# Patient Record
Sex: FEMALE | Race: WHITE | NOT HISPANIC OR LATINO | Employment: OTHER | URBAN - METROPOLITAN AREA
[De-identification: names, ages, dates, MRNs, and addresses within clinical notes are randomized per-mention and may not be internally consistent; named-entity substitution may affect disease eponyms.]

---

## 2017-01-09 ENCOUNTER — TELEPHONE (OUTPATIENT)
Dept: RHEUMATOLOGY | Facility: CLINIC | Age: 72
End: 2017-01-09

## 2017-01-09 NOTE — TELEPHONE ENCOUNTER
Called patient regarding appointment on 4.27.17 needing to be rescheduled due to Dr. Francis being out of clinic that day. Rescheduled appointment to 5.4.17 at 11 for labs and 11.30 to see Dr. MARIE Pt verbalized understanding. Will mail apt slip as well.

## 2017-03-27 ENCOUNTER — TELEPHONE (OUTPATIENT)
Dept: RHEUMATOLOGY | Facility: CLINIC | Age: 72
End: 2017-03-27

## 2017-03-27 DIAGNOSIS — M81.0 OSTEOPOROSIS: Primary | ICD-10-CM

## 2017-05-03 ENCOUNTER — TELEPHONE (OUTPATIENT)
Dept: RHEUMATOLOGY | Facility: CLINIC | Age: 72
End: 2017-05-03

## 2017-05-03 NOTE — TELEPHONE ENCOUNTER
----- Message from Yany Rizvi sent at 5/3/2017 11:15 AM CDT -----  Contact: Patient  Patient called to reschedule her appointment. Her daughter passed away and she can't make it tomorrow but can't wait until July because she needs to come in for a shot. She would like to be worked in for either next week or the following week in the morning. Please call her after lunch to reschedule at [phone].    Thanks,  Yany

## 2017-05-17 ENCOUNTER — TELEPHONE (OUTPATIENT)
Dept: RHEUMATOLOGY | Facility: CLINIC | Age: 72
End: 2017-05-17

## 2017-05-17 NOTE — TELEPHONE ENCOUNTER
Called patient regarding missed prolia appointment on 5/17/17, moved appointment to 1.30 pm for lab on 6/7/17 and 2 pm with Dr. MARIE Pt verbalized understanding. Will mail apt slip as well.

## 2017-06-06 ENCOUNTER — TELEPHONE (OUTPATIENT)
Dept: RHEUMATOLOGY | Facility: CLINIC | Age: 72
End: 2017-06-06

## 2017-06-07 ENCOUNTER — LAB VISIT (OUTPATIENT)
Dept: LAB | Facility: HOSPITAL | Age: 72
End: 2017-06-07
Attending: INTERNAL MEDICINE
Payer: MEDICARE

## 2017-06-07 ENCOUNTER — OFFICE VISIT (OUTPATIENT)
Dept: RHEUMATOLOGY | Facility: CLINIC | Age: 72
End: 2017-06-07
Payer: MEDICARE

## 2017-06-07 ENCOUNTER — TELEPHONE (OUTPATIENT)
Dept: RHEUMATOLOGY | Facility: CLINIC | Age: 72
End: 2017-06-07

## 2017-06-07 VITALS
SYSTOLIC BLOOD PRESSURE: 119 MMHG | HEART RATE: 69 BPM | BODY MASS INDEX: 23.04 KG/M2 | WEIGHT: 121.94 LBS | DIASTOLIC BLOOD PRESSURE: 59 MMHG

## 2017-06-07 DIAGNOSIS — M81.0 OSTEOPOROSIS: ICD-10-CM

## 2017-06-07 DIAGNOSIS — M81.0 AGE-RELATED OSTEOPOROSIS WITHOUT CURRENT PATHOLOGICAL FRACTURE: Primary | ICD-10-CM

## 2017-06-07 DIAGNOSIS — E55.9 VITAMIN D DEFICIENCY: ICD-10-CM

## 2017-06-07 LAB
ALBUMIN SERPL BCP-MCNC: 3.6 G/DL
ALP SERPL-CCNC: 68 U/L
ALT SERPL W/O P-5'-P-CCNC: 11 U/L
ANION GAP SERPL CALC-SCNC: 6 MMOL/L
AST SERPL-CCNC: 15 U/L
BILIRUB SERPL-MCNC: 0.6 MG/DL
BUN SERPL-MCNC: 11 MG/DL
CALCIUM SERPL-MCNC: 9.7 MG/DL
CHLORIDE SERPL-SCNC: 98 MMOL/L
CO2 SERPL-SCNC: 30 MMOL/L
CREAT SERPL-MCNC: 0.8 MG/DL
EST. GFR  (AFRICAN AMERICAN): >60 ML/MIN/1.73 M^2
EST. GFR  (NON AFRICAN AMERICAN): >60 ML/MIN/1.73 M^2
GLUCOSE SERPL-MCNC: 97 MG/DL
POTASSIUM SERPL-SCNC: 4.6 MMOL/L
PROT SERPL-MCNC: 6.9 G/DL
SODIUM SERPL-SCNC: 134 MMOL/L

## 2017-06-07 PROCEDURE — 1126F AMNT PAIN NOTED NONE PRSNT: CPT | Mod: S$GLB,,, | Performed by: INTERNAL MEDICINE

## 2017-06-07 PROCEDURE — 1159F MED LIST DOCD IN RCRD: CPT | Mod: S$GLB,,, | Performed by: INTERNAL MEDICINE

## 2017-06-07 PROCEDURE — 99499 UNLISTED E&M SERVICE: CPT | Mod: S$GLB,,, | Performed by: INTERNAL MEDICINE

## 2017-06-07 PROCEDURE — 96372 THER/PROPH/DIAG INJ SC/IM: CPT | Mod: S$GLB,,, | Performed by: INTERNAL MEDICINE

## 2017-06-07 PROCEDURE — 99999 PR PBB SHADOW E&M-EST. PATIENT-LVL III: CPT | Mod: PBBFAC,,, | Performed by: INTERNAL MEDICINE

## 2017-06-07 PROCEDURE — 99213 OFFICE O/P EST LOW 20 MIN: CPT | Mod: 25,S$GLB,, | Performed by: INTERNAL MEDICINE

## 2017-06-07 NOTE — PROGRESS NOTES
RHEUMATOLOGY CLINIC FOLLOW UP VISIT  Chief complaints:-  To follow up for osteoporosis.     HPI:-  Keyonaoseas Etienne a 71 y.o. pleasant female comes in for a follow up visit with above chief complaints.  She has osteoporosis and degenerative joint disease.  For osteoporosis she is tolerating Prolia without any problems.  Prolia was started because of failure of bisphosphonates.  She denies any jaw pain.  No plans for invasive dental procedures.  No fever or chills.     Review of Systems   Constitutional: Negative for chills, diaphoresis, fever, malaise/fatigue and weight loss.   HENT: Negative for congestion, ear discharge, ear pain, hearing loss, nosebleeds and sore throat.    Eyes: Positive for blurred vision. Negative for double vision, photophobia, discharge and redness.   Respiratory: Negative for cough, hemoptysis, sputum production and shortness of breath.    Cardiovascular: Negative for chest pain, palpitations and claudication.   Gastrointestinal: Negative for abdominal pain, constipation, diarrhea, melena, nausea and vomiting.   Genitourinary: Negative for dysuria, frequency, hematuria and urgency.   Musculoskeletal: Positive for back pain, falls and joint pain. Negative for myalgias and neck pain.   Skin: Negative for itching and rash.   Neurological: Negative for dizziness, tingling, tremors, sensory change, speech change, focal weakness, seizures, loss of consciousness, weakness and headaches.   Endo/Heme/Allergies: Negative for environmental allergies. Does not bruise/bleed easily.   Psychiatric/Behavioral: Positive for memory loss. Negative for hallucinations. The patient has insomnia.        Past Medical History:   Diagnosis Date    Chronic back pain     Dr. Guzman    Fibrocystic breast     Glaucoma     Hyperlipemia     Hypertension     Osteoarthritis     Restless leg syndrome        Past Surgical History:   Procedure Laterality Date     GALLBLADDER SURGERY      HYSTERECTOMY      LUNG SURGERY      TOTAL HIP ARTHROPLASTY      TOTAL KNEE ARTHROPLASTY          Social History   Substance Use Topics    Smoking status: Current Every Day Smoker     Packs/day: 0.50     Types: Cigarettes    Smokeless tobacco: Never Used    Alcohol use No       Family History   Problem Relation Age of Onset    Cancer Mother     Hyperlipidemia Father     Heart failure Father        Allergies   Allergen Reactions    Indomethacin      Other reaction(s): Headache    Latex, Natural Rubber Swelling    Penicillins      Other reaction(s): Vomiting    Sulfa (Sulfonamide Antibiotics) Other (See Comments)     hallucinations    Tolmetin      Other reaction(s): Hives           Physical examination:-    Vitals:    06/07/17 1345   BP: (!) 119/59   Pulse: 69   Weight: 55.3 kg (121 lb 14.6 oz)   PainSc: 0-No pain       Physical Exam   Constitutional: She is oriented to person, place, and time and well-developed, well-nourished, and in no distress. No distress.   HENT:   Head: Normocephalic and atraumatic.   Mouth/Throat: No oropharyngeal exudate.   Eyes: EOM are normal. Pupils are equal, round, and reactive to light. Right eye exhibits no discharge. Left eye exhibits no discharge.   Neck: Normal range of motion. Neck supple.   Cardiovascular: Normal rate and regular rhythm.    Pulmonary/Chest: Effort normal. She exhibits no tenderness.   Abdominal: Soft. There is no tenderness.   Musculoskeletal:   No synovitis over small joints of hands or feet. She has chronic deformities from prior trauma.  Tenderness present in the lower back.  No tenderness over the trochanteric bursa.  Multiple Heberden nodes in DIP joints of both hands.   Neurological: She is alert and oriented to person, place, and time. No cranial nerve deficit.   Skin: Skin is warm. She is not diaphoretic. No erythema.   Psychiatric: Affect and judgment normal.   Nursing note and vitals  reviewed.      Labs:-  Results for MANA YAN (MRN [id number]) as of 4/25/2016 12:33   Ref. Range 7/23/2015 08:40   Vit D, 25-Hydroxy Latest Ref Range: 30 - 96 ng/mL 16 (L)       Assessment/Plans:-  # Osteoporosis:-  Osteoporosis treated by Prolia due to failure of bisphosphonates.  Tolerating the injection very well.  Repeat bone density in 2 years.  Discussed in detail about all adverse effects of the medication including osteonecrosis of the jaw and atypical femoral fractures.  She voiced understanding.  - denosumab (PROLIA) injection 60 mg; Inject 1 mL (60 mg total) into the skin every 6 (six) months.    # Vitamin D deficiency:-  Continue vitamin D supplementation for deficiency.      # RTC in  6 months.      Disclaimer: This note was prepared using voice recognition system and is likely to have sound alike errors and is not proof read.  Please call me with any questions.

## 2017-06-07 NOTE — PROGRESS NOTES
Administered 1 cc Prolia 60mg/cc  to RLQ of abdomen. Pt tolerated well. No acute reaction noted to site. Pt instructed on S/S to report. Pt verbalized understanding.     Lot: [id number]  Exp: 06/19

## 2017-06-07 NOTE — ASSESSMENT & PLAN NOTE
Osteoporosis treated by Prolia due to failure of bisphosphonates.  Tolerating the injection very well.  Repeat bone density in 2 years.  Discussed in detail about all adverse effects of the medication including osteonecrosis of the jaw and atypical femoral fractures.  She voiced understanding.

## 2017-07-06 RX ORDER — ERGOCALCIFEROL 1.25 MG/1
50000 CAPSULE ORAL
Qty: 12 CAPSULE | Refills: 2 | Status: SHIPPED | OUTPATIENT
Start: 2017-07-06 | End: 2018-06-19 | Stop reason: SDUPTHER

## 2017-07-06 NOTE — TELEPHONE ENCOUNTER
----- Message from Rocio Davidson sent at 7/6/2017 11:41 AM CDT -----  1. What is the name of the medication you are requesting? Vitamin D2    2. What is the dose? 1.25 mg    3. How do you take the medication? Orally, topically, etc? Oral    4. How often do you take this medication? 1 x a week    5. Do you need a 30 day or 90 day supply? 90 day    6. How many refills are you requesting? 1    7. What is your preferred pharmacy and location of the pharmacy?    [facility] [place] [address] [place] [address] [address] [facility] [address]  Phone: [phone] Fax: [phone]    8. Who can we contact with further questions? Patient @ [phone]    Thanksjanee

## 2017-08-14 ENCOUNTER — OFFICE VISIT (OUTPATIENT)
Dept: INTERNAL MEDICINE | Facility: CLINIC | Age: 72
End: 2017-08-14
Payer: MEDICARE

## 2017-08-14 VITALS
WEIGHT: 121.5 LBS | HEIGHT: 61 IN | DIASTOLIC BLOOD PRESSURE: 60 MMHG | SYSTOLIC BLOOD PRESSURE: 110 MMHG | BODY MASS INDEX: 22.94 KG/M2 | TEMPERATURE: 98 F | HEART RATE: 60 BPM

## 2017-08-14 DIAGNOSIS — E78.5 HYPERLIPIDEMIA, UNSPECIFIED HYPERLIPIDEMIA TYPE: ICD-10-CM

## 2017-08-14 DIAGNOSIS — Z13.820 OSTEOPOROSIS SCREENING: ICD-10-CM

## 2017-08-14 DIAGNOSIS — I10 ESSENTIAL HYPERTENSION: ICD-10-CM

## 2017-08-14 DIAGNOSIS — Z01.818 PREOP EXAMINATION: ICD-10-CM

## 2017-08-14 DIAGNOSIS — Z12.31 ENCOUNTER FOR SCREENING MAMMOGRAM FOR BREAST CANCER: ICD-10-CM

## 2017-08-14 DIAGNOSIS — D64.9 ANEMIA, UNSPECIFIED TYPE: ICD-10-CM

## 2017-08-14 DIAGNOSIS — H26.9 CATARACT, UNSPECIFIED CATARACT TYPE, UNSPECIFIED LATERALITY: Primary | ICD-10-CM

## 2017-08-14 DIAGNOSIS — E55.9 VITAMIN D DEFICIENCY: ICD-10-CM

## 2017-08-14 PROCEDURE — 1126F AMNT PAIN NOTED NONE PRSNT: CPT | Mod: S$GLB,,, | Performed by: FAMILY MEDICINE

## 2017-08-14 PROCEDURE — 99499 UNLISTED E&M SERVICE: CPT | Mod: S$GLB,,, | Performed by: FAMILY MEDICINE

## 2017-08-14 PROCEDURE — 99999 PR PBB SHADOW E&M-EST. PATIENT-LVL III: CPT | Mod: PBBFAC,,, | Performed by: FAMILY MEDICINE

## 2017-08-14 PROCEDURE — 3078F DIAST BP <80 MM HG: CPT | Mod: S$GLB,,, | Performed by: FAMILY MEDICINE

## 2017-08-14 PROCEDURE — 99214 OFFICE O/P EST MOD 30 MIN: CPT | Mod: S$GLB,,, | Performed by: FAMILY MEDICINE

## 2017-08-14 PROCEDURE — 1159F MED LIST DOCD IN RCRD: CPT | Mod: S$GLB,,, | Performed by: FAMILY MEDICINE

## 2017-08-14 PROCEDURE — 3074F SYST BP LT 130 MM HG: CPT | Mod: S$GLB,,, | Performed by: FAMILY MEDICINE

## 2017-08-14 PROCEDURE — 3008F BODY MASS INDEX DOCD: CPT | Mod: S$GLB,,, | Performed by: FAMILY MEDICINE

## 2017-08-14 NOTE — PROGRESS NOTES
"Subjective:      Patient ID: Keyona Dwyer is a 71 y.o. female.    Chief Complaint: Pre-op Exam    HPI  70 yo female with HTN/Hyperlipidemia/tobacco use/OA/glaucoma and cataracts here for preop clearance.  Scheduled for cataract surgery with Dr. Merritt Metcalf on 8/21/17 for L eye and 9/5/17 for R eye.  Local/MAC anesthesia..  No Cp/SOB.    Bowels normal.  Doing fine aside from visual changes  Lot's of stressors in life, lost her youngest daughter in May suddenly at 53.   with bladder cancer.    Past Medical History:   Diagnosis Date    Cataract     Chronic back pain     Dr. Guzman    Fibrocystic breast     Glaucoma     Hyperlipemia     Hypertension     Osteoarthritis     Restless leg syndrome      Family History   Problem Relation Age of Onset    Cancer Mother     Hyperlipidemia Father     Heart failure Father      Past Surgical History:   Procedure Laterality Date    GALLBLADDER SURGERY      HYSTERECTOMY      LUNG SURGERY      TOTAL HIP ARTHROPLASTY      TOTAL KNEE ARTHROPLASTY       Social History   Substance Use Topics    Smoking status: Current Every Day Smoker     Packs/day: 0.50     Types: Cigarettes    Smokeless tobacco: Never Used    Alcohol use No       /60 (BP Location: Right arm, Patient Position: Sitting, BP Method: Medium (Manual))   Pulse 60   Temp 97.8 °F (36.6 °C) (Tympanic)   Ht 5' 1" (1.549 m)   Wt 55.1 kg (121 lb 7.6 oz)   BMI 22.95 kg/m²     Review of Systems   Constitutional: Negative for activity change, appetite change, chills, diaphoresis, fatigue, fever and unexpected weight change.   HENT: Negative for ear pain, hearing loss, postnasal drip, rhinorrhea and tinnitus.    Eyes: Positive for visual disturbance.   Respiratory: Negative for cough, shortness of breath and wheezing.    Cardiovascular: Negative for chest pain, palpitations and leg swelling.   Gastrointestinal: Negative for abdominal distention, abdominal pain, constipation and diarrhea. "   Genitourinary: Negative for dysuria, frequency, hematuria and urgency.   Skin: Negative.    Neurological: Negative for weakness and headaches.     Objective:     Physical Exam   Constitutional: She is oriented to person, place, and time. She appears well-developed and well-nourished.   HENT:   Mouth/Throat: Oropharynx is clear and moist.   Neck: Normal range of motion. Neck supple.   Cardiovascular: Normal rate, regular rhythm and normal heart sounds.    Pulmonary/Chest: Effort normal and breath sounds normal. No respiratory distress. She has no wheezes.   Abdominal: Soft. Bowel sounds are normal. She exhibits no distension.   Musculoskeletal: She exhibits no edema.   Neurological: She is alert and oriented to person, place, and time.   Skin: Skin is warm and dry.   Psychiatric: She has a normal mood and affect. Her behavior is normal. Judgment and thought content normal.   Nursing note and vitals reviewed.      Lab Results   Component Value Date    WBC 6.04 11/11/2016    HGB 11.3 (L) 11/11/2016    HCT 35.7 (L) 11/11/2016     11/11/2016    CHOL 175 11/11/2016    TRIG 129 11/11/2016    HDL 51 11/11/2016    ALT 11 06/07/2017    AST 15 06/07/2017     (L) 06/07/2017    K 4.6 06/07/2017    CL 98 06/07/2017    CREATININE 0.8 06/07/2017    BUN 11 06/07/2017    CO2 30 (H) 06/07/2017    TSH 0.630 11/11/2016    INR 1.2 10/10/2009       Assessment:     1. Cataract, unspecified cataract type, unspecified laterality    2. Preop examination    3. Essential hypertension    4. Hyperlipidemia, unspecified hyperlipidemia type    5. Anemia, unspecified type    6. Vitamin D deficiency    7. Encounter for screening mammogram for breast cancer    8. Osteoporosis screening       Plan:   Cataract, unspecified cataract type, unspecified laterality    Preop examination    Essential hypertension  -     CBC auto differential; Future; Expected date: 11/14/2017  -     Comprehensive metabolic panel; Future; Expected date:  11/14/2017    Hyperlipidemia, unspecified hyperlipidemia type  -     Lipid panel; Future; Expected date: 11/14/2017  -     TSH; Future; Expected date: 11/14/2017    Anemia, unspecified type  -     CBC auto differential; Future; Expected date: 11/14/2017    Vitamin D deficiency  -     Vitamin D; Future; Expected date: 11/14/2017    Encounter for screening mammogram for breast cancer  -     Mammo Digital Screening Bilat with CAD; Future; Expected date: 08/14/2017    Osteoporosis screening  -     DXA Bone Density Spine And Hip; Future; Expected date: 08/14/2017    BP stable cont current meds  Chronic conditions stable  Cleared for cataract procedure, faxed copy to Dr. Merritt Metcalf  Update Mammo/DEXA  F/u Nov for annual

## 2017-10-04 ENCOUNTER — TELEPHONE (OUTPATIENT)
Dept: RHEUMATOLOGY | Facility: CLINIC | Age: 72
End: 2017-10-04

## 2017-10-04 NOTE — TELEPHONE ENCOUNTER
Called patient regarding appointment on 12/8/17 needing to be rescheduled due to Dr. Francis being out of clinic that day. Rescheduled appointment to 12/8/17 at 11 am for labs and 11.30 am with Marguerite Cutler PA-C. Pt verbalized understanding. Will mail apt slip.

## 2017-11-01 ENCOUNTER — TELEPHONE (OUTPATIENT)
Dept: RADIOLOGY | Facility: HOSPITAL | Age: 72
End: 2017-11-01

## 2017-11-02 ENCOUNTER — HOSPITAL ENCOUNTER (OUTPATIENT)
Dept: RADIOLOGY | Facility: HOSPITAL | Age: 72
Discharge: HOME | End: 2017-11-02
Attending: FAMILY MEDICINE
Payer: MEDICARE

## 2017-11-02 VITALS — WEIGHT: 121 LBS | HEIGHT: 61 IN | BODY MASS INDEX: 22.84 KG/M2

## 2017-11-02 DIAGNOSIS — Z12.31 ENCOUNTER FOR SCREENING MAMMOGRAM FOR BREAST CANCER: ICD-10-CM

## 2017-11-02 PROCEDURE — 77067 SCR MAMMO BI INCL CAD: CPT | Mod: TC

## 2017-11-02 PROCEDURE — 77067 SCR MAMMO BI INCL CAD: CPT | Mod: 26,,, | Performed by: RADIOLOGY

## 2017-11-02 PROCEDURE — 77063 BREAST TOMOSYNTHESIS BI: CPT | Mod: 26,,, | Performed by: RADIOLOGY

## 2017-11-14 ENCOUNTER — TELEPHONE (OUTPATIENT)
Dept: INTERNAL MEDICINE | Facility: CLINIC | Age: 72
End: 2017-11-14

## 2017-11-14 ENCOUNTER — OFFICE VISIT (OUTPATIENT)
Dept: INTERNAL MEDICINE | Facility: CLINIC | Age: 72
End: 2017-11-14
Payer: MEDICARE

## 2017-11-14 VITALS
BODY MASS INDEX: 23.11 KG/M2 | WEIGHT: 122.38 LBS | SYSTOLIC BLOOD PRESSURE: 138 MMHG | HEIGHT: 61 IN | DIASTOLIC BLOOD PRESSURE: 60 MMHG | HEART RATE: 68 BPM | TEMPERATURE: 98 F

## 2017-11-14 DIAGNOSIS — M79.2 NEURALGIA: ICD-10-CM

## 2017-11-14 DIAGNOSIS — M79.662 PAIN IN BOTH LOWER LEGS: ICD-10-CM

## 2017-11-14 DIAGNOSIS — E78.5 HYPERLIPIDEMIA, UNSPECIFIED HYPERLIPIDEMIA TYPE: ICD-10-CM

## 2017-11-14 DIAGNOSIS — M79.661 PAIN IN BOTH LOWER LEGS: ICD-10-CM

## 2017-11-14 DIAGNOSIS — I10 ESSENTIAL HYPERTENSION: ICD-10-CM

## 2017-11-14 DIAGNOSIS — Z00.00 ROUTINE GENERAL MEDICAL EXAMINATION AT A HEALTH CARE FACILITY: Primary | ICD-10-CM

## 2017-11-14 PROCEDURE — 99397 PER PM REEVAL EST PAT 65+ YR: CPT | Mod: S$GLB,,, | Performed by: FAMILY MEDICINE

## 2017-11-14 PROCEDURE — 99999 PR PBB SHADOW E&M-EST. PATIENT-LVL III: CPT | Mod: PBBFAC,,, | Performed by: FAMILY MEDICINE

## 2017-11-14 PROCEDURE — 99499 UNLISTED E&M SERVICE: CPT | Mod: S$GLB,,, | Performed by: FAMILY MEDICINE

## 2017-11-14 NOTE — TELEPHONE ENCOUNTER
Dr. Isidro wants pt to see someone in neurology, after first of the year.  It would not let me book for [facility].  Can someone please call pt and get her scheduled?  Referral in.    Call pt at [phone]

## 2017-11-15 ENCOUNTER — LAB VISIT (OUTPATIENT)
Dept: LAB | Facility: HOSPITAL | Age: 72
End: 2017-11-15
Attending: FAMILY MEDICINE
Payer: MEDICARE

## 2017-11-15 DIAGNOSIS — E55.9 VITAMIN D DEFICIENCY: ICD-10-CM

## 2017-11-15 DIAGNOSIS — I10 ESSENTIAL HYPERTENSION: ICD-10-CM

## 2017-11-15 DIAGNOSIS — D64.9 ANEMIA, UNSPECIFIED TYPE: ICD-10-CM

## 2017-11-15 DIAGNOSIS — E78.5 HYPERLIPIDEMIA, UNSPECIFIED HYPERLIPIDEMIA TYPE: ICD-10-CM

## 2017-11-15 LAB
25(OH)D3+25(OH)D2 SERPL-MCNC: 29 NG/ML
ALBUMIN SERPL BCP-MCNC: 3.3 G/DL
ALP SERPL-CCNC: 48 U/L
ALT SERPL W/O P-5'-P-CCNC: 7 U/L
ANION GAP SERPL CALC-SCNC: 8 MMOL/L
AST SERPL-CCNC: 18 U/L
BASOPHILS # BLD AUTO: 0.07 K/UL
BASOPHILS NFR BLD: 1 %
BILIRUB SERPL-MCNC: 0.5 MG/DL
BUN SERPL-MCNC: 9 MG/DL
CALCIUM SERPL-MCNC: 9 MG/DL
CHLORIDE SERPL-SCNC: 99 MMOL/L
CHOLEST SERPL-MCNC: 187 MG/DL
CHOLEST/HDLC SERPL: 3.2 {RATIO}
CO2 SERPL-SCNC: 26 MMOL/L
CREAT SERPL-MCNC: 0.7 MG/DL
DIFFERENTIAL METHOD: ABNORMAL
EOSINOPHIL # BLD AUTO: 0.2 K/UL
EOSINOPHIL NFR BLD: 2.2 %
ERYTHROCYTE [DISTWIDTH] IN BLOOD BY AUTOMATED COUNT: 13.7 %
EST. GFR  (AFRICAN AMERICAN): >60 ML/MIN/1.73 M^2
EST. GFR  (NON AFRICAN AMERICAN): >60 ML/MIN/1.73 M^2
GLUCOSE SERPL-MCNC: 72 MG/DL
HCT VFR BLD AUTO: 35.3 %
HDLC SERPL-MCNC: 59 MG/DL
HDLC SERPL: 31.6 %
HGB BLD-MCNC: 10.9 G/DL
IMM GRANULOCYTES # BLD AUTO: 0.01 K/UL
IMM GRANULOCYTES NFR BLD AUTO: 0.1 %
LDLC SERPL CALC-MCNC: 108.8 MG/DL
LYMPHOCYTES # BLD AUTO: 3.2 K/UL
LYMPHOCYTES NFR BLD: 46.8 %
MCH RBC QN AUTO: 32.1 PG
MCHC RBC AUTO-ENTMCNC: 30.9 G/DL
MCV RBC AUTO: 104 FL
MONOCYTES # BLD AUTO: 0.4 K/UL
MONOCYTES NFR BLD: 5.8 %
NEUTROPHILS # BLD AUTO: 3 K/UL
NEUTROPHILS NFR BLD: 44.1 %
NONHDLC SERPL-MCNC: 128 MG/DL
NRBC BLD-RTO: 0 /100 WBC
PLATELET # BLD AUTO: 241 K/UL
PMV BLD AUTO: 8.5 FL
POTASSIUM SERPL-SCNC: 4.9 MMOL/L
PROT SERPL-MCNC: 6.5 G/DL
RBC # BLD AUTO: 3.4 M/UL
SODIUM SERPL-SCNC: 133 MMOL/L
TRIGL SERPL-MCNC: 96 MG/DL
TSH SERPL DL<=0.005 MIU/L-ACNC: 0.74 UIU/ML
WBC # BLD AUTO: 6.77 K/UL

## 2017-11-15 PROCEDURE — 80061 LIPID PANEL: CPT

## 2017-11-15 PROCEDURE — 36415 COLL VENOUS BLD VENIPUNCTURE: CPT | Mod: PO

## 2017-11-15 PROCEDURE — 85025 COMPLETE CBC W/AUTO DIFF WBC: CPT

## 2017-11-15 PROCEDURE — 84443 ASSAY THYROID STIM HORMONE: CPT

## 2017-11-15 PROCEDURE — 80053 COMPREHEN METABOLIC PANEL: CPT

## 2017-11-15 PROCEDURE — 82306 VITAMIN D 25 HYDROXY: CPT

## 2017-11-16 NOTE — PROGRESS NOTES
"Subjective:      Patient ID: Keyona Dwyer is a 72 y.o. female.    Chief Complaint:  Update annual    HPI  71 yo female here to update annual visit.  She is also followed by pain management, has pain pump in place.  They have asked that she see Neuro for leg pain, neuralgia.  She went to [facility] clinic and had labs but had a bad experience and didn't follow up.  Has a lot going on with her , he is getting treated for bladder cancer.  No CP/SOB  Bowels moving ok  Mood stable on her meds  No falls    Past Medical History:   Diagnosis Date    Cataract     Chronic back pain     Dr. Guzman    Fibrocystic breast     Glaucoma     Hyperlipemia     Hypertension     Osteoarthritis     Restless leg syndrome      Family History   Problem Relation Age of Onset    Cancer Mother     Breast cancer Mother     Hyperlipidemia Father     Heart failure Father      Past Surgical History:   Procedure Laterality Date    GALLBLADDER SURGERY      HYSTERECTOMY      LUNG SURGERY      TOTAL HIP ARTHROPLASTY      TOTAL KNEE ARTHROPLASTY       Social History   Substance Use Topics    Smoking status: Current Every Day Smoker     Packs/day: 0.50     Types: Cigarettes    Smokeless tobacco: Never Used    Alcohol use No       /60   Pulse 68   Temp 97.7 °F (36.5 °C) (Tympanic)   Ht 5' 1" (1.549 m)   Wt 55.5 kg (122 lb 5.7 oz)   BMI 23.12 kg/m²     Review of Systems   Constitutional: Negative for activity change, appetite change, chills, diaphoresis, fatigue, fever and unexpected weight change.   HENT: Negative for ear pain, hearing loss, postnasal drip, rhinorrhea and tinnitus.    Eyes: Negative for visual disturbance.   Respiratory: Negative for cough, shortness of breath and wheezing.    Cardiovascular: Negative for chest pain, palpitations and leg swelling.   Gastrointestinal: Negative for abdominal distention, abdominal pain, constipation and diarrhea.   Genitourinary: Negative for dysuria, frequency, " hematuria and urgency.   Musculoskeletal: Positive for arthralgias and back pain. Negative for joint swelling.   Skin: Negative.    Neurological: Positive for weakness. Negative for headaches.   Hematological: Negative for adenopathy.   Psychiatric/Behavioral: Negative for confusion and decreased concentration.     Objective:     Physical Exam   Constitutional: She is oriented to person, place, and time. She appears well-developed and well-nourished. No distress.   HENT:   Right Ear: External ear normal.   Left Ear: External ear normal.   Nose: Nose normal.   Mouth/Throat: Oropharynx is clear and moist.   Eyes: Pupils are equal, round, and reactive to light.   Neck: Normal range of motion. Neck supple.   Cardiovascular: Normal rate, regular rhythm and normal heart sounds.    Pulmonary/Chest: Effort normal and breath sounds normal. No respiratory distress. She has no wheezes. She has no rales.   Abdominal: Soft. Bowel sounds are normal. She exhibits no distension. There is no tenderness.   Musculoskeletal: She exhibits no edema.   Neurological: She is alert and oriented to person, place, and time. No cranial nerve deficit.   Skin: Skin is warm and dry. No rash noted.   Psychiatric: She has a normal mood and affect. Her behavior is normal. Judgment and thought content normal.       Lab Results   Component Value Date    WBC 6.04 11/11/2016    HGB 11.3 (L) 11/11/2016    HCT 35.7 (L) 11/11/2016     11/11/2016    CHOL 175 11/11/2016    TRIG 129 11/11/2016    HDL 51 11/11/2016    ALT 11 06/07/2017    AST 15 06/07/2017     (L) 06/07/2017    K 4.6 06/07/2017    CL 98 06/07/2017    CREATININE 0.8 06/07/2017    BUN 11 06/07/2017    CO2 30 (H) 06/07/2017    TSH 0.630 11/11/2016    INR 1.2 10/10/2009       Assessment:     1. Routine general medical examination at a health care facility    2. Essential hypertension    3. Hyperlipidemia, unspecified hyperlipidemia type    4. Pain in both lower legs    5. Neuralgia        Plan:   Routine general medical examination at a health care facility    Essential hypertension    Hyperlipidemia, unspecified hyperlipidemia type    Pain in both lower legs  -     Ambulatory consult to Neurology    Neuralgia  -     Ambulatory consult to Neurology    Update labs  Cont current meds, adjust if needed after lab review  Neuro consult  Cont high dose vit D  Reviewed outside labs done, recommend taking a complex B vitamin as well  F/u annually and PRN

## 2017-11-17 ENCOUNTER — TELEPHONE (OUTPATIENT)
Dept: INTERNAL MEDICINE | Facility: CLINIC | Age: 72
End: 2017-11-17

## 2017-11-17 DIAGNOSIS — E53.8 B12 DEFICIENCY: ICD-10-CM

## 2017-11-17 DIAGNOSIS — D64.9 ANEMIA, UNSPECIFIED TYPE: Primary | ICD-10-CM

## 2017-11-22 ENCOUNTER — LAB VISIT (OUTPATIENT)
Dept: LAB | Facility: HOSPITAL | Age: 72
End: 2017-11-22
Attending: FAMILY MEDICINE
Payer: MEDICARE

## 2017-11-22 DIAGNOSIS — E53.8 B12 DEFICIENCY: ICD-10-CM

## 2017-11-22 DIAGNOSIS — D64.9 ANEMIA, UNSPECIFIED TYPE: ICD-10-CM

## 2017-11-22 LAB
FERRITIN SERPL-MCNC: 72 NG/ML
IRON SERPL-MCNC: 75 UG/DL
SATURATED IRON: 22 %
TOTAL IRON BINDING CAPACITY: 348 UG/DL
TRANSFERRIN SERPL-MCNC: 235 MG/DL
VIT B12 SERPL-MCNC: 465 PG/ML

## 2017-11-22 PROCEDURE — 36415 COLL VENOUS BLD VENIPUNCTURE: CPT | Mod: PO

## 2017-11-22 PROCEDURE — 82607 VITAMIN B-12: CPT

## 2017-11-22 PROCEDURE — 82728 ASSAY OF FERRITIN: CPT

## 2017-11-22 PROCEDURE — 83540 ASSAY OF IRON: CPT

## 2017-11-30 DIAGNOSIS — I10 ESSENTIAL HYPERTENSION: Chronic | ICD-10-CM

## 2017-11-30 RX ORDER — AMLODIPINE BESYLATE 5 MG/1
TABLET ORAL
Qty: 30 TABLET | Refills: 11 | Status: SHIPPED | OUTPATIENT
Start: 2017-11-30 | End: 2018-11-29 | Stop reason: SDUPTHER

## 2017-11-30 RX ORDER — LISINOPRIL 40 MG/1
TABLET ORAL
Qty: 30 TABLET | Refills: 11 | Status: SHIPPED | OUTPATIENT
Start: 2017-11-30 | End: 2018-11-29 | Stop reason: SDUPTHER

## 2017-11-30 NOTE — TELEPHONE ENCOUNTER
----- Message from Wendie Martínez sent at 11/30/2017 10:03 AM CST -----  1. What is the name of the medication you are requesting? Lisinopril   2. What is the dose? 40  3. How do you take the medication? Orally, topically, etc? orally  4. How often do you take this medication? Once a day  5. Do you need a 30 day or 90 day supply? 30 day  6. How many refills are you requesting? Left up to provider  7. What is your preferred pharmacy and location of the pharmacy? [facility]  [phone]  8. Who can we contact with further questions? Patient [phone]    1. What is the name of the medication you are requesting? Amlodipine Besylate  2. What is the dose? 5 mgs  3. How do you take the medication? Orally, topically, etc? orally  4. How often do you take this medication? Once a day  5. Do you need a 30 day or 90 day supply? 30 day  6. How many refills are you requesting? Left up to provide  7. What is your preferred pharmacy and location of the pharmacy? [facility] [phone]  8. Who can we contact with further questions? Patient [phone]                                                 reyes

## 2017-12-13 ENCOUNTER — LAB VISIT (OUTPATIENT)
Dept: LAB | Facility: HOSPITAL | Age: 72
End: 2017-12-13
Attending: INTERNAL MEDICINE
Payer: MEDICARE

## 2017-12-13 ENCOUNTER — OFFICE VISIT (OUTPATIENT)
Dept: RHEUMATOLOGY | Facility: CLINIC | Age: 72
End: 2017-12-13
Payer: MEDICARE

## 2017-12-13 VITALS
WEIGHT: 121.06 LBS | HEART RATE: 78 BPM | BODY MASS INDEX: 22.87 KG/M2 | SYSTOLIC BLOOD PRESSURE: 147 MMHG | DIASTOLIC BLOOD PRESSURE: 69 MMHG

## 2017-12-13 DIAGNOSIS — E55.9 VITAMIN D DEFICIENCY: ICD-10-CM

## 2017-12-13 DIAGNOSIS — M81.0 AGE-RELATED OSTEOPOROSIS WITHOUT CURRENT PATHOLOGICAL FRACTURE: Primary | ICD-10-CM

## 2017-12-13 DIAGNOSIS — M81.0 OSTEOPOROSIS: ICD-10-CM

## 2017-12-13 DIAGNOSIS — Z51.81 MEDICATION MONITORING ENCOUNTER: ICD-10-CM

## 2017-12-13 LAB
ALBUMIN SERPL BCP-MCNC: 3.6 G/DL
ALP SERPL-CCNC: 50 U/L
ALT SERPL W/O P-5'-P-CCNC: 11 U/L
ANION GAP SERPL CALC-SCNC: 9 MMOL/L
AST SERPL-CCNC: 19 U/L
BILIRUB SERPL-MCNC: 0.8 MG/DL
BUN SERPL-MCNC: 10 MG/DL
CALCIUM SERPL-MCNC: 9.7 MG/DL
CHLORIDE SERPL-SCNC: 97 MMOL/L
CO2 SERPL-SCNC: 27 MMOL/L
CREAT SERPL-MCNC: 0.8 MG/DL
EST. GFR  (AFRICAN AMERICAN): >60 ML/MIN/1.73 M^2
EST. GFR  (NON AFRICAN AMERICAN): >60 ML/MIN/1.73 M^2
GLUCOSE SERPL-MCNC: 130 MG/DL
POTASSIUM SERPL-SCNC: 5.2 MMOL/L
PROT SERPL-MCNC: 6.9 G/DL
SODIUM SERPL-SCNC: 133 MMOL/L

## 2017-12-13 PROCEDURE — 99499 UNLISTED E&M SERVICE: CPT | Mod: S$GLB,,, | Performed by: PHYSICIAN ASSISTANT

## 2017-12-13 PROCEDURE — 36415 COLL VENOUS BLD VENIPUNCTURE: CPT | Mod: PO

## 2017-12-13 PROCEDURE — 96372 THER/PROPH/DIAG INJ SC/IM: CPT | Mod: S$GLB,,, | Performed by: INTERNAL MEDICINE

## 2017-12-13 PROCEDURE — 99999 PR PBB SHADOW E&M-EST. PATIENT-LVL IV: CPT | Mod: PBBFAC,,, | Performed by: PHYSICIAN ASSISTANT

## 2017-12-13 PROCEDURE — 80053 COMPREHEN METABOLIC PANEL: CPT | Mod: PO

## 2017-12-13 PROCEDURE — 99214 OFFICE O/P EST MOD 30 MIN: CPT | Mod: 25,S$GLB,, | Performed by: PHYSICIAN ASSISTANT

## 2017-12-13 RX ORDER — VITAMIN B COMPLEX
1 CAPSULE ORAL DAILY
COMMUNITY

## 2017-12-13 NOTE — PROGRESS NOTES
Subjective:       Patient ID: Keyona Dwyer is a 72 y.o. female.    Chief Complaint: Osteoporosis    Keyona is here for f/u. She has osteoporosis and OA of multiple joints. 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  She tolerates prolia well.  She also has low vit D on weekly supplement.  No falls or fractures.  She uses a walker to ambulate.  She had a dexa done last month showing improved bmd in her spine. She has h/o bilateral total hip replacements. She has OA in her hands dion prosper 1 cmc joints.  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  Overall she is doing well today and has no complaints.       Review of Systems   Constitutional: Negative for chills, fatigue and fever.   HENT: Negative for mouth sores, rhinorrhea and sore throat.         False teeth   Eyes: Negative for pain and redness.   Respiratory: Negative for cough and shortness of breath.    Cardiovascular: Negative for chest pain.   Gastrointestinal: Negative for abdominal pain, constipation, diarrhea, nausea and vomiting.   Genitourinary: Negative for dysuria and hematuria.   Musculoskeletal: Positive for arthralgias and gait problem. Negative for joint swelling and myalgias.   Skin: Negative for rash.   Neurological: Negative for weakness, numbness and headaches.   Psychiatric/Behavioral: The patient is not nervous/anxious.          Objective:   BP (!) 147/69   Pulse 78   Wt 54.9 kg (121 lb 0.5 oz)   BMI 22.87 kg/m²      Physical Exam   Constitutional: She is oriented to person, place, and time and well-developed, well-nourished, and in no distress.   HENT:   Head: Normocephalic and atraumatic.   Eyes: Pupils are equal, round, and reactive to light. Right eye exhibits no discharge.   Neck: Normal range of motion.   Cardiovascular: Normal rate, regular rhythm and normal heart sounds.  Exam reveals no friction rub.    Pulmonary/Chest: Effort normal and breath sounds normal. No respiratory  distress.   Abdominal: Soft. She exhibits no distension. There is no tenderness.   Lymphadenopathy:     She has no cervical adenopathy.   Neurological: She is alert and oriented to person, place, and time.   Skin: No rash noted. No erythema.     Psychiatric: Mood normal.   Musculoskeletal: Normal range of motion. She exhibits no edema or deformity.   OA changes to prosper hands dion prosper 1 cmc joints  Left elbow stuck at 50-60 degrees flexion s/p trauma (1969)  Walks with walker         No results found for this or any previous visit (from the past 168 hour(s)).   Dexa 8.2015: spine (L1-2) -3.6, radius total -5.3, Rad 33% -4.8  dexa 11/2017: spine -3.3, spine bmd improved by 7.8%  Assessment:       1. Age-related osteoporosis without current pathological fracture    2. Vitamin D deficiency    3. Medication monitoring encounter        Impression:  Osteoporosis: on prolia q 6 mos, failed bisphosphonates; improved spine bmd last month on dexa 11/2017    Vit D def: taking weekly vit D, level 29 last month    Medication monitoring; no issue with prolia    Plan:       prolia today and cont q 6 mos  Cont weekly vit D  Cont ca in diet   Repeat dexa 11/2019 or after    rtc in 6 months with prolia and cmp

## 2017-12-13 NOTE — LETTER
December 13, 2017      Aman Francis MD  [address] [place] [address]           [facility] - Rheumatology  [address] [place] [address]  Phone: [phone]  Fax: [phone]          Patient: Keyona Dwyer   MR Number: [id number]   YOB: 1945   Date of Visit: 12/13/2017       Dear Dr. Aman Francis:    Thank you for referring Keyona Dwyer to me for evaluation. Attached you will find relevant portions of my assessment and plan of care.    If you have questions, please do not hesitate to call me. I look forward to following Keyona Dwyer along with you.    Sincerely,    ROOSEVELT Edmondosure  CC:  No Recipients    If you would like to receive this communication electronically, please contact [email or web address] or [phone] to request more information on [employer] access.    For providers and/or their staff who would like to refer a patient to [facility], please contact us through our one-stop-shop provider referral line, [facility], at [phone].    If you feel you have received this communication in error or would no longer like to receive these types of communications, please e-mail [email or web address]

## 2017-12-13 NOTE — PROGRESS NOTES
Administered 1cc Prolia 60mg/cc to LLQ of abdomen. Pt. Tolerated well. No acute reaction noted to site. Pt. Instructed on S/S of reaction to report. Pt. Verbalized understanding.    Lot:[id number]  Exp:01/20  Manu:[email or web address]

## 2017-12-13 NOTE — PATIENT INSTRUCTIONS
prolia working, improved bone strength spine    Cont vit D weekly     Cont calcium in your diet daily

## 2018-01-16 ENCOUNTER — OFFICE VISIT (OUTPATIENT)
Dept: INTERNAL MEDICINE | Facility: CLINIC | Age: 73
End: 2018-01-16
Payer: MEDICARE

## 2018-01-16 VITALS
WEIGHT: 118.81 LBS | DIASTOLIC BLOOD PRESSURE: 70 MMHG | HEIGHT: 61 IN | OXYGEN SATURATION: 97 % | BODY MASS INDEX: 22.43 KG/M2 | SYSTOLIC BLOOD PRESSURE: 142 MMHG | HEART RATE: 79 BPM

## 2018-01-16 DIAGNOSIS — I10 ESSENTIAL HYPERTENSION: Chronic | ICD-10-CM

## 2018-01-16 DIAGNOSIS — H40.9 GLAUCOMA OF BOTH EYES, UNSPECIFIED GLAUCOMA TYPE: ICD-10-CM

## 2018-01-16 DIAGNOSIS — M81.0 AGE-RELATED OSTEOPOROSIS WITHOUT CURRENT PATHOLOGICAL FRACTURE: ICD-10-CM

## 2018-01-16 DIAGNOSIS — E55.9 VITAMIN D DEFICIENCY: ICD-10-CM

## 2018-01-16 DIAGNOSIS — M54.16 LUMBAR RADICULOPATHY: ICD-10-CM

## 2018-01-16 DIAGNOSIS — E78.5 HYPERLIPIDEMIA, UNSPECIFIED HYPERLIPIDEMIA TYPE: Chronic | ICD-10-CM

## 2018-01-16 DIAGNOSIS — Z00.00 ENCOUNTER FOR PREVENTIVE HEALTH EXAMINATION: Primary | ICD-10-CM

## 2018-01-16 DIAGNOSIS — F11.20 UNCOMPLICATED OPIOID DEPENDENCE: ICD-10-CM

## 2018-01-16 DIAGNOSIS — F17.200 TOBACCO DEPENDENCE SYNDROME: Chronic | ICD-10-CM

## 2018-01-16 PROBLEM — Z51.81 MEDICATION MONITORING ENCOUNTER: Status: RESOLVED | Noted: 2017-12-13 | Resolved: 2018-01-16

## 2018-01-16 PROCEDURE — 99999 PR PBB SHADOW E&M-EST. PATIENT-LVL IV: CPT | Mod: PBBFAC,,, | Performed by: NURSE PRACTITIONER

## 2018-01-16 PROCEDURE — G0439 PPPS, SUBSEQ VISIT: HCPCS | Mod: S$GLB,,, | Performed by: NURSE PRACTITIONER

## 2018-01-16 PROCEDURE — 99499 UNLISTED E&M SERVICE: CPT | Mod: S$GLB,,, | Performed by: NURSE PRACTITIONER
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
## 2018-01-16 NOTE — PATIENT INSTRUCTIONS
Counseling and Referral of Other Preventative  (Italic type indicates deductible and co-insurance are waived)    Patient Name: Keyona Dwyer  Today's Date: 1/16/2018    Health Maintenance       Date Due Completion Date    Zoster Vaccine 11/13/2003 ---    Mammogram 11/02/2018 11/2/2017    Override on 4/30/2013: Done    Lipid Panel 11/15/2018 11/15/2017    Override on 11/11/2016: Done    DEXA SCAN 11/02/2019 11/2/2017    Override on 4/30/2013: Done (REPEAT 2 YRS)    Colonoscopy 11/11/2021 11/11/2016 (Declined)    Override on 11/11/2016: Declined    TETANUS VACCINE 02/13/2025 2/13/2015        No orders of the defined types were placed in this encounter.    The following information is provided to all patients.  This information is to help you find resources for any of the problems found today that may be affecting your health:                Living healthy guide: www.[facility].[place]      Understanding Diabetes: www.diabetes.org      Eating healthy: www.cdc.gov/healthyweight      CDC home safety checklist: www.cdc.gov/steadi/patient.html      Agency on Aging: www.goea.[place]      Alcoholics anonymous (AA): www.aa.org      Physical Activity: www.sheryl.nih.gov/fc4kwot      Tobacco use: www.quitwithusla.org       Counseling and Referral of Other Preventative  (Italic type indicates deductible and co-insurance are waived)    Patient Name: Keyona Dwyer  Today's Date: 1/16/2018    Health Maintenance       Date Due Completion Date    Zoster Vaccine 11/13/2003 ---    Mammogram 11/02/2018 11/2/2017    Override on 4/30/2013: Done    Lipid Panel 11/15/2018 11/15/2017    Override on 11/11/2016: Done    DEXA SCAN 11/02/2019 11/2/2017    Override on 4/30/2013: Done (REPEAT 2 YRS)    Colonoscopy 11/11/2021 11/11/2016 (Declined)    Override on 11/11/2016: Declined    TETANUS VACCINE 02/13/2025 2/13/2015        No orders of the defined types were placed in this encounter.    The following information is provided to all  patients.  This information is to help you find resources for any of the problems found today that may be affecting your health:                Living healthy guide: www.[facility].[place]      Understanding Diabetes: www.diabetes.org      Eating healthy: www.cdc.gov/healthyweight      CDC home safety checklist: www.cdc.gov/steadi/patient.html      Agency on Aging: www.goea.[place]      Alcoholics anonymous (AA): www.aa.org      Physical Activity: www.sheryl.nih.gov/qv2ognp      Tobacco use: www.quitwithusla.org

## 2018-01-16 NOTE — PROGRESS NOTES
I offered to discuss end of life issues, including information on how to make advance directives that the patient could use to name someone who would make medical decisions on their behalf if they became too ill to make themselves.    ___Patient declined  _X_Patient is interested, I provided paper work and offered to discuss. Pt states she has a living will but has to review

## 2018-01-16 NOTE — Clinical Note
,your patient was seen today for a HRA visit.  Please review non urgent #9 tobacco use, otherwise no  new findings.  I have included a copy of my visit note,feel free to contact me with any questions.  Thank you for allowing me to participate in the care of your patients.  Afua Snyder NP

## 2018-03-08 ENCOUNTER — HOSPITAL ENCOUNTER (OUTPATIENT)
Dept: RADIOLOGY | Facility: HOSPITAL | Age: 73
Discharge: HOME | End: 2018-03-08
Attending: PHYSICIAN ASSISTANT
Payer: MEDICARE

## 2018-03-08 ENCOUNTER — OFFICE VISIT (OUTPATIENT)
Dept: INTERNAL MEDICINE | Facility: CLINIC | Age: 73
End: 2018-03-08
Payer: MEDICARE

## 2018-03-08 VITALS
SYSTOLIC BLOOD PRESSURE: 118 MMHG | DIASTOLIC BLOOD PRESSURE: 70 MMHG | WEIGHT: 113.31 LBS | OXYGEN SATURATION: 94 % | BODY MASS INDEX: 20.85 KG/M2 | HEART RATE: 90 BPM | HEIGHT: 62 IN | TEMPERATURE: 99 F

## 2018-03-08 DIAGNOSIS — R06.2 WHEEZING: ICD-10-CM

## 2018-03-08 DIAGNOSIS — J84.9 INTERSTITIAL PNEUMONIA: ICD-10-CM

## 2018-03-08 DIAGNOSIS — F17.200 SMOKER: ICD-10-CM

## 2018-03-08 DIAGNOSIS — R06.2 WHEEZING: Primary | ICD-10-CM

## 2018-03-08 PROCEDURE — 71046 X-RAY EXAM CHEST 2 VIEWS: CPT | Mod: TC,FY,PO

## 2018-03-08 PROCEDURE — 3074F SYST BP LT 130 MM HG: CPT | Mod: S$GLB,,, | Performed by: PHYSICIAN ASSISTANT

## 2018-03-08 PROCEDURE — 99214 OFFICE O/P EST MOD 30 MIN: CPT | Mod: S$GLB,,, | Performed by: PHYSICIAN ASSISTANT

## 2018-03-08 PROCEDURE — 99999 PR PBB SHADOW E&M-EST. PATIENT-LVL IV: CPT | Mod: PBBFAC,,, | Performed by: PHYSICIAN ASSISTANT

## 2018-03-08 PROCEDURE — 3078F DIAST BP <80 MM HG: CPT | Mod: S$GLB,,, | Performed by: PHYSICIAN ASSISTANT

## 2018-03-08 PROCEDURE — 99499 UNLISTED E&M SERVICE: CPT | Mod: S$GLB,,, | Performed by: PHYSICIAN ASSISTANT

## 2018-03-08 PROCEDURE — 71046 X-RAY EXAM CHEST 2 VIEWS: CPT | Mod: 26,,, | Performed by: RADIOLOGY

## 2018-03-08 RX ORDER — AZITHROMYCIN 250 MG/1
TABLET, FILM COATED ORAL
Qty: 6 TABLET | Refills: 0 | Status: SHIPPED | OUTPATIENT
Start: 2018-03-08 | End: 2018-03-13 | Stop reason: ALTCHOICE

## 2018-03-08 RX ORDER — BENZONATATE 200 MG/1
200 CAPSULE ORAL 3 TIMES DAILY PRN
Qty: 30 CAPSULE | Refills: 0 | Status: SHIPPED | OUTPATIENT
Start: 2018-03-08 | End: 2018-03-18

## 2018-03-08 NOTE — PROGRESS NOTES
Subjective:       Patient ID: Keyona Dwyer is a 72 y.o. female.    Chief Complaint: Sinusitis and Cough    Cough   This is a new problem. The current episode started in the past 7 days. The problem has been unchanged. The problem occurs every few minutes. The cough is productive of sputum. Associated symptoms include a fever (over the last week, has resolved. ), myalgias, nasal congestion, postnasal drip and wheezing. Pertinent negatives include no chest pain, chills, ear congestion, ear pain, headaches, heartburn, hemoptysis, rash, rhinorrhea, sore throat, shortness of breath, sweats or weight loss. Nothing aggravates the symptoms. Risk factors for lung disease include smoking/tobacco exposure. She has tried OTC cough suppressant for the symptoms. The treatment provided no relief.       Health Maintenance Due   Topic Date Due    Zoster Vaccine  11/13/2003       Past Medical History:   Diagnosis Date    Cataract     Chronic back pain     Dr. Guzman    Fibrocystic breast     Glaucoma     Hyperlipemia     Hypertension     Osteoarthritis     Restless leg syndrome     Trouble in sleeping        Current Outpatient Prescriptions   Medication Sig Dispense Refill    amLODIPine (NORVASC) 5 MG tablet TAKE (1) TABLET by mouth DAILY FOR BLOOD PRESSURE. 30 tablet 11    b complex vitamins capsule Take 1 capsule by mouth once daily.      duloxetine (CYMBALTA) 30 MG capsule Take 30 mg by mouth once daily.      ergocalciferol (ERGOCALCIFEROL) 50,000 unit Cap Take 1 capsule (50,000 Units total) by mouth every 7 days. 12 capsule 2    gabapentin (NEURONTIN) 600 MG tablet Take 600 mg by mouth 2 (two) times daily.      hydrocodone-acetaminophen 10-325mg (NORCO)  mg Tab Take 1 tablet by mouth every 6 (six) hours as needed.      lisinopril (PRINIVIL,ZESTRIL) 40 MG tablet TAKE (1) TABLET by mouth DAILY FOR BLOOD PRESSURE. 30 tablet 11    ON-Q PAIN PUMP by Misc.(Non-Drug; Combo Route) route.       "azithromycin (ZITHROMAX Z-VINCE) 250 MG tablet Take as directed, 2 tab on Day 1, 1 tab each additional day 2-4 6 tablet 0    benzonatate (TESSALON) 200 MG capsule Take 1 capsule (200 mg total) by mouth 3 (three) times daily as needed for Cough. 30 capsule 0     Current Facility-Administered Medications   Medication Dose Route Frequency Provider Last Rate Last Dose    denosumab (PROLIA) injection 60 mg  60 mg Subcutaneous Q6 Months Aman Francis MD   60 mg at 12/13/17 1508       Review of Systems   Constitutional: Positive for fever (over the last week, has resolved. ). Negative for chills and weight loss.   HENT: Positive for postnasal drip. Negative for ear pain, rhinorrhea and sore throat.    Respiratory: Positive for cough and wheezing. Negative for hemoptysis and shortness of breath.    Cardiovascular: Negative for chest pain.   Gastrointestinal: Negative for heartburn.   Musculoskeletal: Positive for myalgias.   Skin: Negative for rash.   Neurological: Negative for headaches.       Objective:   /70   Pulse 90   Temp 98.9 °F (37.2 °C) (Tympanic)   Ht 5' 2" (1.575 m)   Wt 51.4 kg (113 lb 5.1 oz)   SpO2 (!) 94%   BMI 20.73 kg/m²      Physical Exam   Constitutional: She is oriented to person, place, and time. She appears well-developed and well-nourished. No distress.   HENT:   Head: Normocephalic and atraumatic.   Right Ear: Hearing, tympanic membrane, external ear and ear canal normal.   Left Ear: Hearing, tympanic membrane, external ear and ear canal normal.   Nose: No sinus tenderness. Right sinus exhibits no maxillary sinus tenderness and no frontal sinus tenderness. Left sinus exhibits no maxillary sinus tenderness and no frontal sinus tenderness.   Mouth/Throat: Uvula is midline, oropharynx is clear and moist and mucous membranes are normal. No oropharyngeal exudate, posterior oropharyngeal edema, posterior oropharyngeal erythema or tonsillar abscesses.   Eyes: Conjunctivae are normal. " Pupils are equal, round, and reactive to light.   Neck: Normal range of motion. Neck supple.   Cardiovascular: Normal rate, regular rhythm and normal heart sounds.    Pulmonary/Chest: Effort normal. No respiratory distress. She has wheezes. She has no rales. She exhibits no tenderness.   Neurological: She is alert and oriented to person, place, and time.   Skin: Skin is warm.   Psychiatric: She has a normal mood and affect. Her behavior is normal. Judgment and thought content normal.   Vitals reviewed.        Lab Results   Component Value Date    WBC 6.77 11/15/2017    HGB 10.9 (L) 11/15/2017    HCT 35.3 (L) 11/15/2017     11/15/2017    CHOL 187 11/15/2017    TRIG 96 11/15/2017    HDL 59 11/15/2017    ALT 11 12/13/2017    AST 19 12/13/2017     (L) 12/13/2017    K 5.2 (H) 12/13/2017    CL 97 12/13/2017    CREATININE 0.8 12/13/2017    BUN 10 12/13/2017    CO2 27 12/13/2017    TSH 0.740 11/15/2017    INR 1.2 10/10/2009       Assessment:       1. Wheezing    2. Smoker    3. Interstitial pneumonia        Plan:   Wheezing  -     X-Ray Chest PA And Lateral; Future; Expected date: 03/08/2018  -     Ambulatory referral to Pulmonology    Smoker  -     X-Ray Chest PA And Lateral; Future; Expected date: 03/08/2018  -     Ambulatory referral to Pulmonology    Interstitial pneumonia  Comments:  unsure if acute or chronic given long term smoking   Zpack, f/u with pulmonary   And 1 week follow up with myself or PCP for recheck.   Orders:  -     Ambulatory referral to Pulmonology    Other orders  -     azithromycin (ZITHROMAX Z-VINCE) 250 MG tablet; Take as directed, 2 tab on Day 1, 1 tab each additional day 2-4  Dispense: 6 tablet; Refill: 0  -     benzonatate (TESSALON) 200 MG capsule; Take 1 capsule (200 mg total) by mouth 3 (three) times daily as needed for Cough.  Dispense: 30 capsule; Refill: 0      ER if symptoms worsen or fail to improve.   No Follow-up on file.

## 2018-03-13 ENCOUNTER — LAB VISIT (OUTPATIENT)
Dept: LAB | Facility: HOSPITAL | Age: 73
End: 2018-03-13
Attending: INTERNAL MEDICINE
Payer: MEDICARE

## 2018-03-13 ENCOUNTER — OFFICE VISIT (OUTPATIENT)
Dept: PULMONOLOGY | Facility: CLINIC | Age: 73
End: 2018-03-13
Payer: MEDICARE

## 2018-03-13 VITALS
RESPIRATION RATE: 18 BRPM | HEIGHT: 62 IN | OXYGEN SATURATION: 93 % | DIASTOLIC BLOOD PRESSURE: 70 MMHG | SYSTOLIC BLOOD PRESSURE: 120 MMHG | HEART RATE: 87 BPM | BODY MASS INDEX: 21.59 KG/M2 | WEIGHT: 117.31 LBS

## 2018-03-13 DIAGNOSIS — R06.89 DYSPNEA AND RESPIRATORY ABNORMALITIES: ICD-10-CM

## 2018-03-13 DIAGNOSIS — F17.210 CIGARETTE SMOKER: Chronic | ICD-10-CM

## 2018-03-13 DIAGNOSIS — R06.00 DYSPNEA AND RESPIRATORY ABNORMALITIES: ICD-10-CM

## 2018-03-13 DIAGNOSIS — J30.1 CHRONIC SEASONAL ALLERGIC RHINITIS DUE TO POLLEN: ICD-10-CM

## 2018-03-13 DIAGNOSIS — J18.0 BRONCHOPNEUMONIA: Primary | ICD-10-CM

## 2018-03-13 PROBLEM — J32.0 CHRONIC MAXILLARY SINUSITIS: Status: ACTIVE | Noted: 2018-03-13

## 2018-03-13 LAB
CRP SERPL-MCNC: 9.9 MG/L
ERYTHROCYTE [SEDIMENTATION RATE] IN BLOOD BY WESTERGREN METHOD: 23 MM/HR

## 2018-03-13 PROCEDURE — 86038 ANTINUCLEAR ANTIBODIES: CPT

## 2018-03-13 PROCEDURE — 87070 CULTURE OTHR SPECIMN AEROBIC: CPT

## 2018-03-13 PROCEDURE — 86255 FLUORESCENT ANTIBODY SCREEN: CPT | Mod: 91

## 2018-03-13 PROCEDURE — 87205 SMEAR GRAM STAIN: CPT

## 2018-03-13 PROCEDURE — 36415 COLL VENOUS BLD VENIPUNCTURE: CPT

## 2018-03-13 PROCEDURE — 85651 RBC SED RATE NONAUTOMATED: CPT

## 2018-03-13 PROCEDURE — 99999 PR PBB SHADOW E&M-EST. PATIENT-LVL IV: CPT | Mod: PBBFAC,,, | Performed by: INTERNAL MEDICINE

## 2018-03-13 PROCEDURE — 99499 UNLISTED E&M SERVICE: CPT | Mod: S$GLB,,, | Performed by: INTERNAL MEDICINE

## 2018-03-13 PROCEDURE — 3078F DIAST BP <80 MM HG: CPT | Mod: CPTII,S$GLB,, | Performed by: INTERNAL MEDICINE

## 2018-03-13 PROCEDURE — 86140 C-REACTIVE PROTEIN: CPT

## 2018-03-13 PROCEDURE — 3074F SYST BP LT 130 MM HG: CPT | Mod: CPTII,S$GLB,, | Performed by: INTERNAL MEDICINE

## 2018-03-13 PROCEDURE — 99205 OFFICE O/P NEW HI 60 MIN: CPT | Mod: S$GLB,,, | Performed by: INTERNAL MEDICINE

## 2018-03-13 RX ORDER — AZITHROMYCIN 500 MG/1
500 TABLET, FILM COATED ORAL DAILY
Qty: 5 TABLET | Refills: 0 | Status: SHIPPED | OUTPATIENT
Start: 2018-03-13 | End: 2018-03-18

## 2018-03-13 RX ORDER — LEVOCETIRIZINE DIHYDROCHLORIDE 5 MG/1
5 TABLET, FILM COATED ORAL NIGHTLY
Qty: 30 TABLET | Refills: 11 | Status: SHIPPED | OUTPATIENT
Start: 2018-03-13 | End: 2019-03-28 | Stop reason: SDUPTHER

## 2018-03-13 NOTE — ASSESSMENT & PLAN NOTE
Etiology unclear.  Multifactorial etiology suspected.  Likely contributors to etiology (checked)    [x] Pulmonary airway disease    [x]  Pulmonary parenchymal disease  [] Pulmonary vascular disease   [] Pleural disease  [] Pulmonary vasculitis  [] Hypoventilation ( chest wall deformity, neuromuscular disease, obesity etc)  [] Anemia  [] Thyroid disease.  [] Cardiac illess  []         Sleep disorder    EVALUATION:  [x]        Complete PFT with bronchodilator.  []        Bronchial challenge with methacholine.   [x]        Stress test, pulmonary.  [x]        PULM - Arterial Blood Gases  []        Chest X Ray  [x]        CT scan of chest.   [x]        AMAN  [x]        Sedimentation rate  [x]        C-reactive protein  [x]        Anti-neutrophilic cytoplasmic antibody          PLAN:  Discussed diagnosis, its evaluation, treatment and usual course.  All questions answered.        Call if shortness of breath worsens, blood in sputum, change in character of cough, development of fever or chills, inability to maintain nutrition and hydration.     Re evaluate in four weeks with results.

## 2018-03-13 NOTE — ASSESSMENT & PLAN NOTE
Assistance with smoking cessation was offered, including:  []  Medications  [x]  Counseling  []  Printed Information on Smoking Cessation  [x]  Referral to a Smoking Cessation Program    Patient was counseled regarding smoking for 3-10 minutes.

## 2018-03-13 NOTE — PROGRESS NOTES
New patient    Subjective:      Patient ID: Keyona Dwyer is a 72 y.o. female.    Patient Active Problem List   Diagnosis    HTN (hypertension)    Hyperlipidemia    Cigarette smoker    Osteoarthritis    Lumbar radiculopathy    Osteoporosis    Vitamin D deficiency    Glaucoma of both eyes    Uncomplicated opioid dependence    Chronic seasonal allergic rhinitis due to pollen    Bronchopneumonia    Dyspnea and respiratory abnormalities       Problem list has been reviewed.    she has been referred by Lory Palafox PA for evaluation and management for   Chief Complaint   Patient presents with    Pneumonia       Chief Complaint: Pneumonia      HPI:    Patient reports productive cough ( thick dark phlegm) , rhinorrhea, malaise and denies worsening dyspnea. She was taking MUCINEX (OTC) for sinuses prior to seeing her PCP on 03/09/18. CXR revealed RML reticulonodular infiltrates unchanged since 2008.  She was prescribed oral AZITHROMYCIN by her PCP.  Patient reports recent sick contacts - her  was recently diagnosed with pneumonia.   Patient does not have new pets. Patient does not have a history of asthma. Patient does have a history of environmental allergens. She take OTC BENADRYL which is effective. Her cough is currently resolving and her phlegm has thinned and is now light yellow. Patient has not traveled recently. Patient does have a history of smoking. She has smoked for the past 60 years. She currently smokes about 0.5 PK/D. Patient has had a previous chest x-ray. Patient has not had a PPD done.      Dyspnea Characteristics:   Exertional Dyspnea   Dyspnea Duration:  Chronic  Dyspnea Severity:  ERWIN 5  Dyspnea Timing:  Exertional only  Dyspnea Contributing Factors:  Tobacco Abuse   Dyspnea Associated Symptoms:   Cough,  Sputum Production and  Wheezing       Modified Erwin Dyspnea Scale      0  Nothing at all    0.5  Very, very slight (just noticeable)    1  Very slight    2  Slight     3  Moderate    4 Somewhat severe    5 Severe      6    7  Very severe    8    9   Very, very severe (almost maximal)  10  Maximal      Previous Report Reviewed: lab reports, office notes and radiology reports     Past Medical History: The following portions of the patient's history were reviewed and updated as appropriate:   She  has a past surgical history that includes Gallbladder surgery; Hysterectomy; Total knee arthroplasty (Bilateral); Total hip arthroplasty (Bilateral); Lung surgery (Right, age 15); and Cataract extraction w/  intraocular lens implant (Bilateral, 8/ 9 2017).  Her family history includes Alcohol abuse in her brother; Brain cancer in her brother; Breast cancer in her mother; Cancer in her mother; Heart disease in her brother; Heart failure in her father; Hyperlipidemia in her father; Stroke in her brother.  She  reports that she has been smoking Cigarettes.  She has been smoking about 0.50 packs per day. She has never used smokeless tobacco. She reports that she does not drink alcohol or use drugs.  She has a current medication list which includes the following prescription(s): amlodipine, b complex vitamins, benzonatate, duloxetine, ergocalciferol, gabapentin, hydrocodone-acetaminophen 10-325mg, lisinopril, ON-Q PAIN PUMP, azithromycin, and levocetirizine, and the following Facility-Administered Medications: denosumab.  She is allergic to indomethacin; latex, natural rubber; penicillins; sulfa (sulfonamide antibiotics); and tolmetin..    Review of Systems   Constitutional: Negative for chills, fatigue and night sweats.   HENT: Positive for postnasal drip, rhinorrhea, sinus pressure and congestion. Negative for sore throat and trouble swallowing.    Eyes: Negative for itching.   Respiratory: Positive for cough, sputum production, wheezing and dyspnea on extertion. Negative for orthopnea and Paroxysmal Nocturnal Dyspnea.    Cardiovascular: Negative for chest pain, palpitations and leg swelling.  "  Genitourinary: Negative for difficulty urinating and hematuria.   Endocrine: Negative for cold intolerance and heat intolerance.    Musculoskeletal: Positive for arthralgias and gait problem. Back pain: Ambulates with a walker.   Skin: Negative for rash.   Gastrointestinal: Negative for nausea, vomiting, abdominal pain and acid reflux.        Intermittent diarrhea   Neurological: Negative for syncope, light-headedness and headaches.   Hematological: No excessive bruising.   Psychiatric/Behavioral: The patient is not nervous/anxious.    All other systems reviewed and are negative.         Occupational History:    Retired .  Denies occupational exposure to asbestos, silica and petrochemicals.    Avocational Exposures:  none    Pet Exposures:  none      Objective:     Vitals:    03/13/18 0803   BP: 120/70   Pulse: 87   Resp: 18   SpO2: (!) 93%   Weight: 53.2 kg (117 lb 4.6 oz)   Height: 5' 2" (1.575 m)   PainSc: 0-No pain     Body mass index is 21.45 kg/m².    Physical Exam   Constitutional: She is oriented to person, place, and time. She appears well-developed and well-nourished.   HENT:   Head: Normocephalic and atraumatic.   Eyes: EOM are normal. Pupils are equal, round, and reactive to light.   Neck: Normal range of motion. Neck supple.   Cardiovascular: Normal rate and regular rhythm.    Pulmonary/Chest: She has rales in the right middle field and the right lower field.   Abdominal: Soft. Bowel sounds are normal.   Musculoskeletal: Normal range of motion.   Neurological: She is alert and oriented to person, place, and time.   Skin: Skin is warm and dry. Capillary refill takes less than 2 seconds.   Psychiatric: She has a normal mood and affect.   Nursing note and vitals reviewed.      Personal Diagnostic Review    CXR: 03/08/18: There are some vague reticular nodular opacities noted within the right midlung zone and right lower lung zone peripherally most consistent with pneumonia.  Stable chronic " blunting of the right costophrenic angle.  The heart is not enlarged. There is calcification of the aortic knob.      Assessment /Plan:     Discussed diagnosis, its evaluation, treatment and usual course. All questions answered.    Problem List Items Addressed This Visit        Pulmonary    Bronchopneumonia - Primary    Current Assessment & Plan     Sputum for gram stain and culture  Oral AZITHROMYCIN per orders         Relevant Medications    azithromycin (ZITHROMAX) 500 MG tablet    Other Relevant Orders    Culture, Respiratory with Gram Stain       Other    Cigarette smoker    Current Assessment & Plan     Assistance with smoking cessation was offered, including:  []  Medications  [x]  Counseling  []  Printed Information on Smoking Cessation  [x]  Referral to a Smoking Cessation Program    Patient was counseled regarding smoking for 3-10 minutes.           Relevant Orders    Ambulatory referral to Smoking Cessation Program    Chronic seasonal allergic rhinitis due to pollen    Current Assessment & Plan     Start XYZAL per orders.   Start Flonase per orders         Relevant Medications    levocetirizine (XYZAL) 5 MG tablet    Dyspnea and respiratory abnormalities    Current Assessment & Plan     Etiology unclear.  Multifactorial etiology suspected.  Likely contributors to etiology (checked)    [x] Pulmonary airway disease    [x]  Pulmonary parenchymal disease  [] Pulmonary vascular disease   [] Pleural disease  [] Pulmonary vasculitis  [] Hypoventilation ( chest wall deformity, neuromuscular disease, obesity etc)  [] Anemia  [] Thyroid disease.  [] Cardiac illess  []         Sleep disorder    EVALUATION:  [x]        Complete PFT with bronchodilator.  []        Bronchial challenge with methacholine.   [x]        Stress test, pulmonary.  [x]        PULM - Arterial Blood Gases  []        Chest X Ray  [x]        CT scan of chest.   [x]        AMAN  [x]        Sedimentation rate  [x]        C-reactive protein  [x]         Anti-neutrophilic cytoplasmic antibody          PLAN:  Discussed diagnosis, its evaluation, treatment and usual course.  All questions answered.        Call if shortness of breath worsens, blood in sputum, change in character of cough, development of fever or chills, inability to maintain nutrition and hydration.     Re evaluate in four weeks with results.           Relevant Orders    Complete PFT with bronchodilator    PULM - Arterial Blood Gases--in addition to PFT only    Pulmonary stress test    Sedimentation rate, manual    C-reactive protein    Anti-neutrophilic cytoplasmic antibody    AMAN    CT Chest With Contrast    Basic metabolic panel              TIME SPENT WITH PATIENT: Time spent: 60 minutes in face to face  discussion concerning diagnosis, prognosis, review of lab and test results, benefits of treatment as well as management of disease, counseling of patient and coordination of care between various health  care providers . Greater than half the time spent was used for coordination of care and counseling of patient.     Follow-up in about 1 month (around 4/13/2018) for Allergic Rhinitis, Pneumonia, Tobacco use disorder, Shortness of breath.

## 2018-03-13 NOTE — LETTER
March 13, 2018      DEION Hurtado  [address]  [place] [address]           [facility]  [id number] [place] [id number]  Phone: [phone]  Fax: [phone]          Patient: Keyona Dwyer   MR Number: [id number]   YOB: 1945   Date of Visit: 3/13/2018       Dear Lory Palafox:    Thank you for referring Keyona Dwyer to me for evaluation. Attached you will find relevant portions of my assessment and plan of care.    If you have questions, please do not hesitate to call me. I look forward to following Keyona Dwyer along with you.    Sincerely,    Guillermo Rodriguez MD    Enclosure  CC:  No Recipients    If you would like to receive this communication electronically, please contact [email or web address] or [phone] to request more information on [employer] Link access.    For providers and/or their staff who would like to refer a patient to [facility], please contact us through our one-stop-shop provider referral line, [facility], at [phone].    If you feel you have received this communication in error or would no longer like to receive these types of communications, please e-mail [email or web address]

## 2018-03-13 NOTE — PATIENT INSTRUCTIONS
Lung Anatomy  Your lungs take air in to give your body oxygen, which the body needs to work. Your lungs, like all the tissues in your body, are made up of billions of tiny specialized cells. Old lung cells die and are replaced by new, identical lung cells. This natural process helps ensure healthy lungs.    Date Last Reviewed: 11/1/2016  © 6750-4410 [employer]. [address] [place] [address], [place] [address]. All rights reserved. This information is not intended as a substitute for professional medical care. Always follow your healthcare professional's instructions.

## 2018-03-14 LAB — ANA SER QL IF: NORMAL

## 2018-03-15 LAB
ANCA AB TITR SER IF: NORMAL TITER
BACTERIA SPEC AEROBE CULT: NORMAL
GRAM STN SPEC: NORMAL
P-ANCA TITR SER IF: NORMAL TITER

## 2018-04-02 ENCOUNTER — TELEPHONE (OUTPATIENT)
Dept: SMOKING CESSATION | Facility: CLINIC | Age: 73
End: 2018-04-02

## 2018-04-02 NOTE — TELEPHONE ENCOUNTER
Patient states that she has another appointment at another ([facility]) clinic at 9:30 on 4-3-2018 and won't be able to make it to this appointment on time. She is trying to coordinate her appointment with her husbands cancer treatment appointments.

## 2018-04-03 ENCOUNTER — CLINICAL SUPPORT (OUTPATIENT)
Dept: SMOKING CESSATION | Facility: CLINIC | Age: 73
End: 2018-04-03
Payer: COMMERCIAL

## 2018-04-03 VITALS — WEIGHT: 117 LBS | HEIGHT: 62 IN | BODY MASS INDEX: 21.53 KG/M2

## 2018-04-03 DIAGNOSIS — F17.200 NICOTINE DEPENDENCE: Primary | ICD-10-CM

## 2018-04-03 PROCEDURE — 99404 PREV MED CNSL INDIV APPRX 60: CPT | Mod: S$GLB,,, | Performed by: GENERAL PRACTICE

## 2018-04-03 RX ORDER — VARENICLINE TARTRATE 0.5 (11)-1
KIT ORAL
Qty: 1 PACKAGE | Refills: 0 | Status: SHIPPED | OUTPATIENT
Start: 2018-04-03 | End: 2018-04-30 | Stop reason: ALTCHOICE

## 2018-04-17 ENCOUNTER — CLINICAL SUPPORT (OUTPATIENT)
Dept: SMOKING CESSATION | Facility: CLINIC | Age: 73
End: 2018-04-17
Payer: COMMERCIAL

## 2018-04-17 DIAGNOSIS — F17.200 NICOTINE DEPENDENCE: Primary | ICD-10-CM

## 2018-04-17 PROCEDURE — 99404 PREV MED CNSL INDIV APPRX 60: CPT | Mod: S$GLB,,, | Performed by: GENERAL PRACTICE

## 2018-04-17 NOTE — PROGRESS NOTES
Individual Follow-Up Form    4/17/2018    Clinical Status of Patient: Outpatient    Length of Service: 60 minutes    Continuing Medication: yes  Chantix     Target Symptoms: Withdrawal and medication side effects. The following were  rated moderate (3) to severe (4) on TCRS:  · Moderate (3): desire tobacco  · Severe (4): none    Comments: Patient was seen today for a smoking cessation progress update. She states that she has been steadily weaning her smoking from over 1 pack per day to 5 cigarettes daily. She states that the cigarettes do not taste the same and she is unable to complete a cigarette. She states that she smokes as part of her daily routine. She states that she is sleeping better since she has reduced the amount that she smokes before bedtime. She states that she has 2 cigarettes remaining in her pack and she will finish those 2 cigarettes today and set her quit date for tomorrow 4-. We discussed coping strategies and having a plan in place for tomorrow. She verbalized understanding. She denies any negative thoughts or behavior at this time. Will continue to encourage and monitor progress.     Diagnosis: F17.200    Next Visit: 2 weeks

## 2018-04-23 ENCOUNTER — CLINICAL SUPPORT (OUTPATIENT)
Dept: SMOKING CESSATION | Facility: CLINIC | Age: 73
End: 2018-04-23
Payer: COMMERCIAL

## 2018-04-23 DIAGNOSIS — F17.200 NICOTINE DEPENDENCE: Primary | ICD-10-CM

## 2018-04-23 PROCEDURE — 99407 BEHAV CHNG SMOKING > 10 MIN: CPT | Mod: S$GLB,,, | Performed by: GENERAL PRACTICE

## 2018-04-26 ENCOUNTER — PROCEDURE VISIT (OUTPATIENT)
Dept: PULMONOLOGY | Facility: CLINIC | Age: 73
End: 2018-04-26
Payer: MEDICARE

## 2018-04-26 ENCOUNTER — OFFICE VISIT (OUTPATIENT)
Dept: PULMONOLOGY | Facility: CLINIC | Age: 73
End: 2018-04-26
Payer: MEDICARE

## 2018-04-26 ENCOUNTER — HOSPITAL ENCOUNTER (OUTPATIENT)
Dept: RADIOLOGY | Facility: HOSPITAL | Age: 73
Discharge: HOME | End: 2018-04-26
Attending: INTERNAL MEDICINE
Payer: MEDICARE

## 2018-04-26 VITALS
RESPIRATION RATE: 18 BRPM | WEIGHT: 117.94 LBS | DIASTOLIC BLOOD PRESSURE: 78 MMHG | HEIGHT: 62 IN | WEIGHT: 118 LBS | BODY MASS INDEX: 21.71 KG/M2 | OXYGEN SATURATION: 97 % | HEIGHT: 62 IN | SYSTOLIC BLOOD PRESSURE: 178 MMHG | BODY MASS INDEX: 21.7 KG/M2 | HEART RATE: 75 BPM

## 2018-04-26 DIAGNOSIS — R06.00 DYSPNEA AND RESPIRATORY ABNORMALITIES: ICD-10-CM

## 2018-04-26 DIAGNOSIS — R06.89 DYSPNEA AND RESPIRATORY ABNORMALITIES: ICD-10-CM

## 2018-04-26 DIAGNOSIS — J30.1 CHRONIC SEASONAL ALLERGIC RHINITIS DUE TO POLLEN: ICD-10-CM

## 2018-04-26 DIAGNOSIS — R06.00 DYSPNEA AND RESPIRATORY ABNORMALITIES: Primary | ICD-10-CM

## 2018-04-26 DIAGNOSIS — F17.210 CIGARETTE SMOKER: ICD-10-CM

## 2018-04-26 DIAGNOSIS — R06.89 DYSPNEA AND RESPIRATORY ABNORMALITIES: Primary | ICD-10-CM

## 2018-04-26 DIAGNOSIS — J44.9 COPD, SEVERE: ICD-10-CM

## 2018-04-26 LAB
ALLENS TEST: NORMAL
DELSYS: NORMAL
HCO3 UR-SCNC: 26 MMOL/L (ref 24–28)
PCO2 BLDA: 39.4 MMHG (ref 35–45)
PH SMN: 7.43 [PH] (ref 7.35–7.45)
PO2 BLDA: 81 MMHG (ref 80–100)
POC BE: 2 MMOL/L
POC SATURATED O2: 96 % (ref 95–100)
SAMPLE: NORMAL
SITE: NORMAL

## 2018-04-26 PROCEDURE — 99499 UNLISTED E&M SERVICE: CPT | Mod: S$GLB,,, | Performed by: INTERNAL MEDICINE

## 2018-04-26 PROCEDURE — 82803 BLOOD GASES ANY COMBINATION: CPT | Mod: S$GLB,,, | Performed by: INTERNAL MEDICINE

## 2018-04-26 PROCEDURE — 3078F DIAST BP <80 MM HG: CPT | Mod: CPTII,S$GLB,, | Performed by: INTERNAL MEDICINE

## 2018-04-26 PROCEDURE — 99999 PR PBB SHADOW E&M-EST. PATIENT-LVL III: CPT | Mod: PBBFAC,,, | Performed by: INTERNAL MEDICINE

## 2018-04-26 PROCEDURE — 99215 OFFICE O/P EST HI 40 MIN: CPT | Mod: 25,S$GLB,, | Performed by: INTERNAL MEDICINE

## 2018-04-26 PROCEDURE — 94729 DIFFUSING CAPACITY: CPT | Mod: S$GLB,,, | Performed by: INTERNAL MEDICINE

## 2018-04-26 PROCEDURE — 36600 WITHDRAWAL OF ARTERIAL BLOOD: CPT | Mod: S$GLB,,, | Performed by: INTERNAL MEDICINE

## 2018-04-26 PROCEDURE — 94060 EVALUATION OF WHEEZING: CPT | Mod: 59,S$GLB,, | Performed by: INTERNAL MEDICINE

## 2018-04-26 PROCEDURE — 25500020 PHARM REV CODE 255: Performed by: INTERNAL MEDICINE

## 2018-04-26 PROCEDURE — 94726 PLETHYSMOGRAPHY LUNG VOLUMES: CPT | Mod: S$GLB,,, | Performed by: INTERNAL MEDICINE

## 2018-04-26 PROCEDURE — 71260 CT THORAX DX C+: CPT | Mod: TC

## 2018-04-26 PROCEDURE — 3077F SYST BP >= 140 MM HG: CPT | Mod: CPTII,S$GLB,, | Performed by: INTERNAL MEDICINE

## 2018-04-26 PROCEDURE — 94618 PULMONARY STRESS TESTING: CPT | Mod: S$GLB,,, | Performed by: INTERNAL MEDICINE

## 2018-04-26 RX ADMIN — IOHEXOL 75 ML: 350 INJECTION, SOLUTION INTRAVENOUS at 02:04

## 2018-04-26 NOTE — PROCEDURES
"[facility]  Six Minute Walk     SUMMARY     Ordering Provider: Dr. Rodriguez   Interpreting Provider: Dr. Rodriguez  Performing nurse/tech/RT: BRANDAN Tapia RRT  Diagnosis:  (Dyspnea and Respiratory Abnormalities)  Height: 5' 2" (157.5 cm)  Weight: 53.5 kg (118 lb)  BMI (Calculated): 21.6   Patient Race:             Phase Oxygen Assessment Supplemental O2 Heart   Rate Blood Pressure Reymundo Dyspnea Scale Rating   Resting 97 % Room Air 79 bpm 195/77 0   Exercise        Minute        1 98 % Room Air 75 bpm     2 98 % Room Air 90 bpm     3 98 % Room Air 100 bpm     4 97 % Room Air 110 bpm     5 98 % Room Air 108 bpm     6  99 % Room Air 108 bpm 189/78 4   Recovery        Minute        1 98 % Room Air 94 bpm     2 99 % Room Air 72 bpm     3 99 % Room Air 69 bpm     4 99 % Room Air 74 bpm 178/78 3     Six Minute Walk Summary  6MWT Status: not completed  Patient Reported: Leg pain, Dyspnea     Interpretation:  Did the patient stop or pause?: Yes  How many times did the patient stop or pause?: 1  Stop Time 1: 317.4  Restart Time 1: 0  Pause Time 1: 42.6 seconds            Total Time Walked (Calculated): 317.4 seconds  Final Partial Lap Distance (feet): 100 feet  Total Distance Meters (Calculated): 152.4 meters  Predicted Distance Meters (Calculated): 449.04 meters  Percentage of Predicted (Calculated): 33.94  Peak VO2 (Calculated): 8.55  Mets: 2.44  Has The Patient Had a Previous Six Minute Walk Test?: No       Previous 6MWT Results  Has The Patient Had a Previous Six Minute Walk Test?: No      PHYSICIAN INTERPRETATION:      Six minute walk test was performed using a rolling walker. Six minute walk distance is 152.4 / 449.04 meters (33.94 % predicted) with moderate dyspnea. Peak VO2 during walking was 8.55 ml/min/kg [ 2.44 METS]. During exercise, there was no significant desaturation while breathing room air. Lowest oxygen saturation during walking was 97%. Blood pressure remained stable and Heart rate increased " significantly with walking. Hypertension was present prior to exercise. This may represent a hypertensive response to exercise. The patient reported non-pulmonary symptoms during exercise - leg pain. Significant exercise impairment is likely due to cardiovascular causes and subjective symptoms. The patient did complete the study, walking 317.4 seconds of the 360 second test. No previous study performed. Based upon age and body mass index, exercise capacity is less than predicted.

## 2018-04-26 NOTE — PROGRESS NOTES
Subjective:      Patient ID: Keyona Dwyer is a 72 y.o. female.  Patient Active Problem List   Diagnosis    HTN (hypertension)    Hyperlipidemia    Cigarette smoker    Osteoarthritis    Lumbar radiculopathy    Osteoporosis    Vitamin D deficiency    Glaucoma of both eyes    Uncomplicated opioid dependence    Chronic seasonal allergic rhinitis due to pollen    COPD, severe     Problem list has been reviewed.    Chief Complaint: Shortness of Breath      HPI    FEV1 =>  FEV1: 0.91  (47 % predicted)      Patients reports NYHA II dyspnea    The patient does not have currently have symptoms / an exacerbation.       No wheezing, No shortness or breath and No recent change in breathing.       She reports that she has quit smoking.      A full  review of systems, past , family  and social histories was performed except as mentioned in the note above, these are non contributory to the main issues discussed today.       Previous Report Reviewed: lab reports, office notes and radiology reports     The following portions of the patient's history were reviewed and updated as appropriate: She  has a past medical history of Cataract; Chronic back pain; Fibrocystic breast; Glaucoma; Hyperlipemia; Hypertension; Osteoarthritis; Restless leg syndrome; and Trouble in sleeping.  She  has a past surgical history that includes Gallbladder surgery; Hysterectomy; Total knee arthroplasty (Bilateral); Total hip arthroplasty (Bilateral); Lung surgery (Right, age 15); and Cataract extraction w/  intraocular lens implant (Bilateral, 8/ 9 2017).  Her family history includes Alcohol abuse in her brother; Brain cancer in her brother; Breast cancer in her mother; Cancer in her mother; Heart disease in her brother; Heart failure in her father; Hyperlipidemia in her father; Stroke in her brother.  She  reports that she has been smoking Cigarettes.  She has a 63.00 pack-year smoking history. She has never used smokeless tobacco. She  "reports that she does not drink alcohol or use drugs.  She has a current medication list which includes the following prescription(s): amlodipine, b complex vitamins, duloxetine, ergocalciferol, gabapentin, hydrocodone-acetaminophen 10-325mg, levocetirizine, lisinopril, ON-Q PAIN PUMP, and varenicline, and the following Facility-Administered Medications: denosumab.  She is allergic to indomethacin; latex, natural rubber; penicillins; sulfa (sulfonamide antibiotics); and tolmetin..    Review of Systems   Constitutional: Negative for chills, fatigue and night sweats.   HENT: Positive for postnasal drip, rhinorrhea, sinus pressure and congestion. Negative for sore throat and trouble swallowing.    Eyes: Negative for itching.   Respiratory: Positive for cough, sputum production, wheezing and dyspnea on extertion. Negative for orthopnea and Paroxysmal Nocturnal Dyspnea.    Cardiovascular: Negative for chest pain, palpitations and leg swelling.   Genitourinary: Negative for difficulty urinating and hematuria.   Endocrine: Negative for cold intolerance and heat intolerance.    Musculoskeletal: Positive for arthralgias and gait problem. Back pain: Ambulates with a walker.   Skin: Negative for rash.   Gastrointestinal: Negative for nausea, vomiting, abdominal pain and acid reflux.        Intermittent diarrhea   Neurological: Negative for syncope, light-headedness and headaches.   Hematological: No excessive bruising.   Psychiatric/Behavioral: The patient is not nervous/anxious.    All other systems reviewed and are negative.       Objective:   BP (!) 178/78   Pulse 75   Resp 18   Ht 5' 2" (1.575 m)   Wt 53.5 kg (117 lb 15.1 oz)   SpO2 97%   BMI 21.57 kg/m²   Body mass index is 21.57 kg/m².    Physical Exam   Constitutional: She is oriented to person, place, and time. She appears well-developed and well-nourished.   HENT:   Head: Normocephalic and atraumatic.   Eyes: EOM are normal. Pupils are equal, round, and reactive " to light.   Neck: Normal range of motion. Neck supple.   Cardiovascular: Normal rate and regular rhythm.    Pulmonary/Chest: Effort normal and breath sounds normal. She has no wheezes. She has no rales.   Abdominal: Soft. Bowel sounds are normal.   Musculoskeletal: Normal range of motion.   Neurological: She is alert and oriented to person, place, and time.   Skin: Skin is warm and dry. Capillary refill takes less than 2 seconds.   Psychiatric: She has a normal mood and affect.   Nursing note and vitals reviewed.      Personal Diagnostic Review  Pulmonary function tests: FEV1: 0.91  (47 % predicted), FVC: 1.56 (60 % predicted), FEV1/FVC: 59, TLC: 3.58 (82 % predicted), RV/TLVC:52 (127% predicted), DLCO: 10.6 (69 % predicted)    Severe obstruction. Airflow is markedly improved after bronchodilator. Improvement in airflow following bronchodilator therapy suggests an asthmatic component. Lung volumes are normal. Diffusion capacity is mildly reduced.    ABG:   POC PH 7.428 7.35 - 7.45    POC PCO2 39.4 35 - 45 mmHg    POC PO2 81 80 - 100 mmHg    POC HCO3 26.0 24 - 28 mmol/L    POC BE 2 -2 to 2 mmol/L    POC SATURATED O2 96 95 - 100 %    Sample ARTERIAL     Site RR     Allens Test Pass     DelSys Room Air        Normal ABG  expected pH = 7.45  expected CO2 = 40  expected HCO3- = 25    Six minute walk test: Six minute walk test was performed using a rolling walker. Six minute walk distance is 152.4 / 449.04 meters (33.94 % predicted) with moderate dyspnea. Peak VO2 during walking was 8.55 ml/min/kg [ 2.44 METS]. During exercise, there was no significant desaturation while breathing room air. Lowest oxygen saturation during walking was 97%. Blood pressure remained stable and Heart rate increased significantly with walking. Hypertension was present prior to exercise. This may represent a hypertensive response to exercise. The patient reported non-pulmonary symptoms during exercise - leg pain. Significant exercise impairment  is likely due to cardiovascular causes and subjective symptoms. The patient did complete the study, walking 317.4 seconds of the 360 second test. No previous study performed. Based upon age and body mass index, exercise capacity is less than predicted.      CT Chest with contrast: 04/26/18: There are no pulmonary infiltrates. No pleural effusion or pericardial effusion. No abnormality of the airway is seen. No pathologically enlarged hilar or mediastinal lymph nodes. There are bullae in the right middle lobe and lateral right lower lobe. Minimal linear band of scarring or plate atelectasis at the left base. There is an intraspinal catheter in the thoracic spinal canal.      Assessment / plan:     Discussed diagnosis, its evaluation, treatment and usual course. All questions answered.    Problem List Items Addressed This Visit        Pulmonary    COPD, severe    Current Assessment & Plan     COPD ROS: no significant ongoing wheezing or shortness of breath.   New concerns: None.   Exam: appears well, vitals normal, no respiratory distress, acyanotic, normal RR.   Assessment:  COPD stable.   Plan: lab results and schedule of future lab studies reviewed with patient.            Other    Cigarette smoker    Current Assessment & Plan     Patient commended on quitting smoking.         Chronic seasonal allergic rhinitis due to pollen    Current Assessment & Plan     Continue XYZAL per orders.   Continue Flonase per orders                 TIME SPENT WITH PATIENT: Time spent: 40 minutes in face to face  discussion concerning diagnosis, prognosis, review of lab and test results, benefits of treatment as well as management of disease, counseling of patient and coordination of care between various health  care providers . Greater than half the time spent was used for coordination of care and counseling of patient.     Follow-up in about 7 weeks (around 6/11/2018) for COPD, Allergic Rhinitis.

## 2018-04-26 NOTE — PROCEDURES
PHYSICIAN INTERPRETATION:    Results for orders placed or performed in visit on 04/26/18   ISTAT PROCEDURE   Result Value Ref Range    POC PH 7.428 7.35 - 7.45    POC PCO2 39.4 35 - 45 mmHg    POC PO2 81 80 - 100 mmHg    POC HCO3 26.0 24 - 28 mmol/L    POC BE 2 -2 to 2 mmol/L    POC SATURATED O2 96 95 - 100 %    Sample ARTERIAL     Site RR     Allens Test Pass     DelSys Room Air        Normal ABG  expected pH = 7.45  expected CO2 = 40  expected HCO3- = 25

## 2018-04-27 PROBLEM — J18.0 BRONCHOPNEUMONIA: Status: RESOLVED | Noted: 2018-03-13 | Resolved: 2018-04-27

## 2018-04-27 PROBLEM — J44.9 COPD, SEVERE: Status: ACTIVE | Noted: 2018-03-13

## 2018-04-27 LAB
POST FEF 25 75: 0.37 L/S (ref 1.01–2.16)
POST FET 100: 9.33 S
POST FEV1 FVC: 54 %
POST FEV1: 1.12 L (ref 1.66–2.21)
POST FIF 50: 2.6 L/S
POST FVC: 2.07 L (ref 2.27–2.91)
POST PEF: 2.2 L/S (ref 4.2–5.8)
PRE DLCO: 10.65 ML/MMHG/MIN (ref 11.29–19.58)
PRE ERV: 0.34 L
PRE FEF 25 75: 0.43 L/S (ref 1.01–2.16)
PRE FET 100: 6.63 S
PRE FEV1 FVC: 58 %
PRE FEV1: 0.91 L (ref 1.66–2.21)
PRE FRC PL: 2.22 L (ref 2.25–3.2)
PRE FVC: 1.56 L (ref 2.27–2.91)
PRE KROGHS K: 3.29 1/MIN
PRE PEF: 2.53 L/S (ref 4.2–5.8)
PRE RV: 1.88 L (ref 1.45–2.16)
PRE SVC: 1.7 L
PRE TLC: 3.58 L (ref 4–4.76)
PREDICTED DLCO: 15.43 ML/MMHG/MIN (ref 11.29–19.58)
PREDICTED FEV1 FVC: 75.08 % (ref 70.19–79.98)
PREDICTED FEV1: 1.94 L (ref 1.66–2.21)
PREDICTED FRC N2: 2.72 L (ref 2.25–3.2)
PREDICTED FRC PL: 2.72 L (ref 2.25–3.2)
PREDICTED FVC: 2.59 L (ref 2.27–2.91)
PREDICTED RV: 1.81 L (ref 1.45–2.16)
PREDICTED SVC: 2.53 L
PREDICTED TLC: 4.38 L (ref 4–4.76)
PROVOCATION PROTOCOL: ABNORMAL

## 2018-04-27 NOTE — ASSESSMENT & PLAN NOTE
COPD ROS: no significant ongoing wheezing or shortness of breath.   New concerns: None.   Exam: appears well, vitals normal, no respiratory distress, acyanotic, normal RR.   Assessment:  COPD stable.   Plan: lab results and schedule of future lab studies reviewed with patient.

## 2018-04-30 ENCOUNTER — CLINICAL SUPPORT (OUTPATIENT)
Dept: SMOKING CESSATION | Facility: CLINIC | Age: 73
End: 2018-04-30
Payer: COMMERCIAL

## 2018-04-30 DIAGNOSIS — F17.200 NICOTINE DEPENDENCE: Primary | ICD-10-CM

## 2018-04-30 PROCEDURE — 99404 PREV MED CNSL INDIV APPRX 60: CPT | Mod: S$GLB,,, | Performed by: GENERAL PRACTICE

## 2018-04-30 RX ORDER — VARENICLINE TARTRATE 1 MG/1
1 TABLET, FILM COATED ORAL 2 TIMES DAILY
Qty: 60 TABLET | Refills: 1 | Status: SHIPPED | OUTPATIENT
Start: 2018-04-30 | End: 2018-09-17 | Stop reason: ALTCHOICE

## 2018-04-30 NOTE — PROGRESS NOTES
Individual Follow-Up Form    4/30/2018    Quit Date: 4-    Clinical Status of Patient: Outpatient    Length of Service: 60 minutes    Continuing Medication: yes  Chantix     Target Symptoms: Withdrawal and medication side effects. The following were  rated moderate (3) to severe (4) on TCRS:  · Moderate (3): desire tobacco, restless, anxious  · Severe (4): none    Comments: Patient was seen today for a smoking cessation progress update. She has successfully reached her target quit date. She continues to use Chantix starter pack as directed with no adverse side effects noted. She states that she has not sat on her back patio yet with her  because she fears that the patio will trigger a strong craving to smoke. She states that yesterday was tough and she had to fight through strong urges all day. She stated that on Sundays she hangs out around the house and has smoked all day in the past. She stated that she used the coping strategies as previously discussed to work through her cravings yesterday. She states that today she will be away from home and should not have any urges. She states that she has not noticed an increase in her appetite like her last quit attempt. We discussed healthy eating habits and healthy snacks. She denies any negative thoughts or behavior at this time. Will continue to encourage and monitor progress.  Diagnosis: F17.200    Next Visit: 2 weeks

## 2018-05-08 ENCOUNTER — OFFICE VISIT (OUTPATIENT)
Dept: NEUROLOGY | Facility: CLINIC | Age: 73
End: 2018-05-08
Payer: MEDICARE

## 2018-05-08 VITALS
HEIGHT: 61 IN | WEIGHT: 118.81 LBS | BODY MASS INDEX: 22.43 KG/M2 | RESPIRATION RATE: 20 BRPM | HEART RATE: 80 BPM | DIASTOLIC BLOOD PRESSURE: 60 MMHG | SYSTOLIC BLOOD PRESSURE: 138 MMHG

## 2018-05-08 DIAGNOSIS — M25.551 RIGHT HIP PAIN: ICD-10-CM

## 2018-05-08 DIAGNOSIS — M54.9 BACK PAIN WITHOUT RADIATION: ICD-10-CM

## 2018-05-08 DIAGNOSIS — M54.50 LOW BACK PAIN, NON-SPECIFIC: ICD-10-CM

## 2018-05-08 DIAGNOSIS — M54.16 LUMBAR RADICULOPATHY: Primary | ICD-10-CM

## 2018-05-08 PROCEDURE — 99205 OFFICE O/P NEW HI 60 MIN: CPT | Mod: S$GLB,,, | Performed by: PSYCHIATRY & NEUROLOGY

## 2018-05-08 PROCEDURE — 99499 UNLISTED E&M SERVICE: CPT | Mod: S$PBB,,, | Performed by: PSYCHIATRY & NEUROLOGY

## 2018-05-08 PROCEDURE — 99999 PR PBB SHADOW E&M-EST. PATIENT-LVL IV: CPT | Mod: PBBFAC,,, | Performed by: PSYCHIATRY & NEUROLOGY

## 2018-05-08 PROCEDURE — 3078F DIAST BP <80 MM HG: CPT | Mod: CPTII,S$GLB,, | Performed by: PSYCHIATRY & NEUROLOGY

## 2018-05-08 PROCEDURE — 3075F SYST BP GE 130 - 139MM HG: CPT | Mod: CPTII,S$GLB,, | Performed by: PSYCHIATRY & NEUROLOGY

## 2018-05-08 NOTE — ASSESSMENT & PLAN NOTE
She has  Bilateral hip replacement and now  She is limping . Pain increases on right leg movements.

## 2018-05-08 NOTE — LETTER
May 8, 2018      Ralf Isidro MD  [address] [place] [address]           [facility] Neurology  [address] [facility] [address]  Phone: [phone]  Fax: [phone]          Patient: Keyona Dwyer   MR Number: [id number]   YOB: 1945   Date of Visit: 5/8/2018       Dear Dr. Ralf Isidro:    Thank you for referring Keyona Dwyer to me for evaluation. Attached you will find relevant portions of my assessment and plan of care.    If you have questions, please do not hesitate to call me. I look forward to following Keyona Dwyer along with you.    Sincerely,    Norma Silverman MD    Enclosure  CC:  No Recipients    If you would like to receive this communication electronically, please contact [email or web address] or [phone] to request more information on [employer] Link access.    For providers and/or their staff who would like to refer a patient to [facility], please contact us through our one-stop-shop provider referral line, [facility], at [phone].    If you feel you have received this communication in error or would no longer like to receive these types of communications, please e-mail [email or web address]

## 2018-05-08 NOTE — ASSESSMENT & PLAN NOTE
With increasing pain in the back , and back ground history , there is possibility that she might have radicular type of pain . She could not have MRI of the Lumbar spine , because of Pain pump. So, will do CT of the Lumbar region.
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
## 2018-05-11 ENCOUNTER — HOSPITAL ENCOUNTER (OUTPATIENT)
Dept: RADIOLOGY | Facility: HOSPITAL | Age: 73
Discharge: HOME | End: 2018-05-11
Attending: PSYCHIATRY & NEUROLOGY
Payer: MEDICARE

## 2018-05-11 ENCOUNTER — TELEPHONE (OUTPATIENT)
Dept: NEUROLOGY | Facility: CLINIC | Age: 73
End: 2018-05-11

## 2018-05-11 DIAGNOSIS — M25.551 RIGHT HIP PAIN: ICD-10-CM

## 2018-05-11 DIAGNOSIS — M54.50 LOW BACK PAIN, NON-SPECIFIC: ICD-10-CM

## 2018-05-11 DIAGNOSIS — M54.9 BACK PAIN WITHOUT RADIATION: ICD-10-CM

## 2018-05-11 DIAGNOSIS — M54.16 LUMBAR RADICULOPATHY: ICD-10-CM

## 2018-05-11 PROCEDURE — 73700 CT LOWER EXTREMITY W/O DYE: CPT | Mod: TC,RT

## 2018-05-11 PROCEDURE — 72131 CT LUMBAR SPINE W/O DYE: CPT | Mod: TC

## 2018-05-11 NOTE — TELEPHONE ENCOUNTER
Called to inform of the arthritis in spine and problem with hip joint needing orthopedic opinion. She says she will get one.5/11/1644/sf

## 2018-05-21 ENCOUNTER — CLINICAL SUPPORT (OUTPATIENT)
Dept: SMOKING CESSATION | Facility: CLINIC | Age: 73
End: 2018-05-21
Payer: COMMERCIAL

## 2018-05-21 DIAGNOSIS — F17.200 NICOTINE DEPENDENCE: Primary | ICD-10-CM

## 2018-05-21 PROCEDURE — 99404 PREV MED CNSL INDIV APPRX 60: CPT | Mod: S$GLB,,, | Performed by: GENERAL PRACTICE

## 2018-05-21 NOTE — PROGRESS NOTES
Individual Follow-Up Form    5/21/2018    Quit Date: 4-    Clinical Status of Patient: Outpatient    Length of Service: 60 minutes    Continuing Medication: yes  Chantix     Target Symptoms: Withdrawal and medication side effects. The following were  rated moderate (3) to severe (4) on TCRS:  · Moderate (3): irritable  · Severe (4): none    Comments: Patient was seen today for a smoking cessation progress update. She remains tobacco free and continues to use Chantix 1 mg twice daily with no adverse side effects noted. She states that she has been having increased hip and back pain and has been undergoing different CT scans and MRI's to locate cause of pain. She states that she is needing to have left hip surgery and is trying to get the surgery scheduled. She states that she has few cravings and desires to smoke and it is when she gets frustrated due to pain or when she is bored. We discussed coping strategies. She verbalized understanding. She denies any negative thoughts or behavior at this time. Will follow up by telephone in 2 weeks per patient request because of her pain. Will continue to encourage and monitor progress.    Diagnosis: F17.200    Next Visit: 2 weeks

## 2018-05-23 DIAGNOSIS — M25.559 ARTHRALGIA OF HIP, UNSPECIFIED LATERALITY: Primary | ICD-10-CM

## 2018-05-29 ENCOUNTER — OFFICE VISIT (OUTPATIENT)
Dept: ORTHOPEDICS | Facility: CLINIC | Age: 73
End: 2018-05-29
Payer: MEDICARE

## 2018-05-29 ENCOUNTER — HOSPITAL ENCOUNTER (OUTPATIENT)
Dept: RADIOLOGY | Facility: HOSPITAL | Age: 73
Discharge: HOME | End: 2018-05-29
Attending: PHYSICIAN ASSISTANT
Payer: MEDICARE

## 2018-05-29 VITALS
SYSTOLIC BLOOD PRESSURE: 129 MMHG | DIASTOLIC BLOOD PRESSURE: 72 MMHG | BODY MASS INDEX: 22.43 KG/M2 | HEIGHT: 61 IN | WEIGHT: 118.81 LBS | HEART RATE: 65 BPM

## 2018-05-29 DIAGNOSIS — M25.552 LEFT HIP PAIN: Primary | ICD-10-CM

## 2018-05-29 DIAGNOSIS — M25.552 PAIN OF BOTH HIP JOINTS: ICD-10-CM

## 2018-05-29 DIAGNOSIS — M25.551 PAIN OF BOTH HIP JOINTS: ICD-10-CM

## 2018-05-29 DIAGNOSIS — M25.552 PAIN OF BOTH HIP JOINTS: Primary | ICD-10-CM

## 2018-05-29 DIAGNOSIS — M25.551 PAIN OF BOTH HIP JOINTS: Primary | ICD-10-CM

## 2018-05-29 PROCEDURE — 3078F DIAST BP <80 MM HG: CPT | Mod: CPTII,S$GLB,, | Performed by: PHYSICIAN ASSISTANT

## 2018-05-29 PROCEDURE — 3074F SYST BP LT 130 MM HG: CPT | Mod: CPTII,S$GLB,, | Performed by: PHYSICIAN ASSISTANT

## 2018-05-29 PROCEDURE — 73521 X-RAY EXAM HIPS BI 2 VIEWS: CPT | Mod: 26,,, | Performed by: RADIOLOGY

## 2018-05-29 PROCEDURE — 99999 PR PBB SHADOW E&M-EST. PATIENT-LVL IV: CPT | Mod: PBBFAC,,, | Performed by: PHYSICIAN ASSISTANT

## 2018-05-29 PROCEDURE — 73521 X-RAY EXAM HIPS BI 2 VIEWS: CPT | Mod: TC,FY,PO

## 2018-05-29 PROCEDURE — 99204 OFFICE O/P NEW MOD 45 MIN: CPT | Mod: S$GLB,,, | Performed by: PHYSICIAN ASSISTANT

## 2018-05-29 NOTE — LETTER
May 31, 2018      Ralf Isidro MD  [address] [place] [address]           [facility]  [address] [place] [address]  Phone: [phone]  Fax: [phone]          Patient: Keyona Dwyer   MR Number: [id number]   YOB: 1945   Date of Visit: 5/29/2018       Dear Dr. Ralf Isidro:    Thank you for referring Keyona Dwyer to me for evaluation. Attached you will find relevant portions of my assessment and plan of care.    If you have questions, please do not hesitate to call me. I look forward to following Keyona Dwyer along with you.    Sincerely,    Nicho Ennis PA-C    Enclosure  CC:  No Recipients    If you would like to receive this communication electronically, please contact [email or web address] or [phone] to request more information on [employer] Link access.    For providers and/or their staff who would like to refer a patient to [facility], please contact us through our one-stop-shop provider referral line, [facility] , at [phone].    If you feel you have received this communication in error or would no longer like to receive these types of communications, please e-mail [email or web address]

## 2018-05-29 NOTE — PATIENT INSTRUCTIONS
After Hip Replacement: Managing Your Pain  Medications may have been prescribed for you to use once you get home. You may also be instructed to take over-the-counter medicines (a prescription is not needed) to use with or instead of prescribed medicines. With pain under control, youll be able to get back to an active life sooner. Use pain medicine only as directed. Take each dose on schedule, before pain becomes severe. Tell your healthcare provider if the medicine doesnt control your pain enough, or if you suddenly feel worse.  Ask your doctor about possible side effects such as drowsiness, constipation, or dependency.  Your surgeon may also recommend one or more of various non-medication treatments for your pain.  These may include:  · Relaxation techniques  · Massage therapy  · Acupuncture  · Transcutaneous electrical nerve stimulation (a TENS unit)  · Continuous passive motion  Often a combination of medicine and non-medicine therapy works best.  Date Last Reviewed: 11/15/2015  © 8093-4915 [employer] [address] [place] [address], [place] [address]. All rights reserved. This information is not intended as a substitute for professional medical care. Always follow your healthcare professional's instructions.
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
## 2018-06-06 ENCOUNTER — CLINICAL SUPPORT (OUTPATIENT)
Dept: SMOKING CESSATION | Facility: CLINIC | Age: 73
End: 2018-06-06
Payer: COMMERCIAL

## 2018-06-06 DIAGNOSIS — F17.200 NICOTINE DEPENDENCE: Primary | ICD-10-CM

## 2018-06-06 PROCEDURE — 99407 BEHAV CHNG SMOKING > 10 MIN: CPT | Mod: S$GLB,,, | Performed by: GENERAL PRACTICE

## 2018-06-11 ENCOUNTER — CLINICAL SUPPORT (OUTPATIENT)
Dept: SMOKING CESSATION | Facility: CLINIC | Age: 73
End: 2018-06-11
Payer: COMMERCIAL

## 2018-06-11 DIAGNOSIS — F17.200 NICOTINE DEPENDENCE: Primary | ICD-10-CM

## 2018-06-11 PROCEDURE — 99407 BEHAV CHNG SMOKING > 10 MIN: CPT | Mod: S$GLB,,,

## 2018-06-11 NOTE — PROGRESS NOTES
Successful contact with patient regarding tobacco cessation  quit #1. Pt states, she's 6 weeks tobacco free and no further assistance is needed at this time. Pt commended for the accomplishment, thus far. Pt provided with her benefit status and contact information to schedule an appointment if needed. Will follow up with her in 3-4 months.

## 2018-06-13 ENCOUNTER — HOSPITAL ENCOUNTER (OUTPATIENT)
Dept: RADIOLOGY | Facility: HOSPITAL | Age: 73
Discharge: HOME | End: 2018-06-13
Attending: PHYSICIAN ASSISTANT
Payer: MEDICARE

## 2018-06-13 DIAGNOSIS — M25.552 LEFT HIP PAIN: ICD-10-CM

## 2018-06-13 PROCEDURE — 78805 NM INFLAMMATORY LOCALIZATION LTD CERETEC: CPT | Mod: 26,,, | Performed by: RADIOLOGY

## 2018-06-13 PROCEDURE — 78805 NM INFLAMMATORY LOCALIZATION LTD CERETEC: CPT | Mod: TC,PO

## 2018-06-15 ENCOUNTER — OFFICE VISIT (OUTPATIENT)
Dept: ORTHOPEDICS | Facility: CLINIC | Age: 73
End: 2018-06-15
Payer: MEDICARE

## 2018-06-15 VITALS
BODY MASS INDEX: 21.71 KG/M2 | HEIGHT: 62 IN | DIASTOLIC BLOOD PRESSURE: 60 MMHG | SYSTOLIC BLOOD PRESSURE: 106 MMHG | HEART RATE: 90 BPM | WEIGHT: 118 LBS

## 2018-06-15 DIAGNOSIS — M25.552 LEFT HIP PAIN: Primary | ICD-10-CM

## 2018-06-15 PROCEDURE — 99213 OFFICE O/P EST LOW 20 MIN: CPT | Mod: S$GLB,,, | Performed by: PHYSICIAN ASSISTANT

## 2018-06-15 PROCEDURE — 3074F SYST BP LT 130 MM HG: CPT | Mod: CPTII,S$GLB,, | Performed by: PHYSICIAN ASSISTANT

## 2018-06-15 PROCEDURE — 3078F DIAST BP <80 MM HG: CPT | Mod: CPTII,S$GLB,, | Performed by: PHYSICIAN ASSISTANT

## 2018-06-15 PROCEDURE — 99999 PR PBB SHADOW E&M-EST. PATIENT-LVL III: CPT | Mod: PBBFAC,,, | Performed by: PHYSICIAN ASSISTANT
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
## 2018-06-18 NOTE — PROGRESS NOTES
"Subjective:       Patient ID: Keyona Dwyer is a 72 y.o. female.    Chief Complaint: Osteoporosis    Keyona is here for f/u. She has osteoporosis and OA of multiple joints. 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  She tolerates prolia well.  She also has low vit D on weekly supplement.  She fell a few weeks ago at home but no fxs. She uses a walker to ambulate.  She had a dexa done last November (2017) showing improved bmd in her spine. She has h/o bilateral total hip replacements. She has OA in her hands dion prosper 1 cmc joints.  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  Overall she is doing well today and has no complaints.  She has stopped smoking now for over 10 weeks!      Review of Systems   Constitutional: Negative for chills, fatigue and fever.   HENT: Negative for mouth sores, rhinorrhea and sore throat.         False teeth   Eyes: Negative for pain and redness.   Respiratory: Negative for cough and shortness of breath.    Cardiovascular: Negative for chest pain.   Gastrointestinal: Negative for abdominal pain, constipation, diarrhea, nausea and vomiting.   Genitourinary: Negative for dysuria and hematuria.   Musculoskeletal: Positive for arthralgias and gait problem. Negative for joint swelling and myalgias.   Skin: Negative for rash.   Neurological: Negative for weakness, numbness and headaches.   Psychiatric/Behavioral: The patient is not nervous/anxious.          Objective:   /65   Pulse 77   Ht 5' 2" (1.575 m)   Wt 52.9 kg (116 lb 10 oz)   BMI 21.33 kg/m²      Physical Exam   Constitutional: She is oriented to person, place, and time and well-developed, well-nourished, and in no distress.   HENT:   Head: Normocephalic and atraumatic.   Eyes: Pupils are equal, round, and reactive to light. Right eye exhibits no discharge.   Neck: Normal range of motion.   Cardiovascular: Normal rate, regular rhythm and normal heart sounds.  Exam " reveals no friction rub.    Pulmonary/Chest: Effort normal and breath sounds normal. No respiratory distress.   Abdominal: Soft. She exhibits no distension. There is no tenderness.   Lymphadenopathy:     She has no cervical adenopathy.   Neurological: She is alert and oriented to person, place, and time.   Skin: No rash noted. No erythema.     Psychiatric: Mood normal.   Musculoskeletal: Normal range of motion. She exhibits no edema or deformity.   OA changes to prosper hands dion prosper 1 cmc joints  Left elbow stuck at 50-60 degrees flexion s/p trauma (1969)  Walks with walker           Recent Results (from the past 168 hour(s))   Comprehensive metabolic panel    Collection Time: 06/19/18  9:05 AM   Result Value Ref Range    Sodium 137 136 - 145 mmol/L    Potassium 4.3 3.5 - 5.1 mmol/L    Chloride 102 95 - 110 mmol/L    CO2 26 23 - 29 mmol/L    Glucose 64 (L) 70 - 110 mg/dL    BUN, Bld 15 8 - 23 mg/dL    Creatinine 0.8 0.5 - 1.4 mg/dL    Calcium 9.4 8.7 - 10.5 mg/dL    Total Protein 6.9 6.0 - 8.4 g/dL    Albumin 3.8 3.5 - 5.2 g/dL    Total Bilirubin 0.5 0.1 - 1.0 mg/dL    Alkaline Phosphatase 76 55 - 135 U/L    AST 29 10 - 40 U/L    ALT 23 10 - 44 U/L    Anion Gap 9 8 - 16 mmol/L    eGFR if African American >60 >60 mL/min/1.73 m^2    eGFR if non African American >60 >60 mL/min/1.73 m^2        Dexa 8.2015: spine (L1-2) -3.6, radius total -5.3, Rad 33% -4.8  dexa 11/2017: spine -3.3, spine bmd improved by 7.8%  Assessment:       1. Medication monitoring encounter    2. Age-related osteoporosis without current pathological fracture    3. Primary osteoarthritis involving multiple joints    4. Vitamin D deficiency    5. Other osteoporosis without current pathological fracture        Impression:  Osteoporosis: on prolia q 6 mos, failed bisphosphonates; improved spine bmd last month on dexa 11/2017    Vit D def: taking weekly vit D, level 29 last month    Medication monitoring; no issue with prolia, labs reviewed see below    OA  mult joints: prosper hands, lumbar spine, recently seen by ortho--mobic helped in the past    Plan:       Labs reviewed and done within past 14 days  Ca 9.4, Creat 0.8, eGFR 60  No contraindication for Prolia today, ok for nurse to administer today  Re-evaluate patient again in 6 months to determine if Prolia still medically indicated and appropriate to administer.    KDIGO lab monitoring will be followed according to KDIGO 2017 Clinical Practice Guideline Update for the Diagnosis, Evaluation, Prevention, and Treatment of Chronic Kidney Disease-Mineral and Bone Disorder (CKD-MBD)  Chapter 3.1: Diagnosis of CKD-MBD: biochemical abnormalities    prolia today and cont q 6 mos  Cont weekly vit D 50K u, refilled today   Cont ca in diet   Repeat dexa 11/2019 or after  mobic prn for osteoarthritis rec take with food and daily prilosec --sent in rx today   rec tumeric 1000mg/day     rtc in 6 months with prolia and cmp and vit D

## 2018-06-19 ENCOUNTER — LAB VISIT (OUTPATIENT)
Dept: LAB | Facility: HOSPITAL | Age: 73
End: 2018-06-19
Attending: PHYSICIAN ASSISTANT
Payer: MEDICARE

## 2018-06-19 ENCOUNTER — OFFICE VISIT (OUTPATIENT)
Dept: RHEUMATOLOGY | Facility: CLINIC | Age: 73
End: 2018-06-19
Payer: MEDICARE

## 2018-06-19 VITALS
DIASTOLIC BLOOD PRESSURE: 65 MMHG | HEIGHT: 62 IN | WEIGHT: 116.63 LBS | HEART RATE: 77 BPM | SYSTOLIC BLOOD PRESSURE: 129 MMHG | BODY MASS INDEX: 21.46 KG/M2

## 2018-06-19 DIAGNOSIS — M81.0 AGE-RELATED OSTEOPOROSIS WITHOUT CURRENT PATHOLOGICAL FRACTURE: ICD-10-CM

## 2018-06-19 DIAGNOSIS — E55.9 VITAMIN D DEFICIENCY: ICD-10-CM

## 2018-06-19 DIAGNOSIS — M81.8 OTHER OSTEOPOROSIS WITHOUT CURRENT PATHOLOGICAL FRACTURE: ICD-10-CM

## 2018-06-19 DIAGNOSIS — M15.9 PRIMARY OSTEOARTHRITIS INVOLVING MULTIPLE JOINTS: Chronic | ICD-10-CM

## 2018-06-19 DIAGNOSIS — Z51.81 MEDICATION MONITORING ENCOUNTER: ICD-10-CM

## 2018-06-19 DIAGNOSIS — Z51.81 MEDICATION MONITORING ENCOUNTER: Primary | ICD-10-CM

## 2018-06-19 LAB
ALBUMIN SERPL BCP-MCNC: 3.8 G/DL
ALP SERPL-CCNC: 76 U/L
ALT SERPL W/O P-5'-P-CCNC: 23 U/L
ANION GAP SERPL CALC-SCNC: 9 MMOL/L
AST SERPL-CCNC: 29 U/L
BILIRUB SERPL-MCNC: 0.5 MG/DL
BUN SERPL-MCNC: 15 MG/DL
CALCIUM SERPL-MCNC: 9.4 MG/DL
CHLORIDE SERPL-SCNC: 102 MMOL/L
CO2 SERPL-SCNC: 26 MMOL/L
CREAT SERPL-MCNC: 0.8 MG/DL
EST. GFR  (AFRICAN AMERICAN): >60 ML/MIN/1.73 M^2
EST. GFR  (NON AFRICAN AMERICAN): >60 ML/MIN/1.73 M^2
GLUCOSE SERPL-MCNC: 64 MG/DL
POTASSIUM SERPL-SCNC: 4.3 MMOL/L
PROT SERPL-MCNC: 6.9 G/DL
SODIUM SERPL-SCNC: 137 MMOL/L

## 2018-06-19 PROCEDURE — 99499 UNLISTED E&M SERVICE: CPT | Mod: S$GLB,,, | Performed by: PHYSICIAN ASSISTANT

## 2018-06-19 PROCEDURE — 99999 PR PBB SHADOW E&M-EST. PATIENT-LVL IV: CPT | Mod: PBBFAC,,, | Performed by: PHYSICIAN ASSISTANT

## 2018-06-19 PROCEDURE — 36415 COLL VENOUS BLD VENIPUNCTURE: CPT | Mod: PO

## 2018-06-19 PROCEDURE — 99214 OFFICE O/P EST MOD 30 MIN: CPT | Mod: 25,S$GLB,, | Performed by: PHYSICIAN ASSISTANT

## 2018-06-19 PROCEDURE — 96372 THER/PROPH/DIAG INJ SC/IM: CPT | Mod: S$GLB,,, | Performed by: INTERNAL MEDICINE

## 2018-06-19 PROCEDURE — 80053 COMPREHEN METABOLIC PANEL: CPT | Mod: PO

## 2018-06-19 RX ORDER — MELOXICAM 15 MG/1
15 TABLET ORAL DAILY
Qty: 30 TABLET | Refills: 5 | Status: SHIPPED | OUTPATIENT
Start: 2018-06-19 | End: 2018-12-14 | Stop reason: SDUPTHER

## 2018-06-19 RX ORDER — ERGOCALCIFEROL 1.25 MG/1
50000 CAPSULE ORAL
Qty: 12 CAPSULE | Refills: 3 | Status: SHIPPED | OUTPATIENT
Start: 2018-06-19 | End: 2019-07-30 | Stop reason: SDUPTHER

## 2018-06-19 NOTE — PATIENT INSTRUCTIONS
Tumeric (Curcumin) 1000mg/day natural anti inflammatory daily vitamin in vitamin section     mobic as needed with food, daily prilosec over the counter     prolia shot today     Cont vit D weekly, refilled     Congratulations on stopping smoking

## 2018-06-19 NOTE — PROGRESS NOTES
Administered 1cc Prolia 60mg/cc to LLQ of abdomen. Ca 9.4 Creat 0.8 Pt. Tolerated well. No acute reaction noted to site. Pt. Instructed on S/S of reaction to report. Pt. Verbalized understanding. Pt waited 15 minutes.    Lot:[id number]  Exp:08/20  Manu:[email or web address]

## 2018-06-20 ENCOUNTER — OFFICE VISIT (OUTPATIENT)
Dept: NEUROLOGY | Facility: CLINIC | Age: 73
End: 2018-06-20
Payer: MEDICARE

## 2018-06-20 VITALS
HEIGHT: 62 IN | DIASTOLIC BLOOD PRESSURE: 56 MMHG | SYSTOLIC BLOOD PRESSURE: 130 MMHG | WEIGHT: 122.38 LBS | RESPIRATION RATE: 20 BRPM | HEART RATE: 68 BPM | BODY MASS INDEX: 22.52 KG/M2

## 2018-06-20 DIAGNOSIS — M54.16 LUMBAR RADICULOPATHY: Primary | ICD-10-CM

## 2018-06-20 PROCEDURE — 3078F DIAST BP <80 MM HG: CPT | Mod: CPTII,S$GLB,, | Performed by: PSYCHIATRY & NEUROLOGY

## 2018-06-20 PROCEDURE — 99499 UNLISTED E&M SERVICE: CPT | Mod: S$PBB,,, | Performed by: PSYCHIATRY & NEUROLOGY

## 2018-06-20 PROCEDURE — 99999 PR PBB SHADOW E&M-EST. PATIENT-LVL IV: CPT | Mod: PBBFAC,,, | Performed by: PSYCHIATRY & NEUROLOGY

## 2018-06-20 PROCEDURE — 3075F SYST BP GE 130 - 139MM HG: CPT | Mod: CPTII,S$GLB,, | Performed by: PSYCHIATRY & NEUROLOGY

## 2018-06-20 PROCEDURE — 99215 OFFICE O/P EST HI 40 MIN: CPT | Mod: S$GLB,,, | Performed by: PSYCHIATRY & NEUROLOGY

## 2018-06-20 NOTE — PROGRESS NOTES
SUBJECTIVE:       HPI:   She is stable and CT lumbar spine was done. She is now Nicotine free.    Past Medical History:   Diagnosis Date    Cataract     Chronic back pain     Dr. Guzman    Fibrocystic breast     Glaucoma     Hyperlipemia     Hypertension     Osteoarthritis     Restless leg syndrome     Trouble in sleeping      Past Surgical History:   Procedure Laterality Date    CATARACT EXTRACTION W/  INTRAOCULAR LENS IMPLANT Bilateral 8/ 9 2017    GALLBLADDER SURGERY      HYSTERECTOMY      LUNG SURGERY Right age 15    lung collpase    TOTAL HIP ARTHROPLASTY Bilateral     TOTAL KNEE ARTHROPLASTY Bilateral      Family History   Problem Relation Age of Onset    Cancer Mother     Breast cancer Mother     Hyperlipidemia Father     Heart failure Father     Heart disease Brother     Brain cancer Brother     Stroke Brother     Alcohol abuse Brother      Social History   Substance Use Topics    Smoking status: Former Smoker     Packs/day: 1.00     Years: 63.00     Types: Cigarettes     Quit date: 4/18/2018    Smokeless tobacco: Never Used      Comment: started age of  11     Alcohol use No     Review of patient's allergies indicates:   Allergen Reactions    Indomethacin      Other reaction(s): Headache    Latex, natural rubber Swelling    Penicillins      Other reaction(s): Vomiting    Sulfa (sulfonamide antibiotics) Other (See Comments)     hallucinations    Tolmetin      Other reaction(s): Hives       Scheduled Meds:   [START ON 10/12/2018] denosumab  60 mg Subcutaneous Q6 Months       Review of Systems   Constitutional: Negative for fever and weight loss.   HENT: Negative for hearing loss.    Eyes: Negative for blurred vision, double vision, photophobia and pain.   Respiratory: Negative for cough.    Cardiovascular: Negative for chest pain.   Gastrointestinal: Negative for abdominal pain, nausea and vomiting.   Genitourinary: Negative for dysuria, frequency and urgency.    Musculoskeletal: Positive for back pain, falls, joint pain and myalgias. Negative for neck pain.   Skin: Negative for itching and rash.   Neurological: Positive for focal weakness. Negative for tingling and headaches.   Psychiatric/Behavioral: Negative for depression and memory loss.         OBJECTIVE:       Physical Exam   Constitutional: She is oriented to person, place, and time. She appears well-developed and well-nourished.   HENT:   Head: Normocephalic and atraumatic.   Eyes: Conjunctivae and EOM are normal. Pupils are equal, round, and reactive to light.   Neck: Normal range of motion. Neck supple. No JVD present. No tracheal deviation present. No thyromegaly present.   Cardiovascular: Normal rate, regular rhythm and normal heart sounds.    Pulmonary/Chest: Effort normal and breath sounds normal.   Abdominal: She exhibits no distension. There is no tenderness.   Musculoskeletal: Normal range of motion. She exhibits no edema or tenderness.   Neurological: She is alert and oriented to person, place, and time. She has normal strength. She displays normal reflexes. A sensory deficit is present. No cranial nerve deficit. She exhibits normal muscle tone. She displays a negative Romberg sign. Gait abnormal. Coordination normal.   Reflex Scores:       Tricep reflexes are 1+ on the right side and 1+ on the left side.       Bicep reflexes are 1+ on the right side and 1+ on the left side.       Brachioradialis reflexes are 1+ on the right side and 1+ on the left side.       Patellar reflexes are 1+ on the right side and 1+ on the left side.       Achilles reflexes are 1+ on the right side and 1+ on the left side.  Gait is limping,. Left foot drop. Toes of left foot has decreased movements. Left arm is flexed at the elbow. ROM restricted in the left shoulder.    Skin: Skin is warm and dry. No rash noted.   Psychiatric: She has a normal mood and affect. Her behavior is normal. Judgment and thought content normal.        Strength  Deltoids Triceps Biceps Wrist Extension Wrist Flexion Hand    Upper: R 5/5 5/5 5/5 5/5 5/5 5/5    L 5/5 5/5 5/5 5/5 5/5 4/5     Iliopsoas Quadriceps Knee  Flexion Tibialis  anterior Gastro- cnemius EHL   Lower: R 5/5 5/5 5/5 5/5 5/5 5/5    L 5/5 5/5 5/5 1/5 4/5 1/5     Laboratory:  Lab Results   Component Value Date    WBC 6.77 11/15/2017    HGB 10.9 (L) 11/15/2017    HCT 35.3 (L) 11/15/2017     11/15/2017    CHOL 187 11/15/2017    TRIG 96 11/15/2017    HDL 59 11/15/2017    ALT 23 06/19/2018    AST 29 06/19/2018     06/19/2018    K 4.3 06/19/2018     06/19/2018    CREATININE 0.8 06/19/2018    BUN 15 06/19/2018    CO2 26 06/19/2018    TSH 0.740 11/15/2017    INR 1.2 10/10/2009         ASSESSMENT/PLAN:     Primary Diagnoses:    Patient Active Problem List   Diagnosis    HTN (hypertension)    Hyperlipidemia    Cigarette smoker    Osteoarthritis    Lumbar radiculopathy    Osteoporosis    Vitamin D deficiency    Glaucoma of both eyes    Uncomplicated opioid dependence    Chronic seasonal allergic rhinitis due to pollen    COPD, severe    Right hip pain        Plan:  Will see as needed.  Time spent: 30 minutes in face to face discussion concerning diagnosis, prognosis, review of lab and test results, benefits of treatment as well as management of disease, counseling of patient and coordination of care between various health care providers . Greater than half the time spent was used for coordination of care and counseling of patient. This note may have some spelling or wording mistakes which might have been overlooked during proof reading.

## 2018-07-16 ENCOUNTER — OFFICE VISIT (OUTPATIENT)
Dept: PULMONOLOGY | Facility: CLINIC | Age: 73
End: 2018-07-16
Payer: MEDICARE

## 2018-07-16 VITALS
HEIGHT: 62 IN | DIASTOLIC BLOOD PRESSURE: 82 MMHG | WEIGHT: 124.25 LBS | SYSTOLIC BLOOD PRESSURE: 140 MMHG | BODY MASS INDEX: 22.87 KG/M2 | OXYGEN SATURATION: 96 % | RESPIRATION RATE: 17 BRPM | HEART RATE: 85 BPM

## 2018-07-16 DIAGNOSIS — J30.1 CHRONIC SEASONAL ALLERGIC RHINITIS DUE TO POLLEN: Chronic | ICD-10-CM

## 2018-07-16 DIAGNOSIS — Z87.891 FORMER CIGARETTE SMOKER: Chronic | ICD-10-CM

## 2018-07-16 DIAGNOSIS — J44.9 COPD, SEVERE: Primary | Chronic | ICD-10-CM

## 2018-07-16 PROCEDURE — 99999 PR PBB SHADOW E&M-EST. PATIENT-LVL III: CPT | Mod: PBBFAC,,, | Performed by: INTERNAL MEDICINE

## 2018-07-16 PROCEDURE — 3077F SYST BP >= 140 MM HG: CPT | Mod: CPTII,S$GLB,, | Performed by: INTERNAL MEDICINE

## 2018-07-16 PROCEDURE — 99214 OFFICE O/P EST MOD 30 MIN: CPT | Mod: S$GLB,,, | Performed by: INTERNAL MEDICINE

## 2018-07-16 PROCEDURE — 3079F DIAST BP 80-89 MM HG: CPT | Mod: CPTII,S$GLB,, | Performed by: INTERNAL MEDICINE

## 2018-07-16 NOTE — PROGRESS NOTES
Subjective:      Patient ID: Keyona Dwyer is a 72 y.o. female.  Patient Active Problem List   Diagnosis    HTN (hypertension)    Hyperlipidemia    Former cigarette smoker    Osteoarthritis    Lumbar radiculopathy    Osteoporosis    Vitamin D deficiency    Glaucoma of both eyes    Uncomplicated opioid dependence    Chronic seasonal allergic rhinitis due to pollen    COPD, severe    Right hip pain     Problem list has been reviewed.    Chief Complaint: COPD      HPI    Group Spirometric Classification Exacerbations / year [facility] CAT   Group D: High risk; more sypmtoms  GOLD 3-4 > = 2 >= 2 >= 10     FEV1 =>  FEV1: 0.91  (47 % predicted)      Patients reports NYHA II dyspnea    The patient does not have currently have symptoms / an exacerbation.       No wheezing, No shortness or breath.     COPD QUESTIONNAIRE  How often do you cough?: Almost never  How often do you have phlegm (mucus) in your chest?: Never  How often does your chest feel tight?: Never  When you walk up a hill or one flight of stairs, how often are you breathless?: Most of the time  How often are you limited doing any activities at home?: Most of the time  How often are you confident leaving the house despite your lung condition?: Most of the time  How often do you sleep soundly?: Most of the time  How often do you have energy?: Some of the time  Total score: 13     She reports that she has quit smoking. Quit date: 05/02/2018     A full  review of systems, past , family  and social histories was performed except as mentioned in the note above, these are non contributory to the main issues discussed today.       Previous Report Reviewed: lab reports, office notes and radiology reports     The following portions of the patient's history were reviewed and updated as appropriate: She  has a past medical history of Cataract; Chronic back pain; Fibrocystic breast; Glaucoma; Hyperlipemia; Hypertension; Osteoarthritis; Restless leg syndrome; and  Trouble in sleeping.  She  has a past surgical history that includes Gallbladder surgery; Hysterectomy; Total knee arthroplasty (Bilateral); Total hip arthroplasty (Bilateral); Lung surgery (Right, age 15); and Cataract extraction w/  intraocular lens implant (Bilateral, 8/ 9 2017).  Her family history includes Alcohol abuse in her brother; Brain cancer in her brother; Breast cancer in her mother; Cancer in her mother; Heart disease in her brother; Heart failure in her father; Hyperlipidemia in her father; Stroke in her brother.  She  reports that she quit smoking about 2 months ago. Her smoking use included Cigarettes. She has a 63.00 pack-year smoking history. She has never used smokeless tobacco. She reports that she does not drink alcohol or use drugs.  She has a current medication list which includes the following prescription(s): amlodipine, b complex vitamins, duloxetine, ergocalciferol, gabapentin, hydrocodone-acetaminophen, levocetirizine, lisinopril, meloxicam, ON-Q PAIN PUMP, and varenicline, and the following Facility-Administered Medications: denosumab.  She is allergic to indomethacin; latex, natural rubber; penicillins; sulfa (sulfonamide antibiotics); and tolmetin..    Review of Systems   Constitutional: Negative for chills, fatigue and night sweats.   HENT: Positive for postnasal drip, rhinorrhea, sinus pressure and congestion. Negative for sore throat and trouble swallowing.    Eyes: Negative for itching.   Respiratory: Positive for cough, sputum production, wheezing and dyspnea on extertion. Negative for orthopnea and Paroxysmal Nocturnal Dyspnea.    Cardiovascular: Negative for chest pain, palpitations and leg swelling.   Genitourinary: Negative for difficulty urinating and hematuria.   Endocrine: Negative for cold intolerance and heat intolerance.    Musculoskeletal: Positive for arthralgias and gait problem. Back pain: Ambulates with a walker.   Skin: Negative for rash.   Gastrointestinal:  "Negative for nausea, vomiting, abdominal pain and acid reflux.        Intermittent diarrhea   Neurological: Negative for syncope, light-headedness and headaches.   Hematological: No excessive bruising.   Psychiatric/Behavioral: The patient is not nervous/anxious.    All other systems reviewed and are negative.       Objective:   BP (!) 140/82   Pulse 85   Resp 17   Ht 5' 2" (1.575 m)   Wt 56.4 kg (124 lb 3.7 oz)   SpO2 96%   BMI 22.72 kg/m²   Body mass index is 22.72 kg/m².    Physical Exam   Constitutional: She is oriented to person, place, and time. She appears well-developed and well-nourished.   HENT:   Head: Normocephalic and atraumatic.   Eyes: EOM are normal. Pupils are equal, round, and reactive to light.   Neck: Normal range of motion. Neck supple.   Cardiovascular: Normal rate and regular rhythm.    Pulmonary/Chest: Effort normal and breath sounds normal. She has no wheezes. She has no rales.   Abdominal: Soft. Bowel sounds are normal.   Musculoskeletal: Normal range of motion.   Neurological: She is alert and oriented to person, place, and time.   Skin: Skin is warm and dry. Capillary refill takes less than 2 seconds.   Psychiatric: She has a normal mood and affect.   Nursing note and vitals reviewed.      Personal Diagnostic Review  None pertinent.    Assessment / plan:     Discussed diagnosis, its evaluation, treatment and usual course. All questions answered.    Problem List Items Addressed This Visit        Pulmonary    COPD, severe - Primary    Current Assessment & Plan     COPD ROS: no significant ongoing wheezing or shortness of breath.   New concerns: None.   Exam: appears well, vitals normal, no respiratory distress, acyanotic, normal RR.   Assessment:  COPD stable.   Plan: Not currently requiring inhaler therapy. Lab results and schedule of future lab studies reviewed with patient. PFT in 6 months. CXR in 6 months.          Relevant Orders    Complete PFT with bronchodilator    X-Ray Chest " PA And Lateral       Other    Former cigarette smoker    Current Assessment & Plan     Patient commended on quitting smoking.         Chronic seasonal allergic rhinitis due to pollen    Current Assessment & Plan     Continue XYZAL per orders.   Continue Flonase per orders                 TIME SPENT WITH PATIENT: Time spent: 35 minutes in face to face  discussion concerning diagnosis, prognosis, review of lab and test results, benefits of treatment as well as management of disease, counseling of patient and coordination of care between various health  care providers . Greater than half the time spent was used for coordination of care and counseling of patient.     Follow-up in about 6 months (around 1/16/2019) for COPD, Allergic Rhinitis.

## 2018-07-16 NOTE — PATIENT INSTRUCTIONS
Lung Anatomy  Your lungs take air in to give your body oxygen, which the body needs to work. Your lungs, like all the tissues in your body, are made up of billions of tiny specialized cells. Old lung cells die and are replaced by new, identical lung cells. This natural process helps ensure healthy lungs.    Date Last Reviewed: 11/1/2016  © 2714-2859 [employer]. [address] [place] [address], [place] [address]. All rights reserved. This information is not intended as a substitute for professional medical care. Always follow your healthcare professional's instructions.

## 2018-07-16 NOTE — ASSESSMENT & PLAN NOTE
COPD ROS: no significant ongoing wheezing or shortness of breath.   New concerns: None.   Exam: appears well, vitals normal, no respiratory distress, acyanotic, normal RR.   Assessment:  COPD stable.   Plan: Not currently requiring inhaler therapy. Lab results and schedule of future lab studies reviewed with patient. PFT in 6 months. CXR in 6 months.

## 2018-09-17 ENCOUNTER — CLINICAL SUPPORT (OUTPATIENT)
Dept: SMOKING CESSATION | Facility: CLINIC | Age: 73
End: 2018-09-17
Payer: COMMERCIAL

## 2018-09-17 DIAGNOSIS — F17.200 NICOTINE DEPENDENCE: Primary | ICD-10-CM

## 2018-09-17 PROCEDURE — 99404 PREV MED CNSL INDIV APPRX 60: CPT | Mod: S$GLB,,, | Performed by: GENERAL PRACTICE

## 2018-09-17 RX ORDER — VARENICLINE TARTRATE 0.5 (11)-1
KIT ORAL
Qty: 53 TABLET | Refills: 0 | Status: SHIPPED | OUTPATIENT
Start: 2018-09-17 | End: 2018-12-14

## 2018-09-24 ENCOUNTER — CLINICAL SUPPORT (OUTPATIENT)
Dept: SMOKING CESSATION | Facility: CLINIC | Age: 73
End: 2018-09-24
Payer: COMMERCIAL

## 2018-09-24 DIAGNOSIS — F17.200 NICOTINE DEPENDENCE: Primary | ICD-10-CM

## 2018-09-24 PROCEDURE — 99407 BEHAV CHNG SMOKING > 10 MIN: CPT | Mod: S$GLB,,, | Performed by: GENERAL PRACTICE

## 2018-10-01 ENCOUNTER — CLINICAL SUPPORT (OUTPATIENT)
Dept: SMOKING CESSATION | Facility: CLINIC | Age: 73
End: 2018-10-01
Payer: COMMERCIAL

## 2018-10-01 DIAGNOSIS — F17.200 NICOTINE DEPENDENCE: Primary | ICD-10-CM

## 2018-10-01 PROCEDURE — 99404 PREV MED CNSL INDIV APPRX 60: CPT | Mod: S$GLB,,, | Performed by: GENERAL PRACTICE

## 2018-10-01 NOTE — PROGRESS NOTES
Individual Follow-Up Form    10/1/2018    Clinical Status of Patient: Outpatient    Length of Service: 60 minutes    Continuing Medication: yes  Chantix     Target Symptoms: Withdrawal and medication side effects. The following were  rated moderate (3) to severe (4) on TCRS:  · Moderate (3): restless, desire tobacco, irritable, increased appetite  · Severe (4): none    Comments: Patient was seen today for a smoking cessation progress update. The patient remains on the prescribed tobacco cessation medication regimen of 1 mg Chantix BID without any negative side effects at this time. She is on Day 12 of Chantix. She states that she has been smoking 1 cigarette each day. We discussed coping strategies and changing her daily habits. She states that her  gets on her nerves and she reaches for a cigarette. WE discussed stress management and relaxation techniques. She verbalized understanding. She states that she is eating more. Discussed healthy eating habits. The patient denies any abnormal behavioral or mental changes at this time. Will continue to encourage and monitor progress.    Diagnosis: F17.200    Next Visit: 2 weeks

## 2018-10-08 ENCOUNTER — OFFICE VISIT (OUTPATIENT)
Dept: URGENT CARE | Facility: CLINIC | Age: 73
End: 2018-10-08
Payer: MEDICARE

## 2018-10-08 ENCOUNTER — HOSPITAL ENCOUNTER (OUTPATIENT)
Dept: RADIOLOGY | Facility: HOSPITAL | Age: 73
Discharge: HOME | End: 2018-10-08
Attending: NURSE PRACTITIONER
Payer: MEDICARE

## 2018-10-08 VITALS
OXYGEN SATURATION: 97 % | WEIGHT: 120 LBS | SYSTOLIC BLOOD PRESSURE: 142 MMHG | BODY MASS INDEX: 21.95 KG/M2 | TEMPERATURE: 102 F | HEART RATE: 92 BPM | DIASTOLIC BLOOD PRESSURE: 68 MMHG | RESPIRATION RATE: 18 BRPM

## 2018-10-08 DIAGNOSIS — R50.9 FEVER, UNSPECIFIED FEVER CAUSE: Primary | ICD-10-CM

## 2018-10-08 DIAGNOSIS — R50.9 FEVER, UNSPECIFIED FEVER CAUSE: ICD-10-CM

## 2018-10-08 DIAGNOSIS — R11.0 NAUSEA: ICD-10-CM

## 2018-10-08 DIAGNOSIS — J44.1 COPD EXACERBATION: ICD-10-CM

## 2018-10-08 LAB
CTP QC/QA: YES
CTP QC/QA: YES
FLUAV AG NPH QL: NEGATIVE
FLUBV AG NPH QL: NEGATIVE
S PYO RRNA THROAT QL PROBE: NEGATIVE

## 2018-10-08 PROCEDURE — 3078F DIAST BP <80 MM HG: CPT | Mod: CPTII,,, | Performed by: NURSE PRACTITIONER

## 2018-10-08 PROCEDURE — 87081 CULTURE SCREEN ONLY: CPT

## 2018-10-08 PROCEDURE — 87880 STREP A ASSAY W/OPTIC: CPT | Mod: PBBFAC,PO | Performed by: NURSE PRACTITIONER

## 2018-10-08 PROCEDURE — 71046 X-RAY EXAM CHEST 2 VIEWS: CPT | Mod: TC,FY,PO

## 2018-10-08 PROCEDURE — 99999 PR PBB SHADOW E&M-EST. PATIENT-LVL V: CPT | Mod: PBBFAC,,, | Performed by: NURSE PRACTITIONER

## 2018-10-08 PROCEDURE — 87804 INFLUENZA ASSAY W/OPTIC: CPT | Mod: 59,PBBFAC,PO | Performed by: NURSE PRACTITIONER

## 2018-10-08 PROCEDURE — 3077F SYST BP >= 140 MM HG: CPT | Mod: CPTII,,, | Performed by: NURSE PRACTITIONER

## 2018-10-08 PROCEDURE — 71046 X-RAY EXAM CHEST 2 VIEWS: CPT | Mod: 26,,, | Performed by: RADIOLOGY

## 2018-10-08 PROCEDURE — 1101F PT FALLS ASSESS-DOCD LE1/YR: CPT | Mod: CPTII,,, | Performed by: NURSE PRACTITIONER

## 2018-10-08 PROCEDURE — 99215 OFFICE O/P EST HI 40 MIN: CPT | Mod: PBBFAC,25,PO | Performed by: NURSE PRACTITIONER

## 2018-10-08 PROCEDURE — 99214 OFFICE O/P EST MOD 30 MIN: CPT | Mod: S$PBB,,, | Performed by: NURSE PRACTITIONER

## 2018-10-08 RX ORDER — BENZONATATE 200 MG/1
200 CAPSULE ORAL 3 TIMES DAILY PRN
Qty: 30 CAPSULE | Refills: 0 | Status: SHIPPED | OUTPATIENT
Start: 2018-10-08 | End: 2018-10-18 | Stop reason: SDUPTHER

## 2018-10-08 RX ORDER — ONDANSETRON 4 MG/1
4 TABLET, FILM COATED ORAL EVERY 6 HOURS PRN
Qty: 20 TABLET | Refills: 0 | Status: SHIPPED | OUTPATIENT
Start: 2018-10-08 | End: 2018-12-14

## 2018-10-08 RX ORDER — ACETAMINOPHEN 325 MG/1
650 TABLET ORAL
Status: COMPLETED | OUTPATIENT
Start: 2018-10-08 | End: 2018-10-08

## 2018-10-08 RX ORDER — DOXYCYCLINE 100 MG/1
100 CAPSULE ORAL EVERY 12 HOURS
Qty: 20 CAPSULE | Refills: 0 | Status: SHIPPED | OUTPATIENT
Start: 2018-10-08 | End: 2018-10-16

## 2018-10-08 RX ORDER — ONDANSETRON 4 MG/1
4 TABLET, FILM COATED ORAL
Status: DISCONTINUED | OUTPATIENT
Start: 2018-10-08 | End: 2021-04-15 | Stop reason: HOSPADM

## 2018-10-08 RX ADMIN — ACETAMINOPHEN 650 MG: 325 TABLET ORAL at 11:10

## 2018-10-08 NOTE — PROGRESS NOTES
Subjective:      Patient ID: Keyona Dwyer is a 72 y.o. female.    Chief Complaint: Nausea; Sore Throat; and Fatigue    Sore Throat    This is a new problem. The current episode started in the past 7 days (4-5 days). The problem has been gradually worsening. Maximum temperature: didn't realize she had fever until checked in here; temp 102. The pain is moderate. Associated symptoms include congestion, coughing, diarrhea (mild) and vomiting (x 1-2 episodes total over the last 4 days). Pertinent negatives include no abdominal pain, ear pain or shortness of breath. Associated symptoms comments: Muscle aches, fatigue. She has had no exposure to strep or mono. She has tried nothing for the symptoms. The treatment provided no relief.     Review of Systems   Constitutional: Positive for appetite change (decreased), fatigue and fever.   HENT: Positive for congestion and sore throat. Negative for ear pain.    Eyes: Negative.    Respiratory: Positive for cough. Negative for shortness of breath and wheezing.    Cardiovascular: Negative.    Gastrointestinal: Positive for diarrhea (mild), nausea and vomiting (x 1-2 episodes total over the last 4 days). Negative for abdominal pain.   Genitourinary: Negative.  Negative for dysuria, flank pain, frequency and hematuria.   Musculoskeletal: Positive for myalgias (body aches).   Skin: Negative.    Neurological: Positive for weakness (generalized weakness and fatigue).   Hematological: Negative.        Objective:   BP (!) 142/68 (BP Location: Right arm, Patient Position: Sitting)   Pulse 92   Temp (!) 102 °F (38.9 °C) (Tympanic)   Resp 18   Wt 54.4 kg (120 lb)   SpO2 97%   BMI 21.95 kg/m²   Physical Exam   Constitutional: She is oriented to person, place, and time. She appears well-developed and well-nourished. No distress.   HENT:   Head: Normocephalic and atraumatic.   Right Ear: Tympanic membrane normal.   Left Ear: Tympanic membrane normal.   Nose: Nose normal.    Mouth/Throat: Uvula is midline and mucous membranes are normal. Posterior oropharyngeal erythema (mild) present. No oropharyngeal exudate or posterior oropharyngeal edema.   Eyes: Conjunctivae are normal.   Neck: Normal range of motion. Neck supple.   Cardiovascular: Normal rate, regular rhythm and normal heart sounds.   Pulmonary/Chest: Effort normal. No respiratory distress. She has no wheezes. She has no rales.   Abdominal: Soft. She exhibits no distension. There is no tenderness. There is no guarding.   Lymphadenopathy:     She has no cervical adenopathy.   Neurological: She is alert and oriented to person, place, and time.   Skin: Skin is warm and dry. She is not diaphoretic.   Nursing note and vitals reviewed.    Assessment:      1. Fever, unspecified fever cause    2. Nausea       Plan:   Fever, unspecified fever cause  -     X-Ray Chest PA And Lateral; Future; Expected date: 10/08/2018  -     POCT Influenza A/B  -     POCT Rapid Strep A  -     acetaminophen tablet 650 mg; Take 2 tablets (650 mg total) by mouth one time.    Nausea  -     ondansetron tablet 4 mg; Take 1 tablet (4 mg total) by mouth one time.    Ms. Dwyer reports feeling a whole lot better after Tylenol and Zofran.  Tylenol PRN fever or pain.   Instructions, follow up, and supportive care as per AVS.  ER for any new or worsening symptoms.  PCP if not improving in the next 48 hours.    Will follow up with Ms. Dwyer when results of her chest x-ray is available.

## 2018-10-08 NOTE — PROGRESS NOTES
Medication given. Patient tolerated well. Informed pt to wait 15mis for s/s of reactions. NDC not matching with what's in computer [id number]. EXP 2/20. LOT [id number].

## 2018-10-08 NOTE — PATIENT INSTRUCTIONS
COPD Flare    You have had a flare-up of your COPD.  COPD, or chronic obstructive pulmonary disease, is a common lung disease. It causes your airways to become irritated and narrower. This makes it harder for you to breathe. Emphysema and chronic bronchitis are both types of COPD. This is a chronic condition, which means you always have it. Sometimes it gets worse. When this happens, it is called a flare-up.  Symptoms of COPD  People with COPD may have symptoms most of the time. In a flare-up, your symptoms get worse. These symptoms may mean you are having a flare-up:  · Shortness of breath, shallow or rapid breathing, or wheezing that gets worse  · Lung infection  · Cough that gets worse  · More mucus, thicker mucus or mucus of a different color  · Tiredness, decreased energy, or trouble doing your usual activities  · Fever  · Chest tightness  · Your symptoms dont get better even when you use your usual medicines, inhalers, and nebulizer  · Trouble talking  · You feel confused  Causes of flare-ups  Unfortunately, a flare-up can happen even though you did everything right, and you followed your doctors instructions. Some causes of flare-ups are:  · Smoking or secondhand smoke  · Colds, the flu, or respiratory infections  · Air pollution  · Sudden change in the weather  · Dust, irritating chemicals, or strong fumes  · Not taking your medicines as prescribed  Home care  Here are some things you can do at home to treat a flare-up:  · Try not to panic. This makes it harder to breathe, and keeps you from doing the right things.  · Dont smoke or be around others who are smoking.  · Try to drink more fluids than usual during a flare-up, unless your doctor has told you not to because of heart and kidney problems. More fluids can help loosen the mucus.  · Use your inhalers and nebulizer, if you have one, as you have been told to.  · If you were given antibiotics, take them until they are used up or your doctor tells you  to stop. Its important to finish the antibiotics, even though you feel better. This will make sure the infection has cleared.  · If you were given prednisone or another steroid, finish it even if you feel better.  Preventing a flare-up  Even though flare-ups happen, the best way to treat one is to prevent it before it starts. Here are some pointers:  · Dont smoke or be around others who are smoking.  · Take your medicines as you have been told.  · Talk with your doctor about getting a flu shot every year. Also find out if you need a pneumonia shot.  · If there is a weather advisory warning to stay indoors, try to stay inside when possible.  · Try to eat healthy and get plenty of sleep.  · Try to avoid things that usually set you off, like dust, chemical fumes, hairsprays, or strong perfumes.  Follow-up care  Follow up with your healthcare provider, or as advised.  If a culture was done, you will be told if your treatment needs to be changed. You can call as directed for the results.  If X-rays were done, you will be notified of any new findings that may affect your care.  Call 911  Call 911 if any of these occur:  · You have trouble breathing  · You feel confused or its difficult to wake you up  · You faint or lose consciousness  · You have a rapid heart rate  · You have new pain in your chest, arm, shoulder, neck or upper back  When to seek medical advice  Call your healthcare provider right away if any of these occur:  · Wheezing or shortness of breath gets worse  · You need to use your inhalers more often than usual without relief  · Fever of 100.4°F (38ºC) or higher, or as directed by your healthcare provider  · Coughing up lots of dark-colored or bloody mucus (sputum)  · Chest pain with each breath  · You do not start to get better within 24 hours  · Swelling of your ankles gets worse  · Dizziness or weakness  Date Last Reviewed: 9/1/2016  © 3694-4015 [employer] [address] [facility],  [place] [address]. All rights reserved. This information is not intended as a substitute for professional medical care. Always follow your healthcare professional's instructions.

## 2018-10-11 LAB — BACTERIA THROAT CULT: NORMAL

## 2018-10-15 ENCOUNTER — CLINICAL SUPPORT (OUTPATIENT)
Dept: SMOKING CESSATION | Facility: CLINIC | Age: 73
End: 2018-10-15
Payer: COMMERCIAL

## 2018-10-15 DIAGNOSIS — F17.200 NICOTINE DEPENDENCE: Primary | ICD-10-CM

## 2018-10-15 PROCEDURE — 99401 PREV MED CNSL INDIV APPRX 15: CPT | Mod: S$GLB,,, | Performed by: GENERAL PRACTICE

## 2018-10-15 NOTE — PROGRESS NOTES
Individual Follow-Up Form    10/15/2018    Quit Date: 10-4-2018    Clinical Status of Patient: Outpatient    Length of Service: 15 minutes    Continuing Medication: no     Target Symptoms: Withdrawal and medication side effects. The following were  rated moderate (3) to severe (4) on TCRS:  · Moderate (3): nausea, fatigue, tired  · Severe (4): none    Comments: Spoke with patient by telephone in regards to her missed follow up appointment. She states that she has been sick in bed for over a week and she forgot to call to reschedule her appointment. She states that she has been having tests run to find out what is going on. She states that she is coughing and feels tired and fatigued. She states that she has not smoked since 10-4-2018 because she felt that she could not breathe after she would smoke. She went to Urgent care last week and was told that if she wasn't better to go back and see her PCP. She states that she has called to schedule an appointment. She has discontinued using Chantix because she states that she was nauseated and throwing up and could not keep food down to take her Chantix. She states that she has no desire to smoke at this time. She denies any negative thoughts or behavior at this time. Will continue to encourage and monitor progress.    Diagnosis: F17.200    Next Visit: 1 week

## 2018-10-16 ENCOUNTER — OFFICE VISIT (OUTPATIENT)
Dept: INTERNAL MEDICINE | Facility: CLINIC | Age: 73
End: 2018-10-16
Payer: MEDICARE

## 2018-10-16 ENCOUNTER — HOSPITAL ENCOUNTER (OUTPATIENT)
Dept: RADIOLOGY | Facility: HOSPITAL | Age: 73
Discharge: HOME | End: 2018-10-16
Attending: PHYSICIAN ASSISTANT
Payer: MEDICARE

## 2018-10-16 VITALS
HEART RATE: 116 BPM | SYSTOLIC BLOOD PRESSURE: 106 MMHG | OXYGEN SATURATION: 94 % | BODY MASS INDEX: 22.76 KG/M2 | TEMPERATURE: 98 F | WEIGHT: 123.69 LBS | DIASTOLIC BLOOD PRESSURE: 60 MMHG | HEIGHT: 62 IN

## 2018-10-16 DIAGNOSIS — R09.89 RHONCHI AT LEFT LUNG BASE: ICD-10-CM

## 2018-10-16 DIAGNOSIS — R09.89 RHONCHI AT LEFT LUNG BASE: Primary | ICD-10-CM

## 2018-10-16 DIAGNOSIS — J44.9 COPD, SEVERE: ICD-10-CM

## 2018-10-16 DIAGNOSIS — I10 ESSENTIAL HYPERTENSION: Chronic | ICD-10-CM

## 2018-10-16 PROCEDURE — 3074F SYST BP LT 130 MM HG: CPT | Mod: CPTII,,, | Performed by: PHYSICIAN ASSISTANT

## 2018-10-16 PROCEDURE — 71046 X-RAY EXAM CHEST 2 VIEWS: CPT | Mod: 26,,, | Performed by: RADIOLOGY

## 2018-10-16 PROCEDURE — 3078F DIAST BP <80 MM HG: CPT | Mod: CPTII,,, | Performed by: PHYSICIAN ASSISTANT

## 2018-10-16 PROCEDURE — 1101F PT FALLS ASSESS-DOCD LE1/YR: CPT | Mod: CPTII,,, | Performed by: PHYSICIAN ASSISTANT

## 2018-10-16 PROCEDURE — 99999 PR PBB SHADOW E&M-EST. PATIENT-LVL V: CPT | Mod: PBBFAC,,, | Performed by: PHYSICIAN ASSISTANT

## 2018-10-16 PROCEDURE — 99499 UNLISTED E&M SERVICE: CPT | Mod: HCNC,S$GLB,, | Performed by: PHYSICIAN ASSISTANT

## 2018-10-16 PROCEDURE — 99214 OFFICE O/P EST MOD 30 MIN: CPT | Mod: S$PBB,,, | Performed by: PHYSICIAN ASSISTANT

## 2018-10-16 PROCEDURE — 71046 X-RAY EXAM CHEST 2 VIEWS: CPT | Mod: TC,FY,PO

## 2018-10-16 PROCEDURE — 99215 OFFICE O/P EST HI 40 MIN: CPT | Mod: PBBFAC,25,PO | Performed by: PHYSICIAN ASSISTANT

## 2018-10-16 RX ORDER — CEFTRIAXONE 1 G/1
0.5 INJECTION, POWDER, FOR SOLUTION INTRAMUSCULAR; INTRAVENOUS
Status: DISCONTINUED | OUTPATIENT
Start: 2018-10-16 | End: 2018-10-16

## 2018-10-16 RX ORDER — AZITHROMYCIN 250 MG/1
TABLET, FILM COATED ORAL
Qty: 6 TABLET | Refills: 0 | Status: SHIPPED | OUTPATIENT
Start: 2018-10-16 | End: 2018-12-14 | Stop reason: ALTCHOICE

## 2018-10-16 NOTE — PROGRESS NOTES
Subjective:       Patient ID: Keyona Dwyer is a 72 y.o. female.    Chief Complaint: Follow-up (UC follow up)    HPI     Patient comes int [facility] for follow up UC  She was treating for illness with doxy and cough medication   Patient does have COPD    Fever is gone but cough is a little worse\she did not finish her abx tx   She stopped after about 4 days, states had stomach upset    Denies CP or shortness of breath     Health Maintenance Due   Topic Date Due    Zoster Vaccine  11/13/2003    Influenza Vaccine  08/01/2018    Mammogram  11/02/2018       Past Medical History:   Diagnosis Date    Cataract     Chronic back pain     Dr. Guzman    Fibrocystic breast     Glaucoma     Hyperlipemia     Hypertension     Osteoarthritis     Restless leg syndrome     Trouble in sleeping        Current Outpatient Medications   Medication Sig Dispense Refill    amLODIPine (NORVASC) 5 MG tablet TAKE (1) TABLET by mouth DAILY FOR BLOOD PRESSURE. 30 tablet 11    b complex vitamins capsule Take 1 capsule by mouth once daily.      benzonatate (TESSALON) 200 MG capsule Take 1 capsule (200 mg total) by mouth 3 (three) times daily as needed for Cough. 30 capsule 0    duloxetine (CYMBALTA) 30 MG capsule Take 30 mg by mouth once daily.      ergocalciferol (ERGOCALCIFEROL) 50,000 unit Cap Take 1 capsule (50,000 Units total) by mouth every 7 days. 12 capsule 3    gabapentin (NEURONTIN) 600 MG tablet Take 600 mg by mouth 4 (four) times daily.       hydrocodone-acetaminophen 10-325mg (NORCO)  mg Tab Take 1 tablet by mouth every 6 (six) hours as needed.      levocetirizine (XYZAL) 5 MG tablet Take 1 tablet (5 mg total) by mouth every evening. 30 tablet 11    lisinopril (PRINIVIL,ZESTRIL) 40 MG tablet TAKE (1) TABLET by mouth DAILY FOR BLOOD PRESSURE. 30 tablet 11    meloxicam (MOBIC) 15 MG tablet Take 1 tablet (15 mg total) by mouth once daily. 30 tablet 5    ON-Q PAIN PUMP by Misc.(Non-Drug; Combo Route) route.   "    varenicline (CHANTIX STARTING MONTH BOX) 0.5 mg (11)- 1 mg (42) tablet Take one 0.5mg tab by mouth once daily X3 days,then increase to one 0.5mg tab twice daily X4 days,then increase to one 1mg tab twice daily 53 tablet 0    azithromycin (ZITHROMAX Z-VINCE) 250 MG tablet Take as directed, 2 tab on Day 1, 1 tab each additional day 2-4 6 tablet 0    ondansetron (ZOFRAN) 4 MG tablet Take 1 tablet (4 mg total) by mouth every 6 (six) hours as needed for Nausea. 20 tablet 0     Current Facility-Administered Medications   Medication Dose Route Frequency Provider Last Rate Last Dose    denosumab (PROLIA) injection 60 mg  60 mg Subcutaneous Q6 Months Marguerite Cutler PA-C   60 mg at 06/19/18 0951    ondansetron tablet 4 mg  4 mg Oral 1 time in Clinic/HOD Azalea Williamson NP           Review of Systems      See HPI     Objective:   /60   Pulse (!) 116   Temp 98.2 °F (36.8 °C) (Tympanic)   Ht 5' 2" (1.575 m)   Wt 56.1 kg (123 lb 10.9 oz)   SpO2 (!) 94%   BMI 22.62 kg/m²      Physical Exam   Constitutional: She is oriented to person, place, and time. She appears well-developed and well-nourished. No distress.   HENT:   Head: Normocephalic and atraumatic.   Right Ear: External ear normal.   Left Ear: External ear normal.   Nose: Nose normal.   Mouth/Throat: Oropharynx is clear and moist. No oropharyngeal exudate.   Eyes: Conjunctivae and EOM are normal. Pupils are equal, round, and reactive to light.   Neck: Normal range of motion. Neck supple.   Cardiovascular: Normal rate, regular rhythm and normal heart sounds.   Pulmonary/Chest: Effort normal. She has wheezes.   Rhonchi, left side    Abdominal: Soft.   Musculoskeletal: She exhibits no edema.   Neurological: She is alert and oriented to person, place, and time.   Skin: Skin is warm. Capillary refill takes less than 2 seconds. She is not diaphoretic.   Psychiatric: She has a normal mood and affect. Her behavior is normal.         Lab Results   Component Value " Date    WBC 6.77 11/15/2017    HGB 10.9 (L) 11/15/2017    HCT 35.3 (L) 11/15/2017     11/15/2017    CHOL 187 11/15/2017    TRIG 96 11/15/2017    HDL 59 11/15/2017    ALT 23 06/19/2018    AST 29 06/19/2018     06/19/2018    K 4.3 06/19/2018     06/19/2018    CREATININE 0.8 06/19/2018    BUN 15 06/19/2018    CO2 26 06/19/2018    TSH 0.740 11/15/2017    INR 1.2 10/10/2009       Assessment:       1. Rhonchi at left lung base    2. COPD, severe    3. Essential hypertension        Plan:     Rhonchi at left lung base  -     X-Ray Chest PA And Lateral; Future; Expected date: 10/16/2018  Patient clinically has PNA   Had fever, tachy, and low pulse O2  Since she is feeling better and xray unremarkable, will start zpack   See back 2 days for vitals recheck   COPD, severe    hypertension- avoid decongestants       -     Discontinue: cefTRIAXone injection 0.5 g  -     azithromycin (ZITHROMAX Z-VINCE) 250 MG tablet; Take as directed, 2 tab on Day 1, 1 tab each additional day 2-4  Dispense: 6 tablet; Refill: 0      No Follow-up on file.

## 2018-10-16 NOTE — PROGRESS NOTES
Subjective:       Patient ID: Keyona Dwyer is a 72 y.o. female.    Chief Complaint: Follow-up ( follow up)    HPI    Health Maintenance Due   Topic Date Due    Zoster Vaccine  11/13/2003    Influenza Vaccine  08/01/2018    Mammogram  11/02/2018       Past Medical History:   Diagnosis Date    Cataract     Chronic back pain     Dr. Guzman    Fibrocystic breast     Glaucoma     Hyperlipemia     Hypertension     Osteoarthritis     Restless leg syndrome     Trouble in sleeping        Current Outpatient Medications   Medication Sig Dispense Refill    amLODIPine (NORVASC) 5 MG tablet TAKE (1) TABLET by mouth DAILY FOR BLOOD PRESSURE. 30 tablet 11    b complex vitamins capsule Take 1 capsule by mouth once daily.      benzonatate (TESSALON) 200 MG capsule Take 1 capsule (200 mg total) by mouth 3 (three) times daily as needed for Cough. 30 capsule 0    duloxetine (CYMBALTA) 30 MG capsule Take 30 mg by mouth once daily.      ergocalciferol (ERGOCALCIFEROL) 50,000 unit Cap Take 1 capsule (50,000 Units total) by mouth every 7 days. 12 capsule 3    gabapentin (NEURONTIN) 600 MG tablet Take 600 mg by mouth 4 (four) times daily.       hydrocodone-acetaminophen 10-325mg (NORCO)  mg Tab Take 1 tablet by mouth every 6 (six) hours as needed.      levocetirizine (XYZAL) 5 MG tablet Take 1 tablet (5 mg total) by mouth every evening. 30 tablet 11    lisinopril (PRINIVIL,ZESTRIL) 40 MG tablet TAKE (1) TABLET by mouth DAILY FOR BLOOD PRESSURE. 30 tablet 11    meloxicam (MOBIC) 15 MG tablet Take 1 tablet (15 mg total) by mouth once daily. 30 tablet 5    ON-Q PAIN PUMP by Misc.(Non-Drug; Combo Route) route.      varenicline (CHANTIX STARTING MONTH BOX) 0.5 mg (11)- 1 mg (42) tablet Take one 0.5mg tab by mouth once daily X3 days,then increase to one 0.5mg tab twice daily X4 days,then increase to one 1mg tab twice daily 53 tablet 0    azithromycin (ZITHROMAX Z-VINCE) 250 MG tablet Take as directed, 2 tab  "on Day 1, 1 tab each additional day 2-4 6 tablet 0    ondansetron (ZOFRAN) 4 MG tablet Take 1 tablet (4 mg total) by mouth every 6 (six) hours as needed for Nausea. 20 tablet 0     Current Facility-Administered Medications   Medication Dose Route Frequency Provider Last Rate Last Dose    denosumab (PROLIA) injection 60 mg  60 mg Subcutaneous Q6 Months Marguerite Cutler, PA-C   60 mg at 06/19/18 0951    ondansetron tablet 4 mg  4 mg Oral 1 time in Clinic/HOD Azalea Williamson NP           Review of Systems    Objective:   /60   Pulse (!) 116   Temp 98.2 °F (36.8 °C) (Tympanic)   Ht 5' 2" (1.575 m)   Wt 56.1 kg (123 lb 10.9 oz)   SpO2 (!) 94%   BMI 22.62 kg/m²      Physical Exam      Lab Results   Component Value Date    WBC 6.77 11/15/2017    HGB 10.9 (L) 11/15/2017    HCT 35.3 (L) 11/15/2017     11/15/2017    CHOL 187 11/15/2017    TRIG 96 11/15/2017    HDL 59 11/15/2017    ALT 23 06/19/2018    AST 29 06/19/2018     06/19/2018    K 4.3 06/19/2018     06/19/2018    CREATININE 0.8 06/19/2018    BUN 15 06/19/2018    CO2 26 06/19/2018    TSH 0.740 11/15/2017    INR 1.2 10/10/2009       Assessment:       1. Rhonchi at left lung base        Plan:   Rhonchi at left lung base  -     X-Ray Chest PA And Lateral; Future; Expected date: 10/16/2018    Other orders  -     Discontinue: cefTRIAXone injection 0.5 g; Inject 0.5 g into the muscle one time.  -     azithromycin (ZITHROMAX Z-VINCE) 250 MG tablet; Take as directed, 2 tab on Day 1, 1 tab each additional day 2-4  Dispense: 6 tablet; Refill: 0        No Follow-up on file.  "

## 2018-10-18 ENCOUNTER — CLINICAL SUPPORT (OUTPATIENT)
Dept: INTERNAL MEDICINE | Facility: CLINIC | Age: 73
End: 2018-10-18
Payer: MEDICARE

## 2018-10-18 ENCOUNTER — TELEPHONE (OUTPATIENT)
Dept: PEDIATRICS | Facility: CLINIC | Age: 73
End: 2018-10-18

## 2018-10-18 VITALS
WEIGHT: 123.88 LBS | OXYGEN SATURATION: 97 % | BODY MASS INDEX: 22.8 KG/M2 | HEIGHT: 62 IN | HEART RATE: 102 BPM | TEMPERATURE: 99 F | SYSTOLIC BLOOD PRESSURE: 148 MMHG | DIASTOLIC BLOOD PRESSURE: 78 MMHG

## 2018-10-18 DIAGNOSIS — J44.1 COPD EXACERBATION: ICD-10-CM

## 2018-10-18 PROCEDURE — 99213 OFFICE O/P EST LOW 20 MIN: CPT | Mod: PBBFAC,PO

## 2018-10-18 PROCEDURE — 99999 PR PBB SHADOW E&M-EST. PATIENT-LVL III: CPT | Mod: PBBFAC,,,

## 2018-10-18 RX ORDER — BENZONATATE 200 MG/1
200 CAPSULE ORAL 3 TIMES DAILY PRN
Qty: 30 CAPSULE | Refills: 0 | Status: SHIPPED | OUTPATIENT
Start: 2018-10-18 | End: 2018-10-28

## 2018-10-18 RX ORDER — BENZONATATE 200 MG/1
200 CAPSULE ORAL 3 TIMES DAILY PRN
Qty: 30 CAPSULE | Refills: 0 | Status: CANCELLED | OUTPATIENT
Start: 2018-10-18 | End: 2018-10-28

## 2018-10-24 ENCOUNTER — IMMUNIZATION (OUTPATIENT)
Dept: INTERNAL MEDICINE | Facility: CLINIC | Age: 73
End: 2018-10-24
Payer: MEDICARE

## 2018-10-24 PROCEDURE — 90662 IIV NO PRSV INCREASED AG IM: CPT | Mod: PBBFAC,PO

## 2018-10-29 ENCOUNTER — CLINICAL SUPPORT (OUTPATIENT)
Dept: SMOKING CESSATION | Facility: CLINIC | Age: 73
End: 2018-10-29
Payer: COMMERCIAL

## 2018-10-29 DIAGNOSIS — F17.200 NICOTINE DEPENDENCE: Primary | ICD-10-CM

## 2018-10-29 PROCEDURE — 99406 BEHAV CHNG SMOKING 3-10 MIN: CPT | Mod: S$GLB,,, | Performed by: GENERAL PRACTICE

## 2018-11-27 ENCOUNTER — CLINICAL SUPPORT (OUTPATIENT)
Dept: SMOKING CESSATION | Facility: CLINIC | Age: 73
End: 2018-11-27
Payer: COMMERCIAL

## 2018-11-27 DIAGNOSIS — F17.200 NICOTINE DEPENDENCE: Primary | ICD-10-CM

## 2018-11-27 PROCEDURE — 99401 PREV MED CNSL INDIV APPRX 15: CPT | Mod: S$GLB,,, | Performed by: GENERAL PRACTICE

## 2018-11-27 NOTE — PROGRESS NOTES
Individual Follow-Up Form    11/27/2018    Quit Date: 10-4-2018    Clinical Status of Patient: Outpatient    Length of Service: 15 minutes    Continuing Medication: no     Target Symptoms: Withdrawal and medication side effects. The following were  rated moderate (3) to severe (4) on TCRS:  · Moderate (3): none  · Severe (4): none    Comments: Spoke with patient by telephone for a progress update. She states that she remains tobacco free at this time. She has discontinued using the Chantix medication. She had surgery last week and is recovering. She stated that she was told to take no medications for 24 hours prior to her surgery. She stated that after her surgery she was hurting and felt nausaus so she was unable to eat and did not take her medication. Her urges to smoke are minimal at this time. She feels confident that she will remain tobacco free. She denies any negative thoughts or behavior at this time. Discussed program benefits and future follow ups. She verbalized understanding. Will continue to encourage and monitor progress.    Diagnosis: F17.200    Next Visit: 2 weeks

## 2018-11-29 DIAGNOSIS — I10 ESSENTIAL HYPERTENSION: Chronic | ICD-10-CM

## 2018-11-29 RX ORDER — LISINOPRIL 40 MG/1
TABLET ORAL
Qty: 30 TABLET | Refills: 11 | Status: SHIPPED | OUTPATIENT
Start: 2018-11-29 | End: 2019-08-27 | Stop reason: SDUPTHER

## 2018-11-29 RX ORDER — AMLODIPINE BESYLATE 5 MG/1
TABLET ORAL
Qty: 30 TABLET | Refills: 11 | Status: SHIPPED | OUTPATIENT
Start: 2018-11-29 | End: 2019-08-27 | Stop reason: SDUPTHER

## 2018-11-29 NOTE — TELEPHONE ENCOUNTER
----- Message from Jenniferjulissa Caruso sent at 11/29/2018 12:27 PM CST -----  Contact: pt  1. What is the name of the medication you are requesting? Amlopdine, lisinopril   2. What is the dose? 5mg, 40mg  3. How do you take the medication? Orally, topically, etc? orally  4. How often do you take this medication? 1xday  5. Do you need a 30 day or 90 day supply? 30  6. How many refills are you requesting? 1  7. What is your preferred pharmacy and location of the pharmacy? [facility]  8. Who can we contact with further questions? Pt @ [phone]

## 2018-12-14 ENCOUNTER — LAB VISIT (OUTPATIENT)
Dept: LAB | Facility: HOSPITAL | Age: 73
End: 2018-12-14
Attending: FAMILY MEDICINE
Payer: MEDICARE

## 2018-12-14 ENCOUNTER — OFFICE VISIT (OUTPATIENT)
Dept: INTERNAL MEDICINE | Facility: CLINIC | Age: 73
End: 2018-12-14
Payer: MEDICARE

## 2018-12-14 VITALS
HEIGHT: 62 IN | WEIGHT: 119.69 LBS | SYSTOLIC BLOOD PRESSURE: 140 MMHG | BODY MASS INDEX: 22.03 KG/M2 | HEART RATE: 62 BPM | TEMPERATURE: 99 F | DIASTOLIC BLOOD PRESSURE: 70 MMHG

## 2018-12-14 DIAGNOSIS — Z12.39 BREAST CANCER SCREENING: ICD-10-CM

## 2018-12-14 DIAGNOSIS — E55.9 VITAMIN D DEFICIENCY: ICD-10-CM

## 2018-12-14 DIAGNOSIS — I10 ESSENTIAL HYPERTENSION: ICD-10-CM

## 2018-12-14 DIAGNOSIS — E78.5 HYPERLIPIDEMIA, UNSPECIFIED HYPERLIPIDEMIA TYPE: ICD-10-CM

## 2018-12-14 DIAGNOSIS — D64.9 ANEMIA, UNSPECIFIED TYPE: ICD-10-CM

## 2018-12-14 DIAGNOSIS — R73.09 ABNORMAL GLUCOSE: ICD-10-CM

## 2018-12-14 DIAGNOSIS — Z00.00 ROUTINE GENERAL MEDICAL EXAMINATION AT A HEALTH CARE FACILITY: Primary | ICD-10-CM

## 2018-12-14 LAB
25(OH)D3+25(OH)D2 SERPL-MCNC: 41 NG/ML
ALBUMIN SERPL BCP-MCNC: 4 G/DL
ALP SERPL-CCNC: 60 U/L
ALT SERPL W/O P-5'-P-CCNC: 20 U/L
ANION GAP SERPL CALC-SCNC: 9 MMOL/L
AST SERPL-CCNC: 24 U/L
BASOPHILS # BLD AUTO: 0.05 K/UL
BASOPHILS NFR BLD: 0.7 %
BILIRUB SERPL-MCNC: 0.4 MG/DL
BUN SERPL-MCNC: 15 MG/DL
CALCIUM SERPL-MCNC: 9.6 MG/DL
CHLORIDE SERPL-SCNC: 100 MMOL/L
CHOLEST SERPL-MCNC: 184 MG/DL
CHOLEST/HDLC SERPL: 3.1 {RATIO}
CO2 SERPL-SCNC: 25 MMOL/L
CREAT SERPL-MCNC: 0.9 MG/DL
DIFFERENTIAL METHOD: ABNORMAL
EOSINOPHIL # BLD AUTO: 0.1 K/UL
EOSINOPHIL NFR BLD: 1.6 %
ERYTHROCYTE [DISTWIDTH] IN BLOOD BY AUTOMATED COUNT: 13.3 %
EST. GFR  (AFRICAN AMERICAN): >60 ML/MIN/1.73 M^2
EST. GFR  (NON AFRICAN AMERICAN): >60 ML/MIN/1.73 M^2
ESTIMATED AVG GLUCOSE: 97 MG/DL
FERRITIN SERPL-MCNC: 165 NG/ML
GLUCOSE SERPL-MCNC: 94 MG/DL
HBA1C MFR BLD HPLC: 5 %
HCT VFR BLD AUTO: 34.9 %
HDLC SERPL-MCNC: 60 MG/DL
HDLC SERPL: 32.6 %
HGB BLD-MCNC: 10.6 G/DL
IMM GRANULOCYTES # BLD AUTO: 0.01 K/UL
IMM GRANULOCYTES NFR BLD AUTO: 0.1 %
IRON SERPL-MCNC: 91 UG/DL
LDLC SERPL CALC-MCNC: 99.8 MG/DL
LYMPHOCYTES # BLD AUTO: 3 K/UL
LYMPHOCYTES NFR BLD: 45.2 %
MCH RBC QN AUTO: 30.8 PG
MCHC RBC AUTO-ENTMCNC: 30.4 G/DL
MCV RBC AUTO: 102 FL
MONOCYTES # BLD AUTO: 0.5 K/UL
MONOCYTES NFR BLD: 6.9 %
NEUTROPHILS # BLD AUTO: 3 K/UL
NEUTROPHILS NFR BLD: 45.5 %
NONHDLC SERPL-MCNC: 124 MG/DL
NRBC BLD-RTO: 0 /100 WBC
PLATELET # BLD AUTO: 315 K/UL
PMV BLD AUTO: 9.1 FL
POTASSIUM SERPL-SCNC: 4.4 MMOL/L
PROT SERPL-MCNC: 7.4 G/DL
RBC # BLD AUTO: 3.44 M/UL
SATURATED IRON: 29 %
SODIUM SERPL-SCNC: 134 MMOL/L
TOTAL IRON BINDING CAPACITY: 309 UG/DL
TRANSFERRIN SERPL-MCNC: 209 MG/DL
TRIGL SERPL-MCNC: 121 MG/DL
TSH SERPL DL<=0.005 MIU/L-ACNC: 0.69 UIU/ML
VIT B12 SERPL-MCNC: 365 PG/ML
WBC # BLD AUTO: 6.7 K/UL

## 2018-12-14 PROCEDURE — 99397 PER PM REEVAL EST PAT 65+ YR: CPT | Mod: HCNC,S$GLB,, | Performed by: FAMILY MEDICINE

## 2018-12-14 PROCEDURE — 85025 COMPLETE CBC W/AUTO DIFF WBC: CPT | Mod: HCNC

## 2018-12-14 PROCEDURE — 80061 LIPID PANEL: CPT | Mod: HCNC

## 2018-12-14 PROCEDURE — 80053 COMPREHEN METABOLIC PANEL: CPT | Mod: HCNC

## 2018-12-14 PROCEDURE — 3077F SYST BP >= 140 MM HG: CPT | Mod: CPTII,HCNC,S$GLB, | Performed by: FAMILY MEDICINE

## 2018-12-14 PROCEDURE — 84443 ASSAY THYROID STIM HORMONE: CPT | Mod: HCNC

## 2018-12-14 PROCEDURE — 99999 PR PBB SHADOW E&M-EST. PATIENT-LVL III: CPT | Mod: PBBFAC,HCNC,, | Performed by: FAMILY MEDICINE

## 2018-12-14 PROCEDURE — 3078F DIAST BP <80 MM HG: CPT | Mod: CPTII,HCNC,S$GLB, | Performed by: FAMILY MEDICINE

## 2018-12-14 PROCEDURE — 82607 VITAMIN B-12: CPT | Mod: HCNC

## 2018-12-14 PROCEDURE — 82728 ASSAY OF FERRITIN: CPT | Mod: HCNC

## 2018-12-14 PROCEDURE — 83036 HEMOGLOBIN GLYCOSYLATED A1C: CPT | Mod: HCNC

## 2018-12-14 PROCEDURE — 82306 VITAMIN D 25 HYDROXY: CPT | Mod: HCNC

## 2018-12-14 PROCEDURE — 83540 ASSAY OF IRON: CPT | Mod: HCNC

## 2018-12-14 PROCEDURE — 36415 COLL VENOUS BLD VENIPUNCTURE: CPT | Mod: HCNC,PO

## 2018-12-14 RX ORDER — MELOXICAM 15 MG/1
15 TABLET ORAL DAILY
Qty: 30 TABLET | Refills: 5 | Status: SHIPPED | OUTPATIENT
Start: 2018-12-14 | End: 2019-07-30 | Stop reason: SDUPTHER

## 2018-12-14 NOTE — PROGRESS NOTES
"Subjective:      Patient ID: Keyona Dwyer is a 73 y.o. female.    Chief Complaint: Annual Exam    HPI  72 yo female with HTN, Hyperlipidemia, OA. Osteoporosis, chronic back pain here for f/u.  She uses walker for ambulation.  About 2 wks ago, wind blew screen door out of her hands and she lost balance and fell.  She bumped back of her head but reports didn't hurt anything.  She is followed by Rheum/on prolia  She is followed by chronic pain management/has pain pump  Has quit smoking again  Mood is good, no depressed symptoms    Past Medical History:   Diagnosis Date    Cataract     Chronic back pain     Dr. Guzman    Fibrocystic breast     Glaucoma     Hyperlipemia     Hypertension     Osteoarthritis     Osteoporosis     Rheumatology/on Prolia    Restless leg syndrome     Trouble in sleeping      Family History   Problem Relation Age of Onset    Cancer Mother     Breast cancer Mother     Hyperlipidemia Father     Heart failure Father     Heart disease Brother     Brain cancer Brother     Stroke Brother     Alcohol abuse Brother      Past Surgical History:   Procedure Laterality Date    CATARACT EXTRACTION W/  INTRAOCULAR LENS IMPLANT Bilateral 2017    GALLBLADDER SURGERY      HYSTERECTOMY      LUNG SURGERY Right age 15    lung collpase    TOTAL HIP ARTHROPLASTY Bilateral     TOTAL KNEE ARTHROPLASTY Bilateral      Social History     Tobacco Use    Smoking status: Former Smoker     Packs/day: 1.00     Years: 63.00     Pack years: 63.00     Types: Cigarettes     Last attempt to quit: 10/4/2018     Years since quittin.1    Smokeless tobacco: Never Used    Tobacco comment: started age of  11    Substance Use Topics    Alcohol use: No    Drug use: No       BP (!) 140/70 (BP Location: Right arm, Patient Position: Sitting, BP Method: Large (Manual))   Pulse 62   Temp 98.7 °F (37.1 °C) (Oral)   Ht 5' 2" (1.575 m)   Wt 54.3 kg (119 lb 11.4 oz)   BMI 21.90 kg/m²     Review of " Systems   Constitutional: Negative for activity change, appetite change, chills, diaphoresis, fatigue, fever and unexpected weight change.   HENT: Negative for ear pain, hearing loss, postnasal drip, rhinorrhea and tinnitus.    Eyes: Negative for visual disturbance.   Respiratory: Negative for cough, shortness of breath and wheezing.    Cardiovascular: Negative for chest pain, palpitations and leg swelling.   Gastrointestinal: Negative for abdominal distention, abdominal pain, constipation and diarrhea.   Genitourinary: Negative for dysuria, frequency, hematuria and urgency.   Musculoskeletal: Positive for arthralgias, back pain and gait problem. Negative for joint swelling.   Neurological: Negative for weakness and headaches.   Hematological: Negative for adenopathy.   Psychiatric/Behavioral: Negative for confusion and decreased concentration.       Objective:     Physical Exam   Constitutional: She is oriented to person, place, and time. She appears well-developed and well-nourished. No distress.   HENT:   Right Ear: External ear normal.   Left Ear: External ear normal.   Nose: Nose normal.   Mouth/Throat: Oropharynx is clear and moist.   Eyes: Conjunctivae are normal. Pupils are equal, round, and reactive to light.   Neck: Normal range of motion. Neck supple. Carotid bruit is not present.   Cardiovascular: Normal rate, regular rhythm and normal heart sounds.   Pulmonary/Chest: Effort normal and breath sounds normal. No respiratory distress. She has no wheezes. She has no rales.   Abdominal: Soft. Bowel sounds are normal. She exhibits no distension. There is no tenderness. There is no guarding.   Musculoskeletal: She exhibits no edema.   Neurological: She is alert and oriented to person, place, and time. No cranial nerve deficit.   Skin: Skin is warm and dry. No rash noted.   Psychiatric: She has a normal mood and affect. Her behavior is normal. Judgment and thought content normal.   Nursing note and vitals  reviewed.      Lab Results   Component Value Date    WBC 6.77 11/15/2017    HGB 10.9 (L) 11/15/2017    HCT 35.3 (L) 11/15/2017     11/15/2017    CHOL 187 11/15/2017    TRIG 96 11/15/2017    HDL 59 11/15/2017    ALT 23 06/19/2018    AST 29 06/19/2018     06/19/2018    K 4.3 06/19/2018     06/19/2018    CREATININE 0.8 06/19/2018    BUN 15 06/19/2018    CO2 26 06/19/2018    TSH 0.740 11/15/2017    INR 1.2 10/10/2009       Assessment:     1. Routine general medical examination at a health care facility    2. Essential hypertension    3. Vitamin D deficiency    4. Anemia, unspecified type    5. Hyperlipidemia, unspecified hyperlipidemia type    6. Abnormal glucose    7. Breast cancer screening         Plan:     Routine general medical examination at a health care facility    Essential hypertension  -     Comprehensive metabolic panel; Future; Expected date: 12/14/2018    Vitamin D deficiency  -     Vitamin D; Future; Expected date: 12/14/2018    Anemia, unspecified type  -     CBC auto differential; Future; Expected date: 12/14/2018    Hyperlipidemia, unspecified hyperlipidemia type  -     Lipid panel; Future; Expected date: 12/14/2018  -     TSH; Future; Expected date: 12/14/2018    Abnormal glucose  -     Hemoglobin A1c; Future; Expected date: 12/14/2018    Breast cancer screening  -     Mammo Digital Screening Bilat; Future; Expected date: 12/14/2018    Other orders  -     meloxicam (MOBIC) 15 MG tablet; Take 1 tablet (15 mg total) by mouth once daily.  Dispense: 30 tablet; Refill: 5    Update annual labs  Update refills  Update mammo  Fall precautions  Cont with smoking cessation  Cont with specialty f/u  F/u with me annually and PRN

## 2018-12-17 ENCOUNTER — TELEPHONE (OUTPATIENT)
Dept: INTERNAL MEDICINE | Facility: CLINIC | Age: 73
End: 2018-12-17

## 2018-12-17 NOTE — TELEPHONE ENCOUNTER
----- Message from Ralf Isidro MD sent at 12/17/2018  3:46 PM CST -----  Labs are stable overall.  Cont current regimen of meds

## 2019-01-09 ENCOUNTER — CLINICAL SUPPORT (OUTPATIENT)
Dept: SMOKING CESSATION | Facility: CLINIC | Age: 74
End: 2019-01-09
Payer: COMMERCIAL

## 2019-01-09 DIAGNOSIS — F17.200 NICOTINE DEPENDENCE: Primary | ICD-10-CM

## 2019-01-09 PROCEDURE — 99407 PR TOBACCO USE CESSATION INTENSIVE >10 MINUTES: ICD-10-PCS | Mod: S$GLB,,, | Performed by: GENERAL PRACTICE

## 2019-01-09 PROCEDURE — 99407 BEHAV CHNG SMOKING > 10 MIN: CPT | Mod: S$GLB,,, | Performed by: GENERAL PRACTICE

## 2019-01-14 ENCOUNTER — TELEPHONE (OUTPATIENT)
Dept: PULMONOLOGY | Facility: CLINIC | Age: 74
End: 2019-01-14

## 2019-01-14 NOTE — TELEPHONE ENCOUNTER
----- Message from Flor Redmond sent at 1/14/2019  9:54 AM CST -----  Contact: pt  She is calling to reschedule her labs and x-ray as soon as possible,please advise [phone] (home)

## 2019-01-25 ENCOUNTER — OFFICE VISIT (OUTPATIENT)
Dept: RHEUMATOLOGY | Facility: CLINIC | Age: 74
End: 2019-01-25
Payer: MEDICARE

## 2019-01-25 ENCOUNTER — HOSPITAL ENCOUNTER (OUTPATIENT)
Dept: RADIOLOGY | Facility: HOSPITAL | Age: 74
Discharge: HOME | End: 2019-01-25
Attending: FAMILY MEDICINE
Payer: MEDICARE

## 2019-01-25 VITALS
WEIGHT: 127.19 LBS | DIASTOLIC BLOOD PRESSURE: 77 MMHG | HEIGHT: 62 IN | HEART RATE: 65 BPM | SYSTOLIC BLOOD PRESSURE: 180 MMHG | BODY MASS INDEX: 23.4 KG/M2

## 2019-01-25 DIAGNOSIS — Z51.81 MEDICATION MONITORING ENCOUNTER: ICD-10-CM

## 2019-01-25 DIAGNOSIS — E55.9 VITAMIN D DEFICIENCY: ICD-10-CM

## 2019-01-25 DIAGNOSIS — M81.0 AGE-RELATED OSTEOPOROSIS WITHOUT CURRENT PATHOLOGICAL FRACTURE: Primary | ICD-10-CM

## 2019-01-25 DIAGNOSIS — Z12.39 BREAST CANCER SCREENING: ICD-10-CM

## 2019-01-25 DIAGNOSIS — M15.9 PRIMARY OSTEOARTHRITIS INVOLVING MULTIPLE JOINTS: ICD-10-CM

## 2019-01-25 PROCEDURE — 3078F DIAST BP <80 MM HG: CPT | Mod: HCNC,CPTII,S$GLB, | Performed by: PHYSICIAN ASSISTANT

## 2019-01-25 PROCEDURE — 99214 OFFICE O/P EST MOD 30 MIN: CPT | Mod: HCNC,25,S$GLB, | Performed by: PHYSICIAN ASSISTANT

## 2019-01-25 PROCEDURE — 1100F PR PT FALLS ASSESS DOC 2+ FALLS/FALL W/INJURY/YR: ICD-10-PCS | Mod: HCNC,CPTII,S$GLB, | Performed by: PHYSICIAN ASSISTANT

## 2019-01-25 PROCEDURE — 3077F PR MOST RECENT SYSTOLIC BLOOD PRESSURE >= 140 MM HG: ICD-10-PCS | Mod: HCNC,CPTII,S$GLB, | Performed by: PHYSICIAN ASSISTANT

## 2019-01-25 PROCEDURE — 1100F PTFALLS ASSESS-DOCD GE2>/YR: CPT | Mod: HCNC,CPTII,S$GLB, | Performed by: PHYSICIAN ASSISTANT

## 2019-01-25 PROCEDURE — 77067 SCR MAMMO BI INCL CAD: CPT | Mod: TC,HCNC

## 2019-01-25 PROCEDURE — 3078F PR MOST RECENT DIASTOLIC BLOOD PRESSURE < 80 MM HG: ICD-10-PCS | Mod: HCNC,CPTII,S$GLB, | Performed by: PHYSICIAN ASSISTANT

## 2019-01-25 PROCEDURE — 3288F PR FALLS RISK ASSESSMENT DOCUMENTED: ICD-10-PCS | Mod: HCNC,CPTII,S$GLB, | Performed by: PHYSICIAN ASSISTANT

## 2019-01-25 PROCEDURE — 77067 MAMMO DIGITAL SCREENING BILAT WITH TOMOSYNTHESIS_CAD: ICD-10-PCS | Mod: 26,HCNC,, | Performed by: RADIOLOGY

## 2019-01-25 PROCEDURE — 3288F FALL RISK ASSESSMENT DOCD: CPT | Mod: HCNC,CPTII,S$GLB, | Performed by: PHYSICIAN ASSISTANT

## 2019-01-25 PROCEDURE — 99214 PR OFFICE/OUTPT VISIT, EST, LEVL IV, 30-39 MIN: ICD-10-PCS | Mod: HCNC,25,S$GLB, | Performed by: PHYSICIAN ASSISTANT

## 2019-01-25 PROCEDURE — 77067 SCR MAMMO BI INCL CAD: CPT | Mod: 26,HCNC,, | Performed by: RADIOLOGY

## 2019-01-25 PROCEDURE — 99999 PR PBB SHADOW E&M-EST. PATIENT-LVL IV: ICD-10-PCS | Mod: PBBFAC,HCNC,, | Performed by: PHYSICIAN ASSISTANT

## 2019-01-25 PROCEDURE — 96372 THER/PROPH/DIAG INJ SC/IM: CPT | Mod: HCNC,S$GLB,, | Performed by: PHYSICIAN ASSISTANT

## 2019-01-25 PROCEDURE — 96372 PR INJECTION,THERAP/PROPH/DIAG2ST, IM OR SUBCUT: ICD-10-PCS | Mod: HCNC,S$GLB,, | Performed by: PHYSICIAN ASSISTANT

## 2019-01-25 PROCEDURE — 77063 MAMMO DIGITAL SCREENING BILAT WITH TOMOSYNTHESIS_CAD: ICD-10-PCS | Mod: 26,HCNC,, | Performed by: RADIOLOGY

## 2019-01-25 PROCEDURE — 77063 BREAST TOMOSYNTHESIS BI: CPT | Mod: 26,HCNC,, | Performed by: RADIOLOGY

## 2019-01-25 PROCEDURE — 99999 PR PBB SHADOW E&M-EST. PATIENT-LVL IV: CPT | Mod: PBBFAC,HCNC,, | Performed by: PHYSICIAN ASSISTANT

## 2019-01-25 PROCEDURE — 3077F SYST BP >= 140 MM HG: CPT | Mod: HCNC,CPTII,S$GLB, | Performed by: PHYSICIAN ASSISTANT

## 2019-01-25 NOTE — PROGRESS NOTES
"Subjective:       Patient ID: Keyona Dwyer is a 73 y.o. female.    Chief Complaint: Osteoporosis    Keyona is here for f/u. She has osteoporosis and OA of multiple joints. 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  She tolerates prolia well.  She also has low vit D on weekly supplement. She uses a walker to ambulate.  She had a dexa done last November (2017) showing improved bmd in her spine. She has h/o bilateral total hip replacements. She has OA in her hands dion prosper 1 cmc joints.  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  Overall she is doing well today and has no complaints.  Started mobic and tumeric for OA and these help. She has stopped smoking now for almost a year!  Did have one recent fall but no fxs, walks with a walker. Did PT training in past.       Review of Systems   Constitutional: Negative for chills, fatigue and fever.   HENT: Negative for mouth sores, rhinorrhea and sore throat.         False teeth   Eyes: Negative for pain and redness.   Respiratory: Negative for cough and shortness of breath.    Cardiovascular: Negative for chest pain.   Gastrointestinal: Negative for abdominal pain, constipation, diarrhea, nausea and vomiting.   Genitourinary: Negative for dysuria and hematuria.   Musculoskeletal: Positive for arthralgias and gait problem. Negative for joint swelling and myalgias.   Skin: Negative for rash.   Neurological: Negative for weakness, numbness and headaches.   Psychiatric/Behavioral: The patient is not nervous/anxious.          Objective:   BP (!) 180/77   Pulse 65   Ht 5' 2" (1.575 m)   Wt 57.7 kg (127 lb 3.3 oz)   BMI 23.27 kg/m²      Physical Exam   Constitutional: She is oriented to person, place, and time and well-developed, well-nourished, and in no distress.   HENT:   Head: Normocephalic and atraumatic.   Eyes: Pupils are equal, round, and reactive to light. Right eye exhibits no discharge.   Neck: Normal range " of motion.   Cardiovascular: Normal rate, regular rhythm and normal heart sounds.  Exam reveals no friction rub.    Pulmonary/Chest: Effort normal and breath sounds normal. No respiratory distress.   Abdominal: Soft. She exhibits no distension. There is no tenderness.   Lymphadenopathy:     She has no cervical adenopathy.   Neurological: She is alert and oriented to person, place, and time.   Skin: No rash noted. No erythema.     Psychiatric: Mood normal.   Musculoskeletal: Normal range of motion. She exhibits no edema or deformity.   OA changes to prosper hands dion prosper 1 cmc joints  Left elbow stuck at 50-60 degrees flexion s/p trauma (1969)  Walks with walker           Recent Results (from the past 168 hour(s))   Comprehensive metabolic panel    Collection Time: 01/25/19  9:21 AM   Result Value Ref Range    Sodium 139 136 - 145 mmol/L    Potassium 4.4 3.5 - 5.1 mmol/L    Chloride 101 95 - 110 mmol/L    CO2 29 23 - 29 mmol/L    Glucose 54 (L) 70 - 110 mg/dL    BUN, Bld 13 8 - 23 mg/dL    Creatinine 0.8 0.5 - 1.4 mg/dL    Calcium 9.6 8.7 - 10.5 mg/dL    Total Protein 6.9 6.0 - 8.4 g/dL    Albumin 3.7 3.5 - 5.2 g/dL    Total Bilirubin 0.4 0.1 - 1.0 mg/dL    Alkaline Phosphatase 65 55 - 135 U/L    AST 15 10 - 40 U/L    ALT 7 (L) 10 - 44 U/L    Anion Gap 9 8 - 16 mmol/L    eGFR if African American >60 >60 mL/min/1.73 m^2    eGFR if non African American >60 >60 mL/min/1.73 m^2        Dexa 8.2015: spine (L1-2) -3.6, radius total -5.3, Rad 33% -4.8  dexa 11/2017: spine -3.3, spine bmd improved by 7.8%  Assessment:       1. Age-related osteoporosis without current pathological fracture    2. Primary osteoarthritis involving multiple joints    3. Medication monitoring encounter    4. Vitamin D deficiency        Impression:  Osteoporosis: on prolia q 6 mos, failed bisphosphonates; improved spine bmd dexa 11/2017, has done PT balance training    Vit D def: taking weekly vit D, level 29     Medication monitoring; no issue with  prolia, labs reviewed see below    OA mult joints: prosper hands, lumbar spine, has pain pump--mobic helps, taking daily tumeric also    Plan:       Labs reviewed and done within past 14 days  Ca 9.6, Creat 0.8, eGFR 60  No contraindication for Prolia today, ok for nurse to administer today  Re-evaluate patient again in 6 months to determine if Prolia still medically indicated and appropriate to administer.    KDIGO lab monitoring will be followed according to KDIGO 2017 Clinical Practice Guideline Update for the Diagnosis, Evaluation, Prevention, and Treatment of Chronic Kidney Disease-Mineral and Bone Disorder (CKD-MBD)  Chapter 3.1: Diagnosis of CKD-MBD: biochemical abnormalities    prolia today and cont q 6 mos  Cont weekly vit D 50K u,   Cont ca in diet   Repeat dexa 11/2019 or after, will order it next time  mobic prn for joints, takes with prilosec   cont tumeric 1000mg/day     rtc in 6 months with prolia and cmp

## 2019-01-25 NOTE — PROGRESS NOTES
Administered 1cc Prolia 60mg/cc to LLQ of abdomen. Ca 9.6 Creat 0.8 Pt. Tolerated well. No acute reaction noted to site. Pt. Instructed on S/S of reaction to report. Pt. Verbalized understanding. Pt waited 15 minutes.    Lot:[id number]  Exp:05/21  Manu:[email or web address]

## 2019-01-25 NOTE — PATIENT INSTRUCTIONS
Prolia shot today     Will order your new bone density when I see you next time     Continue your vitamin D weekly continue your tumeric daily for joints

## 2019-02-06 ENCOUNTER — OFFICE VISIT (OUTPATIENT)
Dept: PULMONOLOGY | Facility: CLINIC | Age: 74
End: 2019-02-06
Payer: MEDICARE

## 2019-02-06 ENCOUNTER — HOSPITAL ENCOUNTER (OUTPATIENT)
Dept: RADIOLOGY | Facility: HOSPITAL | Age: 74
Discharge: HOME | End: 2019-02-06
Attending: INTERNAL MEDICINE
Payer: MEDICARE

## 2019-02-06 ENCOUNTER — CLINICAL SUPPORT (OUTPATIENT)
Dept: PULMONOLOGY | Facility: CLINIC | Age: 74
End: 2019-02-06
Payer: MEDICARE

## 2019-02-06 VITALS
DIASTOLIC BLOOD PRESSURE: 62 MMHG | OXYGEN SATURATION: 96 % | SYSTOLIC BLOOD PRESSURE: 120 MMHG | RESPIRATION RATE: 18 BRPM | BODY MASS INDEX: 23 KG/M2 | HEIGHT: 62 IN | WEIGHT: 125 LBS | HEART RATE: 77 BPM

## 2019-02-06 DIAGNOSIS — J44.9 COPD, SEVERE: Chronic | ICD-10-CM

## 2019-02-06 DIAGNOSIS — J30.1 CHRONIC SEASONAL ALLERGIC RHINITIS DUE TO POLLEN: Chronic | ICD-10-CM

## 2019-02-06 DIAGNOSIS — J44.9 COPD, GROUP D, BY GOLD 2017 CLASSIFICATION: Primary | Chronic | ICD-10-CM

## 2019-02-06 LAB
BRPFT: ABNORMAL
DLCO ADJ PRE: 11.2 ML/(MIN*MMHG) (ref 13.46–24.93)
DLCO SINGLE BREATH LLN: 13.46
DLCO SINGLE BREATH PRE REF: 58.4 %
DLCO SINGLE BREATH REF: 19.19
DLCOC SBVA LLN: 2.65
DLCOC SBVA PRE REF: 106.6 %
DLCOC SBVA REF: 4.2
DLCOC SINGLE BREATH LLN: 13.46
DLCOC SINGLE BREATH PRE REF: 58.4 %
DLCOC SINGLE BREATH REF: 19.19
DLCOVA LLN: 2.65
DLCOVA PRE REF: 106.6 %
DLCOVA PRE: 4.48 ML/(MIN*MMHG*L) (ref 2.65–5.75)
DLCOVA REF: 4.2
DLVAADJ PRE: 4.48 ML/(MIN*MMHG*L) (ref 2.65–5.75)
ERV LLN: 0.55
ERV PRE REF: 64.5 %
ERV REF: 0.55
FEF 25 75 CHG: -39.8 %
FEF 25 75 LLN: 0.71
FEF 25 75 POST REF: 16.1 %
FEF 25 75 PRE REF: 26.8 %
FEF 25 75 REF: 1.61
FET100 CHG: 84.4 %
FEV1 CHG: 1.5 %
FEV1 FVC CHG: -12 %
FEV1 FVC LLN: 64
FEV1 FVC POST REF: 62.9 %
FEV1 FVC PRE REF: 71.5 %
FEV1 FVC REF: 78
FEV1 LLN: 1.37
FEV1 POST REF: 54.8 %
FEV1 PRE REF: 54 %
FEV1 REF: 1.91
FEV6 CHG: -0.9 %
FEV6 LLN: 1.79
FEV6 POST REF: 72 %
FEV6 POST: 1.74 L (ref 1.79–3.05)
FEV6 PRE REF: 72.6 %
FEV6 PRE: 1.76 L (ref 1.79–3.05)
FEV6 REF: 2.42
FRCPLETH LLN: 1.77
FRCPLETH PREREF: 114.4 %
FRCPLETH REF: 2.59
FVC CHG: 15.3 %
FVC LLN: 1.77
FVC POST REF: 86.4 %
FVC PRE REF: 74.9 %
FVC REF: 2.48
IVC PRE: 0.81 L (ref 1.77–3.19)
IVC SINGLE BREATH LLN: 1.77
IVC SINGLE BREATH PRE REF: 32.7 %
IVC SINGLE BREATH REF: 2.48
MVV LLN: 59
MVV PRE REF: 50.7 %
MVV REF: 69
PEF CHG: 39.2 %
PEF LLN: 3.32
PEF POST REF: 41.2 %
PEF PRE REF: 29.6 %
PEF REF: 4.91
POST FEF 25 75: 0.26 L/S (ref 0.71–2.52)
POST FET 100: 13.78 SEC
POST FEV1 FVC: 48.95 % (ref 63.77–91.85)
POST FEV1: 1.05 L (ref 1.37–2.46)
POST FVC: 2.14 L (ref 1.77–3.19)
POST PEF: 2.02 L/S (ref 3.32–6.51)
PRE DLCO: 11.2 ML/(MIN*MMHG) (ref 13.46–24.93)
PRE ERV: 0.35 L (ref 0.55–0.55)
PRE FEF 25 75: 0.43 L/S (ref 0.71–2.52)
PRE FET 100: 7.47 SEC
PRE FEV1 FVC: 55.65 % (ref 63.77–91.85)
PRE FEV1: 1.03 L (ref 1.37–2.46)
PRE FRC PL: 2.97 L
PRE FVC: 1.86 L (ref 1.77–3.19)
PRE MVV: 35 L/MIN (ref 58.7–79.42)
PRE PEF: 1.45 L/S (ref 3.32–6.51)
PRE RV: 2.51 L (ref 1.47–2.62)
PRE TLC: 4.37 L (ref 3.58–5.56)
RAW LLN: 3.06
RAW PRE REF: 142.5 %
RAW PRE: 4.36 CMH2O*S/L (ref 3.06–3.06)
RAW REF: 3.06
RV LLN: 1.47
RV PRE REF: 122.8 %
RV REF: 2.04
RVTLC LLN: 35
RVTLC PRE REF: 129.2 %
RVTLC PRE: 57.44 % (ref 34.87–54.05)
RVTLC REF: 44
TLC LLN: 3.59
TLC PRE REF: 95.5 %
TLC REF: 4.57
VA PRE: 2.5 L (ref 4.42–4.42)
VA SINGLE BREATH LLN: 4.42
VA SINGLE BREATH PRE REF: 56.5 %
VA SINGLE BREATH REF: 4.42
VC LLN: 1.77
VC PRE REF: 74.9 %
VC PRE: 1.86 L (ref 1.77–3.19)
VC REF: 2.48
VTGRAWPRE: 3.2 L

## 2019-02-06 PROCEDURE — 99214 OFFICE O/P EST MOD 30 MIN: CPT | Mod: 25,HCNC,S$GLB, | Performed by: INTERNAL MEDICINE

## 2019-02-06 PROCEDURE — 1100F PTFALLS ASSESS-DOCD GE2>/YR: CPT | Mod: HCNC,CPTII,S$GLB, | Performed by: INTERNAL MEDICINE

## 2019-02-06 PROCEDURE — 71046 X-RAY EXAM CHEST 2 VIEWS: CPT | Mod: 26,HCNC,, | Performed by: RADIOLOGY

## 2019-02-06 PROCEDURE — 3288F PR FALLS RISK ASSESSMENT DOCUMENTED: ICD-10-PCS | Mod: HCNC,CPTII,S$GLB, | Performed by: INTERNAL MEDICINE

## 2019-02-06 PROCEDURE — 71046 X-RAY EXAM CHEST 2 VIEWS: CPT | Mod: TC,HCNC

## 2019-02-06 PROCEDURE — 94726 PULM FUNCT TST PLETHYSMOGRAP: ICD-10-PCS | Mod: HCNC,S$GLB,, | Performed by: INTERNAL MEDICINE

## 2019-02-06 PROCEDURE — 94060 EVALUATION OF WHEEZING: CPT | Mod: HCNC,S$GLB,, | Performed by: INTERNAL MEDICINE

## 2019-02-06 PROCEDURE — 1100F PR PT FALLS ASSESS DOC 2+ FALLS/FALL W/INJURY/YR: ICD-10-PCS | Mod: HCNC,CPTII,S$GLB, | Performed by: INTERNAL MEDICINE

## 2019-02-06 PROCEDURE — 71046 XR CHEST PA AND LATERAL: ICD-10-PCS | Mod: 26,HCNC,, | Performed by: RADIOLOGY

## 2019-02-06 PROCEDURE — 3074F PR MOST RECENT SYSTOLIC BLOOD PRESSURE < 130 MM HG: ICD-10-PCS | Mod: HCNC,CPTII,S$GLB, | Performed by: INTERNAL MEDICINE

## 2019-02-06 PROCEDURE — 94060 PR EVAL OF BRONCHOSPASM: ICD-10-PCS | Mod: HCNC,S$GLB,, | Performed by: INTERNAL MEDICINE

## 2019-02-06 PROCEDURE — 94729 DIFFUSING CAPACITY: CPT | Mod: HCNC,S$GLB,, | Performed by: INTERNAL MEDICINE

## 2019-02-06 PROCEDURE — 3078F DIAST BP <80 MM HG: CPT | Mod: HCNC,CPTII,S$GLB, | Performed by: INTERNAL MEDICINE

## 2019-02-06 PROCEDURE — 3288F FALL RISK ASSESSMENT DOCD: CPT | Mod: HCNC,CPTII,S$GLB, | Performed by: INTERNAL MEDICINE

## 2019-02-06 PROCEDURE — 3074F SYST BP LT 130 MM HG: CPT | Mod: HCNC,CPTII,S$GLB, | Performed by: INTERNAL MEDICINE

## 2019-02-06 PROCEDURE — 94729 PR C02/MEMBANE DIFFUSE CAPACITY: ICD-10-PCS | Mod: HCNC,S$GLB,, | Performed by: INTERNAL MEDICINE

## 2019-02-06 PROCEDURE — 94726 PLETHYSMOGRAPHY LUNG VOLUMES: CPT | Mod: HCNC,S$GLB,, | Performed by: INTERNAL MEDICINE

## 2019-02-06 PROCEDURE — 3078F PR MOST RECENT DIASTOLIC BLOOD PRESSURE < 80 MM HG: ICD-10-PCS | Mod: HCNC,CPTII,S$GLB, | Performed by: INTERNAL MEDICINE

## 2019-02-06 PROCEDURE — 99214 PR OFFICE/OUTPT VISIT, EST, LEVL IV, 30-39 MIN: ICD-10-PCS | Mod: 25,HCNC,S$GLB, | Performed by: INTERNAL MEDICINE

## 2019-02-06 PROCEDURE — 99999 PR PBB SHADOW E&M-EST. PATIENT-LVL III: ICD-10-PCS | Mod: PBBFAC,HCNC,, | Performed by: INTERNAL MEDICINE

## 2019-02-06 PROCEDURE — 99999 PR PBB SHADOW E&M-EST. PATIENT-LVL III: CPT | Mod: PBBFAC,HCNC,, | Performed by: INTERNAL MEDICINE

## 2019-02-06 NOTE — PATIENT INSTRUCTIONS
Lung Anatomy  Your lungs take air in to give your body oxygen, which the body needs to work. Your lungs, like all the tissues in your body, are made up of billions of tiny specialized cells. Old lung cells die and are replaced by new, identical lung cells. This natural process helps ensure healthy lungs.    Date Last Reviewed: 11/1/2016  © 5798-3211 [employer]. [address] [place] [address], [place] [address]. All rights reserved. This information is not intended as a substitute for professional medical care. Always follow your healthcare professional's instructions.

## 2019-02-06 NOTE — PROGRESS NOTES
Subjective:      Patient ID: Keyona Dwyer is a 73 y.o. female.  Patient Active Problem List   Diagnosis    HTN (hypertension)    Hyperlipidemia    Former cigarette smoker    Osteoarthritis    Lumbar radiculopathy    Osteoporosis    Vitamin D deficiency    Glaucoma of both eyes    Uncomplicated opioid dependence    Chronic seasonal allergic rhinitis due to pollen    COPD, group D, by GOLD 2017 classification    Right hip pain     Problem list has been reviewed.    Chief Complaint: COPD      HPI    Group Spirometric Classification Exacerbations / year [facility] CAT   Group D: High risk; more sypmtoms  GOLD 3-4 > = 2 >= 2 >= 10     FEV1 =>  FEV1: 1.03  (54 % predicted)    PFT reviewd with patient who voiced understanding.     Patients reports NYHA II dyspnea    The patient does not have currently have symptoms / an exacerbation.       No wheezing, No shortness or breath.     COPD QUESTIONNAIRE  How often do you cough?: Never  How often do you have phlegm (mucus) in your chest?: Never  How often does your chest feel tight?: Never  When you walk up a hill or one flight of stairs, how often are you breathless?: All of the time  How often are you limited doing any activities at home?: Some of the time  How often are you confident leaving the house despite your lung condition?: All of the time  How often do you sleep soundly?: All of the time  How often do you have energy?: Almost never  Total score: 12     She quit smoking. Quit date: 05/02/2018    She is on Xyzal for AR.     A full  review of systems, past , family  and social histories was performed except as mentioned in the note above, these are non contributory to the main issues discussed today.       Previous Report Reviewed: lab reports, office notes and radiology reports     The following portions of the patient's history were reviewed and updated as appropriate: She  has a past medical history of Cataract, Chronic back pain, Fibrocystic breast,  Glaucoma, Hyperlipemia, Hypertension, Osteoarthritis, Osteoporosis, Restless leg syndrome, and Trouble in sleeping.  She  has a past surgical history that includes Gallbladder surgery; Total knee arthroplasty (Bilateral); Total hip arthroplasty (Bilateral); Lung surgery (Right, age 15); Cataract extraction w/  intraocular lens implant (Bilateral, 8/ 9 2017); and Hysterectomy (1972).  Her family history includes Alcohol abuse in her brother; Brain cancer in her brother; Breast cancer in her mother; Cancer in her mother; Heart disease in her brother; Heart failure in her father; Hyperlipidemia in her father; Stroke in her brother.  She  reports that she quit smoking about 3 months ago. Her smoking use included cigarettes. She has a 63.00 pack-year smoking history. she has never used smokeless tobacco. She reports that she does not drink alcohol or use drugs.  She has a current medication list which includes the following prescription(s): amlodipine, b complex vitamins, duloxetine, ergocalciferol, gabapentin, hydrocodone-acetaminophen, levocetirizine, lisinopril, meloxicam, and ON-Q PAIN PUMP, and the following Facility-Administered Medications: denosumab and ondansetron.  She is allergic to indomethacin; latex, natural rubber; penicillins; sulfa (sulfonamide antibiotics); and tolmetin..    Review of Systems   Constitutional: Negative for chills, fatigue and night sweats.   HENT: Positive for postnasal drip, rhinorrhea, sinus pressure and congestion. Negative for sore throat and trouble swallowing.    Eyes: Negative for itching.   Respiratory: Positive for cough, sputum production, wheezing and dyspnea on extertion. Negative for orthopnea and Paroxysmal Nocturnal Dyspnea.    Cardiovascular: Negative for chest pain, palpitations and leg swelling.   Genitourinary: Negative for difficulty urinating and hematuria.   Endocrine: Negative for cold intolerance and heat intolerance.    Musculoskeletal: Positive for arthralgias  "and gait problem. Back pain: Ambulates with a walker.   Skin: Negative for rash.   Gastrointestinal: Negative for nausea, vomiting, abdominal pain and acid reflux.        Intermittent diarrhea   Neurological: Negative for syncope, light-headedness and headaches.   Hematological: No excessive bruising.   Psychiatric/Behavioral: The patient is not nervous/anxious.    All other systems reviewed and are negative.       Objective:   /62   Pulse 77   Resp 18   Ht 5' 2" (1.575 m)   Wt 56.7 kg (125 lb)   SpO2 96%   BMI 22.86 kg/m²   Body mass index is 22.86 kg/m².    Physical Exam   Constitutional: She is oriented to person, place, and time. She appears well-developed and well-nourished.   HENT:   Head: Normocephalic and atraumatic.   Eyes: EOM are normal. Pupils are equal, round, and reactive to light.   Neck: Normal range of motion. Neck supple.   Cardiovascular: Normal rate and regular rhythm.   Pulmonary/Chest: Effort normal and breath sounds normal. She has no wheezes. She has no rales.   Abdominal: Soft. Bowel sounds are normal.   Musculoskeletal: Normal range of motion.   Neurological: She is alert and oriented to person, place, and time.   Skin: Skin is warm and dry. Capillary refill takes less than 2 seconds.   Psychiatric: She has a normal mood and affect.   Nursing note and vitals reviewed.      Personal Diagnostic Review    Chest x-ray: [facility]    Pulmonary function tests: FEV1: 1.03  (54 % predicted), FVC:  1.86 (74.9 % predicted), FEV1/FVC:  56, T.37 (95.5 % predicted), RV/TLVC: 57 (129.2 % predicted), DLCO: 11.2 (58.4 % predicted)   Severe obstruction. Lung volumes reveal air trapping but no hyperinflation. Diffusion capacity is moderately reduced.        Assessment / plan:     Discussed diagnosis, its evaluation, treatment and usual course. All questions answered.    Problem List Items Addressed This Visit        Pulmonary    COPD, group D, by GOLD 2017 classification - Primary    Current " Assessment & Plan     COPD ROS: no significant ongoing wheezing or shortness of breath.   New concerns: None.   Exam: appears well, vitals normal, no respiratory distress, acyanotic, normal RR.   Assessment:  COPD stable.   Plan: Assymptomatic. No currently on nay inhalers. PFT, CXR in 1 year.          Relevant Orders    X-Ray Chest PA And Lateral    Complete PFT with bronchodilator       Other    Chronic seasonal allergic rhinitis due to pollen    Current Assessment & Plan     Continue XYZAL per orders.                    TIME SPENT WITH PATIENT: Time spent: 35 minutes in face to face  discussion concerning diagnosis, prognosis, review of lab and test results, benefits of treatment as well as management of disease, counseling of patient and coordination of care between various health  care providers . Greater than half the time spent was used for coordination of care and counseling of patient.     Follow-up in about 1 year (around 2/6/2020) for COPD, Allergic Rhinitis.

## 2019-02-06 NOTE — ASSESSMENT & PLAN NOTE
COPD ROS: no significant ongoing wheezing or shortness of breath.   New concerns: None.   Exam: appears well, vitals normal, no respiratory distress, acyanotic, normal RR.   Assessment:  COPD stable.   Plan: Assymptomatic. No currently on nay inhalers. PFT, CXR in 1 year.

## 2019-03-26 ENCOUNTER — PES CALL (OUTPATIENT)
Dept: ADMINISTRATIVE | Facility: CLINIC | Age: 74
End: 2019-03-26

## 2019-03-28 DIAGNOSIS — J30.1 CHRONIC SEASONAL ALLERGIC RHINITIS DUE TO POLLEN: ICD-10-CM

## 2019-03-28 RX ORDER — LEVOCETIRIZINE DIHYDROCHLORIDE 5 MG/1
5 TABLET, FILM COATED ORAL NIGHTLY
Qty: 30 TABLET | Refills: 11 | Status: SHIPPED | OUTPATIENT
Start: 2019-03-28 | End: 2020-02-10

## 2019-05-16 ENCOUNTER — OFFICE VISIT (OUTPATIENT)
Dept: INTERNAL MEDICINE | Facility: CLINIC | Age: 74
End: 2019-05-16
Payer: MEDICARE

## 2019-05-16 VITALS
HEIGHT: 62 IN | WEIGHT: 125 LBS | TEMPERATURE: 98 F | HEART RATE: 89 BPM | BODY MASS INDEX: 23 KG/M2 | OXYGEN SATURATION: 98 % | SYSTOLIC BLOOD PRESSURE: 156 MMHG | DIASTOLIC BLOOD PRESSURE: 76 MMHG

## 2019-05-16 DIAGNOSIS — M81.8 OTHER OSTEOPOROSIS WITHOUT CURRENT PATHOLOGICAL FRACTURE: ICD-10-CM

## 2019-05-16 DIAGNOSIS — H40.9 GLAUCOMA OF BOTH EYES, UNSPECIFIED GLAUCOMA TYPE: ICD-10-CM

## 2019-05-16 DIAGNOSIS — M54.16 LUMBAR RADICULOPATHY: ICD-10-CM

## 2019-05-16 DIAGNOSIS — M15.9 PRIMARY OSTEOARTHRITIS INVOLVING MULTIPLE JOINTS: Chronic | ICD-10-CM

## 2019-05-16 DIAGNOSIS — I10 ESSENTIAL HYPERTENSION: Chronic | ICD-10-CM

## 2019-05-16 DIAGNOSIS — I70.0 ATHEROSCLEROSIS OF AORTA: ICD-10-CM

## 2019-05-16 DIAGNOSIS — F11.20 UNCOMPLICATED OPIOID DEPENDENCE: ICD-10-CM

## 2019-05-16 DIAGNOSIS — E78.5 HYPERLIPIDEMIA, UNSPECIFIED HYPERLIPIDEMIA TYPE: Chronic | ICD-10-CM

## 2019-05-16 DIAGNOSIS — J44.9 COPD, GROUP D, BY GOLD 2017 CLASSIFICATION: ICD-10-CM

## 2019-05-16 DIAGNOSIS — Z00.00 ENCOUNTER FOR PREVENTIVE HEALTH EXAMINATION: Primary | ICD-10-CM

## 2019-05-16 DIAGNOSIS — M25.551 RIGHT HIP PAIN: ICD-10-CM

## 2019-05-16 DIAGNOSIS — E55.9 VITAMIN D DEFICIENCY: ICD-10-CM

## 2019-05-16 DIAGNOSIS — J30.1 CHRONIC SEASONAL ALLERGIC RHINITIS DUE TO POLLEN: ICD-10-CM

## 2019-05-16 PROCEDURE — 3078F DIAST BP <80 MM HG: CPT | Mod: HCNC,CPTII,S$GLB, | Performed by: NURSE PRACTITIONER

## 2019-05-16 PROCEDURE — 99999 PR PBB SHADOW E&M-EST. PATIENT-LVL V: ICD-10-PCS | Mod: PBBFAC,HCNC,, | Performed by: NURSE PRACTITIONER

## 2019-05-16 PROCEDURE — 3077F SYST BP >= 140 MM HG: CPT | Mod: HCNC,CPTII,S$GLB, | Performed by: NURSE PRACTITIONER

## 2019-05-16 PROCEDURE — G0439 PPPS, SUBSEQ VISIT: HCPCS | Mod: HCNC,S$GLB,, | Performed by: NURSE PRACTITIONER

## 2019-05-16 PROCEDURE — 99499 UNLISTED E&M SERVICE: CPT | Mod: HCNC,S$GLB,, | Performed by: NURSE PRACTITIONER

## 2019-05-16 PROCEDURE — 99999 PR PBB SHADOW E&M-EST. PATIENT-LVL V: CPT | Mod: PBBFAC,HCNC,, | Performed by: NURSE PRACTITIONER

## 2019-05-16 PROCEDURE — G0439 PR MEDICARE ANNUAL WELLNESS SUBSEQUENT VISIT: ICD-10-PCS | Mod: HCNC,S$GLB,, | Performed by: NURSE PRACTITIONER

## 2019-05-16 PROCEDURE — 3078F PR MOST RECENT DIASTOLIC BLOOD PRESSURE < 80 MM HG: ICD-10-PCS | Mod: HCNC,CPTII,S$GLB, | Performed by: NURSE PRACTITIONER

## 2019-05-16 PROCEDURE — 3077F PR MOST RECENT SYSTOLIC BLOOD PRESSURE >= 140 MM HG: ICD-10-PCS | Mod: HCNC,CPTII,S$GLB, | Performed by: NURSE PRACTITIONER

## 2019-05-16 PROCEDURE — 99499 RISK ADDL DX/OHS AUDIT: ICD-10-PCS | Mod: HCNC,S$GLB,, | Performed by: NURSE PRACTITIONER

## 2019-05-16 NOTE — PATIENT INSTRUCTIONS
Counseling and Referral of Other Preventative  (Italic type indicates deductible and co-insurance are waived)    Patient Name: Keyona Dwyer  Today's Date: 5/16/2019    Health Maintenance       Date Due Completion Date    Shingles Vaccine (1 of 2) 11/13/1993 ---    LDCT Lung Screen 04/26/2019 4/26/2018    Influenza Vaccine 08/01/2019 10/24/2018    Override on 11/1/2017: Done    Override on 10/31/2012: Done    DEXA SCAN 11/02/2019 11/2/2017    Override on 4/30/2013: Done (REPEAT 2 YRS)    Lipid Panel 12/14/2019 12/14/2018    Override on 12/14/2018: Done    Override on 11/11/2016: Done    Mammogram 01/25/2020 1/25/2019    Override on 4/30/2013: Done    Colonoscopy 11/11/2021 11/11/2016 (Declined)    Override on 11/11/2016: Declined    TETANUS VACCINE 02/13/2025 2/13/2015        No orders of the defined types were placed in this encounter.    The following information is provided to all patients.  This information is to help you find resources for any of the problems found today that may be affecting your health:                Living healthy guide: www.[facility].[place].gov      Understanding Diabetes: www.diabetes.org      Eating healthy: www.cdc.gov/healthyweight      CDC home safety checklist: www.cdc.gov/steadi/patient.html      Agency on Aging: www.goea.[place].[facility]      Alcoholics anonymous (AA): www.aa.org      Physical Activity: www.sheryl.nih.gov/lw4vwjj      Tobacco use: www.quitwithusla.org

## 2019-05-16 NOTE — PROGRESS NOTES
"Keyona Dwyer presented for a  Medicare AWV and comprehensive Health Risk Assessment today. The following components were reviewed and updated:    · Medical history  · Family History  · Social history  · Allergies and Current Medications  · Health Risk Assessment  · Health Maintenance  · Care Team     ** See Completed Assessments for Annual Wellness Visit within the encounter summary.**       The following assessments were completed:  · Living Situation  · CAGE  · Depression Screening  · Timed Get Up and Go  · Whisper Test  · Cognitive Function Screening  · Nutrition Screening  · ADL Screening  · PAQ Screening    Vitals:    05/16/19 1324   BP: (!) 156/76   BP Location: Right arm   Patient Position: Sitting   BP Method: Medium (Manual)   Pulse: 89   Temp: 98.3 °F (36.8 °C)   TempSrc: Tympanic   SpO2: 98%   Weight: 56.7 kg (125 lb)   Height: 5' 2" (1.575 m)     Body mass index is 22.86 kg/m².  Physical Exam   Constitutional: She is oriented to person, place, and time. Vital signs are normal. She appears well-developed and well-nourished.   HENT:   Head: Normocephalic and atraumatic.   Neck: Normal range of motion.   Cardiovascular: Normal rate and regular rhythm.   Pulmonary/Chest: Effort normal and breath sounds normal.   Abdominal: Soft. Bowel sounds are normal.   Neurological: She is alert and oriented to person, place, and time.   Skin: Skin is warm.   Psychiatric: She has a normal mood and affect. Her behavior is normal.             Diagnoses and health risks identified today and associated recommendations/orders:    1. Encounter for preventive health examination       2. Lumbar radiculopathy  Stable with symptoms.  Has visits with pain management every 3 months (Dr. Guzman).  Will continue current treatment plan.     3. Uncomplicated opioid dependence  Stable with symptoms.  Has visits with pain management every 3 months (Dr. Guzman).  Will continue current treatment plan.     4. Glaucoma of both eyes, " unspecified glaucoma type  Stable.  Will continue current treatment plan.     5. COPD, group D, by GOLD 2017 classification  Stable.  Has annual visits with pulmonary.  Will continue current treatment plan.    6. Essential hypertension  Stable.  Will continue current treatment plan.     7. Hyperlipidemia, unspecified hyperlipidemia type  Stable.  Will continue current treatment plan.   Component      Latest Ref Rng & Units 12/14/2018 11/15/2017   Cholesterol      120 - 199 mg/dL 184 187   Triglycerides      30 - 150 mg/dL 121 96   HDL      40 - 75 mg/dL 60 59   LDL Cholesterol External      63.0 - 159.0 mg/dL 99.8 108.8   Hdl/Cholesterol Ratio      20.0 - 50.0 % 32.6 31.6   Total Cholesterol/HDL Ratio      2.0 - 5.0 3.1 3.2   Non-HDL Cholesterol      mg/dL 124 128       8. Vitamin D deficiency  Stable.  Taking vitamin D supplement.  Will continue.     9. Primary osteoarthritis involving multiple joints  Stable symptoms.  Will continue current treatment plan.     10. Other osteoporosis without current pathological fracture  Stable.  Last DEXA 11/2017.  Taking Prolia injection.  Will continue.     11. Right hip pain  Stable with symptoms. Will continue current treatment plan.     12. Chronic seasonal allergic rhinitis due to pollen  Stable.  Take Xyzal daily. Will continue current treatment plan.     13. Atherosclerosis of aorta  Stable.  Will continue hypertension management.       Provided Keyona with a 5-10 year written screening schedule and personal prevention plan. Recommendations were developed using the USPSTF age appropriate recommendations. Education, counseling, and referrals were provided as needed. After Visit Summary printed and given to patient which includes a list of additional screenings\tests needed.    No follow-ups on file.    Trudy Lacey NP

## 2019-06-06 ENCOUNTER — CLINICAL SUPPORT (OUTPATIENT)
Dept: SMOKING CESSATION | Facility: CLINIC | Age: 74
End: 2019-06-06
Payer: COMMERCIAL

## 2019-06-06 DIAGNOSIS — F17.200 NICOTINE DEPENDENCE: Primary | ICD-10-CM

## 2019-06-06 PROCEDURE — 99407 PR TOBACCO USE CESSATION INTENSIVE >10 MINUTES: ICD-10-PCS | Mod: S$GLB,,, | Performed by: INTERNAL MEDICINE

## 2019-06-06 PROCEDURE — 99407 BEHAV CHNG SMOKING > 10 MIN: CPT | Mod: S$GLB,,, | Performed by: INTERNAL MEDICINE

## 2019-06-06 NOTE — PROGRESS NOTES
Spoke with patient today in regard to smoking cessation progress for telephone follow up, she states not tobacco free at this time. Patient states she was able to be tobacco free for about 3 month s using the prescribed tobacco cessation medication Chantix. Commended patient on the accomplishment. Patient states not being able to return to the program at this time due to not being able to drive and stress of not being able to get around as much. Informed patient of benefit period, future follow up, and contact information if any further help or support is needed. Will resolve episode and complete smart form for Quit attempt #1 and complete smart form for 3 and 6 month follow up on Quit attempt #2.

## 2019-06-18 ENCOUNTER — TELEPHONE (OUTPATIENT)
Dept: INTERNAL MEDICINE | Facility: CLINIC | Age: 74
End: 2019-06-18

## 2019-06-18 NOTE — TELEPHONE ENCOUNTER
Pt said, she was short of pills on lisinopril and amlodipine. She said, she only takes 1 pill daily of each. She request to have Dr. Isidro send prescriptions to cover the amount that's missing. Explained to pt that the insurance may not cover that because Dr. Isidro sent in 30 day supply with 11 refills on each. Explained to her that the pharmacy may not have had enough of those medicines and filled what was available . Advised pt to contact the pharmacy to let them know that that she didn't receive the full 30 tablets. If she has any problems, let us know. She verbalized understanding.

## 2019-06-18 NOTE — TELEPHONE ENCOUNTER
----- Message from Shanti Calvin sent at 6/18/2019  8:17 AM CDT -----  Contact: pt  She's calling in regards blood pressure medication being 10 pills short    lisinopril (PRINIVIL,ZESTRIL) 40 MG tablet       amLODIPine (NORVASC) 5 MG tablet - 4 pills short     pls call pt back at [phone] (home)         [facility], [facility] [address] [facility] [address] [address] [address] [facility]  [place] [address]  Phone: [phone] Fax: [phone]

## 2019-07-29 NOTE — PROGRESS NOTES
"Subjective:       Patient ID: Keyona Dwyer is a 73 y.o. female.    Chief Complaint: Osteoporosis    Keyona is here for f/u. She has osteoporosis and OA of multiple joints. 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  She tolerates prolia well.  She also has low vit D on weekly supplement. She uses a walker to ambulate.  She had a dexa done last November (2017) showing improved bmd in her spine. She has h/o bilateral total hip replacements. She has OA in her hands dion prosper 1 cmc joints.  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  Overall she is doing well today and has no complaints.  Started mobic and tumeric for OA and these help.  Did PT training in past.  Increased joint pain lately with weather changes, has been out of her mobic.     Review of Systems   Constitutional: Negative for chills, fatigue and fever.   HENT: Negative for mouth sores, rhinorrhea and sore throat.         False teeth   Eyes: Negative for pain and redness.   Respiratory: Negative for cough and shortness of breath.    Cardiovascular: Negative for chest pain.   Gastrointestinal: Negative for abdominal pain, constipation, diarrhea, nausea and vomiting.   Genitourinary: Negative for dysuria and hematuria.   Musculoskeletal: Positive for arthralgias and gait problem. Negative for joint swelling and myalgias.   Skin: Negative for rash.   Neurological: Negative for weakness, numbness and headaches.   Psychiatric/Behavioral: The patient is not nervous/anxious.          Objective:   BP (!) 144/66   Pulse 64   Ht 5' 2" (1.575 m)   Wt 54.8 kg (120 lb 13 oz)   BMI 22.10 kg/m²      Physical Exam   Constitutional: She is oriented to person, place, and time and well-developed, well-nourished, and in no distress.   HENT:   Head: Normocephalic and atraumatic.   Eyes: Pupils are equal, round, and reactive to light. Right eye exhibits no discharge.   Neck: Normal range of motion.   Cardiovascular: " Normal rate, regular rhythm and normal heart sounds.  Exam reveals no friction rub.    Pulmonary/Chest: Effort normal and breath sounds normal. No respiratory distress.   Abdominal: Soft. She exhibits no distension. There is no tenderness.   Lymphadenopathy:     She has no cervical adenopathy.   Neurological: She is alert and oriented to person, place, and time.   Skin: No rash noted. No erythema.     Psychiatric: Mood normal.   Musculoskeletal: Normal range of motion. She exhibits no edema or deformity.   OA changes to prosper hands dion prosper 1 cmc joints  Left elbow stuck at 50-60 degrees flexion s/p trauma (1969)  Walks with walker           Recent Results (from the past 168 hour(s))   Comprehensive metabolic panel    Collection Time: 07/30/19 10:13 AM   Result Value Ref Range    Sodium 130 (L) 136 - 145 mmol/L    Potassium 5.3 (H) 3.5 - 5.1 mmol/L    Chloride 94 (L) 95 - 110 mmol/L    CO2 27 23 - 29 mmol/L    Glucose 83 70 - 110 mg/dL    BUN, Bld 10 8 - 23 mg/dL    Creatinine 0.8 0.5 - 1.4 mg/dL    Calcium 10.3 8.7 - 10.5 mg/dL    Total Protein 6.8 6.0 - 8.4 g/dL    Albumin 3.8 3.5 - 5.2 g/dL    Total Bilirubin 0.6 0.1 - 1.0 mg/dL    Alkaline Phosphatase 50 (L) 55 - 135 U/L    AST 14 10 - 40 U/L    ALT 11 10 - 44 U/L    Anion Gap 9 8 - 16 mmol/L    eGFR if African American >60 >60 mL/min/1.73 m^2    eGFR if non African American >60 >60 mL/min/1.73 m^2        Dexa 8.2015: spine (L1-2) -3.6, radius total -5.3, Rad 33% -4.8  dexa 11/2017: spine -3.3, spine bmd improved by 7.8%  Assessment:       1. Age-related osteoporosis without current pathological fracture    2. Primary osteoarthritis involving multiple joints    3. Medication monitoring encounter    4. Vitamin D deficiency        Impression:  Osteoporosis: on prolia q 6 mos, failed bisphosphonates; improved spine bmd dexa 11/2017, has done PT balance training    Vit D def: taking weekly vit D, level 29    Medication monitoring; no issue with prolia, labs reviewed  see below    OA mult joints: prosper hands, lumbar spine, has pain pump--mobic helps, taking daily tumeric also    Plan:       Labs reviewed and done within past 14 days  Ca 9.6, Creat 0.8, eGFR 60  No contraindication for Prolia today, ok for nurse to administer today  Re-evaluate patient again in 6 months to determine if Prolia still medically indicated and appropriate to administer.    KDIGO lab monitoring will be followed according to KDIGO 2017 Clinical Practice Guideline Update for the Diagnosis, Evaluation, Prevention, and Treatment of Chronic Kidney Disease-Mineral and Bone Disorder (CKD-MBD)  Chapter 3.1: Diagnosis of CKD-MBD: biochemical abnormalities    prolia today and cont q 6 mos  Cont weekly vit D 50K u,   Cont ca in diet   Repeat dexa next visit   mobic prn for joints, takes with prilosec, refilled  cont tumeric 1000mg/day     rtc in 6 months with prolia and cmp dexa

## 2019-07-30 ENCOUNTER — OFFICE VISIT (OUTPATIENT)
Dept: RHEUMATOLOGY | Facility: CLINIC | Age: 74
End: 2019-07-30
Payer: MEDICARE

## 2019-07-30 ENCOUNTER — INFUSION (OUTPATIENT)
Dept: RHEUMATOLOGY | Facility: HOSPITAL | Age: 74
End: 2019-07-30
Attending: PHYSICIAN ASSISTANT
Payer: MEDICARE

## 2019-07-30 VITALS
DIASTOLIC BLOOD PRESSURE: 66 MMHG | SYSTOLIC BLOOD PRESSURE: 144 MMHG | HEART RATE: 64 BPM | BODY MASS INDEX: 22.23 KG/M2 | WEIGHT: 120.81 LBS | HEIGHT: 62 IN

## 2019-07-30 DIAGNOSIS — M81.0 AGE-RELATED OSTEOPOROSIS WITHOUT CURRENT PATHOLOGICAL FRACTURE: Primary | ICD-10-CM

## 2019-07-30 DIAGNOSIS — M15.9 PRIMARY OSTEOARTHRITIS INVOLVING MULTIPLE JOINTS: ICD-10-CM

## 2019-07-30 DIAGNOSIS — M81.8 OTHER OSTEOPOROSIS WITHOUT CURRENT PATHOLOGICAL FRACTURE: Primary | ICD-10-CM

## 2019-07-30 DIAGNOSIS — E55.9 VITAMIN D DEFICIENCY: ICD-10-CM

## 2019-07-30 DIAGNOSIS — Z51.81 MEDICATION MONITORING ENCOUNTER: ICD-10-CM

## 2019-07-30 PROCEDURE — 1101F PT FALLS ASSESS-DOCD LE1/YR: CPT | Mod: HCNC,CPTII,S$GLB, | Performed by: PHYSICIAN ASSISTANT

## 2019-07-30 PROCEDURE — 99999 PR PBB SHADOW E&M-EST. PATIENT-LVL IV: CPT | Mod: PBBFAC,HCNC,, | Performed by: PHYSICIAN ASSISTANT

## 2019-07-30 PROCEDURE — 99214 PR OFFICE/OUTPT VISIT, EST, LEVL IV, 30-39 MIN: ICD-10-PCS | Mod: HCNC,S$GLB,, | Performed by: PHYSICIAN ASSISTANT

## 2019-07-30 PROCEDURE — 99999 PR PBB SHADOW E&M-EST. PATIENT-LVL IV: ICD-10-PCS | Mod: PBBFAC,HCNC,, | Performed by: PHYSICIAN ASSISTANT

## 2019-07-30 PROCEDURE — 63600175 PHARM REV CODE 636 W HCPCS: Mod: HCNC,JG | Performed by: PHYSICIAN ASSISTANT

## 2019-07-30 PROCEDURE — 1101F PR PT FALLS ASSESS DOC 0-1 FALLS W/OUT INJ PAST YR: ICD-10-PCS | Mod: HCNC,CPTII,S$GLB, | Performed by: PHYSICIAN ASSISTANT

## 2019-07-30 PROCEDURE — 99499 RISK ADDL DX/OHS AUDIT: ICD-10-PCS | Mod: HCNC,S$GLB,, | Performed by: PHYSICIAN ASSISTANT

## 2019-07-30 PROCEDURE — 3077F PR MOST RECENT SYSTOLIC BLOOD PRESSURE >= 140 MM HG: ICD-10-PCS | Mod: HCNC,CPTII,S$GLB, | Performed by: PHYSICIAN ASSISTANT

## 2019-07-30 PROCEDURE — 96372 THER/PROPH/DIAG INJ SC/IM: CPT | Mod: HCNC

## 2019-07-30 PROCEDURE — 3077F SYST BP >= 140 MM HG: CPT | Mod: HCNC,CPTII,S$GLB, | Performed by: PHYSICIAN ASSISTANT

## 2019-07-30 PROCEDURE — 3078F DIAST BP <80 MM HG: CPT | Mod: HCNC,CPTII,S$GLB, | Performed by: PHYSICIAN ASSISTANT

## 2019-07-30 PROCEDURE — 99499 UNLISTED E&M SERVICE: CPT | Mod: HCNC,S$GLB,, | Performed by: PHYSICIAN ASSISTANT

## 2019-07-30 PROCEDURE — 99214 OFFICE O/P EST MOD 30 MIN: CPT | Mod: HCNC,S$GLB,, | Performed by: PHYSICIAN ASSISTANT

## 2019-07-30 PROCEDURE — 3078F PR MOST RECENT DIASTOLIC BLOOD PRESSURE < 80 MM HG: ICD-10-PCS | Mod: HCNC,CPTII,S$GLB, | Performed by: PHYSICIAN ASSISTANT

## 2019-07-30 RX ORDER — ERGOCALCIFEROL 1.25 MG/1
50000 CAPSULE ORAL
Qty: 12 CAPSULE | Refills: 3 | Status: SHIPPED | OUTPATIENT
Start: 2019-07-30 | End: 2020-02-14 | Stop reason: SDUPTHER

## 2019-07-30 RX ORDER — MELOXICAM 15 MG/1
15 TABLET ORAL DAILY
Qty: 30 TABLET | Refills: 5 | Status: SHIPPED | OUTPATIENT
Start: 2019-07-30 | End: 2020-01-17 | Stop reason: SDUPTHER

## 2019-07-30 RX ADMIN — DENOSUMAB 60 MG: 60 INJECTION SUBCUTANEOUS at 10:07

## 2019-07-30 NOTE — PATIENT INSTRUCTIONS
Denosumab injection  What is this medicine?  DENOSUMAB (den oh steffi mab) slows bone breakdown. Prolia is used to treat osteoporosis in women after menopause and in men. Xgeva is used to prevent bone fractures and other bone problems caused by cancer bone metastases. Xgeva is also used to treat giant cell tumor of the bone.  How should I use this medicine?  This medicine is for injection under the skin. It is given by a health care professional in a hospital or clinic setting.  If you are getting Prolia, a special MedGuide will be given to you by the pharmacist with each prescription and refill. Be sure to read this information carefully each time.  For Prolia, talk to your pediatrician regarding the use of this medicine in children. Special care may be needed. For Xgeva, talk to your pediatrician regarding the use of this medicine in children. While this drug may be prescribed for children as young as 13 years for selected conditions, precautions do apply.  What side effects may I notice from receiving this medicine?  Side effects that you should report to your doctor or health care professional as soon as possible:  · allergic reactions like skin rash, itching or hives, swelling of the face, lips, or tongue  · breathing problems  · chest pain  · fast, irregular heartbeat  · feeling faint or lightheaded, falls  · fever, chills, or any other sign of infection  · muscle spasms, tightening, or twitches  · numbness or tingling  · skin blisters or bumps, or is dry, peels, or red  · slow healing or unexplained pain in the mouth or jaw  · unusual bleeding or bruising  Side effects that usually do not require medical attention (Report these to your doctor or health care professional if they continue or are bothersome.):  · muscle pain  · stomach upset, gas  What may interact with this medicine?  Do not take this medicine with any of the following medications:  · other medicines containing denosumab  This medicine may also  interact with the following medications:  · medicines that suppress the immune system  · medicines that treat cancer  · steroid medicines like prednisone or cortisone  What if I miss a dose?  It is important not to miss your dose. Call your doctor or health care professional if you are unable to keep an appointment.  Where should I keep my medicine?  This medicine is only given in a clinic, doctor's office, or other health care setting and will not be stored at home.  What should I tell my health care provider before I take this medicine?  They need to know if you have any of these conditions:  · dental disease  · eczema  · infection or history of infections  · kidney disease or on dialysis  · low blood calcium or vitamin D  · malabsorption syndrome  · scheduled to have surgery or tooth extraction  · taking medicine that contains denosumab  · thyroid or parathyroid disease  · an unusual reaction to denosumab, other medicines, foods, dyes, or preservatives  · pregnant or trying to get pregnant  · breast-feeding  What should I watch for while using this medicine?  Visit your doctor or health care professional for regular checks on your progress. Your doctor or health care professional may order blood tests and other tests to see how you are doing.  Call your doctor or health care professional if you get a cold or other infection while receiving this medicine. Do not treat yourself. This medicine may decrease your body's ability to fight infection.  You should make sure you get enough calcium and vitamin D while you are taking this medicine, unless your doctor tells you not to. Discuss the foods you eat and the vitamins you take with your health care professional.  See your dentist regularly. Brush and floss your teeth as directed. Before you have any dental work done, tell your dentist you are receiving this medicine.  Do not become pregnant while taking this medicine or for 5 months after stopping it. Women should  inform their doctor if they wish to become pregnant or think they might be pregnant. There is a potential for serious side effects to an unborn child. Talk to your health care professional or pharmacist for more information.  NOTE:This sheet is a summary. It may not cover all possible information. If you have questions about this medicine, talk to your doctor, pharmacist, or health care provider. Copyright© 2017 [employer]

## 2019-07-30 NOTE — NURSING
Prolia administered per protocol; patient instructed to wait 15 minutes post infusion; verbalizes understanding.

## 2019-08-05 ENCOUNTER — TELEPHONE (OUTPATIENT)
Dept: INTERNAL MEDICINE | Facility: CLINIC | Age: 74
End: 2019-08-05

## 2019-08-05 DIAGNOSIS — E87.1 HYPONATREMIA: Primary | ICD-10-CM

## 2019-08-05 NOTE — TELEPHONE ENCOUNTER
----- Message from Marguerite Cutler PA-C sent at 7/30/2019 11:37 AM CDT -----  Her sodium is little low.  She told me you had been watching it so just wanted to send you the lab from today

## 2019-08-05 NOTE — TELEPHONE ENCOUNTER
Pls let pt know that she needs to drink around 1 liter of water, not too much with her sodium being down.  Recheck BmP in 1 wk

## 2019-08-05 NOTE — TELEPHONE ENCOUNTER
Called pt and let her know that Dr. Isidro said Pls let pt know that she needs to drink around 1 liter of water, not too much with her sodium being down.  Recheck BmP in 1 wk  Booked for 8-12-19 at [facility] for lab

## 2019-08-12 ENCOUNTER — LAB VISIT (OUTPATIENT)
Dept: LAB | Facility: HOSPITAL | Age: 74
End: 2019-08-12
Attending: FAMILY MEDICINE
Payer: MEDICARE

## 2019-08-12 DIAGNOSIS — E87.1 HYPONATREMIA: ICD-10-CM

## 2019-08-12 LAB
ANION GAP SERPL CALC-SCNC: 6 MMOL/L (ref 8–16)
BUN SERPL-MCNC: 14 MG/DL (ref 8–23)
CALCIUM SERPL-MCNC: 8.5 MG/DL (ref 8.7–10.5)
CHLORIDE SERPL-SCNC: 98 MMOL/L (ref 95–110)
CO2 SERPL-SCNC: 26 MMOL/L (ref 23–29)
CREAT SERPL-MCNC: 0.8 MG/DL (ref 0.5–1.4)
EST. GFR  (AFRICAN AMERICAN): >60 ML/MIN/1.73 M^2
EST. GFR  (NON AFRICAN AMERICAN): >60 ML/MIN/1.73 M^2
GLUCOSE SERPL-MCNC: 78 MG/DL (ref 70–110)
POTASSIUM SERPL-SCNC: 5.8 MMOL/L (ref 3.5–5.1)
SODIUM SERPL-SCNC: 130 MMOL/L (ref 136–145)

## 2019-08-12 PROCEDURE — 36415 COLL VENOUS BLD VENIPUNCTURE: CPT | Mod: HCNC,PO

## 2019-08-12 PROCEDURE — 80048 BASIC METABOLIC PNL TOTAL CA: CPT | Mod: HCNC

## 2019-08-14 ENCOUNTER — TELEPHONE (OUTPATIENT)
Dept: INTERNAL MEDICINE | Facility: CLINIC | Age: 74
End: 2019-08-14

## 2019-08-14 DIAGNOSIS — E87.1 HYPONATREMIA: Primary | ICD-10-CM

## 2019-08-14 NOTE — TELEPHONE ENCOUNTER
----- Message from Ralf Isidro MD sent at 8/14/2019 11:45 AM CDT -----  Sodium remains low and now potassium is a bit high.  How much water is pt taking?  What vitamins/supplements?

## 2019-08-14 NOTE — TELEPHONE ENCOUNTER
Notified pt of results. She verbalized understanding. She said, she's taking prescription vitamin D and B complex. She drinks about 72 oz of water daily.

## 2019-08-15 ENCOUNTER — TELEPHONE (OUTPATIENT)
Dept: ADMINISTRATIVE | Facility: HOSPITAL | Age: 74
End: 2019-08-15

## 2019-08-15 NOTE — TELEPHONE ENCOUNTER
She needs to stop drinking so much water.  That is likely diluting her sodium.  No more than 48 ounces of water for now/next few wks.  Recheck BMP in 2 wks

## 2019-08-15 NOTE — TELEPHONE ENCOUNTER
Contacted patient to schedule follow up with PCP. Patient scheduled HTN F/U with DEION Palafox @ 9:40am. (due to PCP not having availability). [place]

## 2019-08-27 DIAGNOSIS — I10 ESSENTIAL HYPERTENSION: Chronic | ICD-10-CM

## 2019-08-27 RX ORDER — LISINOPRIL 40 MG/1
TABLET ORAL
Qty: 30 TABLET | Refills: 11 | Status: SHIPPED | OUTPATIENT
Start: 2019-08-27 | End: 2020-09-03

## 2019-08-27 RX ORDER — AMLODIPINE BESYLATE 5 MG/1
TABLET ORAL
Qty: 30 TABLET | Refills: 11 | Status: SHIPPED | OUTPATIENT
Start: 2019-08-27 | End: 2020-09-03

## 2019-08-29 ENCOUNTER — OFFICE VISIT (OUTPATIENT)
Dept: INTERNAL MEDICINE | Facility: CLINIC | Age: 74
End: 2019-08-29
Payer: MEDICARE

## 2019-08-29 ENCOUNTER — LAB VISIT (OUTPATIENT)
Dept: LAB | Facility: HOSPITAL | Age: 74
End: 2019-08-29
Attending: PHYSICIAN ASSISTANT
Payer: MEDICARE

## 2019-08-29 VITALS
TEMPERATURE: 98 F | DIASTOLIC BLOOD PRESSURE: 86 MMHG | SYSTOLIC BLOOD PRESSURE: 136 MMHG | WEIGHT: 123.69 LBS | HEART RATE: 96 BPM | BODY MASS INDEX: 22.76 KG/M2 | HEIGHT: 62 IN

## 2019-08-29 DIAGNOSIS — E87.1 CHRONIC HYPONATREMIA: Primary | ICD-10-CM

## 2019-08-29 DIAGNOSIS — R45.0 NERVOUSNESS: ICD-10-CM

## 2019-08-29 DIAGNOSIS — E87.1 HYPONATREMIA: ICD-10-CM

## 2019-08-29 DIAGNOSIS — Z72.0 TOBACCO ABUSE: ICD-10-CM

## 2019-08-29 LAB
ANION GAP SERPL CALC-SCNC: 10 MMOL/L (ref 8–16)
BUN SERPL-MCNC: 10 MG/DL (ref 8–23)
CALCIUM SERPL-MCNC: 9.1 MG/DL (ref 8.7–10.5)
CHLORIDE SERPL-SCNC: 102 MMOL/L (ref 95–110)
CO2 SERPL-SCNC: 24 MMOL/L (ref 23–29)
CREAT SERPL-MCNC: 0.7 MG/DL (ref 0.5–1.4)
EST. GFR  (AFRICAN AMERICAN): >60 ML/MIN/1.73 M^2
EST. GFR  (NON AFRICAN AMERICAN): >60 ML/MIN/1.73 M^2
GLUCOSE SERPL-MCNC: 93 MG/DL (ref 70–110)
POTASSIUM SERPL-SCNC: 4.7 MMOL/L (ref 3.5–5.1)
SODIUM SERPL-SCNC: 136 MMOL/L (ref 136–145)

## 2019-08-29 PROCEDURE — 3079F PR MOST RECENT DIASTOLIC BLOOD PRESSURE 80-89 MM HG: ICD-10-PCS | Mod: HCNC,CPTII,S$GLB, | Performed by: PHYSICIAN ASSISTANT

## 2019-08-29 PROCEDURE — 99214 PR OFFICE/OUTPT VISIT, EST, LEVL IV, 30-39 MIN: ICD-10-PCS | Mod: HCNC,S$GLB,, | Performed by: PHYSICIAN ASSISTANT

## 2019-08-29 PROCEDURE — 80048 BASIC METABOLIC PNL TOTAL CA: CPT | Mod: HCNC

## 2019-08-29 PROCEDURE — 3079F DIAST BP 80-89 MM HG: CPT | Mod: HCNC,CPTII,S$GLB, | Performed by: PHYSICIAN ASSISTANT

## 2019-08-29 PROCEDURE — 1100F PR PT FALLS ASSESS DOC 2+ FALLS/FALL W/INJURY/YR: ICD-10-PCS | Mod: HCNC,CPTII,S$GLB, | Performed by: PHYSICIAN ASSISTANT

## 2019-08-29 PROCEDURE — 1100F PTFALLS ASSESS-DOCD GE2>/YR: CPT | Mod: HCNC,CPTII,S$GLB, | Performed by: PHYSICIAN ASSISTANT

## 2019-08-29 PROCEDURE — 3075F PR MOST RECENT SYSTOLIC BLOOD PRESS GE 130-139MM HG: ICD-10-PCS | Mod: HCNC,CPTII,S$GLB, | Performed by: PHYSICIAN ASSISTANT

## 2019-08-29 PROCEDURE — 36415 COLL VENOUS BLD VENIPUNCTURE: CPT | Mod: HCNC,PO

## 2019-08-29 PROCEDURE — 3288F FALL RISK ASSESSMENT DOCD: CPT | Mod: HCNC,CPTII,S$GLB, | Performed by: PHYSICIAN ASSISTANT

## 2019-08-29 PROCEDURE — 3075F SYST BP GE 130 - 139MM HG: CPT | Mod: HCNC,CPTII,S$GLB, | Performed by: PHYSICIAN ASSISTANT

## 2019-08-29 PROCEDURE — 99999 PR PBB SHADOW E&M-EST. PATIENT-LVL IV: CPT | Mod: PBBFAC,HCNC,, | Performed by: PHYSICIAN ASSISTANT

## 2019-08-29 PROCEDURE — 3288F PR FALLS RISK ASSESSMENT DOCUMENTED: ICD-10-PCS | Mod: HCNC,CPTII,S$GLB, | Performed by: PHYSICIAN ASSISTANT

## 2019-08-29 PROCEDURE — 99999 PR PBB SHADOW E&M-EST. PATIENT-LVL IV: ICD-10-PCS | Mod: PBBFAC,HCNC,, | Performed by: PHYSICIAN ASSISTANT

## 2019-08-29 PROCEDURE — 99214 OFFICE O/P EST MOD 30 MIN: CPT | Mod: HCNC,S$GLB,, | Performed by: PHYSICIAN ASSISTANT

## 2019-08-29 RX ORDER — BUSPIRONE HYDROCHLORIDE 5 MG/1
TABLET ORAL
Qty: 60 TABLET | Refills: 11 | Status: SHIPPED | OUTPATIENT
Start: 2019-08-29 | End: 2020-07-23

## 2019-08-29 NOTE — PROGRESS NOTES
Subjective:       Patient ID: Keyona Dwyer is a 73 y.o. female.    Chief Complaint: Follow-up    HPI   Patient comes in today to repeat BMP and follow up on BP    BP looks good, no issues at this time.     Her sodium and potassium have been off as of late so she is needing to repeat labs. She does admit to eating a good bit of potatoes, so thinking that is where her K+ comes from.   She is a smoker, tried to quit but states her nerves get too bad. She states she even gets too nervous to sew       Health Maintenance Due   Topic Date Due    High Dose Statin  11/13/1966    LDCT Lung Screen  04/26/2019       Past Medical History:   Diagnosis Date    Cataract     Chronic back pain     Dr. Guzman    Emphysema of lung     Fibrocystic breast     Glaucoma     Hyperlipemia     Hypertension     Osteoarthritis     Osteoporosis     Rheumatology/on Prolia    Restless leg syndrome     Trouble in sleeping        Current Outpatient Medications   Medication Sig Dispense Refill    amLODIPine (NORVASC) 5 MG tablet TAKE (1) TABLET by mouth DAILY FOR BLOOD PRESSURE. 30 tablet 11    b complex vitamins capsule Take 1 capsule by mouth once daily.      duloxetine (CYMBALTA) 30 MG capsule Take 30 mg by mouth once daily.      ergocalciferol (ERGOCALCIFEROL) 50,000 unit Cap Take 1 capsule (50,000 Units total) by mouth every 7 days. 12 capsule 3    gabapentin (NEURONTIN) 600 MG tablet Take 600 mg by mouth 4 (four) times daily.       hydrocodone-acetaminophen 10-325mg (NORCO)  mg Tab Take 1 tablet by mouth every 6 (six) hours as needed.      levocetirizine (XYZAL) 5 MG tablet Take 1 tablet (5 mg total) by mouth every evening. 30 tablet 11    lisinopril (PRINIVIL,ZESTRIL) 40 MG tablet TAKE (1) TABLET by mouth DAILY FOR BLOOD PRESSURE. 30 tablet 11    meloxicam (MOBIC) 15 MG tablet Take 1 tablet (15 mg total) by mouth once daily. 30 tablet 5    nutritional supplement/fiber (QUINOA-KALE-HEMP ORAL) Take by mouth.  "     ON-Q PAIN PUMP by Misc.(Non-Drug; Combo Route) route.      busPIRone (BUSPAR) 5 MG Tab Take 1-2 pills daily as needed for anxiety 60 tablet 11     Current Facility-Administered Medications   Medication Dose Route Frequency Provider Last Rate Last Dose    denosumab (PROLIA) injection 60 mg  60 mg Subcutaneous Q6 Months Marguerite Cutler PA-C   60 mg at 01/25/19 1019    ondansetron tablet 4 mg  4 mg Oral 1 time in Clinic/HOD Azalea Williamson NP           Review of Systems   Constitutional: Negative for fatigue, fever and unexpected weight change.   HENT: Negative for sore throat and trouble swallowing.    Respiratory: Negative for cough and shortness of breath.    Cardiovascular: Negative for chest pain.   Gastrointestinal: Negative for abdominal pain.   Skin: Negative for color change, pallor, rash and wound.   Hematological: Negative for adenopathy. Does not bruise/bleed easily.   Psychiatric/Behavioral: The patient is nervous/anxious.    All other systems reviewed and are negative.      Objective:   /86   Pulse 96   Temp 97.6 °F (36.4 °C) (Tympanic)   Ht 5' 2" (1.575 m)   Wt 56.1 kg (123 lb 10.9 oz)   BMI 22.62 kg/m²      Physical Exam   Constitutional: She is oriented to person, place, and time. She appears well-developed and well-nourished. No distress.   HENT:   Head: Normocephalic and atraumatic.   Cardiovascular: Normal rate, regular rhythm, normal heart sounds and intact distal pulses.   Pulmonary/Chest: Effort normal.   Neurological: She is alert and oriented to person, place, and time.   Skin: Skin is warm.   Psychiatric: She has a normal mood and affect. Her behavior is normal. Judgment and thought content normal.   Nursing note and vitals reviewed.        Lab Results   Component Value Date    WBC 6.70 12/14/2018    HGB 10.6 (L) 12/14/2018    HCT 34.9 (L) 12/14/2018     12/14/2018    CHOL 184 12/14/2018    TRIG 121 12/14/2018    HDL 60 12/14/2018    ALT 11 07/30/2019    AST 14 " 07/30/2019     (L) 08/12/2019    K 5.8 (H) 08/12/2019    CL 98 08/12/2019    CREATININE 0.8 08/12/2019    BUN 14 08/12/2019    CO2 26 08/12/2019    TSH 0.693 12/14/2018    INR 1.2 10/10/2009    HGBA1C 5.0 12/14/2018       Assessment:       1. Chronic hyponatremia    2. Tobacco abuse    3. Nervousness        Plan:   Chronic hyponatremia  Comments:  recheck bmp per pcp    Tobacco abuse    Nervousness  Comments:  trial of buspar, hopefully calming so she wont smoke    Other orders  -     busPIRone (BUSPAR) 5 MG Tab; Take 1-2 pills daily as needed for anxiety  Dispense: 60 tablet; Refill: 11        No follow-ups on file.

## 2019-11-20 ENCOUNTER — OFFICE VISIT (OUTPATIENT)
Dept: URGENT CARE | Facility: CLINIC | Age: 74
End: 2019-11-20
Payer: MEDICARE

## 2019-11-20 VITALS
WEIGHT: 120 LBS | RESPIRATION RATE: 18 BRPM | HEART RATE: 75 BPM | HEIGHT: 62 IN | BODY MASS INDEX: 22.08 KG/M2 | SYSTOLIC BLOOD PRESSURE: 150 MMHG | OXYGEN SATURATION: 96 % | DIASTOLIC BLOOD PRESSURE: 67 MMHG | TEMPERATURE: 99 F

## 2019-11-20 DIAGNOSIS — S01.342A: Primary | ICD-10-CM

## 2019-11-20 PROCEDURE — 20525 PR REMOVAL OF FOREIGN BODY DEEP/COMPLIC: ICD-10-PCS | Mod: S$GLB,,, | Performed by: PHYSICIAN ASSISTANT

## 2019-11-20 PROCEDURE — 3078F DIAST BP <80 MM HG: CPT | Mod: CPTII,S$GLB,, | Performed by: PHYSICIAN ASSISTANT

## 2019-11-20 PROCEDURE — 3078F PR MOST RECENT DIASTOLIC BLOOD PRESSURE < 80 MM HG: ICD-10-PCS | Mod: CPTII,S$GLB,, | Performed by: PHYSICIAN ASSISTANT

## 2019-11-20 PROCEDURE — 1100F PTFALLS ASSESS-DOCD GE2>/YR: CPT | Mod: CPTII,S$GLB,, | Performed by: PHYSICIAN ASSISTANT

## 2019-11-20 PROCEDURE — 1159F PR MEDICATION LIST DOCUMENTED IN MEDICAL RECORD: ICD-10-PCS | Mod: S$GLB,,, | Performed by: PHYSICIAN ASSISTANT

## 2019-11-20 PROCEDURE — 3077F PR MOST RECENT SYSTOLIC BLOOD PRESSURE >= 140 MM HG: ICD-10-PCS | Mod: CPTII,S$GLB,, | Performed by: PHYSICIAN ASSISTANT

## 2019-11-20 PROCEDURE — 1100F PR PT FALLS ASSESS DOC 2+ FALLS/FALL W/INJURY/YR: ICD-10-PCS | Mod: CPTII,S$GLB,, | Performed by: PHYSICIAN ASSISTANT

## 2019-11-20 PROCEDURE — 99214 PR OFFICE/OUTPT VISIT, EST, LEVL IV, 30-39 MIN: ICD-10-PCS | Mod: 25,S$GLB,, | Performed by: PHYSICIAN ASSISTANT

## 2019-11-20 PROCEDURE — 20525 RMVL FB MUSC/TDN DEEP/COMP: CPT | Mod: S$GLB,,, | Performed by: PHYSICIAN ASSISTANT

## 2019-11-20 PROCEDURE — 1159F MED LIST DOCD IN RCRD: CPT | Mod: S$GLB,,, | Performed by: PHYSICIAN ASSISTANT

## 2019-11-20 PROCEDURE — 3288F FALL RISK ASSESSMENT DOCD: CPT | Mod: CPTII,S$GLB,, | Performed by: PHYSICIAN ASSISTANT

## 2019-11-20 PROCEDURE — 3077F SYST BP >= 140 MM HG: CPT | Mod: CPTII,S$GLB,, | Performed by: PHYSICIAN ASSISTANT

## 2019-11-20 PROCEDURE — 99214 OFFICE O/P EST MOD 30 MIN: CPT | Mod: 25,S$GLB,, | Performed by: PHYSICIAN ASSISTANT

## 2019-11-20 PROCEDURE — 3288F PR FALLS RISK ASSESSMENT DOCUMENTED: ICD-10-PCS | Mod: CPTII,S$GLB,, | Performed by: PHYSICIAN ASSISTANT

## 2019-11-20 RX ORDER — MUPIROCIN 20 MG/G
OINTMENT TOPICAL
Qty: 22 G | Refills: 1 | Status: SHIPPED | OUTPATIENT
Start: 2019-11-20 | End: 2022-09-01

## 2019-11-20 NOTE — PROCEDURES
Procedures     Foreign body removal note:    Foreign body removal was discussed with patient and verbal consent was given by patient. Earring with earring back is attached and inside of the piercing site of the left earlobe. The left earlobe was cleansed with alcohol, anesthetized with lidocaine 1 % without epi (1 cc). The earring was removed with earring back attached anteriorly. Wound dressed with mupirocin and bandage. Patient tolerated procedure well.

## 2019-11-20 NOTE — PATIENT INSTRUCTIONS
Apply Mupirocin ointment and band-aid 2-3 times daily for 5-7 days.    Follow-up with primary care or return to urgent care for any concerns.    Go to the ER for any emergency.

## 2019-11-20 NOTE — PROGRESS NOTES
"Subjective:       Patient ID: Keyona Dwyer is a 74 y.o. female.    Vitals:  height is 5' 2" (1.575 m) and weight is 54.4 kg (120 lb). Her oral temperature is 98.7 °F (37.1 °C). Her blood pressure is 150/67 (abnormal) and her pulse is 75. Her respiration is 18 and oxygen saturation is 96%.     Chief Complaint: Foreign Body    Pt noticed earring back stuck in her left pierced ear. Pain is at a 3.    Foreign Body   The incident occurred 12 to 24 hours ago. Suspected object: back of an earring(plastic) The foreign body is suspected to be in the left ear. The incident was suspected. The incident was witnessed/reported by the patient. Pertinent negatives include no abdominal pain or sore throat.       Constitution: Negative for fatigue.   HENT: Negative for ear pain, ear discharge, foreign body in ear, tinnitus, facial swelling, facial trauma and sore throat.         + earlobe pain 2/2 earring back stuck on earring     Neck: Negative for neck stiffness.   Cardiovascular: Negative for chest trauma.   Eyes: Negative for eye trauma, double vision and blurred vision.   Gastrointestinal: Negative for abdominal trauma, abdominal pain and rectal bleeding.   Genitourinary: Negative for dysuria, hematuria, missed menses, genital trauma and pelvic pain.   Musculoskeletal: Negative for pain, trauma, joint swelling and abnormal ROM of joint.   Skin: Negative for color change, wound, abrasion, laceration, erythema and bruising.   Neurological: Negative for dizziness, history of vertigo, light-headedness, coordination disturbances, altered mental status and loss of consciousness.   Hematologic/Lymphatic: Negative for history of bleeding disorder.   Psychiatric/Behavioral: Negative for altered mental status.       Objective:      Physical Exam   Constitutional: She is oriented to person, place, and time. Vital signs are normal. She appears well-developed and well-nourished. She is cooperative.  Non-toxic appearance. She does not " have a sickly appearance. She does not appear ill. No distress.   HENT:   Head: Normocephalic and atraumatic.   Right Ear: Tympanic membrane, external ear and ear canal normal.   Left Ear: Tympanic membrane, external ear and ear canal normal.   Nose: Nose normal.   Mouth/Throat: Uvula is midline, oropharynx is clear and moist and mucous membranes are normal.   Foreign body of left earlobe (earring with plastic earring back lodged inside of the piercing site). No rash, increased warmth, increased erythema. Mild tenderness upon manipulation of earring.    Eyes: Pupils are equal, round, and reactive to light. Conjunctivae, EOM and lids are normal.   Neck: Trachea normal, normal range of motion, full passive range of motion without pain and phonation normal. Neck supple.   Cardiovascular: Normal rate, regular rhythm, normal heart sounds and intact distal pulses.   Pulmonary/Chest: Effort normal and breath sounds normal. No stridor. No respiratory distress. She has no decreased breath sounds. She has no wheezes. She has no rhonchi. She has no rales.   Abdominal: Soft. Bowel sounds are normal. She exhibits no distension and no mass. There is no tenderness. There is no guarding.   Musculoskeletal: Normal range of motion.   Neurological: She is alert and oriented to person, place, and time.   Skin: Skin is warm, dry and no rash. Capillary refill takes less than 2 seconds. erythema  Psychiatric: She has a normal mood and affect. Her behavior is normal. Judgment and thought content normal.   Nursing note and vitals reviewed.        Assessment:       1. Penetrating foreign body of skin of earlobe, left, initial encounter        Plan:         Penetrating foreign body of skin of earlobe, left, initial encounter  -     mupirocin (BACTROBAN) 2 % ointment; Apply to affected area 3 times daily  Dispense: 22 g; Refill: 1    Foreign body removed, see procedure note.  Wound dressed with mupirocin ointment and badinage.  Wound care  discussed.    I have discussed the diagnosis, treatment plan and recommendations for follow-up with primary care and patient verbalized understanding and is agreeable to the plan. ED precautions given. AVS printed and given to patient upon discharge with information regarding this visit. All questions were addressed prior to discharge.    Daylin Ty PA-C

## 2019-12-22 ENCOUNTER — OFFICE VISIT (OUTPATIENT)
Dept: URGENT CARE | Facility: CLINIC | Age: 74
End: 2019-12-22
Payer: MEDICARE

## 2019-12-22 VITALS
WEIGHT: 118.19 LBS | OXYGEN SATURATION: 94 % | DIASTOLIC BLOOD PRESSURE: 80 MMHG | TEMPERATURE: 98 F | BODY MASS INDEX: 21.61 KG/M2 | HEART RATE: 100 BPM | SYSTOLIC BLOOD PRESSURE: 110 MMHG

## 2019-12-22 DIAGNOSIS — L98.9 SKIN SORE: Primary | ICD-10-CM

## 2019-12-22 PROCEDURE — 99213 PR OFFICE/OUTPT VISIT, EST, LEVL III, 20-29 MIN: ICD-10-PCS | Mod: S$GLB,,, | Performed by: NURSE PRACTITIONER

## 2019-12-22 PROCEDURE — 99213 OFFICE O/P EST LOW 20 MIN: CPT | Mod: S$GLB,,, | Performed by: NURSE PRACTITIONER
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
## 2019-12-22 NOTE — PATIENT INSTRUCTIONS
Keep area clean and dry.  Apply antiibiotic ointment three times a day for 5-7 days.  Take tylenol/ibuprofen for pain.  Follow up with PCP if symptoms worsen or persist.

## 2020-01-17 ENCOUNTER — OFFICE VISIT (OUTPATIENT)
Dept: URGENT CARE | Facility: CLINIC | Age: 75
End: 2020-01-17
Payer: MEDICARE

## 2020-01-17 VITALS
BODY MASS INDEX: 22.15 KG/M2 | SYSTOLIC BLOOD PRESSURE: 181 MMHG | HEIGHT: 62 IN | TEMPERATURE: 99 F | OXYGEN SATURATION: 97 % | WEIGHT: 120.38 LBS | DIASTOLIC BLOOD PRESSURE: 77 MMHG | HEART RATE: 76 BPM

## 2020-01-17 DIAGNOSIS — T14.8XXA INFECTED WOUND: Primary | ICD-10-CM

## 2020-01-17 DIAGNOSIS — L08.9 INFECTED WOUND: Primary | ICD-10-CM

## 2020-01-17 PROCEDURE — 99214 PR OFFICE/OUTPT VISIT, EST, LEVL IV, 30-39 MIN: ICD-10-PCS | Mod: S$GLB,,, | Performed by: PHYSICIAN ASSISTANT

## 2020-01-17 PROCEDURE — 99214 OFFICE O/P EST MOD 30 MIN: CPT | Mod: S$GLB,,, | Performed by: PHYSICIAN ASSISTANT

## 2020-01-17 RX ORDER — DOXYCYCLINE 100 MG/1
100 CAPSULE ORAL 2 TIMES DAILY
Qty: 14 CAPSULE | Refills: 0 | Status: SHIPPED | OUTPATIENT
Start: 2020-01-17 | End: 2020-01-24

## 2020-01-17 RX ORDER — MELOXICAM 15 MG/1
15 TABLET ORAL DAILY
Qty: 30 TABLET | Refills: 5 | Status: SHIPPED | OUTPATIENT
Start: 2020-01-17 | End: 2020-08-28 | Stop reason: SINTOL
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
## 2020-01-17 NOTE — PATIENT INSTRUCTIONS
Elevated Blood Pressure  Your blood pressure was elevated during your visit to the urgent care.  It was not so high that immediate care was needed but it is recommended that you monitor your blood pressure over the next week or two to make sure that it is not staying elevated.  Please have your blood pressure taken 2-3 times daily at different times of the day.  Write all of those blood pressures down and record the time that they were taken.  Keep all that information and take it with you to see your Primary Care Physician.  If your blood pressure is consistently above 140/90 you will need to follow up with your PCP more quickly      Wound Check, Infection  You have a wound that has become infected. The wound will not heal properly unless the infection is cleared. Infection in a wound may also spread if it is not treated. In most cases, antibiotic medicines are prescribed to treat a wound infection.   Symptoms of a wound infection include:  · Redness or swelling around the wound  · Warmth coming from the wound  · New or worsening pain  · Red streaks around the wound  · Draining pus  · Fever  Home care  Follow all directions you are given to treat the infection.  Medicines  Take all medicines as prescribed.   · If you were given antibiotics, take them until they are gone or your healthcare provider tells you to stop. It is vital to finish the antibiotics even if you feel better. If you do not finish them, the infection may come back and be harder to treat.  · If your infection is not responding to the medicines you are taking, you may be prescribed new medicines.  · Take medicine for pain as directed by your healthcare provider.  Wound care  Care for your wound as directed by your healthcare provider.  · Apply a warm compress (clean cloth soaked in hot water) to the infected area for about 5 to 10 minutes at a time. Be very careful not to burn yourself. Test the cloth on a non-infected area to make sure it is not  too hot.  · Continue to change the dressing daily. If it becomes wet, stained with wound fluid, or dirty, change it sooner. To change it:  ¨ Wash your hands with soap and water before changing the dressing.  ¨ Carefully remove the dressing and tape. If it sticks to the wound, you may need to wet it a little to remove it. (Do not do this if your healthcare provider has told you not to.)  ¨ Gently clean the wound with clean water (or saline) using gauze, a clean washcloth, or cotton swab.  ¨ Do not use soap, alcohol, peroxide or other cleansers.  ¨ If you were told to dry the wound before putting on a new dressing, gently pat. Do not rub.  ¨ Throw out the old dressing.  ¨ Wash your hands again before opening the new, clean dressing.  ¨ Wash your hands again when you are done.  Follow-up care  Follow up with your healthcare provider as advised. If a culture was done, you will be notified if your treatment needs to change. Call as directed for the results.  When to seek medical advice  Call your health care provider right away if any of these occur:  · Symptoms of infection don't start to improve within 2 days of starting antibiotics  · Symptoms of infection get worse  · New symptoms, such as red streaks around the wound  · Fever of 100.4°F (38.0°C) or higher for more than 2 days after starting the antibiotics  Date Last Reviewed: 8/10/2015  © 9984-0000 [employer]. [address] [place] [address], [place] [address]. All rights reserved. This information is not intended as a substitute for professional medical care. Always follow your healthcare professional's instructions.      Please follow up with your Primary care provider within 2-5 days if your signs and symptoms have not resolved or worsen.     If your condition worsens or fails to improve we recommend that you receive another evaluation at the emergency room immediately or contact your primary medical clinic to discuss your concerns.   You must understand  that you have received an Urgent Care treatment only and that you may be released before all of your medical problems are known or treated. You, the patient, will arrange for follow up care as instructed.     RED FLAGS/WARNING SYMPTOMS DISCUSSED WITH PATIENT THAT WOULD WARRANT EMERGENT MEDICAL ATTENTION. PATIENT VERBALIZED UNDERSTANDING.

## 2020-02-03 ENCOUNTER — TELEPHONE (OUTPATIENT)
Dept: RHEUMATOLOGY | Facility: CLINIC | Age: 75
End: 2020-02-03

## 2020-02-03 NOTE — TELEPHONE ENCOUNTER
Ajay [facility] Staff             Good Morning.,     We are working on obtaining authorization for  Keyona Dwyer's injection/infusion scheduled for 02/05/2020 .  The request remains pending per the insurance company.  We will continue to work with the insurance company to obtain authorization and let you know the status.  If you have any questions please call me at extension [phone].     Thank you   Ajay JAY LPN   Clinical Review Team   PreService

## 2020-02-05 ENCOUNTER — APPOINTMENT (OUTPATIENT)
Dept: RADIOLOGY | Facility: HOSPITAL | Age: 75
End: 2020-02-05
Attending: PHYSICIAN ASSISTANT
Payer: MEDICARE

## 2020-02-05 ENCOUNTER — LAB VISIT (OUTPATIENT)
Dept: LAB | Facility: HOSPITAL | Age: 75
End: 2020-02-05
Payer: MEDICARE

## 2020-02-05 DIAGNOSIS — M81.0 AGE-RELATED OSTEOPOROSIS WITHOUT CURRENT PATHOLOGICAL FRACTURE: ICD-10-CM

## 2020-02-05 DIAGNOSIS — Z51.81 MEDICATION MONITORING ENCOUNTER: ICD-10-CM

## 2020-02-05 LAB
ALBUMIN SERPL BCP-MCNC: 3.6 G/DL (ref 3.5–5.2)
ALP SERPL-CCNC: 51 U/L (ref 55–135)
ALT SERPL W/O P-5'-P-CCNC: 7 U/L (ref 10–44)
ANION GAP SERPL CALC-SCNC: 7 MMOL/L (ref 8–16)
AST SERPL-CCNC: 12 U/L (ref 10–40)
BILIRUB SERPL-MCNC: 0.3 MG/DL (ref 0.1–1)
BUN SERPL-MCNC: 13 MG/DL (ref 8–23)
CALCIUM SERPL-MCNC: 9.5 MG/DL (ref 8.7–10.5)
CHLORIDE SERPL-SCNC: 100 MMOL/L (ref 95–110)
CO2 SERPL-SCNC: 31 MMOL/L (ref 23–29)
CREAT SERPL-MCNC: 0.8 MG/DL (ref 0.5–1.4)
EST. GFR  (AFRICAN AMERICAN): >60 ML/MIN/1.73 M^2
EST. GFR  (NON AFRICAN AMERICAN): >60 ML/MIN/1.73 M^2
GLUCOSE SERPL-MCNC: 81 MG/DL (ref 70–110)
POTASSIUM SERPL-SCNC: 4.4 MMOL/L (ref 3.5–5.1)
PROT SERPL-MCNC: 6.4 G/DL (ref 6–8.4)
SODIUM SERPL-SCNC: 138 MMOL/L (ref 136–145)

## 2020-02-05 PROCEDURE — 80053 COMPREHEN METABOLIC PANEL: CPT | Mod: HCNC

## 2020-02-05 PROCEDURE — 77080 DXA BONE DENSITY AXIAL: CPT | Mod: 26,,, | Performed by: RADIOLOGY

## 2020-02-05 PROCEDURE — 77080 DEXA BONE DENSITY SPINE HIP: ICD-10-PCS | Mod: 26,,, | Performed by: RADIOLOGY

## 2020-02-05 PROCEDURE — 77080 DXA BONE DENSITY AXIAL: CPT | Mod: TC

## 2020-02-05 PROCEDURE — 36415 COLL VENOUS BLD VENIPUNCTURE: CPT | Mod: HCNC

## 2020-02-10 ENCOUNTER — OFFICE VISIT (OUTPATIENT)
Dept: PULMONOLOGY | Facility: CLINIC | Age: 75
End: 2020-02-10
Payer: MEDICARE

## 2020-02-10 ENCOUNTER — CLINICAL SUPPORT (OUTPATIENT)
Dept: PULMONOLOGY | Facility: CLINIC | Age: 75
End: 2020-02-10
Payer: MEDICARE

## 2020-02-10 ENCOUNTER — HOSPITAL ENCOUNTER (OUTPATIENT)
Dept: RADIOLOGY | Facility: HOSPITAL | Age: 75
Discharge: HOME | End: 2020-02-10
Attending: INTERNAL MEDICINE
Payer: MEDICARE

## 2020-02-10 VITALS
BODY MASS INDEX: 22.15 KG/M2 | DIASTOLIC BLOOD PRESSURE: 90 MMHG | HEIGHT: 62 IN | HEART RATE: 63 BPM | OXYGEN SATURATION: 96 % | SYSTOLIC BLOOD PRESSURE: 150 MMHG | WEIGHT: 120.38 LBS

## 2020-02-10 DIAGNOSIS — J30.1 CHRONIC SEASONAL ALLERGIC RHINITIS DUE TO POLLEN: Chronic | ICD-10-CM

## 2020-02-10 DIAGNOSIS — F11.20 UNCOMPLICATED OPIOID DEPENDENCE: Chronic | ICD-10-CM

## 2020-02-10 DIAGNOSIS — J44.9 COPD, GROUP D, BY GOLD 2017 CLASSIFICATION: ICD-10-CM

## 2020-02-10 DIAGNOSIS — J44.9 COPD, GROUP D, BY GOLD 2017 CLASSIFICATION: Primary | Chronic | ICD-10-CM

## 2020-02-10 DIAGNOSIS — R10.13 EPIGASTRIC ABDOMINAL PAIN: ICD-10-CM

## 2020-02-10 DIAGNOSIS — J44.9 COPD, GROUP D, BY GOLD 2017 CLASSIFICATION: Chronic | ICD-10-CM

## 2020-02-10 DIAGNOSIS — I70.0 ATHEROSCLEROSIS OF AORTA: Chronic | ICD-10-CM

## 2020-02-10 DIAGNOSIS — F17.210 NICOTINE DEPENDENCE, CIGARETTES, UNCOMPLICATED: Chronic | ICD-10-CM

## 2020-02-10 PROCEDURE — 71046 X-RAY EXAM CHEST 2 VIEWS: CPT | Mod: TC,HCNC

## 2020-02-10 PROCEDURE — 99215 OFFICE O/P EST HI 40 MIN: CPT | Mod: HCNC,S$GLB,, | Performed by: INTERNAL MEDICINE

## 2020-02-10 PROCEDURE — 1159F MED LIST DOCD IN RCRD: CPT | Mod: HCNC,S$GLB,, | Performed by: INTERNAL MEDICINE

## 2020-02-10 PROCEDURE — 99999 PR PBB SHADOW E&M-EST. PATIENT-LVL III: CPT | Mod: PBBFAC,HCNC,, | Performed by: INTERNAL MEDICINE

## 2020-02-10 PROCEDURE — 99999 PR PBB SHADOW E&M-EST. PATIENT-LVL III: ICD-10-PCS | Mod: PBBFAC,HCNC,, | Performed by: INTERNAL MEDICINE

## 2020-02-10 PROCEDURE — 71046 X-RAY EXAM CHEST 2 VIEWS: CPT | Mod: 26,HCNC,, | Performed by: RADIOLOGY

## 2020-02-10 PROCEDURE — 1159F PR MEDICATION LIST DOCUMENTED IN MEDICAL RECORD: ICD-10-PCS | Mod: HCNC,S$GLB,, | Performed by: INTERNAL MEDICINE

## 2020-02-10 PROCEDURE — 3080F DIAST BP >= 90 MM HG: CPT | Mod: HCNC,CPTII,S$GLB, | Performed by: INTERNAL MEDICINE

## 2020-02-10 PROCEDURE — 1101F PR PT FALLS ASSESS DOC 0-1 FALLS W/OUT INJ PAST YR: ICD-10-PCS | Mod: HCNC,CPTII,S$GLB, | Performed by: INTERNAL MEDICINE

## 2020-02-10 PROCEDURE — 3077F PR MOST RECENT SYSTOLIC BLOOD PRESSURE >= 140 MM HG: ICD-10-PCS | Mod: HCNC,CPTII,S$GLB, | Performed by: INTERNAL MEDICINE

## 2020-02-10 PROCEDURE — 3080F PR MOST RECENT DIASTOLIC BLOOD PRESSURE >= 90 MM HG: ICD-10-PCS | Mod: HCNC,CPTII,S$GLB, | Performed by: INTERNAL MEDICINE

## 2020-02-10 PROCEDURE — 99215 PR OFFICE/OUTPT VISIT, EST, LEVL V, 40-54 MIN: ICD-10-PCS | Mod: HCNC,S$GLB,, | Performed by: INTERNAL MEDICINE

## 2020-02-10 PROCEDURE — 3077F SYST BP >= 140 MM HG: CPT | Mod: HCNC,CPTII,S$GLB, | Performed by: INTERNAL MEDICINE

## 2020-02-10 PROCEDURE — 1125F AMNT PAIN NOTED PAIN PRSNT: CPT | Mod: HCNC,S$GLB,, | Performed by: INTERNAL MEDICINE

## 2020-02-10 PROCEDURE — 71046 XR CHEST PA AND LATERAL: ICD-10-PCS | Mod: 26,HCNC,, | Performed by: RADIOLOGY

## 2020-02-10 PROCEDURE — 1101F PT FALLS ASSESS-DOCD LE1/YR: CPT | Mod: HCNC,CPTII,S$GLB, | Performed by: INTERNAL MEDICINE

## 2020-02-10 PROCEDURE — 1125F PR PAIN SEVERITY QUANTIFIED, PAIN PRESENT: ICD-10-PCS | Mod: HCNC,S$GLB,, | Performed by: INTERNAL MEDICINE

## 2020-02-10 RX ORDER — AZELASTINE 1 MG/ML
1 SPRAY, METERED NASAL 2 TIMES DAILY
Qty: 30 ML | Refills: 11 | Status: SHIPPED | OUTPATIENT
Start: 2020-02-10

## 2020-02-10 RX ORDER — LEVOCETIRIZINE DIHYDROCHLORIDE 5 MG/1
5 TABLET, FILM COATED ORAL NIGHTLY
Qty: 30 TABLET | Refills: 11 | Status: SHIPPED | OUTPATIENT
Start: 2020-02-10 | End: 2021-01-28

## 2020-02-10 NOTE — PROGRESS NOTES
Subjective:      Patient ID: Keyona Dwyer is a 74 y.o. female.  Patient Active Problem List   Diagnosis    HTN (hypertension)    Hyperlipidemia    Nicotine dependence, cigarettes, uncomplicated    Osteoarthritis    Lumbar radiculopathy    Osteoporosis    Vitamin D deficiency    Glaucoma of both eyes    Uncomplicated opioid dependence    Chronic seasonal allergic rhinitis due to pollen    COPD, group D, by GOLD 2017 classification    Right hip pain    Atherosclerosis of aorta    Epigastric abdominal pain     Problem list has been reviewed.    Chief Complaint: COPD      HPI    Group Spirometric Classification Exacerbations / year [facility] CAT   Group D: High risk; more sypmtoms  GOLD 3-4 > = 2 >= 2 >= 10     FEV1 =>  FEV1: 1.03  (54 % predicted)     GOLD 1 - mild: FEV1? 80% predicted   GOLD 2 - moderate: 50% ?FEV1 <80% predicted   GOLD 3 - severe: 30% ?FEV1 <50% predicted   GOLD 4 - very severe: FEV1 <30% predicted.      CXR reviewed with patient who voiced understanding.     PFT not done today: Patient complains of epigastric abdominal pain.     She is taking PRILOSEC and ROLAIDS with some relief.        Patients reports NYHA II dyspnea    The patient does not have currently have symptoms / an exacerbation.       No wheezing, No shortness or breath.         She has relapsed with cigarette smoking. She curently smokes approximately 0.75 PPD.      I assessed Keyona Dwyer  to be in a relapse stage with respect to tobacco use.     The patient was advised of the adverse effects of cigarette smoking including increased risk of cancer and respiratory diseases. she is  not ready  to stop smoking. Smoking cessation techniques were reviewed. These include removing cigarettes and smoking materials from environment, stress management, substitution of other forms of reinforcement and support of family/friends. I advised patient to quit, and offered support.. I reviewed smoking cessation  treatments including   buproprion, nicotine gum, nicotine patch and willpower.  Side effects of medications including the risk of exacerbating underlying heart disease, nausea, GI upset, insomnia, sleep disturbance and possible suicidal ideation discussed.  I spent approximately 10 minutes counseling the patient.     She is on Xyzal for AR.     A full  review of systems, past , family  and social histories was performed except as mentioned in the note above, these are non contributory to the main issues discussed today.       Previous Report Reviewed: lab reports, office notes and radiology reports     The following portions of the patient's history were reviewed and updated as appropriate: She  has a past medical history of Cataract, Chronic back pain, Fibrocystic breast, Glaucoma, Hyperlipemia, Hypertension, Osteoarthritis, Osteoporosis, Restless leg syndrome, and Trouble in sleeping.  She  has a past surgical history that includes Gallbladder surgery; Total knee arthroplasty (Bilateral); Total hip arthroplasty (Bilateral); Lung surgery (Right, age 15); Cataract extraction w/  intraocular lens implant (Bilateral, 8/ 9 2017); and Hysterectomy (1972).  Her family history includes Alcohol abuse in her brother; Brain cancer in her brother; Breast cancer in her mother; Cancer in her mother; Heart disease in her brother; Heart failure in her father; Hyperlipidemia in her father; Stroke in her brother.  She  reports that she has been smoking cigarettes. She has a 31.50 pack-year smoking history. She has never used smokeless tobacco. She reports that she does not drink alcohol or use drugs.  She has a current medication list which includes the following prescription(s): amlodipine, azelastine, b complex vitamins, buspirone, duloxetine, ergocalciferol, gabapentin, hydrocodone-acetaminophen, levocetirizine, lisinopril, meloxicam, mupirocin, nutritional supplement/fiber, and ON-Q PAIN PUMP, and the following Facility-Administered Medications:  "denosumab and ondansetron.  She is allergic to indomethacin; latex, natural rubber; penicillins; sulfa (sulfonamide antibiotics); and tolmetin..    Review of Systems   Constitutional: Negative for chills, fatigue and night sweats.   HENT: Positive for postnasal drip, rhinorrhea, sinus pressure and congestion. Negative for sore throat and trouble swallowing.    Eyes: Negative for itching.   Respiratory: Positive for cough, sputum production, wheezing and dyspnea on extertion. Negative for orthopnea and Paroxysmal Nocturnal Dyspnea.    Cardiovascular: Negative for chest pain, palpitations and leg swelling.   Genitourinary: Negative for difficulty urinating and hematuria.   Endocrine: Negative for cold intolerance and heat intolerance.    Musculoskeletal: Positive for arthralgias and gait problem. Back pain: Ambulates with a walker.   Skin: Negative for rash.   Gastrointestinal: Negative for nausea, vomiting, abdominal pain and acid reflux.        Intermittent diarrhea   Neurological: Negative for syncope, light-headedness and headaches.   Hematological: No excessive bruising.   Psychiatric/Behavioral: The patient is not nervous/anxious.    All other systems reviewed and are negative.       Objective:   BP (!) 150/90   Pulse 63   Ht 5' 2" (1.575 m)   Wt 54.6 kg (120 lb 5.9 oz)   SpO2 96%   BMI 22.02 kg/m²   Body mass index is 22.02 kg/m².    Physical Exam   Constitutional: She is oriented to person, place, and time. She appears well-developed and well-nourished.   HENT:   Head: Normocephalic and atraumatic.   Eyes: Pupils are equal, round, and reactive to light. EOM are normal.   Neck: Normal range of motion. Neck supple.   Cardiovascular: Normal rate and regular rhythm.   Pulmonary/Chest: Effort normal and breath sounds normal. She has no wheezes. She has no rales.   Abdominal: Soft. Bowel sounds are normal. There is tenderness (Epigatric tenderness.).   Musculoskeletal: Normal range of motion.   Neurological: " She is alert and oriented to person, place, and time.   Skin: Skin is warm and dry. Capillary refill takes less than 2 seconds.   Psychiatric: She has a normal mood and affect.   Nursing note and vitals reviewed.      Personal Diagnostic Review    Chest x-ray: 02/10/20: The cardiac and mediastinal silhouettes appear within normal limits.   Lungs are again noted to be hyperinflated with some diffuse coarsening of the bronchovascular markings, suggestive of COPD.  No focal parenchymal consolidation or definite pleural effusion visualized.  No acute osseous findings demonstrated.    Pulmonary function tests:19:  FEV1: 1.03  (54 % predicted), FVC:  1.86 (74.9 % predicted), FEV1/FVC:  56, T.37 (95.5 % predicted), RV/TLVC: 57 (129.2 % predicted), DLCO: 11.2 (58.4 % predicted)   Severe obstruction. Lung volumes reveal air trapping but no hyperinflation. Diffusion capacity is moderately reduced.        Assessment / plan:     Discussed diagnosis, its evaluation, treatment and usual course. All questions answered.    Problem List Items Addressed This Visit        Psychiatric    Uncomplicated opioid dependence    Current Assessment & Plan     Plan of management per PCP            Pulmonary    COPD, group D, by GOLD 2017 classification - Primary    Current Assessment & Plan     COPD ROS: no significant ongoing wheezing or shortness of breath.   New concerns: None.   Exam: appears well, vitals normal, no respiratory distress, acyanotic, normal RR.   Assessment:  COPD stable.   Plan: Assymptomatic. No currently on nay inhalers. PFT, CXR in 1 year.          Relevant Orders    X-Ray Chest PA And Lateral    Complete PFT without bronchodilator       Cardiac/Vascular    Atherosclerosis of aorta    Overview     Noted on CT 2018         Current Assessment & Plan     Plan of management per PCP            GI    Epigastric abdominal pain    Current Assessment & Plan     Etiology unclear.  Consider stopping MOBIC.  Continue  PRILOSEC  Continue ROLAIDS            Other    Nicotine dependence, cigarettes, uncomplicated (Chronic)    Current Assessment & Plan     Assistance with smoking cessation was offered, including:  []  Medications  [x]  Counseling  []  Printed Information on Smoking Cessation  []  Referral to a Smoking Cessation Program    Patient was counseled regarding smoking for 3-10 minutes.           Chronic seasonal allergic rhinitis due to pollen    Current Assessment & Plan     Allergy ROS: taking medications as instructed, no medication side effects noted.   New concerns: None.   Exam: nasal and sinus exam normal.   Assessment:  Allergic Rhinitis stable.   Plan: XYZAL. Current treatment plan is effective, no change in therapy.         Relevant Medications    levocetirizine (XYZAL) 5 MG tablet    azelastine (ASTELIN) 137 mcg (0.1 %) nasal spray            TIME SPENT WITH PATIENT: Time spent: 40 minutes in face to face  discussion concerning diagnosis, prognosis, review of lab and test results, benefits of treatment as well as management of disease, counseling of patient and coordination of care between various health  care providers . Greater than half the time spent was used for coordination of care and counseling of patient.     Follow up in about 1 year (around 2/10/2021) for COPD, Allergic Rhinitis.

## 2020-02-10 NOTE — ASSESSMENT & PLAN NOTE
Allergy ROS: taking medications as instructed, no medication side effects noted.   New concerns: None.   Exam: nasal and sinus exam normal.   Assessment:  Allergic Rhinitis stable.   Plan: XYZAL. Current treatment plan is effective, no change in therapy.

## 2020-02-10 NOTE — PROGRESS NOTES
Patient unable to complete the test due to right side hurting and unable to take a deep breath in. Informed Dr. Rodriguez.

## 2020-02-10 NOTE — PATIENT INSTRUCTIONS
Lung Anatomy  Your lungs take air in to give your body oxygen, which the body needs to work. Your lungs, like all the tissues in your body, are made up of billions of tiny specialized cells. Old lung cells die and are replaced by new, identical lung cells. This natural process helps ensure healthy lungs.    Date Last Reviewed: 11/1/2016  © 5528-3963 [employer]. [address] [place] [address], [place] [address]. All rights reserved. This information is not intended as a substitute for professional medical care. Always follow your healthcare professional's instructions.

## 2020-02-13 NOTE — PROGRESS NOTES
"Subjective:       Patient ID: Keyona Dwyer is a 74 y.o. female.    Chief Complaint: Osteoporosis and Osteoarthritis    Keyona is here for f/u. She has osteoporosis and OA of multiple joints. 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  She tolerates prolia well.  She also has low vit D on weekly supplement. She uses a walker to ambulate.  She had a dexa done last November (2017) showing improved bmd in her spine. She has h/o bilateral total hip replacements. She has OA in her hands dion prosper 1 cmc joints.  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  Overall she is doing well today and has no complaints.  Started  tumeric for OA and these help. Stopped mobic due to GI upset.  Did PT training in past.      Review of Systems   Constitutional: Negative for chills, fatigue and fever.   HENT: Negative for mouth sores, rhinorrhea and sore throat.         False teeth   Eyes: Negative for pain and redness.   Respiratory: Negative for cough and shortness of breath.    Cardiovascular: Negative for chest pain.   Gastrointestinal: Negative for abdominal pain, constipation, diarrhea, nausea and vomiting.   Genitourinary: Negative for dysuria and hematuria.   Musculoskeletal: Positive for arthralgias and gait problem. Negative for joint swelling and myalgias.   Skin: Negative for rash.   Neurological: Negative for weakness, numbness and headaches.   Psychiatric/Behavioral: The patient is not nervous/anxious.          Objective:   /66   Pulse 100   Ht 5' 2" (1.575 m)   Wt 52.5 kg (115 lb 11.9 oz)   BMI 21.17 kg/m²      Physical Exam   Constitutional: She is oriented to person, place, and time and well-developed, well-nourished, and in no distress.   HENT:   Head: Normocephalic and atraumatic.   Eyes: Pupils are equal, round, and reactive to light. Right eye exhibits no discharge.   Neck: Normal range of motion.   Cardiovascular: Normal rate, regular rhythm and normal " heart sounds.  Exam reveals no friction rub.    Pulmonary/Chest: Effort normal and breath sounds normal. No respiratory distress.   Abdominal: Soft. She exhibits no distension. There is no tenderness.   Lymphadenopathy:     She has no cervical adenopathy.   Neurological: She is alert and oriented to person, place, and time.   Skin: No rash noted. No erythema.     Psychiatric: Mood normal.   Musculoskeletal: Normal range of motion. She exhibits no edema or deformity.   OA changes to prosper hands dion prosper 1 cmc joints  Left elbow stuck at 50-60 degrees flexion s/p trauma (1969)  Walks with walker           No results found for this or any previous visit (from the past 168 hour(s)).     Dexa 8.2015: spine (L1-2) -3.6, radius total -5.3, Rad 33% -4.8  dexa 11/2017: spine -2.5, spine bmd improved by 7.8%  Dexa 2.2020 spine -2.3, distal 1/3 radius -4.5  Assessment:       1. Age-related osteoporosis without current pathological fracture    2. Medication monitoring encounter    3. Primary osteoarthritis involving multiple joints    4. Vitamin D deficiency        Impression:  Osteoporosis: on prolia q 6 mos, failed bisphosphonates; stable bmd;  has done PT balance training    Vit D def: taking weekly vit D, level 29    Medication monitoring; no issue with prolia, labs reviewed see below; mobic caused Gi upset    OA mult joints: prosper hands, lumbar spine, has pain pump--mobic caused GI upset, taking daily tumeric also    Plan:       Labs reviewed and done within past 14 days  Ca 9.5, Creat 0.8, eGFR 60  No contraindication for Prolia today, ok for nurse to administer today  Re-evaluate patient again in 6 months to determine if Prolia still medically indicated and appropriate to administer.    KDIGO lab monitoring will be followed according to KDIGO 2017 Clinical Practice Guideline Update for the Diagnosis, Evaluation, Prevention, and Treatment of Chronic Kidney Disease-Mineral and Bone Disorder (CKD-MBD)  Chapter 3.1: Diagnosis of  CKD-MBD: biochemical abnormalities    prolia today and cont q 6 mos  Cont weekly vit D 50K u, check level next time  Cont ca in diet   Repeat dexa in 2022  Stay off mobic due to GI issues  cont tumeric 1000mg/day     rtc in 6 months with prolia and cmp

## 2020-02-14 ENCOUNTER — OFFICE VISIT (OUTPATIENT)
Dept: RHEUMATOLOGY | Facility: CLINIC | Age: 75
End: 2020-02-14
Payer: MEDICARE

## 2020-02-14 ENCOUNTER — INFUSION (OUTPATIENT)
Dept: INFUSION THERAPY | Facility: HOSPITAL | Age: 75
End: 2020-02-14
Attending: FAMILY MEDICINE
Payer: MEDICARE

## 2020-02-14 VITALS
WEIGHT: 115.75 LBS | HEIGHT: 62 IN | HEART RATE: 100 BPM | BODY MASS INDEX: 21.3 KG/M2 | DIASTOLIC BLOOD PRESSURE: 66 MMHG | SYSTOLIC BLOOD PRESSURE: 128 MMHG

## 2020-02-14 DIAGNOSIS — M15.9 PRIMARY OSTEOARTHRITIS INVOLVING MULTIPLE JOINTS: ICD-10-CM

## 2020-02-14 DIAGNOSIS — Z51.81 MEDICATION MONITORING ENCOUNTER: ICD-10-CM

## 2020-02-14 DIAGNOSIS — E55.9 VITAMIN D DEFICIENCY: ICD-10-CM

## 2020-02-14 DIAGNOSIS — M81.0 AGE-RELATED OSTEOPOROSIS WITHOUT CURRENT PATHOLOGICAL FRACTURE: Primary | ICD-10-CM

## 2020-02-14 DIAGNOSIS — M81.8 OTHER OSTEOPOROSIS WITHOUT CURRENT PATHOLOGICAL FRACTURE: Primary | ICD-10-CM

## 2020-02-14 PROCEDURE — 99214 OFFICE O/P EST MOD 30 MIN: CPT | Mod: HCNC,S$GLB,, | Performed by: PHYSICIAN ASSISTANT

## 2020-02-14 PROCEDURE — 1125F AMNT PAIN NOTED PAIN PRSNT: CPT | Mod: HCNC,S$GLB,, | Performed by: PHYSICIAN ASSISTANT

## 2020-02-14 PROCEDURE — 63600175 PHARM REV CODE 636 W HCPCS: Mod: JG,HCNC | Performed by: PHYSICIAN ASSISTANT

## 2020-02-14 PROCEDURE — 96372 THER/PROPH/DIAG INJ SC/IM: CPT | Mod: HCNC

## 2020-02-14 PROCEDURE — 3078F DIAST BP <80 MM HG: CPT | Mod: HCNC,CPTII,S$GLB, | Performed by: PHYSICIAN ASSISTANT

## 2020-02-14 PROCEDURE — 1159F PR MEDICATION LIST DOCUMENTED IN MEDICAL RECORD: ICD-10-PCS | Mod: HCNC,S$GLB,, | Performed by: PHYSICIAN ASSISTANT

## 2020-02-14 PROCEDURE — 1101F PR PT FALLS ASSESS DOC 0-1 FALLS W/OUT INJ PAST YR: ICD-10-PCS | Mod: HCNC,CPTII,S$GLB, | Performed by: PHYSICIAN ASSISTANT

## 2020-02-14 PROCEDURE — 99999 PR PBB SHADOW E&M-EST. PATIENT-LVL IV: ICD-10-PCS | Mod: PBBFAC,HCNC,, | Performed by: PHYSICIAN ASSISTANT

## 2020-02-14 PROCEDURE — 3074F SYST BP LT 130 MM HG: CPT | Mod: HCNC,CPTII,S$GLB, | Performed by: PHYSICIAN ASSISTANT

## 2020-02-14 PROCEDURE — 3078F PR MOST RECENT DIASTOLIC BLOOD PRESSURE < 80 MM HG: ICD-10-PCS | Mod: HCNC,CPTII,S$GLB, | Performed by: PHYSICIAN ASSISTANT

## 2020-02-14 PROCEDURE — 99999 PR PBB SHADOW E&M-EST. PATIENT-LVL IV: CPT | Mod: PBBFAC,HCNC,, | Performed by: PHYSICIAN ASSISTANT

## 2020-02-14 PROCEDURE — 1159F MED LIST DOCD IN RCRD: CPT | Mod: HCNC,S$GLB,, | Performed by: PHYSICIAN ASSISTANT

## 2020-02-14 PROCEDURE — 99214 PR OFFICE/OUTPT VISIT, EST, LEVL IV, 30-39 MIN: ICD-10-PCS | Mod: HCNC,S$GLB,, | Performed by: PHYSICIAN ASSISTANT

## 2020-02-14 PROCEDURE — 1101F PT FALLS ASSESS-DOCD LE1/YR: CPT | Mod: HCNC,CPTII,S$GLB, | Performed by: PHYSICIAN ASSISTANT

## 2020-02-14 PROCEDURE — 3074F PR MOST RECENT SYSTOLIC BLOOD PRESSURE < 130 MM HG: ICD-10-PCS | Mod: HCNC,CPTII,S$GLB, | Performed by: PHYSICIAN ASSISTANT

## 2020-02-14 PROCEDURE — 99499 RISK ADDL DX/OHS AUDIT: ICD-10-PCS | Mod: HCNC,S$GLB,, | Performed by: PHYSICIAN ASSISTANT

## 2020-02-14 PROCEDURE — 99499 UNLISTED E&M SERVICE: CPT | Mod: HCNC,S$GLB,, | Performed by: PHYSICIAN ASSISTANT

## 2020-02-14 PROCEDURE — 1125F PR PAIN SEVERITY QUANTIFIED, PAIN PRESENT: ICD-10-PCS | Mod: HCNC,S$GLB,, | Performed by: PHYSICIAN ASSISTANT

## 2020-02-14 RX ORDER — ERGOCALCIFEROL 1.25 MG/1
50000 CAPSULE ORAL
Qty: 12 CAPSULE | Refills: 3 | Status: SHIPPED | OUTPATIENT
Start: 2020-02-14 | End: 2021-01-11

## 2020-02-14 RX ADMIN — DENOSUMAB 60 MG: 60 INJECTION SUBCUTANEOUS at 09:02

## 2020-02-14 NOTE — DISCHARGE INSTRUCTIONS
[facility]  [address] [facility]  [address] [address]  [phone] phone     Hours of Operation: Monday- Friday 8:00am- 5:00pm  After hours phone  [phone]    DEION Lin [address]., Dr., Dr., Dr., PA      Please call with any concerns regarding your appointment today.

## 2020-02-14 NOTE — NURSING
Prolia 60 mg q 6 months  Last dose given-7/30/2019    Any invasive dental procedures in past 3 months or upcoming 3 months: Denied    Last Rheumatology provider visit- Seen by Marguerite Cutler on 2/14/2020    Recent labs? 2/5/2020;  CKD pt needing repeat labs in 10 days- n/a   Lab Results   Component Value Date    CALCIUM 9.5 02/05/2020     Lab Results   Component Value Date    CREATININE 0.8 02/05/2020     Lab Results   Component Value Date    ESTGFRAFRICA >60 02/05/2020     Lab Results   Component Value Date    EGFRNONAA >60 02/05/2020     Lab Results   Component Value Date    [id number] 29 (L) 01/25/2019         Prolia 60 mg/ml administered SQ to lower abdominal quadrant. Tolerated without any complaints. No adverse reaction noted or reported after 15 minutes of administration. No redness, swelling, or drainage noted to site. Pt instructed on signs and symptoms of reaction to report. Verbalizes understanding.     Follow up appointments:  Given to patient

## 2020-02-14 NOTE — PATIENT INSTRUCTIONS
Continue weekly vit D     dexa scan shows stable bone density which is good     Continue with prolia shots every 6 months

## 2020-05-28 NOTE — PROGRESS NOTES
Keyona Dwyer presented for a  Medicare AWV and comprehensive Health Risk Assessment today. The following components were reviewed and updated:    · Medical history  · Family History  · Social history  · Allergies and Current Medications  · Health Risk Assessment  · Health Maintenance  · Care Team     ** See Completed Assessments for Annual Wellness Visit within the encounter summary.**       The following assessments were completed:  · Living Situation  · CAGE  · Depression Screening  · Timed Get Up and Go  · Whisper Test  · Cognitive Function Screening  · Nutrition Screening  · ADL Screening  · PAQ Screening    Patient Care Team:  Ralf Isidro MD as PCP - General (Family Medicine)  Aman Francis MD as Consulting Physician (Rheumatology)  Aylin Wilson LPN as Care Coordinator (Internal Medicine)  Guillermo Rodriguez MD as Consulting Physician (Pulmonary Disease)  Drew Guzman MD as Anesthesiologist (Anesthesiology)  Marguerite Cutler PA-C as Physician Assistant (Rheumatology)  Fer Hammond OD (Optometry)    Vitals:    05/29/20 1017   BP: 120/70   BP Location: Right arm   Temp: 97.9 °F (36.6 °C)   TempSrc: Temporal   Weight: 49.6 kg (109 lb 5.6 oz)     Body mass index is 20 kg/m².     Physical Exam   Constitutional: She is oriented to person, place, and time. She appears well-developed and well-nourished. No distress.   Pt using walker for ambulation.   HENT:   Head: Normocephalic and atraumatic.   Eyes: EOM are normal. No scleral icterus.   Neck: Neck supple.   Cardiovascular: Normal rate and regular rhythm.   Pulmonary/Chest: Effort normal. No respiratory distress.   Neurological: She is alert and oriented to person, place, and time.   Skin: Skin is warm and dry.   Psychiatric: She has a normal mood and affect. Her speech is normal and behavior is normal. Thought content normal.         Diagnoses and health risks identified today and associated recommendations/orders:    1. Encounter for  preventive health examination  Pt to consider shingles vaccine. Pt overdue to see PCP - pt to schedule f/u appt with PCP as discussed.    2. Atherosclerosis of aorta  Stable. CXR 10/16/18. Continue current treatment plan as prescribed by your PCP and f/u with your PCP for further management.    3. COPD, group D, by GOLD 2017 classification  Stable. PFT 2/6/19. Continue current treatment plan as prescribed by your PCP and pulmonologist and f/u with them for further management.    4. Uncomplicated opioid dependence, 5. Primary osteoarthritis involving multiple joints, 6. Lumbar radiculopathy  Stable. Pt taking turmeric, Cymbalta, gabapentin, Norco, and has pain pump. Continue current treatment plan as prescribed by your PCP and pain management and f/u with them for further management.    7. Essential hypertension  Stable. Pt taking amlodipine and lisinopril. Continue current treatment plan as prescribed by your PCP and f/u with your PCP for further management.    8. Hyperlipidemia, unspecified hyperlipidemia type  Stable on most recent labs. Continue current treatment plan as prescribed by your PCP and f/u with your PCP for further management.    9. Vitamin D deficiency  Stable. Pt taking vit D suppl. Continue current treatment plan as prescribed by your PCP and f/u with your PCP for further management.    10. Other osteoporosis without current pathological fracture  Stable. DEXA 2/5/2020. Pt taking Prolia. Continue current treatment plan as prescribed by your PCP and rheumatologist and f/u with them for further management.    11. Chronic seasonal allergic rhinitis due to pollen  Stable. Pt taking Xyzal and Astelin. Continue current treatment plan as prescribed by your PCP and f/u with your PCP for further management.    12. Nicotine dependence, cigarettes, uncomplicated  Not currently controlled. Pt s/p smoking cessation program and counseling by pulmonologist. F/u with your PCP / pulmonologist for further  management.    13. Anemia, unspecified type  Stable. Continue current treatment plan as prescribed by your PCP and f/u with your PCP for further management.    14. Nervousness  Stable. Pt taking Cymbalta and Buspar. Continue current treatment plan as prescribed by your PCP and f/u with your PCP for further management.    15. Glaucoma of both eyes, unspecified glaucoma type  Stable per pt. Continue current treatment plan as prescribed by your PCP and ophthalmologist and f/u with them for further management.    16. Dependence on other enabling machines and devices, 17. Abnormality of gait and mobility  Stable. Pt uses walker for ambulation. Continue current treatment plan as prescribed by your PCP and f/u with your PCP for further management.    18. Weight loss  This a new problem identified during clinic visit today. Pt reports loss of about 6 pounds over the last 3 months due to lack of appetite. Follow-up with PCP for evaluation and treatment plan.    Please obtain any medical records from past / present outside medical providers and give to PCP for review and further medical recommendations.    Provided Keyona with a 5-10 year written screening schedule and personal prevention plan. Recommendations were developed using the USPSTF age appropriate recommendations. Education, counseling, and referrals were provided as needed. After Visit Summary printed and given to patient which includes a list of additional screenings\tests needed.    Follow up in about 1 year (around 5/29/2021) for HRA. F/u with PCP Dr. Dias and specialists as recommended for health management.    DEION Monroe     I offered to discuss end of life issues, including information on how to make advance directives that the patient could use to name someone who would make medical decisions on their behalf if they became too ill to make themselves.  ___Patient declined  _X_Patient is interested, I provided paper work and offered to  discuss.

## 2020-05-29 ENCOUNTER — OFFICE VISIT (OUTPATIENT)
Dept: INTERNAL MEDICINE | Facility: CLINIC | Age: 75
End: 2020-05-29
Payer: MEDICARE

## 2020-05-29 VITALS
DIASTOLIC BLOOD PRESSURE: 70 MMHG | BODY MASS INDEX: 20 KG/M2 | TEMPERATURE: 98 F | SYSTOLIC BLOOD PRESSURE: 120 MMHG | WEIGHT: 109.38 LBS

## 2020-05-29 DIAGNOSIS — R63.4 WEIGHT LOSS: ICD-10-CM

## 2020-05-29 DIAGNOSIS — I70.0 ATHEROSCLEROSIS OF AORTA: ICD-10-CM

## 2020-05-29 DIAGNOSIS — J44.9 COPD, GROUP D, BY GOLD 2017 CLASSIFICATION: ICD-10-CM

## 2020-05-29 DIAGNOSIS — H40.9 GLAUCOMA OF BOTH EYES, UNSPECIFIED GLAUCOMA TYPE: ICD-10-CM

## 2020-05-29 DIAGNOSIS — F11.20 UNCOMPLICATED OPIOID DEPENDENCE: ICD-10-CM

## 2020-05-29 DIAGNOSIS — R45.0 NERVOUSNESS: ICD-10-CM

## 2020-05-29 DIAGNOSIS — Z00.00 ENCOUNTER FOR PREVENTIVE HEALTH EXAMINATION: Primary | ICD-10-CM

## 2020-05-29 DIAGNOSIS — M81.8 OTHER OSTEOPOROSIS WITHOUT CURRENT PATHOLOGICAL FRACTURE: ICD-10-CM

## 2020-05-29 DIAGNOSIS — E55.9 VITAMIN D DEFICIENCY: ICD-10-CM

## 2020-05-29 DIAGNOSIS — I10 ESSENTIAL HYPERTENSION: Chronic | ICD-10-CM

## 2020-05-29 DIAGNOSIS — E78.5 HYPERLIPIDEMIA, UNSPECIFIED HYPERLIPIDEMIA TYPE: Chronic | ICD-10-CM

## 2020-05-29 DIAGNOSIS — D64.9 ANEMIA, UNSPECIFIED TYPE: ICD-10-CM

## 2020-05-29 DIAGNOSIS — J30.1 CHRONIC SEASONAL ALLERGIC RHINITIS DUE TO POLLEN: ICD-10-CM

## 2020-05-29 DIAGNOSIS — M54.16 LUMBAR RADICULOPATHY: ICD-10-CM

## 2020-05-29 DIAGNOSIS — F17.210 NICOTINE DEPENDENCE, CIGARETTES, UNCOMPLICATED: Chronic | ICD-10-CM

## 2020-05-29 DIAGNOSIS — M15.9 PRIMARY OSTEOARTHRITIS INVOLVING MULTIPLE JOINTS: Chronic | ICD-10-CM

## 2020-05-29 DIAGNOSIS — R26.9 ABNORMALITY OF GAIT AND MOBILITY: ICD-10-CM

## 2020-05-29 DIAGNOSIS — Z99.89 DEPENDENCE ON OTHER ENABLING MACHINES AND DEVICES: ICD-10-CM

## 2020-05-29 PROCEDURE — 99499 UNLISTED E&M SERVICE: CPT | Mod: HCNC,S$GLB,, | Performed by: PHYSICIAN ASSISTANT

## 2020-05-29 PROCEDURE — 3074F SYST BP LT 130 MM HG: CPT | Mod: HCNC,CPTII,S$GLB, | Performed by: PHYSICIAN ASSISTANT

## 2020-05-29 PROCEDURE — 3074F PR MOST RECENT SYSTOLIC BLOOD PRESSURE < 130 MM HG: ICD-10-PCS | Mod: HCNC,CPTII,S$GLB, | Performed by: PHYSICIAN ASSISTANT

## 2020-05-29 PROCEDURE — 99999 PR PBB SHADOW E&M-EST. PATIENT-LVL III: CPT | Mod: PBBFAC,HCNC,, | Performed by: PHYSICIAN ASSISTANT

## 2020-05-29 PROCEDURE — 99499 RISK ADDL DX/OHS AUDIT: ICD-10-PCS | Mod: HCNC,S$GLB,, | Performed by: PHYSICIAN ASSISTANT

## 2020-05-29 PROCEDURE — G0439 PR MEDICARE ANNUAL WELLNESS SUBSEQUENT VISIT: ICD-10-PCS | Mod: HCNC,S$GLB,, | Performed by: PHYSICIAN ASSISTANT

## 2020-05-29 PROCEDURE — G0439 PPPS, SUBSEQ VISIT: HCPCS | Mod: HCNC,S$GLB,, | Performed by: PHYSICIAN ASSISTANT

## 2020-05-29 PROCEDURE — 99999 PR PBB SHADOW E&M-EST. PATIENT-LVL III: ICD-10-PCS | Mod: PBBFAC,HCNC,, | Performed by: PHYSICIAN ASSISTANT

## 2020-05-29 PROCEDURE — 3078F DIAST BP <80 MM HG: CPT | Mod: HCNC,CPTII,S$GLB, | Performed by: PHYSICIAN ASSISTANT

## 2020-05-29 PROCEDURE — 3078F PR MOST RECENT DIASTOLIC BLOOD PRESSURE < 80 MM HG: ICD-10-PCS | Mod: HCNC,CPTII,S$GLB, | Performed by: PHYSICIAN ASSISTANT

## 2020-05-29 NOTE — PATIENT INSTRUCTIONS
Counseling and Referral of Other Preventative  (Italic type indicates deductible and co-insurance are waived)    Patient Name: Keyona Dwyer  Today's Date: 5/29/2020    Health Maintenance       Date Due Completion Date    Shingles Vaccine (1 of 2) 11/13/1993 ---    LDCT Lung Screen 04/26/2019 4/26/2018    Lipid Panel 12/14/2019 12/14/2018    Override on 12/14/2018: Done    Override on 11/11/2016: Done    Mammogram 01/25/2020 1/25/2019    Override on 4/30/2013: Done    Colonoscopy 11/11/2021 11/11/2016 (Declined)    Override on 11/11/2016: Declined    DEXA SCAN 02/05/2022 2/5/2020    Override on 4/30/2013: Done (REPEAT 2 YRS)    TETANUS VACCINE 02/13/2025 2/13/2015        No orders of the defined types were placed in this encounter.    The following information is provided to all patients.  This information is to help you find resources for any of the problems found today that may be affecting your health:                Living healthy guide: www.[facility].[place].gov      Understanding Diabetes: www.diabetes.org      Eating healthy: www.cdc.gov/healthyweight      CDC home safety checklist: www.cdc.gov/steadi/patient.html      Agency on Aging: www.goea.[place].gov      Alcoholics anonymous (AA): www.aa.org      Physical Activity: www.sheryl.nih.gov/dp8xhjy      Tobacco use: www.quitwithusla.org

## 2020-08-12 NOTE — PROGRESS NOTES
"Subjective:       Patient ID: Keyona Dwyer is a 74 y.o. female.    Chief Complaint: Osteoarthritis and Osteoporosis    Keyona is here for f/u. She has osteoporosis and OA of multiple joints. 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  She tolerates prolia well.  She also has low vit D on weekly supplement. She uses a walker to ambulate. Stable bmd dexa 2/20.  She has h/o bilateral total hip replacements. She has OA in her hands dion prosper 1 cmc joints.  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  Overall she is doing well today and has no complaints.  Started  tumeric for OA and these help. Stopped mobic due to GI upset.  Did PT gait training in past.      No falls/fxs since last seen, no major complaints.     Review of Systems   Constitutional: Negative for chills, fatigue and fever.   HENT: Negative for mouth sores, rhinorrhea and sore throat.         False teeth   Eyes: Negative for pain and redness.   Respiratory: Negative for cough and shortness of breath.    Cardiovascular: Negative for chest pain.   Gastrointestinal: Negative for abdominal pain, constipation, diarrhea, nausea and vomiting.   Genitourinary: Negative for dysuria and hematuria.   Musculoskeletal: Positive for arthralgias and gait problem. Negative for joint swelling and myalgias.   Skin: Negative for rash.   Neurological: Negative for weakness, numbness and headaches.   Psychiatric/Behavioral: The patient is not nervous/anxious.          Objective:   BP (!) 142/75   Pulse 71   Ht 5' 2" (1.575 m)   Wt 50.9 kg (112 lb 3.4 oz)   BMI 20.52 kg/m²      Physical Exam   Constitutional: She is oriented to person, place, and time and well-developed, well-nourished, and in no distress.   HENT:   Head: Normocephalic and atraumatic.   Eyes: Pupils are equal, round, and reactive to light. Right eye exhibits no discharge.   Neck: Normal range of motion.   Cardiovascular: Normal rate, regular rhythm and " normal heart sounds.  Exam reveals no friction rub.    Pulmonary/Chest: Effort normal and breath sounds normal. No respiratory distress.   Abdominal: Soft. She exhibits no distension. There is no abdominal tenderness.   Lymphadenopathy:     She has no cervical adenopathy.   Neurological: She is alert and oriented to person, place, and time.   Skin: No rash noted. No erythema.     Psychiatric: Mood normal.   Musculoskeletal: Normal range of motion. No deformity or edema.      Comments: OA changes to prosper hands dion prosper 1 cmc joints  Left elbow stuck at 50-60 degrees flexion s/p trauma (1969)  Walks with walker           No results found for this or any previous visit (from the past 168 hour(s)).     Dexa 8.2015: spine (L1-2) -3.6, radius total -5.3, Rad 33% -4.8  dexa 11/2017: spine -2.5, spine bmd improved by 7.8%  Dexa 2.2020 spine -2.3, distal 1/3 radius -4.5  Assessment:       1. Age-related osteoporosis without current pathological fracture    2. Primary osteoarthritis involving multiple joints    3. Medication monitoring encounter    4. Vitamin D deficiency        Impression:  Osteoporosis: on prolia q 6 mos, failed bisphosphonates; stable bmd;  has done PT balance training (started 10/2015)    Vit D def: taking weekly vit D, level 29    Medication monitoring; no issue with prolia, labs reviewed see below; mobic caused Gi upset    OA mult joints: prosper hands, lumbar spine, has pain pump--mobic caused GI upset, taking daily tumeric also    Plan:       Labs reviewed and done within past 14 days  Ca 9.7, Creat 0.7, eGFR 60  No contraindication for Prolia today, ok for nurse to administer today  Re-evaluate patient again in 6 months to determine if Prolia still medically indicated and appropriate to administer.    KDIGO lab monitoring will be followed according to KDIGO 2017 Clinical Practice Guideline Update for the Diagnosis, Evaluation, Prevention, and Treatment of Chronic Kidney Disease-Mineral and Bone Disorder  (CKD-MBD)  Chapter 3.1: Diagnosis of CKD-MBD: biochemical abnormalities    prolia today and cont q 6 mos  Cont weekly vit D 50K u,   Cont ca in diet   Repeat dexa in 2022  Stay off mobic due to GI issues  cont tumeric 1000mg/day     rtc in 6 months with prolia and cmp

## 2020-08-13 ENCOUNTER — TELEPHONE (OUTPATIENT)
Dept: RHEUMATOLOGY | Facility: CLINIC | Age: 75
End: 2020-08-13

## 2020-08-14 ENCOUNTER — INFUSION (OUTPATIENT)
Dept: INFUSION THERAPY | Facility: HOSPITAL | Age: 75
End: 2020-08-14
Attending: PHYSICIAN ASSISTANT
Payer: MEDICARE

## 2020-08-14 ENCOUNTER — PATIENT OUTREACH (OUTPATIENT)
Dept: ADMINISTRATIVE | Facility: OTHER | Age: 75
End: 2020-08-14

## 2020-08-14 ENCOUNTER — OFFICE VISIT (OUTPATIENT)
Dept: RHEUMATOLOGY | Facility: CLINIC | Age: 75
End: 2020-08-14
Payer: MEDICARE

## 2020-08-14 VITALS
WEIGHT: 112.19 LBS | WEIGHT: 112.19 LBS | TEMPERATURE: 98 F | DIASTOLIC BLOOD PRESSURE: 69 MMHG | HEART RATE: 71 BPM | BODY MASS INDEX: 20.65 KG/M2 | SYSTOLIC BLOOD PRESSURE: 142 MMHG | OXYGEN SATURATION: 98 % | RESPIRATION RATE: 16 BRPM | HEIGHT: 62 IN | DIASTOLIC BLOOD PRESSURE: 75 MMHG | HEART RATE: 61 BPM | BODY MASS INDEX: 20.52 KG/M2 | SYSTOLIC BLOOD PRESSURE: 113 MMHG

## 2020-08-14 DIAGNOSIS — M15.9 PRIMARY OSTEOARTHRITIS INVOLVING MULTIPLE JOINTS: ICD-10-CM

## 2020-08-14 DIAGNOSIS — Z51.81 MEDICATION MONITORING ENCOUNTER: ICD-10-CM

## 2020-08-14 DIAGNOSIS — M81.8 OTHER OSTEOPOROSIS WITHOUT CURRENT PATHOLOGICAL FRACTURE: Primary | ICD-10-CM

## 2020-08-14 DIAGNOSIS — M81.0 AGE-RELATED OSTEOPOROSIS WITHOUT CURRENT PATHOLOGICAL FRACTURE: Primary | ICD-10-CM

## 2020-08-14 DIAGNOSIS — E55.9 VITAMIN D DEFICIENCY: ICD-10-CM

## 2020-08-14 PROCEDURE — 1125F AMNT PAIN NOTED PAIN PRSNT: CPT | Mod: HCNC,S$GLB,, | Performed by: PHYSICIAN ASSISTANT

## 2020-08-14 PROCEDURE — 3078F DIAST BP <80 MM HG: CPT | Mod: HCNC,CPTII,S$GLB, | Performed by: PHYSICIAN ASSISTANT

## 2020-08-14 PROCEDURE — 99214 PR OFFICE/OUTPT VISIT, EST, LEVL IV, 30-39 MIN: ICD-10-PCS | Mod: HCNC,S$GLB,, | Performed by: PHYSICIAN ASSISTANT

## 2020-08-14 PROCEDURE — 63600175 PHARM REV CODE 636 W HCPCS: Mod: JG,HCNC | Performed by: PHYSICIAN ASSISTANT

## 2020-08-14 PROCEDURE — 3077F PR MOST RECENT SYSTOLIC BLOOD PRESSURE >= 140 MM HG: ICD-10-PCS | Mod: HCNC,CPTII,S$GLB, | Performed by: PHYSICIAN ASSISTANT

## 2020-08-14 PROCEDURE — 99499 UNLISTED E&M SERVICE: CPT | Mod: HCNC,S$GLB,, | Performed by: PHYSICIAN ASSISTANT

## 2020-08-14 PROCEDURE — 96372 THER/PROPH/DIAG INJ SC/IM: CPT | Mod: HCNC

## 2020-08-14 PROCEDURE — 1101F PT FALLS ASSESS-DOCD LE1/YR: CPT | Mod: HCNC,CPTII,S$GLB, | Performed by: PHYSICIAN ASSISTANT

## 2020-08-14 PROCEDURE — 99999 PR PBB SHADOW E&M-EST. PATIENT-LVL IV: ICD-10-PCS | Mod: PBBFAC,HCNC,, | Performed by: PHYSICIAN ASSISTANT

## 2020-08-14 PROCEDURE — 99999 PR PBB SHADOW E&M-EST. PATIENT-LVL IV: CPT | Mod: PBBFAC,HCNC,, | Performed by: PHYSICIAN ASSISTANT

## 2020-08-14 PROCEDURE — 99499 RISK ADDL DX/OHS AUDIT: ICD-10-PCS | Mod: HCNC,S$GLB,, | Performed by: PHYSICIAN ASSISTANT

## 2020-08-14 PROCEDURE — 3078F PR MOST RECENT DIASTOLIC BLOOD PRESSURE < 80 MM HG: ICD-10-PCS | Mod: HCNC,CPTII,S$GLB, | Performed by: PHYSICIAN ASSISTANT

## 2020-08-14 PROCEDURE — 1159F PR MEDICATION LIST DOCUMENTED IN MEDICAL RECORD: ICD-10-PCS | Mod: HCNC,S$GLB,, | Performed by: PHYSICIAN ASSISTANT

## 2020-08-14 PROCEDURE — 3077F SYST BP >= 140 MM HG: CPT | Mod: HCNC,CPTII,S$GLB, | Performed by: PHYSICIAN ASSISTANT

## 2020-08-14 PROCEDURE — 1101F PR PT FALLS ASSESS DOC 0-1 FALLS W/OUT INJ PAST YR: ICD-10-PCS | Mod: HCNC,CPTII,S$GLB, | Performed by: PHYSICIAN ASSISTANT

## 2020-08-14 PROCEDURE — 99214 OFFICE O/P EST MOD 30 MIN: CPT | Mod: HCNC,S$GLB,, | Performed by: PHYSICIAN ASSISTANT

## 2020-08-14 PROCEDURE — 1125F PR PAIN SEVERITY QUANTIFIED, PAIN PRESENT: ICD-10-PCS | Mod: HCNC,S$GLB,, | Performed by: PHYSICIAN ASSISTANT

## 2020-08-14 PROCEDURE — 1159F MED LIST DOCD IN RCRD: CPT | Mod: HCNC,S$GLB,, | Performed by: PHYSICIAN ASSISTANT

## 2020-08-14 RX ADMIN — DENOSUMAB 60 MG: 60 INJECTION SUBCUTANEOUS at 09:08

## 2020-08-14 NOTE — NURSING
Prolia 60 mg q 6 months  Last dose given-2/14/20    Any invasive dental procedures in past 3 months or upcoming 3 months: denies    Last Rheumatology provider visit- Seen by DEION Everett on 8/14/20    Recent labs? 8/14/20;  CKD pt needing repeat labs in 10 days-No   Lab Results   Component Value Date    CALCIUM 9.7 08/14/2020     Lab Results   Component Value Date    CREATININE 0.7 08/14/2020     Lab Results   Component Value Date    ESTGFRAFRICA >60 08/14/2020     Lab Results   Component Value Date    EGFRNONAA >60 08/14/2020     Lab Results   Component Value Date    QPMUEUJH94YN 29 (L) 01/25/2019          Lot #- [id number]  Expiration Date- 10/22      Prolia 60 mg/ml administered SQ to Right lower abdominal quadrant. Tolerated without any complaints. No redness, swelling, or drainage noted to site. Instructed to remain in clinic 15 minutes after administration to monitor for any s/sx of reaction. Pt instructed on signs and symptoms of reaction to report. Verbalizes understanding.

## 2020-08-14 NOTE — PLAN OF CARE
Plan of care reviewed with patient. Discussed if there are any new or ongoing concerns. Denies.   Problem: Adult Inpatient Plan of Care  Goal: Plan of Care Review  Outcome: Ongoing, Progressing  Flowsheets (Taken 8/14/2020 1000)  Plan of Care Reviewed With: patient  Goal: Absence of Hospital-Acquired Illness or Injury  Outcome: Ongoing, Progressing  Intervention: Identify and Manage Fall Risk  Flowsheets (Taken 8/14/2020 1000)  Safety Promotion/Fall Prevention: Fall Risk reviewed with patient/family  Goal: Optimal Comfort and Wellbeing  Outcome: Ongoing, Progressing  Intervention: Provide Person-Centered Care  Flowsheets (Taken 8/14/2020 1000)  Trust Relationship/Rapport:   care explained   reassurance provided   choices provided   thoughts/feelings acknowledged   emotional support provided   empathic listening provided   questions answered   questions encouraged

## 2020-08-28 ENCOUNTER — LAB VISIT (OUTPATIENT)
Dept: LAB | Facility: HOSPITAL | Age: 75
End: 2020-08-28
Attending: FAMILY MEDICINE
Payer: MEDICARE

## 2020-08-28 ENCOUNTER — OFFICE VISIT (OUTPATIENT)
Dept: INTERNAL MEDICINE | Facility: CLINIC | Age: 75
End: 2020-08-28
Payer: MEDICARE

## 2020-08-28 VITALS
HEART RATE: 84 BPM | TEMPERATURE: 99 F | HEIGHT: 62 IN | WEIGHT: 111.56 LBS | SYSTOLIC BLOOD PRESSURE: 104 MMHG | DIASTOLIC BLOOD PRESSURE: 62 MMHG | BODY MASS INDEX: 20.53 KG/M2

## 2020-08-28 DIAGNOSIS — E78.5 HYPERLIPIDEMIA, UNSPECIFIED HYPERLIPIDEMIA TYPE: ICD-10-CM

## 2020-08-28 DIAGNOSIS — E87.1 HYPONATREMIA: ICD-10-CM

## 2020-08-28 DIAGNOSIS — I70.90 ATHEROSCLEROSIS: ICD-10-CM

## 2020-08-28 DIAGNOSIS — Z12.2 ENCOUNTER FOR SCREENING FOR MALIGNANT NEOPLASM OF RESPIRATORY ORGANS: ICD-10-CM

## 2020-08-28 DIAGNOSIS — Z11.59 NEED FOR HEPATITIS C SCREENING TEST: ICD-10-CM

## 2020-08-28 DIAGNOSIS — Z72.0 TOBACCO ABUSE: ICD-10-CM

## 2020-08-28 DIAGNOSIS — Z00.00 ROUTINE GENERAL MEDICAL EXAMINATION AT A HEALTH CARE FACILITY: Primary | ICD-10-CM

## 2020-08-28 DIAGNOSIS — I10 HYPERTENSION, UNSPECIFIED TYPE: ICD-10-CM

## 2020-08-28 LAB
ANION GAP SERPL CALC-SCNC: 5 MMOL/L (ref 8–16)
BASOPHILS # BLD AUTO: 0.06 K/UL (ref 0–0.2)
BASOPHILS NFR BLD: 0.9 % (ref 0–1.9)
BUN SERPL-MCNC: 12 MG/DL (ref 8–23)
CALCIUM SERPL-MCNC: 9.6 MG/DL (ref 8.7–10.5)
CHLORIDE SERPL-SCNC: 102 MMOL/L (ref 95–110)
CHOLEST SERPL-MCNC: 181 MG/DL (ref 120–199)
CHOLEST/HDLC SERPL: 3 {RATIO} (ref 2–5)
CO2 SERPL-SCNC: 26 MMOL/L (ref 23–29)
CREAT SERPL-MCNC: 0.7 MG/DL (ref 0.5–1.4)
DIFFERENTIAL METHOD: ABNORMAL
EOSINOPHIL # BLD AUTO: 0.1 K/UL (ref 0–0.5)
EOSINOPHIL NFR BLD: 1.2 % (ref 0–8)
ERYTHROCYTE [DISTWIDTH] IN BLOOD BY AUTOMATED COUNT: 13.8 % (ref 11.5–14.5)
EST. GFR  (AFRICAN AMERICAN): >60 ML/MIN/1.73 M^2
EST. GFR  (NON AFRICAN AMERICAN): >60 ML/MIN/1.73 M^2
GLUCOSE SERPL-MCNC: 106 MG/DL (ref 70–110)
HCT VFR BLD AUTO: 39.9 % (ref 37–48.5)
HDLC SERPL-MCNC: 61 MG/DL (ref 40–75)
HDLC SERPL: 33.7 % (ref 20–50)
HGB BLD-MCNC: 11.8 G/DL (ref 12–16)
IMM GRANULOCYTES # BLD AUTO: 0.01 K/UL (ref 0–0.04)
IMM GRANULOCYTES NFR BLD AUTO: 0.1 % (ref 0–0.5)
LDLC SERPL CALC-MCNC: 94.6 MG/DL (ref 63–159)
LYMPHOCYTES # BLD AUTO: 2.4 K/UL (ref 1–4.8)
LYMPHOCYTES NFR BLD: 35.4 % (ref 18–48)
MCH RBC QN AUTO: 31.7 PG (ref 27–31)
MCHC RBC AUTO-ENTMCNC: 29.6 G/DL (ref 32–36)
MCV RBC AUTO: 107 FL (ref 82–98)
MONOCYTES # BLD AUTO: 0.5 K/UL (ref 0.3–1)
MONOCYTES NFR BLD: 6.6 % (ref 4–15)
NEUTROPHILS # BLD AUTO: 3.8 K/UL (ref 1.8–7.7)
NEUTROPHILS NFR BLD: 55.8 % (ref 38–73)
NONHDLC SERPL-MCNC: 120 MG/DL
NRBC BLD-RTO: 0 /100 WBC
PLATELET # BLD AUTO: 305 K/UL (ref 150–350)
PMV BLD AUTO: 8.4 FL (ref 9.2–12.9)
POTASSIUM SERPL-SCNC: 5 MMOL/L (ref 3.5–5.1)
RBC # BLD AUTO: 3.72 M/UL (ref 4–5.4)
SODIUM SERPL-SCNC: 133 MMOL/L (ref 136–145)
TRIGL SERPL-MCNC: 127 MG/DL (ref 30–150)
TSH SERPL DL<=0.005 MIU/L-ACNC: 0.92 UIU/ML (ref 0.4–4)
WBC # BLD AUTO: 6.87 K/UL (ref 3.9–12.7)

## 2020-08-28 PROCEDURE — 99397 PR PREVENTIVE VISIT,EST,65 & OVER: ICD-10-PCS | Mod: HCNC,S$GLB,, | Performed by: FAMILY MEDICINE

## 2020-08-28 PROCEDURE — 3074F PR MOST RECENT SYSTOLIC BLOOD PRESSURE < 130 MM HG: ICD-10-PCS | Mod: HCNC,CPTII,S$GLB, | Performed by: FAMILY MEDICINE

## 2020-08-28 PROCEDURE — 99999 PR PBB SHADOW E&M-EST. PATIENT-LVL V: ICD-10-PCS | Mod: PBBFAC,HCNC,, | Performed by: FAMILY MEDICINE

## 2020-08-28 PROCEDURE — 3078F PR MOST RECENT DIASTOLIC BLOOD PRESSURE < 80 MM HG: ICD-10-PCS | Mod: HCNC,CPTII,S$GLB, | Performed by: FAMILY MEDICINE

## 2020-08-28 PROCEDURE — 36415 COLL VENOUS BLD VENIPUNCTURE: CPT | Mod: HCNC,PO

## 2020-08-28 PROCEDURE — 80048 BASIC METABOLIC PNL TOTAL CA: CPT | Mod: HCNC

## 2020-08-28 PROCEDURE — 85025 COMPLETE CBC W/AUTO DIFF WBC: CPT | Mod: HCNC

## 2020-08-28 PROCEDURE — 3074F SYST BP LT 130 MM HG: CPT | Mod: HCNC,CPTII,S$GLB, | Performed by: FAMILY MEDICINE

## 2020-08-28 PROCEDURE — 99999 PR PBB SHADOW E&M-EST. PATIENT-LVL V: CPT | Mod: PBBFAC,HCNC,, | Performed by: FAMILY MEDICINE

## 2020-08-28 PROCEDURE — 86803 HEPATITIS C AB TEST: CPT | Mod: HCNC

## 2020-08-28 PROCEDURE — 84443 ASSAY THYROID STIM HORMONE: CPT | Mod: HCNC

## 2020-08-28 PROCEDURE — 99397 PER PM REEVAL EST PAT 65+ YR: CPT | Mod: HCNC,S$GLB,, | Performed by: FAMILY MEDICINE

## 2020-08-28 PROCEDURE — 80061 LIPID PANEL: CPT | Mod: HCNC

## 2020-08-28 PROCEDURE — 3078F DIAST BP <80 MM HG: CPT | Mod: HCNC,CPTII,S$GLB, | Performed by: FAMILY MEDICINE

## 2020-08-28 RX ORDER — CEPHALEXIN 250 MG/1
250 CAPSULE ORAL EVERY 12 HOURS
Qty: 14 CAPSULE | Refills: 0 | Status: SHIPPED | OUTPATIENT
Start: 2020-08-28 | End: 2020-09-04

## 2020-08-28 NOTE — PROGRESS NOTES
"Subjective:      Patient ID: Keyona Dwyer is a 74 y.o. female.    Chief Complaint: Annual Exam    HPI 75 yo with anemia, chronic back pain managed by Dr. Guzman, HTN, OA, hyperlipidemia, osteoporosis, smoker here for annual.  No falls  Using walker  Had toenail worked on by Podiatry.  It is infected//red and hurting on the L great toe.  No fever/chills.  Normal BMs  Denies CP/SOB  Reports mood is good considering Covid situation    Past Medical History:   Diagnosis Date    Anemia     Anxiety     Cataract     Chronic back pain     Dr. Guzman    Fibrocystic breast     Glaucoma     Hyperlipemia     Hypertension     Osteoarthritis     Osteoporosis     Rheumatology/on Prolia    Restless leg syndrome     Tobacco dependence     Trouble in sleeping      Family History   Problem Relation Age of Onset    Cancer Mother     Breast cancer Mother     Hyperlipidemia Father     Heart failure Father     Heart disease Brother     Brain cancer Brother     Stroke Brother     Alcohol abuse Brother      Past Surgical History:   Procedure Laterality Date    CATARACT EXTRACTION W/  INTRAOCULAR LENS IMPLANT Bilateral 2017    GALLBLADDER SURGERY      HYSTERECTOMY  1972    LUNG SURGERY Right age 15    lung collpase    TOTAL HIP ARTHROPLASTY Bilateral     TOTAL KNEE ARTHROPLASTY Bilateral      Social History     Tobacco Use    Smoking status: Current Every Day Smoker     Packs/day: 0.50     Years: 63.00     Pack years: 31.50     Types: Cigarettes     Last attempt to quit: 10/28/2018     Years since quittin.8    Smokeless tobacco: Never Used    Tobacco comment: started age of  11    Substance Use Topics    Alcohol use: No    Drug use: No       /62 (BP Location: Right arm, Patient Position: Sitting, BP Method: Large (Manual))   Pulse 84   Temp 99 °F (37.2 °C) (Temporal)   Ht 5' 2" (1.575 m)   Wt 50.6 kg (111 lb 8.8 oz)   BMI 20.40 kg/m²     Review of Systems   Constitutional: " Negative for activity change, appetite change, chills, diaphoresis, fatigue, fever and unexpected weight change.   HENT: Negative for ear pain, hearing loss, postnasal drip, rhinorrhea and tinnitus.    Eyes: Negative for visual disturbance.   Respiratory: Negative for cough, shortness of breath and wheezing.    Cardiovascular: Negative for chest pain, palpitations and leg swelling.   Gastrointestinal: Negative for abdominal distention.   Genitourinary: Negative for dysuria, frequency, hematuria and urgency.   Musculoskeletal: Positive for arthralgias and back pain. Negative for joint swelling.   Neurological: Negative for weakness and headaches.   Hematological: Negative for adenopathy.   Psychiatric/Behavioral: Negative for confusion and decreased concentration.       Objective:     Physical Exam  Vitals signs and nursing note reviewed.   Constitutional:       General: She is not in acute distress.     Appearance: She is well-developed.   HENT:      Right Ear: External ear normal.      Left Ear: External ear normal.   Eyes:      Conjunctiva/sclera: Conjunctivae normal.      Pupils: Pupils are equal, round, and reactive to light.   Neck:      Musculoskeletal: Normal range of motion and neck supple.      Thyroid: No thyromegaly.   Cardiovascular:      Rate and Rhythm: Normal rate and regular rhythm.      Heart sounds: Normal heart sounds. No murmur. No gallop.    Pulmonary:      Effort: Pulmonary effort is normal. No respiratory distress.      Breath sounds: Normal breath sounds. No wheezing or rales.   Abdominal:      General: Bowel sounds are normal. There is no distension.      Palpations: Abdomen is soft.      Tenderness: There is no abdominal tenderness. There is no guarding.   Musculoskeletal:      Right lower leg: No edema.      Left lower leg: No edema.   Skin:     General: Skin is warm and dry.      Comments: L great toe with erythema//scabbing on medial nail cuticle.  No drainage//tender   Neurological:       Mental Status: She is alert and oriented to person, place, and time.      Cranial Nerves: No cranial nerve deficit.   Psychiatric:         Mood and Affect: Mood normal.         Behavior: Behavior normal.         Thought Content: Thought content normal.         Judgment: Judgment normal.         Lab Results   Component Value Date    WBC 6.70 12/14/2018    HGB 10.6 (L) 12/14/2018    HCT 34.9 (L) 12/14/2018     12/14/2018    CHOL 184 12/14/2018    TRIG 121 12/14/2018    HDL 60 12/14/2018    ALT 11 08/14/2020    AST 19 08/14/2020     (L) 08/14/2020    K 4.5 08/14/2020    CL 92 (L) 08/14/2020    CREATININE 0.7 08/14/2020    BUN 9 08/14/2020    CO2 26 08/14/2020    TSH 0.693 12/14/2018    INR 1.2 10/10/2009    HGBA1C 5.0 12/14/2018       Assessment:     1. Routine general medical examination at a health care facility    2. Hypertension, unspecified type    3. Hyponatremia    4. Atherosclerosis    5. Hyperlipidemia, unspecified hyperlipidemia type    6. Need for hepatitis C screening test    7. Encounter for screening for malignant neoplasm of respiratory organs    8. Tobacco abuse         Plan:     Routine general medical examination at a health care facility    Hypertension, unspecified type  -     CBC auto differential; Future; Expected date: 08/28/2020    Hyponatremia  -     Basic metabolic panel; Future; Expected date: 08/28/2020    Atherosclerosis  -     Lipid Panel; Future; Expected date: 08/28/2020    Hyperlipidemia, unspecified hyperlipidemia type  -     Lipid Panel; Future; Expected date: 08/28/2020  -     TSH; Future; Expected date: 08/28/2020    Need for hepatitis C screening test  -     Hepatitis C Antibody; Future; Expected date: 08/28/2020    Encounter for screening for malignant neoplasm of respiratory organs  -     CT Chest Lung Screening Low Dose; Future; Expected date: 08/28/2020    Tobacco abuse  -     CT Chest Lung Screening Low Dose; Future; Expected date: 08/28/2020    Other orders  -      cephALEXin (KEFLEX) 250 MG capsule; Take 1 capsule (250 mg total) by mouth every 12 (twelve) hours. for 7 days  Dispense: 14 capsule; Refill: 0      Update screening labs  CT chest Lung CA screening  Smoking cessation  Abx for toe  Has low sodium//recheck//increase intake  F/u to be determined after review of above labs  F/u if no improvement in toe//??PVD//asymptomatic

## 2020-08-31 LAB — HCV AB SERPL QL IA: NEGATIVE

## 2020-09-10 ENCOUNTER — HOSPITAL ENCOUNTER (OUTPATIENT)
Dept: RADIOLOGY | Facility: HOSPITAL | Age: 75
Discharge: HOME | End: 2020-09-10
Attending: FAMILY MEDICINE
Payer: MEDICARE

## 2020-09-10 DIAGNOSIS — Z12.2 ENCOUNTER FOR SCREENING FOR MALIGNANT NEOPLASM OF RESPIRATORY ORGANS: ICD-10-CM

## 2020-09-10 DIAGNOSIS — Z72.0 TOBACCO ABUSE: ICD-10-CM

## 2020-09-10 PROCEDURE — G0297 LDCT FOR LUNG CA SCREEN: HCPCS | Mod: GA,TC,HCNC

## 2020-09-10 PROCEDURE — G0297 CT CHEST LUNG SCREENING LOW DOSE: ICD-10-PCS | Mod: 26,GA,HCNC, | Performed by: RADIOLOGY

## 2020-09-10 PROCEDURE — G0297 LDCT FOR LUNG CA SCREEN: HCPCS | Mod: 26,GA,HCNC, | Performed by: RADIOLOGY

## 2020-09-14 ENCOUNTER — TELEPHONE (OUTPATIENT)
Dept: INTERNAL MEDICINE | Facility: CLINIC | Age: 75
End: 2020-09-14

## 2020-09-14 NOTE — TELEPHONE ENCOUNTER
----- Message from Bruna Goldstein sent at 9/14/2020 11:15 AM CDT -----  Regarding: pt  .Type:  Needs Medical Advice    Who Called: pt   Symptoms (please be specific): Infected toe nail    How long has patient had these symptoms:  n/a   Pharmacy name and phone #:  .      [id number] [place] - [address] AT [place] [address]  [place] [address]  Phone: [phone] Fax: [phone]      Would the patient rather a call back or a response via [facility]? Call back   Best Call Back Number: [phone] (home)   Additional Information:   Please inform pt       Thank you,   Bruna Goldstein

## 2020-09-14 NOTE — TELEPHONE ENCOUNTER
Called pt and let her know that Dr. Isdiro said      If not healed//needs to see Podiatry/whoever removed nail

## 2020-09-14 NOTE — TELEPHONE ENCOUNTER
Pt said, you gave her an antibiotic after having a toenail remove, but it still hasn't completely healed. She wants to know if she can get another antibiotic? MANI 8/28/20

## 2021-01-28 ENCOUNTER — TELEPHONE (OUTPATIENT)
Dept: RHEUMATOLOGY | Facility: CLINIC | Age: 76
End: 2021-01-28

## 2021-02-18 ENCOUNTER — TELEPHONE (OUTPATIENT)
Dept: RHEUMATOLOGY | Facility: CLINIC | Age: 76
End: 2021-02-18

## 2021-02-19 ENCOUNTER — INFUSION (OUTPATIENT)
Dept: INFUSION THERAPY | Facility: HOSPITAL | Age: 76
End: 2021-02-19
Attending: PHYSICIAN ASSISTANT
Payer: MEDICARE

## 2021-02-19 ENCOUNTER — OFFICE VISIT (OUTPATIENT)
Dept: RHEUMATOLOGY | Facility: CLINIC | Age: 76
End: 2021-02-19
Payer: MEDICARE

## 2021-02-19 VITALS
HEIGHT: 62 IN | HEART RATE: 72 BPM | WEIGHT: 109.38 LBS | SYSTOLIC BLOOD PRESSURE: 149 MMHG | DIASTOLIC BLOOD PRESSURE: 92 MMHG | BODY MASS INDEX: 20.13 KG/M2

## 2021-02-19 VITALS
RESPIRATION RATE: 18 BRPM | HEART RATE: 64 BPM | WEIGHT: 109.38 LBS | OXYGEN SATURATION: 97 % | BODY MASS INDEX: 20 KG/M2 | SYSTOLIC BLOOD PRESSURE: 126 MMHG | TEMPERATURE: 98 F | DIASTOLIC BLOOD PRESSURE: 67 MMHG

## 2021-02-19 DIAGNOSIS — E55.9 VITAMIN D DEFICIENCY: ICD-10-CM

## 2021-02-19 DIAGNOSIS — M81.8 OTHER OSTEOPOROSIS WITHOUT CURRENT PATHOLOGICAL FRACTURE: Primary | ICD-10-CM

## 2021-02-19 DIAGNOSIS — M81.0 AGE-RELATED OSTEOPOROSIS WITHOUT CURRENT PATHOLOGICAL FRACTURE: Primary | ICD-10-CM

## 2021-02-19 DIAGNOSIS — Z51.81 MEDICATION MONITORING ENCOUNTER: ICD-10-CM

## 2021-02-19 DIAGNOSIS — M15.9 PRIMARY OSTEOARTHRITIS INVOLVING MULTIPLE JOINTS: ICD-10-CM

## 2021-02-19 PROCEDURE — 99999 PR PBB SHADOW E&M-EST. PATIENT-LVL IV: ICD-10-PCS | Mod: PBBFAC,,, | Performed by: PHYSICIAN ASSISTANT

## 2021-02-19 PROCEDURE — 1159F PR MEDICATION LIST DOCUMENTED IN MEDICAL RECORD: ICD-10-PCS | Mod: S$GLB,,, | Performed by: PHYSICIAN ASSISTANT

## 2021-02-19 PROCEDURE — 99214 PR OFFICE/OUTPT VISIT, EST, LEVL IV, 30-39 MIN: ICD-10-PCS | Mod: S$GLB,,, | Performed by: PHYSICIAN ASSISTANT

## 2021-02-19 PROCEDURE — 99214 OFFICE O/P EST MOD 30 MIN: CPT | Mod: S$GLB,,, | Performed by: PHYSICIAN ASSISTANT

## 2021-02-19 PROCEDURE — 3288F PR FALLS RISK ASSESSMENT DOCUMENTED: ICD-10-PCS | Mod: CPTII,S$GLB,, | Performed by: PHYSICIAN ASSISTANT

## 2021-02-19 PROCEDURE — 1101F PT FALLS ASSESS-DOCD LE1/YR: CPT | Mod: CPTII,S$GLB,, | Performed by: PHYSICIAN ASSISTANT

## 2021-02-19 PROCEDURE — 3080F DIAST BP >= 90 MM HG: CPT | Mod: CPTII,S$GLB,, | Performed by: PHYSICIAN ASSISTANT

## 2021-02-19 PROCEDURE — 63600175 PHARM REV CODE 636 W HCPCS: Mod: JG | Performed by: PHYSICIAN ASSISTANT

## 2021-02-19 PROCEDURE — 1101F PR PT FALLS ASSESS DOC 0-1 FALLS W/OUT INJ PAST YR: ICD-10-PCS | Mod: CPTII,S$GLB,, | Performed by: PHYSICIAN ASSISTANT

## 2021-02-19 PROCEDURE — 1125F PR PAIN SEVERITY QUANTIFIED, PAIN PRESENT: ICD-10-PCS | Mod: S$GLB,,, | Performed by: PHYSICIAN ASSISTANT

## 2021-02-19 PROCEDURE — 1159F MED LIST DOCD IN RCRD: CPT | Mod: S$GLB,,, | Performed by: PHYSICIAN ASSISTANT

## 2021-02-19 PROCEDURE — 3288F FALL RISK ASSESSMENT DOCD: CPT | Mod: CPTII,S$GLB,, | Performed by: PHYSICIAN ASSISTANT

## 2021-02-19 PROCEDURE — 99499 RISK ADDL DX/OHS AUDIT: ICD-10-PCS | Mod: S$GLB,,, | Performed by: PHYSICIAN ASSISTANT

## 2021-02-19 PROCEDURE — 3077F SYST BP >= 140 MM HG: CPT | Mod: CPTII,S$GLB,, | Performed by: PHYSICIAN ASSISTANT

## 2021-02-19 PROCEDURE — 1125F AMNT PAIN NOTED PAIN PRSNT: CPT | Mod: S$GLB,,, | Performed by: PHYSICIAN ASSISTANT

## 2021-02-19 PROCEDURE — 3080F PR MOST RECENT DIASTOLIC BLOOD PRESSURE >= 90 MM HG: ICD-10-PCS | Mod: CPTII,S$GLB,, | Performed by: PHYSICIAN ASSISTANT

## 2021-02-19 PROCEDURE — 3077F PR MOST RECENT SYSTOLIC BLOOD PRESSURE >= 140 MM HG: ICD-10-PCS | Mod: CPTII,S$GLB,, | Performed by: PHYSICIAN ASSISTANT

## 2021-02-19 PROCEDURE — 99999 PR PBB SHADOW E&M-EST. PATIENT-LVL IV: CPT | Mod: PBBFAC,,, | Performed by: PHYSICIAN ASSISTANT

## 2021-02-19 PROCEDURE — 96372 THER/PROPH/DIAG INJ SC/IM: CPT

## 2021-02-19 PROCEDURE — 99499 UNLISTED E&M SERVICE: CPT | Mod: S$GLB,,, | Performed by: PHYSICIAN ASSISTANT

## 2021-02-19 RX ADMIN — DENOSUMAB 60 MG: 60 INJECTION SUBCUTANEOUS at 09:02

## 2021-04-07 ENCOUNTER — HOSPITAL ENCOUNTER (INPATIENT)
Facility: HOSPITAL | Age: 76
LOS: 7 days | Discharge: HOME HEALTH | DRG: 504 | End: 2021-04-15
Attending: EMERGENCY MEDICINE | Admitting: INTERNAL MEDICINE
Payer: MEDICARE

## 2021-04-07 DIAGNOSIS — M86.9 OSTEOMYELITIS, UNSPECIFIED SITE, UNSPECIFIED TYPE: Primary | ICD-10-CM

## 2021-04-07 DIAGNOSIS — F17.210 NICOTINE DEPENDENCE, CIGARETTES, UNCOMPLICATED: ICD-10-CM

## 2021-04-07 DIAGNOSIS — M86.672 OTHER CHRONIC OSTEOMYELITIS OF LEFT FOOT: ICD-10-CM

## 2021-04-07 DIAGNOSIS — J44.9 COPD, GROUP D, BY GOLD 2017 CLASSIFICATION: ICD-10-CM

## 2021-04-07 DIAGNOSIS — L08.9 TOE INFECTION: ICD-10-CM

## 2021-04-07 DIAGNOSIS — I73.9 PVD (PERIPHERAL VASCULAR DISEASE): ICD-10-CM

## 2021-04-07 DIAGNOSIS — S92.405A CLOSED NONDISPLACED FRACTURE OF PHALANX OF LEFT GREAT TOE, UNSPECIFIED PHALANX, INITIAL ENCOUNTER: ICD-10-CM

## 2021-04-07 DIAGNOSIS — M86.072 ACUTE HEMATOGENOUS OSTEOMYELITIS OF LEFT FOOT: ICD-10-CM

## 2021-04-07 DIAGNOSIS — Z72.0 TOBACCO ABUSE DISORDER: ICD-10-CM

## 2021-04-07 DIAGNOSIS — Z87.81 HISTORY OF FRACTURE OF PHALANX OF TOE: ICD-10-CM

## 2021-04-07 DIAGNOSIS — I70.262 ATHEROSCLEROSIS OF NATIVE ARTERY OF LEFT LOWER EXTREMITY WITH GANGRENE: ICD-10-CM

## 2021-04-07 DIAGNOSIS — M86.172 OTHER ACUTE OSTEOMYELITIS OF LEFT FOOT: ICD-10-CM

## 2021-04-07 DIAGNOSIS — I70.0 ATHEROSCLEROSIS OF AORTA: ICD-10-CM

## 2021-04-07 DIAGNOSIS — I70.245 ATHEROSCLEROSIS OF NATIVE ARTERY OF LEFT LOWER EXTREMITY WITH ULCERATION OF OTHER PART OF FOOT: ICD-10-CM

## 2021-04-07 LAB
ALBUMIN SERPL BCP-MCNC: 3.3 G/DL (ref 3.5–5.2)
ALP SERPL-CCNC: 57 U/L (ref 55–135)
ALT SERPL W/O P-5'-P-CCNC: 9 U/L (ref 10–44)
ANION GAP SERPL CALC-SCNC: 10 MMOL/L (ref 8–16)
AST SERPL-CCNC: 14 U/L (ref 10–40)
BASOPHILS # BLD AUTO: 0.05 K/UL (ref 0–0.2)
BASOPHILS NFR BLD: 0.8 % (ref 0–1.9)
BILIRUB SERPL-MCNC: 0.4 MG/DL (ref 0.1–1)
BUN SERPL-MCNC: 7 MG/DL (ref 8–23)
CALCIUM SERPL-MCNC: 9 MG/DL (ref 8.7–10.5)
CHLORIDE SERPL-SCNC: 103 MMOL/L (ref 95–110)
CO2 SERPL-SCNC: 24 MMOL/L (ref 23–29)
CREAT SERPL-MCNC: 0.6 MG/DL (ref 0.5–1.4)
CRP SERPL-MCNC: 41.8 MG/L (ref 0–8.2)
DIFFERENTIAL METHOD: ABNORMAL
EOSINOPHIL # BLD AUTO: 0.1 K/UL (ref 0–0.5)
EOSINOPHIL NFR BLD: 1 % (ref 0–8)
ERYTHROCYTE [DISTWIDTH] IN BLOOD BY AUTOMATED COUNT: 12.7 % (ref 11.5–14.5)
EST. GFR  (AFRICAN AMERICAN): >60 ML/MIN/1.73 M^2
EST. GFR  (NON AFRICAN AMERICAN): >60 ML/MIN/1.73 M^2
GLUCOSE SERPL-MCNC: 83 MG/DL (ref 70–110)
HCT VFR BLD AUTO: 33.7 % (ref 37–48.5)
HGB BLD-MCNC: 10.6 G/DL (ref 12–16)
IMM GRANULOCYTES # BLD AUTO: 0.02 K/UL (ref 0–0.04)
IMM GRANULOCYTES NFR BLD AUTO: 0.3 % (ref 0–0.5)
LYMPHOCYTES # BLD AUTO: 2.1 K/UL (ref 1–4.8)
LYMPHOCYTES NFR BLD: 33.3 % (ref 18–48)
MCH RBC QN AUTO: 33.9 PG (ref 27–31)
MCHC RBC AUTO-ENTMCNC: 31.5 G/DL (ref 32–36)
MCV RBC AUTO: 108 FL (ref 82–98)
MONOCYTES # BLD AUTO: 0.4 K/UL (ref 0.3–1)
MONOCYTES NFR BLD: 6.8 % (ref 4–15)
NEUTROPHILS # BLD AUTO: 3.6 K/UL (ref 1.8–7.7)
NEUTROPHILS NFR BLD: 57.8 % (ref 38–73)
NRBC BLD-RTO: 0 /100 WBC
PLATELET # BLD AUTO: 237 K/UL (ref 150–450)
PMV BLD AUTO: 8.6 FL (ref 9.2–12.9)
POTASSIUM SERPL-SCNC: 4.4 MMOL/L (ref 3.5–5.1)
PROT SERPL-MCNC: 6.4 G/DL (ref 6–8.4)
RBC # BLD AUTO: 3.13 M/UL (ref 4–5.4)
SARS-COV-2 RDRP RESP QL NAA+PROBE: NEGATIVE
SODIUM SERPL-SCNC: 137 MMOL/L (ref 136–145)
WBC # BLD AUTO: 6.15 K/UL (ref 3.9–12.7)

## 2021-04-07 PROCEDURE — 63600175 PHARM REV CODE 636 W HCPCS: Performed by: EMERGENCY MEDICINE

## 2021-04-07 PROCEDURE — 96365 THER/PROPH/DIAG IV INF INIT: CPT

## 2021-04-07 PROCEDURE — 36415 COLL VENOUS BLD VENIPUNCTURE: CPT | Performed by: NURSE PRACTITIONER

## 2021-04-07 PROCEDURE — 85025 COMPLETE CBC W/AUTO DIFF WBC: CPT | Performed by: EMERGENCY MEDICINE

## 2021-04-07 PROCEDURE — 96365 THER/PROPH/DIAG IV INF INIT: CPT | Performed by: EMERGENCY MEDICINE

## 2021-04-07 PROCEDURE — 99285 EMERGENCY DEPT VISIT HI MDM: CPT | Mod: 25

## 2021-04-07 PROCEDURE — 86140 C-REACTIVE PROTEIN: CPT | Performed by: NURSE PRACTITIONER

## 2021-04-07 PROCEDURE — G0378 HOSPITAL OBSERVATION PER HR: HCPCS

## 2021-04-07 PROCEDURE — 87040 BLOOD CULTURE FOR BACTERIA: CPT | Performed by: EMERGENCY MEDICINE

## 2021-04-07 PROCEDURE — 80053 COMPREHEN METABOLIC PANEL: CPT | Performed by: EMERGENCY MEDICINE

## 2021-04-07 PROCEDURE — 25000003 PHARM REV CODE 250: Performed by: EMERGENCY MEDICINE

## 2021-04-07 PROCEDURE — U0002 COVID-19 LAB TEST NON-CDC: HCPCS | Performed by: EMERGENCY MEDICINE

## 2021-04-07 PROCEDURE — 25000003 PHARM REV CODE 250: Performed by: NURSE PRACTITIONER

## 2021-04-07 RX ORDER — POLYETHYLENE GLYCOL 3350 17 G/17G
17 POWDER, FOR SOLUTION ORAL DAILY
Status: DISCONTINUED | OUTPATIENT
Start: 2021-04-08 | End: 2021-04-15 | Stop reason: HOSPADM

## 2021-04-07 RX ORDER — IBUPROFEN 200 MG
24 TABLET ORAL
Status: DISCONTINUED | OUTPATIENT
Start: 2021-04-07 | End: 2021-04-15 | Stop reason: HOSPADM

## 2021-04-07 RX ORDER — VANCOMYCIN HCL IN 5 % DEXTROSE 1G/250ML
1000 PLASTIC BAG, INJECTION (ML) INTRAVENOUS
Status: DISCONTINUED | OUTPATIENT
Start: 2021-04-08 | End: 2021-04-09

## 2021-04-07 RX ORDER — DULOXETIN HYDROCHLORIDE 30 MG/1
30 CAPSULE, DELAYED RELEASE ORAL DAILY
Status: DISCONTINUED | OUTPATIENT
Start: 2021-04-08 | End: 2021-04-15 | Stop reason: HOSPADM

## 2021-04-07 RX ORDER — ONDANSETRON 2 MG/ML
4 INJECTION INTRAMUSCULAR; INTRAVENOUS EVERY 8 HOURS PRN
Status: DISCONTINUED | OUTPATIENT
Start: 2021-04-07 | End: 2021-04-15 | Stop reason: HOSPADM

## 2021-04-07 RX ORDER — GABAPENTIN 300 MG/1
600 CAPSULE ORAL 3 TIMES DAILY
Status: DISCONTINUED | OUTPATIENT
Start: 2021-04-07 | End: 2021-04-15 | Stop reason: HOSPADM

## 2021-04-07 RX ORDER — HYDRALAZINE HYDROCHLORIDE 20 MG/ML
10 INJECTION INTRAMUSCULAR; INTRAVENOUS EVERY 8 HOURS PRN
Status: DISCONTINUED | OUTPATIENT
Start: 2021-04-07 | End: 2021-04-15 | Stop reason: HOSPADM

## 2021-04-07 RX ORDER — GABAPENTIN 600 MG/1
600 TABLET ORAL 4 TIMES DAILY
Status: DISCONTINUED | OUTPATIENT
Start: 2021-04-07 | End: 2021-04-07 | Stop reason: DRUGHIGH

## 2021-04-07 RX ORDER — GLUCAGON 1 MG
1 KIT INJECTION
Status: DISCONTINUED | OUTPATIENT
Start: 2021-04-07 | End: 2021-04-15 | Stop reason: HOSPADM

## 2021-04-07 RX ORDER — AMLODIPINE BESYLATE 5 MG/1
5 TABLET ORAL DAILY
Status: DISCONTINUED | OUTPATIENT
Start: 2021-04-08 | End: 2021-04-15 | Stop reason: HOSPADM

## 2021-04-07 RX ORDER — LISINOPRIL 20 MG/1
40 TABLET ORAL DAILY
Status: DISCONTINUED | OUTPATIENT
Start: 2021-04-08 | End: 2021-04-15 | Stop reason: HOSPADM

## 2021-04-07 RX ORDER — VANCOMYCIN HCL IN 5 % DEXTROSE 1G/250ML
1000 PLASTIC BAG, INJECTION (ML) INTRAVENOUS ONCE
Status: COMPLETED | OUTPATIENT
Start: 2021-04-07 | End: 2021-04-07

## 2021-04-07 RX ORDER — IBUPROFEN 200 MG
16 TABLET ORAL
Status: DISCONTINUED | OUTPATIENT
Start: 2021-04-07 | End: 2021-04-15 | Stop reason: HOSPADM

## 2021-04-07 RX ORDER — SODIUM CHLORIDE 0.9 % (FLUSH) 0.9 %
10 SYRINGE (ML) INJECTION
Status: DISCONTINUED | OUTPATIENT
Start: 2021-04-07 | End: 2021-04-15 | Stop reason: HOSPADM

## 2021-04-07 RX ORDER — CLINDAMYCIN HYDROCHLORIDE 300 MG/1
300 CAPSULE ORAL 3 TIMES DAILY
Status: ON HOLD | COMMUNITY
Start: 2021-03-23 | End: 2021-04-15 | Stop reason: HOSPADM

## 2021-04-07 RX ADMIN — CEFTRIAXONE 1 G: 1 INJECTION, SOLUTION INTRAVENOUS at 03:04

## 2021-04-07 RX ADMIN — VANCOMYCIN HYDROCHLORIDE 1000 MG: 1 INJECTION, POWDER, LYOPHILIZED, FOR SOLUTION INTRAVENOUS at 12:04

## 2021-04-07 RX ADMIN — GABAPENTIN 600 MG: 300 CAPSULE ORAL at 08:04

## 2021-04-08 DIAGNOSIS — J44.9 COPD, GROUP D, BY GOLD 2017 CLASSIFICATION: Primary | ICD-10-CM

## 2021-04-08 LAB
ANION GAP SERPL CALC-SCNC: 9 MMOL/L (ref 8–16)
BASOPHILS # BLD AUTO: 0.04 K/UL (ref 0–0.2)
BASOPHILS NFR BLD: 0.8 % (ref 0–1.9)
BUN SERPL-MCNC: 4 MG/DL (ref 8–23)
CALCIUM SERPL-MCNC: 9.4 MG/DL (ref 8.7–10.5)
CHLORIDE SERPL-SCNC: 102 MMOL/L (ref 95–110)
CO2 SERPL-SCNC: 26 MMOL/L (ref 23–29)
CREAT SERPL-MCNC: 0.6 MG/DL (ref 0.5–1.4)
DIFFERENTIAL METHOD: ABNORMAL
EOSINOPHIL # BLD AUTO: 0 K/UL (ref 0–0.5)
EOSINOPHIL NFR BLD: 0.8 % (ref 0–8)
ERYTHROCYTE [DISTWIDTH] IN BLOOD BY AUTOMATED COUNT: 12.4 % (ref 11.5–14.5)
ERYTHROCYTE [SEDIMENTATION RATE] IN BLOOD BY WESTERGREN METHOD: 38 MM/HR (ref 0–20)
EST. GFR  (AFRICAN AMERICAN): >60 ML/MIN/1.73 M^2
EST. GFR  (NON AFRICAN AMERICAN): >60 ML/MIN/1.73 M^2
GLUCOSE SERPL-MCNC: 108 MG/DL (ref 70–110)
HCT VFR BLD AUTO: 36.8 % (ref 37–48.5)
HGB BLD-MCNC: 11.8 G/DL (ref 12–16)
IMM GRANULOCYTES # BLD AUTO: 0.01 K/UL (ref 0–0.04)
IMM GRANULOCYTES NFR BLD AUTO: 0.2 % (ref 0–0.5)
LYMPHOCYTES # BLD AUTO: 1.1 K/UL (ref 1–4.8)
LYMPHOCYTES NFR BLD: 22.5 % (ref 18–48)
MCH RBC QN AUTO: 33.9 PG (ref 27–31)
MCHC RBC AUTO-ENTMCNC: 32.1 G/DL (ref 32–36)
MCV RBC AUTO: 106 FL (ref 82–98)
MONOCYTES # BLD AUTO: 0.3 K/UL (ref 0.3–1)
MONOCYTES NFR BLD: 7 % (ref 4–15)
NEUTROPHILS # BLD AUTO: 3.3 K/UL (ref 1.8–7.7)
NEUTROPHILS NFR BLD: 68.7 % (ref 38–73)
NRBC BLD-RTO: 0 /100 WBC
PLATELET # BLD AUTO: 245 K/UL (ref 150–450)
PMV BLD AUTO: 8.4 FL (ref 9.2–12.9)
POTASSIUM SERPL-SCNC: 4.7 MMOL/L (ref 3.5–5.1)
RBC # BLD AUTO: 3.48 M/UL (ref 4–5.4)
SODIUM SERPL-SCNC: 137 MMOL/L (ref 136–145)
WBC # BLD AUTO: 4.85 K/UL (ref 3.9–12.7)

## 2021-04-08 PROCEDURE — 36415 COLL VENOUS BLD VENIPUNCTURE: CPT | Performed by: NURSE PRACTITIONER

## 2021-04-08 PROCEDURE — 25000003 PHARM REV CODE 250: Performed by: NURSE PRACTITIONER

## 2021-04-08 PROCEDURE — 85025 COMPLETE CBC W/AUTO DIFF WBC: CPT | Performed by: NURSE PRACTITIONER

## 2021-04-08 PROCEDURE — 99223 PR INITIAL HOSPITAL CARE,LEVL III: ICD-10-PCS | Mod: NSCH,,, | Performed by: INTERNAL MEDICINE

## 2021-04-08 PROCEDURE — 80048 BASIC METABOLIC PNL TOTAL CA: CPT | Performed by: NURSE PRACTITIONER

## 2021-04-08 PROCEDURE — 96375 TX/PRO/DX INJ NEW DRUG ADDON: CPT | Performed by: EMERGENCY MEDICINE

## 2021-04-08 PROCEDURE — 99223 1ST HOSP IP/OBS HIGH 75: CPT | Mod: NSCH,,, | Performed by: INTERNAL MEDICINE

## 2021-04-08 PROCEDURE — 25000003 PHARM REV CODE 250: Performed by: INTERNAL MEDICINE

## 2021-04-08 PROCEDURE — 21400001 HC TELEMETRY ROOM

## 2021-04-08 PROCEDURE — 36415 COLL VENOUS BLD VENIPUNCTURE: CPT | Performed by: PODIATRIST

## 2021-04-08 PROCEDURE — 63600175 PHARM REV CODE 636 W HCPCS: Performed by: EMERGENCY MEDICINE

## 2021-04-08 PROCEDURE — 25000003 PHARM REV CODE 250: Performed by: EMERGENCY MEDICINE

## 2021-04-08 PROCEDURE — 63600175 PHARM REV CODE 636 W HCPCS: Performed by: NURSE PRACTITIONER

## 2021-04-08 PROCEDURE — 85651 RBC SED RATE NONAUTOMATED: CPT | Performed by: PODIATRIST

## 2021-04-08 RX ORDER — TRAMADOL HYDROCHLORIDE 50 MG/1
50 TABLET ORAL EVERY 8 HOURS PRN
Status: DISCONTINUED | OUTPATIENT
Start: 2021-04-08 | End: 2021-04-15 | Stop reason: HOSPADM

## 2021-04-08 RX ORDER — DIPHENOXYLATE HYDROCHLORIDE AND ATROPINE SULFATE 2.5; .025 MG/1; MG/1
1 TABLET ORAL 4 TIMES DAILY PRN
Status: DISCONTINUED | OUTPATIENT
Start: 2021-04-08 | End: 2021-04-15 | Stop reason: HOSPADM

## 2021-04-08 RX ORDER — TALC
6 POWDER (GRAM) TOPICAL NIGHTLY PRN
Status: DISCONTINUED | OUTPATIENT
Start: 2021-04-08 | End: 2021-04-15 | Stop reason: HOSPADM

## 2021-04-08 RX ADMIN — AMLODIPINE BESYLATE 5 MG: 5 TABLET ORAL at 08:04

## 2021-04-08 RX ADMIN — GABAPENTIN 600 MG: 300 CAPSULE ORAL at 09:04

## 2021-04-08 RX ADMIN — TRAMADOL HYDROCHLORIDE 50 MG: 50 TABLET, FILM COATED ORAL at 09:04

## 2021-04-08 RX ADMIN — DIPHENOXYLATE HYDROCHLORIDE AND ATROPINE SULFATE 1 TABLET: 2.5; .025 TABLET ORAL at 08:04

## 2021-04-08 RX ADMIN — Medication 6 MG: at 09:04

## 2021-04-08 RX ADMIN — HYDRALAZINE HYDROCHLORIDE 10 MG: 20 INJECTION INTRAMUSCULAR; INTRAVENOUS at 12:04

## 2021-04-08 RX ADMIN — LISINOPRIL 40 MG: 20 TABLET ORAL at 08:04

## 2021-04-08 RX ADMIN — DULOXETINE HYDROCHLORIDE 30 MG: 30 CAPSULE, DELAYED RELEASE ORAL at 08:04

## 2021-04-08 RX ADMIN — GABAPENTIN 600 MG: 300 CAPSULE ORAL at 04:04

## 2021-04-08 RX ADMIN — VANCOMYCIN HYDROCHLORIDE 1000 MG: 1 INJECTION, POWDER, LYOPHILIZED, FOR SOLUTION INTRAVENOUS at 12:04

## 2021-04-08 RX ADMIN — ONDANSETRON 4 MG: 2 INJECTION INTRAMUSCULAR; INTRAVENOUS at 09:04

## 2021-04-08 RX ADMIN — GABAPENTIN 600 MG: 300 CAPSULE ORAL at 08:04

## 2021-04-09 LAB
ANION GAP SERPL CALC-SCNC: 9 MMOL/L (ref 8–16)
BASOPHILS # BLD AUTO: 0.06 K/UL (ref 0–0.2)
BASOPHILS NFR BLD: 1.1 % (ref 0–1.9)
BUN SERPL-MCNC: 5 MG/DL (ref 8–23)
CALCIUM SERPL-MCNC: 9.2 MG/DL (ref 8.7–10.5)
CHLORIDE SERPL-SCNC: 99 MMOL/L (ref 95–110)
CO2 SERPL-SCNC: 26 MMOL/L (ref 23–29)
CREAT SERPL-MCNC: 0.7 MG/DL (ref 0.5–1.4)
DIFFERENTIAL METHOD: ABNORMAL
EOSINOPHIL # BLD AUTO: 0.1 K/UL (ref 0–0.5)
EOSINOPHIL NFR BLD: 1.1 % (ref 0–8)
ERYTHROCYTE [DISTWIDTH] IN BLOOD BY AUTOMATED COUNT: 12.2 % (ref 11.5–14.5)
EST. GFR  (AFRICAN AMERICAN): >60 ML/MIN/1.73 M^2
EST. GFR  (NON AFRICAN AMERICAN): >60 ML/MIN/1.73 M^2
GLUCOSE SERPL-MCNC: 136 MG/DL (ref 70–110)
HCT VFR BLD AUTO: 36.8 % (ref 37–48.5)
HGB BLD-MCNC: 11.7 G/DL (ref 12–16)
IMM GRANULOCYTES # BLD AUTO: 0.01 K/UL (ref 0–0.04)
IMM GRANULOCYTES NFR BLD AUTO: 0.2 % (ref 0–0.5)
LYMPHOCYTES # BLD AUTO: 0.9 K/UL (ref 1–4.8)
LYMPHOCYTES NFR BLD: 15.6 % (ref 18–48)
MCH RBC QN AUTO: 34.1 PG (ref 27–31)
MCHC RBC AUTO-ENTMCNC: 31.8 G/DL (ref 32–36)
MCV RBC AUTO: 107 FL (ref 82–98)
MONOCYTES # BLD AUTO: 0.4 K/UL (ref 0.3–1)
MONOCYTES NFR BLD: 6.6 % (ref 4–15)
NEUTROPHILS # BLD AUTO: 4.3 K/UL (ref 1.8–7.7)
NEUTROPHILS NFR BLD: 75.4 % (ref 38–73)
NRBC BLD-RTO: 0 /100 WBC
PLATELET # BLD AUTO: 251 K/UL (ref 150–450)
PMV BLD AUTO: 8.5 FL (ref 9.2–12.9)
POTASSIUM SERPL-SCNC: 4.5 MMOL/L (ref 3.5–5.1)
RBC # BLD AUTO: 3.43 M/UL (ref 4–5.4)
SODIUM SERPL-SCNC: 134 MMOL/L (ref 136–145)
VANCOMYCIN TROUGH SERPL-MCNC: 7.3 UG/ML (ref 10–22)
WBC # BLD AUTO: 5.64 K/UL (ref 3.9–12.7)

## 2021-04-09 PROCEDURE — 21400001 HC TELEMETRY ROOM

## 2021-04-09 PROCEDURE — 36415 COLL VENOUS BLD VENIPUNCTURE: CPT | Performed by: EMERGENCY MEDICINE

## 2021-04-09 PROCEDURE — 25000003 PHARM REV CODE 250: Performed by: NURSE PRACTITIONER

## 2021-04-09 PROCEDURE — 85025 COMPLETE CBC W/AUTO DIFF WBC: CPT | Performed by: NURSE PRACTITIONER

## 2021-04-09 PROCEDURE — 36415 COLL VENOUS BLD VENIPUNCTURE: CPT | Performed by: NURSE PRACTITIONER

## 2021-04-09 PROCEDURE — 80202 ASSAY OF VANCOMYCIN: CPT | Performed by: EMERGENCY MEDICINE

## 2021-04-09 PROCEDURE — 63600175 PHARM REV CODE 636 W HCPCS: Performed by: NURSE PRACTITIONER

## 2021-04-09 PROCEDURE — 99223 PR INITIAL HOSPITAL CARE,LEVL III: ICD-10-PCS | Mod: ,,, | Performed by: PODIATRIST

## 2021-04-09 PROCEDURE — 25500020 PHARM REV CODE 255: Performed by: FAMILY MEDICINE

## 2021-04-09 PROCEDURE — A9585 GADOBUTROL INJECTION: HCPCS | Performed by: FAMILY MEDICINE

## 2021-04-09 PROCEDURE — 80048 BASIC METABOLIC PNL TOTAL CA: CPT | Performed by: NURSE PRACTITIONER

## 2021-04-09 PROCEDURE — 99223 1ST HOSP IP/OBS HIGH 75: CPT | Mod: ,,, | Performed by: PODIATRIST

## 2021-04-09 PROCEDURE — 63600175 PHARM REV CODE 636 W HCPCS: Performed by: FAMILY MEDICINE

## 2021-04-09 PROCEDURE — 25000003 PHARM REV CODE 250: Performed by: FAMILY MEDICINE

## 2021-04-09 RX ORDER — CEFEPIME HYDROCHLORIDE 1 G/50ML
2 INJECTION, SOLUTION INTRAVENOUS
Status: DISCONTINUED | OUTPATIENT
Start: 2021-04-09 | End: 2021-04-15

## 2021-04-09 RX ORDER — GADOBUTROL 604.72 MG/ML
10 INJECTION INTRAVENOUS
Status: COMPLETED | OUTPATIENT
Start: 2021-04-09 | End: 2021-04-09

## 2021-04-09 RX ADMIN — CEFEPIME HYDROCHLORIDE 2 G: 2 INJECTION, SOLUTION INTRAVENOUS at 04:04

## 2021-04-09 RX ADMIN — GABAPENTIN 600 MG: 300 CAPSULE ORAL at 04:04

## 2021-04-09 RX ADMIN — Medication 6 MG: at 09:04

## 2021-04-09 RX ADMIN — LISINOPRIL 40 MG: 20 TABLET ORAL at 08:04

## 2021-04-09 RX ADMIN — DULOXETINE HYDROCHLORIDE 30 MG: 30 CAPSULE, DELAYED RELEASE ORAL at 09:04

## 2021-04-09 RX ADMIN — GADOBUTROL 4 ML: 604.72 INJECTION INTRAVENOUS at 06:04

## 2021-04-09 RX ADMIN — VANCOMYCIN HYDROCHLORIDE 1500 MG: 1.5 INJECTION, POWDER, LYOPHILIZED, FOR SOLUTION INTRAVENOUS at 01:04

## 2021-04-09 RX ADMIN — POLYETHYLENE GLYCOL 3350 17 G: 17 POWDER, FOR SOLUTION ORAL at 08:04

## 2021-04-09 RX ADMIN — ONDANSETRON 4 MG: 2 INJECTION INTRAMUSCULAR; INTRAVENOUS at 05:04

## 2021-04-09 RX ADMIN — GABAPENTIN 600 MG: 300 CAPSULE ORAL at 09:04

## 2021-04-09 RX ADMIN — GABAPENTIN 600 MG: 300 CAPSULE ORAL at 08:04

## 2021-04-09 RX ADMIN — AMLODIPINE BESYLATE 5 MG: 5 TABLET ORAL at 08:04

## 2021-04-10 LAB
ANION GAP SERPL CALC-SCNC: 10 MMOL/L (ref 8–16)
BASOPHILS # BLD AUTO: 0.07 K/UL (ref 0–0.2)
BASOPHILS NFR BLD: 1.4 % (ref 0–1.9)
BUN SERPL-MCNC: 6 MG/DL (ref 8–23)
CALCIUM SERPL-MCNC: 9.3 MG/DL (ref 8.7–10.5)
CHLORIDE SERPL-SCNC: 100 MMOL/L (ref 95–110)
CO2 SERPL-SCNC: 26 MMOL/L (ref 23–29)
CREAT SERPL-MCNC: 0.6 MG/DL (ref 0.5–1.4)
DIFFERENTIAL METHOD: ABNORMAL
EOSINOPHIL # BLD AUTO: 0.2 K/UL (ref 0–0.5)
EOSINOPHIL NFR BLD: 3.8 % (ref 0–8)
ERYTHROCYTE [DISTWIDTH] IN BLOOD BY AUTOMATED COUNT: 11.9 % (ref 11.5–14.5)
EST. GFR  (AFRICAN AMERICAN): >60 ML/MIN/1.73 M^2
EST. GFR  (NON AFRICAN AMERICAN): >60 ML/MIN/1.73 M^2
GLUCOSE SERPL-MCNC: 94 MG/DL (ref 70–110)
HCT VFR BLD AUTO: 35.4 % (ref 37–48.5)
HGB BLD-MCNC: 11.3 G/DL (ref 12–16)
IMM GRANULOCYTES # BLD AUTO: 0.01 K/UL (ref 0–0.04)
IMM GRANULOCYTES NFR BLD AUTO: 0.2 % (ref 0–0.5)
LYMPHOCYTES # BLD AUTO: 1.1 K/UL (ref 1–4.8)
LYMPHOCYTES NFR BLD: 22.2 % (ref 18–48)
MCH RBC QN AUTO: 34.2 PG (ref 27–31)
MCHC RBC AUTO-ENTMCNC: 31.9 G/DL (ref 32–36)
MCV RBC AUTO: 107 FL (ref 82–98)
MONOCYTES # BLD AUTO: 0.5 K/UL (ref 0.3–1)
MONOCYTES NFR BLD: 10.4 % (ref 4–15)
NEUTROPHILS # BLD AUTO: 3.1 K/UL (ref 1.8–7.7)
NEUTROPHILS NFR BLD: 62 % (ref 38–73)
NRBC BLD-RTO: 0 /100 WBC
PLATELET # BLD AUTO: 225 K/UL (ref 150–450)
PMV BLD AUTO: 8.6 FL (ref 9.2–12.9)
POTASSIUM SERPL-SCNC: 3.8 MMOL/L (ref 3.5–5.1)
RBC # BLD AUTO: 3.3 M/UL (ref 4–5.4)
SODIUM SERPL-SCNC: 136 MMOL/L (ref 136–145)
WBC # BLD AUTO: 5.01 K/UL (ref 3.9–12.7)

## 2021-04-10 PROCEDURE — 36415 COLL VENOUS BLD VENIPUNCTURE: CPT | Performed by: NURSE PRACTITIONER

## 2021-04-10 PROCEDURE — 25000003 PHARM REV CODE 250: Performed by: NURSE PRACTITIONER

## 2021-04-10 PROCEDURE — 63600175 PHARM REV CODE 636 W HCPCS: Performed by: FAMILY MEDICINE

## 2021-04-10 PROCEDURE — 25000003 PHARM REV CODE 250: Performed by: FAMILY MEDICINE

## 2021-04-10 PROCEDURE — 25000003 PHARM REV CODE 250: Performed by: INTERNAL MEDICINE

## 2021-04-10 PROCEDURE — 21400001 HC TELEMETRY ROOM

## 2021-04-10 PROCEDURE — 85025 COMPLETE CBC W/AUTO DIFF WBC: CPT | Performed by: NURSE PRACTITIONER

## 2021-04-10 PROCEDURE — 80048 BASIC METABOLIC PNL TOTAL CA: CPT | Performed by: NURSE PRACTITIONER

## 2021-04-10 RX ADMIN — AMLODIPINE BESYLATE 5 MG: 5 TABLET ORAL at 08:04

## 2021-04-10 RX ADMIN — TRAMADOL HYDROCHLORIDE 50 MG: 50 TABLET, FILM COATED ORAL at 10:04

## 2021-04-10 RX ADMIN — POLYETHYLENE GLYCOL 3350 17 G: 17 POWDER, FOR SOLUTION ORAL at 08:04

## 2021-04-10 RX ADMIN — Medication 6 MG: at 10:04

## 2021-04-10 RX ADMIN — GABAPENTIN 600 MG: 300 CAPSULE ORAL at 10:04

## 2021-04-10 RX ADMIN — GABAPENTIN 600 MG: 300 CAPSULE ORAL at 02:04

## 2021-04-10 RX ADMIN — LISINOPRIL 40 MG: 20 TABLET ORAL at 08:04

## 2021-04-10 RX ADMIN — GABAPENTIN 600 MG: 300 CAPSULE ORAL at 08:04

## 2021-04-10 RX ADMIN — DULOXETINE HYDROCHLORIDE 30 MG: 30 CAPSULE, DELAYED RELEASE ORAL at 08:04

## 2021-04-10 RX ADMIN — CEFEPIME HYDROCHLORIDE 2 G: 2 INJECTION, SOLUTION INTRAVENOUS at 04:04

## 2021-04-10 RX ADMIN — VANCOMYCIN HYDROCHLORIDE 1500 MG: 1.5 INJECTION, POWDER, LYOPHILIZED, FOR SOLUTION INTRAVENOUS at 02:04

## 2021-04-11 LAB
ANION GAP SERPL CALC-SCNC: 11 MMOL/L (ref 8–16)
BASOPHILS # BLD AUTO: 0.06 K/UL (ref 0–0.2)
BASOPHILS NFR BLD: 1.3 % (ref 0–1.9)
BUN SERPL-MCNC: 8 MG/DL (ref 8–23)
CALCIUM SERPL-MCNC: 9.8 MG/DL (ref 8.7–10.5)
CHLORIDE SERPL-SCNC: 97 MMOL/L (ref 95–110)
CO2 SERPL-SCNC: 28 MMOL/L (ref 23–29)
CREAT SERPL-MCNC: 0.7 MG/DL (ref 0.5–1.4)
DIFFERENTIAL METHOD: ABNORMAL
EOSINOPHIL # BLD AUTO: 0.2 K/UL (ref 0–0.5)
EOSINOPHIL NFR BLD: 5.3 % (ref 0–8)
ERYTHROCYTE [DISTWIDTH] IN BLOOD BY AUTOMATED COUNT: 12 % (ref 11.5–14.5)
EST. GFR  (AFRICAN AMERICAN): >60 ML/MIN/1.73 M^2
EST. GFR  (NON AFRICAN AMERICAN): >60 ML/MIN/1.73 M^2
GLUCOSE SERPL-MCNC: 89 MG/DL (ref 70–110)
HCT VFR BLD AUTO: 35.3 % (ref 37–48.5)
HGB BLD-MCNC: 11.2 G/DL (ref 12–16)
IMM GRANULOCYTES # BLD AUTO: 0.01 K/UL (ref 0–0.04)
IMM GRANULOCYTES NFR BLD AUTO: 0.2 % (ref 0–0.5)
LYMPHOCYTES # BLD AUTO: 1.1 K/UL (ref 1–4.8)
LYMPHOCYTES NFR BLD: 24.4 % (ref 18–48)
MCH RBC QN AUTO: 33.8 PG (ref 27–31)
MCHC RBC AUTO-ENTMCNC: 31.7 G/DL (ref 32–36)
MCV RBC AUTO: 107 FL (ref 82–98)
MONOCYTES # BLD AUTO: 0.6 K/UL (ref 0.3–1)
MONOCYTES NFR BLD: 12.3 % (ref 4–15)
NEUTROPHILS # BLD AUTO: 2.6 K/UL (ref 1.8–7.7)
NEUTROPHILS NFR BLD: 56.5 % (ref 38–73)
NRBC BLD-RTO: 0 /100 WBC
PLATELET # BLD AUTO: 224 K/UL (ref 150–450)
PMV BLD AUTO: 8.2 FL (ref 9.2–12.9)
POTASSIUM SERPL-SCNC: 3.8 MMOL/L (ref 3.5–5.1)
RBC # BLD AUTO: 3.31 M/UL (ref 4–5.4)
SODIUM SERPL-SCNC: 136 MMOL/L (ref 136–145)
VANCOMYCIN TROUGH SERPL-MCNC: 17.3 UG/ML (ref 10–22)
WBC # BLD AUTO: 4.54 K/UL (ref 3.9–12.7)

## 2021-04-11 PROCEDURE — 99233 SBSQ HOSP IP/OBS HIGH 50: CPT | Mod: NSCH,,, | Performed by: INTERNAL MEDICINE

## 2021-04-11 PROCEDURE — 99233 PR SUBSEQUENT HOSPITAL CARE,LEVL III: ICD-10-PCS | Mod: NSCH,,, | Performed by: INTERNAL MEDICINE

## 2021-04-11 PROCEDURE — 36415 COLL VENOUS BLD VENIPUNCTURE: CPT | Performed by: NURSE PRACTITIONER

## 2021-04-11 PROCEDURE — 25000003 PHARM REV CODE 250: Performed by: INTERNAL MEDICINE

## 2021-04-11 PROCEDURE — 25000003 PHARM REV CODE 250: Performed by: NURSE PRACTITIONER

## 2021-04-11 PROCEDURE — 36415 COLL VENOUS BLD VENIPUNCTURE: CPT | Performed by: FAMILY MEDICINE

## 2021-04-11 PROCEDURE — 80048 BASIC METABOLIC PNL TOTAL CA: CPT | Performed by: NURSE PRACTITIONER

## 2021-04-11 PROCEDURE — 63600175 PHARM REV CODE 636 W HCPCS: Performed by: FAMILY MEDICINE

## 2021-04-11 PROCEDURE — 21400001 HC TELEMETRY ROOM

## 2021-04-11 PROCEDURE — 85025 COMPLETE CBC W/AUTO DIFF WBC: CPT | Performed by: NURSE PRACTITIONER

## 2021-04-11 PROCEDURE — 80202 ASSAY OF VANCOMYCIN: CPT | Performed by: FAMILY MEDICINE

## 2021-04-11 PROCEDURE — 25000003 PHARM REV CODE 250: Performed by: FAMILY MEDICINE

## 2021-04-11 RX ADMIN — CEFEPIME HYDROCHLORIDE 2 G: 2 INJECTION, SOLUTION INTRAVENOUS at 03:04

## 2021-04-11 RX ADMIN — VANCOMYCIN HYDROCHLORIDE 1500 MG: 1.5 INJECTION, POWDER, LYOPHILIZED, FOR SOLUTION INTRAVENOUS at 02:04

## 2021-04-11 RX ADMIN — AMLODIPINE BESYLATE 5 MG: 5 TABLET ORAL at 08:04

## 2021-04-11 RX ADMIN — GABAPENTIN 600 MG: 300 CAPSULE ORAL at 08:04

## 2021-04-11 RX ADMIN — Medication 6 MG: at 10:04

## 2021-04-11 RX ADMIN — CEFEPIME HYDROCHLORIDE 2 G: 2 INJECTION, SOLUTION INTRAVENOUS at 04:04

## 2021-04-11 RX ADMIN — DULOXETINE HYDROCHLORIDE 30 MG: 30 CAPSULE, DELAYED RELEASE ORAL at 08:04

## 2021-04-11 RX ADMIN — LISINOPRIL 40 MG: 20 TABLET ORAL at 08:04

## 2021-04-11 RX ADMIN — TRAMADOL HYDROCHLORIDE 50 MG: 50 TABLET, FILM COATED ORAL at 10:04

## 2021-04-11 RX ADMIN — GABAPENTIN 600 MG: 300 CAPSULE ORAL at 02:04

## 2021-04-12 PROBLEM — I73.9 PVD (PERIPHERAL VASCULAR DISEASE): Status: ACTIVE | Noted: 2021-04-12

## 2021-04-12 LAB
ANION GAP SERPL CALC-SCNC: 12 MMOL/L (ref 8–16)
BACTERIA BLD CULT: NORMAL
BACTERIA BLD CULT: NORMAL
BASOPHILS # BLD AUTO: 0.05 K/UL (ref 0–0.2)
BASOPHILS NFR BLD: 1 % (ref 0–1.9)
BUN SERPL-MCNC: 10 MG/DL (ref 8–23)
CALCIUM SERPL-MCNC: 10.6 MG/DL (ref 8.7–10.5)
CHLORIDE SERPL-SCNC: 96 MMOL/L (ref 95–110)
CO2 SERPL-SCNC: 29 MMOL/L (ref 23–29)
CREAT SERPL-MCNC: 0.8 MG/DL (ref 0.5–1.4)
DIFFERENTIAL METHOD: ABNORMAL
EOSINOPHIL # BLD AUTO: 0.3 K/UL (ref 0–0.5)
EOSINOPHIL NFR BLD: 6 % (ref 0–8)
ERYTHROCYTE [DISTWIDTH] IN BLOOD BY AUTOMATED COUNT: 11.9 % (ref 11.5–14.5)
EST. GFR  (AFRICAN AMERICAN): >60 ML/MIN/1.73 M^2
EST. GFR  (NON AFRICAN AMERICAN): >60 ML/MIN/1.73 M^2
GLUCOSE SERPL-MCNC: 89 MG/DL (ref 70–110)
HCT VFR BLD AUTO: 38.1 % (ref 37–48.5)
HGB BLD-MCNC: 12.1 G/DL (ref 12–16)
IMM GRANULOCYTES # BLD AUTO: 0.01 K/UL (ref 0–0.04)
IMM GRANULOCYTES NFR BLD AUTO: 0.2 % (ref 0–0.5)
LYMPHOCYTES # BLD AUTO: 1.3 K/UL (ref 1–4.8)
LYMPHOCYTES NFR BLD: 25.1 % (ref 18–48)
MCH RBC QN AUTO: 34.1 PG (ref 27–31)
MCHC RBC AUTO-ENTMCNC: 31.8 G/DL (ref 32–36)
MCV RBC AUTO: 107 FL (ref 82–98)
MONOCYTES # BLD AUTO: 0.6 K/UL (ref 0.3–1)
MONOCYTES NFR BLD: 11.9 % (ref 4–15)
NEUTROPHILS # BLD AUTO: 2.9 K/UL (ref 1.8–7.7)
NEUTROPHILS NFR BLD: 55.8 % (ref 38–73)
NRBC BLD-RTO: 0 /100 WBC
PLATELET # BLD AUTO: 226 K/UL (ref 150–450)
PMV BLD AUTO: 9.7 FL (ref 9.2–12.9)
POCT GLUCOSE: 87 MG/DL (ref 70–110)
POTASSIUM SERPL-SCNC: 4.4 MMOL/L (ref 3.5–5.1)
RBC # BLD AUTO: 3.55 M/UL (ref 4–5.4)
SODIUM SERPL-SCNC: 137 MMOL/L (ref 136–145)
WBC # BLD AUTO: 5.21 K/UL (ref 3.9–12.7)

## 2021-04-12 PROCEDURE — 63600175 PHARM REV CODE 636 W HCPCS: Performed by: NURSE PRACTITIONER

## 2021-04-12 PROCEDURE — 25500020 PHARM REV CODE 255: Performed by: FAMILY MEDICINE

## 2021-04-12 PROCEDURE — 25500020 PHARM REV CODE 255: Performed by: SURGERY

## 2021-04-12 PROCEDURE — 36415 COLL VENOUS BLD VENIPUNCTURE: CPT | Performed by: NURSE PRACTITIONER

## 2021-04-12 PROCEDURE — 85025 COMPLETE CBC W/AUTO DIFF WBC: CPT | Performed by: NURSE PRACTITIONER

## 2021-04-12 PROCEDURE — 63600175 PHARM REV CODE 636 W HCPCS: Performed by: SURGERY

## 2021-04-12 PROCEDURE — 63600175 PHARM REV CODE 636 W HCPCS: Performed by: FAMILY MEDICINE

## 2021-04-12 PROCEDURE — 21400001 HC TELEMETRY ROOM

## 2021-04-12 PROCEDURE — 36200 PLACE CATHETER IN AORTA: CPT | Performed by: SURGERY

## 2021-04-12 PROCEDURE — 75625 CONTRAST EXAM ABDOMINL AORTA: CPT | Performed by: SURGERY

## 2021-04-12 PROCEDURE — 25000003 PHARM REV CODE 250: Performed by: FAMILY MEDICINE

## 2021-04-12 PROCEDURE — 27201423 OPTIME MED/SURG SUP & DEVICES STERILE SUPPLY: Performed by: SURGERY

## 2021-04-12 PROCEDURE — C1769 GUIDE WIRE: HCPCS | Performed by: SURGERY

## 2021-04-12 PROCEDURE — 25000003 PHARM REV CODE 250: Performed by: SURGERY

## 2021-04-12 PROCEDURE — 80048 BASIC METABOLIC PNL TOTAL CA: CPT | Performed by: NURSE PRACTITIONER

## 2021-04-12 PROCEDURE — C1730 CATH, EP, 19 OR FEW ELECT: HCPCS | Performed by: SURGERY

## 2021-04-12 PROCEDURE — 99152 MOD SED SAME PHYS/QHP 5/>YRS: CPT | Performed by: SURGERY

## 2021-04-12 PROCEDURE — C1887 CATHETER, GUIDING: HCPCS | Performed by: SURGERY

## 2021-04-12 PROCEDURE — 25000003 PHARM REV CODE 250: Performed by: INTERNAL MEDICINE

## 2021-04-12 PROCEDURE — 25000003 PHARM REV CODE 250: Performed by: NURSE PRACTITIONER

## 2021-04-12 PROCEDURE — C1894 INTRO/SHEATH, NON-LASER: HCPCS | Performed by: SURGERY

## 2021-04-12 RX ORDER — HYDROCODONE BITARTRATE AND ACETAMINOPHEN 5; 325 MG/1; MG/1
1 TABLET ORAL EVERY 4 HOURS PRN
Status: DISCONTINUED | OUTPATIENT
Start: 2021-04-12 | End: 2021-04-14

## 2021-04-12 RX ORDER — MIDAZOLAM HYDROCHLORIDE 1 MG/ML
INJECTION, SOLUTION INTRAMUSCULAR; INTRAVENOUS
Status: DISCONTINUED | OUTPATIENT
Start: 2021-04-12 | End: 2021-04-12 | Stop reason: HOSPADM

## 2021-04-12 RX ORDER — MORPHINE SULFATE 2 MG/ML
3 INJECTION, SOLUTION INTRAMUSCULAR; INTRAVENOUS
Status: DISCONTINUED | OUTPATIENT
Start: 2021-04-12 | End: 2021-04-14

## 2021-04-12 RX ORDER — FENTANYL CITRATE 50 UG/ML
INJECTION, SOLUTION INTRAMUSCULAR; INTRAVENOUS
Status: DISCONTINUED | OUTPATIENT
Start: 2021-04-12 | End: 2021-04-12 | Stop reason: HOSPADM

## 2021-04-12 RX ORDER — SODIUM CHLORIDE 450 MG/100ML
INJECTION, SOLUTION INTRAVENOUS CONTINUOUS
Status: DISCONTINUED | OUTPATIENT
Start: 2021-04-12 | End: 2021-04-14

## 2021-04-12 RX ORDER — LIDOCAINE HYDROCHLORIDE 20 MG/ML
INJECTION, SOLUTION INFILTRATION; PERINEURAL
Status: DISCONTINUED | OUTPATIENT
Start: 2021-04-12 | End: 2021-04-12 | Stop reason: HOSPADM

## 2021-04-12 RX ADMIN — VANCOMYCIN HYDROCHLORIDE 1500 MG: 1.5 INJECTION, POWDER, LYOPHILIZED, FOR SOLUTION INTRAVENOUS at 03:04

## 2021-04-12 RX ADMIN — IOHEXOL 100 ML: 350 INJECTION, SOLUTION INTRAVENOUS at 09:04

## 2021-04-12 RX ADMIN — AMLODIPINE BESYLATE 5 MG: 5 TABLET ORAL at 08:04

## 2021-04-12 RX ADMIN — CEFEPIME HYDROCHLORIDE 2 G: 2 INJECTION, SOLUTION INTRAVENOUS at 03:04

## 2021-04-12 RX ADMIN — GABAPENTIN 600 MG: 300 CAPSULE ORAL at 03:04

## 2021-04-12 RX ADMIN — SODIUM CHLORIDE: 0.45 INJECTION, SOLUTION INTRAVENOUS at 12:04

## 2021-04-12 RX ADMIN — HYDRALAZINE HYDROCHLORIDE 10 MG: 20 INJECTION INTRAMUSCULAR; INTRAVENOUS at 12:04

## 2021-04-12 RX ADMIN — GABAPENTIN 600 MG: 300 CAPSULE ORAL at 08:04

## 2021-04-12 RX ADMIN — DULOXETINE HYDROCHLORIDE 30 MG: 30 CAPSULE, DELAYED RELEASE ORAL at 08:04

## 2021-04-12 RX ADMIN — GABAPENTIN 600 MG: 300 CAPSULE ORAL at 09:04

## 2021-04-12 RX ADMIN — TRAMADOL HYDROCHLORIDE 50 MG: 50 TABLET, FILM COATED ORAL at 09:04

## 2021-04-12 RX ADMIN — LISINOPRIL 40 MG: 20 TABLET ORAL at 08:04

## 2021-04-13 PROBLEM — Z72.0 TOBACCO ABUSE DISORDER: Status: ACTIVE | Noted: 2021-04-13

## 2021-04-13 LAB
CREAT SERPL-MCNC: 0.8 MG/DL (ref 0.5–1.4)
EST. GFR  (AFRICAN AMERICAN): >60 ML/MIN/1.73 M^2
EST. GFR  (NON AFRICAN AMERICAN): >60 ML/MIN/1.73 M^2
VANCOMYCIN TROUGH SERPL-MCNC: 22.4 UG/ML (ref 10–22)

## 2021-04-13 PROCEDURE — 63600175 PHARM REV CODE 636 W HCPCS: Performed by: SURGERY

## 2021-04-13 PROCEDURE — 25000003 PHARM REV CODE 250: Performed by: NURSE PRACTITIONER

## 2021-04-13 PROCEDURE — 25000003 PHARM REV CODE 250: Performed by: SURGERY

## 2021-04-13 PROCEDURE — 21400001 HC TELEMETRY ROOM

## 2021-04-13 PROCEDURE — 63600175 PHARM REV CODE 636 W HCPCS: Performed by: FAMILY MEDICINE

## 2021-04-13 PROCEDURE — 36415 COLL VENOUS BLD VENIPUNCTURE: CPT | Performed by: SURGERY

## 2021-04-13 PROCEDURE — 80202 ASSAY OF VANCOMYCIN: CPT | Performed by: SURGERY

## 2021-04-13 PROCEDURE — 82565 ASSAY OF CREATININE: CPT | Performed by: FAMILY MEDICINE

## 2021-04-13 PROCEDURE — 36415 COLL VENOUS BLD VENIPUNCTURE: CPT | Performed by: FAMILY MEDICINE

## 2021-04-13 PROCEDURE — 25000003 PHARM REV CODE 250: Performed by: FAMILY MEDICINE

## 2021-04-13 RX ORDER — SODIUM CHLORIDE 9 MG/ML
INJECTION, SOLUTION INTRAVENOUS CONTINUOUS
Status: DISCONTINUED | OUTPATIENT
Start: 2021-04-13 | End: 2021-04-15 | Stop reason: HOSPADM

## 2021-04-13 RX ADMIN — GABAPENTIN 600 MG: 300 CAPSULE ORAL at 09:04

## 2021-04-13 RX ADMIN — GABAPENTIN 600 MG: 300 CAPSULE ORAL at 08:04

## 2021-04-13 RX ADMIN — GABAPENTIN 600 MG: 300 CAPSULE ORAL at 03:04

## 2021-04-13 RX ADMIN — AMLODIPINE BESYLATE 5 MG: 5 TABLET ORAL at 08:04

## 2021-04-13 RX ADMIN — SODIUM CHLORIDE: 0.45 INJECTION, SOLUTION INTRAVENOUS at 04:04

## 2021-04-13 RX ADMIN — SODIUM CHLORIDE: 0.9 INJECTION, SOLUTION INTRAVENOUS at 09:04

## 2021-04-13 RX ADMIN — CEFEPIME HYDROCHLORIDE 2 G: 2 INJECTION, SOLUTION INTRAVENOUS at 03:04

## 2021-04-13 RX ADMIN — LISINOPRIL 40 MG: 20 TABLET ORAL at 08:04

## 2021-04-13 RX ADMIN — Medication 6 MG: at 09:04

## 2021-04-13 RX ADMIN — TRAMADOL HYDROCHLORIDE 50 MG: 50 TABLET, FILM COATED ORAL at 09:04

## 2021-04-13 RX ADMIN — CEFEPIME HYDROCHLORIDE 2 G: 2 INJECTION, SOLUTION INTRAVENOUS at 04:04

## 2021-04-13 RX ADMIN — DULOXETINE HYDROCHLORIDE 30 MG: 30 CAPSULE, DELAYED RELEASE ORAL at 08:04

## 2021-04-13 RX ADMIN — VANCOMYCIN HYDROCHLORIDE 1250 MG: 1.25 INJECTION, POWDER, LYOPHILIZED, FOR SOLUTION INTRAVENOUS at 09:04

## 2021-04-14 LAB
CREAT SERPL-MCNC: 0.7 MG/DL (ref 0.5–1.4)
EST. GFR  (AFRICAN AMERICAN): >60 ML/MIN/1.73 M^2
EST. GFR  (NON AFRICAN AMERICAN): >60 ML/MIN/1.73 M^2
POC ACTIVATED CLOTTING TIME K: 175 SEC (ref 74–137)
SAMPLE: ABNORMAL
VANCOMYCIN TROUGH SERPL-MCNC: 20.5 UG/ML (ref 10–22)

## 2021-04-14 PROCEDURE — C1730 CATH, EP, 19 OR FEW ELECT: HCPCS | Performed by: SURGERY

## 2021-04-14 PROCEDURE — C9765 REVASC INTRA LITHOTRIP-STENT: HCPCS | Mod: LT | Performed by: SURGERY

## 2021-04-14 PROCEDURE — C1769 GUIDE WIRE: HCPCS | Performed by: SURGERY

## 2021-04-14 PROCEDURE — 25000003 PHARM REV CODE 250: Performed by: SURGERY

## 2021-04-14 PROCEDURE — 75716 ARTERY X-RAYS ARMS/LEGS: CPT | Mod: 59 | Performed by: SURGERY

## 2021-04-14 PROCEDURE — 99153 MOD SED SAME PHYS/QHP EA: CPT | Performed by: SURGERY

## 2021-04-14 PROCEDURE — 99152 MOD SED SAME PHYS/QHP 5/>YRS: CPT | Performed by: SURGERY

## 2021-04-14 PROCEDURE — 85025 COMPLETE CBC W/AUTO DIFF WBC: CPT | Performed by: SURGERY

## 2021-04-14 PROCEDURE — 82565 ASSAY OF CREATININE: CPT | Performed by: FAMILY MEDICINE

## 2021-04-14 PROCEDURE — 25000003 PHARM REV CODE 250: Performed by: NURSE PRACTITIONER

## 2021-04-14 PROCEDURE — C1894 INTRO/SHEATH, NON-LASER: HCPCS | Performed by: SURGERY

## 2021-04-14 PROCEDURE — C1887 CATHETER, GUIDING: HCPCS | Performed by: SURGERY

## 2021-04-14 PROCEDURE — C1725 CATH, TRANSLUMIN NON-LASER: HCPCS | Performed by: SURGERY

## 2021-04-14 PROCEDURE — 21400001 HC TELEMETRY ROOM

## 2021-04-14 PROCEDURE — 80202 ASSAY OF VANCOMYCIN: CPT | Performed by: FAMILY MEDICINE

## 2021-04-14 PROCEDURE — 25000003 PHARM REV CODE 250: Performed by: INTERNAL MEDICINE

## 2021-04-14 PROCEDURE — 63600175 PHARM REV CODE 636 W HCPCS: Performed by: SURGERY

## 2021-04-14 PROCEDURE — 36415 COLL VENOUS BLD VENIPUNCTURE: CPT | Performed by: FAMILY MEDICINE

## 2021-04-14 PROCEDURE — C1874 STENT, COATED/COV W/DEL SYS: HCPCS | Performed by: SURGERY

## 2021-04-14 PROCEDURE — 36415 COLL VENOUS BLD VENIPUNCTURE: CPT | Performed by: SURGERY

## 2021-04-14 DEVICE — ICAST COVERED STENT, 7MMX38MMX80CM
Type: IMPLANTABLE DEVICE | Site: ABDOMEN | Status: FUNCTIONAL
Brand: ICAST

## 2021-04-14 RX ORDER — CLOPIDOGREL 300 MG/1
300 TABLET, FILM COATED ORAL ONCE
Status: COMPLETED | OUTPATIENT
Start: 2021-04-14 | End: 2021-04-14

## 2021-04-14 RX ORDER — CLOPIDOGREL BISULFATE 75 MG/1
75 TABLET ORAL DAILY
Status: DISCONTINUED | OUTPATIENT
Start: 2021-04-15 | End: 2021-04-15 | Stop reason: HOSPADM

## 2021-04-14 RX ORDER — MIDAZOLAM HYDROCHLORIDE 1 MG/ML
INJECTION, SOLUTION INTRAMUSCULAR; INTRAVENOUS
Status: DISCONTINUED | OUTPATIENT
Start: 2021-04-14 | End: 2021-04-14 | Stop reason: HOSPADM

## 2021-04-14 RX ORDER — DIPHENHYDRAMINE HYDROCHLORIDE 50 MG/ML
INJECTION INTRAMUSCULAR; INTRAVENOUS
Status: DISCONTINUED | OUTPATIENT
Start: 2021-04-14 | End: 2021-04-14 | Stop reason: HOSPADM

## 2021-04-14 RX ORDER — VANCOMYCIN HCL IN 5 % DEXTROSE 1G/250ML
1000 PLASTIC BAG, INJECTION (ML) INTRAVENOUS
Status: DISCONTINUED | OUTPATIENT
Start: 2021-04-15 | End: 2021-04-15

## 2021-04-14 RX ORDER — LIDOCAINE HYDROCHLORIDE 20 MG/ML
INJECTION, SOLUTION EPIDURAL; INFILTRATION; INTRACAUDAL; PERINEURAL
Status: DISCONTINUED | OUTPATIENT
Start: 2021-04-14 | End: 2021-04-14 | Stop reason: HOSPADM

## 2021-04-14 RX ORDER — MORPHINE SULFATE 2 MG/ML
2 INJECTION, SOLUTION INTRAMUSCULAR; INTRAVENOUS
Status: DISCONTINUED | OUTPATIENT
Start: 2021-04-14 | End: 2021-04-15 | Stop reason: HOSPADM

## 2021-04-14 RX ORDER — METRONIDAZOLE 500 MG/1
500 TABLET ORAL EVERY 8 HOURS
Status: DISCONTINUED | OUTPATIENT
Start: 2021-04-14 | End: 2021-04-15 | Stop reason: HOSPADM

## 2021-04-14 RX ORDER — FENTANYL CITRATE 50 UG/ML
INJECTION, SOLUTION INTRAMUSCULAR; INTRAVENOUS
Status: DISCONTINUED | OUTPATIENT
Start: 2021-04-14 | End: 2021-04-14 | Stop reason: HOSPADM

## 2021-04-14 RX ORDER — HEPARIN SODIUM 1000 [USP'U]/ML
INJECTION INTRAVENOUS; SUBCUTANEOUS
Status: DISCONTINUED | OUTPATIENT
Start: 2021-04-14 | End: 2021-04-14 | Stop reason: HOSPADM

## 2021-04-14 RX ORDER — SODIUM CHLORIDE 450 MG/100ML
INJECTION, SOLUTION INTRAVENOUS CONTINUOUS
Status: DISCONTINUED | OUTPATIENT
Start: 2021-04-14 | End: 2021-04-15 | Stop reason: HOSPADM

## 2021-04-14 RX ORDER — HYDROCODONE BITARTRATE AND ACETAMINOPHEN 5; 325 MG/1; MG/1
1 TABLET ORAL EVERY 4 HOURS PRN
Status: DISCONTINUED | OUTPATIENT
Start: 2021-04-14 | End: 2021-04-15 | Stop reason: HOSPADM

## 2021-04-14 RX ADMIN — CEFEPIME HYDROCHLORIDE 2 G: 2 INJECTION, SOLUTION INTRAVENOUS at 05:04

## 2021-04-14 RX ADMIN — SODIUM CHLORIDE: 450 INJECTION, SOLUTION INTRAVENOUS at 02:04

## 2021-04-14 RX ADMIN — METRONIDAZOLE 500 MG: 500 TABLET ORAL at 05:04

## 2021-04-14 RX ADMIN — Medication 6 MG: at 08:04

## 2021-04-14 RX ADMIN — AMLODIPINE BESYLATE 5 MG: 5 TABLET ORAL at 08:04

## 2021-04-14 RX ADMIN — CLOPIDOGREL BISULFATE 300 MG: 300 TABLET, FILM COATED ORAL at 03:04

## 2021-04-14 RX ADMIN — GABAPENTIN 600 MG: 300 CAPSULE ORAL at 08:04

## 2021-04-14 RX ADMIN — DULOXETINE HYDROCHLORIDE 30 MG: 30 CAPSULE, DELAYED RELEASE ORAL at 08:04

## 2021-04-14 RX ADMIN — VANCOMYCIN HYDROCHLORIDE 1250 MG: 1.25 INJECTION, POWDER, LYOPHILIZED, FOR SOLUTION INTRAVENOUS at 08:04

## 2021-04-14 RX ADMIN — LISINOPRIL 40 MG: 20 TABLET ORAL at 08:04

## 2021-04-14 RX ADMIN — METRONIDAZOLE 500 MG: 500 TABLET ORAL at 10:04

## 2021-04-14 RX ADMIN — TRAMADOL HYDROCHLORIDE 50 MG: 50 TABLET, FILM COATED ORAL at 08:04

## 2021-04-15 ENCOUNTER — ANESTHESIA EVENT (OUTPATIENT)
Dept: SURGERY | Facility: HOSPITAL | Age: 76
DRG: 504 | End: 2021-04-15
Payer: MEDICARE

## 2021-04-15 ENCOUNTER — TELEPHONE (OUTPATIENT)
Dept: INTERNAL MEDICINE | Facility: CLINIC | Age: 76
End: 2021-04-15

## 2021-04-15 ENCOUNTER — ANESTHESIA (OUTPATIENT)
Dept: SURGERY | Facility: HOSPITAL | Age: 76
DRG: 504 | End: 2021-04-15
Payer: MEDICARE

## 2021-04-15 VITALS
RESPIRATION RATE: 18 BRPM | BODY MASS INDEX: 18.75 KG/M2 | TEMPERATURE: 98 F | HEART RATE: 79 BPM | OXYGEN SATURATION: 95 % | HEIGHT: 62 IN | WEIGHT: 101.88 LBS | DIASTOLIC BLOOD PRESSURE: 56 MMHG | SYSTOLIC BLOOD PRESSURE: 111 MMHG

## 2021-04-15 LAB
BASOPHILS # BLD AUTO: 0.08 K/UL (ref 0–0.2)
BASOPHILS NFR BLD: 1.1 % (ref 0–1.9)
CREAT SERPL-MCNC: 0.7 MG/DL (ref 0.5–1.4)
DIFFERENTIAL METHOD: ABNORMAL
EOSINOPHIL # BLD AUTO: 0.2 K/UL (ref 0–0.5)
EOSINOPHIL NFR BLD: 2.9 % (ref 0–8)
ERYTHROCYTE [DISTWIDTH] IN BLOOD BY AUTOMATED COUNT: 11.7 % (ref 11.5–14.5)
EST. GFR  (AFRICAN AMERICAN): >60 ML/MIN/1.73 M^2
EST. GFR  (NON AFRICAN AMERICAN): >60 ML/MIN/1.73 M^2
HCT VFR BLD AUTO: 28.8 % (ref 37–48.5)
HGB BLD-MCNC: 9.1 G/DL (ref 12–16)
IMM GRANULOCYTES # BLD AUTO: 0.02 K/UL (ref 0–0.04)
IMM GRANULOCYTES NFR BLD AUTO: 0.3 % (ref 0–0.5)
LYMPHOCYTES # BLD AUTO: 1.7 K/UL (ref 1–4.8)
LYMPHOCYTES NFR BLD: 23.6 % (ref 18–48)
MCH RBC QN AUTO: 33.7 PG (ref 27–31)
MCHC RBC AUTO-ENTMCNC: 31.6 G/DL (ref 32–36)
MCV RBC AUTO: 107 FL (ref 82–98)
MONOCYTES # BLD AUTO: 0.9 K/UL (ref 0.3–1)
MONOCYTES NFR BLD: 12.2 % (ref 4–15)
NEUTROPHILS # BLD AUTO: 4.3 K/UL (ref 1.8–7.7)
NEUTROPHILS NFR BLD: 59.9 % (ref 38–73)
NRBC BLD-RTO: 0 /100 WBC
PLATELET # BLD AUTO: 176 K/UL (ref 150–450)
PMV BLD AUTO: 9.1 FL (ref 9.2–12.9)
RBC # BLD AUTO: 2.7 M/UL (ref 4–5.4)
SARS-COV-2 RDRP RESP QL NAA+PROBE: NEGATIVE
WBC # BLD AUTO: 7.13 K/UL (ref 3.9–12.7)

## 2021-04-15 PROCEDURE — 87070 CULTURE OTHR SPECIMN AEROBIC: CPT | Performed by: PODIATRIST

## 2021-04-15 PROCEDURE — 27201423 OPTIME MED/SURG SUP & DEVICES STERILE SUPPLY: Performed by: PODIATRIST

## 2021-04-15 PROCEDURE — 25000003 PHARM REV CODE 250: Performed by: SURGERY

## 2021-04-15 PROCEDURE — 88305 TISSUE EXAM BY PATHOLOGIST: CPT | Mod: 26,,, | Performed by: PATHOLOGY

## 2021-04-15 PROCEDURE — 88311 DECALCIFY TISSUE: CPT | Mod: 26,,, | Performed by: PATHOLOGY

## 2021-04-15 PROCEDURE — 88305 TISSUE EXAM BY PATHOLOGIST: ICD-10-PCS | Mod: 26,,, | Performed by: PATHOLOGY

## 2021-04-15 PROCEDURE — 36000706: Performed by: PODIATRIST

## 2021-04-15 PROCEDURE — 99233 SBSQ HOSP IP/OBS HIGH 50: CPT | Mod: ,,, | Performed by: PODIATRIST

## 2021-04-15 PROCEDURE — 36000707: Performed by: PODIATRIST

## 2021-04-15 PROCEDURE — 37000009 HC ANESTHESIA EA ADD 15 MINS: Performed by: PODIATRIST

## 2021-04-15 PROCEDURE — 71000033 HC RECOVERY, INTIAL HOUR: Performed by: PODIATRIST

## 2021-04-15 PROCEDURE — 88311 PR  DECALCIFY TISSUE: ICD-10-PCS | Mod: 26,,, | Performed by: PATHOLOGY

## 2021-04-15 PROCEDURE — 88311 DECALCIFY TISSUE: CPT | Performed by: PATHOLOGY

## 2021-04-15 PROCEDURE — 87075 CULTR BACTERIA EXCEPT BLOOD: CPT | Performed by: PODIATRIST

## 2021-04-15 PROCEDURE — 25000003 PHARM REV CODE 250: Performed by: NURSE ANESTHETIST, CERTIFIED REGISTERED

## 2021-04-15 PROCEDURE — 28820 PR AMPUTATION TOE,MT-P JT: ICD-10-PCS | Mod: TA,,, | Performed by: PODIATRIST

## 2021-04-15 PROCEDURE — 37000008 HC ANESTHESIA 1ST 15 MINUTES: Performed by: PODIATRIST

## 2021-04-15 PROCEDURE — 99233 PR SUBSEQUENT HOSPITAL CARE,LEVL III: ICD-10-PCS | Mod: ,,, | Performed by: PODIATRIST

## 2021-04-15 PROCEDURE — 88305 TISSUE EXAM BY PATHOLOGIST: CPT | Performed by: PATHOLOGY

## 2021-04-15 PROCEDURE — 63600175 PHARM REV CODE 636 W HCPCS: Performed by: NURSE ANESTHETIST, CERTIFIED REGISTERED

## 2021-04-15 PROCEDURE — U0002 COVID-19 LAB TEST NON-CDC: HCPCS | Performed by: FAMILY MEDICINE

## 2021-04-15 PROCEDURE — 25000003 PHARM REV CODE 250: Performed by: INTERNAL MEDICINE

## 2021-04-15 PROCEDURE — 63600175 PHARM REV CODE 636 W HCPCS: Performed by: SURGERY

## 2021-04-15 PROCEDURE — 25000003 PHARM REV CODE 250: Performed by: PODIATRIST

## 2021-04-15 PROCEDURE — 36415 COLL VENOUS BLD VENIPUNCTURE: CPT | Performed by: SURGERY

## 2021-04-15 PROCEDURE — 82565 ASSAY OF CREATININE: CPT | Performed by: SURGERY

## 2021-04-15 PROCEDURE — 28820 AMPUTATION OF TOE: CPT | Mod: TA,,, | Performed by: PODIATRIST

## 2021-04-15 RX ORDER — HYDROMORPHONE HYDROCHLORIDE 2 MG/ML
0.2 INJECTION, SOLUTION INTRAMUSCULAR; INTRAVENOUS; SUBCUTANEOUS EVERY 5 MIN PRN
Status: DISCONTINUED | OUTPATIENT
Start: 2021-04-15 | End: 2021-04-15 | Stop reason: HOSPADM

## 2021-04-15 RX ORDER — METOCLOPRAMIDE HYDROCHLORIDE 5 MG/ML
10 INJECTION INTRAMUSCULAR; INTRAVENOUS EVERY 10 MIN PRN
Status: DISCONTINUED | OUTPATIENT
Start: 2021-04-15 | End: 2021-04-15 | Stop reason: HOSPADM

## 2021-04-15 RX ORDER — ONDANSETRON 2 MG/ML
INJECTION INTRAMUSCULAR; INTRAVENOUS
Status: DISCONTINUED | OUTPATIENT
Start: 2021-04-15 | End: 2021-04-15

## 2021-04-15 RX ORDER — LIDOCAINE HYDROCHLORIDE 10 MG/ML
INJECTION, SOLUTION EPIDURAL; INFILTRATION; INTRACAUDAL; PERINEURAL
Status: DISCONTINUED | OUTPATIENT
Start: 2021-04-15 | End: 2021-04-15

## 2021-04-15 RX ORDER — SODIUM CHLORIDE 0.9 % (FLUSH) 0.9 %
3 SYRINGE (ML) INJECTION EVERY 8 HOURS
Status: DISCONTINUED | OUTPATIENT
Start: 2021-04-15 | End: 2021-04-15 | Stop reason: HOSPADM

## 2021-04-15 RX ORDER — DIPHENHYDRAMINE HYDROCHLORIDE 50 MG/ML
25 INJECTION INTRAMUSCULAR; INTRAVENOUS EVERY 6 HOURS PRN
Status: DISCONTINUED | OUTPATIENT
Start: 2021-04-15 | End: 2021-04-15 | Stop reason: HOSPADM

## 2021-04-15 RX ORDER — EPHEDRINE SULFATE 50 MG/ML
INJECTION, SOLUTION INTRAVENOUS
Status: DISCONTINUED | OUTPATIENT
Start: 2021-04-15 | End: 2021-04-15

## 2021-04-15 RX ORDER — AMOXICILLIN AND CLAVULANATE POTASSIUM 875; 125 MG/1; MG/1
1 TABLET, FILM COATED ORAL 2 TIMES DAILY
Qty: 20 TABLET | Refills: 0 | Status: SHIPPED | OUTPATIENT
Start: 2021-04-15 | End: 2021-04-25

## 2021-04-15 RX ORDER — MEPERIDINE HYDROCHLORIDE 25 MG/ML
12.5 INJECTION INTRAMUSCULAR; INTRAVENOUS; SUBCUTANEOUS ONCE AS NEEDED
Status: DISCONTINUED | OUTPATIENT
Start: 2021-04-15 | End: 2021-04-15 | Stop reason: HOSPADM

## 2021-04-15 RX ORDER — LIDOCAINE HYDROCHLORIDE 10 MG/ML
INJECTION, SOLUTION EPIDURAL; INFILTRATION; INTRACAUDAL; PERINEURAL
Status: DISCONTINUED | OUTPATIENT
Start: 2021-04-15 | End: 2021-04-15 | Stop reason: HOSPADM

## 2021-04-15 RX ORDER — CLOPIDOGREL BISULFATE 75 MG/1
75 TABLET ORAL DAILY
Qty: 30 TABLET | Refills: 0 | Status: SHIPPED | OUTPATIENT
Start: 2021-04-15 | End: 2021-04-20 | Stop reason: SDUPTHER

## 2021-04-15 RX ORDER — HYDROCODONE BITARTRATE AND ACETAMINOPHEN 5; 325 MG/1; MG/1
1 TABLET ORAL EVERY 4 HOURS PRN
Status: CANCELLED | OUTPATIENT
Start: 2021-04-15

## 2021-04-15 RX ORDER — FENTANYL CITRATE 50 UG/ML
INJECTION, SOLUTION INTRAMUSCULAR; INTRAVENOUS
Status: DISCONTINUED | OUTPATIENT
Start: 2021-04-15 | End: 2021-04-15

## 2021-04-15 RX ORDER — PROPOFOL 10 MG/ML
VIAL (ML) INTRAVENOUS
Status: DISCONTINUED | OUTPATIENT
Start: 2021-04-15 | End: 2021-04-15

## 2021-04-15 RX ORDER — BUPIVACAINE HYDROCHLORIDE AND EPINEPHRINE 5; 5 MG/ML; UG/ML
INJECTION, SOLUTION EPIDURAL; INTRACAUDAL; PERINEURAL
Status: DISCONTINUED | OUTPATIENT
Start: 2021-04-15 | End: 2021-04-15 | Stop reason: HOSPADM

## 2021-04-15 RX ADMIN — PROPOFOL 20 MG: 10 INJECTION, EMULSION INTRAVENOUS at 10:04

## 2021-04-15 RX ADMIN — PROPOFOL 20 MG: 10 INJECTION, EMULSION INTRAVENOUS at 09:04

## 2021-04-15 RX ADMIN — SODIUM CHLORIDE, SODIUM LACTATE, POTASSIUM CHLORIDE, AND CALCIUM CHLORIDE: .6; .31; .03; .02 INJECTION, SOLUTION INTRAVENOUS at 09:04

## 2021-04-15 RX ADMIN — DIPHENOXYLATE HYDROCHLORIDE AND ATROPINE SULFATE 1 TABLET: 2.5; .025 TABLET ORAL at 07:04

## 2021-04-15 RX ADMIN — EPHEDRINE SULFATE 15 MG: 50 INJECTION INTRAVENOUS at 09:04

## 2021-04-15 RX ADMIN — CLOPIDOGREL 75 MG: 75 TABLET, FILM COATED ORAL at 07:04

## 2021-04-15 RX ADMIN — HYDROCODONE BITARTRATE AND ACETAMINOPHEN 1 TABLET: 5; 325 TABLET ORAL at 01:04

## 2021-04-15 RX ADMIN — AMLODIPINE BESYLATE 5 MG: 5 TABLET ORAL at 07:04

## 2021-04-15 RX ADMIN — HYDROCODONE BITARTRATE AND ACETAMINOPHEN 1 TABLET: 5; 325 TABLET ORAL at 04:04

## 2021-04-15 RX ADMIN — GABAPENTIN 600 MG: 300 CAPSULE ORAL at 07:04

## 2021-04-15 RX ADMIN — PROPOFOL 30 MG: 10 INJECTION, EMULSION INTRAVENOUS at 09:04

## 2021-04-15 RX ADMIN — EPHEDRINE SULFATE 25 MG: 50 INJECTION INTRAVENOUS at 09:04

## 2021-04-15 RX ADMIN — FENTANYL CITRATE 50 MCG: 50 INJECTION, SOLUTION INTRAMUSCULAR; INTRAVENOUS at 09:04

## 2021-04-15 RX ADMIN — CEFEPIME HYDROCHLORIDE 2 G: 2 INJECTION, SOLUTION INTRAVENOUS at 04:04

## 2021-04-15 RX ADMIN — PROPOFOL 40 MG: 10 INJECTION, EMULSION INTRAVENOUS at 09:04

## 2021-04-15 RX ADMIN — METRONIDAZOLE 500 MG: 500 TABLET ORAL at 04:04

## 2021-04-15 RX ADMIN — EPHEDRINE SULFATE 10 MG: 50 INJECTION INTRAVENOUS at 10:04

## 2021-04-15 RX ADMIN — LIDOCAINE HYDROCHLORIDE 100 MG: 10 INJECTION, SOLUTION EPIDURAL; INFILTRATION; INTRACAUDAL; PERINEURAL at 09:04

## 2021-04-15 RX ADMIN — DULOXETINE HYDROCHLORIDE 30 MG: 30 CAPSULE, DELAYED RELEASE ORAL at 07:04

## 2021-04-15 RX ADMIN — ONDANSETRON 4 MG: 2 INJECTION, SOLUTION INTRAMUSCULAR; INTRAVENOUS at 09:04

## 2021-04-15 RX ADMIN — LISINOPRIL 40 MG: 20 TABLET ORAL at 07:04

## 2021-04-16 ENCOUNTER — PATIENT OUTREACH (OUTPATIENT)
Dept: ADMINISTRATIVE | Facility: CLINIC | Age: 76
End: 2021-04-16

## 2021-04-19 LAB — BACTERIA SPEC AEROBE CULT: NO GROWTH

## 2021-04-20 ENCOUNTER — OFFICE VISIT (OUTPATIENT)
Dept: INTERNAL MEDICINE | Facility: CLINIC | Age: 76
End: 2021-04-20
Payer: MEDICARE

## 2021-04-20 VITALS
DIASTOLIC BLOOD PRESSURE: 88 MMHG | BODY MASS INDEX: 18.63 KG/M2 | HEART RATE: 78 BPM | SYSTOLIC BLOOD PRESSURE: 162 MMHG | RESPIRATION RATE: 18 BRPM | HEIGHT: 62 IN | TEMPERATURE: 97 F | OXYGEN SATURATION: 98 %

## 2021-04-20 DIAGNOSIS — F41.1 GAD (GENERALIZED ANXIETY DISORDER): ICD-10-CM

## 2021-04-20 DIAGNOSIS — M86.9 OSTEOMYELITIS OF GREAT TOE: ICD-10-CM

## 2021-04-20 DIAGNOSIS — Z72.0 TOBACCO ABUSE DISORDER: ICD-10-CM

## 2021-04-20 DIAGNOSIS — Z09 HOSPITAL DISCHARGE FOLLOW-UP: Primary | ICD-10-CM

## 2021-04-20 DIAGNOSIS — I10 ESSENTIAL HYPERTENSION: Chronic | ICD-10-CM

## 2021-04-20 DIAGNOSIS — S98.112A AMPUTATION OF LEFT GREAT TOE: ICD-10-CM

## 2021-04-20 DIAGNOSIS — I73.9 PVD (PERIPHERAL VASCULAR DISEASE): ICD-10-CM

## 2021-04-20 PROCEDURE — 3288F FALL RISK ASSESSMENT DOCD: CPT | Mod: CPTII,S$GLB,, | Performed by: NURSE PRACTITIONER

## 2021-04-20 PROCEDURE — 99999 PR PBB SHADOW E&M-EST. PATIENT-LVL IV: CPT | Mod: PBBFAC,,, | Performed by: NURSE PRACTITIONER

## 2021-04-20 PROCEDURE — 99999 PR PBB SHADOW E&M-EST. PATIENT-LVL IV: ICD-10-PCS | Mod: PBBFAC,,, | Performed by: NURSE PRACTITIONER

## 2021-04-20 PROCEDURE — 99496 TRANSJ CARE MGMT HIGH F2F 7D: CPT | Mod: S$GLB,,, | Performed by: NURSE PRACTITIONER

## 2021-04-20 PROCEDURE — 1126F AMNT PAIN NOTED NONE PRSNT: CPT | Mod: S$GLB,,, | Performed by: NURSE PRACTITIONER

## 2021-04-20 PROCEDURE — 1101F PR PT FALLS ASSESS DOC 0-1 FALLS W/OUT INJ PAST YR: ICD-10-PCS | Mod: CPTII,S$GLB,, | Performed by: NURSE PRACTITIONER

## 2021-04-20 PROCEDURE — 99496 TRANSITIONAL CARE MANAGE SERVICE 7 DAY DISCHARGE: ICD-10-PCS | Mod: S$GLB,,, | Performed by: NURSE PRACTITIONER

## 2021-04-20 PROCEDURE — 1101F PT FALLS ASSESS-DOCD LE1/YR: CPT | Mod: CPTII,S$GLB,, | Performed by: NURSE PRACTITIONER

## 2021-04-20 PROCEDURE — 3288F PR FALLS RISK ASSESSMENT DOCUMENTED: ICD-10-PCS | Mod: CPTII,S$GLB,, | Performed by: NURSE PRACTITIONER

## 2021-04-20 PROCEDURE — 1126F PR PAIN SEVERITY QUANTIFIED, NO PAIN PRESENT: ICD-10-PCS | Mod: S$GLB,,, | Performed by: NURSE PRACTITIONER

## 2021-04-20 RX ORDER — BUSPIRONE HYDROCHLORIDE 10 MG/1
10 TABLET ORAL 2 TIMES DAILY
Qty: 60 TABLET | Refills: 11 | Status: SHIPPED | OUTPATIENT
Start: 2021-04-20 | End: 2021-04-29 | Stop reason: SDUPTHER

## 2021-04-20 RX ORDER — ROSUVASTATIN CALCIUM 5 MG/1
5 TABLET, COATED ORAL DAILY
Qty: 90 TABLET | Refills: 3 | Status: SHIPPED | OUTPATIENT
Start: 2021-04-20 | End: 2022-04-11

## 2021-04-20 RX ORDER — CLOPIDOGREL BISULFATE 75 MG/1
75 TABLET ORAL DAILY
Qty: 90 TABLET | Refills: 3 | Status: SHIPPED | OUTPATIENT
Start: 2021-04-20 | End: 2021-05-25 | Stop reason: SDUPTHER

## 2021-04-21 ENCOUNTER — OFFICE VISIT (OUTPATIENT)
Dept: PODIATRY | Facility: CLINIC | Age: 76
End: 2021-04-21
Payer: MEDICARE

## 2021-04-21 VITALS — BODY MASS INDEX: 18.75 KG/M2 | WEIGHT: 101.88 LBS | HEIGHT: 62 IN

## 2021-04-21 DIAGNOSIS — I73.9 PVD (PERIPHERAL VASCULAR DISEASE): ICD-10-CM

## 2021-04-21 DIAGNOSIS — Z72.0 TOBACCO ABUSE DISORDER: ICD-10-CM

## 2021-04-21 DIAGNOSIS — Z09 SURGERY FOLLOW-UP EXAMINATION: Primary | ICD-10-CM

## 2021-04-21 PROCEDURE — 1101F PR PT FALLS ASSESS DOC 0-1 FALLS W/OUT INJ PAST YR: ICD-10-PCS | Mod: CPTII,S$GLB,, | Performed by: PODIATRIST

## 2021-04-21 PROCEDURE — 99024 PR POST-OP FOLLOW-UP VISIT: ICD-10-PCS | Mod: S$GLB,,, | Performed by: PODIATRIST

## 2021-04-21 PROCEDURE — 99024 POSTOP FOLLOW-UP VISIT: CPT | Mod: S$GLB,,, | Performed by: PODIATRIST

## 2021-04-21 PROCEDURE — 3288F PR FALLS RISK ASSESSMENT DOCUMENTED: ICD-10-PCS | Mod: CPTII,S$GLB,, | Performed by: PODIATRIST

## 2021-04-21 PROCEDURE — 1101F PT FALLS ASSESS-DOCD LE1/YR: CPT | Mod: CPTII,S$GLB,, | Performed by: PODIATRIST

## 2021-04-21 PROCEDURE — 99999 PR PBB SHADOW E&M-EST. PATIENT-LVL IV: ICD-10-PCS | Mod: PBBFAC,,, | Performed by: PODIATRIST

## 2021-04-21 PROCEDURE — 3288F FALL RISK ASSESSMENT DOCD: CPT | Mod: CPTII,S$GLB,, | Performed by: PODIATRIST

## 2021-04-21 PROCEDURE — 99999 PR PBB SHADOW E&M-EST. PATIENT-LVL IV: CPT | Mod: PBBFAC,,, | Performed by: PODIATRIST

## 2021-04-22 LAB
BACTERIA SPEC ANAEROBE CULT: NORMAL
FINAL PATHOLOGIC DIAGNOSIS: NORMAL
GROSS: NORMAL
Lab: NORMAL

## 2021-04-26 ENCOUNTER — EXTERNAL HOME HEALTH (OUTPATIENT)
Dept: HOME HEALTH SERVICES | Facility: HOSPITAL | Age: 76
End: 2021-04-26
Payer: MEDICARE

## 2021-04-27 ENCOUNTER — TELEPHONE (OUTPATIENT)
Dept: INTERNAL MEDICINE | Facility: CLINIC | Age: 76
End: 2021-04-27

## 2021-04-28 ENCOUNTER — OFFICE VISIT (OUTPATIENT)
Dept: PODIATRY | Facility: CLINIC | Age: 76
End: 2021-04-28
Payer: MEDICARE

## 2021-04-28 VITALS — HEIGHT: 62 IN | BODY MASS INDEX: 18.75 KG/M2 | WEIGHT: 101.88 LBS

## 2021-04-28 DIAGNOSIS — I73.9 PVD (PERIPHERAL VASCULAR DISEASE): ICD-10-CM

## 2021-04-28 DIAGNOSIS — Z09 SURGERY FOLLOW-UP EXAMINATION: Primary | ICD-10-CM

## 2021-04-28 PROCEDURE — 1101F PT FALLS ASSESS-DOCD LE1/YR: CPT | Mod: CPTII,S$GLB,, | Performed by: PODIATRIST

## 2021-04-28 PROCEDURE — 99024 POSTOP FOLLOW-UP VISIT: CPT | Mod: S$GLB,,, | Performed by: PODIATRIST

## 2021-04-28 PROCEDURE — 3288F PR FALLS RISK ASSESSMENT DOCUMENTED: ICD-10-PCS | Mod: CPTII,S$GLB,, | Performed by: PODIATRIST

## 2021-04-28 PROCEDURE — 99024 PR POST-OP FOLLOW-UP VISIT: ICD-10-PCS | Mod: S$GLB,,, | Performed by: PODIATRIST

## 2021-04-28 PROCEDURE — 1101F PR PT FALLS ASSESS DOC 0-1 FALLS W/OUT INJ PAST YR: ICD-10-PCS | Mod: CPTII,S$GLB,, | Performed by: PODIATRIST

## 2021-04-28 PROCEDURE — 3288F FALL RISK ASSESSMENT DOCD: CPT | Mod: CPTII,S$GLB,, | Performed by: PODIATRIST

## 2021-04-28 PROCEDURE — 99999 PR PBB SHADOW E&M-EST. PATIENT-LVL III: CPT | Mod: PBBFAC,,, | Performed by: PODIATRIST

## 2021-04-28 PROCEDURE — 99999 PR PBB SHADOW E&M-EST. PATIENT-LVL III: ICD-10-PCS | Mod: PBBFAC,,, | Performed by: PODIATRIST

## 2021-04-29 RX ORDER — BUSPIRONE HYDROCHLORIDE 10 MG/1
10 TABLET ORAL 2 TIMES DAILY
Qty: 60 TABLET | Refills: 11 | Status: SHIPPED | OUTPATIENT
Start: 2021-04-29 | End: 2022-07-06

## 2021-05-04 ENCOUNTER — OFFICE VISIT (OUTPATIENT)
Dept: INTERNAL MEDICINE | Facility: CLINIC | Age: 76
End: 2021-05-04
Payer: MEDICARE

## 2021-05-04 VITALS
TEMPERATURE: 98 F | BODY MASS INDEX: 18.3 KG/M2 | HEART RATE: 72 BPM | DIASTOLIC BLOOD PRESSURE: 60 MMHG | HEIGHT: 62 IN | WEIGHT: 99.44 LBS | SYSTOLIC BLOOD PRESSURE: 114 MMHG | RESPIRATION RATE: 18 BRPM

## 2021-05-04 DIAGNOSIS — I10 ESSENTIAL HYPERTENSION: ICD-10-CM

## 2021-05-04 DIAGNOSIS — J44.9 CHRONIC OBSTRUCTIVE PULMONARY DISEASE, UNSPECIFIED COPD TYPE: ICD-10-CM

## 2021-05-04 DIAGNOSIS — F41.1 GAD (GENERALIZED ANXIETY DISORDER): ICD-10-CM

## 2021-05-04 DIAGNOSIS — Z79.899 OTHER LONG TERM (CURRENT) DRUG THERAPY: ICD-10-CM

## 2021-05-04 DIAGNOSIS — I73.9 PVD (PERIPHERAL VASCULAR DISEASE): Primary | ICD-10-CM

## 2021-05-04 LAB
POC ACTIVATED CLOTTING TIME K: 158 SEC (ref 74–137)
SAMPLE: ABNORMAL

## 2021-05-04 PROCEDURE — 1159F PR MEDICATION LIST DOCUMENTED IN MEDICAL RECORD: ICD-10-PCS | Mod: S$GLB,,, | Performed by: NURSE PRACTITIONER

## 2021-05-04 PROCEDURE — 99499 UNLISTED E&M SERVICE: CPT | Mod: S$GLB,,, | Performed by: NURSE PRACTITIONER

## 2021-05-04 PROCEDURE — 1101F PT FALLS ASSESS-DOCD LE1/YR: CPT | Mod: CPTII,S$GLB,, | Performed by: NURSE PRACTITIONER

## 2021-05-04 PROCEDURE — 99214 OFFICE O/P EST MOD 30 MIN: CPT | Mod: S$GLB,,, | Performed by: NURSE PRACTITIONER

## 2021-05-04 PROCEDURE — 99499 RISK ADDL DX/OHS AUDIT: ICD-10-PCS | Mod: S$GLB,,, | Performed by: NURSE PRACTITIONER

## 2021-05-04 PROCEDURE — 1101F PR PT FALLS ASSESS DOC 0-1 FALLS W/OUT INJ PAST YR: ICD-10-PCS | Mod: CPTII,S$GLB,, | Performed by: NURSE PRACTITIONER

## 2021-05-04 PROCEDURE — 1126F PR PAIN SEVERITY QUANTIFIED, NO PAIN PRESENT: ICD-10-PCS | Mod: S$GLB,,, | Performed by: NURSE PRACTITIONER

## 2021-05-04 PROCEDURE — 99999 PR PBB SHADOW E&M-EST. PATIENT-LVL IV: CPT | Mod: PBBFAC,,, | Performed by: NURSE PRACTITIONER

## 2021-05-04 PROCEDURE — 99214 PR OFFICE/OUTPT VISIT, EST, LEVL IV, 30-39 MIN: ICD-10-PCS | Mod: S$GLB,,, | Performed by: NURSE PRACTITIONER

## 2021-05-04 PROCEDURE — 3288F PR FALLS RISK ASSESSMENT DOCUMENTED: ICD-10-PCS | Mod: CPTII,S$GLB,, | Performed by: NURSE PRACTITIONER

## 2021-05-04 PROCEDURE — 1159F MED LIST DOCD IN RCRD: CPT | Mod: S$GLB,,, | Performed by: NURSE PRACTITIONER

## 2021-05-04 PROCEDURE — 99999 PR PBB SHADOW E&M-EST. PATIENT-LVL IV: ICD-10-PCS | Mod: PBBFAC,,, | Performed by: NURSE PRACTITIONER

## 2021-05-04 PROCEDURE — 1126F AMNT PAIN NOTED NONE PRSNT: CPT | Mod: S$GLB,,, | Performed by: NURSE PRACTITIONER

## 2021-05-04 PROCEDURE — 3288F FALL RISK ASSESSMENT DOCD: CPT | Mod: CPTII,S$GLB,, | Performed by: NURSE PRACTITIONER

## 2021-05-05 ENCOUNTER — OFFICE VISIT (OUTPATIENT)
Dept: PODIATRY | Facility: CLINIC | Age: 76
End: 2021-05-05
Payer: MEDICARE

## 2021-05-05 VITALS — BODY MASS INDEX: 18.3 KG/M2 | HEIGHT: 62 IN | WEIGHT: 99.44 LBS

## 2021-05-05 DIAGNOSIS — Z09 SURGERY FOLLOW-UP EXAMINATION: Primary | ICD-10-CM

## 2021-05-05 PROCEDURE — 99024 PR POST-OP FOLLOW-UP VISIT: ICD-10-PCS | Mod: S$GLB,,, | Performed by: PODIATRIST

## 2021-05-05 PROCEDURE — 99999 PR PBB SHADOW E&M-EST. PATIENT-LVL III: ICD-10-PCS | Mod: PBBFAC,,, | Performed by: PODIATRIST

## 2021-05-05 PROCEDURE — 99999 PR PBB SHADOW E&M-EST. PATIENT-LVL III: CPT | Mod: PBBFAC,,, | Performed by: PODIATRIST

## 2021-05-05 PROCEDURE — 99024 POSTOP FOLLOW-UP VISIT: CPT | Mod: S$GLB,,, | Performed by: PODIATRIST

## 2021-05-18 ENCOUNTER — TELEPHONE (OUTPATIENT)
Dept: VASCULAR SURGERY | Facility: CLINIC | Age: 76
End: 2021-05-18

## 2021-05-25 RX ORDER — DENOSUMAB 60 MG/ML
INJECTION SUBCUTANEOUS
COMMUNITY
Start: 2021-02-19 | End: 2023-03-06

## 2021-05-25 RX ORDER — CLOPIDOGREL BISULFATE 75 MG/1
75 TABLET ORAL DAILY
Qty: 90 TABLET | Refills: 3 | Status: SHIPPED | OUTPATIENT
Start: 2021-05-25 | End: 2021-08-23

## 2021-05-26 ENCOUNTER — OFFICE VISIT (OUTPATIENT)
Dept: PODIATRY | Facility: CLINIC | Age: 76
End: 2021-05-26
Payer: MEDICARE

## 2021-05-26 VITALS — BODY MASS INDEX: 18.3 KG/M2 | HEIGHT: 62 IN | WEIGHT: 99.44 LBS

## 2021-05-26 DIAGNOSIS — Z71.6 TOBACCO ABUSE COUNSELING: ICD-10-CM

## 2021-05-26 DIAGNOSIS — Z89.412 STATUS POST AMPUTATION OF GREAT TOE, LEFT: ICD-10-CM

## 2021-05-26 DIAGNOSIS — I73.9 PVD (PERIPHERAL VASCULAR DISEASE): Primary | ICD-10-CM

## 2021-05-26 PROCEDURE — 99499 RISK ADDL DX/OHS AUDIT: ICD-10-PCS | Mod: S$GLB,,, | Performed by: PODIATRIST

## 2021-05-26 PROCEDURE — 99999 PR PBB SHADOW E&M-EST. PATIENT-LVL IV: ICD-10-PCS | Mod: PBBFAC,,, | Performed by: PODIATRIST

## 2021-05-26 PROCEDURE — 3288F PR FALLS RISK ASSESSMENT DOCUMENTED: ICD-10-PCS | Mod: CPTII,S$GLB,, | Performed by: PODIATRIST

## 2021-05-26 PROCEDURE — 99024 POSTOP FOLLOW-UP VISIT: CPT | Mod: S$GLB,,, | Performed by: PODIATRIST

## 2021-05-26 PROCEDURE — 1101F PT FALLS ASSESS-DOCD LE1/YR: CPT | Mod: CPTII,S$GLB,, | Performed by: PODIATRIST

## 2021-05-26 PROCEDURE — 1101F PR PT FALLS ASSESS DOC 0-1 FALLS W/OUT INJ PAST YR: ICD-10-PCS | Mod: CPTII,S$GLB,, | Performed by: PODIATRIST

## 2021-05-26 PROCEDURE — 99999 PR PBB SHADOW E&M-EST. PATIENT-LVL IV: CPT | Mod: PBBFAC,,, | Performed by: PODIATRIST

## 2021-05-26 PROCEDURE — 99024 PR POST-OP FOLLOW-UP VISIT: ICD-10-PCS | Mod: S$GLB,,, | Performed by: PODIATRIST

## 2021-05-26 PROCEDURE — 99499 UNLISTED E&M SERVICE: CPT | Mod: S$GLB,,, | Performed by: PODIATRIST

## 2021-05-26 PROCEDURE — 3288F FALL RISK ASSESSMENT DOCD: CPT | Mod: CPTII,S$GLB,, | Performed by: PODIATRIST

## 2021-05-26 RX ORDER — VARENICLINE TARTRATE 0.5 MG/1
0.5 TABLET, FILM COATED ORAL DAILY
Qty: 30 TABLET | Refills: 1 | Status: SHIPPED | OUTPATIENT
Start: 2021-05-26 | End: 2022-02-21

## 2021-06-01 ENCOUNTER — OFFICE VISIT (OUTPATIENT)
Dept: VASCULAR SURGERY | Facility: CLINIC | Age: 76
End: 2021-06-01
Payer: MEDICARE

## 2021-06-01 VITALS
WEIGHT: 99 LBS | HEART RATE: 72 BPM | HEIGHT: 62 IN | BODY MASS INDEX: 18.22 KG/M2 | SYSTOLIC BLOOD PRESSURE: 131 MMHG | DIASTOLIC BLOOD PRESSURE: 68 MMHG

## 2021-06-01 DIAGNOSIS — M86.672 OTHER CHRONIC OSTEOMYELITIS OF LEFT FOOT: Primary | ICD-10-CM

## 2021-06-01 DIAGNOSIS — I70.0 ATHEROSCLEROSIS OF AORTA: ICD-10-CM

## 2021-06-01 DIAGNOSIS — I73.9 PVD (PERIPHERAL VASCULAR DISEASE): ICD-10-CM

## 2021-06-01 PROCEDURE — 99999 PR PBB SHADOW E&M-EST. PATIENT-LVL IV: CPT | Mod: PBBFAC,,, | Performed by: SURGERY

## 2021-06-01 PROCEDURE — 1126F AMNT PAIN NOTED NONE PRSNT: CPT | Mod: S$GLB,,, | Performed by: SURGERY

## 2021-06-01 PROCEDURE — 99024 POSTOP FOLLOW-UP VISIT: CPT | Mod: S$GLB,,, | Performed by: SURGERY

## 2021-06-01 PROCEDURE — 1126F PR PAIN SEVERITY QUANTIFIED, NO PAIN PRESENT: ICD-10-PCS | Mod: S$GLB,,, | Performed by: SURGERY

## 2021-06-01 PROCEDURE — 99024 PR POST-OP FOLLOW-UP VISIT: ICD-10-PCS | Mod: S$GLB,,, | Performed by: SURGERY

## 2021-06-01 PROCEDURE — 99999 PR PBB SHADOW E&M-EST. PATIENT-LVL IV: ICD-10-PCS | Mod: PBBFAC,,, | Performed by: SURGERY

## 2021-06-02 ENCOUNTER — CLINICAL SUPPORT (OUTPATIENT)
Dept: SMOKING CESSATION | Facility: CLINIC | Age: 76
End: 2021-06-02
Payer: COMMERCIAL

## 2021-06-02 DIAGNOSIS — F17.200 NICOTINE DEPENDENCE: Primary | ICD-10-CM

## 2021-06-02 PROCEDURE — 99404 PREV MED CNSL INDIV APPRX 60: CPT | Mod: S$GLB,,, | Performed by: GENERAL PRACTICE

## 2021-06-02 PROCEDURE — 99999 PR PBB SHADOW E&M-EST. PATIENT-LVL III: CPT | Mod: PBBFAC,,,

## 2021-06-02 PROCEDURE — 99404 PR PREVENT COUNSEL,INDIV,60 MIN: ICD-10-PCS | Mod: S$GLB,,, | Performed by: GENERAL PRACTICE

## 2021-06-02 PROCEDURE — 99999 PR PBB SHADOW E&M-EST. PATIENT-LVL III: ICD-10-PCS | Mod: PBBFAC,,,

## 2021-06-02 RX ORDER — VARENICLINE TARTRATE 0.5 (11)-1
KIT ORAL
Qty: 53 TABLET | Refills: 0 | Status: SHIPPED | OUTPATIENT
Start: 2021-06-02 | End: 2021-06-09 | Stop reason: SDUPTHER

## 2021-06-09 ENCOUNTER — TELEPHONE (OUTPATIENT)
Dept: SMOKING CESSATION | Facility: CLINIC | Age: 76
End: 2021-06-09

## 2021-06-09 ENCOUNTER — CLINICAL SUPPORT (OUTPATIENT)
Dept: SMOKING CESSATION | Facility: CLINIC | Age: 76
End: 2021-06-09
Payer: COMMERCIAL

## 2021-06-09 DIAGNOSIS — F17.200 NICOTINE DEPENDENCE: Primary | ICD-10-CM

## 2021-06-09 PROCEDURE — 99999 PR PBB SHADOW E&M-EST. PATIENT-LVL III: CPT | Mod: PBBFAC,,,

## 2021-06-09 PROCEDURE — 99999 PR PBB SHADOW E&M-EST. PATIENT-LVL III: ICD-10-PCS | Mod: PBBFAC,,,

## 2021-06-09 PROCEDURE — 99402 PREV MED CNSL INDIV APPRX 30: CPT | Mod: S$GLB,,, | Performed by: GENERAL PRACTICE

## 2021-06-09 PROCEDURE — 99402 PR PREVENT COUNSEL,INDIV,30 MIN: ICD-10-PCS | Mod: S$GLB,,, | Performed by: GENERAL PRACTICE

## 2021-06-09 RX ORDER — VARENICLINE TARTRATE 0.5 (11)-1
KIT ORAL
Qty: 53 TABLET | Refills: 0 | Status: SHIPPED | OUTPATIENT
Start: 2021-06-09 | End: 2022-02-21

## 2021-06-14 ENCOUNTER — TELEPHONE (OUTPATIENT)
Dept: PODIATRY | Facility: CLINIC | Age: 76
End: 2021-06-14

## 2021-06-16 ENCOUNTER — CLINICAL SUPPORT (OUTPATIENT)
Dept: SMOKING CESSATION | Facility: CLINIC | Age: 76
End: 2021-06-16
Payer: COMMERCIAL

## 2021-06-16 DIAGNOSIS — F17.200 NICOTINE DEPENDENCE: Primary | ICD-10-CM

## 2021-06-16 PROCEDURE — 99402 PR PREVENT COUNSEL,INDIV,30 MIN: ICD-10-PCS | Mod: S$GLB,,, | Performed by: GENERAL PRACTICE

## 2021-06-16 PROCEDURE — 99999 PR PBB SHADOW E&M-EST. PATIENT-LVL III: CPT | Mod: PBBFAC,,,

## 2021-06-16 PROCEDURE — 99402 PREV MED CNSL INDIV APPRX 30: CPT | Mod: S$GLB,,, | Performed by: GENERAL PRACTICE

## 2021-06-16 PROCEDURE — 99999 PR PBB SHADOW E&M-EST. PATIENT-LVL III: ICD-10-PCS | Mod: PBBFAC,,,

## 2021-06-23 ENCOUNTER — CLINICAL SUPPORT (OUTPATIENT)
Dept: SMOKING CESSATION | Facility: CLINIC | Age: 76
End: 2021-06-23
Payer: COMMERCIAL

## 2021-06-23 DIAGNOSIS — F17.200 NICOTINE DEPENDENCE: Primary | ICD-10-CM

## 2021-06-23 PROCEDURE — 99402 PREV MED CNSL INDIV APPRX 30: CPT | Mod: S$GLB,,, | Performed by: GENERAL PRACTICE

## 2021-06-23 PROCEDURE — 99999 PR PBB SHADOW E&M-EST. PATIENT-LVL III: CPT | Mod: PBBFAC,,,

## 2021-06-23 PROCEDURE — 99402 PR PREVENT COUNSEL,INDIV,30 MIN: ICD-10-PCS | Mod: S$GLB,,, | Performed by: GENERAL PRACTICE

## 2021-06-23 PROCEDURE — 99999 PR PBB SHADOW E&M-EST. PATIENT-LVL III: ICD-10-PCS | Mod: PBBFAC,,,

## 2021-06-23 RX ORDER — VARENICLINE TARTRATE 1 MG/1
1 TABLET, FILM COATED ORAL 2 TIMES DAILY
Qty: 60 TABLET | Refills: 0 | Status: SHIPPED | OUTPATIENT
Start: 2021-06-23 | End: 2022-02-21

## 2021-07-07 ENCOUNTER — CLINICAL SUPPORT (OUTPATIENT)
Dept: SMOKING CESSATION | Facility: CLINIC | Age: 76
End: 2021-07-07
Payer: COMMERCIAL

## 2021-07-07 ENCOUNTER — TELEPHONE (OUTPATIENT)
Dept: INTERNAL MEDICINE | Facility: CLINIC | Age: 76
End: 2021-07-07

## 2021-07-07 DIAGNOSIS — F17.200 NICOTINE DEPENDENCE: Primary | ICD-10-CM

## 2021-07-07 PROCEDURE — 99999 PR PBB SHADOW E&M-EST. PATIENT-LVL III: CPT | Mod: PBBFAC,,,

## 2021-07-07 PROCEDURE — 99999 PR PBB SHADOW E&M-EST. PATIENT-LVL III: ICD-10-PCS | Mod: PBBFAC,,,

## 2021-07-07 PROCEDURE — 99402 PREV MED CNSL INDIV APPRX 30: CPT | Mod: S$GLB,,, | Performed by: GENERAL PRACTICE

## 2021-07-07 PROCEDURE — 99402 PR PREVENT COUNSEL,INDIV,30 MIN: ICD-10-PCS | Mod: S$GLB,,, | Performed by: GENERAL PRACTICE

## 2021-07-14 ENCOUNTER — CLINICAL SUPPORT (OUTPATIENT)
Dept: SMOKING CESSATION | Facility: CLINIC | Age: 76
End: 2021-07-14
Payer: COMMERCIAL

## 2021-07-14 DIAGNOSIS — F17.200 NICOTINE DEPENDENCE: Primary | ICD-10-CM

## 2021-07-14 PROCEDURE — 99999 PR PBB SHADOW E&M-EST. PATIENT-LVL III: CPT | Mod: PBBFAC,,,

## 2021-07-14 PROCEDURE — 99402 PREV MED CNSL INDIV APPRX 30: CPT | Mod: S$GLB,,, | Performed by: GENERAL PRACTICE

## 2021-07-14 PROCEDURE — 99999 PR PBB SHADOW E&M-EST. PATIENT-LVL III: ICD-10-PCS | Mod: PBBFAC,,,

## 2021-07-14 PROCEDURE — 99402 PR PREVENT COUNSEL,INDIV,30 MIN: ICD-10-PCS | Mod: S$GLB,,, | Performed by: GENERAL PRACTICE

## 2021-07-28 ENCOUNTER — CLINICAL SUPPORT (OUTPATIENT)
Dept: SMOKING CESSATION | Facility: CLINIC | Age: 76
End: 2021-07-28
Payer: COMMERCIAL

## 2021-07-28 DIAGNOSIS — F17.200 NICOTINE DEPENDENCE: Primary | ICD-10-CM

## 2021-07-28 PROCEDURE — 99402 PREV MED CNSL INDIV APPRX 30: CPT | Mod: S$GLB,,, | Performed by: GENERAL PRACTICE

## 2021-07-28 PROCEDURE — 99402 PR PREVENT COUNSEL,INDIV,30 MIN: ICD-10-PCS | Mod: S$GLB,,, | Performed by: GENERAL PRACTICE

## 2021-07-28 PROCEDURE — 99999 PR PBB SHADOW E&M-EST. PATIENT-LVL III: ICD-10-PCS | Mod: PBBFAC,,,

## 2021-07-28 PROCEDURE — 99999 PR PBB SHADOW E&M-EST. PATIENT-LVL III: CPT | Mod: PBBFAC,,,

## 2021-07-28 RX ORDER — BUPROPION HYDROCHLORIDE 75 MG/1
75 TABLET ORAL 2 TIMES DAILY
Qty: 60 TABLET | Refills: 1 | Status: SHIPPED | OUTPATIENT
Start: 2021-07-28 | End: 2021-09-07 | Stop reason: SDUPTHER

## 2021-08-03 ENCOUNTER — LAB VISIT (OUTPATIENT)
Dept: LAB | Facility: HOSPITAL | Age: 76
End: 2021-08-03
Attending: NURSE PRACTITIONER
Payer: MEDICARE

## 2021-08-03 DIAGNOSIS — Z79.899 OTHER LONG TERM (CURRENT) DRUG THERAPY: ICD-10-CM

## 2021-08-03 DIAGNOSIS — F41.1 GAD (GENERALIZED ANXIETY DISORDER): ICD-10-CM

## 2021-08-03 DIAGNOSIS — I73.9 PVD (PERIPHERAL VASCULAR DISEASE): ICD-10-CM

## 2021-08-03 DIAGNOSIS — J44.9 CHRONIC OBSTRUCTIVE PULMONARY DISEASE, UNSPECIFIED COPD TYPE: ICD-10-CM

## 2021-08-03 DIAGNOSIS — I10 ESSENTIAL HYPERTENSION: ICD-10-CM

## 2021-08-03 LAB
ALBUMIN SERPL BCP-MCNC: 3.2 G/DL (ref 3.5–5.2)
ALP SERPL-CCNC: 41 U/L (ref 55–135)
ALT SERPL W/O P-5'-P-CCNC: 7 U/L (ref 10–44)
ANION GAP SERPL CALC-SCNC: 7 MMOL/L (ref 8–16)
AST SERPL-CCNC: 14 U/L (ref 10–40)
BASOPHILS # BLD AUTO: 0.06 K/UL (ref 0–0.2)
BASOPHILS NFR BLD: 1.4 % (ref 0–1.9)
BILIRUB SERPL-MCNC: 0.5 MG/DL (ref 0.1–1)
BUN SERPL-MCNC: 11 MG/DL (ref 8–23)
CALCIUM SERPL-MCNC: 8.9 MG/DL (ref 8.7–10.5)
CHLORIDE SERPL-SCNC: 104 MMOL/L (ref 95–110)
CHOLEST SERPL-MCNC: 147 MG/DL (ref 120–199)
CHOLEST/HDLC SERPL: 2.1 {RATIO} (ref 2–5)
CO2 SERPL-SCNC: 25 MMOL/L (ref 23–29)
CREAT SERPL-MCNC: 0.7 MG/DL (ref 0.5–1.4)
DIFFERENTIAL METHOD: ABNORMAL
EOSINOPHIL # BLD AUTO: 0.1 K/UL (ref 0–0.5)
EOSINOPHIL NFR BLD: 3 % (ref 0–8)
ERYTHROCYTE [DISTWIDTH] IN BLOOD BY AUTOMATED COUNT: 15.1 % (ref 11.5–14.5)
EST. GFR  (AFRICAN AMERICAN): >60 ML/MIN/1.73 M^2
EST. GFR  (NON AFRICAN AMERICAN): >60 ML/MIN/1.73 M^2
GLUCOSE SERPL-MCNC: 74 MG/DL (ref 70–110)
HCT VFR BLD AUTO: 30.2 % (ref 37–48.5)
HDLC SERPL-MCNC: 69 MG/DL (ref 40–75)
HDLC SERPL: 46.9 % (ref 20–50)
HGB BLD-MCNC: 9 G/DL (ref 12–16)
IMM GRANULOCYTES # BLD AUTO: 0.01 K/UL (ref 0–0.04)
IMM GRANULOCYTES NFR BLD AUTO: 0.2 % (ref 0–0.5)
LDLC SERPL CALC-MCNC: 63.8 MG/DL (ref 63–159)
LYMPHOCYTES # BLD AUTO: 1.9 K/UL (ref 1–4.8)
LYMPHOCYTES NFR BLD: 44.2 % (ref 18–48)
MCH RBC QN AUTO: 32.1 PG (ref 27–31)
MCHC RBC AUTO-ENTMCNC: 29.8 G/DL (ref 32–36)
MCV RBC AUTO: 108 FL (ref 82–98)
MONOCYTES # BLD AUTO: 0.4 K/UL (ref 0.3–1)
MONOCYTES NFR BLD: 8.4 % (ref 4–15)
NEUTROPHILS # BLD AUTO: 1.8 K/UL (ref 1.8–7.7)
NEUTROPHILS NFR BLD: 42.8 % (ref 38–73)
NONHDLC SERPL-MCNC: 78 MG/DL
NRBC BLD-RTO: 0 /100 WBC
PLATELET # BLD AUTO: 235 K/UL (ref 150–450)
PMV BLD AUTO: 9 FL (ref 9.2–12.9)
POTASSIUM SERPL-SCNC: 4.3 MMOL/L (ref 3.5–5.1)
PROT SERPL-MCNC: 6 G/DL (ref 6–8.4)
RBC # BLD AUTO: 2.8 M/UL (ref 4–5.4)
SODIUM SERPL-SCNC: 136 MMOL/L (ref 136–145)
TRIGL SERPL-MCNC: 71 MG/DL (ref 30–150)
TSH SERPL DL<=0.005 MIU/L-ACNC: 1.05 UIU/ML (ref 0.4–4)
WBC # BLD AUTO: 4.3 K/UL (ref 3.9–12.7)

## 2021-08-03 PROCEDURE — 85025 COMPLETE CBC W/AUTO DIFF WBC: CPT | Performed by: NURSE PRACTITIONER

## 2021-08-03 PROCEDURE — 80053 COMPREHEN METABOLIC PANEL: CPT | Performed by: NURSE PRACTITIONER

## 2021-08-03 PROCEDURE — 80061 LIPID PANEL: CPT | Performed by: NURSE PRACTITIONER

## 2021-08-03 PROCEDURE — 36415 COLL VENOUS BLD VENIPUNCTURE: CPT | Mod: PO | Performed by: NURSE PRACTITIONER

## 2021-08-03 PROCEDURE — 84443 ASSAY THYROID STIM HORMONE: CPT | Performed by: NURSE PRACTITIONER

## 2021-08-11 ENCOUNTER — CLINICAL SUPPORT (OUTPATIENT)
Dept: SMOKING CESSATION | Facility: CLINIC | Age: 76
End: 2021-08-11
Payer: COMMERCIAL

## 2021-08-11 DIAGNOSIS — F17.200 NICOTINE DEPENDENCE: Primary | ICD-10-CM

## 2021-08-11 PROCEDURE — 99999 PR PBB SHADOW E&M-EST. PATIENT-LVL I: ICD-10-PCS | Mod: PBBFAC,,, | Performed by: GENERAL PRACTICE

## 2021-08-11 PROCEDURE — 99402 PREV MED CNSL INDIV APPRX 30: CPT | Mod: S$GLB,,, | Performed by: GENERAL PRACTICE

## 2021-08-11 PROCEDURE — 99402 PR PREVENT COUNSEL,INDIV,30 MIN: ICD-10-PCS | Mod: S$GLB,,, | Performed by: GENERAL PRACTICE

## 2021-08-11 PROCEDURE — 99999 PR PBB SHADOW E&M-EST. PATIENT-LVL I: CPT | Mod: PBBFAC,,, | Performed by: GENERAL PRACTICE

## 2021-08-16 ENCOUNTER — TELEPHONE (OUTPATIENT)
Dept: VASCULAR SURGERY | Facility: CLINIC | Age: 76
End: 2021-08-16

## 2021-08-18 ENCOUNTER — TELEPHONE (OUTPATIENT)
Dept: RHEUMATOLOGY | Facility: CLINIC | Age: 76
End: 2021-08-18

## 2021-08-19 ENCOUNTER — OFFICE VISIT (OUTPATIENT)
Dept: RHEUMATOLOGY | Facility: CLINIC | Age: 76
End: 2021-08-19
Payer: MEDICARE

## 2021-08-19 ENCOUNTER — INFUSION (OUTPATIENT)
Dept: INFUSION THERAPY | Facility: HOSPITAL | Age: 76
End: 2021-08-19
Attending: PHYSICIAN ASSISTANT
Payer: MEDICARE

## 2021-08-19 VITALS
TEMPERATURE: 98 F | BODY MASS INDEX: 19.71 KG/M2 | DIASTOLIC BLOOD PRESSURE: 71 MMHG | HEIGHT: 62 IN | OXYGEN SATURATION: 98 % | WEIGHT: 107.13 LBS | SYSTOLIC BLOOD PRESSURE: 135 MMHG | RESPIRATION RATE: 18 BRPM | HEART RATE: 66 BPM

## 2021-08-19 VITALS
BODY MASS INDEX: 19.71 KG/M2 | HEIGHT: 62 IN | SYSTOLIC BLOOD PRESSURE: 131 MMHG | WEIGHT: 107.13 LBS | DIASTOLIC BLOOD PRESSURE: 71 MMHG | HEART RATE: 73 BPM

## 2021-08-19 DIAGNOSIS — M81.8 OTHER OSTEOPOROSIS WITHOUT CURRENT PATHOLOGICAL FRACTURE: Primary | ICD-10-CM

## 2021-08-19 DIAGNOSIS — M15.9 PRIMARY OSTEOARTHRITIS INVOLVING MULTIPLE JOINTS: ICD-10-CM

## 2021-08-19 DIAGNOSIS — Z51.81 MEDICATION MONITORING ENCOUNTER: ICD-10-CM

## 2021-08-19 DIAGNOSIS — M81.0 AGE-RELATED OSTEOPOROSIS WITHOUT CURRENT PATHOLOGICAL FRACTURE: Primary | ICD-10-CM

## 2021-08-19 DIAGNOSIS — E55.9 VITAMIN D DEFICIENCY: ICD-10-CM

## 2021-08-19 PROCEDURE — 1101F PT FALLS ASSESS-DOCD LE1/YR: CPT | Mod: CPTII,S$GLB,, | Performed by: PHYSICIAN ASSISTANT

## 2021-08-19 PROCEDURE — 96372 THER/PROPH/DIAG INJ SC/IM: CPT

## 2021-08-19 PROCEDURE — 1159F PR MEDICATION LIST DOCUMENTED IN MEDICAL RECORD: ICD-10-PCS | Mod: CPTII,S$GLB,, | Performed by: PHYSICIAN ASSISTANT

## 2021-08-19 PROCEDURE — 1125F PR PAIN SEVERITY QUANTIFIED, PAIN PRESENT: ICD-10-PCS | Mod: CPTII,S$GLB,, | Performed by: PHYSICIAN ASSISTANT

## 2021-08-19 PROCEDURE — 1160F RVW MEDS BY RX/DR IN RCRD: CPT | Mod: CPTII,S$GLB,, | Performed by: PHYSICIAN ASSISTANT

## 2021-08-19 PROCEDURE — 99999 PR PBB SHADOW E&M-EST. PATIENT-LVL IV: CPT | Mod: PBBFAC,,, | Performed by: PHYSICIAN ASSISTANT

## 2021-08-19 PROCEDURE — 3078F PR MOST RECENT DIASTOLIC BLOOD PRESSURE < 80 MM HG: ICD-10-PCS | Mod: CPTII,S$GLB,, | Performed by: PHYSICIAN ASSISTANT

## 2021-08-19 PROCEDURE — 99499 UNLISTED E&M SERVICE: CPT | Mod: S$GLB,,, | Performed by: PHYSICIAN ASSISTANT

## 2021-08-19 PROCEDURE — 1101F PR PT FALLS ASSESS DOC 0-1 FALLS W/OUT INJ PAST YR: ICD-10-PCS | Mod: CPTII,S$GLB,, | Performed by: PHYSICIAN ASSISTANT

## 2021-08-19 PROCEDURE — 3075F PR MOST RECENT SYSTOLIC BLOOD PRESS GE 130-139MM HG: ICD-10-PCS | Mod: CPTII,S$GLB,, | Performed by: PHYSICIAN ASSISTANT

## 2021-08-19 PROCEDURE — 1159F MED LIST DOCD IN RCRD: CPT | Mod: CPTII,S$GLB,, | Performed by: PHYSICIAN ASSISTANT

## 2021-08-19 PROCEDURE — 63600175 PHARM REV CODE 636 W HCPCS: Mod: JG | Performed by: PHYSICIAN ASSISTANT

## 2021-08-19 PROCEDURE — 3078F DIAST BP <80 MM HG: CPT | Mod: CPTII,S$GLB,, | Performed by: PHYSICIAN ASSISTANT

## 2021-08-19 PROCEDURE — 3288F FALL RISK ASSESSMENT DOCD: CPT | Mod: CPTII,S$GLB,, | Performed by: PHYSICIAN ASSISTANT

## 2021-08-19 PROCEDURE — 99214 OFFICE O/P EST MOD 30 MIN: CPT | Mod: S$GLB,,, | Performed by: PHYSICIAN ASSISTANT

## 2021-08-19 PROCEDURE — 3288F PR FALLS RISK ASSESSMENT DOCUMENTED: ICD-10-PCS | Mod: CPTII,S$GLB,, | Performed by: PHYSICIAN ASSISTANT

## 2021-08-19 PROCEDURE — 99214 PR OFFICE/OUTPT VISIT, EST, LEVL IV, 30-39 MIN: ICD-10-PCS | Mod: S$GLB,,, | Performed by: PHYSICIAN ASSISTANT

## 2021-08-19 PROCEDURE — 99999 PR PBB SHADOW E&M-EST. PATIENT-LVL IV: ICD-10-PCS | Mod: PBBFAC,,, | Performed by: PHYSICIAN ASSISTANT

## 2021-08-19 PROCEDURE — 99499 RISK ADDL DX/OHS AUDIT: ICD-10-PCS | Mod: S$GLB,,, | Performed by: PHYSICIAN ASSISTANT

## 2021-08-19 PROCEDURE — 1160F PR REVIEW ALL MEDS BY PRESCRIBER/CLIN PHARMACIST DOCUMENTED: ICD-10-PCS | Mod: CPTII,S$GLB,, | Performed by: PHYSICIAN ASSISTANT

## 2021-08-19 PROCEDURE — 3075F SYST BP GE 130 - 139MM HG: CPT | Mod: CPTII,S$GLB,, | Performed by: PHYSICIAN ASSISTANT

## 2021-08-19 PROCEDURE — 1125F AMNT PAIN NOTED PAIN PRSNT: CPT | Mod: CPTII,S$GLB,, | Performed by: PHYSICIAN ASSISTANT

## 2021-08-19 RX ORDER — ERGOCALCIFEROL 1.25 MG/1
CAPSULE ORAL
Qty: 12 CAPSULE | Refills: 3 | Status: SHIPPED | OUTPATIENT
Start: 2021-08-19 | End: 2022-04-11 | Stop reason: SDUPTHER

## 2021-08-19 RX ADMIN — DENOSUMAB 60 MG: 60 INJECTION SUBCUTANEOUS at 08:08

## 2021-08-25 ENCOUNTER — CLINICAL SUPPORT (OUTPATIENT)
Dept: SMOKING CESSATION | Facility: CLINIC | Age: 76
End: 2021-08-25
Payer: COMMERCIAL

## 2021-08-25 DIAGNOSIS — F17.200 NICOTINE DEPENDENCE: Primary | ICD-10-CM

## 2021-08-25 PROCEDURE — 99999 PR PBB SHADOW E&M-EST. PATIENT-LVL I: CPT | Mod: PBBFAC,,, | Performed by: GENERAL PRACTICE

## 2021-08-25 PROCEDURE — 99402 PR PREVENT COUNSEL,INDIV,30 MIN: ICD-10-PCS | Mod: S$GLB,,, | Performed by: GENERAL PRACTICE

## 2021-08-25 PROCEDURE — 99999 PR PBB SHADOW E&M-EST. PATIENT-LVL I: ICD-10-PCS | Mod: PBBFAC,,, | Performed by: GENERAL PRACTICE

## 2021-08-25 PROCEDURE — 99402 PREV MED CNSL INDIV APPRX 30: CPT | Mod: S$GLB,,, | Performed by: GENERAL PRACTICE

## 2021-08-31 ENCOUNTER — TELEPHONE (OUTPATIENT)
Dept: VASCULAR SURGERY | Facility: CLINIC | Age: 76
End: 2021-08-31

## 2021-09-07 ENCOUNTER — CLINICAL SUPPORT (OUTPATIENT)
Dept: SMOKING CESSATION | Facility: CLINIC | Age: 76
End: 2021-09-07
Payer: COMMERCIAL

## 2021-09-07 DIAGNOSIS — F17.200 NICOTINE DEPENDENCE: Primary | ICD-10-CM

## 2021-09-07 PROCEDURE — 99402 PREV MED CNSL INDIV APPRX 30: CPT | Mod: S$GLB,,, | Performed by: GENERAL PRACTICE

## 2021-09-07 PROCEDURE — 99999 PR PBB SHADOW E&M-EST. PATIENT-LVL I: ICD-10-PCS | Mod: PBBFAC,,, | Performed by: GENERAL PRACTICE

## 2021-09-07 PROCEDURE — 99402 PR PREVENT COUNSEL,INDIV,30 MIN: ICD-10-PCS | Mod: S$GLB,,, | Performed by: GENERAL PRACTICE

## 2021-09-07 PROCEDURE — 99999 PR PBB SHADOW E&M-EST. PATIENT-LVL I: CPT | Mod: PBBFAC,,, | Performed by: GENERAL PRACTICE

## 2021-09-07 RX ORDER — BUPROPION HYDROCHLORIDE 75 MG/1
75 TABLET ORAL 2 TIMES DAILY
Qty: 60 TABLET | Refills: 2 | Status: SHIPPED | OUTPATIENT
Start: 2021-09-07 | End: 2021-09-27 | Stop reason: SDUPTHER

## 2021-09-10 ENCOUNTER — OFFICE VISIT (OUTPATIENT)
Dept: INTERNAL MEDICINE | Facility: CLINIC | Age: 76
End: 2021-09-10
Payer: MEDICARE

## 2021-09-10 ENCOUNTER — LAB VISIT (OUTPATIENT)
Dept: LAB | Facility: HOSPITAL | Age: 76
End: 2021-09-10
Attending: FAMILY MEDICINE
Payer: MEDICARE

## 2021-09-10 VITALS
HEART RATE: 81 BPM | DIASTOLIC BLOOD PRESSURE: 64 MMHG | BODY MASS INDEX: 20.32 KG/M2 | TEMPERATURE: 98 F | SYSTOLIC BLOOD PRESSURE: 138 MMHG | HEIGHT: 62 IN | WEIGHT: 110.44 LBS

## 2021-09-10 DIAGNOSIS — D64.9 ANEMIA, UNSPECIFIED TYPE: ICD-10-CM

## 2021-09-10 DIAGNOSIS — Z12.11 COLON CANCER SCREENING: ICD-10-CM

## 2021-09-10 DIAGNOSIS — J44.9 CHRONIC OBSTRUCTIVE PULMONARY DISEASE, UNSPECIFIED COPD TYPE: ICD-10-CM

## 2021-09-10 DIAGNOSIS — I10 HYPERTENSION, UNSPECIFIED TYPE: ICD-10-CM

## 2021-09-10 DIAGNOSIS — Z87.891 PERSONAL HISTORY OF NICOTINE DEPENDENCE: ICD-10-CM

## 2021-09-10 DIAGNOSIS — Z00.00 ROUTINE GENERAL MEDICAL EXAMINATION AT A HEALTH CARE FACILITY: Primary | ICD-10-CM

## 2021-09-10 DIAGNOSIS — I10 ESSENTIAL HYPERTENSION: Chronic | ICD-10-CM

## 2021-09-10 LAB
ANION GAP SERPL CALC-SCNC: 6 MMOL/L (ref 8–16)
BUN SERPL-MCNC: 12 MG/DL (ref 8–23)
CALCIUM SERPL-MCNC: 8.8 MG/DL (ref 8.7–10.5)
CHLORIDE SERPL-SCNC: 108 MMOL/L (ref 95–110)
CO2 SERPL-SCNC: 22 MMOL/L (ref 23–29)
CREAT SERPL-MCNC: 0.8 MG/DL (ref 0.5–1.4)
EST. GFR  (AFRICAN AMERICAN): >60 ML/MIN/1.73 M^2
EST. GFR  (NON AFRICAN AMERICAN): >60 ML/MIN/1.73 M^2
GLUCOSE SERPL-MCNC: 74 MG/DL (ref 70–110)
IRON SERPL-MCNC: 85 UG/DL (ref 30–160)
POTASSIUM SERPL-SCNC: 4.5 MMOL/L (ref 3.5–5.1)
SATURATED IRON: 24 % (ref 20–50)
SODIUM SERPL-SCNC: 136 MMOL/L (ref 136–145)
TOTAL IRON BINDING CAPACITY: 349 UG/DL (ref 250–450)
TRANSFERRIN SERPL-MCNC: 236 MG/DL (ref 200–375)
VIT B12 SERPL-MCNC: 258 PG/ML (ref 210–950)

## 2021-09-10 PROCEDURE — 99999 PR PBB SHADOW E&M-EST. PATIENT-LVL III: ICD-10-PCS | Mod: PBBFAC,,, | Performed by: FAMILY MEDICINE

## 2021-09-10 PROCEDURE — 99397 PER PM REEVAL EST PAT 65+ YR: CPT | Mod: S$GLB,,, | Performed by: FAMILY MEDICINE

## 2021-09-10 PROCEDURE — 1159F PR MEDICATION LIST DOCUMENTED IN MEDICAL RECORD: ICD-10-PCS | Mod: CPTII,S$GLB,, | Performed by: FAMILY MEDICINE

## 2021-09-10 PROCEDURE — 99397 PR PREVENTIVE VISIT,EST,65 & OVER: ICD-10-PCS | Mod: S$GLB,,, | Performed by: FAMILY MEDICINE

## 2021-09-10 PROCEDURE — 84466 ASSAY OF TRANSFERRIN: CPT | Performed by: FAMILY MEDICINE

## 2021-09-10 PROCEDURE — 99999 PR PBB SHADOW E&M-EST. PATIENT-LVL III: CPT | Mod: PBBFAC,,, | Performed by: FAMILY MEDICINE

## 2021-09-10 PROCEDURE — 3075F SYST BP GE 130 - 139MM HG: CPT | Mod: CPTII,S$GLB,, | Performed by: FAMILY MEDICINE

## 2021-09-10 PROCEDURE — 3078F PR MOST RECENT DIASTOLIC BLOOD PRESSURE < 80 MM HG: ICD-10-PCS | Mod: CPTII,S$GLB,, | Performed by: FAMILY MEDICINE

## 2021-09-10 PROCEDURE — 1159F MED LIST DOCD IN RCRD: CPT | Mod: CPTII,S$GLB,, | Performed by: FAMILY MEDICINE

## 2021-09-10 PROCEDURE — 82607 VITAMIN B-12: CPT | Mod: GA | Performed by: FAMILY MEDICINE

## 2021-09-10 PROCEDURE — 1160F PR REVIEW ALL MEDS BY PRESCRIBER/CLIN PHARMACIST DOCUMENTED: ICD-10-PCS | Mod: CPTII,S$GLB,, | Performed by: FAMILY MEDICINE

## 2021-09-10 PROCEDURE — 3078F DIAST BP <80 MM HG: CPT | Mod: CPTII,S$GLB,, | Performed by: FAMILY MEDICINE

## 2021-09-10 PROCEDURE — 36415 COLL VENOUS BLD VENIPUNCTURE: CPT | Mod: PO | Performed by: FAMILY MEDICINE

## 2021-09-10 PROCEDURE — 80048 BASIC METABOLIC PNL TOTAL CA: CPT | Performed by: FAMILY MEDICINE

## 2021-09-10 PROCEDURE — 3075F PR MOST RECENT SYSTOLIC BLOOD PRESS GE 130-139MM HG: ICD-10-PCS | Mod: CPTII,S$GLB,, | Performed by: FAMILY MEDICINE

## 2021-09-10 PROCEDURE — 1160F RVW MEDS BY RX/DR IN RCRD: CPT | Mod: CPTII,S$GLB,, | Performed by: FAMILY MEDICINE

## 2021-09-10 RX ORDER — AMLODIPINE BESYLATE 5 MG/1
TABLET ORAL
Qty: 90 TABLET | Refills: 3 | Status: SHIPPED | OUTPATIENT
Start: 2021-09-10 | End: 2022-04-11

## 2021-09-13 ENCOUNTER — CLINICAL SUPPORT (OUTPATIENT)
Dept: SMOKING CESSATION | Facility: CLINIC | Age: 76
End: 2021-09-13
Payer: COMMERCIAL

## 2021-09-13 ENCOUNTER — TELEPHONE (OUTPATIENT)
Dept: INTERNAL MEDICINE | Facility: CLINIC | Age: 76
End: 2021-09-13

## 2021-09-13 DIAGNOSIS — F17.200 NICOTINE DEPENDENCE: Primary | ICD-10-CM

## 2021-09-13 PROCEDURE — 99402 PREV MED CNSL INDIV APPRX 30: CPT | Mod: S$GLB,,, | Performed by: GENERAL PRACTICE

## 2021-09-13 PROCEDURE — 99402 PR PREVENT COUNSEL,INDIV,30 MIN: ICD-10-PCS | Mod: S$GLB,,, | Performed by: GENERAL PRACTICE

## 2021-09-13 PROCEDURE — 99999 PR PBB SHADOW E&M-EST. PATIENT-LVL I: CPT | Mod: PBBFAC,,, | Performed by: GENERAL PRACTICE

## 2021-09-13 PROCEDURE — 99999 PR PBB SHADOW E&M-EST. PATIENT-LVL I: ICD-10-PCS | Mod: PBBFAC,,, | Performed by: GENERAL PRACTICE

## 2021-09-14 ENCOUNTER — PATIENT OUTREACH (OUTPATIENT)
Dept: ADMINISTRATIVE | Facility: OTHER | Age: 76
End: 2021-09-14

## 2021-09-15 ENCOUNTER — OFFICE VISIT (OUTPATIENT)
Dept: PODIATRY | Facility: CLINIC | Age: 76
End: 2021-09-15
Payer: MEDICARE

## 2021-09-15 DIAGNOSIS — B35.1 DERMATOPHYTOSIS OF NAIL: ICD-10-CM

## 2021-09-15 DIAGNOSIS — I73.9 PVD (PERIPHERAL VASCULAR DISEASE): Primary | ICD-10-CM

## 2021-09-15 DIAGNOSIS — Z89.412 STATUS POST AMPUTATION OF GREAT TOE, LEFT: ICD-10-CM

## 2021-09-15 PROCEDURE — 1159F PR MEDICATION LIST DOCUMENTED IN MEDICAL RECORD: ICD-10-PCS | Mod: CPTII,S$GLB,, | Performed by: PODIATRIST

## 2021-09-15 PROCEDURE — 99999 PR PBB SHADOW E&M-EST. PATIENT-LVL III: ICD-10-PCS | Mod: PBBFAC,,, | Performed by: PODIATRIST

## 2021-09-15 PROCEDURE — 99213 OFFICE O/P EST LOW 20 MIN: CPT | Mod: 25,S$GLB,, | Performed by: PODIATRIST

## 2021-09-15 PROCEDURE — 99213 PR OFFICE/OUTPT VISIT, EST, LEVL III, 20-29 MIN: ICD-10-PCS | Mod: 25,S$GLB,, | Performed by: PODIATRIST

## 2021-09-15 PROCEDURE — 11721 PR DEBRIDEMENT OF NAILS, 6 OR MORE: ICD-10-PCS | Mod: Q7,S$GLB,, | Performed by: PODIATRIST

## 2021-09-15 PROCEDURE — 99999 PR PBB SHADOW E&M-EST. PATIENT-LVL III: CPT | Mod: PBBFAC,,, | Performed by: PODIATRIST

## 2021-09-15 PROCEDURE — 1159F MED LIST DOCD IN RCRD: CPT | Mod: CPTII,S$GLB,, | Performed by: PODIATRIST

## 2021-09-15 PROCEDURE — 11721 DEBRIDE NAIL 6 OR MORE: CPT | Mod: Q7,S$GLB,, | Performed by: PODIATRIST

## 2021-09-27 ENCOUNTER — CLINICAL SUPPORT (OUTPATIENT)
Dept: SMOKING CESSATION | Facility: CLINIC | Age: 76
End: 2021-09-27
Payer: COMMERCIAL

## 2021-09-27 DIAGNOSIS — F17.200 NICOTINE DEPENDENCE: Primary | ICD-10-CM

## 2021-09-27 PROCEDURE — 99402 PR PREVENT COUNSEL,INDIV,30 MIN: ICD-10-PCS | Mod: S$GLB,,, | Performed by: GENERAL PRACTICE

## 2021-09-27 PROCEDURE — 99999 PR PBB SHADOW E&M-EST. PATIENT-LVL I: CPT | Mod: PBBFAC,,, | Performed by: GENERAL PRACTICE

## 2021-09-27 PROCEDURE — 99999 PR PBB SHADOW E&M-EST. PATIENT-LVL I: ICD-10-PCS | Mod: PBBFAC,,, | Performed by: GENERAL PRACTICE

## 2021-09-27 PROCEDURE — 99402 PREV MED CNSL INDIV APPRX 30: CPT | Mod: S$GLB,,, | Performed by: GENERAL PRACTICE

## 2021-09-27 RX ORDER — BUPROPION HYDROCHLORIDE 75 MG/1
75 TABLET ORAL 2 TIMES DAILY
Qty: 60 TABLET | Refills: 2 | Status: SHIPPED | OUTPATIENT
Start: 2021-09-27 | End: 2023-10-25

## 2021-09-29 ENCOUNTER — HOSPITAL ENCOUNTER (OUTPATIENT)
Dept: CARDIOLOGY | Facility: HOSPITAL | Age: 76
Discharge: HOME | End: 2021-09-29
Attending: SURGERY
Payer: MEDICARE

## 2021-09-29 VITALS
SYSTOLIC BLOOD PRESSURE: 114 MMHG | DIASTOLIC BLOOD PRESSURE: 55 MMHG | WEIGHT: 110 LBS | HEIGHT: 62 IN | BODY MASS INDEX: 20.24 KG/M2

## 2021-09-29 DIAGNOSIS — I73.9 PVD (PERIPHERAL VASCULAR DISEASE): ICD-10-CM

## 2021-09-29 DIAGNOSIS — I70.0 ATHEROSCLEROSIS OF AORTA: ICD-10-CM

## 2021-09-29 LAB
LEFT ABI: 1.06
LEFT DORSALIS PEDIS: 121 MMHG
LEFT POSTERIOR TIBIAL: 120 MMHG
LEFT TBI: 0.75
LEFT TOE PRESSURE: 86 MMHG
RIGHT ABI: 0.46
RIGHT ARM BP: 114 MMHG
RIGHT DORSALIS PEDIS: 52 MMHG
RIGHT POSTERIOR TIBIAL: 53 MMHG
RIGHT TBI: 0.39
RIGHT TOE PRESSURE: 45 MMHG

## 2021-09-29 PROCEDURE — 93922 ANKLE BRACHIAL INDICES (ABI): ICD-10-PCS | Mod: 26,,, | Performed by: INTERNAL MEDICINE

## 2021-09-29 PROCEDURE — 93922 UPR/L XTREMITY ART 2 LEVELS: CPT | Mod: PO

## 2021-09-29 PROCEDURE — 93922 UPR/L XTREMITY ART 2 LEVELS: CPT | Mod: 26,,, | Performed by: INTERNAL MEDICINE

## 2021-11-29 DIAGNOSIS — M81.0 AGE-RELATED OSTEOPOROSIS WITHOUT CURRENT PATHOLOGICAL FRACTURE: Primary | ICD-10-CM

## 2021-11-29 DIAGNOSIS — Z51.81 MEDICATION MONITORING ENCOUNTER: ICD-10-CM

## 2021-12-15 ENCOUNTER — HOSPITAL ENCOUNTER (OUTPATIENT)
Dept: RADIOLOGY | Facility: HOSPITAL | Age: 76
Discharge: HOME | End: 2021-12-15
Attending: PODIATRIST
Payer: MEDICARE

## 2021-12-15 ENCOUNTER — OFFICE VISIT (OUTPATIENT)
Dept: PODIATRY | Facility: CLINIC | Age: 76
End: 2021-12-15
Payer: MEDICARE

## 2021-12-15 DIAGNOSIS — B35.1 DERMATOPHYTOSIS OF NAIL: ICD-10-CM

## 2021-12-15 DIAGNOSIS — M79.89 PAIN AND SWELLING OF TOE, LEFT: ICD-10-CM

## 2021-12-15 DIAGNOSIS — M79.675 PAIN AND SWELLING OF TOE, LEFT: ICD-10-CM

## 2021-12-15 DIAGNOSIS — M79.675 TOE PAIN, LEFT: ICD-10-CM

## 2021-12-15 DIAGNOSIS — Z89.412 STATUS POST AMPUTATION OF GREAT TOE, LEFT: ICD-10-CM

## 2021-12-15 DIAGNOSIS — M79.675 TOE PAIN, LEFT: Primary | ICD-10-CM

## 2021-12-15 DIAGNOSIS — I73.9 PVD (PERIPHERAL VASCULAR DISEASE): ICD-10-CM

## 2021-12-15 PROCEDURE — 99999 PR PBB SHADOW E&M-EST. PATIENT-LVL III: CPT | Mod: PBBFAC,HCNC,, | Performed by: PODIATRIST

## 2021-12-15 PROCEDURE — 99499 UNLISTED E&M SERVICE: CPT | Mod: S$GLB,,, | Performed by: PODIATRIST

## 2021-12-15 PROCEDURE — 73630 X-RAY EXAM OF FOOT: CPT | Mod: TC,HCNC,FY,PO,LT

## 2021-12-15 PROCEDURE — 99214 OFFICE O/P EST MOD 30 MIN: CPT | Mod: 25,HCNC,S$GLB, | Performed by: PODIATRIST

## 2021-12-15 PROCEDURE — 73630 X-RAY EXAM OF FOOT: CPT | Mod: 26,HCNC,LT, | Performed by: RADIOLOGY

## 2021-12-15 PROCEDURE — 99499 RISK ADDL DX/OHS AUDIT: ICD-10-PCS | Mod: S$GLB,,, | Performed by: PODIATRIST

## 2021-12-15 PROCEDURE — 99214 PR OFFICE/OUTPT VISIT, EST, LEVL IV, 30-39 MIN: ICD-10-PCS | Mod: 25,HCNC,S$GLB, | Performed by: PODIATRIST

## 2021-12-15 PROCEDURE — 11721 PR DEBRIDEMENT OF NAILS, 6 OR MORE: ICD-10-PCS | Mod: Q7,HCNC,S$GLB, | Performed by: PODIATRIST

## 2021-12-15 PROCEDURE — 11721 DEBRIDE NAIL 6 OR MORE: CPT | Mod: Q7,HCNC,S$GLB, | Performed by: PODIATRIST

## 2021-12-15 PROCEDURE — 99999 PR PBB SHADOW E&M-EST. PATIENT-LVL III: ICD-10-PCS | Mod: PBBFAC,HCNC,, | Performed by: PODIATRIST

## 2021-12-15 PROCEDURE — 73630 XR FOOT COMPLETE 3 VIEW LEFT: ICD-10-PCS | Mod: 26,HCNC,LT, | Performed by: RADIOLOGY

## 2021-12-22 DIAGNOSIS — M79.675 TOE PAIN, LEFT: Primary | ICD-10-CM

## 2022-01-16 ENCOUNTER — PATIENT OUTREACH (OUTPATIENT)
Dept: ADMINISTRATIVE | Facility: OTHER | Age: 77
End: 2022-01-16
Payer: MEDICARE

## 2022-01-19 ENCOUNTER — OFFICE VISIT (OUTPATIENT)
Dept: PODIATRY | Facility: CLINIC | Age: 77
End: 2022-01-19
Payer: MEDICARE

## 2022-01-19 ENCOUNTER — HOSPITAL ENCOUNTER (OUTPATIENT)
Dept: RADIOLOGY | Facility: HOSPITAL | Age: 77
Discharge: HOME | End: 2022-01-19
Attending: PODIATRIST
Payer: MEDICARE

## 2022-01-19 VITALS — WEIGHT: 110 LBS | BODY MASS INDEX: 20.24 KG/M2 | HEIGHT: 62 IN

## 2022-01-19 DIAGNOSIS — M79.675 PAIN AND SWELLING OF TOE, LEFT: Primary | ICD-10-CM

## 2022-01-19 DIAGNOSIS — M79.675 TOE PAIN, LEFT: ICD-10-CM

## 2022-01-19 DIAGNOSIS — M79.89 PAIN AND SWELLING OF TOE, LEFT: Primary | ICD-10-CM

## 2022-01-19 DIAGNOSIS — S92.505A CLOSED NONDISPLACED FRACTURE OF PHALANX OF LESSER TOE OF LEFT FOOT, UNSPECIFIED PHALANX, INITIAL ENCOUNTER: ICD-10-CM

## 2022-01-19 PROCEDURE — 99213 PR OFFICE/OUTPT VISIT, EST, LEVL III, 20-29 MIN: ICD-10-PCS | Mod: HCNC,S$GLB,, | Performed by: PODIATRIST

## 2022-01-19 PROCEDURE — 99999 PR PBB SHADOW E&M-EST. PATIENT-LVL III: ICD-10-PCS | Mod: PBBFAC,HCNC,, | Performed by: PODIATRIST

## 2022-01-19 PROCEDURE — 1159F MED LIST DOCD IN RCRD: CPT | Mod: HCNC,CPTII,S$GLB, | Performed by: PODIATRIST

## 2022-01-19 PROCEDURE — 73630 X-RAY EXAM OF FOOT: CPT | Mod: 26,HCNC,LT, | Performed by: RADIOLOGY

## 2022-01-19 PROCEDURE — 73630 XR FOOT COMPLETE 3 VIEW LEFT: ICD-10-PCS | Mod: 26,HCNC,LT, | Performed by: RADIOLOGY

## 2022-01-19 PROCEDURE — 1126F PR PAIN SEVERITY QUANTIFIED, NO PAIN PRESENT: ICD-10-PCS | Mod: HCNC,CPTII,S$GLB, | Performed by: PODIATRIST

## 2022-01-19 PROCEDURE — 99213 OFFICE O/P EST LOW 20 MIN: CPT | Mod: HCNC,S$GLB,, | Performed by: PODIATRIST

## 2022-01-19 PROCEDURE — 1101F PT FALLS ASSESS-DOCD LE1/YR: CPT | Mod: HCNC,CPTII,S$GLB, | Performed by: PODIATRIST

## 2022-01-19 PROCEDURE — 3288F FALL RISK ASSESSMENT DOCD: CPT | Mod: HCNC,CPTII,S$GLB, | Performed by: PODIATRIST

## 2022-01-19 PROCEDURE — 1101F PR PT FALLS ASSESS DOC 0-1 FALLS W/OUT INJ PAST YR: ICD-10-PCS | Mod: HCNC,CPTII,S$GLB, | Performed by: PODIATRIST

## 2022-01-19 PROCEDURE — 3288F PR FALLS RISK ASSESSMENT DOCUMENTED: ICD-10-PCS | Mod: HCNC,CPTII,S$GLB, | Performed by: PODIATRIST

## 2022-01-19 PROCEDURE — 1126F AMNT PAIN NOTED NONE PRSNT: CPT | Mod: HCNC,CPTII,S$GLB, | Performed by: PODIATRIST

## 2022-01-19 PROCEDURE — 1159F PR MEDICATION LIST DOCUMENTED IN MEDICAL RECORD: ICD-10-PCS | Mod: HCNC,CPTII,S$GLB, | Performed by: PODIATRIST

## 2022-01-19 PROCEDURE — 99999 PR PBB SHADOW E&M-EST. PATIENT-LVL III: CPT | Mod: PBBFAC,HCNC,, | Performed by: PODIATRIST

## 2022-01-19 PROCEDURE — 73630 X-RAY EXAM OF FOOT: CPT | Mod: TC,HCNC,FY,PO,LT

## 2022-01-19 NOTE — PROGRESS NOTES
PODIATRIC MEDICINE AND SURGERY     CHIEF COMPLAINT  Chief Complaint   Patient presents with    Follow-up     F/u for toe fx 2nd, left, 0 pain, non-diabetic, wears casual shoes, last seen Dr. Isidro on 09/10/21         HPI    SUBJECTIVE: Keyona Dwyer is a 76 y.o. female who  has a past medical history of Anemia, Anxiety, Cataract, Chronic back pain, Fibrocystic breast, Glaucoma, Hyperlipemia, Hypertension, Osteoarthritis, Osteoporosis, PVD (peripheral vascular disease) (4/12/2021), Restless leg syndrome, Tobacco dependence, and Trouble in sleeping. Keyonapresents to clinic for high risk pvdfoot exam and care. Patient admits to painful toenails aggravated by increased weight bearing, shoe gear, and pressure. States pain is relieved with routine debridements.     She complains of mild pain to left second toe of unknown duration with associated swelling. She denies trauma. She denies N/V/F/VC.        1/19/2022  Pt is here today for follow up left second toe fracture. Pt states pain has resolved with judi splinting. She has mild swelling to site. Redness has resolved. No further complaints.    PMH  Past Medical History:   Diagnosis Date    Anemia     Anxiety     Cataract     Chronic back pain     Dr. Guzman    Fibrocystic breast     Glaucoma     Hyperlipemia     Hypertension     Osteoarthritis     Osteoporosis     Rheumatology/on Prolia    PVD (peripheral vascular disease) 4/12/2021    Restless leg syndrome     Tobacco dependence     Trouble in sleeping      Patient Active Problem List   Diagnosis    HTN (hypertension)    Hyperlipidemia    Nicotine dependence, cigarettes, uncomplicated    Osteoarthritis    Lumbar radiculopathy    Osteoporosis    Vitamin D deficiency    Glaucoma of both eyes    Uncomplicated opioid dependence    Chronic seasonal allergic rhinitis due to pollen    COPD, group D, by GOLD 2017 classification    Right hip pain    Atherosclerosis of aorta     Epigastric abdominal pain    Anemia    Osteomyelitis    PVD (peripheral vascular disease)    Tobacco abuse disorder    Status post amputation of great toe, left       MEDS  Current Outpatient Medications on File Prior to Visit   Medication Sig Dispense Refill    amLODIPine (NORVASC) 5 MG tablet TAKE 1 TABLET BY MOUTH EVERY DAY FOR BLOOD PRESSURE 90 tablet 3    azelastine (ASTELIN) 137 mcg (0.1 %) nasal spray 1 spray (137 mcg total) by Nasal route 2 (two) times daily. 30 mL 11    b complex vitamins capsule Take 1 capsule by mouth once daily.      buPROPion (WELLBUTRIN) 75 MG tablet Take 1 tablet (75 mg total) by mouth 2 (two) times daily. 60 tablet 2    busPIRone (BUSPAR) 10 MG tablet Take 1 tablet (10 mg total) by mouth 2 (two) times daily. 60 tablet 11    clopidogreL (PLAVIX) 75 mg tablet TAKE 1 TABLET BY MOUTH EVERY DAY 90 tablet 3    duloxetine (CYMBALTA) 30 MG capsule Take 30 mg by mouth once daily.      ergocalciferol (ERGOCALCIFEROL) 50,000 unit Cap TAKE 1 CAPSULE BY MOUTH ONE TIME PER WEEK 12 capsule 3    gabapentin (NEURONTIN) 600 MG tablet Take 600 mg by mouth 4 (four) times daily.       hydrocodone-acetaminophen 10-325mg (NORCO)  mg Tab Take 1 tablet by mouth every 6 (six) hours as needed.      levocetirizine (XYZAL) 5 MG tablet TAKE 1 TABLET BY MOUTH EVERY DAY IN THE EVENING 30 tablet 11    lisinopriL (PRINIVIL,ZESTRIL) 40 MG tablet TAKE 1 TABLET BY MOUTH EVERY DAY FOR BLOOD PRESSURE 90 tablet 3    mupirocin (BACTROBAN) 2 % ointment Apply to affected area 3 times daily 22 g 1    nutritional supplement/fiber (QUINOA-KALE-HEMP ORAL) Take by mouth.      ON-Q PAIN PUMP by Misc.(Non-Drug; Combo Route) route.      PROLIA 60 mg/mL Syrg       rosuvastatin (CRESTOR) 5 MG tablet Take 1 tablet (5 mg total) by mouth once daily. 90 tablet 3    varenicline (CHANTIX STARTING MONTH BOX) 0.5 mg (11)- 1 mg (42) tablet Take one 0.5mg tab by mouth once daily X3 days,then increase to one 0.5mg tab  twice daily X4 days,then increase to one 1mg tab twice daily 53 tablet 0    varenicline (CHANTIX) 0.5 MG Tab Take 1 tablet (0.5 mg total) by mouth once daily. 30 tablet 1    varenicline (CHANTIX) 1 mg Tab Take 1 tablet (1 mg total) by mouth 2 (two) times daily. 60 tablet 0     No current facility-administered medications on file prior to visit.       PSH     Past Surgical History:   Procedure Laterality Date    ANGIOGRAPHY OF LOWER EXTREMITY N/A 4/12/2021    Procedure: ANGIOGRAM, LOWER EXTREMITY/left leg;  Surgeon: Maico Adkins MD;  Location: [facility] CATH LAB;  Service: Vascular;  Laterality: N/A;  req 1130 start time/Rm A243A    AORTOGRAPHY WITH SERIALOGRAPHY N/A 4/14/2021    Procedure: AORTOGRAM, WITH RUNOFF;  Surgeon: Maico Adkins MD;  Location: [facility] CATH LAB;  Service: Vascular;  Laterality: N/A;  left iliac stent with shockwave/req 1030 start    CATARACT EXTRACTION W/  INTRAOCULAR LENS IMPLANT Bilateral 8/ 9 2017    GALLBLADDER SURGERY      HYSTERECTOMY  1972    LUNG SURGERY Right age 15    lung collpase    TOE AMPUTATION Left 4/15/2021    Procedure: AMPUTATION, TOE;  Surgeon: Taylor Anderson DPM;  Location: [facility] OR;  Service: Podiatry;  Laterality: Left;  Hallux (Great Toe)    TOTAL HIP ARTHROPLASTY Bilateral     TOTAL KNEE ARTHROPLASTY Bilateral         ALL  Review of patient's allergies indicates:   Allergen Reactions    Indomethacin      Other reaction(s): Headache    Latex, natural rubber Swelling    Penicillins      Other reaction(s): Vomiting    Patient tolerated Rocephin during the 4/2021 encounter and she states that she has tolerated Keflex in the past without issue.    Sulfa (sulfonamide antibiotics) Other (See Comments)     hallucinations    Tolmetin      Other reaction(s): Hives    Bactroban [mupirocin calcium] Rash     Burned skin        SOC     Social History     Tobacco Use    Smoking status: Former Smoker     Packs/day: 0.00     Years: 63.00     Pack years: 0.00     Types:  Cigarettes     Quit date: 2021     Years since quittin.5    Smokeless tobacco: Never Used    Tobacco comment: started age of  11    Substance Use Topics    Alcohol use: No    Drug use: No         Family HX    Family History   Problem Relation Age of Onset    Cancer Mother     Breast cancer Mother     Hyperlipidemia Father     Heart failure Father     Heart disease Brother     Brain cancer Brother     Stroke Brother     Alcohol abuse Brother             REVIEW OF SYSTEMS  General: Denies any fever or chills  Chest: Denies shortness of breath, wheezing, coughing, or sputum production  Heart: Denies chest pain.  As noted above and per history of current illness above, otherwise negative in the remainder of the 14 systems.     PHYSICAL EXAM  GEN:  This patient is well-developed, well-nourished and appears stated age, well-oriented to person, place and time, and cooperative and pleasant on today's visit.      LOWER EXTREMITY    VASCULAR  DP pedal pulse 2/4 RIGHT, LEFT2/4   PT pedal pulse 2/4 RIGHT, LEFT2/4  Capillary refill time immediate to the toes.   Feet are warm to the touch. Skin temperature warm to warm from proximally to distally   There are no varicosities, telangiectasias noted to bilateral foot and ankle regions.   There are no ecchymoses noted to bilateral foot and ankle regions.   There is mild swelling to left second toe    DERMATOLOGIC  Skin moist with healthy texture and turgor.  Thickened, dystrophic, elongated, discolored toenails with subungal debris x 9  There are no open ulcerations, lacerations, or fissures to bilateral foot and ankle regions. There are no signs of infection as there is no erythema, no proximal-extending lymphangiitis, no fluctuance, or crepitus noted on palpation to bilateral foot and ankle regions.   There is no interdigital maceration.   There are diffuse hyperkeratotic lesions noted to feet.    NEUROLOGIC  Protective sensation intact at sites  Propioception  intact at 1st MTPJ b/l.  Achilles and patellar deep tendon reflexes intact  Babinski reflex absent    ORTHOPEDIC/BIOMECHANICAL  No symptomatic structural abnormalities noted.  Negative pain with palpation of left second digit  Partial left 1st ray resection   Muscle strength is 5/5 for foot inverters, everters, plantarflexors, and dorsiflexors. Muscle tone is normal.  I  nspection/palpation of bone, joints and muscles unremarkable.    ASSESSMENT  Pain and swelling of toe, left    Closed nondisplaced fracture of phalanx of lesser toe of left foot, unspecified phalanx, initial encounter        PLAN  -The patient was examined and evaulated  -Discuss presenting problems, etiology, pathologic processes and management options with patient today.     Continue with judi splinting x 4 weeks, no compression to site  RTC as scheduled for at risk foot care    Disclaimer: This note was partially prepared using a voice recognition system and is likely to have sound alike errors within the text.        Future Appointments   Date Time Provider Department Center   2/21/2022  8:00 AM LABORATORY, [facility] LAB [facility]   2/21/2022  8:30 AM Holly Mcclain PA-C [facility] RHEUM [facility]   2/21/2022  9:30 AM INJECTION 1, [facility] INFUSION [facility] INFSN [facility]   3/16/2022  8:20 AM Taylor Anderson DPM [facility] POD [place]       Report Electronically Signed By:     Taylor Anderson DPM   Podiatry  [facility]  1/19/2022

## 2022-01-31 ENCOUNTER — CLINICAL SUPPORT (OUTPATIENT)
Dept: SMOKING CESSATION | Facility: CLINIC | Age: 77
End: 2022-01-31
Payer: COMMERCIAL

## 2022-01-31 DIAGNOSIS — F17.200 NICOTINE DEPENDENCE: Primary | ICD-10-CM

## 2022-01-31 PROCEDURE — 99407 BEHAV CHNG SMOKING > 10 MIN: CPT | Mod: S$GLB,,,

## 2022-01-31 PROCEDURE — 99999 PR PBB SHADOW E&M-EST. PATIENT-LVL I: CPT | Mod: PBBFAC,,,

## 2022-01-31 PROCEDURE — 99999 PR PBB SHADOW E&M-EST. PATIENT-LVL I: ICD-10-PCS | Mod: PBBFAC,,,

## 2022-01-31 PROCEDURE — 99407 PR TOBACCO USE CESSATION INTENSIVE >10 MINUTES: ICD-10-PCS | Mod: S$GLB,,,

## 2022-01-31 NOTE — PROGRESS NOTES
Called pt to f/u on her 3 and 6 month smoking cessation quit status. Pt stated she was quit, but then relapsed after she was off the Chantix and is still smoking. Informed her she has benefits available and is able to rejoin. Pt not ready to make appointment. She will call back when ready. Informed her of benefit period, phone follow ups, and contact information. Will complete smart form and resolve quit #2 episode. Completed 3 and 6 month for quit #2 and will continue to follow up with patient.

## 2022-02-18 ENCOUNTER — TELEPHONE (OUTPATIENT)
Dept: RHEUMATOLOGY | Facility: CLINIC | Age: 77
End: 2022-02-18
Payer: MEDICARE

## 2022-02-18 NOTE — PROGRESS NOTES
"Subjective:       Patient ID: Keyona Dwyer is a 76 y.o. female.    Chief Complaint: Osteoarthritis and Osteoporosis    HPI   Keynoa is here for f/u. She has osteoporosis and OA of multiple joints. 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  She tolerates prolia well.  She also has low vit D on weekly supplement. She uses a walker to ambulate. Stable bmd dexa 2/20.  She has h/o bilateral total hip replacements. She has OA in her hands dion prosper 1 cmc joints.  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  Overall she is doing well today and has no complaints.  Started tumeric for OA, says these do not help much. Stopped mobic due to GI upset.  Did PT gait training in past.      One fall since last appt. She tripped over a table with no resulting fractures.  no major complaints.         Review of Systems   Constitutional: Negative for chills, fatigue and fever.   HENT: Negative for mouth sores, rhinorrhea and sore throat.         False teeth   Eyes: Negative for pain and redness.   Respiratory: Negative for cough and shortness of breath.    Cardiovascular: Negative for chest pain.   Gastrointestinal: Negative for abdominal pain, constipation, diarrhea, nausea and vomiting.   Genitourinary: Negative for dysuria and hematuria.   Musculoskeletal: Positive for arthralgias and gait problem. Negative for joint swelling and myalgias.   Skin: Negative for rash.   Neurological: Negative for weakness, numbness and headaches.   Psychiatric/Behavioral: The patient is not nervous/anxious.          Objective:   BP 99/62   Pulse 88   Ht 5' 2" (1.575 m)   Wt 50 kg (110 lb 3.7 oz)   BMI 20.16 kg/m²      Physical Exam   Constitutional: She is oriented to person, place, and time.   HENT:   Head: Normocephalic and atraumatic.   Eyes: Pupils are equal, round, and reactive to light. Right eye exhibits no discharge.   Cardiovascular: Normal rate, regular rhythm and normal heart " sounds. Exam reveals no friction rub.   Pulmonary/Chest: Effort normal and breath sounds normal. No respiratory distress.   Abdominal: Soft. She exhibits no distension. There is no abdominal tenderness.   Musculoskeletal:         General: No deformity. Normal range of motion.      Cervical back: Normal range of motion.      Comments: OA changes to prosper hands dion prosper 1 cmc joints  Left elbow stuck at 50-60 degrees flexion s/p trauma (1969)  Walks with walker   Lymphadenopathy:     She has no cervical adenopathy.   Neurological: She is alert and oriented to person, place, and time.   Skin: No rash noted. No erythema.   Psychiatric: Mood normal.         Recent Results (from the past 168 hour(s))   Comprehensive Metabolic Panel    Collection Time: 02/21/22  7:40 AM   Result Value Ref Range    Sodium 134 (L) 136 - 145 mmol/L    Potassium 5.4 (H) 3.5 - 5.1 mmol/L    Chloride 108 95 - 110 mmol/L    CO2 17 (L) 23 - 29 mmol/L    Glucose 106 70 - 110 mg/dL    BUN 20 8 - 23 mg/dL    Creatinine 1.5 (H) 0.5 - 1.4 mg/dL    Calcium 9.3 8.7 - 10.5 mg/dL    Total Protein 6.7 6.0 - 8.4 g/dL    Albumin 3.5 3.5 - 5.2 g/dL    Total Bilirubin 0.3 0.1 - 1.0 mg/dL    Alkaline Phosphatase 48 (L) 55 - 135 U/L    AST 16 10 - 40 U/L    ALT 12 10 - 44 U/L    Anion Gap 9 8 - 16 mmol/L    eGFR if African American 38 (A) >60 mL/min/1.73 m^2    eGFR if non African American 33 (A) >60 mL/min/1.73 m^2        Dexa 8.2015: spine (L1-2) -3.6, radius total -5.3, Rad 33% -4.8  dexa 11/2017: spine -2.5, spine bmd improved by 7.8%  Dexa 2.2020 spine -2.3, distal 1/3 radius -4.5  Assessment:       1. Age-related osteoporosis without current pathological fracture    2. Medication monitoring encounter    3. Primary osteoarthritis involving multiple joints    4. Vitamin D deficiency        Impression:  Osteoporosis: on prolia q 6 mos, failed bisphosphonates; improved bmd;  has done PT balance training (started prolia 10/2015)    Vit D def: taking weekly vit D,  level improved to 44    Medication monitoring; no issue with prolia, labs reviewed see below; mobic caused Gi upset    OA mult joints: prosper hands, lumbar spine, has pain pump--mobic caused GI upset, taking daily tumeric also    Plan:           Labs reviewed and done within past 14 days  Ca 8.8, Creat 0.8, eGFR >60  No contraindication for Prolia today, ok for nurse to administer today  Re-evaluate patient again in 6 months to determine if Prolia still medically indicated and appropriate to administer.    KDIGO lab monitoring will be followed according to KDIGO 2017 Clinical Practice Guideline Update for the Diagnosis, Evaluation, Prevention, and Treatment of Chronic Kidney Disease-Mineral and Bone Disorder (CKD-MBD)  Chapter 3.1: Diagnosis of CKD-MBD: biochemical abnormalities    Prolia today and cont q 6 mos  Cont weekly vit D 50K u. For now. Will update Vit D level.   Cont ca in diet   Repeat dexa prior   cont tumeric 1000mg/day   rec voltaren gel for joint pain    rtc in 6 months with prolia, cmp, calcitriol, and dexa       Holly Mcclain PA-C  Rheumatology Department   [facility]      30 minutes of total time spent on the encounter, which includes face to face time and non-face to face time preparing to see the patient (eg, review of tests), Obtaining and/or reviewing separately obtained history, Documenting clinical information in the electronic or other health record, Independently interpreting results (not separately reported) and communicating results to the patient/family/caregiver, or Care coordination (not separately reported).    Disclaimer: This note was prepared using voice recognition system and is likely to have sound alike errors and is not proof read.  Please call me with any questions

## 2022-02-21 ENCOUNTER — PATIENT OUTREACH (OUTPATIENT)
Dept: ADMINISTRATIVE | Facility: OTHER | Age: 77
End: 2022-02-21
Payer: MEDICARE

## 2022-02-21 ENCOUNTER — OFFICE VISIT (OUTPATIENT)
Dept: RHEUMATOLOGY | Facility: CLINIC | Age: 77
End: 2022-02-21
Payer: MEDICARE

## 2022-02-21 ENCOUNTER — INFUSION (OUTPATIENT)
Dept: INFUSION THERAPY | Facility: HOSPITAL | Age: 77
End: 2022-02-21
Payer: MEDICARE

## 2022-02-21 VITALS
SYSTOLIC BLOOD PRESSURE: 97 MMHG | HEART RATE: 88 BPM | SYSTOLIC BLOOD PRESSURE: 99 MMHG | RESPIRATION RATE: 16 BRPM | DIASTOLIC BLOOD PRESSURE: 60 MMHG | OXYGEN SATURATION: 97 % | BODY MASS INDEX: 20.29 KG/M2 | WEIGHT: 110.25 LBS | TEMPERATURE: 98 F | HEIGHT: 62 IN | HEART RATE: 82 BPM | DIASTOLIC BLOOD PRESSURE: 62 MMHG

## 2022-02-21 DIAGNOSIS — M15.9 PRIMARY OSTEOARTHRITIS INVOLVING MULTIPLE JOINTS: ICD-10-CM

## 2022-02-21 DIAGNOSIS — M81.0 AGE-RELATED OSTEOPOROSIS WITHOUT CURRENT PATHOLOGICAL FRACTURE: Primary | ICD-10-CM

## 2022-02-21 DIAGNOSIS — E55.9 VITAMIN D DEFICIENCY: ICD-10-CM

## 2022-02-21 DIAGNOSIS — Z51.81 MEDICATION MONITORING ENCOUNTER: ICD-10-CM

## 2022-02-21 DIAGNOSIS — M81.8 OTHER OSTEOPOROSIS WITHOUT CURRENT PATHOLOGICAL FRACTURE: Primary | ICD-10-CM

## 2022-02-21 PROCEDURE — 1125F PR PAIN SEVERITY QUANTIFIED, PAIN PRESENT: ICD-10-PCS | Mod: HCNC,CPTII,S$GLB,

## 2022-02-21 PROCEDURE — 3288F PR FALLS RISK ASSESSMENT DOCUMENTED: ICD-10-PCS | Mod: HCNC,CPTII,S$GLB,

## 2022-02-21 PROCEDURE — 1160F PR REVIEW ALL MEDS BY PRESCRIBER/CLIN PHARMACIST DOCUMENTED: ICD-10-PCS | Mod: HCNC,CPTII,S$GLB,

## 2022-02-21 PROCEDURE — 1159F PR MEDICATION LIST DOCUMENTED IN MEDICAL RECORD: ICD-10-PCS | Mod: HCNC,CPTII,S$GLB,

## 2022-02-21 PROCEDURE — 99999 PR PBB SHADOW E&M-EST. PATIENT-LVL IV: CPT | Mod: PBBFAC,HCNC,,

## 2022-02-21 PROCEDURE — 1125F AMNT PAIN NOTED PAIN PRSNT: CPT | Mod: HCNC,CPTII,S$GLB,

## 2022-02-21 PROCEDURE — 99214 PR OFFICE/OUTPT VISIT, EST, LEVL IV, 30-39 MIN: ICD-10-PCS | Mod: HCNC,S$GLB,,

## 2022-02-21 PROCEDURE — 96372 THER/PROPH/DIAG INJ SC/IM: CPT | Mod: HCNC

## 2022-02-21 PROCEDURE — 3288F FALL RISK ASSESSMENT DOCD: CPT | Mod: HCNC,CPTII,S$GLB,

## 2022-02-21 PROCEDURE — 63600175 PHARM REV CODE 636 W HCPCS: Mod: JG,HCNC | Performed by: PHYSICIAN ASSISTANT

## 2022-02-21 PROCEDURE — 3074F SYST BP LT 130 MM HG: CPT | Mod: HCNC,CPTII,S$GLB,

## 2022-02-21 PROCEDURE — 3074F PR MOST RECENT SYSTOLIC BLOOD PRESSURE < 130 MM HG: ICD-10-PCS | Mod: HCNC,CPTII,S$GLB,

## 2022-02-21 PROCEDURE — 1100F PR PT FALLS ASSESS DOC 2+ FALLS/FALL W/INJURY/YR: ICD-10-PCS | Mod: HCNC,CPTII,S$GLB,

## 2022-02-21 PROCEDURE — 1100F PTFALLS ASSESS-DOCD GE2>/YR: CPT | Mod: HCNC,CPTII,S$GLB,

## 2022-02-21 PROCEDURE — 99999 PR PBB SHADOW E&M-EST. PATIENT-LVL IV: ICD-10-PCS | Mod: PBBFAC,HCNC,,

## 2022-02-21 PROCEDURE — 3078F DIAST BP <80 MM HG: CPT | Mod: HCNC,CPTII,S$GLB,

## 2022-02-21 PROCEDURE — 99214 OFFICE O/P EST MOD 30 MIN: CPT | Mod: HCNC,S$GLB,,

## 2022-02-21 PROCEDURE — 1159F MED LIST DOCD IN RCRD: CPT | Mod: HCNC,CPTII,S$GLB,

## 2022-02-21 PROCEDURE — 1160F RVW MEDS BY RX/DR IN RCRD: CPT | Mod: HCNC,CPTII,S$GLB,

## 2022-02-21 PROCEDURE — 3078F PR MOST RECENT DIASTOLIC BLOOD PRESSURE < 80 MM HG: ICD-10-PCS | Mod: HCNC,CPTII,S$GLB,

## 2022-02-21 RX ORDER — INFLUENZA VACCINE, ADJUVANTED 15; 15; 15; 15 UG/.5ML; UG/.5ML; UG/.5ML; UG/.5ML
INJECTION, SUSPENSION INTRAMUSCULAR
COMMUNITY
Start: 2021-10-07 | End: 2022-09-01 | Stop reason: ALTCHOICE

## 2022-02-21 RX ADMIN — DENOSUMAB 60 MG: 60 INJECTION SUBCUTANEOUS at 08:02

## 2022-02-21 NOTE — DISCHARGE INSTRUCTIONS
FALL PREVENTION   Falls often occur due to slipping, tripping or losing your balance. Here are ways to reduce your risk of falling again.   Was there anything that caused your fall that can be fixed, removed or replaced?   Make your home safe by keeping walkways clear of objects you may trip over.   Use non-slip pads under rugs.   Do not walk in poorly lit areas.   Do not stand on chairs or wobbly ladders.   Use caution when reaching overhead or looking upward. This position can cause a loss of balance.   Be sure your shoes fit properly, have non-slip bottoms and are in good condition.   Be cautious when going up and down stairs, curbs, and when walking on uneven sidewalks.   If your balance is poor, consider using a cane or walker.   If your fall was related to alcohol use, stop or limit alcohol intake.   If your fall was related to use of sleeping medicines, talk to your doctor about this. You may need to reduce your dosage at bedtime if you awaken during the night to go to the bathroom.   To reduce the need for nighttime bathroom trips:   Avoid drinking fluids for several hours before going to bed   Empty your bladder before going to bed   Men can keep a urinal at the bedside   © 6100-3411 [facility], [address] [place] [address], [place] [address]. All rights reserved. This information is not intended as a substitute for professional medical care. Always follow your healthcare professional's instructions.     Remember to take your calcium with vitamin D supplement as directed.   Do not have any dental work for 3 months following your injection today.

## 2022-03-16 ENCOUNTER — OFFICE VISIT (OUTPATIENT)
Dept: PODIATRY | Facility: CLINIC | Age: 77
End: 2022-03-16
Payer: MEDICARE

## 2022-03-16 VITALS — BODY MASS INDEX: 20.24 KG/M2 | HEIGHT: 62 IN | WEIGHT: 110 LBS

## 2022-03-16 DIAGNOSIS — S98.112A AMPUTATION OF LEFT GREAT TOE: ICD-10-CM

## 2022-03-16 DIAGNOSIS — I73.9 PVD (PERIPHERAL VASCULAR DISEASE): Primary | ICD-10-CM

## 2022-03-16 DIAGNOSIS — B35.1 DERMATOPHYTOSIS OF NAIL: ICD-10-CM

## 2022-03-16 PROCEDURE — 1159F PR MEDICATION LIST DOCUMENTED IN MEDICAL RECORD: ICD-10-PCS | Mod: CPTII,S$GLB,, | Performed by: PODIATRIST

## 2022-03-16 PROCEDURE — 1101F PR PT FALLS ASSESS DOC 0-1 FALLS W/OUT INJ PAST YR: ICD-10-PCS | Mod: CPTII,S$GLB,, | Performed by: PODIATRIST

## 2022-03-16 PROCEDURE — 3288F PR FALLS RISK ASSESSMENT DOCUMENTED: ICD-10-PCS | Mod: CPTII,S$GLB,, | Performed by: PODIATRIST

## 2022-03-16 PROCEDURE — 99213 OFFICE O/P EST LOW 20 MIN: CPT | Mod: 25,S$GLB,, | Performed by: PODIATRIST

## 2022-03-16 PROCEDURE — 1101F PT FALLS ASSESS-DOCD LE1/YR: CPT | Mod: CPTII,S$GLB,, | Performed by: PODIATRIST

## 2022-03-16 PROCEDURE — 1159F MED LIST DOCD IN RCRD: CPT | Mod: CPTII,S$GLB,, | Performed by: PODIATRIST

## 2022-03-16 PROCEDURE — 99999 PR PBB SHADOW E&M-EST. PATIENT-LVL III: ICD-10-PCS | Mod: PBBFAC,,, | Performed by: PODIATRIST

## 2022-03-16 PROCEDURE — 99213 PR OFFICE/OUTPT VISIT, EST, LEVL III, 20-29 MIN: ICD-10-PCS | Mod: 25,S$GLB,, | Performed by: PODIATRIST

## 2022-03-16 PROCEDURE — 3288F FALL RISK ASSESSMENT DOCD: CPT | Mod: CPTII,S$GLB,, | Performed by: PODIATRIST

## 2022-03-16 PROCEDURE — 99999 PR PBB SHADOW E&M-EST. PATIENT-LVL III: CPT | Mod: PBBFAC,,, | Performed by: PODIATRIST

## 2022-03-16 PROCEDURE — 1126F PR PAIN SEVERITY QUANTIFIED, NO PAIN PRESENT: ICD-10-PCS | Mod: CPTII,S$GLB,, | Performed by: PODIATRIST

## 2022-03-16 PROCEDURE — 11721 DEBRIDE NAIL 6 OR MORE: CPT | Mod: Q7,S$GLB,, | Performed by: PODIATRIST

## 2022-03-16 PROCEDURE — 1126F AMNT PAIN NOTED NONE PRSNT: CPT | Mod: CPTII,S$GLB,, | Performed by: PODIATRIST

## 2022-03-16 PROCEDURE — 11721 PR DEBRIDEMENT OF NAILS, 6 OR MORE: ICD-10-PCS | Mod: Q7,S$GLB,, | Performed by: PODIATRIST

## 2022-03-16 NOTE — PROGRESS NOTES
PODIATRIC MEDICINE AND SURGERY     CHIEF COMPLAINT  Chief Complaint   Patient presents with    Routine Foot Care     3 month RFC, 0 pain, non-diabetic, wears casual shoes and socks, last seen PCP Dr. Isidro on 09/10/21         HPI    SUBJECTIVE: Keyona Dwyer is a 76 y.o. female who  has a past medical history of Anemia, Anxiety, Cataract, Chronic back pain, Fibrocystic breast, Glaucoma, Hyperlipemia, Hypertension, Osteoarthritis, Osteoporosis, PVD (peripheral vascular disease) (4/12/2021), Restless leg syndrome, Tobacco dependence, and Trouble in sleeping. Keyonapresents to clinic for high risk pvdfoot exam and care. Patient admits to painful toenails aggravated by increased weight bearing, shoe gear, and pressure. States pain is relieved with routine debridements.     PMH  Past Medical History:   Diagnosis Date    Anemia     Anxiety     Cataract     Chronic back pain     Dr. Guzman    Fibrocystic breast     Glaucoma     Hyperlipemia     Hypertension     Osteoarthritis     Osteoporosis     Rheumatology/on Prolia    PVD (peripheral vascular disease) 4/12/2021    Restless leg syndrome     Tobacco dependence     Trouble in sleeping      Patient Active Problem List   Diagnosis    HTN (hypertension)    Hyperlipidemia    Nicotine dependence, cigarettes, uncomplicated    Osteoarthritis    Lumbar radiculopathy    Osteoporosis    Vitamin D deficiency    Glaucoma of both eyes    Uncomplicated opioid dependence    Chronic seasonal allergic rhinitis due to pollen    COPD, group D, by GOLD 2017 classification    Right hip pain    Atherosclerosis of aorta    Epigastric abdominal pain    Anemia    Osteomyelitis    PVD (peripheral vascular disease)    Tobacco abuse disorder    Status post amputation of great toe, left    Amputation of left great toe       MEDS  Current Outpatient Medications on File Prior to Visit   Medication Sig Dispense Refill    amLODIPine (NORVASC) 5 MG  tablet TAKE 1 TABLET BY MOUTH EVERY DAY FOR BLOOD PRESSURE 90 tablet 3    azelastine (ASTELIN) 137 mcg (0.1 %) nasal spray 1 spray (137 mcg total) by Nasal route 2 (two) times daily. 30 mL 11    b complex vitamins capsule Take 1 capsule by mouth once daily.      buPROPion (WELLBUTRIN) 75 MG tablet Take 1 tablet (75 mg total) by mouth 2 (two) times daily. 60 tablet 2    busPIRone (BUSPAR) 10 MG tablet Take 1 tablet (10 mg total) by mouth 2 (two) times daily. 60 tablet 11    clopidogreL (PLAVIX) 75 mg tablet TAKE 1 TABLET BY MOUTH EVERY DAY 90 tablet 3    duloxetine (CYMBALTA) 30 MG capsule Take 30 mg by mouth once daily.      ergocalciferol (ERGOCALCIFEROL) 50,000 unit Cap TAKE 1 CAPSULE BY MOUTH ONE TIME PER WEEK 12 capsule 3    FLUAD QUAD 2021-22,65Y UP,,PF, 60 mcg (15 mcg x 4)/0.5 mL Syrg       gabapentin (NEURONTIN) 600 MG tablet Take 600 mg by mouth 4 (four) times daily.       hydrocodone-acetaminophen 10-325mg (NORCO)  mg Tab Take 1 tablet by mouth every 6 (six) hours as needed.      lisinopriL (PRINIVIL,ZESTRIL) 40 MG tablet TAKE 1 TABLET BY MOUTH EVERY DAY FOR BLOOD PRESSURE 90 tablet 3    mupirocin (BACTROBAN) 2 % ointment Apply to affected area 3 times daily 22 g 1    nutritional supplement/fiber (QUINOA-KALE-HEMP ORAL) Take by mouth.      ON-Q PAIN PUMP by Misc.(Non-Drug; Combo Route) route.      PROLIA 60 mg/mL Syrg       rosuvastatin (CRESTOR) 5 MG tablet Take 1 tablet (5 mg total) by mouth once daily. 90 tablet 3     No current facility-administered medications on file prior to visit.       PSH     Past Surgical History:   Procedure Laterality Date    ANGIOGRAPHY OF LOWER EXTREMITY N/A 4/12/2021    Procedure: ANGIOGRAM, LOWER EXTREMITY/left leg;  Surgeon: Maico Adkins MD;  Location[facility] CATH LAB;  Service: Vascular;  Laterality: N/A;  req 1130 start time/Rm A243A    AORTOGRAPHY WITH SERIALOGRAPHY N/A 4/14/2021    Procedure: AORTOGRAM, WITH RUNOFF;  Surgeon: Maico Adkins MD;   Location: [facility] CATH LAB;  Service: Vascular;  Laterality: N/A;  left iliac stent with shockwave/req 1030 start    CATARACT EXTRACTION W/  INTRAOCULAR LENS IMPLANT Bilateral 2017    GALLBLADDER SURGERY      HYSTERECTOMY  1972    LUNG SURGERY Right age 15    lung collpase    TOE AMPUTATION Left 4/15/2021    Procedure: AMPUTATION, TOE;  Surgeon: Taylor Anderson DPM;  Location: [facility] OR;  Service: Podiatry;  Laterality: Left;  Hallux (Great Toe)    TOTAL HIP ARTHROPLASTY Bilateral     TOTAL KNEE ARTHROPLASTY Bilateral         ALL  Review of patient's allergies indicates:   Allergen Reactions    Indomethacin      Other reaction(s): Headache    Latex, natural rubber Swelling    Penicillins      Other reaction(s): Vomiting    Patient tolerated Rocephin during the 2021 encounter and she states that she has tolerated Keflex in the past without issue.    Sulfa (sulfonamide antibiotics) Other (See Comments)     hallucinations    Tolmetin      Other reaction(s): Hives    Bactroban [mupirocin calcium] Rash     Burned skin        SOC     Social History     Tobacco Use    Smoking status: Former Smoker     Packs/day: 0.00     Years: 63.00     Pack years: 0.00     Types: Cigarettes     Quit date: 2021     Years since quittin.7    Smokeless tobacco: Never Used    Tobacco comment: started age of  11    Substance Use Topics    Alcohol use: No    Drug use: No         Family HX    Family History   Problem Relation Age of Onset    Cancer Mother     Breast cancer Mother     Hyperlipidemia Father     Heart failure Father     Heart disease Brother     Brain cancer Brother     Stroke Brother     Alcohol abuse Brother             REVIEW OF SYSTEMS  General: Denies any fever or chills  Chest: Denies shortness of breath, wheezing, coughing, or sputum production  Heart: Denies chest pain.  As noted above and per history of current illness above, otherwise negative in the remainder of the 14 systems.      PHYSICAL EXAM  GEN:  This patient is well-developed, well-nourished and appears stated age, well-oriented to person, place and time, and cooperative and pleasant on today's visit.      LOWER EXTREMITY    VASCULAR  DP pedal pulse 2/4 RIGHT, LEFT2/4   PT pedal pulse 2/4 RIGHT, LEFT2/4  Capillary refill time immediate to the toes.   Feet are warm to the touch. Skin temperature warm to warm from proximally to distally   There are no varicosities, telangiectasias noted to bilateral foot and ankle regions.   There are no ecchymoses noted to bilateral foot and ankle regions.   There is mild swelling to left second toe    DERMATOLOGIC  Skin moist with healthy texture and turgor.  Thickened, dystrophic, elongated, discolored toenails with subungal debris x 9  There are no open ulcerations, lacerations, or fissures to bilateral foot and ankle regions. There are no signs of infection as there is no erythema, no proximal-extending lymphangiitis, no fluctuance, or crepitus noted on palpation to bilateral foot and ankle regions.   There is no interdigital maceration.   There are diffuse hyperkeratotic lesions noted to feet.    NEUROLOGIC  Protective sensation intact at sites  Propioception intact at 1st MTPJ b/l.  Achilles and patellar deep tendon reflexes intact  Babinski reflex absent    ORTHOPEDIC/BIOMECHANICAL  No symptomatic structural abnormalities noted.  Partial left 1st ray resection   Muscle strength is 5/5 for foot inverters, everters, plantarflexors, and dorsiflexors. Muscle tone is normal.  I  nspection/palpation of bone, joints and muscles unremarkable.    ASSESSMENT  PVD (peripheral vascular disease)    Dermatophytosis of nail    Amputation of left great toe        PLAN  -The patient was examined and evaulated  -Discuss presenting problems, etiology, pathologic processes and management options with patient today.     -With patient's permission, the elongated onychomycotic toenails, as outlined in the physical  examination, were sharply debrided with a double action nail nipper to their soft tissue attachment. If indicated, the nails were then smoothed down in thickness with a mechanical rotary susannah and/or olya board to facilitate in further debridement removing all offending nail and subungual debris.    Disclaimer: This note was partially prepared using a voice recognition system and is likely to have sound alike errors within the text.        Future Appointments   Date Time Provider Department Center   6/22/2022  8:20 AM Taylor Anderson DPM [facility] POD [place]   8/15/2022  9:30 AM [facility] BMD1 [facility] DEXABMD [facility]   8/15/2022 10:00 AM LABORATORY, [facility] LAB [facility]   8/22/2022  9:30 AM Holly Mcclain PA-C [facility] RHEUM [facility]   8/22/2022 10:00 AM INJECTION 1, [facility] INFUSION [facility] INFSN [facility]       Report Electronically Signed By:     Taylor Anderson DPM   Podiatry  [facility]  3/16/2022

## 2022-04-11 RX ORDER — ERGOCALCIFEROL 1.25 MG/1
CAPSULE ORAL
Qty: 12 CAPSULE | Refills: 3 | Status: SHIPPED | OUTPATIENT
Start: 2022-04-11 | End: 2023-04-06

## 2022-05-29 ENCOUNTER — OFFICE VISIT (OUTPATIENT)
Dept: URGENT CARE | Facility: CLINIC | Age: 77
End: 2022-05-29
Payer: MEDICARE

## 2022-05-29 VITALS
SYSTOLIC BLOOD PRESSURE: 124 MMHG | OXYGEN SATURATION: 97 % | RESPIRATION RATE: 18 BRPM | HEART RATE: 94 BPM | DIASTOLIC BLOOD PRESSURE: 57 MMHG

## 2022-05-29 DIAGNOSIS — M25.473 ANKLE SWELLING, UNSPECIFIED LATERALITY: Primary | ICD-10-CM

## 2022-05-29 PROCEDURE — 3074F PR MOST RECENT SYSTOLIC BLOOD PRESSURE < 130 MM HG: ICD-10-PCS | Mod: CPTII,S$GLB,, | Performed by: NURSE PRACTITIONER

## 2022-05-29 PROCEDURE — 99214 OFFICE O/P EST MOD 30 MIN: CPT | Mod: S$GLB,,, | Performed by: NURSE PRACTITIONER

## 2022-05-29 PROCEDURE — 1125F PR PAIN SEVERITY QUANTIFIED, PAIN PRESENT: ICD-10-PCS | Mod: CPTII,S$GLB,, | Performed by: NURSE PRACTITIONER

## 2022-05-29 PROCEDURE — 1159F PR MEDICATION LIST DOCUMENTED IN MEDICAL RECORD: ICD-10-PCS | Mod: CPTII,S$GLB,, | Performed by: NURSE PRACTITIONER

## 2022-05-29 PROCEDURE — 1125F AMNT PAIN NOTED PAIN PRSNT: CPT | Mod: CPTII,S$GLB,, | Performed by: NURSE PRACTITIONER

## 2022-05-29 PROCEDURE — 99214 PR OFFICE/OUTPT VISIT, EST, LEVL IV, 30-39 MIN: ICD-10-PCS | Mod: S$GLB,,, | Performed by: NURSE PRACTITIONER

## 2022-05-29 PROCEDURE — 3078F DIAST BP <80 MM HG: CPT | Mod: CPTII,S$GLB,, | Performed by: NURSE PRACTITIONER

## 2022-05-29 PROCEDURE — 1159F MED LIST DOCD IN RCRD: CPT | Mod: CPTII,S$GLB,, | Performed by: NURSE PRACTITIONER

## 2022-05-29 PROCEDURE — 3078F PR MOST RECENT DIASTOLIC BLOOD PRESSURE < 80 MM HG: ICD-10-PCS | Mod: CPTII,S$GLB,, | Performed by: NURSE PRACTITIONER

## 2022-05-29 PROCEDURE — 3074F SYST BP LT 130 MM HG: CPT | Mod: CPTII,S$GLB,, | Performed by: NURSE PRACTITIONER

## 2022-05-29 RX ORDER — FUROSEMIDE 20 MG/1
20 TABLET ORAL DAILY
Qty: 2 TABLET | Refills: 0 | Status: SHIPPED | OUTPATIENT
Start: 2022-05-29 | End: 2022-05-29 | Stop reason: SDUPTHER

## 2022-05-29 RX ORDER — FUROSEMIDE 20 MG/1
20 TABLET ORAL DAILY
Qty: 2 TABLET | Refills: 0 | Status: SHIPPED | OUTPATIENT
Start: 2022-05-29 | End: 2022-05-31 | Stop reason: SDUPTHER

## 2022-05-29 NOTE — PROGRESS NOTES
Subjective:       Patient ID: Keyona Dwyer is a 76 y.o. female.    Vitals:  blood pressure is 124/57 (abnormal) and her pulse is 94. Her respiration is 18 and oxygen saturation is 97%.     Chief Complaint: Edema (Pt presents to u/c with RAMSES edema since Friday.)    Pt presents to u/ with RAMSES ankle edema worsening over the weekend. . States she normally does not eat salty food but over the weekend ate boiled crabs and was at a party where she ate other foods she normally would not. Pertinent negatives are chest pain, SOB, cough.     Edema  This is a new problem. The current episode started in the past 7 days. The problem occurs constantly. The problem has been unchanged. Associated symptoms include arthralgias and joint swelling. Pertinent negatives include no abdominal pain, chest pain, congestion, coughing, diaphoresis, fever, nausea, numbness, rash, sore throat, urinary symptoms or vomiting. The symptoms are aggravated by walking. She has tried lying down for the symptoms. The treatment provided no relief.       Constitution: Negative for sweating and fever.   HENT: Negative for congestion and sore throat.    Cardiovascular: Negative for chest pain.   Eyes: Negative for eye pain.   Respiratory: Negative for cough.    Gastrointestinal: Negative for abdominal pain, nausea and vomiting.   Genitourinary: Negative for dysuria.   Musculoskeletal: Positive for joint pain and joint swelling.   Skin: Negative for rash.   Allergic/Immunologic: Negative for itching.   Neurological: Negative for disorientation and numbness.   Hematologic/Lymphatic: Positive for easy bruising/bleeding. Bruises/bleeds easily.   Psychiatric/Behavioral: Negative for disorientation and confusion.       Objective:      Physical Exam   Constitutional: She is oriented to person, place, and time. She appears well-developed. She is cooperative. No distress.   HENT:   Head: Normocephalic and atraumatic.   Nose: Nose normal.   Mouth/Throat:  Oropharynx is clear and moist and mucous membranes are normal.   Eyes: Conjunctivae and lids are normal.   Neck: Trachea normal and phonation normal. Neck supple.   Cardiovascular: Normal rate, regular rhythm, normal heart sounds and normal pulses.   Pulmonary/Chest: Effort normal and breath sounds normal.   Abdominal: Normal appearance. She exhibits no abdominal bruit, no pulsatile midline mass and no mass.   Musculoskeletal:         General: No deformity.      Comments: Bilateral ankle swelling. No tenderness in the calf or behind the knee bilaterally. NV intact.    Neurological: She is alert and oriented to person, place, and time. She has normal strength and normal reflexes. No sensory deficit.   Skin: Skin is warm, dry, intact and not diaphoretic.   Psychiatric: Her speech is normal and behavior is normal. Judgment and thought content normal.   Nursing note and vitals reviewed.        Assessment:       1. Ankle swelling, unspecified laterality          Plan:         Ankle swelling, unspecified laterality  -     Discontinue: furosemide (LASIX) 20 MG tablet; Take 1 tablet (20 mg total) by mouth once daily.  Dispense: 2 tablet; Refill: 0  -     furosemide (LASIX) 20 MG tablet; Take 1 tablet (20 mg total) by mouth once daily.  Dispense: 2 tablet; Refill: 0         Advised to follow up with Dr. Isidro tomorrow.

## 2022-05-31 ENCOUNTER — OFFICE VISIT (OUTPATIENT)
Dept: INTERNAL MEDICINE | Facility: CLINIC | Age: 77
End: 2022-05-31
Payer: MEDICARE

## 2022-05-31 VITALS
BODY MASS INDEX: 19.88 KG/M2 | OXYGEN SATURATION: 95 % | TEMPERATURE: 97 F | HEIGHT: 62 IN | HEART RATE: 91 BPM | WEIGHT: 108 LBS | SYSTOLIC BLOOD PRESSURE: 120 MMHG | DIASTOLIC BLOOD PRESSURE: 68 MMHG

## 2022-05-31 DIAGNOSIS — I10 PRIMARY HYPERTENSION: Chronic | ICD-10-CM

## 2022-05-31 DIAGNOSIS — M25.473 ANKLE SWELLING, UNSPECIFIED LATERALITY: Primary | ICD-10-CM

## 2022-05-31 DIAGNOSIS — E78.5 HYPERLIPIDEMIA, UNSPECIFIED HYPERLIPIDEMIA TYPE: Chronic | ICD-10-CM

## 2022-05-31 DIAGNOSIS — J44.9 CHRONIC OBSTRUCTIVE PULMONARY DISEASE, UNSPECIFIED COPD TYPE: ICD-10-CM

## 2022-05-31 PROCEDURE — 99214 PR OFFICE/OUTPT VISIT, EST, LEVL IV, 30-39 MIN: ICD-10-PCS | Mod: S$GLB,,, | Performed by: NURSE PRACTITIONER

## 2022-05-31 PROCEDURE — 3288F FALL RISK ASSESSMENT DOCD: CPT | Mod: CPTII,S$GLB,, | Performed by: NURSE PRACTITIONER

## 2022-05-31 PROCEDURE — 1160F RVW MEDS BY RX/DR IN RCRD: CPT | Mod: CPTII,S$GLB,, | Performed by: NURSE PRACTITIONER

## 2022-05-31 PROCEDURE — 1159F MED LIST DOCD IN RCRD: CPT | Mod: CPTII,S$GLB,, | Performed by: NURSE PRACTITIONER

## 2022-05-31 PROCEDURE — 3078F DIAST BP <80 MM HG: CPT | Mod: CPTII,S$GLB,, | Performed by: NURSE PRACTITIONER

## 2022-05-31 PROCEDURE — 1101F PR PT FALLS ASSESS DOC 0-1 FALLS W/OUT INJ PAST YR: ICD-10-PCS | Mod: CPTII,S$GLB,, | Performed by: NURSE PRACTITIONER

## 2022-05-31 PROCEDURE — 3078F PR MOST RECENT DIASTOLIC BLOOD PRESSURE < 80 MM HG: ICD-10-PCS | Mod: CPTII,S$GLB,, | Performed by: NURSE PRACTITIONER

## 2022-05-31 PROCEDURE — 99499 UNLISTED E&M SERVICE: CPT | Mod: ,,, | Performed by: NURSE PRACTITIONER

## 2022-05-31 PROCEDURE — 99499 RISK ADDL DX/OHS AUDIT: ICD-10-PCS | Mod: ,,, | Performed by: NURSE PRACTITIONER

## 2022-05-31 PROCEDURE — 3074F SYST BP LT 130 MM HG: CPT | Mod: CPTII,S$GLB,, | Performed by: NURSE PRACTITIONER

## 2022-05-31 PROCEDURE — 1160F PR REVIEW ALL MEDS BY PRESCRIBER/CLIN PHARMACIST DOCUMENTED: ICD-10-PCS | Mod: CPTII,S$GLB,, | Performed by: NURSE PRACTITIONER

## 2022-05-31 PROCEDURE — 99214 OFFICE O/P EST MOD 30 MIN: CPT | Mod: S$GLB,,, | Performed by: NURSE PRACTITIONER

## 2022-05-31 PROCEDURE — 99999 PR PBB SHADOW E&M-EST. PATIENT-LVL V: CPT | Mod: PBBFAC,,, | Performed by: NURSE PRACTITIONER

## 2022-05-31 PROCEDURE — 3074F PR MOST RECENT SYSTOLIC BLOOD PRESSURE < 130 MM HG: ICD-10-PCS | Mod: CPTII,S$GLB,, | Performed by: NURSE PRACTITIONER

## 2022-05-31 PROCEDURE — 1125F PR PAIN SEVERITY QUANTIFIED, PAIN PRESENT: ICD-10-PCS | Mod: CPTII,S$GLB,, | Performed by: NURSE PRACTITIONER

## 2022-05-31 PROCEDURE — 1101F PT FALLS ASSESS-DOCD LE1/YR: CPT | Mod: CPTII,S$GLB,, | Performed by: NURSE PRACTITIONER

## 2022-05-31 PROCEDURE — 1159F PR MEDICATION LIST DOCUMENTED IN MEDICAL RECORD: ICD-10-PCS | Mod: CPTII,S$GLB,, | Performed by: NURSE PRACTITIONER

## 2022-05-31 PROCEDURE — 3288F PR FALLS RISK ASSESSMENT DOCUMENTED: ICD-10-PCS | Mod: CPTII,S$GLB,, | Performed by: NURSE PRACTITIONER

## 2022-05-31 PROCEDURE — 99999 PR PBB SHADOW E&M-EST. PATIENT-LVL V: ICD-10-PCS | Mod: PBBFAC,,, | Performed by: NURSE PRACTITIONER

## 2022-05-31 PROCEDURE — 1125F AMNT PAIN NOTED PAIN PRSNT: CPT | Mod: CPTII,S$GLB,, | Performed by: NURSE PRACTITIONER

## 2022-05-31 RX ORDER — FUROSEMIDE 20 MG/1
20 TABLET ORAL DAILY PRN
Qty: 5 TABLET | Refills: 0 | Status: ON HOLD | OUTPATIENT
Start: 2022-05-31 | End: 2022-09-17 | Stop reason: SDUPTHER

## 2022-05-31 NOTE — PATIENT INSTRUCTIONS
Annual with Dr. ASHLYN juarez in sept  Lasix daily as needed for swelling  Eat a banana on the days you take your lasix  Increase water to 64 oz per day  Elevate legs  Limit salt  Compression socks up to the knee

## 2022-05-31 NOTE — PROGRESS NOTES
"Subjective:       Patient ID: Keyona Dwyer is a 76 y.o. female.    Chief Complaint: Joint Swelling    Patient here with bilateral ankle swelling  Ate some stuffed bell peppers last week, salted the rice and the stuffing  Does not normally eat salt  Also recently ate out with some friends- food was salty   Swelling started last thurs   Went to Urgent Care last Sunday because swelling was bad and was in pain, was given lasix 2 pills to take for the next 2 days  Swelling is better but still present   Needs to drink more water    Restarted smoking, smoking about 8 cigarettes/day      /68   Pulse 91   Temp 97.4 °F (36.3 °C) (Temporal)   Ht 5' 2" (1.575 m)   Wt 49 kg (108 lb 0.4 oz)   SpO2 95%   BMI 19.76 kg/m²     Review of Systems   Constitutional: Negative for activity change, appetite change, chills, diaphoresis, fatigue, fever and unexpected weight change.   HENT: Negative.    Respiratory: Negative for cough and shortness of breath.    Cardiovascular: Negative for chest pain, palpitations and leg swelling.   Gastrointestinal: Negative.    Genitourinary: Negative.    Musculoskeletal: Negative.    Skin: Negative for color change, pallor, rash and wound.   Allergic/Immunologic: Negative for immunocompromised state.   Neurological: Negative.  Negative for dizziness and facial asymmetry.   Hematological: Negative for adenopathy. Does not bruise/bleed easily.   Psychiatric/Behavioral: Negative for agitation, behavioral problems and confusion.       Objective:      Physical Exam  Vitals and nursing note reviewed.   Constitutional:       General: She is not in acute distress.     Appearance: She is well-developed. She is not diaphoretic.   HENT:      Head: Normocephalic and atraumatic.      Mouth/Throat:      Pharynx: No oropharyngeal exudate.   Eyes:      General:         Right eye: No discharge.         Left eye: No discharge.      Conjunctiva/sclera: Conjunctivae normal.   Cardiovascular:      Rate and " Rhythm: Normal rate and regular rhythm.      Heart sounds: Normal heart sounds. No murmur heard.  Pulmonary:      Effort: Pulmonary effort is normal. No respiratory distress.      Breath sounds: Normal breath sounds. No wheezing.   Abdominal:      General: There is no distension.      Palpations: Abdomen is soft.   Musculoskeletal:         General: No tenderness. Normal range of motion.   Skin:     General: Skin is warm and dry.      Findings: No erythema or rash.   Neurological:      Mental Status: She is alert and oriented to person, place, and time.   Psychiatric:         Behavior: Behavior normal.         Thought Content: Thought content normal.         Judgment: Judgment normal.         Assessment:       1. Ankle swelling, unspecified laterality    2. Primary hypertension    3. Hyperlipidemia, unspecified hyperlipidemia type    4. Chronic obstructive pulmonary disease, unspecified COPD type        Plan:       Keyona was seen today for joint swelling.    Diagnoses and all orders for this visit:    Ankle swelling, unspecified laterality  -     furosemide (LASIX) 20 MG tablet; Take 1 tablet (20 mg total) by mouth daily as needed (swelling).    Primary hypertension  Stable on lisinopril, amlodipine    Hyperlipidemia, unspecified hyperlipidemia type  Stable. On crestor    Chronic obstructive pulmonary disease, unspecified COPD type  Stable. Smoking again, 8 cigarettes/day  Cessation advised    Patient Instructions   Annual with Dr. ASHLYN juarez in sept  Lasix daily as needed for swelling  Eat a banana on the days you take your lasix  Increase water to 64 oz per day  Elevate legs  Limit salt  Compression socks up to the knee

## 2022-06-01 ENCOUNTER — TELEPHONE (OUTPATIENT)
Dept: URGENT CARE | Facility: CLINIC | Age: 77
End: 2022-06-01
Payer: MEDICARE

## 2022-06-09 ENCOUNTER — OFFICE VISIT (OUTPATIENT)
Dept: URGENT CARE | Facility: CLINIC | Age: 77
End: 2022-06-09
Payer: MEDICARE

## 2022-06-09 ENCOUNTER — TELEPHONE (OUTPATIENT)
Dept: ORTHOPEDICS | Facility: CLINIC | Age: 77
End: 2022-06-09
Payer: MEDICARE

## 2022-06-09 VITALS
OXYGEN SATURATION: 97 % | HEIGHT: 62 IN | DIASTOLIC BLOOD PRESSURE: 61 MMHG | BODY MASS INDEX: 19.88 KG/M2 | SYSTOLIC BLOOD PRESSURE: 134 MMHG | RESPIRATION RATE: 18 BRPM | HEART RATE: 107 BPM | WEIGHT: 108 LBS | TEMPERATURE: 99 F

## 2022-06-09 DIAGNOSIS — L02.511 ABSCESS OF FINGER, RIGHT: Primary | ICD-10-CM

## 2022-06-09 PROCEDURE — 1159F PR MEDICATION LIST DOCUMENTED IN MEDICAL RECORD: ICD-10-PCS | Mod: CPTII,S$GLB,, | Performed by: PHYSICIAN ASSISTANT

## 2022-06-09 PROCEDURE — 3075F PR MOST RECENT SYSTOLIC BLOOD PRESS GE 130-139MM HG: ICD-10-PCS | Mod: CPTII,S$GLB,, | Performed by: PHYSICIAN ASSISTANT

## 2022-06-09 PROCEDURE — 96372 THER/PROPH/DIAG INJ SC/IM: CPT | Mod: 59,S$GLB,, | Performed by: PHYSICIAN ASSISTANT

## 2022-06-09 PROCEDURE — 3078F PR MOST RECENT DIASTOLIC BLOOD PRESSURE < 80 MM HG: ICD-10-PCS | Mod: CPTII,S$GLB,, | Performed by: PHYSICIAN ASSISTANT

## 2022-06-09 PROCEDURE — 26010 DRAINAGE OF FINGER ABSCESS: CPT | Mod: F6,S$GLB,, | Performed by: PHYSICIAN ASSISTANT

## 2022-06-09 PROCEDURE — 1160F PR REVIEW ALL MEDS BY PRESCRIBER/CLIN PHARMACIST DOCUMENTED: ICD-10-PCS | Mod: CPTII,S$GLB,, | Performed by: PHYSICIAN ASSISTANT

## 2022-06-09 PROCEDURE — 96372 PR INJECTION,THERAP/PROPH/DIAG2ST, IM OR SUBCUT: ICD-10-PCS | Mod: 59,S$GLB,, | Performed by: PHYSICIAN ASSISTANT

## 2022-06-09 PROCEDURE — 3075F SYST BP GE 130 - 139MM HG: CPT | Mod: CPTII,S$GLB,, | Performed by: PHYSICIAN ASSISTANT

## 2022-06-09 PROCEDURE — 1160F RVW MEDS BY RX/DR IN RCRD: CPT | Mod: CPTII,S$GLB,, | Performed by: PHYSICIAN ASSISTANT

## 2022-06-09 PROCEDURE — 1159F MED LIST DOCD IN RCRD: CPT | Mod: CPTII,S$GLB,, | Performed by: PHYSICIAN ASSISTANT

## 2022-06-09 PROCEDURE — 1125F AMNT PAIN NOTED PAIN PRSNT: CPT | Mod: CPTII,S$GLB,, | Performed by: PHYSICIAN ASSISTANT

## 2022-06-09 PROCEDURE — 99214 PR OFFICE/OUTPT VISIT, EST, LEVL IV, 30-39 MIN: ICD-10-PCS | Mod: 25,S$GLB,, | Performed by: PHYSICIAN ASSISTANT

## 2022-06-09 PROCEDURE — 87070 CULTURE OTHR SPECIMN AEROBIC: CPT | Performed by: PHYSICIAN ASSISTANT

## 2022-06-09 PROCEDURE — 87075 CULTR BACTERIA EXCEPT BLOOD: CPT | Performed by: PHYSICIAN ASSISTANT

## 2022-06-09 PROCEDURE — 26010 INCISION & DRAINAGE: ICD-10-PCS | Mod: F6,S$GLB,, | Performed by: PHYSICIAN ASSISTANT

## 2022-06-09 PROCEDURE — 87077 CULTURE AEROBIC IDENTIFY: CPT | Performed by: PHYSICIAN ASSISTANT

## 2022-06-09 PROCEDURE — 3078F DIAST BP <80 MM HG: CPT | Mod: CPTII,S$GLB,, | Performed by: PHYSICIAN ASSISTANT

## 2022-06-09 PROCEDURE — 99214 OFFICE O/P EST MOD 30 MIN: CPT | Mod: 25,S$GLB,, | Performed by: PHYSICIAN ASSISTANT

## 2022-06-09 PROCEDURE — 1125F PR PAIN SEVERITY QUANTIFIED, PAIN PRESENT: ICD-10-PCS | Mod: CPTII,S$GLB,, | Performed by: PHYSICIAN ASSISTANT

## 2022-06-09 PROCEDURE — 87186 SC STD MICRODIL/AGAR DIL: CPT | Performed by: PHYSICIAN ASSISTANT

## 2022-06-09 RX ORDER — CEPHALEXIN 500 MG/1
500 CAPSULE ORAL EVERY 8 HOURS
Qty: 30 CAPSULE | Refills: 0 | Status: SHIPPED | OUTPATIENT
Start: 2022-06-09 | End: 2022-06-18

## 2022-06-09 RX ORDER — LIDOCAINE HYDROCHLORIDE 10 MG/ML
2.1 INJECTION INFILTRATION; PERINEURAL
Status: COMPLETED | OUTPATIENT
Start: 2022-06-09 | End: 2022-06-09

## 2022-06-09 RX ORDER — CEFTRIAXONE 1 G/1
1 INJECTION, POWDER, FOR SOLUTION INTRAMUSCULAR; INTRAVENOUS
Status: COMPLETED | OUTPATIENT
Start: 2022-06-09 | End: 2022-06-09

## 2022-06-09 RX ORDER — LIDOCAINE HYDROCHLORIDE 10 MG/ML
1 INJECTION INFILTRATION; PERINEURAL
Status: DISCONTINUED | OUTPATIENT
Start: 2022-06-09 | End: 2022-06-09

## 2022-06-09 RX ORDER — DOXYCYCLINE 100 MG/1
100 CAPSULE ORAL EVERY 12 HOURS
Qty: 20 CAPSULE | Refills: 0 | Status: SHIPPED | OUTPATIENT
Start: 2022-06-09 | End: 2022-06-19

## 2022-06-09 RX ORDER — BACITRACIN 500 [USP'U]/G
OINTMENT TOPICAL 2 TIMES DAILY
Qty: 30 G | Refills: 1 | Status: SHIPPED | OUTPATIENT
Start: 2022-06-09 | End: 2022-06-19

## 2022-06-09 RX ORDER — LIDOCAINE HYDROCHLORIDE 10 MG/ML
5 INJECTION INFILTRATION; PERINEURAL
Status: COMPLETED | OUTPATIENT
Start: 2022-06-09 | End: 2022-06-09

## 2022-06-09 RX ADMIN — CEFTRIAXONE 1 G: 1 INJECTION, POWDER, FOR SOLUTION INTRAMUSCULAR; INTRAVENOUS at 12:06

## 2022-06-09 RX ADMIN — LIDOCAINE HYDROCHLORIDE 5 ML: 10 INJECTION INFILTRATION; PERINEURAL at 11:06

## 2022-06-09 RX ADMIN — LIDOCAINE HYDROCHLORIDE 2.1 ML: 10 INJECTION INFILTRATION; PERINEURAL at 12:06

## 2022-06-09 NOTE — TELEPHONE ENCOUNTER
Spoke to the patients  an was able to get her scheduled with Dr. Jernigan next clinic day Tuesday 6/14/2022 @ 9:00AM . Patient  verbalized understanding.         ----- Message from Becca Balderas MA sent at 6/9/2022  2:23 PM CDT -----  Infected finger complaint. I am unable to schedule patients with this insurance.  Please assist with scheduling with the appropriate provider.  ----- Message -----  From: Jennifer Caruso  Sent: 6/9/2022   1:39 PM CDT  To: [facility] Clinical Staff    Type:  Sooner Apoointment Request    Caller is requesting a sooner appointment.  Caller declined first available appointment listed below.  Caller will not accept being placed on the waitlist and is requesting a message be sent to doctor.  Name of Caller:Pasquale   When is the first available appointment?na  Symptoms:Np Urgent follow within 2-3 days, infected finger  Would the patient rather a call back or a response via [facility]?call back  Best Call Back Number:[phone]  Additional Information: na

## 2022-06-09 NOTE — PROCEDURES
"Incision & Drainage    Date/Time: 6/9/2022 11:30 AM  Performed by: Onelia Bhatti PA-C  Authorized by: Onelia Bhatti PA-C     Time out: Immediately prior to procedure a "time out" was called to verify the correct patient, procedure, equipment, support staff and site/side marked as required.    Consent Done?:  Yes (Verbal)    Type:  Abscess  Body area:  Upper extremity  Location details:  Right index finger  Anesthesia:  Local infiltration and digital block  Anesthetic total (ml):  2  Scalpel size:  11  Incision type:  Single straight  Incision depth: subcutaneous    Complexity:  Complex  Drainage:  Pus, serosanguinous, bloody and purulent  Drainage amount:  Moderate  Wound treatment:  Wound left open  Patient tolerance:  Patient tolerated the procedure well with no immediate complications    NVM intact post procedure       "

## 2022-06-09 NOTE — PROGRESS NOTES
"Subjective:       Patient ID: Keyona Dwyer is a 76 y.o. female.    Vitals:  height is 5' 2" (1.575 m) and weight is 49 kg (108 lb). Her tympanic temperature is 98.8 °F (37.1 °C). Her blood pressure is 134/61 and her pulse is 107. Her respiration is 18 and oxygen saturation is 97%.     Chief Complaint: Hand Pain    Pt. Stated her rt 2nd finger has been infected since Monday of this week (X3 days). Pt. Stated it started out as a small bump but now with large wound and spreading redness. Purulent and bloody drainage. Denies any known injury or bite. No fever     Hand Pain   The incident occurred 3 to 5 days ago. The incident occurred at home. There was no injury mechanism. The pain is present in the left fingers. The quality of the pain is described as burning and aching. The pain does not radiate. The pain is at a severity of 8/10. The pain is severe. The pain has been constant since the incident. Pertinent negatives include no chest pain, muscle weakness, numbness or tingling. The symptoms are aggravated by movement. Treatments tried: mupirocin. The treatment provided no relief.       Constitution: Negative for chills, fatigue and fever.   HENT: Negative for congestion and sore throat.    Neck: Negative for painful lymph nodes.   Cardiovascular: Negative for chest pain and leg swelling.   Eyes: Negative for double vision and blurred vision.   Respiratory: Negative for cough and shortness of breath.    Gastrointestinal: Negative for nausea, vomiting and diarrhea.   Genitourinary: Negative for dysuria, frequency, urgency and history of kidney stones.   Musculoskeletal: Positive for pain. Negative for joint pain, joint swelling, muscle cramps and muscle ache.   Skin: Positive for wound, erythema and abscess. Negative for color change, pale, rash and bruising.   Allergic/Immunologic: Negative for seasonal allergies.   Neurological: Negative for dizziness, history of vertigo, light-headedness, passing out, headaches, " numbness and tingling.   Hematologic/Lymphatic: Negative for swollen lymph nodes.   Psychiatric/Behavioral: Negative for nervous/anxious, sleep disturbance and depression. The patient is not nervous/anxious.        Objective:      Physical Exam   Constitutional: She is oriented to person, place, and time. She appears well-developed. She is cooperative.  Non-toxic appearance. She does not appear ill. No distress.   HENT:   Head: Normocephalic and atraumatic.   Ears:   Right Ear: Hearing, external ear and ear canal normal.   Left Ear: Hearing, external ear and ear canal normal.   Nose: Nose normal. No mucosal edema, rhinorrhea or nasal deformity. No epistaxis. Right sinus exhibits no maxillary sinus tenderness and no frontal sinus tenderness. Left sinus exhibits no maxillary sinus tenderness and no frontal sinus tenderness.   Mouth/Throat: Uvula is midline, oropharynx is clear and moist and mucous membranes are normal. No trismus in the jaw. Normal dentition. No uvula swelling. No posterior oropharyngeal erythema.   Eyes: Conjunctivae and lids are normal. Right eye exhibits no discharge. Left eye exhibits no discharge. No scleral icterus.   Neck: Trachea normal and phonation normal. Neck supple.   Cardiovascular: Normal rate, regular rhythm, normal heart sounds and normal pulses.      Comments: RUE 2+ radial and ulnar pulses. Normal cap refill of fingers    Pulmonary/Chest: Effort normal and breath sounds normal. No respiratory distress.   Abdominal: Normal appearance and bowel sounds are normal. She exhibits no distension and no mass. Soft. There is no abdominal tenderness.   Musculoskeletal: Normal range of motion.         General: No deformity. Normal range of motion.      Comments: Right 2nd finger medial (radial aspect) 2 cm necrotic ulceration with fluctuance and purulence expressed. Extending cellulitis and mild swelling to proximal phalanx. No fusiform swelling or flexor tendon TTP. Able to fully extend and  fully flex finger. Normal sensation.    Neurological: She is alert and oriented to person, place, and time. She exhibits normal muscle tone. Coordination normal.   Skin: Skin is warm, dry, intact, not diaphoretic and not pale. erythema   Psychiatric: Her speech is normal and behavior is normal. Judgment and thought content normal.   Nursing note and vitals reviewed.        Assessment:       1. Abscess of finger, right          Plan:       Tetanus is UTD. I&D and purulent + bloody drainage expressed. Sent for aerobic and anaerobic culture. Given IM rocephin. Started on keflex + doxycycline. Recommend hibiclens soaks, bacitracin ointment 2-3x daily. Given wound care instructions. F/u with orthopedist for re-eval due to cellulitis and necrotic wound on finger. ER precautions for any fever or signs of tenosynovitis.    Abscess of finger, right  -     cefTRIAXone injection 1 g  -     LIDOcaine HCL 10 mg/ml (1%) injection 5 mL  -     CULTURE, AEROBIC  (SPECIFY SOURCE)  -     CULTURE, ANAEROBE  -     Ambulatory referral/consult to Orthopedics  -     cephALEXin (KEFLEX) 500 MG capsule; Take 1 capsule (500 mg total) by mouth every 8 (eight) hours. for 10 days  Dispense: 30 capsule; Refill: 0  -     doxycycline (VIBRAMYCIN) 100 MG Cap; Take 1 capsule (100 mg total) by mouth every 12 (twelve) hours. for 10 days  Dispense: 20 capsule; Refill: 0  -     LIDOcaine HCL 10 mg/ml (1%) injection 2.1 mL  -     bacitracin 500 unit/gram ointment; Apply topically 2 (two) times daily. for 10 days  Dispense: 30 g; Refill: 1    Other orders  -     Discontinue: LIDOcaine HCL 10 mg/ml (1%) injection 1 mL    Onelia Bhatti PA-C  [facility]         Patient Instructions   Complete all keflex and doxycycline antibiotics today. Tylenol for pain 1000mg every 4-6 hours, max 4000mg/24 hours. Hibiclens soaks 3-4x daily. Pat dry and apply ointment and nonstick bandage after. Follow up with orthopedist for recheck within 2-3 days. Call   to schedule.    You need more emergent re-evaluation if fever, spreading redness, inability to fully extend or flex finger or worsening symptoms.

## 2022-06-09 NOTE — PATIENT INSTRUCTIONS
Complete all keflex and doxycycline antibiotics today. Tylenol for pain 1000mg every 4-6 hours, max 4000mg/24 hours. Hibiclens soaks 3-4x daily. Pat dry and apply ointment and nonstick bandage after. Follow up with orthopedist for recheck within 2-3 days. Call  to schedule.    You need more emergent re-evaluation if fever, spreading redness, inability to fully extend or flex finger or worsening symptoms.

## 2022-06-12 ENCOUNTER — TELEPHONE (OUTPATIENT)
Dept: URGENT CARE | Facility: CLINIC | Age: 77
End: 2022-06-12
Payer: MEDICARE

## 2022-06-13 LAB — BACTERIA SPEC AEROBE CULT: ABNORMAL

## 2022-06-14 ENCOUNTER — OFFICE VISIT (OUTPATIENT)
Dept: ORTHOPEDICS | Facility: CLINIC | Age: 77
End: 2022-06-14
Payer: MEDICARE

## 2022-06-14 VITALS — HEIGHT: 62 IN | WEIGHT: 108 LBS | BODY MASS INDEX: 19.88 KG/M2

## 2022-06-14 DIAGNOSIS — L08.9 INFECTION OF FINGER: Primary | ICD-10-CM

## 2022-06-14 LAB — BACTERIA SPEC ANAEROBE CULT: NORMAL

## 2022-06-14 PROCEDURE — 3288F FALL RISK ASSESSMENT DOCD: CPT | Mod: CPTII,S$GLB,, | Performed by: ORTHOPAEDIC SURGERY

## 2022-06-14 PROCEDURE — 1101F PT FALLS ASSESS-DOCD LE1/YR: CPT | Mod: CPTII,S$GLB,, | Performed by: ORTHOPAEDIC SURGERY

## 2022-06-14 PROCEDURE — 99999 PR PBB SHADOW E&M-EST. PATIENT-LVL III: CPT | Mod: PBBFAC,,, | Performed by: ORTHOPAEDIC SURGERY

## 2022-06-14 PROCEDURE — 1125F PR PAIN SEVERITY QUANTIFIED, PAIN PRESENT: ICD-10-PCS | Mod: CPTII,S$GLB,, | Performed by: ORTHOPAEDIC SURGERY

## 2022-06-14 PROCEDURE — 99203 PR OFFICE/OUTPT VISIT, NEW, LEVL III, 30-44 MIN: ICD-10-PCS | Mod: S$GLB,,, | Performed by: ORTHOPAEDIC SURGERY

## 2022-06-14 PROCEDURE — 1125F AMNT PAIN NOTED PAIN PRSNT: CPT | Mod: CPTII,S$GLB,, | Performed by: ORTHOPAEDIC SURGERY

## 2022-06-14 PROCEDURE — 1159F MED LIST DOCD IN RCRD: CPT | Mod: CPTII,S$GLB,, | Performed by: ORTHOPAEDIC SURGERY

## 2022-06-14 PROCEDURE — 1160F PR REVIEW ALL MEDS BY PRESCRIBER/CLIN PHARMACIST DOCUMENTED: ICD-10-PCS | Mod: CPTII,S$GLB,, | Performed by: ORTHOPAEDIC SURGERY

## 2022-06-14 PROCEDURE — 99999 PR PBB SHADOW E&M-EST. PATIENT-LVL III: ICD-10-PCS | Mod: PBBFAC,,, | Performed by: ORTHOPAEDIC SURGERY

## 2022-06-14 PROCEDURE — 1101F PR PT FALLS ASSESS DOC 0-1 FALLS W/OUT INJ PAST YR: ICD-10-PCS | Mod: CPTII,S$GLB,, | Performed by: ORTHOPAEDIC SURGERY

## 2022-06-14 PROCEDURE — 1159F PR MEDICATION LIST DOCUMENTED IN MEDICAL RECORD: ICD-10-PCS | Mod: CPTII,S$GLB,, | Performed by: ORTHOPAEDIC SURGERY

## 2022-06-14 PROCEDURE — 1160F RVW MEDS BY RX/DR IN RCRD: CPT | Mod: CPTII,S$GLB,, | Performed by: ORTHOPAEDIC SURGERY

## 2022-06-14 PROCEDURE — 99203 OFFICE O/P NEW LOW 30 MIN: CPT | Mod: S$GLB,,, | Performed by: ORTHOPAEDIC SURGERY

## 2022-06-14 PROCEDURE — 3288F PR FALLS RISK ASSESSMENT DOCUMENTED: ICD-10-PCS | Mod: CPTII,S$GLB,, | Performed by: ORTHOPAEDIC SURGERY

## 2022-06-14 NOTE — PROGRESS NOTES
Subjective:     Patient ID: Keyona Dwyer is a 76 y.o. female.    Chief Complaint: Pain and Swelling of the Right Hand      HPI:  The patient is a 76-year-old female who apparently had a brown recluse spider bite 06/05/2022.  She did have a debridement.  She currently is treating it with doxycycline and baclofen ointment and reports for follow-up.  No radiographs have been obtained.    Past Medical History:   Diagnosis Date    Anemia     Anxiety     Cataract     Chronic back pain     Dr. Guzman    Fibrocystic breast     Glaucoma     Hyperlipemia     Hypertension     Osteoarthritis     Osteoporosis     Rheumatology/on Prolia    PVD (peripheral vascular disease) 4/12/2021    Restless leg syndrome     Tobacco dependence     Trouble in sleeping      Past Surgical History:   Procedure Laterality Date    ANGIOGRAPHY OF LOWER EXTREMITY N/A 4/12/2021    Procedure: ANGIOGRAM, LOWER EXTREMITY/left leg;  Surgeon: Maico Adkins MD;  Location: [facility] CATH LAB;  Service: Vascular;  Laterality: N/A;  req 1130 start time/ A243A    AORTOGRAPHY WITH SERIALOGRAPHY N/A 4/14/2021    Procedure: AORTOGRAM, WITH RUNOFF;  Surgeon: Maico Adkins MD;  Location: [facility] CATH LAB;  Service: Vascular;  Laterality: N/A;  left iliac stent with shockwave/req 1030 start    CATARACT EXTRACTION W/  INTRAOCULAR LENS IMPLANT Bilateral 8/ 9 2017    GALLBLADDER SURGERY      HYSTERECTOMY  1972    LUNG SURGERY Right age 15    lung collpase    TOE AMPUTATION Left 4/15/2021    Procedure: AMPUTATION, TOE;  Surgeon: Taylor Anderson DPM;  Location: [facility] OR;  Service: Podiatry;  Laterality: Left;  Hallux (Great Toe)    TOTAL HIP ARTHROPLASTY Bilateral     TOTAL KNEE ARTHROPLASTY Bilateral      Family History   Problem Relation Age of Onset    Cancer Mother     Breast cancer Mother     Hyperlipidemia Father     Heart failure Father     Heart disease Brother     Brain cancer Brother     Stroke Brother     Alcohol abuse Brother       Social History     Socioeconomic History    Marital status:      Spouse name: elizabeth    Number of children: 3   Occupational History    Occupation: retired   Tobacco Use    Smoking status: Former Smoker     Packs/day: 0.00     Years: 63.00     Pack years: 0.00     Types: Cigarettes     Quit date: 2021     Years since quittin.9    Smokeless tobacco: Never Used    Tobacco comment: started age of  11    Substance and Sexual Activity    Alcohol use: No    Drug use: No    Sexual activity: Yes     Partners: Male     Medication List with Changes/Refills   Current Medications    AMLODIPINE (NORVASC) 5 MG TABLET    TAKE 1 TABLET BY MOUTH EVERY DAY FOR BLOOD PRESSURE    AZELASTINE (ASTELIN) 137 MCG (0.1 %) NASAL SPRAY    1 spray (137 mcg total) by Nasal route 2 (two) times daily.    B COMPLEX VITAMINS CAPSULE    Take 1 capsule by mouth once daily.    BACITRACIN 500 UNIT/GRAM OINTMENT    Apply topically 2 (two) times daily. for 10 days    BUPROPION (WELLBUTRIN) 75 MG TABLET    Take 1 tablet (75 mg total) by mouth 2 (two) times daily.    BUSPIRONE (BUSPAR) 10 MG TABLET    Take 1 tablet (10 mg total) by mouth 2 (two) times daily.    CEPHALEXIN (KEFLEX) 500 MG CAPSULE    Take 1 capsule (500 mg total) by mouth every 8 (eight) hours. for 10 days    CLOPIDOGREL (PLAVIX) 75 MG TABLET    TAKE 1 TABLET BY MOUTH ONCE A DAY    DOXYCYCLINE (VIBRAMYCIN) 100 MG CAP    Take 1 capsule (100 mg total) by mouth every 12 (twelve) hours. for 10 days    DULOXETINE (CYMBALTA) 30 MG CAPSULE    Take 30 mg by mouth once daily.    ERGOCALCIFEROL (ERGOCALCIFEROL) 50,000 UNIT CAP    TAKE 1 CAPSULE BY MOUTH ONE TIME PER WEEK    FLUAD QUAD -,65Y UP,,PF, 60 MCG (15 MCG X 4)/0.5 ML SYRG        FUROSEMIDE (LASIX) 20 MG TABLET    Take 1 tablet (20 mg total) by mouth daily as needed (swelling).    GABAPENTIN (NEURONTIN) 600 MG TABLET    Take 600 mg by mouth 4 (four) times daily.     HYDROCODONE-ACETAMINOPHEN (NORCO)  MG  PER TABLET    Take 1 tablet by mouth 3 (three) times daily as needed.    HYDROCODONE-ACETAMINOPHEN 10-325MG (NORCO)  MG TAB    Take 1 tablet by mouth every 6 (six) hours as needed.    LISINOPRIL (PRINIVIL,ZESTRIL) 40 MG TABLET    TAKE 1 TABLET BY MOUTH EVERY DAY FOR BLOOD PRESSURE    MUPIROCIN (BACTROBAN) 2 % OINTMENT    Apply to affected area 3 times daily    NUTRITIONAL SUPPLEMENT/FIBER (QUINOA-KALE-HEMP ORAL)    Take by mouth.    ON-Q PAIN PUMP    by Misc.(Non-Drug; Combo Route) route.    PROLIA 60 MG/ML SYRG        ROSUVASTATIN (CRESTOR) 5 MG TABLET    TAKE 1 TABLET BY MOUTH ONCE A DAY     Review of patient's allergies indicates:   Allergen Reactions    Indomethacin      Other reaction(s): Headache    Latex, natural rubber Swelling    Penicillins      Other reaction(s): Vomiting    Patient tolerated Rocephin during the 4/2021 encounter and she states that she has tolerated Keflex in the past without issue.    Sulfa (sulfonamide antibiotics) Other (See Comments)     hallucinations    Tolmetin      Other reaction(s): Hives    Bactroban [mupirocin calcium] Rash     Burned skin      Review of Systems   Constitutional: Negative for malaise/fatigue.   HENT: Negative for hearing loss.    Eyes: Positive for visual disturbance. Negative for double vision.   Cardiovascular: Negative for chest pain.   Respiratory: Positive for shortness of breath.    Endocrine: Negative for cold intolerance.   Hematologic/Lymphatic: Does not bruise/bleed easily.   Skin: Negative for poor wound healing and suspicious lesions.   Musculoskeletal: Positive for arthritis, back pain, joint pain and joint swelling. Negative for gout.   Gastrointestinal: Positive for abdominal pain. Negative for nausea and vomiting.   Genitourinary: Negative for dysuria.   Neurological: Negative for numbness, paresthesias and sensory change.   Psychiatric/Behavioral: Positive for substance abuse. Negative for depression and memory loss. The patient is  not nervous/anxious.    Allergic/Immunologic: Negative for persistent infections.       Objective:   Body mass index is 19.75 kg/m².  There were no vitals filed for this visit.             General    Constitutional: She is oriented to person, place, and time. She appears well-developed and well-nourished. No distress.   HENT:   Head: Normocephalic.   Eyes: EOM are normal.   Pulmonary/Chest: Effort normal.   Neurological: She is oriented to person, place, and time.   Psychiatric: She has a normal mood and affect.             Right Hand/Wrist Exam     Inspection   Scars: Wrist - absent Hand -  present  Effusion: Wrist - absent Hand -  present    Other     Neuorologic Exam    Median Distribution: normal  Ulnar Distribution: normal  Radial Distribution: normal    Comments:  The patient has a 2 sq cm lytic lesion that has been debrided on the radial aspect of the right index finger distal interphalangeal joint.  She has good flexion and extension of the distal interphalangeal joint.  There is no lymphangitis or lymphadenopathy.  There are no motor or sensory deficits.  There is no evidence of septic flexor tenosynovitis          Vascular Exam       Capillary Refill  Right Hand: normal capillary refill             radiographs were not obtained today  Assessment:     Encounter Diagnosis   Name Primary?    Infection of finger Yes        Plan:     The patient had wound care discussed.  She is also doing daily Hibiclens soaks.  She is using doxycycline.  She is using baclofen ointment.  Wound care was discussed.  She will return in 2 weeks for re-evaluation and radiographs right index finger.                Disclaimer: This note was prepared using a voice recognition system and is likely to have sound alike errors within the text.

## 2022-06-16 ENCOUNTER — TELEPHONE (OUTPATIENT)
Dept: URGENT CARE | Facility: CLINIC | Age: 77
End: 2022-06-16
Payer: MEDICARE

## 2022-06-16 NOTE — TELEPHONE ENCOUNTER
Courtesy call to check on patient and go over wound culture results. + MRSA. On doxycycline, keflex, and bactroban. Did f/u with ortho and has another appt in 1.5 weeks.     No answer. Left VM to return call. Onelia Bhatti

## 2022-06-18 ENCOUNTER — HOSPITAL ENCOUNTER (INPATIENT)
Facility: HOSPITAL | Age: 77
LOS: 1 days | Discharge: HOME | DRG: 440 | End: 2022-06-20
Attending: EMERGENCY MEDICINE | Admitting: INTERNAL MEDICINE
Payer: MEDICARE

## 2022-06-18 DIAGNOSIS — K85.00 IDIOPATHIC ACUTE PANCREATITIS, UNSPECIFIED COMPLICATION STATUS: Primary | ICD-10-CM

## 2022-06-18 DIAGNOSIS — R10.9 ABDOMINAL PAIN: ICD-10-CM

## 2022-06-18 DIAGNOSIS — R10.13 EPIGASTRIC PAIN: ICD-10-CM

## 2022-06-18 PROBLEM — E87.5 HYPERKALEMIA: Status: ACTIVE | Noted: 2022-06-18

## 2022-06-18 PROBLEM — D72.829 LEUKOCYTOSIS: Status: ACTIVE | Noted: 2022-06-18

## 2022-06-18 LAB
ALBUMIN SERPL BCP-MCNC: 3.3 G/DL (ref 3.5–5.2)
ALP SERPL-CCNC: 54 U/L (ref 55–135)
ALT SERPL W/O P-5'-P-CCNC: 8 U/L (ref 10–44)
ANION GAP SERPL CALC-SCNC: 11 MMOL/L (ref 8–16)
AST SERPL-CCNC: 14 U/L (ref 10–40)
BACTERIA #/AREA URNS HPF: ABNORMAL /HPF
BASOPHILS NFR BLD: 0 % (ref 0–1.9)
BILIRUB SERPL-MCNC: 0.5 MG/DL (ref 0.1–1)
BILIRUB UR QL STRIP: NEGATIVE
BUN SERPL-MCNC: 29 MG/DL (ref 8–23)
CALCIUM SERPL-MCNC: 9.3 MG/DL (ref 8.7–10.5)
CHLORIDE SERPL-SCNC: 111 MMOL/L (ref 95–110)
CLARITY UR: CLEAR
CO2 SERPL-SCNC: 17 MMOL/L (ref 23–29)
COLOR UR: YELLOW
CREAT SERPL-MCNC: 0.9 MG/DL (ref 0.5–1.4)
CTP QC/QA: YES
DIFFERENTIAL METHOD: ABNORMAL
EOSINOPHIL NFR BLD: 0 % (ref 0–8)
ERYTHROCYTE [DISTWIDTH] IN BLOOD BY AUTOMATED COUNT: 13 % (ref 11.5–14.5)
EST. GFR  (AFRICAN AMERICAN): >60 ML/MIN/1.73 M^2
EST. GFR  (NON AFRICAN AMERICAN): >60 ML/MIN/1.73 M^2
GLUCOSE SERPL-MCNC: 115 MG/DL (ref 70–110)
GLUCOSE UR QL STRIP: NEGATIVE
GRAN CASTS #/AREA URNS LPF: 3 /LPF
HCT VFR BLD AUTO: 34.8 % (ref 37–48.5)
HGB BLD-MCNC: 11.2 G/DL (ref 12–16)
HGB UR QL STRIP: ABNORMAL
HYALINE CASTS #/AREA URNS LPF: 0 /LPF
IMM GRANULOCYTES # BLD AUTO: ABNORMAL K/UL (ref 0–0.04)
IMM GRANULOCYTES NFR BLD AUTO: ABNORMAL % (ref 0–0.5)
KETONES UR QL STRIP: NEGATIVE
LACTATE SERPL-SCNC: 2 MMOL/L (ref 0.5–2.2)
LEUKOCYTE ESTERASE UR QL STRIP: NEGATIVE
LIPASE SERPL-CCNC: >1000 U/L (ref 4–60)
LYMPHOCYTES NFR BLD: 7 % (ref 18–48)
MCH RBC QN AUTO: 32.7 PG (ref 27–31)
MCHC RBC AUTO-ENTMCNC: 32.2 G/DL (ref 32–36)
MCV RBC AUTO: 102 FL (ref 82–98)
MICROSCOPIC COMMENT: ABNORMAL
MONOCYTES NFR BLD: 1 % (ref 4–15)
NEUTROPHILS NFR BLD: 74 % (ref 38–73)
NEUTS BAND NFR BLD MANUAL: 18 %
NITRITE UR QL STRIP: NEGATIVE
NRBC BLD-RTO: 0 /100 WBC
PH UR STRIP: 6 [PH] (ref 5–8)
PLATELET # BLD AUTO: 378 K/UL (ref 150–450)
PMV BLD AUTO: 8.4 FL (ref 9.2–12.9)
POTASSIUM SERPL-SCNC: 5.4 MMOL/L (ref 3.5–5.1)
PROT SERPL-MCNC: 6.1 G/DL (ref 6–8.4)
PROT UR QL STRIP: ABNORMAL
RBC # BLD AUTO: 3.43 M/UL (ref 4–5.4)
RBC #/AREA URNS HPF: 0 /HPF (ref 0–4)
SARS-COV-2 RDRP RESP QL NAA+PROBE: NEGATIVE
SODIUM SERPL-SCNC: 139 MMOL/L (ref 136–145)
SP GR UR STRIP: >=1.03 (ref 1–1.03)
URN SPEC COLLECT METH UR: ABNORMAL
UROBILINOGEN UR STRIP-ACNC: NEGATIVE EU/DL
WBC # BLD AUTO: 23.57 K/UL (ref 3.9–12.7)
WBC #/AREA URNS HPF: 2 /HPF (ref 0–5)

## 2022-06-18 PROCEDURE — 81000 URINALYSIS NONAUTO W/SCOPE: CPT | Performed by: NURSE PRACTITIONER

## 2022-06-18 PROCEDURE — 96374 THER/PROPH/DIAG INJ IV PUSH: CPT

## 2022-06-18 PROCEDURE — G0378 HOSPITAL OBSERVATION PER HR: HCPCS

## 2022-06-18 PROCEDURE — U0002 COVID-19 LAB TEST NON-CDC: HCPCS | Performed by: EMERGENCY MEDICINE

## 2022-06-18 PROCEDURE — 96361 HYDRATE IV INFUSION ADD-ON: CPT

## 2022-06-18 PROCEDURE — 93005 ELECTROCARDIOGRAM TRACING: CPT

## 2022-06-18 PROCEDURE — 87040 BLOOD CULTURE FOR BACTERIA: CPT | Performed by: EMERGENCY MEDICINE

## 2022-06-18 PROCEDURE — 93010 EKG 12-LEAD: ICD-10-PCS | Mod: ,,, | Performed by: INTERNAL MEDICINE

## 2022-06-18 PROCEDURE — 63600175 PHARM REV CODE 636 W HCPCS: Performed by: EMERGENCY MEDICINE

## 2022-06-18 PROCEDURE — 96376 TX/PRO/DX INJ SAME DRUG ADON: CPT

## 2022-06-18 PROCEDURE — 93010 ELECTROCARDIOGRAM REPORT: CPT | Mod: ,,, | Performed by: INTERNAL MEDICINE

## 2022-06-18 PROCEDURE — 96375 TX/PRO/DX INJ NEW DRUG ADDON: CPT

## 2022-06-18 PROCEDURE — 99291 CRITICAL CARE FIRST HOUR: CPT | Mod: 25

## 2022-06-18 PROCEDURE — 63600175 PHARM REV CODE 636 W HCPCS: Performed by: NURSE PRACTITIONER

## 2022-06-18 PROCEDURE — 85007 BL SMEAR W/DIFF WBC COUNT: CPT | Performed by: NURSE PRACTITIONER

## 2022-06-18 PROCEDURE — 80053 COMPREHEN METABOLIC PANEL: CPT | Performed by: NURSE PRACTITIONER

## 2022-06-18 PROCEDURE — 25000003 PHARM REV CODE 250: Performed by: NURSE PRACTITIONER

## 2022-06-18 PROCEDURE — 83690 ASSAY OF LIPASE: CPT | Performed by: NURSE PRACTITIONER

## 2022-06-18 PROCEDURE — 83605 ASSAY OF LACTIC ACID: CPT | Performed by: EMERGENCY MEDICINE

## 2022-06-18 PROCEDURE — 85027 COMPLETE CBC AUTOMATED: CPT | Performed by: NURSE PRACTITIONER

## 2022-06-18 PROCEDURE — 25000003 PHARM REV CODE 250: Performed by: EMERGENCY MEDICINE

## 2022-06-18 RX ORDER — CLOPIDOGREL BISULFATE 75 MG/1
75 TABLET ORAL DAILY
Status: DISCONTINUED | OUTPATIENT
Start: 2022-06-19 | End: 2022-06-20 | Stop reason: HOSPADM

## 2022-06-18 RX ORDER — SODIUM CHLORIDE 9 MG/ML
INJECTION, SOLUTION INTRAVENOUS CONTINUOUS
Status: DISCONTINUED | OUTPATIENT
Start: 2022-06-18 | End: 2022-06-18

## 2022-06-18 RX ORDER — AMLODIPINE BESYLATE 5 MG/1
5 TABLET ORAL DAILY
Status: DISCONTINUED | OUTPATIENT
Start: 2022-06-18 | End: 2022-06-20 | Stop reason: HOSPADM

## 2022-06-18 RX ORDER — ONDANSETRON 2 MG/ML
4 INJECTION INTRAMUSCULAR; INTRAVENOUS EVERY 6 HOURS PRN
Status: DISCONTINUED | OUTPATIENT
Start: 2022-06-18 | End: 2022-06-20 | Stop reason: HOSPADM

## 2022-06-18 RX ORDER — IPRATROPIUM BROMIDE AND ALBUTEROL SULFATE 2.5; .5 MG/3ML; MG/3ML
3 SOLUTION RESPIRATORY (INHALATION) EVERY 6 HOURS PRN
Status: DISCONTINUED | OUTPATIENT
Start: 2022-06-18 | End: 2022-06-18

## 2022-06-18 RX ORDER — HYDROCODONE BITARTRATE AND ACETAMINOPHEN 10; 325 MG/1; MG/1
1 TABLET ORAL EVERY 6 HOURS PRN
Status: DISCONTINUED | OUTPATIENT
Start: 2022-06-18 | End: 2022-06-19

## 2022-06-18 RX ORDER — BUPROPION HYDROCHLORIDE 75 MG/1
75 TABLET ORAL 2 TIMES DAILY
Status: DISCONTINUED | OUTPATIENT
Start: 2022-06-18 | End: 2022-06-20 | Stop reason: HOSPADM

## 2022-06-18 RX ORDER — DULOXETIN HYDROCHLORIDE 30 MG/1
30 CAPSULE, DELAYED RELEASE ORAL DAILY
Status: DISCONTINUED | OUTPATIENT
Start: 2022-06-19 | End: 2022-06-20 | Stop reason: HOSPADM

## 2022-06-18 RX ORDER — SODIUM CHLORIDE 9 MG/ML
INJECTION, SOLUTION INTRAVENOUS CONTINUOUS
Status: ACTIVE | OUTPATIENT
Start: 2022-06-18 | End: 2022-06-19

## 2022-06-18 RX ORDER — ATORVASTATIN CALCIUM 10 MG/1
20 TABLET, FILM COATED ORAL DAILY
Refills: 2 | Status: DISCONTINUED | OUTPATIENT
Start: 2022-06-19 | End: 2022-06-20 | Stop reason: HOSPADM

## 2022-06-18 RX ORDER — ONDANSETRON 2 MG/ML
4 INJECTION INTRAMUSCULAR; INTRAVENOUS
Status: COMPLETED | OUTPATIENT
Start: 2022-06-18 | End: 2022-06-18

## 2022-06-18 RX ORDER — MORPHINE SULFATE 4 MG/ML
4 INJECTION, SOLUTION INTRAMUSCULAR; INTRAVENOUS
Status: COMPLETED | OUTPATIENT
Start: 2022-06-18 | End: 2022-06-18

## 2022-06-18 RX ORDER — HYDRALAZINE HYDROCHLORIDE 20 MG/ML
10 INJECTION INTRAMUSCULAR; INTRAVENOUS EVERY 8 HOURS PRN
Status: DISCONTINUED | OUTPATIENT
Start: 2022-06-18 | End: 2022-06-20 | Stop reason: HOSPADM

## 2022-06-18 RX ORDER — IPRATROPIUM BROMIDE AND ALBUTEROL SULFATE 2.5; .5 MG/3ML; MG/3ML
3 SOLUTION RESPIRATORY (INHALATION) EVERY 6 HOURS PRN
Status: DISCONTINUED | OUTPATIENT
Start: 2022-06-18 | End: 2022-06-20 | Stop reason: HOSPADM

## 2022-06-18 RX ORDER — SODIUM CHLORIDE 9 MG/ML
1000 INJECTION, SOLUTION INTRAVENOUS
Status: COMPLETED | OUTPATIENT
Start: 2022-06-18 | End: 2022-06-18

## 2022-06-18 RX ORDER — MORPHINE SULFATE 4 MG/ML
2 INJECTION, SOLUTION INTRAMUSCULAR; INTRAVENOUS
Status: COMPLETED | OUTPATIENT
Start: 2022-06-18 | End: 2022-06-18

## 2022-06-18 RX ADMIN — SODIUM CHLORIDE: 0.9 INJECTION, SOLUTION INTRAVENOUS at 08:06

## 2022-06-18 RX ADMIN — ONDANSETRON 4 MG: 2 INJECTION INTRAMUSCULAR; INTRAVENOUS at 01:06

## 2022-06-18 RX ADMIN — AMLODIPINE BESYLATE 5 MG: 5 TABLET ORAL at 06:06

## 2022-06-18 RX ADMIN — SODIUM CHLORIDE 1000 ML: 0.9 INJECTION, SOLUTION INTRAVENOUS at 03:06

## 2022-06-18 RX ADMIN — BUPROPION HYDROCHLORIDE 75 MG: 75 TABLET, FILM COATED ORAL at 10:06

## 2022-06-18 RX ADMIN — HYDROCODONE BITARTRATE AND ACETAMINOPHEN 1 TABLET: 10; 325 TABLET ORAL at 06:06

## 2022-06-18 RX ADMIN — MORPHINE SULFATE 2 MG: 4 INJECTION INTRAVENOUS at 01:06

## 2022-06-18 RX ADMIN — SODIUM CHLORIDE 1000 ML: 0.9 INJECTION, SOLUTION INTRAVENOUS at 01:06

## 2022-06-18 RX ADMIN — SODIUM CHLORIDE: 0.9 INJECTION, SOLUTION INTRAVENOUS at 06:06

## 2022-06-18 RX ADMIN — MORPHINE SULFATE 4 MG: 4 INJECTION INTRAVENOUS at 02:06

## 2022-06-18 RX ADMIN — ONDANSETRON 4 MG: 2 INJECTION INTRAMUSCULAR; INTRAVENOUS at 07:06

## 2022-06-18 NOTE — ASSESSMENT & PLAN NOTE
Place in observation   Denies ETOH use, history of gall bladder removal   1 L IVF's bolus given in the ED   Continue IVF's   Pain control   Nausea and vomiting control   Daily Lipase   NPO x meds for now

## 2022-06-18 NOTE — FIRST PROVIDER EVALUATION
Medical screening exam completed.  I have conducted a focused provider triage encounter, findings are as follows:    Brief history of present illness:  76-year-old female with complaint of epigastric pain since this morning.  Patient reports moderate to severe pain.    Vitals:    06/18/22 0935   TempSrc: Oral       Pertinent physical exam:  [id number]

## 2022-06-18 NOTE — ASSESSMENT & PLAN NOTE
WBC 23.57 on admission    No current sign of infection   BC X 2 collected in the ED   Start antibiotic if needed

## 2022-06-18 NOTE — HPI
Ms Dwyer is a 76 year old female with PMHx of gall bladder removal, HTN, HLD, chronic back pain (pain pump present), tobacco abuse, osteoarthritis and osteoporosis who presented to [facility] Emergency Room of evaluation of abdominal pain that started 2 days ago. Associated symptoms include weakness, fatigue, nausea, vomiting and diarrhea. Denies associated symptoms of chest pain, shortness of breath, fever, chills or diaphoresis. CT renal stone study showed mild-moderate inflammatory changes located adjacent to the body and tail the pancreas with minimal free fluid tracking within the pericolic gutters bilaterally, finding most likely indicate acute pancreatitis. Lipase > 1000. WBC 23.57. Patient is a DNR, spouse is surrogate decision maker. She is being placed in Observation under the care of Hospital Medicine.

## 2022-06-18 NOTE — SUBJECTIVE & OBJECTIVE
Past Medical History:   Diagnosis Date    Anemia     Anxiety     Cataract     Chronic back pain     Dr. Guzman    Fibrocystic breast     Glaucoma     Hyperlipemia     Hypertension     Osteoarthritis     Osteoporosis     Rheumatology/on Prolia    PVD (peripheral vascular disease) 4/12/2021    Restless leg syndrome     Tobacco dependence     Trouble in sleeping        Past Surgical History:   Procedure Laterality Date    ANGIOGRAPHY OF LOWER EXTREMITY N/A 4/12/2021    Procedure: ANGIOGRAM, LOWER EXTREMITY/left leg;  Surgeon: Maico Adkins MD;  Location: [facility] CATH LAB;  Service: Vascular;  Laterality: N/A;  req 1130 start time/Rm A243A    AORTOGRAPHY WITH SERIALOGRAPHY N/A 4/14/2021    Procedure: AORTOGRAM, WITH RUNOFF;  Surgeon: Maico Adkins MD;  Location: [facility] CATH LAB;  Service: Vascular;  Laterality: N/A;  left iliac stent with shockwave/req 1030 start    CATARACT EXTRACTION W/  INTRAOCULAR LENS IMPLANT Bilateral 8/ 9 2017    GALLBLADDER SURGERY      HYSTERECTOMY  1972    LUNG SURGERY Right age 15    lung collpase    TOE AMPUTATION Left 4/15/2021    Procedure: AMPUTATION, TOE;  Surgeon: Taylor Anderson DPM;  Location: [facility] OR;  Service: Podiatry;  Laterality: Left;  Hallux (Great Toe)    TOTAL HIP ARTHROPLASTY Bilateral     TOTAL KNEE ARTHROPLASTY Bilateral        Review of patient's allergies indicates:   Allergen Reactions    Indomethacin      Other reaction(s): Headache    Latex, natural rubber Swelling    Penicillins      Other reaction(s): Vomiting    Patient tolerated Rocephin during the 4/2021 encounter and she states that she has tolerated Keflex in the past without issue.    Sulfa (sulfonamide antibiotics) Other (See Comments)     hallucinations    Tolmetin      Other reaction(s): Hives    Bactroban [mupirocin calcium] Rash     Burned skin        No current facility-administered medications on file prior to encounter.     Current Outpatient Medications on File Prior to Encounter   Medication Sig     amLODIPine (NORVASC) 5 MG tablet TAKE 1 TABLET BY MOUTH EVERY DAY FOR BLOOD PRESSURE    buPROPion (WELLBUTRIN) 75 MG tablet Take 1 tablet (75 mg total) by mouth 2 (two) times daily.    busPIRone (BUSPAR) 10 MG tablet Take 1 tablet (10 mg total) by mouth 2 (two) times daily.    clopidogreL (PLAVIX) 75 mg tablet TAKE 1 TABLET BY MOUTH ONCE A DAY    doxycycline (VIBRAMYCIN) 100 MG Cap Take 1 capsule (100 mg total) by mouth every 12 (twelve) hours. for 10 days    duloxetine (CYMBALTA) 30 MG capsule Take 30 mg by mouth once daily.    gabapentin (NEURONTIN) 600 MG tablet Take 600 mg by mouth 4 (four) times daily.     HYDROcodone-acetaminophen (NORCO)  mg per tablet Take 1 tablet by mouth 3 (three) times daily as needed.    hydrocodone-acetaminophen 10-325mg (NORCO)  mg Tab Take 1 tablet by mouth every 6 (six) hours as needed.    lisinopriL (PRINIVIL,ZESTRIL) 40 MG tablet TAKE 1 TABLET BY MOUTH EVERY DAY FOR BLOOD PRESSURE    ON-Q PAIN PUMP by Misc.(Non-Drug; Combo Route) route.    rosuvastatin (CRESTOR) 5 MG tablet TAKE 1 TABLET BY MOUTH ONCE A DAY    [DISCONTINUED] cephALEXin (KEFLEX) 500 MG capsule Take 1 capsule (500 mg total) by mouth every 8 (eight) hours. for 10 days    azelastine (ASTELIN) 137 mcg (0.1 %) nasal spray 1 spray (137 mcg total) by Nasal route 2 (two) times daily.    b complex vitamins capsule Take 1 capsule by mouth once daily.    bacitracin 500 unit/gram ointment Apply topically 2 (two) times daily. for 10 days    ergocalciferol (ERGOCALCIFEROL) 50,000 unit Cap TAKE 1 CAPSULE BY MOUTH ONE TIME PER WEEK    FLUAD QUAD 2021-22,65Y UP,,PF, 60 mcg (15 mcg x 4)/0.5 mL Syrg     furosemide (LASIX) 20 MG tablet Take 1 tablet (20 mg total) by mouth daily as needed (swelling).    mupirocin (BACTROBAN) 2 % ointment Apply to affected area 3 times daily    nutritional supplement/fiber (QUINOA-KALE-HEMP ORAL) Take by mouth.    PROLIA 60 mg/mL Syrg      Family History       Problem Relation (Age of Onset)     Alcohol abuse Brother    Brain cancer Brother    Breast cancer Mother    Cancer Mother    Heart disease Brother    Heart failure Father    Hyperlipidemia Father    Stroke Brother          Tobacco Use    Smoking status: Current Some Day Smoker     Packs/day: 1.00     Years: 63.00     Pack years: 63.00     Types: Cigarettes     Last attempt to quit: 2021     Years since quittin.9    Smokeless tobacco: Never Used    Tobacco comment: started age of  11    Substance and Sexual Activity    Alcohol use: No    Drug use: No    Sexual activity: Yes     Partners: Male     Review of Systems   Constitutional:  Positive for activity change and appetite change. Negative for chills and fever.   HENT:  Negative for congestion, rhinorrhea and sinus pressure.    Respiratory:  Negative for apnea, cough, choking, chest tightness, shortness of breath, wheezing and stridor.    Cardiovascular:  Negative for chest pain, palpitations and leg swelling.   Gastrointestinal:  Positive for abdominal pain, diarrhea, nausea and vomiting. Negative for abdominal distention.   Endocrine: Negative for cold intolerance and heat intolerance.   Genitourinary:  Negative for difficulty urinating and hematuria.   Musculoskeletal:  Negative for arthralgias and joint swelling.   Skin:  Negative for color change, pallor and rash.   Neurological:  Negative for dizziness, seizures, numbness and headaches.   Psychiatric/Behavioral:  Negative for agitation. The patient is not nervous/anxious.    Objective:     Vital Signs (Most Recent):  Temp: 98.1 °F (36.7 °C) (22 0935)  Pulse: 98 (22 1551)  Resp: 18 (22 1551)  BP: (!) 167/77 (22 1551)  SpO2: 98 % (22 1551)   Vital Signs (24h Range):  Temp:  [98.1 °F (36.7 °C)] 98.1 °F (36.7 °C)  Pulse:  [] 98  Resp:  [16-18] 18  SpO2:  [97 %-98 %] 98 %  BP: (143-182)/(77-85) 167/77     Weight: 49 kg (108 lb)  Body mass index is 19.75 kg/m².    Physical Exam  Vitals and nursing note  reviewed.   Constitutional:       General: She is not in acute distress.     Appearance: She is underweight. She is ill-appearing. She is not toxic-appearing or diaphoretic.   Eyes:      General:         Right eye: No discharge.         Left eye: No discharge.   Pulmonary:      Effort: No respiratory distress.      Breath sounds: No stridor. No wheezing, rhonchi or rales.   Chest:      Chest wall: No tenderness.   Abdominal:      General: There is no distension.      Palpations: There is no mass.      Tenderness: There is abdominal tenderness. There is no right CVA tenderness, guarding or rebound.      Hernia: No hernia is present.   Musculoskeletal:         General: No swelling, tenderness, deformity or signs of injury.      Right lower leg: No edema.      Left lower leg: No edema.   Skin:     Coloration: Skin is not jaundiced or pale.      Findings: No bruising, erythema or lesion.   Neurological:      Cranial Nerves: No cranial nerve deficit.      Sensory: No sensory deficit.      Motor: Weakness present.      Coordination: Coordination normal.      Gait: Gait normal.      Deep Tendon Reflexes: Reflexes normal.           Significant Labs: All pertinent labs within the past 24 hours have been reviewed.  CBC:   Recent Labs   Lab 06/18/22  1335   WBC 23.57*   HGB 11.2*   HCT 34.8*        CMP:   Recent Labs   Lab 06/18/22  1335      K 5.4*   *   CO2 17*   *   BUN 29*   CREATININE 0.9   CALCIUM 9.3   PROT 6.1   ALBUMIN 3.3*   BILITOT 0.5   ALKPHOS 54*   AST 14   ALT 8*   ANIONGAP 11   EGFRNONAA >60     Lactic Acid:   Recent Labs   Lab 06/18/22  1455   LACTATE 2.0     Lipase:   Recent Labs   Lab 06/18/22  1335   LIPASE >1000*     Urine Studies:   Recent Labs   Lab 06/18/22  1447   COLORU Yellow   APPEARANCEUA Clear   PHUR 6.0   SPECGRAV >=1.030*   PROTEINUA 1+*   GLUCUA Negative   KETONESU Negative   BILIRUBINUA Negative   OCCULTUA Trace*   NITRITE Negative   UROBILINOGEN Negative    LEUKOCYTESUR Negative   RBCUA 0   WBCUA 2   BACTERIA Few*   HYALINECASTS 0       Significant Imaging:     Imaging Results              X-Ray Chest AP Portable (Final result)  Result time 06/18/22 15:21:22      Final result by Lopez Rodriguez MD (06/18/22 15:21:22)                   Impression:      1. No acute chest findings.  2.    Electronically signed by: Lopez Rodriguez  Date:    06/18/2022  Time:    15:21               Narrative:    EXAMINATION:  XR CHEST AP PORTABLE    CLINICAL HISTORY:  cough;  nausea vomiting.    COMPARISON:  10/02/2020    FINDINGS:  Clear lungs.  Heart size within normal limits.  Vascular calcification of the aorta.  Old, healed proximal left humerus fracture.                                       CT Renal Stone Study ABD Pelvis WO (Final result)  Result time 06/18/22 10:25:47      Final result by Lopez Rodriguez MD (06/18/22 10:25:47)                   Impression:      Probable acute pancreatitis, as detailed above.    All CT scans at this facility are performed  using dose modulation techniques as appropriate to performed exam including the following:  automated exposure control; adjustment of mA and/or kV according to the patients size (this includes techniques or standardized protocols for targeted exams where dose is matched to indication/reason for exam: i.e. extremities or head);  iterative reconstruction technique.      Electronically signed by: Lopez Rodriguez  Date:    06/18/2022  Time:    10:25               Narrative:    EXAMINATION:  CT RENAL STONE STUDY ABD PELVIS WO    CLINICAL HISTORY:  abdominal pain;.    TECHNIQUE:  Low dose axial images, sagittal and coronal reformations were obtained from the lung bases to the pubic symphysis.    COMPARISON:  None    FINDINGS:  Mild bibasilar atelectasis or scarring.  Visualized heart normal in size.    Normal liver.  Gallbladder surgically absent.    Spleen normal in size and appearance.  Mild-moderate inflammatory  changes located adjacent to the body and tail the pancreas with minimal free fluid tracking within the pericolic gutters bilaterally.  Findings most likely indicate acute pancreatitis.  No fluid collections identified.    Minimal thickening left adrenal gland.  Right adrenal gland normal.  Severe atherosclerotic calcification of the abdominal aorta common iliac vasculature and mesenteric vasculature.  No aneurysms.    The left kidney is normal.  Left ureter is normal.  Right kidney is normal.  Right ureter is normal.    Stomach is distended with ingested material and fluid.  Small intestine is nondilated and unremarkable.  Appendix not completely visualized.  Diverticulosis sigmoid colon.    The pelvis demonstrates mildly distended normal-appearing bladder.  Small to moderate amount of free fluid dependent pelvis.    Bilateral hip prostheses noted.  Pain pump noted with lead extending into the thoracic central canal.

## 2022-06-18 NOTE — ED PROVIDER NOTES
Encounter Date: 6/18/2022       History     Chief Complaint   Patient presents with    Abdominal Pain     Abdominal pain since last night. N/V diarrhea.      Patient here for epigastric abdominal pain that started around Thursday night and has gotten worse.  She started vomiting last night.  She reports the pain does radiate to her back.  She reports no fever chest pain or shortness of breath she reports no diarrhea.  The pain does radiate down to the epigastric area.    No sick contacts.  No cough or congestion and no diarrhea or urinary complaints.  No aggravating or relieving factors.  She does feel lightheaded and weak.        Review of patient's allergies indicates:   Allergen Reactions    Indomethacin      Other reaction(s): Headache    Latex, natural rubber Swelling    Penicillins      Other reaction(s): Vomiting    Patient tolerated Rocephin during the 4/2021 encounter and she states that she has tolerated Keflex in the past without issue.    Sulfa (sulfonamide antibiotics) Other (See Comments)     hallucinations    Tolmetin      Other reaction(s): Hives    Bactroban [mupirocin calcium] Rash     Burned skin      Past Medical History:   Diagnosis Date    Anemia     Anxiety     Cataract     Chronic back pain     Dr. Guzman    Fibrocystic breast     Glaucoma     Hyperlipemia     Hypertension     Osteoarthritis     Osteoporosis     Rheumatology/on Prolia    PVD (peripheral vascular disease) 4/12/2021    Restless leg syndrome     Tobacco dependence     Trouble in sleeping      Past Surgical History:   Procedure Laterality Date    ANGIOGRAPHY OF LOWER EXTREMITY N/A 4/12/2021    Procedure: ANGIOGRAM, LOWER EXTREMITY/left leg;  Surgeon: Maico Adkins MD;  Location: [facility] CATH LAB;  Service: Vascular;  Laterality: N/A;  req 1130 start time/Rm A243A    AORTOGRAPHY WITH SERIALOGRAPHY N/A 4/14/2021    Procedure: AORTOGRAM, WITH RUNOFF;  Surgeon: Maico Adkins MD;  Location: [facility] CATH LAB;   Service: Vascular;  Laterality: N/A;  left iliac stent with shockwave/req 1030 start    CATARACT EXTRACTION W/  INTRAOCULAR LENS IMPLANT Bilateral 2017    GALLBLADDER SURGERY      HYSTERECTOMY  1972    LUNG SURGERY Right age 15    lung collpase    TOE AMPUTATION Left 4/15/2021    Procedure: AMPUTATION, TOE;  Surgeon: Taylor Anderson DPM;  Location[facility];  Service: Podiatry;  Laterality: Left;  Hallux (Great Toe)    TOTAL HIP ARTHROPLASTY Bilateral     TOTAL KNEE ARTHROPLASTY Bilateral      Family History   Problem Relation Age of Onset    Cancer Mother     Breast cancer Mother     Hyperlipidemia Father     Heart failure Father     Heart disease Brother     Brain cancer Brother     Stroke Brother     Alcohol abuse Brother      Social History     Tobacco Use    Smoking status: Former Smoker     Packs/day: 0.00     Years: 63.00     Pack years: 0.00     Types: Cigarettes     Quit date: 2021     Years since quittin.9    Smokeless tobacco: Never Used    Tobacco comment: started age of  11    Substance Use Topics    Alcohol use: No    Drug use: No     Review of Systems   Constitutional: Negative for fever.   HENT: Negative for sore throat.    Respiratory: Negative for shortness of breath.    Cardiovascular: Negative for chest pain.   Gastrointestinal: Positive for abdominal pain, nausea and vomiting. Negative for diarrhea.   Genitourinary: Negative for dysuria.   Musculoskeletal: Negative for back pain.   Skin: Negative for rash.   Neurological: Positive for light-headedness. Negative for dizziness, speech difficulty and weakness.   Hematological: Does not bruise/bleed easily.   Psychiatric/Behavioral: Negative for confusion.       Physical Exam     Initial Vitals [22 0935]   BP Pulse Resp Temp SpO2   (!) 143/85 105 18 98.1 °F (36.7 °C) 97 %      MAP       --         Physical Exam    Nursing note and vitals reviewed.  Constitutional: She appears well-developed.   Patient does  look weak.  She was wheelchair into the room but she was able to sit up and answer all the questions   HENT:   Head: Normocephalic and atraumatic.   Eyes: Conjunctivae and EOM are normal.   Neck: Neck supple.   Normal range of motion.  Cardiovascular: Normal rate, regular rhythm, normal heart sounds and intact distal pulses.   Pulmonary/Chest: Breath sounds normal. No respiratory distress. She has no wheezes. She has no rhonchi. She has no rales.   Abdominal: Abdomen is soft. There is abdominal tenderness.   Patient is tender to the upper abdomen especially the epigastric area There is no rebound and no guarding.   Musculoskeletal:         General: Normal range of motion.      Cervical back: Normal range of motion and neck supple.     Neurological: She is alert and oriented to person, place, and time. She has normal strength.   Skin: Skin is warm and dry.   Psychiatric: She has a normal mood and affect. Thought content normal.         ED Course   Critical Care    Date/Time: 6/18/2022 5:01 PM  Performed by: Tyrone Crystal Do, MD  Authorized by: Tyrone Crystal Do, MD   Direct patient critical care time: 7 minutes  Additional history critical care time: 6 minutes  Ordering / reviewing critical care time: 5 minutes  Documentation critical care time: 5 minutes  Consulting other physicians critical care time: 6 minutes  Consult with family critical care time: 5 minutes  Total critical care time (exclusive of procedural time) : 34 minutes  Critical care time was exclusive of separately billable procedures and treating other patients.  Critical care was necessary to treat or prevent imminent or life-threatening deterioration of the following conditions: Dehydration and pancreatitis.  Critical care was time spent personally by me on the following activities: development of treatment plan with patient or surrogate, discussions with consultants, interpretation of cardiac output measurements, evaluation of patient's response to  treatment, examination of patient, obtaining history from patient or surrogate, ordering and performing treatments and interventions, ordering and review of laboratory studies, ordering and review of radiographic studies, pulse oximetry, re-evaluation of patient's condition and review of old charts.        Labs Reviewed   CBC W/ AUTO DIFFERENTIAL - Abnormal; Notable for the following components:       Result Value    WBC 23.57 (*)     RBC 3.43 (*)     Hemoglobin 11.2 (*)     Hematocrit 34.8 (*)      (*)     MCH 32.7 (*)     MPV 8.4 (*)     Gran % 74.0 (*)     Lymph % 7.0 (*)     Mono % 1.0 (*)     All other components within normal limits   COMPREHENSIVE METABOLIC PANEL - Abnormal; Notable for the following components:    Potassium 5.4 (*)     Chloride 111 (*)     CO2 17 (*)     Glucose 115 (*)     BUN 29 (*)     Albumin 3.3 (*)     Alkaline Phosphatase 54 (*)     ALT 8 (*)     All other components within normal limits   LIPASE - Abnormal; Notable for the following components:    Lipase >1000 (*)     All other components within normal limits   URINALYSIS, REFLEX TO URINE CULTURE - Abnormal; Notable for the following components:    Specific Gravity, UA >=1.030 (*)     Protein, UA 1+ (*)     Occult Blood UA Trace (*)     All other components within normal limits    Narrative:     Specimen Source->Urine   URINALYSIS MICROSCOPIC - Abnormal; Notable for the following components:    Bacteria Few (*)     Granular Casts, UA 3 (*)     All other components within normal limits    Narrative:     Specimen Source->Urine   SARS-COV-2 RDRP GENE - Normal    Narrative:     This test utilizes isothermal nucleic acid amplification   technology to detect the SARS-CoV-2 RdRp nucleic acid segment.   The analytical sensitivity (limit of detection) is 125 genome   equivalents/mL.   A POSITIVE result implies infection with the SARS-CoV-2 virus;   the patient is presumed to be contagious.     A NEGATIVE result means that SARS-CoV-2  nucleic acids are not   present above the limit of detection. A NEGATIVE result should be   treated as presumptive. It does not rule out the possibility of   COVID-19 and should not be the sole basis for treatment decisions.   If COVID-19 is strongly suspected based on clinical and exposure   history, re-testing using an alternate molecular assay should be   considered.   This test is only for use under the Food and Drug   Administration s Emergency Use Authorization (EUA).   Commercial kits are provided by [employer].   Performance characteristics of the EUA have been independently   verified by [facility] Department of   Pathology and Laboratory Medicine.   _________________________________________________________________   The authorized Fact Sheet for Healthcare Providers and the authorized Fact   Sheet for Patients of the ID NOW COVID-19 are available on the FDA   website:     https://www.fda.gov/media/816576/download  https://www.fda.gov/media/344823/download           CULTURE, BLOOD   CULTURE, BLOOD   LACTIC ACID, PLASMA     Results for orders placed or performed during the hospital encounter of 06/18/22   CBC auto differential   Result Value Ref Range    WBC 23.57 (H) 3.90 - 12.70 K/uL    RBC 3.43 (L) 4.00 - 5.40 M/uL    Hemoglobin 11.2 (L) 12.0 - 16.0 g/dL    Hematocrit 34.8 (L) 37.0 - 48.5 %     (H) 82 - 98 fL    MCH 32.7 (H) 27.0 - 31.0 pg    MCHC 32.2 32.0 - 36.0 g/dL    RDW 13.0 11.5 - 14.5 %    Platelets 378 150 - 450 K/uL    MPV 8.4 (L) 9.2 - 12.9 fL    Immature Granulocytes CANCELED 0.0 - 0.5 %    Immature Grans (Abs) CANCELED 0.00 - 0.04 K/uL    nRBC 0 0 /100 WBC    Gran % 74.0 (H) 38.0 - 73.0 %    Lymph % 7.0 (L) 18.0 - 48.0 %    Mono % 1.0 (L) 4.0 - 15.0 %    Eosinophil % 0.0 0.0 - 8.0 %    Basophil % 0.0 0.0 - 1.9 %    Bands 18.0 %    Differential Method Manual    Comprehensive metabolic panel   Result Value Ref Range    Sodium 139 136 - 145 mmol/L    Potassium 5.4 (H)  3.5 - 5.1 mmol/L    Chloride 111 (H) 95 - 110 mmol/L    CO2 17 (L) 23 - 29 mmol/L    Glucose 115 (H) 70 - 110 mg/dL    BUN 29 (H) 8 - 23 mg/dL    Creatinine 0.9 0.5 - 1.4 mg/dL    Calcium 9.3 8.7 - 10.5 mg/dL    Total Protein 6.1 6.0 - 8.4 g/dL    Albumin 3.3 (L) 3.5 - 5.2 g/dL    Total Bilirubin 0.5 0.1 - 1.0 mg/dL    Alkaline Phosphatase 54 (L) 55 - 135 U/L    AST 14 10 - 40 U/L    ALT 8 (L) 10 - 44 U/L    Anion Gap 11 8 - 16 mmol/L    eGFR if African American >60 >60 mL/min/1.73 m^2    eGFR if non African American >60 >60 mL/min/1.73 m^2   Lipase   Result Value Ref Range    Lipase >1000 (H) 4 - 60 U/L   Urinalysis, Reflex to Urine Culture Urine, Clean Catch    Specimen: Urine   Result Value Ref Range    Specimen UA Urine, Clean Catch     Color, UA Yellow Yellow, Straw, Tova    Appearance, UA Clear Clear    pH, UA 6.0 5.0 - 8.0    Specific Gravity, UA >=1.030 (A) 1.005 - 1.030    Protein, UA 1+ (A) Negative    Glucose, UA Negative Negative    Ketones, UA Negative Negative    Bilirubin (UA) Negative Negative    Occult Blood UA Trace (A) Negative    Nitrite, UA Negative Negative    Urobilinogen, UA Negative <2.0 EU/dL    Leukocytes, UA Negative Negative   Lactic acid, plasma   Result Value Ref Range    Lactate (Lactic Acid) 2.0 0.5 - 2.2 mmol/L   Urinalysis Microscopic   Result Value Ref Range    RBC, UA 0 0 - 4 /hpf    WBC, UA 2 0 - 5 /hpf    Bacteria Few (A) None-Occ /hpf    Hyaline Casts, UA 0 0-1/lpf /lpf    Granular Casts, UA 3 (A) None /lpf    Microscopic Comment SEE COMMENT    POCT COVID-19 Rapid Screening   Result Value Ref Range    POC Rapid COVID Negative Negative     Acceptable Yes        EKG Readings: (Independently Interpreted)   Sinus rhythm, rate 84, no acute ST elevation or T-wave change       Imaging Results          X-Ray Chest AP Portable (Final result)  Result time 06/18/22 15:21:22    Final result by Lopez Rodriguez MD (06/18/22 15:21:22)                 Impression:       1. No acute chest findings.  2.    Electronically signed by: Lopez Rodriguez  Date:    06/18/2022  Time:    15:21             Narrative:    EXAMINATION:  XR CHEST AP PORTABLE    CLINICAL HISTORY:  cough;  nausea vomiting.    COMPARISON:  10/02/2020    FINDINGS:  Clear lungs.  Heart size within normal limits.  Vascular calcification of the aorta.  Old, healed proximal left humerus fracture.                               CT Renal Stone Study ABD Pelvis WO (Final result)  Result time 06/18/22 10:25:47    Final result by Lopez Rodriguez MD (06/18/22 10:25:47)                 Impression:      Probable acute pancreatitis, as detailed above.    All CT scans at this facility are performed  using dose modulation techniques as appropriate to performed exam including the following:  automated exposure control; adjustment of mA and/or kV according to the patients size (this includes techniques or standardized protocols for targeted exams where dose is matched to indication/reason for exam: i.e. extremities or head);  iterative reconstruction technique.      Electronically signed by: Lopez Rodriguez  Date:    06/18/2022  Time:    10:25             Narrative:    EXAMINATION:  CT RENAL STONE STUDY ABD PELVIS WO    CLINICAL HISTORY:  abdominal pain;.    TECHNIQUE:  Low dose axial images, sagittal and coronal reformations were obtained from the lung bases to the pubic symphysis.    COMPARISON:  None    FINDINGS:  Mild bibasilar atelectasis or scarring.  Visualized heart normal in size.    Normal liver.  Gallbladder surgically absent.    Spleen normal in size and appearance.  Mild-moderate inflammatory changes located adjacent to the body and tail the pancreas with minimal free fluid tracking within the pericolic gutters bilaterally.  Findings most likely indicate acute pancreatitis.  No fluid collections identified.    Minimal thickening left adrenal gland.  Right adrenal gland normal.  Severe atherosclerotic  calcification of the abdominal aorta common iliac vasculature and mesenteric vasculature.  No aneurysms.    The left kidney is normal.  Left ureter is normal.  Right kidney is normal.  Right ureter is normal.    Stomach is distended with ingested material and fluid.  Small intestine is nondilated and unremarkable.  Appendix not completely visualized.  Diverticulosis sigmoid colon.    The pelvis demonstrates mildly distended normal-appearing bladder.  Small to moderate amount of free fluid dependent pelvis.    Bilateral hip prostheses noted.  Pain pump noted with lead extending into the thoracic central canal.                                 Medications   sodium chloride 0.9% bolus 1,000 mL (1,000 mLs Intravenous New Bag 6/18/22 1329)   ondansetron injection 4 mg (4 mg Intravenous Given 6/18/22 1323)   morphine injection 2 mg (2 mg Intravenous Given 6/18/22 1323)   0.9%  NaCl infusion (1,000 mLs Intravenous New Bag 6/18/22 1528)   morphine injection 4 mg (4 mg Intravenous Given 6/18/22 1458)         5:00 PM  Patient with acute pancreatitis.  Patient does not smoke.  She does not have a gallbladder.  Not really sure what the cause of her pancreatitis is her lipase is over 1000. She will be admitted to hospital medicine services under Dr. Fish for hydration and workup of the pancreatitis and pain control .  She is stable at this time.  Blood pressure is a lot better 167/77 and her pain is better controlled.                 Clinical Impression:   Final diagnoses:  [R10.9] Abdominal pain  [R10.13] Epigastric pain  [K85.00] Idiopathic acute pancreatitis, unspecified complication status (Primary)          ED Disposition Condition    Observation               Tyrone Crystal Do, MD  06/18/22 7339

## 2022-06-18 NOTE — H&P
[facility] - Emergency Dept.  [facility] Medicine  History & Physical    Patient Name: Keyona Dwyer  MRN: [id number]  Patient Class: OP- Observation  Admission Date: 6/18/2022  Attending Physician: Tyrone Crystal Do, MD   Primary Care Provider: Ralf Isidro MD         Patient information was obtained from patient, spouse/SO and ER records.     Subjective:     Principal Problem:Idiopathic acute pancreatitis    Chief Complaint:   Chief Complaint   Patient presents with    Abdominal Pain     Abdominal pain since last night. N/V diarrhea.         HPI: Ms Dwyer is a 76 year old female with PMHx of gall bladder removal, HTN, HLD, chronic back pain (pain pump present), tobacco abuse, osteoarthritis and osteoporosis who presented to [facility] Emergency Room of evaluation of abdominal pain that started 2 days ago. Associated symptoms include weakness, fatigue, nausea, vomiting and diarrhea. Denies associated symptoms of chest pain, shortness of breath, fever, chills or diaphoresis. CT renal stone study showed mild-moderate inflammatory changes located adjacent to the body and tail the pancreas with minimal free fluid tracking within the pericolic gutters bilaterally, finding most likely indicate acute pancreatitis. Lipase > 1000. WBC 23.57. Patient is a DNR, spouse is surrogate decision maker. She is being placed in Observation under the care of Hospital Medicine.       Past Medical History:   Diagnosis Date    Anemia     Anxiety     Cataract     Chronic back pain     Dr. Guzman    Fibrocystic breast     Glaucoma     Hyperlipemia     Hypertension     Osteoarthritis     Osteoporosis     Rheumatology/on Prolia    PVD (peripheral vascular disease) 4/12/2021    Restless leg syndrome     Tobacco dependence     Trouble in sleeping        Past Surgical History:   Procedure Laterality Date    ANGIOGRAPHY OF LOWER EXTREMITY N/A 4/12/2021    Procedure: ANGIOGRAM, LOWER EXTREMITY/left leg;  Surgeon: Maico Adkins  MD;  Location: [facility] CATH LAB;  Service: Vascular;  Laterality: N/A;  req 1130 start time/Rm A243A    AORTOGRAPHY WITH SERIALOGRAPHY N/A 4/14/2021    Procedure: AORTOGRAM, WITH RUNOFF;  Surgeon: Maico Adkins MD;  Location: [facility] CATH LAB;  Service: Vascular;  Laterality: N/A;  left iliac stent with shockwave/req 1030 start    CATARACT EXTRACTION W/  INTRAOCULAR LENS IMPLANT Bilateral 8/ 9 2017    GALLBLADDER SURGERY      HYSTERECTOMY  1972    LUNG SURGERY Right age 15    lung collpase    TOE AMPUTATION Left 4/15/2021    Procedure: AMPUTATION, TOE;  Surgeon: Taylor Anderson DPM;  Location: [facility] OR;  Service: Podiatry;  Laterality: Left;  Hallux (Great Toe)    TOTAL HIP ARTHROPLASTY Bilateral     TOTAL KNEE ARTHROPLASTY Bilateral        Review of patient's allergies indicates:   Allergen Reactions    Indomethacin      Other reaction(s): Headache    Latex, natural rubber Swelling    Penicillins      Other reaction(s): Vomiting    Patient tolerated Rocephin during the 4/2021 encounter and she states that she has tolerated Keflex in the past without issue.    Sulfa (sulfonamide antibiotics) Other (See Comments)     hallucinations    Tolmetin      Other reaction(s): Hives    Bactroban [mupirocin calcium] Rash     Burned skin        No current facility-administered medications on file prior to encounter.     Current Outpatient Medications on File Prior to Encounter   Medication Sig    amLODIPine (NORVASC) 5 MG tablet TAKE 1 TABLET BY MOUTH EVERY DAY FOR BLOOD PRESSURE    buPROPion (WELLBUTRIN) 75 MG tablet Take 1 tablet (75 mg total) by mouth 2 (two) times daily.    busPIRone (BUSPAR) 10 MG tablet Take 1 tablet (10 mg total) by mouth 2 (two) times daily.    clopidogreL (PLAVIX) 75 mg tablet TAKE 1 TABLET BY MOUTH ONCE A DAY    doxycycline (VIBRAMYCIN) 100 MG Cap Take 1 capsule (100 mg total) by mouth every 12 (twelve) hours. for 10 days    duloxetine (CYMBALTA) 30 MG capsule Take 30 mg by mouth once  daily.    gabapentin (NEURONTIN) 600 MG tablet Take 600 mg by mouth 4 (four) times daily.     HYDROcodone-acetaminophen (NORCO)  mg per tablet Take 1 tablet by mouth 3 (three) times daily as needed.    hydrocodone-acetaminophen 10-325mg (NORCO)  mg Tab Take 1 tablet by mouth every 6 (six) hours as needed.    lisinopriL (PRINIVIL,ZESTRIL) 40 MG tablet TAKE 1 TABLET BY MOUTH EVERY DAY FOR BLOOD PRESSURE    ON-Q PAIN PUMP by Misc.(Non-Drug; Combo Route) route.    rosuvastatin (CRESTOR) 5 MG tablet TAKE 1 TABLET BY MOUTH ONCE A DAY    [DISCONTINUED] cephALEXin (KEFLEX) 500 MG capsule Take 1 capsule (500 mg total) by mouth every 8 (eight) hours. for 10 days    azelastine (ASTELIN) 137 mcg (0.1 %) nasal spray 1 spray (137 mcg total) by Nasal route 2 (two) times daily.    b complex vitamins capsule Take 1 capsule by mouth once daily.    bacitracin 500 unit/gram ointment Apply topically 2 (two) times daily. for 10 days    ergocalciferol (ERGOCALCIFEROL) 50,000 unit Cap TAKE 1 CAPSULE BY MOUTH ONE TIME PER WEEK    FLUAD QUAD -22,65Y UP,,PF, 60 mcg (15 mcg x 4)/0.5 mL Syrg     furosemide (LASIX) 20 MG tablet Take 1 tablet (20 mg total) by mouth daily as needed (swelling).    mupirocin (BACTROBAN) 2 % ointment Apply to affected area 3 times daily    nutritional supplement/fiber (QUINOA-KALE-HEMP ORAL) Take by mouth.    PROLIA 60 mg/mL Syrg      Family History       Problem Relation (Age of Onset)    Alcohol abuse Brother    Brain cancer Brother    Breast cancer Mother    Cancer Mother    Heart disease Brother    Heart failure Father    Hyperlipidemia Father    Stroke Brother          Tobacco Use    Smoking status: Current Some Day Smoker     Packs/day: 1.00     Years: 63.00     Pack years: 63.00     Types: Cigarettes     Last attempt to quit: 2021     Years since quittin.9    Smokeless tobacco: Never Used    Tobacco comment: started age of  11    Substance and Sexual Activity     Alcohol use: No    Drug use: No    Sexual activity: Yes     Partners: Male     Review of Systems   Constitutional:  Positive for activity change and appetite change. Negative for chills and fever.   HENT:  Negative for congestion, rhinorrhea and sinus pressure.    Respiratory:  Negative for apnea, cough, choking, chest tightness, shortness of breath, wheezing and stridor.    Cardiovascular:  Negative for chest pain, palpitations and leg swelling.   Gastrointestinal:  Positive for abdominal pain, diarrhea, nausea and vomiting. Negative for abdominal distention.   Endocrine: Negative for cold intolerance and heat intolerance.   Genitourinary:  Negative for difficulty urinating and hematuria.   Musculoskeletal:  Negative for arthralgias and joint swelling.   Skin:  Negative for color change, pallor and rash.   Neurological:  Negative for dizziness, seizures, numbness and headaches.   Psychiatric/Behavioral:  Negative for agitation. The patient is not nervous/anxious.    Objective:     Vital Signs (Most Recent):  Temp: 98.1 °F (36.7 °C) (06/18/22 0935)  Pulse: 98 (06/18/22 1551)  Resp: 18 (06/18/22 1551)  BP: (!) 167/77 (06/18/22 1551)  SpO2: 98 % (06/18/22 1551)   Vital Signs (24h Range):  Temp:  [98.1 °F (36.7 °C)] 98.1 °F (36.7 °C)  Pulse:  [] 98  Resp:  [16-18] 18  SpO2:  [97 %-98 %] 98 %  BP: (143-182)/(77-85) 167/77     Weight: 49 kg (108 lb)  Body mass index is 19.75 kg/m².    Physical Exam  Vitals and nursing note reviewed.   Constitutional:       General: She is not in acute distress.     Appearance: She is underweight. She is ill-appearing. She is not toxic-appearing or diaphoretic.   Eyes:      General:         Right eye: No discharge.         Left eye: No discharge.   Pulmonary:      Effort: No respiratory distress.      Breath sounds: No stridor. No wheezing, rhonchi or rales.   Chest:      Chest wall: No tenderness.   Abdominal:      General: There is no distension.      Palpations: There is no  mass.      Tenderness: There is abdominal tenderness. There is no right CVA tenderness, guarding or rebound.      Hernia: No hernia is present.   Musculoskeletal:         General: No swelling, tenderness, deformity or signs of injury.      Right lower leg: No edema.      Left lower leg: No edema.   Skin:     Coloration: Skin is not jaundiced or pale.      Findings: No bruising, erythema or lesion.   Neurological:      Cranial Nerves: No cranial nerve deficit.      Sensory: No sensory deficit.      Motor: Weakness present.      Coordination: Coordination normal.      Gait: Gait normal.      Deep Tendon Reflexes: Reflexes normal.           Significant Labs: All pertinent labs within the past 24 hours have been reviewed.  CBC:   Recent Labs   Lab 06/18/22  1335   WBC 23.57*   HGB 11.2*   HCT 34.8*        CMP:   Recent Labs   Lab 06/18/22  1335      K 5.4*   *   CO2 17*   *   BUN 29*   CREATININE 0.9   CALCIUM 9.3   PROT 6.1   ALBUMIN 3.3*   BILITOT 0.5   ALKPHOS 54*   AST 14   ALT 8*   ANIONGAP 11   EGFRNONAA >60     Lactic Acid:   Recent Labs   Lab 06/18/22  1455   LACTATE 2.0     Lipase:   Recent Labs   Lab 06/18/22  1335   LIPASE >1000*     Urine Studies:   Recent Labs   Lab 06/18/22  1447   COLORU Yellow   APPEARANCEUA Clear   PHUR 6.0   SPECGRAV >=1.030*   PROTEINUA 1+*   GLUCUA Negative   KETONESU Negative   BILIRUBINUA Negative   OCCULTUA Trace*   NITRITE Negative   UROBILINOGEN Negative   LEUKOCYTESUR Negative   RBCUA 0   WBCUA 2   BACTERIA Few*   HYALINECASTS 0       Significant Imaging:     Imaging Results              X-Ray Chest AP Portable (Final result)  Result time 06/18/22 15:21:22      Final result by Lopez Rodriguez MD (06/18/22 15:21:22)                   Impression:      1. No acute chest findings.  2.    Electronically signed by: Lopez Rodriguez  Date:    06/18/2022  Time:    15:21               Narrative:    EXAMINATION:  XR CHEST AP PORTABLE    CLINICAL  HISTORY:  cough;  nausea vomiting.    COMPARISON:  10/02/2020    FINDINGS:  Clear lungs.  Heart size within normal limits.  Vascular calcification of the aorta.  Old, healed proximal left humerus fracture.                                       CT Renal Stone Study ABD Pelvis WO (Final result)  Result time 06/18/22 10:25:47      Final result by Lopez Rodriguez MD (06/18/22 10:25:47)                   Impression:      Probable acute pancreatitis, as detailed above.    All CT scans at this facility are performed  using dose modulation techniques as appropriate to performed exam including the following:  automated exposure control; adjustment of mA and/or kV according to the patients size (this includes techniques or standardized protocols for targeted exams where dose is matched to indication/reason for exam: i.e. extremities or head);  iterative reconstruction technique.      Electronically signed by: Lopez Rodriguez  Date:    06/18/2022  Time:    10:25               Narrative:    EXAMINATION:  CT RENAL STONE STUDY ABD PELVIS WO    CLINICAL HISTORY:  abdominal pain;.    TECHNIQUE:  Low dose axial images, sagittal and coronal reformations were obtained from the lung bases to the pubic symphysis.    COMPARISON:  None    FINDINGS:  Mild bibasilar atelectasis or scarring.  Visualized heart normal in size.    Normal liver.  Gallbladder surgically absent.    Spleen normal in size and appearance.  Mild-moderate inflammatory changes located adjacent to the body and tail the pancreas with minimal free fluid tracking within the pericolic gutters bilaterally.  Findings most likely indicate acute pancreatitis.  No fluid collections identified.    Minimal thickening left adrenal gland.  Right adrenal gland normal.  Severe atherosclerotic calcification of the abdominal aorta common iliac vasculature and mesenteric vasculature.  No aneurysms.    The left kidney is normal.  Left ureter is normal.  Right kidney is normal.   Right ureter is normal.    Stomach is distended with ingested material and fluid.  Small intestine is nondilated and unremarkable.  Appendix not completely visualized.  Diverticulosis sigmoid colon.    The pelvis demonstrates mildly distended normal-appearing bladder.  Small to moderate amount of free fluid dependent pelvis.    Bilateral hip prostheses noted.  Pain pump noted with lead extending into the thoracic central canal.                                       Assessment/Plan:     * Idiopathic acute pancreatitis  Place in observation   Denies ETOH use, history of gall bladder removal   1 L IVF's bolus given in the ED   Continue IVF's   Pain control   Nausea and vomiting control   Daily Lipase   NPO x meds for now    Leukocytosis  WBC 23.57 on admission    No current sign of infection   BC X 2 collected in the ED   Start antibiotic if needed            Hyperkalemia  Potassium level 5.4 on admission   Hold home ACEI for now   Continue IVF's   Monitor in AM       PVD (peripheral vascular disease)  Continue Statin and Plavix       COPD, group D, by GOLD 2017 classification  No current signs of decompensation   Duo nebs PRN       Nicotine dependence, cigarettes, uncomplicated    Encouraged smoking cessation   Nicotine patch as needed       Hyperlipidemia  Continue home Statin       HTN (hypertension)  Restart home amlodipine  Hold home ACEI due to hyperkalemia    Hydralazine IV PRN SBP> 170    VTE Risk Mitigation (From admission, onward)         Ordered     Place sequential compression device  Until discontinued         06/18/22 1815                   Yuval Arreguin NP  Department of Hospital Medicine   [facility] - Emergency Dept.

## 2022-06-19 LAB
ALBUMIN SERPL BCP-MCNC: 2.5 G/DL (ref 3.5–5.2)
ALP SERPL-CCNC: 53 U/L (ref 55–135)
ALT SERPL W/O P-5'-P-CCNC: 8 U/L (ref 10–44)
ANION GAP SERPL CALC-SCNC: 6 MMOL/L (ref 8–16)
AST SERPL-CCNC: 17 U/L (ref 10–40)
BASOPHILS # BLD AUTO: 0.03 K/UL (ref 0–0.2)
BASOPHILS NFR BLD: 0.1 % (ref 0–1.9)
BILIRUB SERPL-MCNC: 0.4 MG/DL (ref 0.1–1)
BUN SERPL-MCNC: 17 MG/DL (ref 8–23)
CALCIUM SERPL-MCNC: 7.5 MG/DL (ref 8.7–10.5)
CHLORIDE SERPL-SCNC: 117 MMOL/L (ref 95–110)
CHOLEST SERPL-MCNC: 66 MG/DL (ref 120–199)
CHOLEST/HDLC SERPL: 1.7 {RATIO} (ref 2–5)
CO2 SERPL-SCNC: 19 MMOL/L (ref 23–29)
CREAT SERPL-MCNC: 0.7 MG/DL (ref 0.5–1.4)
CRP SERPL-MCNC: 120.5 MG/L (ref 0–8.2)
DIFFERENTIAL METHOD: ABNORMAL
EOSINOPHIL # BLD AUTO: 0 K/UL (ref 0–0.5)
EOSINOPHIL NFR BLD: 0 % (ref 0–8)
ERYTHROCYTE [DISTWIDTH] IN BLOOD BY AUTOMATED COUNT: 13.2 % (ref 11.5–14.5)
EST. GFR  (AFRICAN AMERICAN): >60 ML/MIN/1.73 M^2
EST. GFR  (NON AFRICAN AMERICAN): >60 ML/MIN/1.73 M^2
GLUCOSE SERPL-MCNC: 86 MG/DL (ref 70–110)
HCT VFR BLD AUTO: 31.3 % (ref 37–48.5)
HDLC SERPL-MCNC: 39 MG/DL (ref 40–75)
HDLC SERPL: 59.1 % (ref 20–50)
HGB BLD-MCNC: 9.4 G/DL (ref 12–16)
IMM GRANULOCYTES # BLD AUTO: 0.2 K/UL (ref 0–0.04)
IMM GRANULOCYTES NFR BLD AUTO: 1 % (ref 0–0.5)
LDLC SERPL CALC-MCNC: 20 MG/DL (ref 63–159)
LIPASE SERPL-CCNC: >1000 U/L (ref 4–60)
LYMPHOCYTES # BLD AUTO: 1.9 K/UL (ref 1–4.8)
LYMPHOCYTES NFR BLD: 8.8 % (ref 18–48)
MCH RBC QN AUTO: 32 PG (ref 27–31)
MCHC RBC AUTO-ENTMCNC: 30 G/DL (ref 32–36)
MCV RBC AUTO: 107 FL (ref 82–98)
MONOCYTES # BLD AUTO: 1.5 K/UL (ref 0.3–1)
MONOCYTES NFR BLD: 7 % (ref 4–15)
NEUTROPHILS # BLD AUTO: 17.4 K/UL (ref 1.8–7.7)
NEUTROPHILS NFR BLD: 83.1 % (ref 38–73)
NONHDLC SERPL-MCNC: 27 MG/DL
NRBC BLD-RTO: 0 /100 WBC
PLATELET # BLD AUTO: 304 K/UL (ref 150–450)
PMV BLD AUTO: 8.9 FL (ref 9.2–12.9)
POTASSIUM SERPL-SCNC: 4.6 MMOL/L (ref 3.5–5.1)
PROCALCITONIN SERPL IA-MCNC: 0.02 NG/ML
PROT SERPL-MCNC: 4.9 G/DL (ref 6–8.4)
RBC # BLD AUTO: 2.94 M/UL (ref 4–5.4)
SODIUM SERPL-SCNC: 142 MMOL/L (ref 136–145)
TRIGL SERPL-MCNC: 35 MG/DL (ref 30–150)
WBC # BLD AUTO: 20.98 K/UL (ref 3.9–12.7)

## 2022-06-19 PROCEDURE — 83690 ASSAY OF LIPASE: CPT | Performed by: NURSE PRACTITIONER

## 2022-06-19 PROCEDURE — 11000001 HC ACUTE MED/SURG PRIVATE ROOM

## 2022-06-19 PROCEDURE — 80053 COMPREHEN METABOLIC PANEL: CPT | Performed by: NURSE PRACTITIONER

## 2022-06-19 PROCEDURE — 96375 TX/PRO/DX INJ NEW DRUG ADDON: CPT

## 2022-06-19 PROCEDURE — 96374 THER/PROPH/DIAG INJ IV PUSH: CPT | Mod: 59

## 2022-06-19 PROCEDURE — 96361 HYDRATE IV INFUSION ADD-ON: CPT

## 2022-06-19 PROCEDURE — 21400001 HC TELEMETRY ROOM

## 2022-06-19 PROCEDURE — 63600175 PHARM REV CODE 636 W HCPCS: Performed by: NURSE PRACTITIONER

## 2022-06-19 PROCEDURE — 25000003 PHARM REV CODE 250: Performed by: NURSE PRACTITIONER

## 2022-06-19 PROCEDURE — 25000003 PHARM REV CODE 250: Performed by: INTERNAL MEDICINE

## 2022-06-19 PROCEDURE — 86140 C-REACTIVE PROTEIN: CPT | Performed by: INTERNAL MEDICINE

## 2022-06-19 PROCEDURE — 80061 LIPID PANEL: CPT | Performed by: INTERNAL MEDICINE

## 2022-06-19 PROCEDURE — 63600175 PHARM REV CODE 636 W HCPCS: Performed by: INTERNAL MEDICINE

## 2022-06-19 PROCEDURE — 85025 COMPLETE CBC W/AUTO DIFF WBC: CPT | Performed by: NURSE PRACTITIONER

## 2022-06-19 PROCEDURE — 84145 PROCALCITONIN (PCT): CPT | Performed by: NURSE PRACTITIONER

## 2022-06-19 PROCEDURE — 96376 TX/PRO/DX INJ SAME DRUG ADON: CPT

## 2022-06-19 RX ORDER — SODIUM CHLORIDE 9 MG/ML
INJECTION, SOLUTION INTRAVENOUS CONTINUOUS
Status: DISCONTINUED | OUTPATIENT
Start: 2022-06-19 | End: 2022-06-20 | Stop reason: HOSPADM

## 2022-06-19 RX ORDER — HYDROMORPHONE HYDROCHLORIDE 2 MG/ML
1 INJECTION, SOLUTION INTRAMUSCULAR; INTRAVENOUS; SUBCUTANEOUS EVERY 4 HOURS PRN
Status: DISCONTINUED | OUTPATIENT
Start: 2022-06-19 | End: 2022-06-19

## 2022-06-19 RX ORDER — OXYCODONE AND ACETAMINOPHEN 10; 325 MG/1; MG/1
1 TABLET ORAL EVERY 4 HOURS PRN
Status: DISCONTINUED | OUTPATIENT
Start: 2022-06-19 | End: 2022-06-20 | Stop reason: HOSPADM

## 2022-06-19 RX ORDER — HYDROMORPHONE HYDROCHLORIDE 2 MG/ML
1 INJECTION, SOLUTION INTRAMUSCULAR; INTRAVENOUS; SUBCUTANEOUS EVERY 4 HOURS PRN
Status: DISCONTINUED | OUTPATIENT
Start: 2022-06-19 | End: 2022-06-20 | Stop reason: HOSPADM

## 2022-06-19 RX ORDER — DOXYCYCLINE HYCLATE 100 MG
100 TABLET ORAL EVERY 12 HOURS
Status: DISCONTINUED | OUTPATIENT
Start: 2022-06-19 | End: 2022-06-20 | Stop reason: HOSPADM

## 2022-06-19 RX ADMIN — HYDRALAZINE HYDROCHLORIDE 10 MG: 20 INJECTION, SOLUTION INTRAMUSCULAR; INTRAVENOUS at 06:06

## 2022-06-19 RX ADMIN — HYDROCODONE BITARTRATE AND ACETAMINOPHEN 1 TABLET: 10; 325 TABLET ORAL at 12:06

## 2022-06-19 RX ADMIN — SODIUM CHLORIDE: 0.9 INJECTION, SOLUTION INTRAVENOUS at 06:06

## 2022-06-19 RX ADMIN — DOXYCYCLINE HYCLATE 100 MG: 100 TABLET, COATED ORAL at 09:06

## 2022-06-19 RX ADMIN — OXYCODONE AND ACETAMINOPHEN 1 TABLET: 10; 325 TABLET ORAL at 10:06

## 2022-06-19 RX ADMIN — DOXYCYCLINE HYCLATE 100 MG: 100 TABLET, COATED ORAL at 12:06

## 2022-06-19 RX ADMIN — SODIUM CHLORIDE: 0.9 INJECTION, SOLUTION INTRAVENOUS at 10:06

## 2022-06-19 RX ADMIN — HYDROMORPHONE HYDROCHLORIDE 1 MG: 2 INJECTION INTRAMUSCULAR; INTRAVENOUS; SUBCUTANEOUS at 02:06

## 2022-06-19 RX ADMIN — CLOPIDOGREL 75 MG: 75 TABLET, FILM COATED ORAL at 09:06

## 2022-06-19 RX ADMIN — BUPROPION HYDROCHLORIDE 75 MG: 75 TABLET, FILM COATED ORAL at 09:06

## 2022-06-19 RX ADMIN — ONDANSETRON 4 MG: 2 INJECTION INTRAMUSCULAR; INTRAVENOUS at 09:06

## 2022-06-19 RX ADMIN — HYDROMORPHONE HYDROCHLORIDE 1 MG: 2 INJECTION INTRAMUSCULAR; INTRAVENOUS; SUBCUTANEOUS at 12:06

## 2022-06-19 RX ADMIN — AMLODIPINE BESYLATE 5 MG: 5 TABLET ORAL at 09:06

## 2022-06-19 RX ADMIN — ATORVASTATIN CALCIUM 20 MG: 10 TABLET, FILM COATED ORAL at 09:06

## 2022-06-19 RX ADMIN — HYDROMORPHONE HYDROCHLORIDE 1 MG: 2 INJECTION INTRAMUSCULAR; INTRAVENOUS; SUBCUTANEOUS at 06:06

## 2022-06-19 RX ADMIN — OXYCODONE AND ACETAMINOPHEN 1 TABLET: 10; 325 TABLET ORAL at 06:06

## 2022-06-19 RX ADMIN — SODIUM CHLORIDE: 0.9 INJECTION, SOLUTION INTRAVENOUS at 12:06

## 2022-06-19 RX ADMIN — ONDANSETRON 4 MG: 2 INJECTION INTRAMUSCULAR; INTRAVENOUS at 01:06

## 2022-06-19 RX ADMIN — OXYCODONE AND ACETAMINOPHEN 1 TABLET: 10; 325 TABLET ORAL at 02:06

## 2022-06-19 RX ADMIN — SODIUM CHLORIDE: 0.9 INJECTION, SOLUTION INTRAVENOUS at 01:06

## 2022-06-19 RX ADMIN — HYDROCODONE BITARTRATE AND ACETAMINOPHEN 1 TABLET: 10; 325 TABLET ORAL at 09:06

## 2022-06-19 RX ADMIN — DULOXETINE 30 MG: 30 CAPSULE, DELAYED RELEASE ORAL at 09:06

## 2022-06-19 NOTE — ASSESSMENT & PLAN NOTE
Potassium level 5.4 on admission   Hold home ACEI for now   Continue IVF's   Monitor in AM   6/19/22 Resolved

## 2022-06-19 NOTE — PLAN OF CARE
NS@150mL/hr. Remains NPO. PRN nausea and pain meds adm as needed. Heart monitor 8666. Remains free from incident/injury. Call light in reach.

## 2022-06-19 NOTE — PROGRESS NOTES
[facility]  Progress Note    Patient Name: Keyona Dwyer  MRN: [id number]  Patient Class: OP- Observation   Admission Date: 6/18/2022  Length of Stay: 0 days  Attending Physician: Natalie Fish MD  Primary Care Provider: Ralf Isidro MD        Subjective:     Principal Problem:Idiopathic acute pancreatitis        HPI:  Ms Dwyer is a 76 year old female with PMHx of gall bladder removal, HTN, HLD, chronic back pain (pain pump present), tobacco abuse, osteoarthritis and osteoporosis who presented to [facility] Emergency Room of evaluation of abdominal pain that started 2 days ago. Associated symptoms include weakness, fatigue, nausea, vomiting and diarrhea. Denies associated symptoms of chest pain, shortness of breath, fever, chills or diaphoresis. CT renal stone study showed mild-moderate inflammatory changes located adjacent to the body and tail the pancreas with minimal free fluid tracking within the pericolic gutters bilaterally, finding most likely indicate acute pancreatitis. Lipase > 1000. WBC 23.57. Patient is a DNR, spouse is surrogate decision maker. She is being placed in Observation under the care of Hospital Medicine.       Overview/Hospital Course:  6/19/22 The patient denies any significant improvement in symptoms. She reports pain is not currently well controlled. Will adjust PRN analgesia. Lipase remains > 1000. Continue NPO status and increase IV hydration. The patient denies any ETOH use. Lipid panel is pending. Once lipase begins to decrease and symptoms improve will slowly advance diet.       Interval History: The patient denies any significant improvement in symptoms. She reports pain is not currently well controlled. Will adjust PRN analgesia. Lipase remains > 1000. Continue NPO status and increase IV hydration. The patient denies any ETOH use. Lipid panel is pending. Once lipase begins to decrease and symptoms improve will slowly advance diet.     Review of  Systems   Constitutional:  Positive for activity change and appetite change. Negative for chills, diaphoresis, fatigue, fever and unexpected weight change.   HENT:  Negative for congestion, drooling, facial swelling, rhinorrhea, sinus pressure, sneezing and sore throat.    Eyes:  Negative for discharge, redness, itching and visual disturbance.   Respiratory:  Negative for apnea, cough, choking, chest tightness, shortness of breath, wheezing and stridor.    Cardiovascular:  Negative for chest pain, palpitations and leg swelling.   Gastrointestinal:  Positive for abdominal pain, diarrhea, nausea and vomiting. Negative for abdominal distention, anal bleeding, blood in stool and constipation.   Endocrine: Negative for cold intolerance and heat intolerance.   Genitourinary:  Negative for decreased urine volume, difficulty urinating, dysuria, frequency, hematuria, pelvic pain, urgency, vaginal bleeding and vaginal discharge.   Musculoskeletal:  Negative for arthralgias, back pain, gait problem, joint swelling, myalgias, neck pain and neck stiffness.   Skin:  Negative for color change, pallor, rash and wound.   Neurological:  Negative for dizziness, seizures, facial asymmetry, speech difficulty, weakness, light-headedness, numbness and headaches.   Psychiatric/Behavioral:  Negative for agitation, confusion, hallucinations and suicidal ideas. The patient is not nervous/anxious.    All other systems reviewed and are negative.  Objective:     Vital Signs (Most Recent):  Temp: 99.2 °F (37.3 °C) (06/19/22 1152)  Pulse: 110 (06/19/22 1152)  Resp: 17 (06/19/22 1206)  BP: (!) 147/65 (06/19/22 1152)  SpO2: 97 % (06/19/22 1152)   Vital Signs (24h Range):  Temp:  [98 °F (36.7 °C)-99.9 °F (37.7 °C)] 99.2 °F (37.3 °C)  Pulse:  [] 110  Resp:  [14-19] 17  SpO2:  [95 %-98 %] 97 %  BP: (122-188)/(59-83) 147/65     Weight: 48.9 kg (107 lb 12.9 oz)  Body mass index is 19.72 kg/m².    Intake/Output Summary (Last 24 hours) at 6/19/2022  1255  Last data filed at 6/19/2022 1000  Gross per 24 hour   Intake 0 ml   Output --   Net 0 ml      Physical Exam  Vitals and nursing note reviewed.   Constitutional:       General: She is not in acute distress.     Appearance: She is well-developed and underweight. She is ill-appearing. She is not toxic-appearing or diaphoretic.   HENT:      Head: Normocephalic and atraumatic.   Eyes:      General:         Right eye: No discharge.         Left eye: No discharge.      Conjunctiva/sclera: Conjunctivae normal.      Pupils: Pupils are equal, round, and reactive to light.   Cardiovascular:      Rate and Rhythm: Normal rate and regular rhythm.      Heart sounds: Normal heart sounds. No murmur heard.  Pulmonary:      Effort: Pulmonary effort is normal. No respiratory distress.      Breath sounds: Normal breath sounds. No stridor. No wheezing, rhonchi or rales.   Chest:      Chest wall: No tenderness.   Abdominal:      General: Bowel sounds are normal. There is no distension.      Palpations: Abdomen is soft. There is no mass.      Tenderness: There is abdominal tenderness. There is no right CVA tenderness, guarding or rebound.      Hernia: No hernia is present.   Musculoskeletal:         General: No swelling, tenderness, deformity or signs of injury. Normal range of motion.      Cervical back: Normal range of motion and neck supple.      Right lower leg: No edema.      Left lower leg: No edema.   Skin:     General: Skin is warm and dry.      Coloration: Skin is not jaundiced or pale.      Findings: No bruising, erythema or lesion.   Neurological:      Mental Status: She is alert and oriented to person, place, and time.      Cranial Nerves: No cranial nerve deficit.      Sensory: No sensory deficit.      Motor: Weakness present.      Coordination: Coordination normal.      Gait: Gait normal.      Deep Tendon Reflexes: Reflexes are normal and symmetric. Reflexes normal.   Psychiatric:         Behavior: Behavior normal.        Significant Labs: All pertinent labs within the past 24 hours have been reviewed.    Significant Imaging:   Imaging Results              X-Ray Chest AP Portable (Final result)  Result time 06/18/22 15:21:22      Final result by Lopez Rodriguez MD (06/18/22 15:21:22)                   Impression:      1. No acute chest findings.  2.    Electronically signed by: Lopez Rodriguez  Date:    06/18/2022  Time:    15:21               Narrative:    EXAMINATION:  XR CHEST AP PORTABLE    CLINICAL HISTORY:  cough;  nausea vomiting.    COMPARISON:  10/02/2020    FINDINGS:  Clear lungs.  Heart size within normal limits.  Vascular calcification of the aorta.  Old, healed proximal left humerus fracture.                                       CT Renal Stone Study ABD Pelvis WO (Final result)  Result time 06/18/22 10:25:47      Final result by Lopez Rodriguez MD (06/18/22 10:25:47)                   Impression:      Probable acute pancreatitis, as detailed above.    All CT scans at this facility are performed  using dose modulation techniques as appropriate to performed exam including the following:  automated exposure control; adjustment of mA and/or kV according to the patients size (this includes techniques or standardized protocols for targeted exams where dose is matched to indication/reason for exam: i.e. extremities or head);  iterative reconstruction technique.      Electronically signed by: Lopez Rodriguez  Date:    06/18/2022  Time:    10:25               Narrative:    EXAMINATION:  CT RENAL STONE STUDY ABD PELVIS WO    CLINICAL HISTORY:  abdominal pain;.    TECHNIQUE:  Low dose axial images, sagittal and coronal reformations were obtained from the lung bases to the pubic symphysis.    COMPARISON:  None    FINDINGS:  Mild bibasilar atelectasis or scarring.  Visualized heart normal in size.    Normal liver.  Gallbladder surgically absent.    Spleen normal in size and appearance.  Mild-moderate  inflammatory changes located adjacent to the body and tail the pancreas with minimal free fluid tracking within the pericolic gutters bilaterally.  Findings most likely indicate acute pancreatitis.  No fluid collections identified.    Minimal thickening left adrenal gland.  Right adrenal gland normal.  Severe atherosclerotic calcification of the abdominal aorta common iliac vasculature and mesenteric vasculature.  No aneurysms.    The left kidney is normal.  Left ureter is normal.  Right kidney is normal.  Right ureter is normal.    Stomach is distended with ingested material and fluid.  Small intestine is nondilated and unremarkable.  Appendix not completely visualized.  Diverticulosis sigmoid colon.    The pelvis demonstrates mildly distended normal-appearing bladder.  Small to moderate amount of free fluid dependent pelvis.    Bilateral hip prostheses noted.  Pain pump noted with lead extending into the thoracic central canal.                                          Assessment/Plan:      * Idiopathic acute pancreatitis  Place in observation   Denies ETOH use, history of gall bladder removal   1 L IVF's bolus given in the ED   Continue IVF's   Pain control   Nausea and vomiting control   Daily Lipase   NPO x meds for now  6/19/22 The patient denies any significant improvement in symptoms. She reports pain is not currently well controlled. Will adjust PRN analgesia. Lipase remains > 1000. Continue NPO status and increase IV hydration. The patient denies any ETOH use. Lipid panel is pending. Once lipase begins to decrease and symptoms improve will slowly advance diet.     Leukocytosis  WBC 23.57 on admission    No current sign of infection   BC X 2 collected in the ED   Start antibiotic if needed    6/19/22 Improving. Likely reactive, BC NGTD, will check a procalcitonin.         Hyperkalemia  Potassium level 5.4 on admission   Hold home ACEI for now   Continue IVF's   Monitor in AM   6/19/22 Resolved      PVD  (peripheral vascular disease)  Continue Statin and Plavix       COPD, group D, by GOLD 2017 classification  No current signs of decompensation   Duo nebs PRN       Nicotine dependence, cigarettes, uncomplicated    Encouraged smoking cessation   Nicotine patch as needed       Hyperlipidemia  Continue home Statin       HTN (hypertension)  Restart home amlodipine  Hold home ACEI due to hyperkalemia          VTE Risk Mitigation (From admission, onward)         Ordered     Place sequential compression device  Until discontinued         06/18/22 1813                Discharge Planning   TOREY:      Code Status: Prior   Is the patient medically ready for discharge?:     Reason for patient still in hospital (select all that apply): Patient trending condition, Laboratory test, Treatment and Pending disposition                     Garland Montgomery NP  Department of Hospital Medicine   [facility]

## 2022-06-19 NOTE — ASSESSMENT & PLAN NOTE
WBC 23.57 on admission    No current sign of infection   BC X 2 collected in the ED   Start antibiotic if needed    6/19/22 Improving. Likely reactive, BC NGTD, will check a procalcitonin.

## 2022-06-19 NOTE — HOSPITAL COURSE
Ms Dwyer is a 76 year old female admitted with acute pancreatitis. As of 6/19/22, patient denies any significant improvement in symptoms. She reports pain is not currently well controlled. Will adjust PRN analgesia. Lipase remains > 1000. Continue NPO status and increase IV hydration. The patient denies any ETOH use. Lipid panel is pending. Once lipase begins to decrease and symptoms improve will slowly advance diet. As of 6/21/2022, vital signs and labs stable, Lipase trended down to 387. Patient reports abdominal pain has greatly decrease. She was started on full liquid diet and tolerated well, advance diet as tolerated. Patient was seen, examined and deemed suitable for discharge.

## 2022-06-19 NOTE — SUBJECTIVE & OBJECTIVE
Interval History: The patient denies any significant improvement in symptoms. She reports pain is not currently well controlled. Will adjust PRN analgesia. Lipase remains > 1000. Continue NPO status and increase IV hydration. The patient denies any ETOH use. Lipid panel is pending. Once lipase begins to decrease and symptoms improve will slowly advance diet.     Review of Systems   Constitutional:  Positive for activity change and appetite change. Negative for chills, diaphoresis, fatigue, fever and unexpected weight change.   HENT:  Negative for congestion, drooling, facial swelling, rhinorrhea, sinus pressure, sneezing and sore throat.    Eyes:  Negative for discharge, redness, itching and visual disturbance.   Respiratory:  Negative for apnea, cough, choking, chest tightness, shortness of breath, wheezing and stridor.    Cardiovascular:  Negative for chest pain, palpitations and leg swelling.   Gastrointestinal:  Positive for abdominal pain, diarrhea, nausea and vomiting. Negative for abdominal distention, anal bleeding, blood in stool and constipation.   Endocrine: Negative for cold intolerance and heat intolerance.   Genitourinary:  Negative for decreased urine volume, difficulty urinating, dysuria, frequency, hematuria, pelvic pain, urgency, vaginal bleeding and vaginal discharge.   Musculoskeletal:  Negative for arthralgias, back pain, gait problem, joint swelling, myalgias, neck pain and neck stiffness.   Skin:  Negative for color change, pallor, rash and wound.   Neurological:  Negative for dizziness, seizures, facial asymmetry, speech difficulty, weakness, light-headedness, numbness and headaches.   Psychiatric/Behavioral:  Negative for agitation, confusion, hallucinations and suicidal ideas. The patient is not nervous/anxious.    All other systems reviewed and are negative.  Objective:     Vital Signs (Most Recent):  Temp: 99.2 °F (37.3 °C) (06/19/22 1152)  Pulse: 110 (06/19/22 1152)  Resp: 17 (06/19/22  1206)  BP: (!) 147/65 (06/19/22 1152)  SpO2: 97 % (06/19/22 1152)   Vital Signs (24h Range):  Temp:  [98 °F (36.7 °C)-99.9 °F (37.7 °C)] 99.2 °F (37.3 °C)  Pulse:  [] 110  Resp:  [14-19] 17  SpO2:  [95 %-98 %] 97 %  BP: (122-188)/(59-83) 147/65     Weight: 48.9 kg (107 lb 12.9 oz)  Body mass index is 19.72 kg/m².    Intake/Output Summary (Last 24 hours) at 6/19/2022 1255  Last data filed at 6/19/2022 1000  Gross per 24 hour   Intake 0 ml   Output --   Net 0 ml      Physical Exam  Vitals and nursing note reviewed.   Constitutional:       General: She is not in acute distress.     Appearance: She is well-developed and underweight. She is ill-appearing. She is not toxic-appearing or diaphoretic.   HENT:      Head: Normocephalic and atraumatic.   Eyes:      General:         Right eye: No discharge.         Left eye: No discharge.      Conjunctiva/sclera: Conjunctivae normal.      Pupils: Pupils are equal, round, and reactive to light.   Cardiovascular:      Rate and Rhythm: Normal rate and regular rhythm.      Heart sounds: Normal heart sounds. No murmur heard.  Pulmonary:      Effort: Pulmonary effort is normal. No respiratory distress.      Breath sounds: Normal breath sounds. No stridor. No wheezing, rhonchi or rales.   Chest:      Chest wall: No tenderness.   Abdominal:      General: Bowel sounds are normal. There is no distension.      Palpations: Abdomen is soft. There is no mass.      Tenderness: There is abdominal tenderness. There is no right CVA tenderness, guarding or rebound.      Hernia: No hernia is present.   Musculoskeletal:         General: No swelling, tenderness, deformity or signs of injury. Normal range of motion.      Cervical back: Normal range of motion and neck supple.      Right lower leg: No edema.      Left lower leg: No edema.   Skin:     General: Skin is warm and dry.      Coloration: Skin is not jaundiced or pale.      Findings: No bruising, erythema or lesion.   Neurological:       Mental Status: She is alert and oriented to person, place, and time.      Cranial Nerves: No cranial nerve deficit.      Sensory: No sensory deficit.      Motor: Weakness present.      Coordination: Coordination normal.      Gait: Gait normal.      Deep Tendon Reflexes: Reflexes are normal and symmetric. Reflexes normal.   Psychiatric:         Behavior: Behavior normal.       Significant Labs: All pertinent labs within the past 24 hours have been reviewed.    Significant Imaging:   Imaging Results              X-Ray Chest AP Portable (Final result)  Result time 06/18/22 15:21:22      Final result by Lopez Rodriguez MD (06/18/22 15:21:22)                   Impression:      1. No acute chest findings.  2.    Electronically signed by: Lopez Rodriguez  Date:    06/18/2022  Time:    15:21               Narrative:    EXAMINATION:  XR CHEST AP PORTABLE    CLINICAL HISTORY:  cough;  nausea vomiting.    COMPARISON:  10/02/2020    FINDINGS:  Clear lungs.  Heart size within normal limits.  Vascular calcification of the aorta.  Old, healed proximal left humerus fracture.                                       CT Renal Stone Study ABD Pelvis WO (Final result)  Result time 06/18/22 10:25:47      Final result by Lopez Rodriguez MD (06/18/22 10:25:47)                   Impression:      Probable acute pancreatitis, as detailed above.    All CT scans at this facility are performed  using dose modulation techniques as appropriate to performed exam including the following:  automated exposure control; adjustment of mA and/or kV according to the patients size (this includes techniques or standardized protocols for targeted exams where dose is matched to indication/reason for exam: i.e. extremities or head);  iterative reconstruction technique.      Electronically signed by: Lopez Rodriguez  Date:    06/18/2022  Time:    10:25               Narrative:    EXAMINATION:  CT RENAL STONE STUDY ABD PELVIS WO    CLINICAL  HISTORY:  abdominal pain;.    TECHNIQUE:  Low dose axial images, sagittal and coronal reformations were obtained from the lung bases to the pubic symphysis.    COMPARISON:  None    FINDINGS:  Mild bibasilar atelectasis or scarring.  Visualized heart normal in size.    Normal liver.  Gallbladder surgically absent.    Spleen normal in size and appearance.  Mild-moderate inflammatory changes located adjacent to the body and tail the pancreas with minimal free fluid tracking within the pericolic gutters bilaterally.  Findings most likely indicate acute pancreatitis.  No fluid collections identified.    Minimal thickening left adrenal gland.  Right adrenal gland normal.  Severe atherosclerotic calcification of the abdominal aorta common iliac vasculature and mesenteric vasculature.  No aneurysms.    The left kidney is normal.  Left ureter is normal.  Right kidney is normal.  Right ureter is normal.    Stomach is distended with ingested material and fluid.  Small intestine is nondilated and unremarkable.  Appendix not completely visualized.  Diverticulosis sigmoid colon.    The pelvis demonstrates mildly distended normal-appearing bladder.  Small to moderate amount of free fluid dependent pelvis.    Bilateral hip prostheses noted.  Pain pump noted with lead extending into the thoracic central canal.

## 2022-06-20 VITALS
TEMPERATURE: 99 F | OXYGEN SATURATION: 96 % | HEART RATE: 99 BPM | HEIGHT: 62 IN | DIASTOLIC BLOOD PRESSURE: 68 MMHG | SYSTOLIC BLOOD PRESSURE: 133 MMHG | WEIGHT: 114.63 LBS | RESPIRATION RATE: 20 BRPM | BODY MASS INDEX: 21.1 KG/M2

## 2022-06-20 PROBLEM — E87.5 HYPERKALEMIA: Status: RESOLVED | Noted: 2022-06-18 | Resolved: 2022-06-20

## 2022-06-20 LAB
ALBUMIN SERPL BCP-MCNC: 2.3 G/DL (ref 3.5–5.2)
ALP SERPL-CCNC: 61 U/L (ref 55–135)
ALT SERPL W/O P-5'-P-CCNC: 10 U/L (ref 10–44)
ANION GAP SERPL CALC-SCNC: 5 MMOL/L (ref 8–16)
AST SERPL-CCNC: 17 U/L (ref 10–40)
BASOPHILS # BLD AUTO: 0.02 K/UL (ref 0–0.2)
BASOPHILS NFR BLD: 0.1 % (ref 0–1.9)
BILIRUB SERPL-MCNC: 0.4 MG/DL (ref 0.1–1)
BUN SERPL-MCNC: 11 MG/DL (ref 8–23)
CALCIUM SERPL-MCNC: 6.7 MG/DL (ref 8.7–10.5)
CHLORIDE SERPL-SCNC: 115 MMOL/L (ref 95–110)
CO2 SERPL-SCNC: 21 MMOL/L (ref 23–29)
CREAT SERPL-MCNC: 0.6 MG/DL (ref 0.5–1.4)
DIFFERENTIAL METHOD: ABNORMAL
EOSINOPHIL # BLD AUTO: 0 K/UL (ref 0–0.5)
EOSINOPHIL NFR BLD: 0 % (ref 0–8)
ERYTHROCYTE [DISTWIDTH] IN BLOOD BY AUTOMATED COUNT: 13.3 % (ref 11.5–14.5)
EST. GFR  (AFRICAN AMERICAN): >60 ML/MIN/1.73 M^2
EST. GFR  (NON AFRICAN AMERICAN): >60 ML/MIN/1.73 M^2
GLUCOSE SERPL-MCNC: 63 MG/DL (ref 70–110)
HCT VFR BLD AUTO: 26.7 % (ref 37–48.5)
HGB BLD-MCNC: 8.1 G/DL (ref 12–16)
IMM GRANULOCYTES # BLD AUTO: 0.14 K/UL (ref 0–0.04)
IMM GRANULOCYTES NFR BLD AUTO: 0.8 % (ref 0–0.5)
LACTATE SERPL-SCNC: 0.6 MMOL/L (ref 0.5–2.2)
LIPASE SERPL-CCNC: 387 U/L (ref 4–60)
LYMPHOCYTES # BLD AUTO: 1.4 K/UL (ref 1–4.8)
LYMPHOCYTES NFR BLD: 8 % (ref 18–48)
MCH RBC QN AUTO: 32.3 PG (ref 27–31)
MCHC RBC AUTO-ENTMCNC: 30.3 G/DL (ref 32–36)
MCV RBC AUTO: 106 FL (ref 82–98)
MONOCYTES # BLD AUTO: 1.1 K/UL (ref 0.3–1)
MONOCYTES NFR BLD: 5.8 % (ref 4–15)
NEUTROPHILS # BLD AUTO: 15.4 K/UL (ref 1.8–7.7)
NEUTROPHILS NFR BLD: 85.3 % (ref 38–73)
NRBC BLD-RTO: 0 /100 WBC
PLATELET # BLD AUTO: 223 K/UL (ref 150–450)
PMV BLD AUTO: 8.9 FL (ref 9.2–12.9)
POTASSIUM SERPL-SCNC: 3 MMOL/L (ref 3.5–5.1)
PROCALCITONIN SERPL IA-MCNC: 0.08 NG/ML
PROT SERPL-MCNC: 4.5 G/DL (ref 6–8.4)
RBC # BLD AUTO: 2.51 M/UL (ref 4–5.4)
SODIUM SERPL-SCNC: 141 MMOL/L (ref 136–145)
WBC # BLD AUTO: 18.08 K/UL (ref 3.9–12.7)

## 2022-06-20 PROCEDURE — 83605 ASSAY OF LACTIC ACID: CPT | Performed by: NURSE PRACTITIONER

## 2022-06-20 PROCEDURE — 80053 COMPREHEN METABOLIC PANEL: CPT | Performed by: NURSE PRACTITIONER

## 2022-06-20 PROCEDURE — 25000003 PHARM REV CODE 250: Performed by: NURSE PRACTITIONER

## 2022-06-20 PROCEDURE — 25000003 PHARM REV CODE 250: Performed by: INTERNAL MEDICINE

## 2022-06-20 PROCEDURE — 63600175 PHARM REV CODE 636 W HCPCS: Performed by: NURSE PRACTITIONER

## 2022-06-20 PROCEDURE — 36415 COLL VENOUS BLD VENIPUNCTURE: CPT | Performed by: NURSE PRACTITIONER

## 2022-06-20 PROCEDURE — 85025 COMPLETE CBC W/AUTO DIFF WBC: CPT | Performed by: NURSE PRACTITIONER

## 2022-06-20 PROCEDURE — 84145 PROCALCITONIN (PCT): CPT | Performed by: NURSE PRACTITIONER

## 2022-06-20 PROCEDURE — 83690 ASSAY OF LIPASE: CPT | Performed by: NURSE PRACTITIONER

## 2022-06-20 RX ORDER — CALCIUM CARBONATE 200(500)MG
1000 TABLET,CHEWABLE ORAL EVERY 6 HOURS
Status: DISCONTINUED | OUTPATIENT
Start: 2022-06-20 | End: 2022-06-20 | Stop reason: HOSPADM

## 2022-06-20 RX ORDER — POTASSIUM CHLORIDE 20 MEQ/1
40 TABLET, EXTENDED RELEASE ORAL ONCE
Status: COMPLETED | OUTPATIENT
Start: 2022-06-20 | End: 2022-06-20

## 2022-06-20 RX ORDER — POTASSIUM CHLORIDE 20 MEQ/1
20 TABLET, EXTENDED RELEASE ORAL ONCE
Status: DISCONTINUED | OUTPATIENT
Start: 2022-06-20 | End: 2022-06-20 | Stop reason: HOSPADM

## 2022-06-20 RX ADMIN — HYDROMORPHONE HYDROCHLORIDE 1 MG: 2 INJECTION INTRAMUSCULAR; INTRAVENOUS; SUBCUTANEOUS at 01:06

## 2022-06-20 RX ADMIN — AMLODIPINE BESYLATE 5 MG: 5 TABLET ORAL at 08:06

## 2022-06-20 RX ADMIN — OXYCODONE AND ACETAMINOPHEN 1 TABLET: 10; 325 TABLET ORAL at 01:06

## 2022-06-20 RX ADMIN — ATORVASTATIN CALCIUM 20 MG: 10 TABLET, FILM COATED ORAL at 08:06

## 2022-06-20 RX ADMIN — CALCIUM CARBONATE (ANTACID) CHEW TAB 500 MG 1000 MG: 500 CHEW TAB at 10:06

## 2022-06-20 RX ADMIN — OXYCODONE AND ACETAMINOPHEN 1 TABLET: 10; 325 TABLET ORAL at 04:06

## 2022-06-20 RX ADMIN — HYDROMORPHONE HYDROCHLORIDE 1 MG: 2 INJECTION INTRAMUSCULAR; INTRAVENOUS; SUBCUTANEOUS at 05:06

## 2022-06-20 RX ADMIN — SODIUM CHLORIDE: 0.9 INJECTION, SOLUTION INTRAVENOUS at 01:06

## 2022-06-20 RX ADMIN — SODIUM CHLORIDE: 0.9 INJECTION, SOLUTION INTRAVENOUS at 08:06

## 2022-06-20 RX ADMIN — DOXYCYCLINE HYCLATE 100 MG: 100 TABLET, COATED ORAL at 08:06

## 2022-06-20 RX ADMIN — POTASSIUM CHLORIDE 40 MEQ: 1500 TABLET, EXTENDED RELEASE ORAL at 12:06

## 2022-06-20 RX ADMIN — DULOXETINE 30 MG: 30 CAPSULE, DELAYED RELEASE ORAL at 08:06

## 2022-06-20 RX ADMIN — BUPROPION HYDROCHLORIDE 75 MG: 75 TABLET, FILM COATED ORAL at 10:06

## 2022-06-20 RX ADMIN — CLOPIDOGREL 75 MG: 75 TABLET, FILM COATED ORAL at 08:06

## 2022-06-20 RX ADMIN — OXYCODONE AND ACETAMINOPHEN 1 TABLET: 10; 325 TABLET ORAL at 08:06

## 2022-06-20 NOTE — PLAN OF CARE
[facility]  Discharge Assessment    Primary Care Provider: Ralf Isidro MD     Discharge Assessment (most recent)     BRIEF DISCHARGE ASSESSMENT - 06/20/22 1206        Discharge Planning    Assessment Type Discharge Planning Brief Assessment     Resource/Environmental Concerns none     Support Systems Spouse/significant other;Family members     Current Living Arrangements home/apartment/condo     Patient/Family Anticipates Transition to home     Patient/Family Anticipated Services at Transition none     DME Needed Upon Discharge  none     Discharge Plan A Home     Discharge Plan B Home

## 2022-06-20 NOTE — NURSING
Pt being discharged to home in stable condition, IV discontinued cannula intact. Telemetry monitor removed and returned to nurses station and purewick removed. Discharge instructions given to pt and family and they did verbalize understanding of instructions. Van ArreguinNp aware that pain medication was given at 1311 and he said okay for pt to be discharged to home.

## 2022-06-20 NOTE — PLAN OF CARE
Patient has been free from falls/injuries this shift. Safety precautions maintained. Pain managed with prn pain medication. No acute s/s of distress noted. Chart check complete.

## 2022-06-20 NOTE — PLAN OF CARE
[facility]  Discharge Final Note    Primary Care Provider: Ralf Isidro MD    Expected Discharge Date: 6/20/2022    Final Discharge Note (most recent)     Final Note - 06/20/22 1207        Final Note    Assessment Type Final Discharge Note     Anticipated Discharge Disposition Home or Self Care     Hospital Resources/Appts/Education Provided Provided patient/caregiver with written discharge plan information;Appointments scheduled and added to AVS        Post-Acute Status    Discharge Delays None known at this time                 Important Message from Medicare             Contact Info     Ralf Isidro MD   Specialty: Family Medicine   Relationship: PCP - General    [id number] [place] [id number]   Phone: [phone]       Next Steps: Schedule an appointment as soon as possible for a visit in 3 day(s)    Instructions: Hospital follow up for acute pancreatitis

## 2022-06-21 NOTE — DISCHARGE SUMMARY
[facility]  Discharge Summary      Patient Name: Keyona Dwyer  MRN: [id number]  Patient Class: IP- Inpatient  Admission Date: 6/18/2022  Hospital Length of Stay: 1 days  Discharge Date and Time: 6/20/2022  4:49 PM  Attending Physician: Natalie Fish MD   Discharging Provider: Yuval Arreguin NP  Primary Care Provider: Ralf Isidro MD      HPI:   Ms Dwyer is a 76 year old female with PMHx of gall bladder removal, HTN, HLD, chronic back pain (pain pump present), tobacco abuse, osteoarthritis and osteoporosis who presented to [facility] Emergency Room of evaluation of abdominal pain that started 2 days ago. Associated symptoms include weakness, fatigue, nausea, vomiting and diarrhea. Denies associated symptoms of chest pain, shortness of breath, fever, chills or diaphoresis. CT renal stone study showed mild-moderate inflammatory changes located adjacent to the body and tail the pancreas with minimal free fluid tracking within the pericolic gutters bilaterally, finding most likely indicate acute pancreatitis. Lipase > 1000. WBC 23.57. Patient is a DNR, spouse is surrogate decision maker. She is being placed in Observation under the care of [facility].       * No surgery found *      Hospital Course:   Ms Dwyer is a 76 year old female admitted with acute pancreatitis. As of 6/19/22, patient denies any significant improvement in symptoms. She reports pain is not currently well controlled. Will adjust PRN analgesia. Lipase remains > 1000. Continue NPO status and increase IV hydration. The patient denies any ETOH use. Lipid panel is pending. Once lipase begins to decrease and symptoms improve will slowly advance diet. As of 6/21/2022, vital signs and labs stable, Lipase trended down to 387. Patient reports abdominal pain has greatly decrease. She was started on full liquid diet and tolerated well, advance diet as tolerated. Patient was seen, examined and deemed suitable for  discharge.        Goals of Care Treatment Preferences:  Code Status: Full Code      Consults:     Anemia          Final Active Diagnoses:    Diagnosis Date Noted POA    PRINCIPAL PROBLEM:  Idiopathic acute pancreatitis [K85.00] 06/18/2022 Yes    Leukocytosis [D72.829] 06/18/2022 Yes    PVD (peripheral vascular disease) [I73.9] 04/12/2021 Yes    COPD, group D, by GOLD 2017 classification [J44.9] 03/13/2018 Yes    HTN (hypertension) [I10] 09/23/2013 Yes     Chronic    Hyperlipidemia [E78.5] 09/23/2013 Yes     Chronic    Nicotine dependence, cigarettes, uncomplicated [F17.210] 09/23/2013 Yes     Chronic      Problems Resolved During this Admission:    Diagnosis Date Noted Date Resolved POA    Hyperkalemia [E87.5] 06/18/2022 06/20/2022 Yes       Discharged Condition: stable    Disposition: Home or Self Care    Follow Up:   Follow-up Information     Ralf Isidro MD. Schedule an appointment as soon as possible for a visit in 3 day(s).    Specialty: Family Medicine  Why: Hospital follow up for acute pancreatitis  Contact information:  [address] [facility] [place] [address] [phone]                       Patient Instructions:      Diet full liquid   Scheduling Instructions: Advance as tolerated to regular diet     Activity as tolerated       Significant Diagnostic Studies: Labs:   CMP   Recent Labs   Lab 06/20/22  0728      K 3.0*   *   CO2 21*   GLU 63*   BUN 11   CREATININE 0.6   CALCIUM 6.7*   PROT 4.5*   ALBUMIN 2.3*   BILITOT 0.4   ALKPHOS 61   AST 17   ALT 10   ANIONGAP 5*   ESTGFRAFRICA >60   EGFRNONAA >60    and CBC   Recent Labs   Lab 06/20/22  0728   WBC 18.08*   HGB 8.1*   HCT 26.7*          Pending Diagnostic Studies:     None         Medications:  Reconciled Home Medications:      Medication List      CONTINUE taking these medications    amLODIPine 5 MG tablet  Commonly known as: NORVASC  TAKE 1 TABLET BY MOUTH EVERY DAY FOR BLOOD PRESSURE     azelastine 137 mcg (0.1 %)  nasal spray  Commonly known as: ASTELIN  1 spray (137 mcg total) by Nasal route 2 (two) times daily.     b complex vitamins capsule  Take 1 capsule by mouth once daily.     buPROPion 75 MG tablet  Commonly known as: WELLBUTRIN  Take 1 tablet (75 mg total) by mouth 2 (two) times daily.     busPIRone 10 MG tablet  Commonly known as: BUSPAR  Take 1 tablet (10 mg total) by mouth 2 (two) times daily.     clopidogreL 75 mg tablet  Commonly known as: PLAVIX  TAKE 1 TABLET BY MOUTH ONCE A DAY     DULoxetine 30 MG capsule  Commonly known as: CYMBALTA  Take 30 mg by mouth once daily.     ergocalciferol 50,000 unit Cap  Commonly known as: ERGOCALCIFEROL  TAKE 1 CAPSULE BY MOUTH ONE TIME PER WEEK     FLUAD QUAD 2021-22(65Y UP)(PF) 60 mcg (15 mcg x 4)/0.5 mL Syrg  Generic drug: flu vac 2021 [id number](PF)     furosemide 20 MG tablet  Commonly known as: LASIX  Take 1 tablet (20 mg total) by mouth daily as needed (swelling).     gabapentin 600 MG tablet  Commonly known as: NEURONTIN  Take 600 mg by mouth 4 (four) times daily.     * HYDROcodone-acetaminophen  mg per tablet  Commonly known as: NORCO  Take 1 tablet by mouth every 6 (six) hours as needed.     * HYDROcodone-acetaminophen  mg per tablet  Commonly known as: NORCO  Take 1 tablet by mouth 3 (three) times daily as needed.     lisinopriL 40 MG tablet  Commonly known as: PRINIVIL,ZESTRIL  TAKE 1 TABLET BY MOUTH EVERY DAY FOR BLOOD PRESSURE     mupirocin 2 % ointment  Commonly known as: BACTROBAN  Apply to affected area 3 times daily     ON-Q PAIN PUMP  by Misc.(Non-Drug; Combo Route) route.     PROLIA 60 mg/mL Syrg  Generic drug: denosumab     QUINOA-KALE-HEMP ORAL  Take by mouth.     rosuvastatin 5 MG tablet  Commonly known as: CRESTOR  TAKE 1 TABLET BY MOUTH ONCE A DAY         * This list has 2 medication(s) that are the same as other medications prescribed for you. Read the directions carefully, and ask your doctor or other care provider to review them with  you.            STOP taking these medications    cephALEXin 500 MG capsule  Commonly known as: KEFLEX        ASK your doctor about these medications    bacitracin 500 unit/gram ointment  Apply topically 2 (two) times daily. for 10 days  Ask about: Should I take this medication?     doxycycline 100 MG Cap  Commonly known as: VIBRAMYCIN  Take 1 capsule (100 mg total) by mouth every 12 (twelve) hours. for 10 days  Ask about: Should I take this medication?            Indwelling Lines/Drains at time of discharge:   Lines/Drains/Airways     None                 Time spent on the discharge of patient: 45 minutes         Yuval Arreguin NP  Department of Hospital Medicine  [place] [facility]

## 2022-06-22 ENCOUNTER — OFFICE VISIT (OUTPATIENT)
Dept: INTERNAL MEDICINE | Facility: CLINIC | Age: 77
End: 2022-06-22
Payer: MEDICARE

## 2022-06-22 VITALS
SYSTOLIC BLOOD PRESSURE: 122 MMHG | BODY MASS INDEX: 19.75 KG/M2 | HEART RATE: 63 BPM | OXYGEN SATURATION: 97 % | WEIGHT: 108 LBS | DIASTOLIC BLOOD PRESSURE: 68 MMHG | TEMPERATURE: 98 F

## 2022-06-22 DIAGNOSIS — E87.6 HYPOKALEMIA: ICD-10-CM

## 2022-06-22 DIAGNOSIS — K85.90 ACUTE PANCREATITIS, UNSPECIFIED COMPLICATION STATUS, UNSPECIFIED PANCREATITIS TYPE: ICD-10-CM

## 2022-06-22 DIAGNOSIS — D64.9 ANEMIA, UNSPECIFIED TYPE: Primary | ICD-10-CM

## 2022-06-22 DIAGNOSIS — Z09 HOSPITAL DISCHARGE FOLLOW-UP: ICD-10-CM

## 2022-06-22 PROCEDURE — 1101F PT FALLS ASSESS-DOCD LE1/YR: CPT | Mod: CPTII,S$GLB,, | Performed by: FAMILY MEDICINE

## 2022-06-22 PROCEDURE — 99999 PR PBB SHADOW E&M-EST. PATIENT-LVL V: ICD-10-PCS | Mod: PBBFAC,,, | Performed by: FAMILY MEDICINE

## 2022-06-22 PROCEDURE — 1159F MED LIST DOCD IN RCRD: CPT | Mod: CPTII,S$GLB,, | Performed by: FAMILY MEDICINE

## 2022-06-22 PROCEDURE — 99214 PR OFFICE/OUTPT VISIT, EST, LEVL IV, 30-39 MIN: ICD-10-PCS | Mod: S$GLB,,, | Performed by: FAMILY MEDICINE

## 2022-06-22 PROCEDURE — 3288F PR FALLS RISK ASSESSMENT DOCUMENTED: ICD-10-PCS | Mod: CPTII,S$GLB,, | Performed by: FAMILY MEDICINE

## 2022-06-22 PROCEDURE — 1159F PR MEDICATION LIST DOCUMENTED IN MEDICAL RECORD: ICD-10-PCS | Mod: CPTII,S$GLB,, | Performed by: FAMILY MEDICINE

## 2022-06-22 PROCEDURE — 99999 PR PBB SHADOW E&M-EST. PATIENT-LVL V: CPT | Mod: PBBFAC,,, | Performed by: FAMILY MEDICINE

## 2022-06-22 PROCEDURE — 1101F PR PT FALLS ASSESS DOC 0-1 FALLS W/OUT INJ PAST YR: ICD-10-PCS | Mod: CPTII,S$GLB,, | Performed by: FAMILY MEDICINE

## 2022-06-22 PROCEDURE — 3074F SYST BP LT 130 MM HG: CPT | Mod: CPTII,S$GLB,, | Performed by: FAMILY MEDICINE

## 2022-06-22 PROCEDURE — 1111F PR DISCHARGE MEDS RECONCILED W/ CURRENT OUTPATIENT MED LIST: ICD-10-PCS | Mod: CPTII,S$GLB,, | Performed by: FAMILY MEDICINE

## 2022-06-22 PROCEDURE — 1125F AMNT PAIN NOTED PAIN PRSNT: CPT | Mod: CPTII,S$GLB,, | Performed by: FAMILY MEDICINE

## 2022-06-22 PROCEDURE — 3078F PR MOST RECENT DIASTOLIC BLOOD PRESSURE < 80 MM HG: ICD-10-PCS | Mod: CPTII,S$GLB,, | Performed by: FAMILY MEDICINE

## 2022-06-22 PROCEDURE — 99214 OFFICE O/P EST MOD 30 MIN: CPT | Mod: S$GLB,,, | Performed by: FAMILY MEDICINE

## 2022-06-22 PROCEDURE — 1160F RVW MEDS BY RX/DR IN RCRD: CPT | Mod: CPTII,S$GLB,, | Performed by: FAMILY MEDICINE

## 2022-06-22 PROCEDURE — 1125F PR PAIN SEVERITY QUANTIFIED, PAIN PRESENT: ICD-10-PCS | Mod: CPTII,S$GLB,, | Performed by: FAMILY MEDICINE

## 2022-06-22 PROCEDURE — 1111F DSCHRG MED/CURRENT MED MERGE: CPT | Mod: CPTII,S$GLB,, | Performed by: FAMILY MEDICINE

## 2022-06-22 PROCEDURE — 3078F DIAST BP <80 MM HG: CPT | Mod: CPTII,S$GLB,, | Performed by: FAMILY MEDICINE

## 2022-06-22 PROCEDURE — 3288F FALL RISK ASSESSMENT DOCD: CPT | Mod: CPTII,S$GLB,, | Performed by: FAMILY MEDICINE

## 2022-06-22 PROCEDURE — 1160F PR REVIEW ALL MEDS BY PRESCRIBER/CLIN PHARMACIST DOCUMENTED: ICD-10-PCS | Mod: CPTII,S$GLB,, | Performed by: FAMILY MEDICINE

## 2022-06-22 PROCEDURE — 3074F PR MOST RECENT SYSTOLIC BLOOD PRESSURE < 130 MM HG: ICD-10-PCS | Mod: CPTII,S$GLB,, | Performed by: FAMILY MEDICINE

## 2022-06-22 NOTE — PROGRESS NOTES
Subjective:      Patient ID: Keyona Dwyer is a 76 y.o. female.    Chief Complaint: Follow-up (Hospital  Pancreatitis)    HPI 76 y.o.   female patient with a PMHx of anemia, anxiety, chronic back pain, hyperlipidemia, HTN, osteoporosis, PVD, andrestless leg syndrome presents to clinic for hospital follow-up. The patient was last seen on September 10, 2021      Today she accompanied by family member reports she is doing ok. Patient reports she was hospitalized for pancreatitis.  She states she is eating a soft diet with two ensures daily.   No abd pain.  Bowels moving ok.  No fever/chills.  Followed by pain management//so gets pain meds thru them.   She states she got bit by a spider a week before hospital admittance and was on antibiotics for it when admitted. Had I&D//under care of Dr. Jernigan.  atient otherwise without complaints.     Past Medical History:   Diagnosis Date    Anemia     Anxiety     Cataract     Chronic back pain     Dr. Guzman    Fibrocystic breast     Glaucoma     Hyperlipemia     Hypertension     Idiopathic acute pancreatitis 6/18/2022    Osteoarthritis     Osteoporosis     Rheumatology/on Prolia    PVD (peripheral vascular disease) 4/12/2021    Restless leg syndrome     Tobacco dependence     Trouble in sleeping      Family History   Problem Relation Age of Onset    Cancer Mother     Breast cancer Mother     Hyperlipidemia Father     Heart failure Father     Heart disease Brother     Brain cancer Brother     Stroke Brother     Alcohol abuse Brother      Past Surgical History:   Procedure Laterality Date    ANGIOGRAPHY OF LOWER EXTREMITY N/A 4/12/2021    Procedure: ANGIOGRAM, LOWER EXTREMITY/left leg;  Surgeon: Maico Adkins MD;  Location: [facility] CATH LAB;  Service: Vascular;  Laterality: N/A;  req 1130 start time/ A243A    AORTOGRAPHY WITH SERIALOGRAPHY N/A 4/14/2021    Procedure: AORTOGRAM, WITH RUNOFF;  Surgeon: Maico Adkins MD;  Location: [facility] CATH LAB;   Service: Vascular;  Laterality: N/A;  left iliac stent with shockwave/req 1030 start    CATARACT EXTRACTION W/  INTRAOCULAR LENS IMPLANT Bilateral 2017    GALLBLADDER SURGERY      HYSTERECTOMY  1972    LUNG SURGERY Right age 15    lung collpase    TOE AMPUTATION Left 4/15/2021    Procedure: AMPUTATION, TOE;  Surgeon: Taylor Anderson DPM;  Location: [facility];  Service: Podiatry;  Laterality: Left;  Hallux (Great Toe)    TOTAL HIP ARTHROPLASTY Bilateral     TOTAL KNEE ARTHROPLASTY Bilateral      Social History     Tobacco Use    Smoking status: Current Some Day Smoker     Packs/day: 0.50     Years: 63.00     Pack years: 31.50     Types: Cigarettes     Last attempt to quit: 2021     Years since quittin.9    Smokeless tobacco: Never Used    Tobacco comment: started age of  11    Substance Use Topics    Alcohol use: No    Drug use: No       /68 (BP Location: Right arm, Patient Position: Sitting)   Pulse 63   Temp 97.8 °F (36.6 °C) (Temporal)   Wt 49 kg (108 lb)   SpO2 97%   BMI 19.75 kg/m²     Review of Systems   Constitutional: Positive for activity change and appetite change. Negative for chills, diaphoresis, fatigue, fever and unexpected weight change.   HENT: Negative for congestion, ear pain, hearing loss, postnasal drip, rhinorrhea, sinus pain and sore throat.    Eyes: Negative for pain, discharge, itching and visual disturbance.   Respiratory: Negative for cough, chest tightness, shortness of breath and wheezing.    Cardiovascular: Negative for chest pain, palpitations and leg swelling.   Gastrointestinal: Negative for abdominal pain, constipation, diarrhea, nausea and vomiting.   Endocrine: Negative for polydipsia and polyuria.   Genitourinary: Negative for difficulty urinating, dysuria, flank pain, frequency, menstrual problem, pelvic pain and urgency.   Skin: Positive for wound.        R finger//wrapped   Neurological: Negative for dizziness, weakness, light-headedness  and headaches.   Hematological: Negative for adenopathy.   Psychiatric/Behavioral: Negative for confusion, decreased concentration and dysphoric mood.       Objective:     Physical Exam  Constitutional:       Appearance: Normal appearance.      Comments: Very thin appearing female   Cardiovascular:      Rate and Rhythm: Normal rate and regular rhythm.   Pulmonary:      Effort: Pulmonary effort is normal.      Breath sounds: Normal breath sounds. No wheezing.   Abdominal:      General: Abdomen is flat. Bowel sounds are normal.      Palpations: Abdomen is soft.      Tenderness: There is no abdominal tenderness.   Musculoskeletal:      Right lower leg: No edema.      Left lower leg: No edema.   Skin:     Comments: R finger wrapped   Neurological:      Mental Status: She is alert and oriented to person, place, and time.   Psychiatric:         Mood and Affect: Mood normal.         Behavior: Behavior normal.         Thought Content: Thought content normal.         Judgment: Judgment normal.         Lab Results   Component Value Date    WBC 18.08 (H) 06/20/2022    HGB 8.1 (L) 06/20/2022    HCT 26.7 (L) 06/20/2022     06/20/2022    CHOL 66 (L) 06/19/2022    TRIG 35 06/19/2022    HDL 39 (L) 06/19/2022    ALT 10 06/20/2022    AST 17 06/20/2022     06/20/2022    K 3.0 (L) 06/20/2022     (H) 06/20/2022    CREATININE 0.6 06/20/2022    BUN 11 06/20/2022    CO2 21 (L) 06/20/2022    TSH 1.055 08/03/2021    INR 1.2 10/10/2009    HGBA1C 5.0 12/14/2018       Assessment:     1. Anemia, unspecified type    2. Hypokalemia    3. Acute pancreatitis, unspecified complication status, unspecified pancreatitis type    4. Hospital discharge follow-up         Plan:     Anemia, unspecified type  -     CBC Auto Differential; Future; Expected date: 06/27/2022  -     Ferritin; Future; Expected date: 06/27/2022  -     Iron and TIBC; Future; Expected date: 06/27/2022    Hypokalemia  -     Comprehensive Metabolic Panel; Future;  Expected date: 06/27/2022    Acute pancreatitis, unspecified complication status, unspecified pancreatitis type    Hospital discharge follow-up        Vitals reviewed. BP within normal range at 122/68.    Reviewed hosp records  Pt's labs were off  Recheck on Monday//CBC with iron levels and chemistry.  Pt states her K was replaced while there, Ca also down.  Should improve with time//diet advancement.  Cont Doxy//f/u with Dr. Jernigan  Advance diet as tolerated  Return to ER if any pain/worsening        Documentation entered by Matheus Barrera, acting as scribe for Dr. Ralf Dias. 06/22/2022 8:50 AM.

## 2022-06-23 LAB
BACTERIA BLD CULT: NORMAL
BACTERIA BLD CULT: NORMAL

## 2022-06-27 ENCOUNTER — LAB VISIT (OUTPATIENT)
Dept: LAB | Facility: HOSPITAL | Age: 77
End: 2022-06-27
Attending: FAMILY MEDICINE
Payer: MEDICARE

## 2022-06-27 DIAGNOSIS — E87.6 HYPOKALEMIA: ICD-10-CM

## 2022-06-27 DIAGNOSIS — D64.9 ANEMIA, UNSPECIFIED TYPE: ICD-10-CM

## 2022-06-27 LAB
ALBUMIN SERPL BCP-MCNC: 2.2 G/DL (ref 3.5–5.2)
ALP SERPL-CCNC: 89 U/L (ref 55–135)
ALT SERPL W/O P-5'-P-CCNC: 15 U/L (ref 10–44)
ANION GAP SERPL CALC-SCNC: 13 MMOL/L (ref 8–16)
AST SERPL-CCNC: 23 U/L (ref 10–40)
BASOPHILS # BLD AUTO: 0.03 K/UL (ref 0–0.2)
BASOPHILS NFR BLD: 0.4 % (ref 0–1.9)
BILIRUB SERPL-MCNC: 0.4 MG/DL (ref 0.1–1)
BUN SERPL-MCNC: 8 MG/DL (ref 8–23)
CALCIUM SERPL-MCNC: 9.4 MG/DL (ref 8.7–10.5)
CHLORIDE SERPL-SCNC: 100 MMOL/L (ref 95–110)
CO2 SERPL-SCNC: 25 MMOL/L (ref 23–29)
CREAT SERPL-MCNC: 0.7 MG/DL (ref 0.5–1.4)
DIFFERENTIAL METHOD: ABNORMAL
EOSINOPHIL # BLD AUTO: 0.1 K/UL (ref 0–0.5)
EOSINOPHIL NFR BLD: 1.5 % (ref 0–8)
ERYTHROCYTE [DISTWIDTH] IN BLOOD BY AUTOMATED COUNT: 13.6 % (ref 11.5–14.5)
EST. GFR  (AFRICAN AMERICAN): >60 ML/MIN/1.73 M^2
EST. GFR  (NON AFRICAN AMERICAN): >60 ML/MIN/1.73 M^2
FERRITIN SERPL-MCNC: 129 NG/ML (ref 20–300)
GLUCOSE SERPL-MCNC: 73 MG/DL (ref 70–110)
HCT VFR BLD AUTO: 26.9 % (ref 37–48.5)
HGB BLD-MCNC: 8.1 G/DL (ref 12–16)
IMM GRANULOCYTES # BLD AUTO: 0.03 K/UL (ref 0–0.04)
IMM GRANULOCYTES NFR BLD AUTO: 0.4 % (ref 0–0.5)
IRON SERPL-MCNC: 19 UG/DL (ref 30–160)
LYMPHOCYTES # BLD AUTO: 1.9 K/UL (ref 1–4.8)
LYMPHOCYTES NFR BLD: 25.5 % (ref 18–48)
MCH RBC QN AUTO: 32 PG (ref 27–31)
MCHC RBC AUTO-ENTMCNC: 30.1 G/DL (ref 32–36)
MCV RBC AUTO: 106 FL (ref 82–98)
MONOCYTES # BLD AUTO: 0.7 K/UL (ref 0.3–1)
MONOCYTES NFR BLD: 9.7 % (ref 4–15)
NEUTROPHILS # BLD AUTO: 4.6 K/UL (ref 1.8–7.7)
NEUTROPHILS NFR BLD: 62.5 % (ref 38–73)
NRBC BLD-RTO: 0 /100 WBC
PLATELET # BLD AUTO: 396 K/UL (ref 150–450)
PMV BLD AUTO: 9.6 FL (ref 9.2–12.9)
POTASSIUM SERPL-SCNC: 5.2 MMOL/L (ref 3.5–5.1)
PROT SERPL-MCNC: 4.9 G/DL (ref 6–8.4)
RBC # BLD AUTO: 2.53 M/UL (ref 4–5.4)
SATURATED IRON: 10 % (ref 20–50)
SODIUM SERPL-SCNC: 138 MMOL/L (ref 136–145)
TOTAL IRON BINDING CAPACITY: 189 UG/DL (ref 250–450)
TRANSFERRIN SERPL-MCNC: 128 MG/DL (ref 200–375)
WBC # BLD AUTO: 7.3 K/UL (ref 3.9–12.7)

## 2022-06-27 PROCEDURE — 85025 COMPLETE CBC W/AUTO DIFF WBC: CPT | Performed by: FAMILY MEDICINE

## 2022-06-27 PROCEDURE — 84466 ASSAY OF TRANSFERRIN: CPT | Performed by: FAMILY MEDICINE

## 2022-06-27 PROCEDURE — 82728 ASSAY OF FERRITIN: CPT | Performed by: FAMILY MEDICINE

## 2022-06-27 PROCEDURE — 36415 COLL VENOUS BLD VENIPUNCTURE: CPT | Mod: PO | Performed by: FAMILY MEDICINE

## 2022-06-27 PROCEDURE — 80053 COMPREHEN METABOLIC PANEL: CPT | Performed by: FAMILY MEDICINE

## 2022-06-28 ENCOUNTER — TELEPHONE (OUTPATIENT)
Dept: ORTHOPEDICS | Facility: CLINIC | Age: 77
End: 2022-06-28
Payer: MEDICARE

## 2022-06-28 NOTE — TELEPHONE ENCOUNTER
Patient scheduled for 6/29/2022 at 10am. Pt notified. Pt VU.    ----- Message from Jennifer Caruso sent at 6/28/2022  7:29 AM CDT -----  Please call pt @ [phone] to reschedule appt, pt cancel due to flat tire

## 2022-06-29 ENCOUNTER — OFFICE VISIT (OUTPATIENT)
Dept: ORTHOPEDICS | Facility: CLINIC | Age: 77
End: 2022-06-29
Payer: MEDICARE

## 2022-06-29 VITALS — WEIGHT: 108 LBS | BODY MASS INDEX: 19.88 KG/M2 | HEIGHT: 62 IN

## 2022-06-29 DIAGNOSIS — L08.9 INFECTION OF FINGER: Primary | ICD-10-CM

## 2022-06-29 PROCEDURE — 1160F RVW MEDS BY RX/DR IN RCRD: CPT | Mod: CPTII,S$GLB,, | Performed by: ORTHOPAEDIC SURGERY

## 2022-06-29 PROCEDURE — 3288F PR FALLS RISK ASSESSMENT DOCUMENTED: ICD-10-PCS | Mod: CPTII,S$GLB,, | Performed by: ORTHOPAEDIC SURGERY

## 2022-06-29 PROCEDURE — 1160F PR REVIEW ALL MEDS BY PRESCRIBER/CLIN PHARMACIST DOCUMENTED: ICD-10-PCS | Mod: CPTII,S$GLB,, | Performed by: ORTHOPAEDIC SURGERY

## 2022-06-29 PROCEDURE — 1125F AMNT PAIN NOTED PAIN PRSNT: CPT | Mod: CPTII,S$GLB,, | Performed by: ORTHOPAEDIC SURGERY

## 2022-06-29 PROCEDURE — 1111F PR DISCHARGE MEDS RECONCILED W/ CURRENT OUTPATIENT MED LIST: ICD-10-PCS | Mod: CPTII,S$GLB,, | Performed by: ORTHOPAEDIC SURGERY

## 2022-06-29 PROCEDURE — 1101F PT FALLS ASSESS-DOCD LE1/YR: CPT | Mod: CPTII,S$GLB,, | Performed by: ORTHOPAEDIC SURGERY

## 2022-06-29 PROCEDURE — 1159F MED LIST DOCD IN RCRD: CPT | Mod: CPTII,S$GLB,, | Performed by: ORTHOPAEDIC SURGERY

## 2022-06-29 PROCEDURE — 3288F FALL RISK ASSESSMENT DOCD: CPT | Mod: CPTII,S$GLB,, | Performed by: ORTHOPAEDIC SURGERY

## 2022-06-29 PROCEDURE — 1101F PR PT FALLS ASSESS DOC 0-1 FALLS W/OUT INJ PAST YR: ICD-10-PCS | Mod: CPTII,S$GLB,, | Performed by: ORTHOPAEDIC SURGERY

## 2022-06-29 PROCEDURE — 99213 OFFICE O/P EST LOW 20 MIN: CPT | Mod: S$GLB,,, | Performed by: ORTHOPAEDIC SURGERY

## 2022-06-29 PROCEDURE — 1159F PR MEDICATION LIST DOCUMENTED IN MEDICAL RECORD: ICD-10-PCS | Mod: CPTII,S$GLB,, | Performed by: ORTHOPAEDIC SURGERY

## 2022-06-29 PROCEDURE — 1111F DSCHRG MED/CURRENT MED MERGE: CPT | Mod: CPTII,S$GLB,, | Performed by: ORTHOPAEDIC SURGERY

## 2022-06-29 PROCEDURE — 99999 PR PBB SHADOW E&M-EST. PATIENT-LVL III: ICD-10-PCS | Mod: PBBFAC,,, | Performed by: ORTHOPAEDIC SURGERY

## 2022-06-29 PROCEDURE — 99213 PR OFFICE/OUTPT VISIT, EST, LEVL III, 20-29 MIN: ICD-10-PCS | Mod: S$GLB,,, | Performed by: ORTHOPAEDIC SURGERY

## 2022-06-29 PROCEDURE — 99999 PR PBB SHADOW E&M-EST. PATIENT-LVL III: CPT | Mod: PBBFAC,,, | Performed by: ORTHOPAEDIC SURGERY

## 2022-06-29 PROCEDURE — 1125F PR PAIN SEVERITY QUANTIFIED, PAIN PRESENT: ICD-10-PCS | Mod: CPTII,S$GLB,, | Performed by: ORTHOPAEDIC SURGERY

## 2022-06-29 NOTE — PROGRESS NOTES
Subjective:     Patient ID: Keyona Dwyer is a 76 y.o. female.    Chief Complaint: Pain of the Right Hand      HPI:  The patient is a 76-year-old female with a right index presumed brown recluse spider bite with necrosis and infection 2 weeks ago.  She currently is doing much better.  She was in the hospital for 3 days for Keflex associated problem.  I gave her Bactrim on her last visit in currently she is doing quite well.    Past Medical History:   Diagnosis Date    Anemia     Anxiety     Cataract     Chronic back pain     Dr. Guzman    Fibrocystic breast     Glaucoma     Hyperlipemia     Hypertension     Idiopathic acute pancreatitis 6/18/2022    Osteoarthritis     Osteoporosis     Rheumatology/on Prolia    PVD (peripheral vascular disease) 4/12/2021    Restless leg syndrome     Tobacco dependence     Trouble in sleeping      Past Surgical History:   Procedure Laterality Date    ANGIOGRAPHY OF LOWER EXTREMITY N/A 4/12/2021    Procedure: ANGIOGRAM, LOWER EXTREMITY/left leg;  Surgeon: Maico Adkins MD;  Location: [facility] CATH LAB;  Service: Vascular;  Laterality: N/A;  req 1130 start time/ A243A    AORTOGRAPHY WITH SERIALOGRAPHY N/A 4/14/2021    Procedure: AORTOGRAM, WITH RUNOFF;  Surgeon: Maico Adkins MD;  Location: [facility] CATH LAB;  Service: Vascular;  Laterality: N/A;  left iliac stent with shockwave/req 1030 start    CATARACT EXTRACTION W/  INTRAOCULAR LENS IMPLANT Bilateral 8/ 9 2017    GALLBLADDER SURGERY      HYSTERECTOMY  1972    LUNG SURGERY Right age 15    lung collpase    TOE AMPUTATION Left 4/15/2021    Procedure: AMPUTATION, TOE;  Surgeon: Taylor Anderson DPM;  Location: [facility] OR;  Service: Podiatry;  Laterality: Left;  Hallux (Great Toe)    TOTAL HIP ARTHROPLASTY Bilateral     TOTAL KNEE ARTHROPLASTY Bilateral      Family History   Problem Relation Age of Onset    Cancer Mother     Breast cancer Mother     Hyperlipidemia Father     Heart failure Father     Heart  disease Brother     Brain cancer Brother     Stroke Brother     Alcohol abuse Brother      Social History     Socioeconomic History    Marital status:      Spouse name: elizabeth    Number of children: 3   Occupational History    Occupation: retired   Tobacco Use    Smoking status: Current Some Day Smoker     Packs/day: 0.50     Years: 63.00     Pack years: 31.50     Types: Cigarettes     Last attempt to quit: 2021     Years since quittin.0    Smokeless tobacco: Never Used    Tobacco comment: started age of  11    Substance and Sexual Activity    Alcohol use: No    Drug use: No    Sexual activity: Yes     Partners: Male     Medication List with Changes/Refills   Current Medications    AMLODIPINE (NORVASC) 5 MG TABLET    TAKE 1 TABLET BY MOUTH EVERY DAY FOR BLOOD PRESSURE    AZELASTINE (ASTELIN) 137 MCG (0.1 %) NASAL SPRAY    1 spray (137 mcg total) by Nasal route 2 (two) times daily.    B COMPLEX VITAMINS CAPSULE    Take 1 capsule by mouth once daily.    BUPROPION (WELLBUTRIN) 75 MG TABLET    Take 1 tablet (75 mg total) by mouth 2 (two) times daily.    BUSPIRONE (BUSPAR) 10 MG TABLET    Take 1 tablet (10 mg total) by mouth 2 (two) times daily.    CLOPIDOGREL (PLAVIX) 75 MG TABLET    TAKE 1 TABLET BY MOUTH ONCE A DAY    DULOXETINE (CYMBALTA) 30 MG CAPSULE    Take 30 mg by mouth once daily.    ERGOCALCIFEROL (ERGOCALCIFEROL) 50,000 UNIT CAP    TAKE 1 CAPSULE BY MOUTH ONE TIME PER WEEK    FLUAD QUAD -,65Y UP,,PF, 60 MCG (15 MCG X 4)/0.5 ML SYRG        FUROSEMIDE (LASIX) 20 MG TABLET    Take 1 tablet (20 mg total) by mouth daily as needed (swelling).    GABAPENTIN (NEURONTIN) 600 MG TABLET    Take 600 mg by mouth 4 (four) times daily.     HYDROCODONE-ACETAMINOPHEN (NORCO)  MG PER TABLET    Take 1 tablet by mouth 3 (three) times daily as needed.    HYDROCODONE-ACETAMINOPHEN 10-325MG (NORCO)  MG TAB    Take 1 tablet by mouth every 6 (six) hours as needed.    LISINOPRIL  (PRINIVIL,ZESTRIL) 40 MG TABLET    TAKE 1 TABLET BY MOUTH EVERY DAY FOR BLOOD PRESSURE    MUPIROCIN (BACTROBAN) 2 % OINTMENT    Apply to affected area 3 times daily    NUTRITIONAL SUPPLEMENT/FIBER (QUINOA-KALE-HEMP ORAL)    Take by mouth.    ON-Q PAIN PUMP    by Misc.(Non-Drug; Combo Route) route.    PROLIA 60 MG/ML SYRG        ROSUVASTATIN (CRESTOR) 5 MG TABLET    TAKE 1 TABLET BY MOUTH ONCE A DAY     Review of patient's allergies indicates:   Allergen Reactions    Indomethacin      Other reaction(s): Headache    K-flex Other (See Comments)     Pancreatitis     Latex, natural rubber Swelling    Penicillins      Other reaction(s): Vomiting    Patient tolerated Rocephin during the 4/2021 encounter and she states that she has tolerated Keflex in the past without issue.    Sulfa (sulfonamide antibiotics) Other (See Comments)     hallucinations    Tolmetin      Other reaction(s): Hives    Bactroban [mupirocin calcium] Rash     Burned skin      Review of Systems   Constitutional: Negative for malaise/fatigue.   HENT: Negative for hearing loss.    Eyes: Positive for visual disturbance. Negative for double vision.   Cardiovascular: Negative for chest pain.   Respiratory: Positive for shortness of breath.    Endocrine: Negative for cold intolerance.   Hematologic/Lymphatic: Does not bruise/bleed easily.   Skin: Negative for poor wound healing and suspicious lesions.   Musculoskeletal: Positive for arthritis, back pain, joint pain and joint swelling. Negative for gout.   Gastrointestinal: Positive for abdominal pain. Negative for nausea and vomiting.   Genitourinary: Negative for dysuria.   Neurological: Negative for numbness, paresthesias and sensory change.   Psychiatric/Behavioral: Positive for substance abuse. Negative for depression and memory loss. The patient is not nervous/anxious.    Allergic/Immunologic: Negative for persistent infections.       Objective:   Body mass index is 19.76 kg/m².  There were no  vitals filed for this visit.             General    Constitutional: She is oriented to person, place, and time. She appears well-developed and well-nourished. No distress.   HENT:   Head: Normocephalic.   Eyes: EOM are normal.   Pulmonary/Chest: Effort normal.   Neurological: She is oriented to person, place, and time.   Psychiatric: She has a normal mood and affect.             Right Hand/Wrist Exam     Inspection   Scars: Wrist - absent Hand -  present  Effusion: Wrist - absent Hand -  absent    Other     Neuorologic Exam    Median Distribution: normal  Ulnar Distribution: normal  Radial Distribution: normal    Comments:  The patient's ulcer right index finger is completely epithelialized.  She has full range of motion right hand.  There is no residual infection.  There are no motor or sensory deficits.          Vascular Exam       Capillary Refill  Right Hand: normal capillary refill            radiographs were not obtained today  Assessment:     Encounter Diagnosis   Name Primary?    Infection of finger Yes    Right index brown recluse spider bite    Plan:       The patient seems to be doing well.  She has finished her Bactrim.  She will return on a as needed basis.              Disclaimer: This note was prepared using a voice recognition system and is likely to have sound alike errors within the text.

## 2022-06-30 ENCOUNTER — TELEPHONE (OUTPATIENT)
Dept: INTERNAL MEDICINE | Facility: CLINIC | Age: 77
End: 2022-06-30
Payer: MEDICARE

## 2022-06-30 DIAGNOSIS — D50.9 IRON DEFICIENCY ANEMIA, UNSPECIFIED IRON DEFICIENCY ANEMIA TYPE: Primary | ICD-10-CM

## 2022-06-30 NOTE — TELEPHONE ENCOUNTER
Chemistry as far as calcium is better.  POtassium is back up//so no need for supplements.  Blood counts/iron levels are down significantly.  Pt should have GI follow up, but needs to see Hematology as well.  Order in

## 2022-07-01 ENCOUNTER — TELEPHONE (OUTPATIENT)
Dept: HEMATOLOGY/ONCOLOGY | Facility: CLINIC | Age: 77
End: 2022-07-01
Payer: MEDICARE

## 2022-07-01 NOTE — TELEPHONE ENCOUNTER
Spoke to patient in reference to Hematology referral from Dr. Isidro.  Appointment scheduled per patient's request next available.  Appointment notice mailed.

## 2022-07-18 ENCOUNTER — LAB VISIT (OUTPATIENT)
Dept: LAB | Facility: HOSPITAL | Age: 77
End: 2022-07-18
Attending: NURSE PRACTITIONER
Payer: MEDICARE

## 2022-07-18 ENCOUNTER — OFFICE VISIT (OUTPATIENT)
Dept: HEMATOLOGY/ONCOLOGY | Facility: CLINIC | Age: 77
End: 2022-07-18
Payer: MEDICARE

## 2022-07-18 VITALS
HEIGHT: 62 IN | WEIGHT: 100.75 LBS | OXYGEN SATURATION: 98 % | HEART RATE: 103 BPM | TEMPERATURE: 98 F | DIASTOLIC BLOOD PRESSURE: 60 MMHG | SYSTOLIC BLOOD PRESSURE: 125 MMHG | BODY MASS INDEX: 18.54 KG/M2

## 2022-07-18 DIAGNOSIS — D64.9 ANEMIA, UNSPECIFIED TYPE: ICD-10-CM

## 2022-07-18 DIAGNOSIS — D50.9 IRON DEFICIENCY ANEMIA, UNSPECIFIED IRON DEFICIENCY ANEMIA TYPE: Primary | ICD-10-CM

## 2022-07-18 DIAGNOSIS — D53.9 MACROCYTIC ANEMIA: ICD-10-CM

## 2022-07-18 DIAGNOSIS — E53.8 B12 DEFICIENCY: ICD-10-CM

## 2022-07-18 DIAGNOSIS — R05.9 COUGH: ICD-10-CM

## 2022-07-18 DIAGNOSIS — D50.9 IRON DEFICIENCY ANEMIA, UNSPECIFIED IRON DEFICIENCY ANEMIA TYPE: ICD-10-CM

## 2022-07-18 DIAGNOSIS — Z12.31 ENCOUNTER FOR SCREENING MAMMOGRAM FOR MALIGNANT NEOPLASM OF BREAST: ICD-10-CM

## 2022-07-18 DIAGNOSIS — F17.210 CIGARETTE SMOKER: ICD-10-CM

## 2022-07-18 LAB
BASOPHILS # BLD AUTO: 0.06 K/UL (ref 0–0.2)
BASOPHILS NFR BLD: 0.9 % (ref 0–1.9)
DIFFERENTIAL METHOD: ABNORMAL
EOSINOPHIL # BLD AUTO: 0.2 K/UL (ref 0–0.5)
EOSINOPHIL NFR BLD: 3.4 % (ref 0–8)
ERYTHROCYTE [DISTWIDTH] IN BLOOD BY AUTOMATED COUNT: 15.3 % (ref 11.5–14.5)
FOLATE SERPL-MCNC: 13.3 NG/ML (ref 4–24)
HCT VFR BLD AUTO: 29 % (ref 37–48.5)
HGB BLD-MCNC: 8.3 G/DL (ref 12–16)
IMM GRANULOCYTES # BLD AUTO: 0.02 K/UL (ref 0–0.04)
IMM GRANULOCYTES NFR BLD AUTO: 0.3 % (ref 0–0.5)
LDH SERPL L TO P-CCNC: 156 U/L (ref 110–260)
LYMPHOCYTES # BLD AUTO: 2.3 K/UL (ref 1–4.8)
LYMPHOCYTES NFR BLD: 35.1 % (ref 18–48)
MCH RBC QN AUTO: 30.7 PG (ref 27–31)
MCHC RBC AUTO-ENTMCNC: 28.6 G/DL (ref 32–36)
MCV RBC AUTO: 107 FL (ref 82–98)
MONOCYTES # BLD AUTO: 0.6 K/UL (ref 0.3–1)
MONOCYTES NFR BLD: 9.6 % (ref 4–15)
NEUTROPHILS # BLD AUTO: 3.3 K/UL (ref 1.8–7.7)
NEUTROPHILS NFR BLD: 50.7 % (ref 38–73)
NRBC BLD-RTO: 0 /100 WBC
PATH REV BLD -IMP: NORMAL
PLATELET # BLD AUTO: 359 K/UL (ref 150–450)
PMV BLD AUTO: 9.2 FL (ref 9.2–12.9)
RBC # BLD AUTO: 2.7 M/UL (ref 4–5.4)
RETICS/RBC NFR AUTO: 2.1 % (ref 0.5–2.5)
VIT B12 SERPL-MCNC: 912 PG/ML (ref 210–950)
WBC # BLD AUTO: 6.47 K/UL (ref 3.9–12.7)

## 2022-07-18 PROCEDURE — 85060 BLOOD SMEAR INTERPRETATION: CPT | Mod: ,,, | Performed by: PATHOLOGY

## 2022-07-18 PROCEDURE — 1159F MED LIST DOCD IN RCRD: CPT | Mod: CPTII,S$GLB,, | Performed by: NURSE PRACTITIONER

## 2022-07-18 PROCEDURE — 84165 PATHOLOGIST INTERPRETATION SPE: ICD-10-PCS | Mod: 26,,, | Performed by: PATHOLOGY

## 2022-07-18 PROCEDURE — 1126F PR PAIN SEVERITY QUANTIFIED, NO PAIN PRESENT: ICD-10-PCS | Mod: CPTII,S$GLB,, | Performed by: NURSE PRACTITIONER

## 2022-07-18 PROCEDURE — 99205 PR OFFICE/OUTPT VISIT, NEW, LEVL V, 60-74 MIN: ICD-10-PCS | Mod: S$GLB,,, | Performed by: NURSE PRACTITIONER

## 2022-07-18 PROCEDURE — 1159F PR MEDICATION LIST DOCUMENTED IN MEDICAL RECORD: ICD-10-PCS | Mod: CPTII,S$GLB,, | Performed by: NURSE PRACTITIONER

## 2022-07-18 PROCEDURE — 82607 VITAMIN B-12: CPT | Performed by: NURSE PRACTITIONER

## 2022-07-18 PROCEDURE — 83615 LACTATE (LD) (LDH) ENZYME: CPT | Performed by: NURSE PRACTITIONER

## 2022-07-18 PROCEDURE — 84165 PROTEIN E-PHORESIS SERUM: CPT | Performed by: NURSE PRACTITIONER

## 2022-07-18 PROCEDURE — 3074F SYST BP LT 130 MM HG: CPT | Mod: CPTII,S$GLB,, | Performed by: NURSE PRACTITIONER

## 2022-07-18 PROCEDURE — 1101F PR PT FALLS ASSESS DOC 0-1 FALLS W/OUT INJ PAST YR: ICD-10-PCS | Mod: CPTII,S$GLB,, | Performed by: NURSE PRACTITIONER

## 2022-07-18 PROCEDURE — 1126F AMNT PAIN NOTED NONE PRSNT: CPT | Mod: CPTII,S$GLB,, | Performed by: NURSE PRACTITIONER

## 2022-07-18 PROCEDURE — 84165 PROTEIN E-PHORESIS SERUM: CPT | Mod: 26,,, | Performed by: PATHOLOGY

## 2022-07-18 PROCEDURE — 82746 ASSAY OF FOLIC ACID SERUM: CPT | Performed by: NURSE PRACTITIONER

## 2022-07-18 PROCEDURE — 86334 PATHOLOGIST INTERPRETATION IFE: ICD-10-PCS | Mod: 26,,, | Performed by: PATHOLOGY

## 2022-07-18 PROCEDURE — 1111F PR DISCHARGE MEDS RECONCILED W/ CURRENT OUTPATIENT MED LIST: ICD-10-PCS | Mod: CPTII,S$GLB,, | Performed by: NURSE PRACTITIONER

## 2022-07-18 PROCEDURE — 3288F FALL RISK ASSESSMENT DOCD: CPT | Mod: CPTII,S$GLB,, | Performed by: NURSE PRACTITIONER

## 2022-07-18 PROCEDURE — 83520 IMMUNOASSAY QUANT NOS NONAB: CPT | Mod: 59 | Performed by: NURSE PRACTITIONER

## 2022-07-18 PROCEDURE — 3074F PR MOST RECENT SYSTOLIC BLOOD PRESSURE < 130 MM HG: ICD-10-PCS | Mod: CPTII,S$GLB,, | Performed by: NURSE PRACTITIONER

## 2022-07-18 PROCEDURE — 3078F PR MOST RECENT DIASTOLIC BLOOD PRESSURE < 80 MM HG: ICD-10-PCS | Mod: CPTII,S$GLB,, | Performed by: NURSE PRACTITIONER

## 2022-07-18 PROCEDURE — 83921 ORGANIC ACID SINGLE QUANT: CPT | Performed by: NURSE PRACTITIONER

## 2022-07-18 PROCEDURE — 1101F PT FALLS ASSESS-DOCD LE1/YR: CPT | Mod: CPTII,S$GLB,, | Performed by: NURSE PRACTITIONER

## 2022-07-18 PROCEDURE — 85045 AUTOMATED RETICULOCYTE COUNT: CPT | Performed by: NURSE PRACTITIONER

## 2022-07-18 PROCEDURE — 99999 PR PBB SHADOW E&M-EST. PATIENT-LVL V: CPT | Mod: PBBFAC,,, | Performed by: NURSE PRACTITIONER

## 2022-07-18 PROCEDURE — 36415 COLL VENOUS BLD VENIPUNCTURE: CPT | Mod: PN | Performed by: NURSE PRACTITIONER

## 2022-07-18 PROCEDURE — 99999 PR PBB SHADOW E&M-EST. PATIENT-LVL V: ICD-10-PCS | Mod: PBBFAC,,, | Performed by: NURSE PRACTITIONER

## 2022-07-18 PROCEDURE — 3078F DIAST BP <80 MM HG: CPT | Mod: CPTII,S$GLB,, | Performed by: NURSE PRACTITIONER

## 2022-07-18 PROCEDURE — 86334 IMMUNOFIX E-PHORESIS SERUM: CPT | Mod: 26,,, | Performed by: PATHOLOGY

## 2022-07-18 PROCEDURE — 1160F RVW MEDS BY RX/DR IN RCRD: CPT | Mod: CPTII,S$GLB,, | Performed by: NURSE PRACTITIONER

## 2022-07-18 PROCEDURE — 86334 IMMUNOFIX E-PHORESIS SERUM: CPT | Performed by: NURSE PRACTITIONER

## 2022-07-18 PROCEDURE — 1160F PR REVIEW ALL MEDS BY PRESCRIBER/CLIN PHARMACIST DOCUMENTED: ICD-10-PCS | Mod: CPTII,S$GLB,, | Performed by: NURSE PRACTITIONER

## 2022-07-18 PROCEDURE — 99205 OFFICE O/P NEW HI 60 MIN: CPT | Mod: S$GLB,,, | Performed by: NURSE PRACTITIONER

## 2022-07-18 PROCEDURE — 85060 PATHOLOGIST REVIEW: ICD-10-PCS | Mod: ,,, | Performed by: PATHOLOGY

## 2022-07-18 PROCEDURE — 3288F PR FALLS RISK ASSESSMENT DOCUMENTED: ICD-10-PCS | Mod: CPTII,S$GLB,, | Performed by: NURSE PRACTITIONER

## 2022-07-18 PROCEDURE — 85025 COMPLETE CBC W/AUTO DIFF WBC: CPT | Performed by: NURSE PRACTITIONER

## 2022-07-18 PROCEDURE — 1111F DSCHRG MED/CURRENT MED MERGE: CPT | Mod: CPTII,S$GLB,, | Performed by: NURSE PRACTITIONER

## 2022-07-18 RX ORDER — FERROUS SULFATE 325(65) MG
TABLET ORAL
Qty: 45 TABLET | Refills: 1 | Status: SHIPPED | OUTPATIENT
Start: 2022-07-18 | End: 2023-05-25 | Stop reason: SDUPTHER

## 2022-07-18 NOTE — PROGRESS NOTES
Subjective:      Patient ID: Keyona Dwyer is a 76 y.o. female.    Chief Complaint: chronic fatigue    HPI:  Patient is a 76 year old female who presents today for further evaluation and recommendations of iron deficiency anemia.  She has been referred to the hematology by her PCP, Dr. Ralf Isidro.  Had diagnosis of acute pancreatitis requiring hospitalization in 2022 with complaints of abdominal pain.  Other chronic diagnosis include anxiety, chronic back pain, hyperlipidemia, htn, osteoporosis, RVT, and restless leg syndrome.      Patient states that she got bit by a brown recluse and got sick due to antibiotics and has some weight loss.  States that she was on cephalexin 500 mg PO TID and caused stomach distress with weight loss and episodic nausea/vomiting.     Is a current smoker since she was 12 years old - 1 pack per day for 60 years. Now smokes 3-10 per day.  She denies alcohol use.     She denies f/c/ns or lymphadenopathy.       Social History     Socioeconomic History    Marital status:      Spouse name: elizabeth    Number of children: 3   Occupational History    Occupation: retired   Tobacco Use    Smoking status: Current Some Day Smoker     Packs/day: 0.50     Years: 63.00     Pack years: 31.50     Types: Cigarettes     Last attempt to quit: 2021     Years since quittin.0    Smokeless tobacco: Never Used    Tobacco comment: started age of  11    Substance and Sexual Activity    Alcohol use: No    Drug use: No    Sexual activity: Yes     Partners: Male       Family History   Problem Relation Age of Onset    Cancer Mother     Breast cancer Mother     Hyperlipidemia Father     Heart failure Father     Heart disease Brother     Brain cancer Brother     Stroke Brother     Alcohol abuse Brother        Past Surgical History:   Procedure Laterality Date    ANGIOGRAPHY OF LOWER EXTREMITY N/A 2021    Procedure: ANGIOGRAM, LOWER EXTREMITY/left leg;  Surgeon: Maico  MD Lucille;  Location: [facility] CATH LAB;  Service: Vascular;  Laterality: N/A;  req 1130 start time/Rm A243A    AORTOGRAPHY WITH SERIALOGRAPHY N/A 4/14/2021    Procedure: AORTOGRAM, WITH RUNOFF;  Surgeon: Maico Adkins MD;  Location: [facility] CATH LAB;  Service: Vascular;  Laterality: N/A;  left iliac stent with shockwave/req 1030 start    CATARACT EXTRACTION W/  INTRAOCULAR LENS IMPLANT Bilateral 8/ 9 2017    GALLBLADDER SURGERY      HYSTERECTOMY  1972    LUNG SURGERY Right age 15    lung collpase    TOE AMPUTATION Left 4/15/2021    Procedure: AMPUTATION, TOE;  Surgeon: Taylor Anderson DPM;  Location: [facility] OR;  Service: Podiatry;  Laterality: Left;  Hallux (Great Toe)    TOTAL HIP ARTHROPLASTY Bilateral     TOTAL KNEE ARTHROPLASTY Bilateral        Past Medical History:   Diagnosis Date    Anemia     Anxiety     Cataract     Chronic back pain     Dr. Guzman    Fibrocystic breast     Glaucoma     Hyperlipemia     Hypertension     Idiopathic acute pancreatitis 6/18/2022    Osteoarthritis     Osteoporosis     Rheumatology/on Prolia    PVD (peripheral vascular disease) 4/12/2021    Restless leg syndrome     Tobacco dependence     Trouble in sleeping        Review of Systems   Constitutional: Positive for fatigue (chronic fatigue that she tolerates).   HENT: Negative.    Eyes: Negative.    Respiratory: Positive for shortness of breath (chronic shortness of breath with exertions). Negative for chest tightness.    Cardiovascular: Negative for chest pain and palpitations.   Gastrointestinal: Negative.    Endocrine: Positive for cold intolerance.   Genitourinary: Negative.    Musculoskeletal: Positive for arthralgias, back pain and gait problem.        Has pain pump for chronic pain   Skin: Negative.    Allergic/Immunologic: Negative.    Hematological: Bruises/bleeds easily.   Psychiatric/Behavioral: Negative.           Medication List with Changes/Refills   New Medications    FERROUS SULFATE (FEOSOL)  325 MG (65 MG IRON) TAB TABLET    Take 1 tab (325 mg) every other day.   Current Medications    AMLODIPINE (NORVASC) 5 MG TABLET    TAKE 1 TABLET BY MOUTH EVERY DAY FOR BLOOD PRESSURE    AZELASTINE (ASTELIN) 137 MCG (0.1 %) NASAL SPRAY    1 spray (137 mcg total) by Nasal route 2 (two) times daily.    B COMPLEX VITAMINS CAPSULE    Take 1 capsule by mouth once daily.    BUPROPION (WELLBUTRIN) 75 MG TABLET    Take 1 tablet (75 mg total) by mouth 2 (two) times daily.    BUSPIRONE (BUSPAR) 10 MG TABLET    TAKE 1 TABLET BY MOUTH TWICE DAILY    CLOPIDOGREL (PLAVIX) 75 MG TABLET    TAKE 1 TABLET BY MOUTH ONCE A DAY    DULOXETINE (CYMBALTA) 30 MG CAPSULE    Take 30 mg by mouth once daily.    ERGOCALCIFEROL (ERGOCALCIFEROL) 50,000 UNIT CAP    TAKE 1 CAPSULE BY MOUTH ONE TIME PER WEEK    FLUAD QUAD 2021-22,65Y UP,,PF, 60 MCG (15 MCG X 4)/0.5 ML SYRG        FUROSEMIDE (LASIX) 20 MG TABLET    Take 1 tablet (20 mg total) by mouth daily as needed (swelling).    GABAPENTIN (NEURONTIN) 600 MG TABLET    Take 600 mg by mouth 4 (four) times daily.     HYDROCODONE-ACETAMINOPHEN (NORCO)  MG PER TABLET    Take 1 tablet by mouth 3 (three) times daily as needed.    HYDROCODONE-ACETAMINOPHEN 10-325MG (NORCO)  MG TAB    Take 1 tablet by mouth every 6 (six) hours as needed.    LISINOPRIL (PRINIVIL,ZESTRIL) 40 MG TABLET    TAKE 1 TABLET BY MOUTH EVERY DAY for blood pressure    MUPIROCIN (BACTROBAN) 2 % OINTMENT    Apply to affected area 3 times daily    NUTRITIONAL SUPPLEMENT/FIBER (QUINOA-KALE-HEMP ORAL)    Take by mouth.    ON-Q PAIN PUMP    by Misc.(Non-Drug; Combo Route) route.    PROLIA 60 MG/ML SYRG        ROSUVASTATIN (CRESTOR) 5 MG TABLET    TAKE 1 TABLET BY MOUTH ONCE A DAY        Objective:     Vitals:    07/18/22 0755   BP: 125/60   Pulse: 103   Temp: 98.4 °F (36.9 °C)       Physical Exam  Vitals and nursing note reviewed.   Constitutional:       Appearance: Normal appearance.   HENT:      Head: Normocephalic and  atraumatic.      Right Ear: External ear normal.      Left Ear: External ear normal.   Cardiovascular:      Rate and Rhythm: Normal rate and regular rhythm.      Heart sounds: Normal heart sounds, S1 normal and S2 normal.   Pulmonary:      Effort: Pulmonary effort is normal.      Breath sounds: Normal breath sounds.   Abdominal:      General: There is no distension.   Musculoskeletal:         General: Normal range of motion.      Cervical back: Normal range of motion.   Skin:     General: Skin is warm and dry.      Findings: Bruising present.   Neurological:      General: No focal deficit present.      Mental Status: She is alert and oriented to person, place, and time.   Psychiatric:         Attention and Perception: Attention and perception normal.         Mood and Affect: Mood and affect normal.         Speech: Speech normal.         Behavior: Behavior normal. Behavior is cooperative.         Thought Content: Thought content normal.         Cognition and Memory: Cognition and memory normal.         Judgment: Judgment normal.         Assessment:     Problem List Items Addressed This Visit        Oncology    Anemia - Primary    Relevant Medications    ferrous sulfate (FEOSOL) 325 mg (65 mg iron) Tab tablet    Other Relevant Orders    CBC Auto Differential (Completed)    Comprehensive Metabolic Panel    Case Request Endoscopy: ESOPHAGOGASTRODUODENOSCOPY (EGD), COLONOSCOPY (Completed)    CBC Auto Differential    Comprehensive Metabolic Panel    Ferritin    Iron and TIBC    CBC Auto Differential (Completed)    Comprehensive Metabolic Panel    Reticulocytes (Completed)    Vitamin B12 (Completed)    Folate (Completed)    Immunoglobulin Free LT Chains Blood (Completed)    Protein Electrophoresis, Serum (Completed)    Immunofixation Electrophoresis (Completed)    Methylmalonic Acid, Serum (Completed)    Pathologist Interpretation Differential (Completed)    C-Reactive Protein    Lactate Dehydrogenase (Completed)    CBC  Auto Differential    Comprehensive Metabolic Panel    Ferritin    Iron and TIBC      Other Visit Diagnoses     Macrocytic anemia        Relevant Orders    CBC Auto Differential (Completed)    Comprehensive Metabolic Panel    Vitamin B12 (Completed)    Folate (Completed)    Encounter for screening mammogram for malignant neoplasm of breast        Relevant Orders    Mammo Digital Screening Bilat    Cigarette smoker        Cough        B12 deficiency            Lab Results   Component Value Date    WBC 6.47 07/18/2022    RBC 2.70 (L) 07/18/2022    HGB 8.3 (L) 07/18/2022    HCT 29.0 (L) 07/18/2022     (H) 07/18/2022    MCH 30.7 07/18/2022    MCHC 28.6 (L) 07/18/2022    RDW 15.3 (H) 07/18/2022     07/18/2022    MPV 9.2 07/18/2022    GRAN 3.3 07/18/2022    GRAN 50.7 07/18/2022    LYMPH 2.3 07/18/2022    LYMPH 35.1 07/18/2022    MONO 0.6 07/18/2022    MONO 9.6 07/18/2022    EOS 0.2 07/18/2022    BASO 0.06 07/18/2022    EOSINOPHIL 3.4 07/18/2022    BASOPHIL 0.9 07/18/2022     BMP  Lab Results   Component Value Date     06/27/2022    K 5.2 (H) 06/27/2022     06/27/2022    CO2 25 06/27/2022    BUN 8 06/27/2022    CREATININE 0.7 06/27/2022    CALCIUM 9.4 06/27/2022    ANIONGAP 13 06/27/2022    ESTGFRAFRICA >60.0 06/27/2022    EGFRNONAA >60.0 06/27/2022     Lab Results   Component Value Date    ALT 15 06/27/2022    AST 23 06/27/2022    ALKPHOS 89 06/27/2022    BILITOT 0.4 06/27/2022     Lab Results   Component Value Date    IRON 19 (L) 06/27/2022    TIBC 189 (L) 06/27/2022    FERRITIN 129 06/27/2022   Saturated iron 10%  Lab Results   Component Value Date    STCAGCGJ31 912 07/18/2022     Lab Results   Component Value Date    FOLATE 13.3 07/18/2022     Lab Results   Component Value Date    TSH 1.055 08/03/2021         Reticulocyte 2.1    MMA 0.48        Plan:   Iron deficiency anemia, unspecified iron deficiency anemia type  -     Ambulatory referral/consult to Hematology / Oncology  -     CBC  Auto Differential; Future; Expected date: 07/18/2022  -     Comprehensive Metabolic Panel; Future; Expected date: 07/18/2022  -     ferrous sulfate (FEOSOL) 325 mg (65 mg iron) Tab tablet; Take 1 tab (325 mg) every other day.  Dispense: 45 tablet; Refill: 1  -     Case Request Endoscopy: ESOPHAGOGASTRODUODENOSCOPY (EGD), COLONOSCOPY  -     CBC Auto Differential; Future; Expected date: 07/18/2022  -     Comprehensive Metabolic Panel; Future; Expected date: 07/18/2022  -     Ferritin; Future; Expected date: 07/18/2022  -     Iron and TIBC; Future; Expected date: 07/18/2022    Macrocytic anemia  -     CBC Auto Differential; Future; Expected date: 07/18/2022  -     Comprehensive Metabolic Panel; Future; Expected date: 07/18/2022  -     Vitamin B12; Future; Expected date: 07/18/2022  -     Folate; Future; Expected date: 07/18/2022    Anemia, unspecified type  -     CBC Auto Differential; Future; Expected date: 07/18/2022  -     Comprehensive Metabolic Panel; Future; Expected date: 07/18/2022  -     Reticulocytes; Future; Expected date: 07/18/2022  -     Vitamin B12; Future; Expected date: 07/18/2022  -     Folate; Future; Expected date: 07/18/2022  -     Immunoglobulin Free LT Chains Blood; Future; Expected date: 07/18/2022  -     Protein Electrophoresis, Serum; Future; Expected date: 07/18/2022  -     Immunofixation Electrophoresis; Future; Expected date: 07/18/2022  -     Methylmalonic Acid, Serum; Future; Expected date: 07/18/2022  -     Pathologist Interpretation Differential; Future; Expected date: 07/18/2022  -     C-Reactive Protein; Future; Expected date: 07/18/2022  -     Lactate Dehydrogenase; Future; Expected date: 07/18/2022  -     CBC Auto Differential; Future; Expected date: 07/18/2022  -     Comprehensive Metabolic Panel; Future; Expected date: 07/18/2022  -     Ferritin; Future; Expected date: 07/18/2022  -     Iron and TIBC; Future; Expected date: 07/18/2022    Encounter for screening mammogram for  malignant neoplasm of breast  -     Mammo Digital Screening Bilat; Future; Expected date: 07/18/2022    Cigarette smoker    Cough    B12 deficiency    Start ferrous sulfate 325 mg PO every other day with stool softeners as needed.  Will evaluate LOR with upper and lower endoscopies.  Recommend repeat mammogram.  Work up macrocytosis with labs cmp, folate, B12, MMA.  Work up from hemolysis.  She will f/u in 3 months with labs a couple days prior.  Tried to order LDCT chest; however patient's insurance will not cover.  MMA increased will need to start B12 4754-6463 mcg SL daily.      Collaborating Provider:  Dr. Kris Alcocer    Thank You,  Tiarra Mg, LUIS DANIELP-C  Hematology Oncology

## 2022-07-19 LAB
ALBUMIN SERPL ELPH-MCNC: 3.1 G/DL (ref 3.35–5.55)
ALPHA1 GLOB SERPL ELPH-MCNC: 0.44 G/DL (ref 0.17–0.41)
ALPHA2 GLOB SERPL ELPH-MCNC: 0.89 G/DL (ref 0.43–0.99)
B-GLOBULIN SERPL ELPH-MCNC: 0.71 G/DL (ref 0.5–1.1)
GAMMA GLOB SERPL ELPH-MCNC: 0.86 G/DL (ref 0.67–1.58)
INTERPRETATION SERPL IFE-IMP: NORMAL
KAPPA LC SER QL IA: 6.15 MG/DL (ref 0.33–1.94)
KAPPA LC/LAMBDA SER IA: 1.68 (ref 0.26–1.65)
LAMBDA LC SER QL IA: 3.66 MG/DL (ref 0.57–2.63)
PATH REV BLD -IMP: NORMAL
PROT SERPL-MCNC: 6 G/DL (ref 6–8.4)

## 2022-07-20 LAB
PATHOLOGIST INTERPRETATION IFE: NORMAL
PATHOLOGIST INTERPRETATION SPE: NORMAL

## 2022-07-22 ENCOUNTER — TELEPHONE (OUTPATIENT)
Dept: HEMATOLOGY/ONCOLOGY | Facility: CLINIC | Age: 77
End: 2022-07-22
Payer: MEDICARE

## 2022-07-22 LAB — METHYLMALONATE SERPL-SCNC: 0.48 UMOL/L

## 2022-07-22 NOTE — PROGRESS NOTES
Please let patient know that her methylmalonic acid is increased which indicates B12 deficiency.  Please make sure she is taking B12 6140-4064 mcg SL daily.

## 2022-07-22 NOTE — TELEPHONE ENCOUNTER
----- Message from Drea Mg NP sent at 7/22/2022  5:50 AM CDT -----  Please let patient know that her methylmalonic acid is increased which indicates B12 deficiency.  Please make sure she is taking B12 0375-2766 mcg SL daily.          Pt notified of the above information, pt verbalized her understanding.

## 2022-07-24 NOTE — ASSESSMENT & PLAN NOTE
Place in observation   Denies ETOH use, history of gall bladder removal   1 L IVF's bolus given in the ED   Continue IVF's   Pain control   Nausea and vomiting control   Daily Lipase   NPO x meds for now  6/19/22 The patient denies any significant improvement in symptoms. She reports pain is not currently well controlled. Will adjust PRN analgesia. Lipase remains > 1000. Continue NPO status and increase IV hydration. The patient denies any ETOH use. Lipid panel is pending. Once lipase begins to decrease and symptoms improve will slowly advance diet.    18

## 2022-08-15 ENCOUNTER — HOSPITAL ENCOUNTER (OUTPATIENT)
Dept: RADIOLOGY | Facility: HOSPITAL | Age: 77
Discharge: HOME | End: 2022-08-15
Attending: NURSE PRACTITIONER
Payer: MEDICARE

## 2022-08-15 DIAGNOSIS — Z12.31 ENCOUNTER FOR SCREENING MAMMOGRAM FOR MALIGNANT NEOPLASM OF BREAST: ICD-10-CM

## 2022-08-15 PROCEDURE — 77067 SCR MAMMO BI INCL CAD: CPT | Mod: 26,,, | Performed by: RADIOLOGY

## 2022-08-15 PROCEDURE — 77063 MAMMO DIGITAL SCREENING BILAT WITH TOMO: ICD-10-PCS | Mod: 26,,, | Performed by: RADIOLOGY

## 2022-08-15 PROCEDURE — 77063 BREAST TOMOSYNTHESIS BI: CPT | Mod: TC

## 2022-08-15 PROCEDURE — 77063 BREAST TOMOSYNTHESIS BI: CPT | Mod: 26,,, | Performed by: RADIOLOGY

## 2022-08-15 PROCEDURE — 77067 MAMMO DIGITAL SCREENING BILAT WITH TOMO: ICD-10-PCS | Mod: 26,,, | Performed by: RADIOLOGY

## 2022-08-17 ENCOUNTER — TELEPHONE (OUTPATIENT)
Dept: HEMATOLOGY/ONCOLOGY | Facility: CLINIC | Age: 77
End: 2022-08-17
Payer: MEDICARE

## 2022-08-17 NOTE — TELEPHONE ENCOUNTER
----- Message from Drea Mg NP sent at 8/17/2022  7:05 AM CDT -----  Please let patient know that her bilateral mammogram was negative for malignancy.  Recommendations are to repeat again in one year.    Thank you,  Drea'        Pt notified and verbalized her understanding

## 2022-08-17 NOTE — PROGRESS NOTES
Please let patient know that her bilateral mammogram was negative for malignancy.  Recommendations are to repeat again in one year.    Thank you,  Tiarra

## 2022-08-18 ENCOUNTER — TELEPHONE (OUTPATIENT)
Dept: RHEUMATOLOGY | Facility: CLINIC | Age: 77
End: 2022-08-18
Payer: MEDICARE

## 2022-08-18 NOTE — TELEPHONE ENCOUNTER
----- Message from Chrissy Alexandre sent at 8/18/2022  8:19 AM CDT -----  Regarding: reschedule appts  Contact: pt  Pt needs to reschedule dr appt and injection. [phone]

## 2022-08-24 ENCOUNTER — INFUSION (OUTPATIENT)
Dept: INFUSION THERAPY | Facility: HOSPITAL | Age: 77
End: 2022-08-24
Attending: PHYSICIAN ASSISTANT
Payer: MEDICARE

## 2022-08-24 ENCOUNTER — OFFICE VISIT (OUTPATIENT)
Dept: RHEUMATOLOGY | Facility: CLINIC | Age: 77
End: 2022-08-24
Payer: MEDICARE

## 2022-08-24 VITALS
HEART RATE: 76 BPM | SYSTOLIC BLOOD PRESSURE: 103 MMHG | DIASTOLIC BLOOD PRESSURE: 54 MMHG | OXYGEN SATURATION: 97 % | RESPIRATION RATE: 18 BRPM | TEMPERATURE: 97 F

## 2022-08-24 VITALS
BODY MASS INDEX: 18.06 KG/M2 | WEIGHT: 98.13 LBS | HEIGHT: 62 IN | HEART RATE: 93 BPM | RESPIRATION RATE: 18 BRPM | SYSTOLIC BLOOD PRESSURE: 121 MMHG | DIASTOLIC BLOOD PRESSURE: 69 MMHG

## 2022-08-24 DIAGNOSIS — E55.9 VITAMIN D DEFICIENCY: ICD-10-CM

## 2022-08-24 DIAGNOSIS — M81.8 OTHER OSTEOPOROSIS WITHOUT CURRENT PATHOLOGICAL FRACTURE: Primary | ICD-10-CM

## 2022-08-24 DIAGNOSIS — M81.0 AGE-RELATED OSTEOPOROSIS WITHOUT CURRENT PATHOLOGICAL FRACTURE: Primary | ICD-10-CM

## 2022-08-24 DIAGNOSIS — M15.9 PRIMARY OSTEOARTHRITIS INVOLVING MULTIPLE JOINTS: ICD-10-CM

## 2022-08-24 DIAGNOSIS — E87.5 HYPERKALEMIA: ICD-10-CM

## 2022-08-24 DIAGNOSIS — Z51.81 MEDICATION MONITORING ENCOUNTER: ICD-10-CM

## 2022-08-24 PROCEDURE — 1160F RVW MEDS BY RX/DR IN RCRD: CPT | Mod: CPTII,S$GLB,,

## 2022-08-24 PROCEDURE — 1125F AMNT PAIN NOTED PAIN PRSNT: CPT | Mod: CPTII,S$GLB,,

## 2022-08-24 PROCEDURE — 3288F FALL RISK ASSESSMENT DOCD: CPT | Mod: CPTII,S$GLB,,

## 2022-08-24 PROCEDURE — 96372 THER/PROPH/DIAG INJ SC/IM: CPT

## 2022-08-24 PROCEDURE — 99999 PR PBB SHADOW E&M-EST. PATIENT-LVL V: ICD-10-PCS | Mod: PBBFAC,,,

## 2022-08-24 PROCEDURE — 3288F PR FALLS RISK ASSESSMENT DOCUMENTED: ICD-10-PCS | Mod: CPTII,S$GLB,,

## 2022-08-24 PROCEDURE — 99999 PR PBB SHADOW E&M-EST. PATIENT-LVL V: CPT | Mod: PBBFAC,,,

## 2022-08-24 PROCEDURE — 3074F PR MOST RECENT SYSTOLIC BLOOD PRESSURE < 130 MM HG: ICD-10-PCS | Mod: CPTII,S$GLB,,

## 2022-08-24 PROCEDURE — 63600175 PHARM REV CODE 636 W HCPCS: Mod: JG

## 2022-08-24 PROCEDURE — 1125F PR PAIN SEVERITY QUANTIFIED, PAIN PRESENT: ICD-10-PCS | Mod: CPTII,S$GLB,,

## 2022-08-24 PROCEDURE — 99214 PR OFFICE/OUTPT VISIT, EST, LEVL IV, 30-39 MIN: ICD-10-PCS | Mod: S$GLB,,,

## 2022-08-24 PROCEDURE — 3078F PR MOST RECENT DIASTOLIC BLOOD PRESSURE < 80 MM HG: ICD-10-PCS | Mod: CPTII,S$GLB,,

## 2022-08-24 PROCEDURE — 1101F PT FALLS ASSESS-DOCD LE1/YR: CPT | Mod: CPTII,S$GLB,,

## 2022-08-24 PROCEDURE — 1101F PR PT FALLS ASSESS DOC 0-1 FALLS W/OUT INJ PAST YR: ICD-10-PCS | Mod: CPTII,S$GLB,,

## 2022-08-24 PROCEDURE — 1159F PR MEDICATION LIST DOCUMENTED IN MEDICAL RECORD: ICD-10-PCS | Mod: CPTII,S$GLB,,

## 2022-08-24 PROCEDURE — 3078F DIAST BP <80 MM HG: CPT | Mod: CPTII,S$GLB,,

## 2022-08-24 PROCEDURE — 1160F PR REVIEW ALL MEDS BY PRESCRIBER/CLIN PHARMACIST DOCUMENTED: ICD-10-PCS | Mod: CPTII,S$GLB,,

## 2022-08-24 PROCEDURE — 99214 OFFICE O/P EST MOD 30 MIN: CPT | Mod: S$GLB,,,

## 2022-08-24 PROCEDURE — 1159F MED LIST DOCD IN RCRD: CPT | Mod: CPTII,S$GLB,,

## 2022-08-24 PROCEDURE — 3074F SYST BP LT 130 MM HG: CPT | Mod: CPTII,S$GLB,,

## 2022-08-24 RX ADMIN — DENOSUMAB 60 MG: 60 INJECTION SUBCUTANEOUS at 08:08

## 2022-08-24 NOTE — DISCHARGE INSTRUCTIONS
.[facility]  [address]  [address]  [phone] phone     [phone] fax  Hours of Operation: Monday- Friday 8:00am- 5:00pm  After hours phone  [phone]  Hematology / Oncology Physicians on call    Dr. Leodan Forde      Nurse Practitioners:    Elenita Andersen, JACQUI Mg, JACQUI Chu, JACQUI You, JACQUI Garcia, JACQUI Esteban, PA      Please don't hesitate to call if you have any concerns.     .FALL PREVENTION   Falls often occur due to slipping, tripping or losing your balance. Here are ways to reduce your risk of falling again.   Was there anything that caused your fall that can be fixed, removed or replaced?   Make your home safe by keeping walkways clear of objects you may trip over.   Use non-slip pads under rugs.   Do not walk in poorly lit areas.   Do not stand on chairs or wobbly ladders.   Use caution when reaching overhead or looking upward. This position can cause a loss of balance.   Be sure your shoes fit properly, have non-slip bottoms and are in good condition.   Be cautious when going up and down stairs, curbs, and when walking on uneven sidewalks.   If your balance is poor, consider using a cane or walker.   If your fall was related to alcohol use, stop or limit alcohol intake.   If your fall was related to use of sleeping medicines, talk to your doctor about this. You may need to reduce your dosage at bedtime if you awaken during the night to go to the bathroom.   To reduce the need for nighttime bathroom trips:   Avoid drinking fluids for several hours before going to bed   Empty your bladder before going to bed   Men can keep a urinal at the bedside   © 1064-1562 [employer], [address] [place] [address], [place] [address]. All rights reserved. This information is not intended as a substitute for professional medical care. Always follow your healthcare  professional's instructions.

## 2022-08-24 NOTE — PATIENT INSTRUCTIONS
Apply topical Voltaren/diclofenac to affected joint 3-4 times daily.        How can I prevent hyperkalemia (high potassium)?  If youve had hyperkalemia or are at risk for it, a low-potassium diet is the best way to protect your health. You may need to cut back on, or completely cut out, certain high-potassium foods, such as:    Asparagus.  Avocados.  Bananas.  Citrus fruits and juices, such as oranges and grapefruit.  Cooked spinach.  Melons like honeydew and cantaloupe.  Nectarines.  Potatoes.  Prunes, raisins and other dried fruits.  Pumpkin and winter squash.  Salt substitutes that contain potassium.  Tomatoes and tomato-based products like sauces and ketchup.    When should I call the doctor?  You should call your healthcare provider if you experience:    Difficulty breathing.  Extreme muscle weakness or fatigue.  Severe abdominal pain, vomiting or diarrhea.  Weak pulse, chest pain or signs of a heart attack.

## 2022-08-24 NOTE — PROGRESS NOTES
RHEUMATOLOGY OUTPATIENT CLINIC NOTE    08/24/2022    Subjective:       Patient ID: Keyona Dwyer is a 76 y.o. female.    Chief Complaint: Osteoporosis      HPI   Keyona Dwyer is a 76 y.o. pleasant female here for rheumatology follow up for osteoporosis and OA of multiple joints.     Currently on Prolia every 6 months.  Tolerating well.  Vitamin-D 50 K weekly.  Uses rolling walker to ambulate.    History of bilateral total hip replacements.  OA in her hands, especially 1st CMC joints.  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  On pain medication per pain mgmt bone and joint.       Rheumatologic review of systems negative otherwise.     Physical exam: chronic dec ROM L elbow. OA changes multiple proximal and distal IP joints.  Able to rise from a chair independently.  Walks without assistance.  Steady gait.  No kyphosis. Dentures top and bottom    Past Medical History:   Diagnosis Date    Anemia     Anxiety     Cataract     Chronic back pain     Dr. Guzamn    Fibrocystic breast     Glaucoma     Hyperlipemia     Hypertension     Idiopathic acute pancreatitis 6/18/2022    Osteoarthritis     Osteoporosis     Rheumatology/on Prolia    PVD (peripheral vascular disease) 4/12/2021    Restless leg syndrome     Tobacco dependence     Trouble in sleeping      Past Surgical History:   Procedure Laterality Date    ANGIOGRAPHY OF LOWER EXTREMITY N/A 4/12/2021    Procedure: ANGIOGRAM, LOWER EXTREMITY/left leg;  Surgeon: Maico Adkins MD;  Location: [facility] CATH LAB;  Service: Vascular;  Laterality: N/A;  req 1130 start time/ A243A    AORTOGRAPHY WITH SERIALOGRAPHY N/A 4/14/2021    Procedure: AORTOGRAM, WITH RUNOFF;  Surgeon: Maico Adkins MD;  Location: [facility] CATH LAB;  Service: Vascular;  Laterality: N/A;  left iliac stent with shockwave/req 1030 start    CATARACT EXTRACTION W/  INTRAOCULAR LENS IMPLANT Bilateral 8/ 9 2017    GALLBLADDER SURGERY      HYSTERECTOMY  1972    LUNG SURGERY  "Right age 15    lung collpase    TOE AMPUTATION Left 4/15/2021    Procedure: AMPUTATION, TOE;  Surgeon: Taylor Anderson DPM;  Location: [facility];  Service: Podiatry;  Laterality: Left;  Hallux (Great Toe)    TOTAL HIP ARTHROPLASTY Bilateral     TOTAL KNEE ARTHROPLASTY Bilateral      Family History   Problem Relation Age of Onset    Cancer Mother     Breast cancer Mother     Hyperlipidemia Father     Heart failure Father     Heart disease Brother     Brain cancer Brother     Stroke Brother     Alcohol abuse Brother      Social History     Socioeconomic History    Marital status:      Spouse name: elizabeth    Number of children: 3   Occupational History    Occupation: retired   Tobacco Use    Smoking status: Current Some Day Smoker     Packs/day: 0.50     Years: 63.00     Pack years: 31.50     Types: Cigarettes     Last attempt to quit: 2021     Years since quittin.1    Smokeless tobacco: Never Used    Tobacco comment: started age of  11    Substance and Sexual Activity    Alcohol use: No    Drug use: No    Sexual activity: Yes     Partners: Male     Review of patient's allergies indicates:   Allergen Reactions    Indomethacin      Other reaction(s): Headache    K-flex Other (See Comments)     Pancreatitis     Latex, natural rubber Swelling    Penicillins      Other reaction(s): Vomiting    Patient tolerated Rocephin during the 2021 encounter and she states that she has tolerated Keflex in the past without issue.    Sulfa (sulfonamide antibiotics) Other (See Comments)     hallucinations    Tolmetin      Other reaction(s): Hives    Bactroban [mupirocin calcium] Rash     Burned skin            Objective:   /69   Pulse 93   Resp 18   Ht 5' 2" (1.575 m)   Wt 44.5 kg (98 lb 1.7 oz)   BMI 17.94 kg/m²   Immunization History   Administered Date(s) Administered    COVID-19, MRNA, LN-S, PF (MODERNA FULL 0.5 ML DOSE) 2021, 2021, 10/29/2021    Influenza " (FLUAD) - Quadrivalent - Adjuvanted - PF *Preferred* (65+) 09/22/2020    Influenza (FLUAD) - Trivalent - Adjuvanted - PF (65+) 11/01/2017    Influenza - High Dose - PF (65 years and older) 12/28/2009, 10/05/2011, 10/11/2013, 10/28/2014, 10/08/2015, 10/26/2016, 10/24/2018, 09/16/2019, 10/07/2021    Influenza - Trivalent (ADULT) 11/19/2007, 11/21/2008    Influenza A (H1N1) 2009 Monovalent - IM 12/28/2009    Influenza Split 11/19/2007, 11/21/2008    Pneumococcal Conjugate - 13 Valent 11/11/2016    Pneumococcal Polysaccharide - 23 Valent 07/21/2015    Tdap 02/13/2015              Recent Results (from the past 672 hour(s))   Comprehensive Metabolic Panel    Collection Time: 08/15/22  9:42 AM   Result Value Ref Range    Sodium 133 (L) 136 - 145 mmol/L    Potassium 5.9 (H) 3.5 - 5.1 mmol/L    Chloride 100 95 - 110 mmol/L    CO2 24 23 - 29 mmol/L    Glucose 107 70 - 110 mg/dL    BUN 20 8 - 23 mg/dL    Creatinine 0.9 0.5 - 1.4 mg/dL    Calcium 10.6 (H) 8.7 - 10.5 mg/dL    Total Protein 6.9 6.0 - 8.4 g/dL    Albumin 3.6 3.5 - 5.2 g/dL    Total Bilirubin 0.2 0.1 - 1.0 mg/dL    Alkaline Phosphatase 42 (L) 55 - 135 U/L    AST 18 10 - 40 U/L    ALT 9 (L) 10 - 44 U/L    Anion Gap 9 8 - 16 mmol/L    eGFR >60 >60 mL/min/1.73 m^2   Calcitriol    Collection Time: 08/15/22  9:42 AM   Result Value Ref Range    Vit D, 1,25-Dihydroxy 36 20 - 79 pg/mL        No results found for: TBGOLDPLUS   Lab Results   Component Value Date    HEPCAB Negative 08/28/2020      Dexa 8/15/2022   Spine-2.2, rad 33%-4.4, rad total-4.9, stable at spine and hip.    Assessment:       1. Age-related osteoporosis without current pathological fracture    2. Vitamin D deficiency    3. Primary osteoarthritis involving multiple joints    4. Medication monitoring encounter    5. Hyperkalemia          Impression:     Osteoporosis: on prolia q 6 mos, failed bisphosphonates; improved bmd;  has done PT balance training (started prolia 10/2015). No falls since last  appt. Walks with walker.      Vit D def: taking weekly vit D, level 36     Medication monitoring; no issue with prolia, labs reviewed see below; mobic caused Gi upset     OA mult joints: prosper hands, lumbar spine, has pain pump--mobic caused GI upset, turmeric not helpful. On norco per pain mgmt.     Potassium elevated 5.9. Currently no symptoms.     Plan:            Labs reviewed and done within past 14 days  Ca  10.6, Creat  0.9, eGFR >60  No contraindication for Prolia today, ok for nurse to administer today  Re-evaluate patient again in 6 months to determine if Prolia still medically indicated and appropriate to administer.    KDIGO lab monitoring will be followed according to KDIGO 2017 Clinical Practice Guideline Update for the Diagnosis, Evaluation, Prevention, and Treatment of Chronic Kidney Disease-Mineral and Bone Disorder (CKD-MBD)  Chapter 3.1: Diagnosis of CKD-MBD: biochemical abnormalities      Prolia today and cont q 6 mos  Weight bearing activities as tolerated. Caution to avoid falls.     Cont weekly vit D 50K u. For now   Cont Ca in diet     Rec voltaren gel for joint pain    Low potassium diet. Advise patient to discuss with PCP    Repeat dexa 8/2024     RTC in 6 months with jose, cmp      Holly Mcclain PA-C  [facility]  Rheumatology       35 minutes of total time spent on the encounter, which includes face to face time and non-face to face time preparing to see the patient (eg, review of tests), Obtaining and/or reviewing separately obtained history, Documenting clinical information in the electronic or other health record, Independently interpreting results (not separately reported) and communicating results to the patient/family/caregiver, or Care coordination (not separately reported).

## 2022-09-01 ENCOUNTER — OFFICE VISIT (OUTPATIENT)
Dept: INTERNAL MEDICINE | Facility: CLINIC | Age: 77
End: 2022-09-01
Payer: MEDICARE

## 2022-09-01 VITALS
HEIGHT: 62 IN | WEIGHT: 99.44 LBS | DIASTOLIC BLOOD PRESSURE: 60 MMHG | TEMPERATURE: 98 F | BODY MASS INDEX: 18.3 KG/M2 | HEART RATE: 86 BPM | SYSTOLIC BLOOD PRESSURE: 120 MMHG

## 2022-09-01 DIAGNOSIS — J44.9 CHRONIC OBSTRUCTIVE PULMONARY DISEASE, UNSPECIFIED COPD TYPE: ICD-10-CM

## 2022-09-01 DIAGNOSIS — E78.5 HYPERLIPIDEMIA, UNSPECIFIED HYPERLIPIDEMIA TYPE: ICD-10-CM

## 2022-09-01 DIAGNOSIS — D50.9 IRON DEFICIENCY ANEMIA, UNSPECIFIED IRON DEFICIENCY ANEMIA TYPE: ICD-10-CM

## 2022-09-01 DIAGNOSIS — I10 PRIMARY HYPERTENSION: ICD-10-CM

## 2022-09-01 DIAGNOSIS — Z00.00 ROUTINE GENERAL MEDICAL EXAMINATION AT A HEALTH CARE FACILITY: Primary | ICD-10-CM

## 2022-09-01 DIAGNOSIS — Z12.11 COLON CANCER SCREENING: ICD-10-CM

## 2022-09-01 PROCEDURE — 1160F RVW MEDS BY RX/DR IN RCRD: CPT | Mod: CPTII,S$GLB,, | Performed by: FAMILY MEDICINE

## 2022-09-01 PROCEDURE — 3074F PR MOST RECENT SYSTOLIC BLOOD PRESSURE < 130 MM HG: ICD-10-PCS | Mod: CPTII,S$GLB,, | Performed by: FAMILY MEDICINE

## 2022-09-01 PROCEDURE — 99999 PR PBB SHADOW E&M-EST. PATIENT-LVL III: CPT | Mod: PBBFAC,,, | Performed by: FAMILY MEDICINE

## 2022-09-01 PROCEDURE — 99397 PER PM REEVAL EST PAT 65+ YR: CPT | Mod: S$GLB,,, | Performed by: FAMILY MEDICINE

## 2022-09-01 PROCEDURE — 1159F MED LIST DOCD IN RCRD: CPT | Mod: CPTII,S$GLB,, | Performed by: FAMILY MEDICINE

## 2022-09-01 PROCEDURE — 1101F PR PT FALLS ASSESS DOC 0-1 FALLS W/OUT INJ PAST YR: ICD-10-PCS | Mod: CPTII,S$GLB,, | Performed by: FAMILY MEDICINE

## 2022-09-01 PROCEDURE — 3074F SYST BP LT 130 MM HG: CPT | Mod: CPTII,S$GLB,, | Performed by: FAMILY MEDICINE

## 2022-09-01 PROCEDURE — 1125F PR PAIN SEVERITY QUANTIFIED, PAIN PRESENT: ICD-10-PCS | Mod: CPTII,S$GLB,, | Performed by: FAMILY MEDICINE

## 2022-09-01 PROCEDURE — 1125F AMNT PAIN NOTED PAIN PRSNT: CPT | Mod: CPTII,S$GLB,, | Performed by: FAMILY MEDICINE

## 2022-09-01 PROCEDURE — 1160F PR REVIEW ALL MEDS BY PRESCRIBER/CLIN PHARMACIST DOCUMENTED: ICD-10-PCS | Mod: CPTII,S$GLB,, | Performed by: FAMILY MEDICINE

## 2022-09-01 PROCEDURE — 3288F FALL RISK ASSESSMENT DOCD: CPT | Mod: CPTII,S$GLB,, | Performed by: FAMILY MEDICINE

## 2022-09-01 PROCEDURE — 1101F PT FALLS ASSESS-DOCD LE1/YR: CPT | Mod: CPTII,S$GLB,, | Performed by: FAMILY MEDICINE

## 2022-09-01 PROCEDURE — 3078F PR MOST RECENT DIASTOLIC BLOOD PRESSURE < 80 MM HG: ICD-10-PCS | Mod: CPTII,S$GLB,, | Performed by: FAMILY MEDICINE

## 2022-09-01 PROCEDURE — 99397 PR PREVENTIVE VISIT,EST,65 & OVER: ICD-10-PCS | Mod: S$GLB,,, | Performed by: FAMILY MEDICINE

## 2022-09-01 PROCEDURE — 99999 PR PBB SHADOW E&M-EST. PATIENT-LVL III: ICD-10-PCS | Mod: PBBFAC,,, | Performed by: FAMILY MEDICINE

## 2022-09-01 PROCEDURE — 1159F PR MEDICATION LIST DOCUMENTED IN MEDICAL RECORD: ICD-10-PCS | Mod: CPTII,S$GLB,, | Performed by: FAMILY MEDICINE

## 2022-09-01 PROCEDURE — 3078F DIAST BP <80 MM HG: CPT | Mod: CPTII,S$GLB,, | Performed by: FAMILY MEDICINE

## 2022-09-01 PROCEDURE — 3288F PR FALLS RISK ASSESSMENT DOCUMENTED: ICD-10-PCS | Mod: CPTII,S$GLB,, | Performed by: FAMILY MEDICINE

## 2022-09-01 NOTE — PROGRESS NOTES
Subjective:      Patient ID: Keyona Dwyer is a 76 y.o. female.    Chief Complaint: Annual Exam    HPI  75 yo female here for annual visit.  Her weight is down, she is trying to gain back after having hospitalization for pancreatitis/getting very sick.  She is feeling so much better, in fact lately has more energy.  Using walker  No falls  Under care of Rheum for osteoporosis  Also seeing Heme for anemia.  Taking iron  Normal BMs  Mood is good on her meds    Past Medical History:   Diagnosis Date    Anemia     Anxiety     Cataract     Chronic back pain     Dr. Guzman    Fibrocystic breast     Glaucoma     Hyperlipemia     Hypertension     Idiopathic acute pancreatitis 6/18/2022    Osteoarthritis     Osteoporosis     Rheumatology/on Prolia    PVD (peripheral vascular disease) 4/12/2021    Restless leg syndrome     Tobacco dependence     Trouble in sleeping      Family History   Problem Relation Age of Onset    Cancer Mother     Breast cancer Mother     Hyperlipidemia Father     Heart failure Father     Heart disease Brother     Brain cancer Brother     Stroke Brother     Alcohol abuse Brother      Past Surgical History:   Procedure Laterality Date    ANGIOGRAPHY OF LOWER EXTREMITY N/A 4/12/2021    Procedure: ANGIOGRAM, LOWER EXTREMITY/left leg;  Surgeon: Maico Adkins MD;  Location: [facility] CATH LAB;  Service: Vascular;  Laterality: N/A;  req 1130 start time/Rm A243A    AORTOGRAPHY WITH SERIALOGRAPHY N/A 4/14/2021    Procedure: AORTOGRAM, WITH RUNOFF;  Surgeon: Maico Adkins MD;  Location: [facility] CATH LAB;  Service: Vascular;  Laterality: N/A;  left iliac stent with shockwave/req 1030 start    CATARACT EXTRACTION W/  INTRAOCULAR LENS IMPLANT Bilateral 8/ 9 2017    GALLBLADDER SURGERY      HYSTERECTOMY  1972    LUNG SURGERY Right age 15    lung collpase    TOE AMPUTATION Left 4/15/2021    Procedure: AMPUTATION, TOE;  Surgeon: Taylor Anderson DPM;  Location: [facility] OR;  Service: Podiatry;  Laterality: Left;  Hallux  "(Great Toe)    TOTAL HIP ARTHROPLASTY Bilateral     TOTAL KNEE ARTHROPLASTY Bilateral      Social History     Tobacco Use    Smoking status: Some Days     Packs/day: 0.50     Years: 63.00     Pack years: 31.50     Types: Cigarettes     Last attempt to quit: 2021     Years since quittin.1    Smokeless tobacco: Never    Tobacco comments:     started age of  11    Substance Use Topics    Alcohol use: No    Drug use: No       /60   Pulse 86   Temp 97.5 °F (36.4 °C) (Temporal)   Ht 5' 2" (1.575 m)   Wt 45.1 kg (99 lb 6.8 oz)   BMI 18.19 kg/m²     Review of Systems   Constitutional:  Negative for activity change, appetite change, chills, diaphoresis, fatigue, fever and unexpected weight change.   HENT:  Negative for ear pain, hearing loss, postnasal drip, rhinorrhea and tinnitus.    Eyes:  Negative for visual disturbance.   Respiratory:  Negative for cough, shortness of breath and wheezing.    Cardiovascular:  Negative for chest pain, palpitations and leg swelling.   Gastrointestinal:  Negative for abdominal distention.   Genitourinary:  Negative for dysuria, frequency, hematuria and urgency.   Musculoskeletal:  Positive for back pain and gait problem. Negative for joint swelling.   Neurological:  Negative for headaches.   Psychiatric/Behavioral:  Negative for confusion and decreased concentration.      Objective:     Physical Exam  Vitals and nursing note reviewed.   Constitutional:       General: She is not in acute distress.     Appearance: She is well-developed.   Eyes:      Conjunctiva/sclera: Conjunctivae normal.      Pupils: Pupils are equal, round, and reactive to light.   Neck:      Thyroid: No thyromegaly.   Cardiovascular:      Rate and Rhythm: Normal rate and regular rhythm.      Heart sounds: Normal heart sounds. No murmur heard.    No gallop.   Pulmonary:      Effort: Pulmonary effort is normal. No respiratory distress.      Breath sounds: Normal breath sounds. No wheezing or rales. "   Abdominal:      General: Bowel sounds are normal. There is no distension.      Palpations: Abdomen is soft.      Tenderness: There is no abdominal tenderness. There is no guarding.   Musculoskeletal:         General: Normal range of motion.      Cervical back: Normal range of motion and neck supple.      Right lower leg: No edema.      Left lower leg: No edema.   Skin:     General: Skin is warm and dry.   Neurological:      Mental Status: She is alert and oriented to person, place, and time.      Cranial Nerves: No cranial nerve deficit.   Psychiatric:         Behavior: Behavior normal.         Thought Content: Thought content normal.         Judgment: Judgment normal.       Lab Results   Component Value Date    WBC 6.47 07/18/2022    HGB 8.3 (L) 07/18/2022    HCT 29.0 (L) 07/18/2022     07/18/2022    CHOL 66 (L) 06/19/2022    TRIG 35 06/19/2022    HDL 39 (L) 06/19/2022    ALT 9 (L) 08/15/2022    AST 18 08/15/2022     (L) 08/15/2022    K 5.9 (H) 08/15/2022     08/15/2022    CREATININE 0.9 08/15/2022    BUN 20 08/15/2022    CO2 24 08/15/2022    TSH 1.055 08/03/2021    INR 1.2 10/10/2009    HGBA1C 5.0 12/14/2018       Assessment:     1. Routine general medical examination at a health care facility    2. Primary hypertension    3. Hyperlipidemia, unspecified hyperlipidemia type    4. Chronic obstructive pulmonary disease, unspecified COPD type    5. Colon cancer screening    6. Iron deficiency anemia, unspecified iron deficiency anemia type         Plan:     Routine general medical examination at a health care facility    Primary hypertension    Hyperlipidemia, unspecified hyperlipidemia type    Chronic obstructive pulmonary disease, unspecified COPD type    Colon cancer screening  -     Ambulatory referral/consult to Endo Procedure ; Future; Expected date: 09/02/2022    Iron deficiency anemia, unspecified iron deficiency anemia type  -     Ambulatory referral/consult to Endo Procedure  ; Future; Expected date: 09/02/2022      Labs are up to date and stable  Ca up//cut from diet.    Cont per specialty  Needs EGD/Cscope due to anemia//orders in  F/u 6 mos

## 2022-09-11 ENCOUNTER — HOSPITAL ENCOUNTER (INPATIENT)
Facility: HOSPITAL | Age: 77
LOS: 5 days | Discharge: HOME HEALTH | DRG: 871 | End: 2022-09-17
Attending: EMERGENCY MEDICINE | Admitting: INTERNAL MEDICINE
Payer: MEDICARE

## 2022-09-11 DIAGNOSIS — R00.0 TACHYCARDIA: ICD-10-CM

## 2022-09-11 DIAGNOSIS — I10 PRIMARY HYPERTENSION: Chronic | ICD-10-CM

## 2022-09-11 DIAGNOSIS — D64.9 ANEMIA, UNSPECIFIED TYPE: ICD-10-CM

## 2022-09-11 DIAGNOSIS — M25.473 ANKLE SWELLING, UNSPECIFIED LATERALITY: ICD-10-CM

## 2022-09-11 DIAGNOSIS — J18.9 PNEUMONIA OF RIGHT LOWER LOBE DUE TO INFECTIOUS ORGANISM: ICD-10-CM

## 2022-09-11 DIAGNOSIS — I21.4 NSTEMI (NON-ST ELEVATED MYOCARDIAL INFARCTION): ICD-10-CM

## 2022-09-11 DIAGNOSIS — D62 ACUTE BLOOD LOSS ANEMIA: ICD-10-CM

## 2022-09-11 DIAGNOSIS — Z72.0 TOBACCO ABUSE DISORDER: ICD-10-CM

## 2022-09-11 DIAGNOSIS — I50.31 ACUTE DIASTOLIC HEART FAILURE: ICD-10-CM

## 2022-09-11 DIAGNOSIS — R53.1 WEAKNESS: Primary | ICD-10-CM

## 2022-09-11 PROBLEM — A41.9 SEPSIS: Status: ACTIVE | Noted: 2022-09-11

## 2022-09-11 PROBLEM — R79.89 ELEVATED TROPONIN: Status: ACTIVE | Noted: 2022-09-11

## 2022-09-11 LAB
ALBUMIN SERPL BCP-MCNC: 3.1 G/DL (ref 3.5–5.2)
ALP SERPL-CCNC: 81 U/L (ref 55–135)
ALT SERPL W/O P-5'-P-CCNC: 18 U/L (ref 10–44)
ANION GAP SERPL CALC-SCNC: 12 MMOL/L (ref 8–16)
AORTIC ROOT ANNULUS: 2.5 CM
APTT BLDCRRT: 139 SEC (ref 21–32)
APTT BLDCRRT: 31.2 SEC (ref 21–32)
ASCENDING AORTA: 2.22 CM
AST SERPL-CCNC: 38 U/L (ref 10–40)
AV INDEX (PROSTH): 0.72
AV MEAN GRADIENT: 6 MMHG
AV PEAK GRADIENT: 12 MMHG
AV VALVE AREA: 1.87 CM2
AV VELOCITY RATIO: 0.72
BASOPHILS # BLD AUTO: 0.03 K/UL (ref 0–0.2)
BASOPHILS NFR BLD: 0.2 % (ref 0–1.9)
BILIRUB SERPL-MCNC: 0.4 MG/DL (ref 0.1–1)
BSA FOR ECHO PROCEDURE: 1.42 M2
BUN SERPL-MCNC: 12 MG/DL (ref 8–23)
CALCIUM SERPL-MCNC: 9.5 MG/DL (ref 8.7–10.5)
CHLORIDE SERPL-SCNC: 97 MMOL/L (ref 95–110)
CO2 SERPL-SCNC: 21 MMOL/L (ref 23–29)
CREAT SERPL-MCNC: 0.8 MG/DL (ref 0.5–1.4)
CTP QC/QA: YES
CV ECHO LV RWT: 0.87 CM
DIFFERENTIAL METHOD: ABNORMAL
DOP CALC AO PEAK VEL: 1.72 M/S
DOP CALC AO VTI: 35.1 CM
DOP CALC LVOT AREA: 2.6 CM2
DOP CALC LVOT DIAMETER: 1.82 CM
DOP CALC LVOT PEAK VEL: 1.24 M/S
DOP CALC LVOT STROKE VOLUME: 65.79 CM3
DOP CALC RVOT PEAK VEL: 0.77 M/S
DOP CALC RVOT VTI: 13.3 CM
DOP CALCLVOT PEAK VEL VTI: 25.3 CM
E WAVE DECELERATION TIME: 123.62 MSEC
E/A RATIO: 0.86
E/E' RATIO: 10.84 M/S
ECHO LV POSTERIOR WALL: 1.5 CM (ref 0.6–1.1)
EJECTION FRACTION: 55 %
EOSINOPHIL # BLD AUTO: 0 K/UL (ref 0–0.5)
EOSINOPHIL NFR BLD: 0 % (ref 0–8)
ERYTHROCYTE [DISTWIDTH] IN BLOOD BY AUTOMATED COUNT: 13 % (ref 11.5–14.5)
EST. GFR  (NO RACE VARIABLE): >60 ML/MIN/1.73 M^2
FRACTIONAL SHORTENING: 35 % (ref 28–44)
GLUCOSE SERPL-MCNC: 187 MG/DL (ref 70–110)
HCT VFR BLD AUTO: 34.8 % (ref 37–48.5)
HGB BLD-MCNC: 11.2 G/DL (ref 12–16)
IMM GRANULOCYTES # BLD AUTO: 0.07 K/UL (ref 0–0.04)
IMM GRANULOCYTES NFR BLD AUTO: 0.5 % (ref 0–0.5)
INTERVENTRICULAR SEPTUM: 1.36 CM (ref 0.6–1.1)
IVC DIAMETER: 1.68 CM
IVRT: 54.23 MSEC
LA MAJOR: 4.27 CM
LA MINOR: 4.35 CM
LA WIDTH: 3.7 CM
LACTATE SERPL-SCNC: 0.8 MMOL/L (ref 0.5–2.2)
LACTATE SERPL-SCNC: 0.8 MMOL/L (ref 0.5–2.2)
LEFT ATRIUM SIZE: 4.45 CM
LEFT ATRIUM VOLUME INDEX: 41.9 ML/M2
LEFT ATRIUM VOLUME: 60.31 CM3
LEFT INTERNAL DIMENSION IN SYSTOLE: 2.23 CM (ref 2.1–4)
LEFT VENTRICLE DIASTOLIC VOLUME INDEX: 33.81 ML/M2
LEFT VENTRICLE DIASTOLIC VOLUME: 48.68 ML
LEFT VENTRICLE MASS INDEX: 121 G/M2
LEFT VENTRICLE SYSTOLIC VOLUME INDEX: 11.6 ML/M2
LEFT VENTRICLE SYSTOLIC VOLUME: 16.7 ML
LEFT VENTRICULAR INTERNAL DIMENSION IN DIASTOLE: 3.44 CM (ref 3.5–6)
LEFT VENTRICULAR MASS: 174.77 G
LIPASE SERPL-CCNC: 24 U/L (ref 4–60)
LV LATERAL E/E' RATIO: 8.58 M/S
LV SEPTAL E/E' RATIO: 14.71 M/S
LVOT MG: 3.34 MMHG
LVOT MV: 0.87 CM/S
LYMPHOCYTES # BLD AUTO: 0.5 K/UL (ref 1–4.8)
LYMPHOCYTES NFR BLD: 3.7 % (ref 18–48)
MCH RBC QN AUTO: 30.2 PG (ref 27–31)
MCHC RBC AUTO-ENTMCNC: 32.2 G/DL (ref 32–36)
MCV RBC AUTO: 94 FL (ref 82–98)
MONOCYTES # BLD AUTO: 0.8 K/UL (ref 0.3–1)
MONOCYTES NFR BLD: 5.9 % (ref 4–15)
MV PEAK A VEL: 1.2 M/S
MV PEAK E VEL: 1.03 M/S
MV STENOSIS PRESSURE HALF TIME: 35.85 MS
MV VALVE AREA P 1/2 METHOD: 6.14 CM2
NEUTROPHILS # BLD AUTO: 12.1 K/UL (ref 1.8–7.7)
NEUTROPHILS NFR BLD: 89.7 % (ref 38–73)
NRBC BLD-RTO: 0 /100 WBC
PISA MRMAX VEL: 4.73 M/S
PISA TR MAX VEL: 3.25 M/S
PLATELET # BLD AUTO: 294 K/UL (ref 150–450)
PMV BLD AUTO: 8.9 FL (ref 9.2–12.9)
POTASSIUM SERPL-SCNC: 3.8 MMOL/L (ref 3.5–5.1)
PROCALCITONIN SERPL IA-MCNC: 16.3 NG/ML
PROT SERPL-MCNC: 7.6 G/DL (ref 6–8.4)
PV MEAN GRADIENT: 1.28 MMHG
RA MAJOR: 3.8 CM
RA PRESSURE: 3 MMHG
RA WIDTH: 2.7 CM
RBC # BLD AUTO: 3.71 M/UL (ref 4–5.4)
SARS-COV-2 RDRP RESP QL NAA+PROBE: NEGATIVE
SODIUM SERPL-SCNC: 130 MMOL/L (ref 136–145)
STJ: 2.2 CM
TDI LATERAL: 0.12 M/S
TDI SEPTAL: 0.07 M/S
TDI: 0.1 M/S
TR MAX PG: 42 MMHG
TRICUSPID ANNULAR PLANE SYSTOLIC EXCURSION: 2 CM
TROPONIN I SERPL DL<=0.01 NG/ML-MCNC: 1.27 NG/ML (ref 0–0.03)
TROPONIN I SERPL DL<=0.01 NG/ML-MCNC: 1.49 NG/ML (ref 0–0.03)
TROPONIN I SERPL DL<=0.01 NG/ML-MCNC: 1.95 NG/ML (ref 0–0.03)
TV REST PULMONARY ARTERY PRESSURE: 45 MMHG
WBC # BLD AUTO: 13.53 K/UL (ref 3.9–12.7)

## 2022-09-11 PROCEDURE — 80053 COMPREHEN METABOLIC PANEL: CPT | Performed by: NURSE PRACTITIONER

## 2022-09-11 PROCEDURE — 94761 N-INVAS EAR/PLS OXIMETRY MLT: CPT

## 2022-09-11 PROCEDURE — 94640 AIRWAY INHALATION TREATMENT: CPT

## 2022-09-11 PROCEDURE — G0378 HOSPITAL OBSERVATION PER HR: HCPCS

## 2022-09-11 PROCEDURE — 99900035 HC TECH TIME PER 15 MIN (STAT)

## 2022-09-11 PROCEDURE — 83605 ASSAY OF LACTIC ACID: CPT | Performed by: NURSE PRACTITIONER

## 2022-09-11 PROCEDURE — 96365 THER/PROPH/DIAG IV INF INIT: CPT

## 2022-09-11 PROCEDURE — 96361 HYDRATE IV INFUSION ADD-ON: CPT

## 2022-09-11 PROCEDURE — 99214 OFFICE O/P EST MOD 30 MIN: CPT | Mod: 25,,, | Performed by: INTERNAL MEDICINE

## 2022-09-11 PROCEDURE — 93010 ELECTROCARDIOGRAM REPORT: CPT | Mod: ,,, | Performed by: INTERNAL MEDICINE

## 2022-09-11 PROCEDURE — 25000242 PHARM REV CODE 250 ALT 637 W/ HCPCS: Performed by: INTERNAL MEDICINE

## 2022-09-11 PROCEDURE — 94640 AIRWAY INHALATION TREATMENT: CPT | Mod: XB

## 2022-09-11 PROCEDURE — 25000242 PHARM REV CODE 250 ALT 637 W/ HCPCS: Performed by: EMERGENCY MEDICINE

## 2022-09-11 PROCEDURE — 93010 EKG 12-LEAD: ICD-10-PCS | Mod: ,,, | Performed by: INTERNAL MEDICINE

## 2022-09-11 PROCEDURE — 63600175 PHARM REV CODE 636 W HCPCS: Performed by: EMERGENCY MEDICINE

## 2022-09-11 PROCEDURE — 84484 ASSAY OF TROPONIN QUANT: CPT | Performed by: NURSE PRACTITIONER

## 2022-09-11 PROCEDURE — 93005 ELECTROCARDIOGRAM TRACING: CPT

## 2022-09-11 PROCEDURE — 83605 ASSAY OF LACTIC ACID: CPT | Mod: 91 | Performed by: INTERNAL MEDICINE

## 2022-09-11 PROCEDURE — 99291 CRITICAL CARE FIRST HOUR: CPT | Mod: 25

## 2022-09-11 PROCEDURE — 84145 PROCALCITONIN (PCT): CPT | Performed by: INTERNAL MEDICINE

## 2022-09-11 PROCEDURE — 84484 ASSAY OF TROPONIN QUANT: CPT | Mod: 91 | Performed by: INTERNAL MEDICINE

## 2022-09-11 PROCEDURE — 63600175 PHARM REV CODE 636 W HCPCS: Performed by: INTERNAL MEDICINE

## 2022-09-11 PROCEDURE — 27000221 HC OXYGEN, UP TO 24 HOURS

## 2022-09-11 PROCEDURE — 25000003 PHARM REV CODE 250: Performed by: INTERNAL MEDICINE

## 2022-09-11 PROCEDURE — 85025 COMPLETE CBC W/AUTO DIFF WBC: CPT | Performed by: NURSE PRACTITIONER

## 2022-09-11 PROCEDURE — 85730 THROMBOPLASTIN TIME PARTIAL: CPT | Performed by: EMERGENCY MEDICINE

## 2022-09-11 PROCEDURE — 36415 COLL VENOUS BLD VENIPUNCTURE: CPT | Performed by: INTERNAL MEDICINE

## 2022-09-11 PROCEDURE — 99214 PR OFFICE/OUTPT VISIT, EST, LEVL IV, 30-39 MIN: ICD-10-PCS | Mod: 25,,, | Performed by: INTERNAL MEDICINE

## 2022-09-11 PROCEDURE — 85730 THROMBOPLASTIN TIME PARTIAL: CPT | Mod: 91 | Performed by: INTERNAL MEDICINE

## 2022-09-11 PROCEDURE — U0002 COVID-19 LAB TEST NON-CDC: HCPCS | Performed by: NURSE PRACTITIONER

## 2022-09-11 PROCEDURE — 25000003 PHARM REV CODE 250: Performed by: EMERGENCY MEDICINE

## 2022-09-11 PROCEDURE — 94760 N-INVAS EAR/PLS OXIMETRY 1: CPT

## 2022-09-11 PROCEDURE — 87040 BLOOD CULTURE FOR BACTERIA: CPT | Mod: 59 | Performed by: NURSE PRACTITIONER

## 2022-09-11 PROCEDURE — 83690 ASSAY OF LIPASE: CPT | Performed by: NURSE PRACTITIONER

## 2022-09-11 RX ORDER — BUPROPION HYDROCHLORIDE 75 MG/1
75 TABLET ORAL 2 TIMES DAILY
Status: DISCONTINUED | OUTPATIENT
Start: 2022-09-11 | End: 2022-09-17 | Stop reason: HOSPADM

## 2022-09-11 RX ORDER — HYDRALAZINE HYDROCHLORIDE 20 MG/ML
10 INJECTION INTRAMUSCULAR; INTRAVENOUS EVERY 8 HOURS PRN
Status: DISCONTINUED | OUTPATIENT
Start: 2022-09-11 | End: 2022-09-17 | Stop reason: HOSPADM

## 2022-09-11 RX ORDER — ACETAMINOPHEN 325 MG/1
650 TABLET ORAL EVERY 4 HOURS PRN
Status: DISCONTINUED | OUTPATIENT
Start: 2022-09-11 | End: 2022-09-17 | Stop reason: HOSPADM

## 2022-09-11 RX ORDER — POLYETHYLENE GLYCOL 3350 17 G/17G
17 POWDER, FOR SOLUTION ORAL DAILY
Status: DISCONTINUED | OUTPATIENT
Start: 2022-09-11 | End: 2022-09-17 | Stop reason: HOSPADM

## 2022-09-11 RX ORDER — ACETAMINOPHEN 500 MG
1000 TABLET ORAL
Status: COMPLETED | OUTPATIENT
Start: 2022-09-11 | End: 2022-09-11

## 2022-09-11 RX ORDER — SODIUM CHLORIDE 0.9 % (FLUSH) 0.9 %
10 SYRINGE (ML) INJECTION
Status: DISCONTINUED | OUTPATIENT
Start: 2022-09-11 | End: 2022-09-17 | Stop reason: HOSPADM

## 2022-09-11 RX ORDER — ASPIRIN 325 MG
325 TABLET ORAL
Status: COMPLETED | OUTPATIENT
Start: 2022-09-11 | End: 2022-09-11

## 2022-09-11 RX ORDER — CEFEPIME HYDROCHLORIDE 1 G/50ML
2 INJECTION, SOLUTION INTRAVENOUS
Status: COMPLETED | OUTPATIENT
Start: 2022-09-11 | End: 2022-09-11

## 2022-09-11 RX ORDER — BUSPIRONE HYDROCHLORIDE 10 MG/1
10 TABLET ORAL 2 TIMES DAILY
Status: DISCONTINUED | OUTPATIENT
Start: 2022-09-11 | End: 2022-09-17 | Stop reason: HOSPADM

## 2022-09-11 RX ORDER — ATORVASTATIN CALCIUM 10 MG/1
20 TABLET, FILM COATED ORAL NIGHTLY
Refills: 2 | Status: DISCONTINUED | OUTPATIENT
Start: 2022-09-11 | End: 2022-09-17 | Stop reason: HOSPADM

## 2022-09-11 RX ORDER — HYDROCODONE BITARTRATE AND ACETAMINOPHEN 5; 325 MG/1; MG/1
1 TABLET ORAL EVERY 6 HOURS PRN
Status: DISCONTINUED | OUTPATIENT
Start: 2022-09-11 | End: 2022-09-17 | Stop reason: HOSPADM

## 2022-09-11 RX ORDER — HYDROCODONE BITARTRATE AND ACETAMINOPHEN 10; 325 MG/1; MG/1
1 TABLET ORAL EVERY 6 HOURS PRN
Status: DISCONTINUED | OUTPATIENT
Start: 2022-09-11 | End: 2022-09-17 | Stop reason: HOSPADM

## 2022-09-11 RX ORDER — ONDANSETRON 2 MG/ML
4 INJECTION INTRAMUSCULAR; INTRAVENOUS EVERY 8 HOURS PRN
Status: DISCONTINUED | OUTPATIENT
Start: 2022-09-11 | End: 2022-09-14

## 2022-09-11 RX ORDER — HEPARIN SODIUM,PORCINE/D5W 25000/250
0-40 INTRAVENOUS SOLUTION INTRAVENOUS CONTINUOUS
Status: DISCONTINUED | OUTPATIENT
Start: 2022-09-11 | End: 2022-09-11

## 2022-09-11 RX ORDER — TALC
6 POWDER (GRAM) TOPICAL NIGHTLY PRN
Status: DISCONTINUED | OUTPATIENT
Start: 2022-09-11 | End: 2022-09-17 | Stop reason: HOSPADM

## 2022-09-11 RX ORDER — MIDODRINE HYDROCHLORIDE 5 MG/1
5 TABLET ORAL 3 TIMES DAILY
Status: DISCONTINUED | OUTPATIENT
Start: 2022-09-11 | End: 2022-09-16

## 2022-09-11 RX ORDER — NAPROXEN SODIUM 220 MG/1
81 TABLET, FILM COATED ORAL DAILY
Status: DISCONTINUED | OUTPATIENT
Start: 2022-09-12 | End: 2022-09-13

## 2022-09-11 RX ORDER — IPRATROPIUM BROMIDE AND ALBUTEROL SULFATE 2.5; .5 MG/3ML; MG/3ML
3 SOLUTION RESPIRATORY (INHALATION)
Status: COMPLETED | OUTPATIENT
Start: 2022-09-11 | End: 2022-09-11

## 2022-09-11 RX ORDER — CLOPIDOGREL BISULFATE 75 MG/1
75 TABLET ORAL DAILY
Status: DISCONTINUED | OUTPATIENT
Start: 2022-09-11 | End: 2022-09-13

## 2022-09-11 RX ORDER — BUDESONIDE 0.5 MG/2ML
0.5 INHALANT ORAL EVERY 12 HOURS
Status: DISCONTINUED | OUTPATIENT
Start: 2022-09-11 | End: 2022-09-17 | Stop reason: HOSPADM

## 2022-09-11 RX ORDER — FAMOTIDINE 20 MG/1
20 TABLET, FILM COATED ORAL DAILY
Status: DISCONTINUED | OUTPATIENT
Start: 2022-09-11 | End: 2022-09-12

## 2022-09-11 RX ORDER — IPRATROPIUM BROMIDE AND ALBUTEROL SULFATE 2.5; .5 MG/3ML; MG/3ML
3 SOLUTION RESPIRATORY (INHALATION)
Status: DISCONTINUED | OUTPATIENT
Start: 2022-09-11 | End: 2022-09-17 | Stop reason: HOSPADM

## 2022-09-11 RX ORDER — ARFORMOTEROL TARTRATE 15 UG/2ML
15 SOLUTION RESPIRATORY (INHALATION) 2 TIMES DAILY
Status: DISCONTINUED | OUTPATIENT
Start: 2022-09-11 | End: 2022-09-17 | Stop reason: HOSPADM

## 2022-09-11 RX ORDER — DULOXETIN HYDROCHLORIDE 30 MG/1
30 CAPSULE, DELAYED RELEASE ORAL DAILY
Status: DISCONTINUED | OUTPATIENT
Start: 2022-09-11 | End: 2022-09-17 | Stop reason: HOSPADM

## 2022-09-11 RX ADMIN — CEFEPIME HYDROCHLORIDE 2 G: 2 INJECTION, SOLUTION INTRAVENOUS at 12:09

## 2022-09-11 RX ADMIN — AZITHROMYCIN MONOHYDRATE 500 MG: 500 INJECTION, POWDER, LYOPHILIZED, FOR SOLUTION INTRAVENOUS at 06:09

## 2022-09-11 RX ADMIN — ASPIRIN 325 MG: 325 TABLET ORAL at 01:09

## 2022-09-11 RX ADMIN — SODIUM CHLORIDE 500 ML: 0.9 INJECTION, SOLUTION INTRAVENOUS at 02:09

## 2022-09-11 RX ADMIN — MIDODRINE HYDROCHLORIDE 5 MG: 5 TABLET ORAL at 10:09

## 2022-09-11 RX ADMIN — SODIUM CHLORIDE 1000 ML: 9 INJECTION, SOLUTION INTRAVENOUS at 12:09

## 2022-09-11 RX ADMIN — BUDESONIDE 0.5 MG: 0.5 INHALANT ORAL at 07:09

## 2022-09-11 RX ADMIN — IPRATROPIUM BROMIDE AND ALBUTEROL SULFATE 3 ML: 2.5; .5 SOLUTION RESPIRATORY (INHALATION) at 12:09

## 2022-09-11 RX ADMIN — CEFTRIAXONE 1 G: 1 INJECTION, SOLUTION INTRAVENOUS at 06:09

## 2022-09-11 RX ADMIN — HEPARIN SODIUM 40 UNITS/KG/HR: 10000 INJECTION, SOLUTION INTRAVENOUS at 02:09

## 2022-09-11 RX ADMIN — POLYETHYLENE GLYCOL 3350 17 G: 17 POWDER, FOR SOLUTION ORAL at 06:09

## 2022-09-11 RX ADMIN — ACETAMINOPHEN 1000 MG: 500 TABLET ORAL at 12:09

## 2022-09-11 RX ADMIN — BUSPIRONE HYDROCHLORIDE 10 MG: 10 TABLET ORAL at 09:09

## 2022-09-11 RX ADMIN — IPRATROPIUM BROMIDE AND ALBUTEROL SULFATE 3 ML: 2.5; .5 SOLUTION RESPIRATORY (INHALATION) at 07:09

## 2022-09-11 RX ADMIN — CLOPIDOGREL 75 MG: 75 TABLET, FILM COATED ORAL at 06:09

## 2022-09-11 RX ADMIN — BUPROPION HYDROCHLORIDE 75 MG: 75 TABLET, FILM COATED ORAL at 09:09

## 2022-09-11 RX ADMIN — MIDODRINE HYDROCHLORIDE 5 MG: 5 TABLET ORAL at 06:09

## 2022-09-11 RX ADMIN — ARFORMOTEROL TARTRATE 15 MCG: 15 SOLUTION RESPIRATORY (INHALATION) at 07:09

## 2022-09-11 RX ADMIN — FAMOTIDINE 20 MG: 20 TABLET ORAL at 06:09

## 2022-09-11 RX ADMIN — ATORVASTATIN CALCIUM 20 MG: 10 TABLET, FILM COATED ORAL at 09:09

## 2022-09-11 RX ADMIN — DULOXETINE 30 MG: 30 CAPSULE, DELAYED RELEASE ORAL at 06:09

## 2022-09-11 NOTE — HPI
Cardiology consulted for NSTEMI.  Pt has h/o PAD, HTN, hyperlipidemia, smoking.  She has h/o PAD with angiogram in April 2021 showing R common iliac occlusion and had PTA L common iliac artery, tx by Dr. Adkins, Vascular Surgery.  She has been weak last few days, and had a fever, GAMEZ, and lower abd pain.  No chest pain sxs.  She presented to ER and started on antibiotics for possible R sided pneumonia.  Troponin elevated 1.9  Ecg showed NSR, anterior T wave abnl.  WBC elevated.  Had low grade fever 100.9 in ER.

## 2022-09-11 NOTE — HPI
The pt is a 75 yo female with PMHx significant for HTN, Hyperlipidemia, chronic low back pain, OA, Osteoporosis, Prior h/o pancreatitis, PVD, COPD, Life long and current everyday smoker who was brought to the ED by her  for evaluation of 4 days h/o increasing generalized weakness to the point she was unable to get up from bed without assistance , decreased appetite , decreased food and water intake, dry cough, dry heaves, subjective fever . Since yesterday she was experiencing abd pain across the lower abd and increased urination. Denies chest pain, increased SOB, palpitations, nausea , vomiting, diarrhea, constipation, dysuria or incontinence.     Vitals on presentation 115/70, 133, 22, 100.9, 93% on RA  Labs - WBC 13.5K, Hgb 11.2, Plt 294, Na 130, K 3.8, CO2 21, Cr 0.8, Lipase 24, La 0.8, Troponin 1.946.UA- not collected     CXR- Interval development mild interstitial infiltrate involving the right lung.  Left lung is clear.  Possible small right pleural effusion.    EKG- ST, non specific T wave abnormality.      Pt was given NS bolus 1Liter , Cefepime 2 gram IV,  mg  and Heparin infusion per ACS protocol initiated in the ED and HM consulted to place pt in observation for further management of sepsis due to pneumonia and elevated troponin.

## 2022-09-11 NOTE — FIRST PROVIDER EVALUATION
Medical screening examination initiated.  I have conducted a focused provider triage encounter, findings are as follows:    Brief history of present illness:  Patient presents to ER for weakness, onset days ago.  Associated symptoms include abdominal pain, nausea, vomiting, fever.    Vitals:    09/11/22 1105   BP: 115/70   BP Location: Right arm   Patient Position: Sitting   Pulse: (!) 133   Resp: (!) 22   Temp: (!) 100.9 °F (38.3 °C)   TempSrc: Oral   SpO2: (!) 93%       Pertinent physical exam:  Respirations even nonlabored., + tachycardia, patient remains in wheelchair during triage.    Brief workup plan:  labs, COVID19 testing    Preliminary workup initiated; this workup will be continued and followed by the physician or advanced practice provider that is assigned to the patient when roomed.

## 2022-09-11 NOTE — ASSESSMENT & PLAN NOTE
NSTEMI with atypical presentation.  Has h/o significant PAD in past.  Likely has significant underlying CAD.  Anterior T wave abnl on ecg.    Serial troponin levels.  IV heparin gtt.  Asa, Plavix, statin.      Echocardiogram pending.    Would benefit from [facility] to rule out significant underlying CAD.    NPO after midnight for possible cath.

## 2022-09-11 NOTE — ASSESSMENT & PLAN NOTE
- BP is soft  -Hold home antihypertensives in the setting of sepsis   -Monitor BP trend and resume once appropriate

## 2022-09-11 NOTE — ED PROVIDER NOTES
SCRIBE #1 NOTE: I, Azalea Napoles, am scribing for, and in the presence of, Jose G Carlisle Jr., MD. I have scribed the entire note.       History     Chief Complaint   Patient presents with    Abdominal Pain     Since Friday. Pt  states pt is increasingly weak, more confused. Pt reports dysuria, abdominal pain, and R sided flank pain. Denies home O2 use, states she is a smoker but does not have COPD     Review of patient's allergies indicates:   Allergen Reactions    Indomethacin      Other reaction(s): Headache    K-flex Other (See Comments)     Pancreatitis     Latex, natural rubber Swelling    Penicillins      Other reaction(s): Vomiting    Patient tolerated Rocephin during the 4/2021 encounter and she states that she has tolerated Keflex in the past without issue.    Sulfa (sulfonamide antibiotics) Other (See Comments)     hallucinations    Tolmetin      Other reaction(s): Hives    Bactroban [mupirocin calcium] Rash     Burned skin          History of Present Illness     HPI    9/11/2022, 11:49 AM  History obtained from the patient's       History of Present Illness: Keyona Dwyer is a 76 y.o. female patient with a PMHx of HTN, hyperlipidemia, and PVD who presents to the Emergency Department for evaluation of abdominal pain which onset gradually Friday afternoon. Pt's  states pt began being increasingly weaker on Friday afternoon where she could not hold herself up to stand with her walker. Pt's  states pt has chronic back pain, abdominal pain, N/V, and fever. Symptoms are constant and moderate in severity. No mitigating or exacerbating factors reported. Patient denies any diarrhea, CP, SOB, blood in stool,cough , and all other sxs at this time. No prior Tx reported. No further complaints or concerns at this time.       Arrival mode: Personal vehicle      PCP: Ralf Isidro MD        Past Medical History:  Past Medical History:   Diagnosis Date    Anemia     Anxiety     Cataract      Chronic back pain     Dr. Guzman    Fibrocystic breast     Glaucoma     Hyperlipemia     Hypertension     Idiopathic acute pancreatitis 2022    NSTEMI (non-ST elevated myocardial infarction) 2022    Osteoarthritis     Osteoporosis     Rheumatology/on Prolia    PVD (peripheral vascular disease) 2021    Restless leg syndrome     Tobacco dependence     Trouble in sleeping        Past Surgical History:  Past Surgical History:   Procedure Laterality Date    ANGIOGRAPHY OF LOWER EXTREMITY N/A 2021    Procedure: ANGIOGRAM, LOWER EXTREMITY/left leg;  Surgeon: Maico Adkins MD;  Location: [facility], [place] CATH LAB;  Service: Vascular;  Laterality: N/A;  req 1130 start time/Rm A243A    AORTOGRAPHY WITH SERIALOGRAPHY N/A 2021    Procedure: AORTOGRAM, WITH RUNOFF;  Surgeon: Maico Adkins MD;  Location: [facility], [place] CATH LAB;  Service: Vascular;  Laterality: N/A;  left iliac stent with shockwave/req 1030 start    CATARACT EXTRACTION W/  INTRAOCULAR LENS IMPLANT Bilateral 2017    GALLBLADDER SURGERY      HYSTERECTOMY  1972    LUNG SURGERY Right age 15    lung collpase    TOE AMPUTATION Left 4/15/2021    Procedure: AMPUTATION, TOE;  Surgeon: Taylor Anderson DPM;  Location: [facility], [place] OR;  Service: Podiatry;  Laterality: Left;  Hallux (Great Toe)    TOTAL HIP ARTHROPLASTY Bilateral     TOTAL KNEE ARTHROPLASTY Bilateral          Family History:  Family History   Problem Relation Age of Onset    Cancer Mother     Breast cancer Mother     Hyperlipidemia Father     Heart failure Father     Heart disease Brother     Brain cancer Brother     Stroke Brother     Alcohol abuse Brother        Social History:  Social History     Tobacco Use    Smoking status: Some Days     Packs/day: 0.50     Years: 63.00     Pack years: 31.50     Types: Cigarettes     Last attempt to quit: 2021     Years since quittin.2    Smokeless tobacco: Never    Tobacco comments:     started age of  11    Substance and Sexual Activity    Alcohol  use: No    Drug use: No    Sexual activity: Yes     Partners: Male        Review of Systems     Review of Systems   Constitutional:  Positive for chills and fever.   HENT:  Negative for sore throat.    Respiratory:  Negative for cough and shortness of breath.    Cardiovascular:  Negative for chest pain.   Gastrointestinal:  Positive for abdominal pain, nausea and vomiting. Negative for blood in stool and diarrhea.   Genitourinary:  Negative for dysuria.   Musculoskeletal:  Positive for back pain.   Skin:  Negative for rash.   Neurological:  Positive for weakness.   Hematological:  Does not bruise/bleed easily.   All other systems reviewed and are negative.     Physical Exam     Initial Vitals [09/11/22 1105]   BP Pulse Resp Temp SpO2   115/70 (!) 133 (!) 22 (!) 100.9 °F (38.3 °C) (!) 93 %      MAP       --          Physical Exam  Nursing Notes and Vital Signs Reviewed.  Constitutional: Patient is in mild distress. Well-developed and well-nourished.  Head: Atraumatic. Normocephalic.  Eyes: PERRL. EOM intact. Conjunctivae are not pale. No scleral icterus.  ENT: Mucous membranes are moist. Oropharynx is clear and symmetric.    Neck: Supple. Full ROM. No lymphadenopathy.  Cardiovascular: Regular rate. Regular rhythm. No murmurs, rubs, or gallops. Distal pulses are 2+ and symmetric.  Pulmonary/Chest: No respiratory distress. Clear to auscultation bilaterally. Wheezing.  Abdominal: Soft and non-distended.  There is no tenderness.  No rebound, guarding, or rigidity.   Musculoskeletal: Moves all extremities. No obvious deformities. No edema.  Skin: Warm and dry.  Neurological:  Alert, awake, and appropriate.  Normal speech.  No acute focal neurological deficits are appreciated.  Psychiatric: Normal affect. Good eye contact. Appropriate in content.     ED Course   Critical Care    Date/Time: 9/11/2022 12:59 PM  Performed by: oJse G Carlisle Jr., MD  Authorized by: Jose G Carlisle Jr., MD   Direct patient critical care time: 14  "minutes  Additional history critical care time: 11 minutes  Ordering / reviewing critical care time: 9 minutes  Documentation critical care time: 7 minutes  Consulting other physicians critical care time: 4 minutes  Total critical care time (exclusive of procedural time) : 45 minutes  Critical care time was exclusive of separately billable procedures and treating other patients and teaching time.  Critical care was necessary to treat or prevent imminent or life-threatening deterioration of the following conditions: sepsis.  Critical care was time spent personally by me on the following activities: blood draw for specimens, development of treatment plan with patient or surrogate, discussions with consultants, interpretation of cardiac output measurements, evaluation of patient's response to treatment, examination of patient, obtaining history from patient or surrogate, ordering and performing treatments and interventions, ordering and review of laboratory studies, ordering and review of radiographic studies, pulse oximetry, re-evaluation of patient's condition and review of old charts.      ED Vital Signs:  Vitals:    09/11/22 1105 09/11/22 1220 09/11/22 1239 09/11/22 1436   BP: 115/70   (!) 93/55   Pulse: (!) 133  (!) 116 81   Resp: (!) 22  18 17   Temp: (!) 100.9 °F (38.3 °C)      TempSrc: Oral      SpO2: (!) 93%  95% 95%   Weight:  46.7 kg (102 lb 15.3 oz) 46.3 kg (102 lb)    Height:   5' 2" (1.575 m)     09/11/22 1439 09/11/22 1531 09/11/22 1613 09/11/22 1908   BP:  (!) 93/53 (!) 112/58 117/63   Pulse:  87 89 75   Resp:  (!) 24 18 16   Temp: 98.8 °F (37.1 °C)  97.8 °F (36.6 °C) 98.3 °F (36.8 °C)   TempSrc: Oral      SpO2:  97% 99% 96%   Weight:       Height:        09/1943   BP:    Pulse: 79   Resp: 16   Temp:    TempSrc:    SpO2: 100%   Weight:    Height:        Abnormal Lab Results:  Labs Reviewed   CBC W/ AUTO DIFFERENTIAL - Abnormal; Notable for the following components:       Result Value    WBC 13.53 " (*)     RBC 3.71 (*)     Hemoglobin 11.2 (*)     Hematocrit 34.8 (*)     MPV 8.9 (*)     Gran # (ANC) 12.1 (*)     Immature Grans (Abs) 0.07 (*)     Lymph # 0.5 (*)     Gran % 89.7 (*)     Lymph % 3.7 (*)     All other components within normal limits   COMPREHENSIVE METABOLIC PANEL - Abnormal; Notable for the following components:    Sodium 130 (*)     CO2 21 (*)     Glucose 187 (*)     Albumin 3.1 (*)     All other components within normal limits   TROPONIN I - Abnormal; Notable for the following components:    Troponin I 1.946 (*)     All other components within normal limits   PROCALCITONIN - Abnormal; Notable for the following components:    Procalcitonin 16.30 (*)     All other components within normal limits   SARS-COV-2 RDRP GENE - Normal    Narrative:     This test utilizes isothermal nucleic acid amplification   technology to detect the SARS-CoV-2 RdRp nucleic acid segment.   The analytical sensitivity (limit of detection) is 125 genome   equivalents/mL.   A POSITIVE result implies infection with the SARS-CoV-2 virus;   the patient is presumed to be contagious.     A NEGATIVE result means that SARS-CoV-2 nucleic acids are not   present above the limit of detection. A NEGATIVE result should be   treated as presumptive. It does not rule out the possibility of   COVID-19 and should not be the sole basis for treatment decisions.   If COVID-19 is strongly suspected based on clinical and exposure   history, re-testing using an alternate molecular assay should be   considered.   This test is only for use under the Food and Drug   Administration s Emergency Use Authorization (EUA).   Commercial kits are provided by [employer].   Performance characteristics of the EUA have been independently   verified by [facility] Department of   Pathology and Laboratory Medicine.   _________________________________________________________________   The authorized Fact Sheet for Healthcare Providers and the  authorized Fact   Sheet for Patients of the ID NOW COVID-19 are available on the FDA   website:     https://www.fda.gov/media/787277/download  https://www.fda.gov/media/145677/download           LIPASE   LACTIC ACID, PLASMA   APTT    Narrative:     PTT daily if no changes in infusion rate   URINALYSIS, REFLEX TO URINE CULTURE   TROPONIN I        All Lab Results:  Results for orders placed or performed during the hospital encounter of 09/11/22   CBC auto differential   Result Value Ref Range    WBC 13.53 (H) 3.90 - 12.70 K/uL    RBC 3.71 (L) 4.00 - 5.40 M/uL    Hemoglobin 11.2 (L) 12.0 - 16.0 g/dL    Hematocrit 34.8 (L) 37.0 - 48.5 %    MCV 94 82 - 98 fL    MCH 30.2 27.0 - 31.0 pg    MCHC 32.2 32.0 - 36.0 g/dL    RDW 13.0 11.5 - 14.5 %    Platelets 294 150 - 450 K/uL    MPV 8.9 (L) 9.2 - 12.9 fL    Immature Granulocytes 0.5 0.0 - 0.5 %    Gran # (ANC) 12.1 (H) 1.8 - 7.7 K/uL    Immature Grans (Abs) 0.07 (H) 0.00 - 0.04 K/uL    Lymph # 0.5 (L) 1.0 - 4.8 K/uL    Mono # 0.8 0.3 - 1.0 K/uL    Eos # 0.0 0.0 - 0.5 K/uL    Baso # 0.03 0.00 - 0.20 K/uL    nRBC 0 0 /100 WBC    Gran % 89.7 (H) 38.0 - 73.0 %    Lymph % 3.7 (L) 18.0 - 48.0 %    Mono % 5.9 4.0 - 15.0 %    Eosinophil % 0.0 0.0 - 8.0 %    Basophil % 0.2 0.0 - 1.9 %    Differential Method Automated    Comprehensive metabolic panel   Result Value Ref Range    Sodium 130 (L) 136 - 145 mmol/L    Potassium 3.8 3.5 - 5.1 mmol/L    Chloride 97 95 - 110 mmol/L    CO2 21 (L) 23 - 29 mmol/L    Glucose 187 (H) 70 - 110 mg/dL    BUN 12 8 - 23 mg/dL    Creatinine 0.8 0.5 - 1.4 mg/dL    Calcium 9.5 8.7 - 10.5 mg/dL    Total Protein 7.6 6.0 - 8.4 g/dL    Albumin 3.1 (L) 3.5 - 5.2 g/dL    Total Bilirubin 0.4 0.1 - 1.0 mg/dL    Alkaline Phosphatase 81 55 - 135 U/L    AST 38 10 - 40 U/L    ALT 18 10 - 44 U/L    Anion Gap 12 8 - 16 mmol/L    eGFR >60 >60 mL/min/1.73 m^2   Lipase   Result Value Ref Range    Lipase 24 4 - 60 U/L   Lactic acid, plasma   Result Value Ref Range    Lactate  (Lactic Acid) 0.8 0.5 - 2.2 mmol/L   Troponin I   Result Value Ref Range    Troponin I 1.946 (H) 0.000 - 0.026 ng/mL   Procalcitonin   Result Value Ref Range    Procalcitonin 16.30 (H) <0.25 ng/mL   APTT   Result Value Ref Range    aPTT 31.2 21.0 - 32.0 sec   Troponin I   Result Value Ref Range    Troponin I 1.485 (H) 0.000 - 0.026 ng/mL   APTT   Result Value Ref Range    aPTT 139.0 (H) 21.0 - 32.0 sec   Lactic acid, plasma   Result Value Ref Range    Lactate (Lactic Acid) 0.8 0.5 - 2.2 mmol/L   POCT COVID-19 Rapid Screening   Result Value Ref Range    POC Rapid COVID Negative Negative     Acceptable Yes    Echo   Result Value Ref Range    BSA 1.42 m2    TDI SEPTAL 0.07 m/s    LV LATERAL E/E' RATIO 8.58 m/s    LV SEPTAL E/E' RATIO 14.71 m/s    LA WIDTH 3.70 cm    IVC diameter 1.68 cm    Left Ventricular Outflow Tract Mean Velocity 0.87 cm/s    Left Ventricular Outflow Tract Mean Gradient 3.34 mmHg    TDI LATERAL 0.12 m/s    LVIDd 3.44 (A) 3.5 - 6.0 cm    IVS 1.36 (A) 0.6 - 1.1 cm    Posterior Wall 1.50 (A) 0.6 - 1.1 cm    Ao root annulus 2.50 cm    LVIDs 2.23 2.1 - 4.0 cm    FS 35 28 - 44 %    LA volume 60.31 cm3    STJ 2.20 cm    Ascending aorta 2.22 cm    LV mass 174.77 g    LA size 4.45 cm    TAPSE 2.00 cm    Left Ventricle Relative Wall Thickness 0.87 cm    AV mean gradient 6 mmHg    AV valve area 1.87 cm2    AV Velocity Ratio 0.72     AV index (prosthetic) 0.72     MV valve area p 1/2 method 6.14 cm2    E/A ratio 0.86     Mean e' 0.10 m/s    E wave deceleration time 123.62 msec    IVRT 54.23 msec    LVOT diameter 1.82 cm    LVOT area 2.6 cm2    LVOT peak inocente 1.24 m/s    LVOT peak VTI 25.30 cm    Ao peak inocente 1.72 m/s    Ao VTI 35.1 cm    RVOT peak inocente 0.77 m/s    RVOT peak VTI 13.3 cm    Mr max inocente 4.73 m/s    LVOT stroke volume 65.79 cm3    AV peak gradient 12 mmHg    PV mean gradient 1.28 mmHg    E/E' ratio 10.84 m/s    MV Peak E Inocente 1.03 m/s    TR Max Inocente 3.25 m/s    MV stenosis pressure 1/2  time 35.85 ms    MV Peak A Inocente 1.20 m/s    LV Systolic Volume 16.70 mL    LV Systolic Volume Index 11.6 mL/m2    LV Diastolic Volume 48.68 mL    LV Diastolic Volume Index 33.81 mL/m2    LA Volume Index 41.9 mL/m2    LV Mass Index 121 g/m2    RA Major Axis 3.80 cm    Left Atrium Minor Axis 4.35 cm    Left Atrium Major Axis 4.27 cm    Triscuspid Valve Regurgitation Peak Gradient 42 mmHg    RA Width 2.70 cm    Right Atrial Pressure (from IVC) 3 mmHg    EF 55 %    TV rest pulmonary artery pressure 45 mmHg         Imaging Results:  Imaging Results              X-Ray Chest 1 View (Final result)  Result time 09/11/22 12:30:11   Procedure changed from X-Ray Chest PA And Lateral     Final result by Maximino Pandey MD (Timothy) (09/11/22 12:30:11)                   Impression:      Interval development of a mild interstitial infiltrate involving the right lung with a small right pleural effusion.  Differential considerations include unilateral    edema versus pneumonia.      Electronically signed by: Maximino Pandey MD  Date:    09/11/2022  Time:    12:30               Narrative:    EXAMINATION:  XR CHEST 1 VIEW    CLINICAL HISTORY:  <Diagnosis>, weakness;    COMPARISON:  Comparison 06/18/2022.    FINDINGS:  Heart is normal in size.  Interval development mild interstitial infiltrate involving the right lung.  Left lung is clear.  Possible small right pleural effusion.                                       The EKG was ordered, reviewed, and independently interpreted by the ED provider.  Interpretation time: 11:53  Rate: 119 BPM  Rhythm: sinus tachycardia  Interpretation: T wave abnormality, consider anterior ischemia. No STEMI.             The Emergency Provider reviewed the vital signs and test results, which are outlined above.     ED Discussion   12:57 PM: Discussed case with Joanna Holden NP (Hospital Medicine). Dr. Fish  agrees with current care and management of pt and accepts admission.   Admitting Service:  Hospital medicine   Admitting Physician: Dr. Fish   Admit to: Obs    12:58 PM: Re-evaluated pt. I have discussed test results, shared treatment plan, and the need for admission with patient and family at bedside. Pt and family express understanding at this time and agree with all information. All questions answered. Pt and family have no further questions or concerns at this time. Pt is ready for admit.    1:11 PM: Discussed pt's case with Dr. Puckett (interventional Cardiology) who recommends heparin, med tx, and ischemia evaluation after pneumonia tx. .             Medical Decision Making:   Clinical Tests:   Lab Tests: Ordered and Reviewed  Radiological Study: Ordered and Reviewed  Medical Tests: Ordered and Reviewed         ED Medication(s):  Medications   heparin 25,000 units in dextrose 5% 250 mL (100 units/mL) infusion LOW INTENSITY nomogram - [facility] (40 Units/kg/hr × 46.7 kg (Adjusted) Intravenous New Bag 9/11/22 1429)   heparin 25,000 units in dextrose 5% (100 units/ml) IV bolus from bag - ADDITIONAL PRN BOLUS - 60 units/kg (max bolus 4000 units) (has no administration in time range)   heparin 25,000 units in dextrose 5% (100 units/ml) IV bolus from bag - ADDITIONAL PRN BOLUS - 30 units/kg (max bolus 4000 units) (has no administration in time range)   sodium chloride 0.9% flush 10 mL (has no administration in time range)   melatonin tablet 6 mg (has no administration in time range)   buPROPion tablet 75 mg (has no administration in time range)   busPIRone tablet 10 mg (has no administration in time range)   clopidogreL tablet 75 mg (75 mg Oral Given 9/11/22 1847)   DULoxetine DR capsule 30 mg (30 mg Oral Given 9/11/22 1847)   atorvastatin tablet 20 mg (has no administration in time range)   sodium chloride 0.9% flush 10 mL (has no administration in time range)   acetaminophen tablet 650 mg (has no administration in time range)   HYDROcodone-acetaminophen 5-325 mg per tablet 1 tablet (has no administration in  time range)   HYDROcodone-acetaminophen  mg per tablet 1 tablet (has no administration in time range)   polyethylene glycol packet 17 g (17 g Oral Given 9/11/22 1849)   famotidine tablet 20 mg (20 mg Oral Given 9/11/22 1847)   ondansetron injection 4 mg (has no administration in time range)   azithromycin 500 mg in dextrose 5 % 250 mL IVPB (ready to mix system) (500 mg Intravenous New Bag 9/11/22 1846)   cefTRIAXone (ROCEPHIN) 1 g/50 mL D5W IVPB (1 g Intravenous New Bag 9/11/22 1854)   arformoteroL nebulizer solution 15 mcg (15 mcg Nebulization Given 9/1943)   budesonide nebulizer solution 0.5 mg (0.5 mg Nebulization Given 9/1943)   albuterol-ipratropium 2.5 mg-0.5 mg/3 mL nebulizer solution 3 mL (3 mLs Nebulization Given 9/1943)   hydrALAZINE injection 10 mg (has no administration in time range)   midodrine tablet 5 mg (5 mg Oral Given 9/11/22 1855)   aspirin chewable tablet 81 mg (has no administration in time range)   metoprolol succinate (TOPROL-XL) 24 hr split tablet 12.5 mg (has no administration in time range)   albuterol-ipratropium 2.5 mg-0.5 mg/3 mL nebulizer solution 3 mL (3 mLs Nebulization Given 9/11/22 1239)   sodium chloride 0.9% bolus 1,000 mL (0 mLs Intravenous Stopped 9/11/22 1436)   acetaminophen tablet 1,000 mg (1,000 mg Oral Given 9/11/22 1216)   cefepime in dextrose 5 % IVPB 2 g (0 g Intravenous Stopped 9/11/22 1328)   heparin 25,000 units in dextrose 5% (100 units/ml) IV bolus from bag INITIAL BOLUS (max bolus 4000 units) (2,800 Units Intravenous Bolus from Bag 9/11/22 1430)   aspirin tablet 325 mg (325 mg Oral Given 9/11/22 1314)   sodium chloride 0.9% bolus 500 mL (0 mLs Intravenous Stopped 9/11/22 1550)       Current Discharge Medication List                  Scribe Attestation:   Scribe #1: I performed the above scribed service and the documentation accurately describes the services I performed. I attest to the accuracy of the note.     Attending:   Physician  Attestation Statement for Scribe #1: I, Jose G Carlisle Jr., MD, personally performed the services described in this documentation, as scribed by Azalea Napoles, in my presence, and it is both accurate and complete.           Clinical Impression       ICD-10-CM ICD-9-CM   1. Weakness  R53.1 780.79   2. Pneumonia of right lower lobe due to infectious organism  J18.9 486   3. NSTEMI (non-ST elevated myocardial infarction)  I21.4 410.70       Disposition:   Disposition: Placed in Observation  Condition: Fair       Jose G Carlisle Jr., MD  09/11/22 1952

## 2022-09-11 NOTE — ASSESSMENT & PLAN NOTE
This patient does have evidence of infective focus  My overall impression is sepsis. Vital signs were reviewed and noted in progress note.  Antibiotics given-   Antibiotics (From admission, onward)    Start     Stop Route Frequency Ordered    09/11/22 1500  cefTRIAXone (ROCEPHIN) 1 g/50 mL D5W IVPB         09/16 1459 IV Every 24 hours (non-standard times) 09/11/22 1325    09/11/22 1400  azithromycin 500 mg in dextrose 5 % 250 mL IVPB (ready to mix system)         09/14 1359 IV Every 24 hours (non-standard times) 09/11/22 1325        Cultures were taken-   Microbiology Results (last 7 days)     Procedure Component Value Units Date/Time    Blood Culture #1 **CANNOT BE ORDERED STAT** [[id number]] Collected: 09/11/22 1206    Order Status: Sent Specimen: Blood from Peripheral, Hand, Right Updated: 09/11/22 1207    Blood Culture #2 **CANNOT BE ORDERED STAT** [[id number]] Collected: 09/11/22 1205    Order Status: Sent Specimen: Blood from Peripheral, Antecubital, Right Updated: 09/11/22 1206        Latest lactate reviewed, they are-  Recent Labs   Lab 09/11/22 1206   LACTATE 0.8       Organ dysfunction indicated by none  Source- Lower resp tract     Source control Achieved by- Antimicrobial     SLP consult to assess aspiration risk

## 2022-09-11 NOTE — ASSESSMENT & PLAN NOTE
- Troponin elevated at 1.946 on presentation   -Pt denies cliff chest pain or angina equivalent  -EKG with non specific T wave abnormality  - mg x 1 dose in the ED  -Continue Plavix and Statin   -Heparin gtt initiated per [facility] protocol   -Trend troponin   -Consult Cardiology

## 2022-09-11 NOTE — ED NOTES
Bed: 15  Expected date: 9/11/22  Expected time:   Means of arrival: Personal Transportation  Comments:

## 2022-09-11 NOTE — CONSULTS
[facility] S[facility]  Cardiology  Consult Note    Patient Name: Keyona Dwyer  MRN: [id number]  Admission Date: 9/11/2022  Hospital Length of Stay: 0 days  Code Status: Full Code   Attending Provider: Natalie Fish MD   Consulting Provider: Raffaele Puckett MD  Primary Care Physician: Ralf Isidro MD  Principal Problem:Sepsis    Patient information was obtained from patient, spouse/SO, past medical records and ER records.     Inpatient consult to Cardiology  Consult performed by: Raffaele Puckett MD  Consult ordered by: Natalie Fish MD  Reason for consult: Elevated troponin        Subjective:     Chief Complaint:  weakness     HPI:   Cardiology consulted for NSTEMI.  Pt has h/o PAD, HTN, hyperlipidemia, smoking.  She has h/o PAD with angiogram in April 2021 showing R common iliac occlusion and had PTA L common iliac artery, tx by Dr. Adkins, Vascular Surgery.  She has been weak last few days, and had a fever, GAMEZ, and lower abd pain.  No chest pain sxs.  She presented to ER and started on antibiotics for possible R sided pneumonia.  Troponin elevated 1.9  Ecg showed NSR, anterior T wave abnl.  WBC elevated.  Had low grade fever 100.9 in ER.          Past Medical History:   Diagnosis Date    Anemia     Anxiety     Cataract     Chronic back pain     Dr. Guzman    Fibrocystic breast     Glaucoma     Hyperlipemia     Hypertension     Idiopathic acute pancreatitis 6/18/2022    Osteoarthritis     Osteoporosis     Rheumatology/on Prolia    PVD (peripheral vascular disease) 4/12/2021    Restless leg syndrome     Tobacco dependence     Trouble in sleeping        Past Surgical History:   Procedure Laterality Date    ANGIOGRAPHY OF LOWER EXTREMITY N/A 4/12/2021    Procedure: ANGIOGRAM, LOWER EXTREMITY/left leg;  Surgeon: Maico Adkins MD;  Location: [facility] CATH LAB;  Service: Vascular;  Laterality: N/A;  req 1130 start time/Rm A243A    AORTOGRAPHY WITH SERIALOGRAPHY N/A 4/14/2021    Procedure: AORTOGRAM,  WITH RUNOFF;  Surgeon: Maico Adkins MD;  Location: [facility] CATH LAB;  Service: Vascular;  Laterality: N/A;  left iliac stent with shockwave/req 1030 start    CATARACT EXTRACTION W/  INTRAOCULAR LENS IMPLANT Bilateral 8/ 9 2017    GALLBLADDER SURGERY      HYSTERECTOMY  1972    LUNG SURGERY Right age 15    lung collpase    TOE AMPUTATION Left 4/15/2021    Procedure: AMPUTATION, TOE;  Surgeon: Taylor Anderson DPM;  Location: [facility] OR;  Service: Podiatry;  Laterality: Left;  Hallux (Great Toe)    TOTAL HIP ARTHROPLASTY Bilateral     TOTAL KNEE ARTHROPLASTY Bilateral        Review of patient's allergies indicates:   Allergen Reactions    Indomethacin      Other reaction(s): Headache    K-flex Other (See Comments)     Pancreatitis     Latex, natural rubber Swelling    Penicillins      Other reaction(s): Vomiting    Patient tolerated Rocephin during the 4/2021 encounter and she states that she has tolerated Keflex in the past without issue.    Sulfa (sulfonamide antibiotics) Other (See Comments)     hallucinations    Tolmetin      Other reaction(s): Hives    Bactroban [mupirocin calcium] Rash     Burned skin        No current facility-administered medications on file prior to encounter.     Current Outpatient Medications on File Prior to Encounter   Medication Sig    amLODIPine (NORVASC) 5 MG tablet TAKE 1 TABLET BY MOUTH EVERY DAY FOR BLOOD PRESSURE    azelastine (ASTELIN) 137 mcg (0.1 %) nasal spray 1 spray (137 mcg total) by Nasal route 2 (two) times daily.    b complex vitamins capsule Take 1 capsule by mouth once daily.    buPROPion (WELLBUTRIN) 75 MG tablet Take 1 tablet (75 mg total) by mouth 2 (two) times daily.    busPIRone (BUSPAR) 10 MG tablet TAKE 1 TABLET BY MOUTH TWICE DAILY    clopidogreL (PLAVIX) 75 mg tablet TAKE 1 TABLET BY MOUTH ONCE A DAY    duloxetine (CYMBALTA) 30 MG capsule Take 30 mg by mouth once daily.    ergocalciferol (ERGOCALCIFEROL) 50,000 unit Cap TAKE 1 CAPSULE BY MOUTH  ONE TIME PER WEEK    ferrous sulfate (FEOSOL) 325 mg (65 mg iron) Tab tablet Take 1 tab (325 mg) every other day.    furosemide (LASIX) 20 MG tablet Take 1 tablet (20 mg total) by mouth daily as needed (swelling). (Patient not taking: Reported on 2022)    HYDROcodone-acetaminophen (NORCO)  mg per tablet Take 1 tablet by mouth 3 (three) times daily as needed.    hydrocodone-acetaminophen 10-325mg (NORCO)  mg Tab Take 1 tablet by mouth every 6 (six) hours as needed.    lisinopriL (PRINIVIL,ZESTRIL) 40 MG tablet TAKE 1 TABLET BY MOUTH EVERY DAY for blood pressure    nutritional supplement/fiber (QUINOA-KALE-HEMP ORAL) Take by mouth.    ON-Q PAIN PUMP by Misc.(Non-Drug; Combo Route) route.    PROLIA 60 mg/mL Syrg     rosuvastatin (CRESTOR) 5 MG tablet TAKE 1 TABLET BY MOUTH ONCE A DAY     Family History       Problem Relation (Age of Onset)    Alcohol abuse Brother    Brain cancer Brother    Breast cancer Mother    Cancer Mother    Heart disease Brother    Heart failure Father    Hyperlipidemia Father    Stroke Brother          Tobacco Use    Smoking status: Some Days     Packs/day: 0.50     Years: 63.00     Pack years: 31.50     Types: Cigarettes     Last attempt to quit: 2021     Years since quittin.2    Smokeless tobacco: Never    Tobacco comments:     started age of  11    Substance and Sexual Activity    Alcohol use: No    Drug use: No    Sexual activity: Yes     Partners: Male     Review of Systems   Constitutional: Positive for decreased appetite, fever and malaise/fatigue.   HENT: Negative.     Eyes: Negative.    Cardiovascular:  Positive for dyspnea on exertion.   Respiratory:  Positive for shortness of breath.    Endocrine: Negative.    Hematologic/Lymphatic: Negative.    Skin: Negative.    Musculoskeletal: Negative.    Gastrointestinal:  Positive for abdominal pain.   Genitourinary: Negative.    Neurological:  Positive for weakness.   Psychiatric/Behavioral: Negative.      Allergic/Immunologic: Negative.    Objective:     Vital Signs (Most Recent):  Temp: 97.8 °F (36.6 °C) (09/11/22 1613)  Pulse: 89 (09/11/22 1613)  Resp: 18 (09/11/22 1613)  BP: (!) 112/58 (09/11/22 1613)  SpO2: 99 % (09/11/22 1613)   Vital Signs (24h Range):  Temp:  [97.8 °F (36.6 °C)-100.9 °F (38.3 °C)] 97.8 °F (36.6 °C)  Pulse:  [] 89  Resp:  [17-24] 18  SpO2:  [93 %-99 %] 99 %  BP: ()/(53-70) 112/58     Weight: 46.3 kg (102 lb)  Body mass index is 18.66 kg/m².    SpO2: 99 %  O2 Device (Oxygen Therapy): nasal cannula (1L NC)    No intake or output data in the 24 hours ending 09/11/22 1712    Lines/Drains/Airways       Peripheral Intravenous Line  Duration                  Peripheral IV - Single Lumen 09/11/22 1227 20 G Right Antecubital <1 day                    Physical Exam  Vitals and nursing note reviewed.   Constitutional:       General: She is awake. She is not in acute distress.     Appearance: Normal appearance. She is well-developed. She is ill-appearing. She is not diaphoretic.   HENT:      Head: Normocephalic.   Neck:      Thyroid: No thyromegaly.      Vascular: Normal carotid pulses. No carotid bruit, hepatojugular reflux or JVD.   Cardiovascular:      Rate and Rhythm: Normal rate and regular rhythm.      Chest Wall: PMI is not displaced.      Pulses: Normal pulses.           Radial pulses are 2+ on the right side and 2+ on the left side.      Heart sounds: Normal heart sounds, S1 normal and S2 normal. No murmur heard.    No friction rub. No gallop.   Pulmonary:      Effort: Pulmonary effort is normal.      Breath sounds: Rhonchi present. No wheezing or rales.   Abdominal:      General: Bowel sounds are normal. There is no abdominal bruit.      Palpations: Abdomen is soft. There is no hepatomegaly, splenomegaly or mass.      Tenderness: There is abdominal tenderness. There is no guarding or rebound.   Musculoskeletal:      Cervical back: Neck supple.   Lymphadenopathy:      Cervical: No  cervical adenopathy.   Skin:     General: Skin is warm.   Psychiatric:         Behavior: Behavior normal. Behavior is cooperative.       Significant Labs: ABG: No results for input(s): PH, PCO2, HCO3, POCSATURATED, BE in the last 48 hours., Blood Culture: No results for input(s): LABBLOO in the last 48 hours., BMP:   Recent Labs   Lab 09/11/22  1206   *   *   K 3.8   CL 97   CO2 21*   BUN 12   CREATININE 0.8   CALCIUM 9.5   , CMP   Recent Labs   Lab 09/11/22  1206   *   K 3.8   CL 97   CO2 21*   *   BUN 12   CREATININE 0.8   CALCIUM 9.5   PROT 7.6   ALBUMIN 3.1*   BILITOT 0.4   ALKPHOS 81   AST 38   ALT 18   ANIONGAP 12   , CBC   Recent Labs   Lab 09/11/22  1206   WBC 13.53*   HGB 11.2*   HCT 34.8*      , INR No results for input(s): INR, PROTIME in the last 48 hours., Lipid Panel No results for input(s): CHOL, HDL, LDLCALC, TRIG, CHOLHDL in the last 48 hours., and Troponin   Recent Labs   Lab 09/11/22  1206   TROPONINI 1.946*       Significant Imaging: Echocardiogram: Transthoracic echo (TTE) complete ([facility] Only):   Results for orders placed or performed during the hospital encounter of 09/11/22   Echo   Result Value Ref Range    BSA 1.42 m2    TDI SEPTAL 0.07 m/s    LV LATERAL E/E' RATIO 8.58 m/s    LV SEPTAL E/E' RATIO 14.71 m/s    LA WIDTH 3.70 cm    IVC diameter 1.68 cm    Left Ventricular Outflow Tract Mean Velocity 0.87 cm/s    Left Ventricular Outflow Tract Mean Gradient 3.34 mmHg    TDI LATERAL 0.12 m/s    LVIDd 3.44 (A) 3.5 - 6.0 cm    IVS 1.36 (A) 0.6 - 1.1 cm    Posterior Wall 1.50 (A) 0.6 - 1.1 cm    Ao root annulus 2.50 cm    LVIDs 2.23 2.1 - 4.0 cm    FS 35 28 - 44 %    LA volume 60.31 cm3    STJ 2.20 cm    Ascending aorta 2.22 cm    LV mass 174.77 g    LA size 4.45 cm    TAPSE 2.00 cm    Left Ventricle Relative Wall Thickness 0.87 cm    AV mean gradient 6 mmHg    AV valve area 1.87 cm2    AV Velocity Ratio 0.72     AV index (prosthetic) 0.72     MV valve area p 1/2  method 6.14 cm2    E/A ratio 0.86     Mean e' 0.10 m/s    E wave deceleration time 123.62 msec    IVRT 54.23 msec    LVOT diameter 1.82 cm    LVOT area 2.6 cm2    LVOT peak inocente 1.24 m/s    LVOT peak VTI 25.30 cm    Ao peak inocente 1.72 m/s    Ao VTI 35.1 cm    RVOT peak inocente 0.77 m/s    RVOT peak VTI 13.3 cm    Mr max inocente 4.73 m/s    LVOT stroke volume 65.79 cm3    AV peak gradient 12 mmHg    PV mean gradient 1.28 mmHg    E/E' ratio 10.84 m/s    MV Peak E Inocente 1.03 m/s    TR Max Inocente 3.25 m/s    MV stenosis pressure 1/2 time 35.85 ms    MV Peak A Inocente 1.20 m/s    LV Systolic Volume 16.70 mL    LV Systolic Volume Index 11.6 mL/m2    LV Diastolic Volume 48.68 mL    LV Diastolic Volume Index 33.81 mL/m2    LA Volume Index 41.9 mL/m2    LV Mass Index 121 g/m2    RA Major Axis 3.80 cm    Left Atrium Minor Axis 4.35 cm    Left Atrium Major Axis 4.27 cm    Triscuspid Valve Regurgitation Peak Gradient 42 mmHg    RA Width 2.70 cm    Right Atrial Pressure (from IVC) 3 mmHg    EF 55 %    TV rest pulmonary artery pressure 45 mmHg     Assessment and Plan:     * Sepsis due to Pneumonia   Continue antibiotics for pneumonia.  Per Hospital Med mgt.      NSTEMI (non-ST elevated myocardial infarction)  NSTEMI with atypical presentation.  Has h/o significant PAD in past.  Likely has significant underlying CAD.  Anterior T wave abnl on ecg.    Serial troponin levels.  IV heparin gtt.  Asa, Plavix, statin.      Echocardiogram pending.    Would benefit from [facility] to rule out significant underlying CAD.    NPO after midnight for possible cath.    Elevated troponin  See NSTEMI section.    Tobacco abuse disorder  Smoking cessation advised.    PVD (peripheral vascular disease)  Medical tx for now.  Smoking cessation.    HTN (hypertension)  HTN control.        VTE Risk Mitigation (From admission, onward)         Ordered     heparin 25,000 units in dextrose 5% (100 units/ml) IV bolus from bag - ADDITIONAL PRN BOLUS - 60 units/kg (max bolus 4000 units)   As needed (PRN)        Question:  Heparin Infusion Adjustment (DO NOT MODIFY ANSWER)  Answer:  [email or web address]\epic\Images\Pharmacy\HeparinInfusions\heparin LOW INTENSITY nomogram for [facility] [id number]    09/11/22 1259     heparin 25,000 units in dextrose 5% (100 units/ml) IV bolus from bag - ADDITIONAL PRN BOLUS - 30 units/kg (max bolus 4000 units)  As needed (PRN)        Question:  Heparin Infusion Adjustment (DO NOT MODIFY ANSWER)  Answer:  [email or web address]\epic\Images\Pharmacy\HeparinInfusions\heparin LOW INTENSITY nomogram for [facility] [id number]    09/11/22 1259     IP VTE HIGH RISK PATIENT  Once         09/11/22 1324     Place sequential compression device  Until discontinued         09/11/22 1324     heparin 25,000 units in dextrose 5% 250 mL (100 units/mL) infusion LOW INTENSITY nomogram - [facility]  Continuous        Question Answer Comment   Heparin Infusion Adjustment (DO NOT MODIFY ANSWER) [email or web address]\epic\Images\Pharmacy\HeparinInfusions\heparin LOW INTENSITY nomogram for [facility] [id number]    Begin at (in units/kg/hr) 12        09/11/22 1259                Thank you for your consult.    Raffaele Puckett MD  Cardiology   [facility]

## 2022-09-11 NOTE — H&P
[facility] Medicine  History & Physical    Patient Name: Keyona Dwyer  MRN: [id number]  Patient Class: OP- Observation  Admission Date: 9/11/2022  Attending Physician: Natalie Fish MD  Primary Care Provider: Ralf Isidro MD         Patient information was obtained from spouse/SO, past medical records and ER records.     Subjective:     Principal Problem:Sepsis    Chief Complaint:   Chief Complaint   Patient presents with    Abdominal Pain     Since Friday. Pt  states pt is increasingly weak, more confused. Pt reports dysuria, abdominal pain, and R sided flank pain. Denies home O2 use, states she is a smoker but does not have COPD        HPI: The pt is a 77 yo female with PMHx significant for HTN, Hyperlipidemia, chronic low back pain, OA, Osteoporosis, Prior h/o pancreatitis, PVD, COPD, Life long and current everyday smoker who was brought to the ED by her  for evaluation of 4 days h/o increasing generalized weakness to the point she was unable to get up from bed without assistance , decreased appetite , decreased food and water intake, dry cough, dry heaves, subjective fever . Since yesterday she was experiencing abd pain across the lower abd and increased urination. Denies chest pain, increased SOB, palpitations, nausea , vomiting, diarrhea, constipation, dysuria or incontinence.     Vitals on presentation 115/70, 133, 22, 100.9, 93% on RA  Labs - WBC 13.5K, Hgb 11.2, Plt 294, Na 130, K 3.8, CO2 21, Cr 0.8, Lipase 24, La 0.8, Troponin 1.946.UA- not collected     CXR- Interval development mild interstitial infiltrate involving the right lung.  Left lung is clear.  Possible small right pleural effusion.    EKG- ST, non specific T wave abnormality.      Pt was given NS bolus 1Liter , Cefepime 2 gram IV,  mg  and Heparin infusion per ACS protocol initiated in the ED and HM consulted to place pt in observation for further management of sepsis due to pneumonia and  elevated troponin.       Past Medical History:   Diagnosis Date    Anemia     Anxiety     Cataract     Chronic back pain     Dr. Guzman    Fibrocystic breast     Glaucoma     Hyperlipemia     Hypertension     Idiopathic acute pancreatitis 6/18/2022    Osteoarthritis     Osteoporosis     Rheumatology/on Prolia    PVD (peripheral vascular disease) 4/12/2021    Restless leg syndrome     Tobacco dependence     Trouble in sleeping        Past Surgical History:   Procedure Laterality Date    ANGIOGRAPHY OF LOWER EXTREMITY N/A 4/12/2021    Procedure: ANGIOGRAM, LOWER EXTREMITY/left leg;  Surgeon: Maico Adkins MD;  Location: [facility] CATH LAB;  Service: Vascular;  Laterality: N/A;  req 1130 start time/ A243A    AORTOGRAPHY WITH SERIALOGRAPHY N/A 4/14/2021    Procedure: AORTOGRAM, WITH RUNOFF;  Surgeon: Maico Adkins MD;  Location: [facility] CATH LAB;  Service: Vascular;  Laterality: N/A;  left iliac stent with shockwave/req 1030 start    CATARACT EXTRACTION W/  INTRAOCULAR LENS IMPLANT Bilateral 8/ 9 2017    GALLBLADDER SURGERY      HYSTERECTOMY  1972    LUNG SURGERY Right age 15    lung collpase    TOE AMPUTATION Left 4/15/2021    Procedure: AMPUTATION, TOE;  Surgeon: Taylor Anderson DPM;  Location: [facility] OR;  Service: Podiatry;  Laterality: Left;  Hallux (Great Toe)    TOTAL HIP ARTHROPLASTY Bilateral     TOTAL KNEE ARTHROPLASTY Bilateral        Review of patient's allergies indicates:   Allergen Reactions    Indomethacin      Other reaction(s): Headache    K-flex Other (See Comments)     Pancreatitis     Latex, natural rubber Swelling    Penicillins      Other reaction(s): Vomiting    Patient tolerated Rocephin during the 4/2021 encounter and she states that she has tolerated Keflex in the past without issue.    Sulfa (sulfonamide antibiotics) Other (See Comments)     hallucinations    Tolmetin      Other reaction(s): Hives    Bactroban [mupirocin calcium] Rash     Burned skin        No  current facility-administered medications on file prior to encounter.     Current Outpatient Medications on File Prior to Encounter   Medication Sig    amLODIPine (NORVASC) 5 MG tablet TAKE 1 TABLET BY MOUTH EVERY DAY FOR BLOOD PRESSURE    azelastine (ASTELIN) 137 mcg (0.1 %) nasal spray 1 spray (137 mcg total) by Nasal route 2 (two) times daily.    b complex vitamins capsule Take 1 capsule by mouth once daily.    buPROPion (WELLBUTRIN) 75 MG tablet Take 1 tablet (75 mg total) by mouth 2 (two) times daily.    busPIRone (BUSPAR) 10 MG tablet TAKE 1 TABLET BY MOUTH TWICE DAILY    clopidogreL (PLAVIX) 75 mg tablet TAKE 1 TABLET BY MOUTH ONCE A DAY    duloxetine (CYMBALTA) 30 MG capsule Take 30 mg by mouth once daily.    ergocalciferol (ERGOCALCIFEROL) 50,000 unit Cap TAKE 1 CAPSULE BY MOUTH ONE TIME PER WEEK    ferrous sulfate (FEOSOL) 325 mg (65 mg iron) Tab tablet Take 1 tab (325 mg) every other day.    furosemide (LASIX) 20 MG tablet Take 1 tablet (20 mg total) by mouth daily as needed (swelling). (Patient not taking: Reported on 9/1/2022)    HYDROcodone-acetaminophen (NORCO)  mg per tablet Take 1 tablet by mouth 3 (three) times daily as needed.    hydrocodone-acetaminophen 10-325mg (NORCO)  mg Tab Take 1 tablet by mouth every 6 (six) hours as needed.    lisinopriL (PRINIVIL,ZESTRIL) 40 MG tablet TAKE 1 TABLET BY MOUTH EVERY DAY for blood pressure    nutritional supplement/fiber (QUINOA-KALE-HEMP ORAL) Take by mouth.    ON-Q PAIN PUMP by Misc.(Non-Drug; Combo Route) route.    PROLIA 60 mg/mL Syrg     rosuvastatin (CRESTOR) 5 MG tablet TAKE 1 TABLET BY MOUTH ONCE A DAY     Family History       Problem Relation (Age of Onset)    Alcohol abuse Brother    Brain cancer Brother    Breast cancer Mother    Cancer Mother    Heart disease Brother    Heart failure Father    Hyperlipidemia Father    Stroke Brother          Tobacco Use    Smoking status: Some Days     Packs/day: 0.50     Years:  63.00     Pack years: 31.50     Types: Cigarettes     Last attempt to quit: 2021     Years since quittin.2    Smokeless tobacco: Never    Tobacco comments:     started age of  11    Substance and Sexual Activity    Alcohol use: No    Drug use: No    Sexual activity: Yes     Partners: Male     Review of Systems   Constitutional:  Positive for activity change, appetite change, fatigue and fever.   HENT:  Negative for sore throat.    Eyes:  Negative for visual disturbance.   Respiratory:  Positive for cough and shortness of breath. Negative for chest tightness.    Cardiovascular:  Negative for chest pain, palpitations and leg swelling.   Gastrointestinal:  Positive for abdominal pain and nausea. Negative for abdominal distention, constipation, diarrhea and vomiting.        Dry heaves    Endocrine: Negative for polyuria.   Genitourinary:  Negative for decreased urine volume, dysuria, flank pain, frequency and hematuria.   Musculoskeletal:  Positive for back pain and gait problem.   Skin:  Negative for rash.   Neurological:  Positive for weakness. Negative for syncope, speech difficulty, light-headedness and headaches.   Psychiatric/Behavioral:  Negative for confusion, hallucinations and sleep disturbance.    Objective:     Vital Signs (Most Recent):  Temp: 97.8 °F (36.6 °C) (22)  Pulse: 89 (22)  Resp: 18 (22)  BP: (!) 112/58 (22)  SpO2: 99 % (22)   Vital Signs (24h Range):  Temp:  [97.8 °F (36.6 °C)-100.9 °F (38.3 °C)] 97.8 °F (36.6 °C)  Pulse:  [] 89  Resp:  [17-24] 18  SpO2:  [93 %-99 %] 99 %  BP: ()/(53-70) 112/58     Weight: 46.3 kg (102 lb)  Body mass index is 18.66 kg/m².    Physical Exam  Constitutional:       General: She is not in acute distress.     Appearance: She is well-developed and underweight. She is ill-appearing. She is not diaphoretic.   HENT:      Head: Normocephalic and atraumatic.      Mouth/Throat:      Mouth: Mucous  membranes are dry.      Pharynx: No oropharyngeal exudate.   Eyes:      Conjunctiva/sclera: Conjunctivae normal.      Pupils: Pupils are equal, round, and reactive to light.   Neck:      Thyroid: No thyromegaly.      Vascular: No JVD.   Cardiovascular:      Rate and Rhythm: Normal rate and regular rhythm.      Heart sounds: Normal heart sounds. No murmur heard.  Pulmonary:      Effort: Pulmonary effort is normal. No respiratory distress.      Breath sounds: Normal breath sounds. No wheezing or rales.   Chest:      Chest wall: No tenderness.   Abdominal:      General: Bowel sounds are normal. There is no distension.      Palpations: Abdomen is soft.      Tenderness: There is abdominal tenderness (across lower abdomen). There is no guarding or rebound.   Musculoskeletal:         General: Normal range of motion.      Cervical back: Normal range of motion and neck supple.      Right lower leg: No edema.      Left lower leg: No edema.   Lymphadenopathy:      Cervical: No cervical adenopathy.   Skin:     General: Skin is warm and dry.      Findings: Bruising present. No rash.   Neurological:      General: No focal deficit present.      Mental Status: She is oriented to person, place, and time. She is lethargic.      Cranial Nerves: No cranial nerve deficit.      Sensory: No sensory deficit.      Motor: Weakness present.      Deep Tendon Reflexes: Reflexes normal.   Psychiatric:         Behavior: Behavior is cooperative.         CRANIAL NERVES     CN III, IV, VI   Pupils are equal, round, and reactive to light.     Significant Labs:   Results for orders placed or performed during the hospital encounter of 09/11/22   CBC auto differential   Result Value Ref Range    WBC 13.53 (H) 3.90 - 12.70 K/uL    RBC 3.71 (L) 4.00 - 5.40 M/uL    Hemoglobin 11.2 (L) 12.0 - 16.0 g/dL    Hematocrit 34.8 (L) 37.0 - 48.5 %    MCV 94 82 - 98 fL    MCH 30.2 27.0 - 31.0 pg    MCHC 32.2 32.0 - 36.0 g/dL    RDW 13.0 11.5 - 14.5 %    Platelets 294  150 - 450 K/uL    MPV 8.9 (L) 9.2 - 12.9 fL    Immature Granulocytes 0.5 0.0 - 0.5 %    Gran # (ANC) 12.1 (H) 1.8 - 7.7 K/uL    Immature Grans (Abs) 0.07 (H) 0.00 - 0.04 K/uL    Lymph # 0.5 (L) 1.0 - 4.8 K/uL    Mono # 0.8 0.3 - 1.0 K/uL    Eos # 0.0 0.0 - 0.5 K/uL    Baso # 0.03 0.00 - 0.20 K/uL    nRBC 0 0 /100 WBC    Gran % 89.7 (H) 38.0 - 73.0 %    Lymph % 3.7 (L) 18.0 - 48.0 %    Mono % 5.9 4.0 - 15.0 %    Eosinophil % 0.0 0.0 - 8.0 %    Basophil % 0.2 0.0 - 1.9 %    Differential Method Automated    Comprehensive metabolic panel   Result Value Ref Range    Sodium 130 (L) 136 - 145 mmol/L    Potassium 3.8 3.5 - 5.1 mmol/L    Chloride 97 95 - 110 mmol/L    CO2 21 (L) 23 - 29 mmol/L    Glucose 187 (H) 70 - 110 mg/dL    BUN 12 8 - 23 mg/dL    Creatinine 0.8 0.5 - 1.4 mg/dL    Calcium 9.5 8.7 - 10.5 mg/dL    Total Protein 7.6 6.0 - 8.4 g/dL    Albumin 3.1 (L) 3.5 - 5.2 g/dL    Total Bilirubin 0.4 0.1 - 1.0 mg/dL    Alkaline Phosphatase 81 55 - 135 U/L    AST 38 10 - 40 U/L    ALT 18 10 - 44 U/L    Anion Gap 12 8 - 16 mmol/L    eGFR >60 >60 mL/min/1.73 m^2   Lipase   Result Value Ref Range    Lipase 24 4 - 60 U/L   Lactic acid, plasma   Result Value Ref Range    Lactate (Lactic Acid) 0.8 0.5 - 2.2 mmol/L   Troponin I   Result Value Ref Range    Troponin I 1.946 (H) 0.000 - 0.026 ng/mL   Procalcitonin   Result Value Ref Range    Procalcitonin 16.30 (H) <0.25 ng/mL   APTT   Result Value Ref Range    aPTT 31.2 21.0 - 32.0 sec   POCT COVID-19 Rapid Screening   Result Value Ref Range    POC Rapid COVID Negative Negative     Acceptable Yes    Echo   Result Value Ref Range    BSA 1.42 m2    TDI SEPTAL 0.07 m/s    LV LATERAL E/E' RATIO 8.58 m/s    LV SEPTAL E/E' RATIO 14.71 m/s    LA WIDTH 3.70 cm    IVC diameter 1.68 cm    Left Ventricular Outflow Tract Mean Velocity 0.87 cm/s    Left Ventricular Outflow Tract Mean Gradient 3.34 mmHg    TDI LATERAL 0.12 m/s    LVIDd 3.44 (A) 3.5 - 6.0 cm    IVS 1.36 (A) 0.6 -  1.1 cm    Posterior Wall 1.50 (A) 0.6 - 1.1 cm    Ao root annulus 2.50 cm    LVIDs 2.23 2.1 - 4.0 cm    FS 35 28 - 44 %    LA volume 60.31 cm3    STJ 2.20 cm    Ascending aorta 2.22 cm    LV mass 174.77 g    LA size 4.45 cm    TAPSE 2.00 cm    Left Ventricle Relative Wall Thickness 0.87 cm    AV mean gradient 6 mmHg    AV valve area 1.87 cm2    AV Velocity Ratio 0.72     AV index (prosthetic) 0.72     MV valve area p 1/2 method 6.14 cm2    E/A ratio 0.86     Mean e' 0.10 m/s    E wave deceleration time 123.62 msec    IVRT 54.23 msec    LVOT diameter 1.82 cm    LVOT area 2.6 cm2    LVOT peak inocente 1.24 m/s    LVOT peak VTI 25.30 cm    Ao peak inocente 1.72 m/s    Ao VTI 35.1 cm    RVOT peak inocente 0.77 m/s    RVOT peak VTI 13.3 cm    Mr max inocente 4.73 m/s    LVOT stroke volume 65.79 cm3    AV peak gradient 12 mmHg    PV mean gradient 1.28 mmHg    E/E' ratio 10.84 m/s    MV Peak E Inocente 1.03 m/s    TR Max Inocente 3.25 m/s    MV stenosis pressure 1/2 time 35.85 ms    MV Peak A Inocente 1.20 m/s    LV Systolic Volume 16.70 mL    LV Systolic Volume Index 11.6 mL/m2    LV Diastolic Volume 48.68 mL    LV Diastolic Volume Index 33.81 mL/m2    LA Volume Index 41.9 mL/m2    LV Mass Index 121 g/m2    RA Major Axis 3.80 cm    Left Atrium Minor Axis 4.35 cm    Left Atrium Major Axis 4.27 cm    Triscuspid Valve Regurgitation Peak Gradient 42 mmHg    RA Width 2.70 cm    Right Atrial Pressure (from IVC) 3 mmHg    EF 55 %    TV rest pulmonary artery pressure 45 mmHg         Significant Imaging:   Imaging Results              X-Ray Chest 1 View (Final result)  Result time 09/11/22 12:30:11   Procedure changed from X-Ray Chest PA And Lateral     Final result by Maximino Pandey MD (Timothy) (09/11/22 12:30:11)                   Impression:      Interval development of a mild interstitial infiltrate involving the right lung with a small right pleural effusion.  Differential considerations include unilateral    edema versus pneumonia.      Electronically  signed by: Maximino Pandey MD  Date:    09/11/2022  Time:    12:30               Narrative:    EXAMINATION:  XR CHEST 1 VIEW    CLINICAL HISTORY:  <Diagnosis>, weakness;    COMPARISON:  Comparison 06/18/2022.    FINDINGS:  Heart is normal in size.  Interval development mild interstitial infiltrate involving the right lung.  Left lung is clear.  Possible small right pleural effusion.                                        Assessment/Plan:     * Sepsis due to Pneumonia   This patient does have evidence of infective focus  My overall impression is sepsis. Vital signs were reviewed and noted in progress note.  Antibiotics given-   Antibiotics (From admission, onward)    Start     Stop Route Frequency Ordered    09/11/22 1500  cefTRIAXone (ROCEPHIN) 1 g/50 mL D5W IVPB         09/16 1459 IV Every 24 hours (non-standard times) 09/11/22 1325    09/11/22 1400  azithromycin 500 mg in dextrose 5 % 250 mL IVPB (ready to mix system)         09/14 1359 IV Every 24 hours (non-standard times) 09/11/22 1325        Cultures were taken-   Microbiology Results (last 7 days)     Procedure Component Value Units Date/Time    Blood Culture #1 **CANNOT BE ORDERED STAT** [[id number]] Collected: 09/11/22 1206    Order Status: Sent Specimen: Blood from Peripheral, Hand, Right Updated: 09/11/22 1207    Blood Culture #2 **CANNOT BE ORDERED STAT** [[id number]] Collected: 09/11/22 1205    Order Status: Sent Specimen: Blood from Peripheral, Antecubital, Right Updated: 09/11/22 1206        Latest lactate reviewed, they are-  Recent Labs   Lab 09/11/22 1206   LACTATE 0.8       Organ dysfunction indicated by none  Source- Lower resp tract     Source control Achieved by- Antimicrobial     SLP consult to assess aspiration risk         Right lower lobe pneumonia  - See plan per Sepsis       Elevated troponin/ NSTEMI   - Troponin elevated at 1.946 on presentation   -Pt denies cliff chest pain or angina equivalent  -EKG with non specific T wave  abnormality  - mg x 1 dose in the ED  -Continue Plavix and Statin   -Heparin gtt initiated per ACS protocol   -Trend troponin   -Consult Cardiology         COPD, group D, by GOLD 2017 classification  - Inhaled bronchodilators and ICS  -Supplemental oxygen to keep SpO2  90 to 92%      PVD (peripheral vascular disease)  - Continue Plavix and Statin       HTN (hypertension)  - BP is soft  -Hold home antihypertensives in the setting of sepsis   -Monitor BP trend and resume once appropriate       Tobacco abuse disorder  Assistance with smoking cessation was offered, including:  []  Medications  [x]  Counseling  []  Printed Information on Smoking Cessation  []  Referral to a Smoking Cessation Program    Patient was counseled regarding smoking for 3-10 minutes.          NSTEMI (non-ST elevated myocardial infarction)          VTE Risk Mitigation (From admission, onward)         Ordered     heparin 25,000 units in dextrose 5% (100 units/ml) IV bolus from bag - ADDITIONAL PRN BOLUS - 60 units/kg (max bolus 4000 units)  As needed (PRN)        Question:  Heparin Infusion Adjustment (DO NOT MODIFY ANSWER)  Answer:  [email or web address]\epic\Images\Pharmacy\HeparinInfusions\heparin LOW INTENSITY nomogram for [facility] [id number]    09/11/22 1259     heparin 25,000 units in dextrose 5% (100 units/ml) IV bolus from bag - ADDITIONAL PRN BOLUS - 30 units/kg (max bolus 4000 units)  As needed (PRN)        Question:  Heparin Infusion Adjustment (DO NOT MODIFY ANSWER)  Answer:  [email or web address]\epic\Images\Pharmacy\HeparinInfusions\heparin LOW INTENSITY nomogram for [facility] [id number]    09/11/22 1259     IP VTE HIGH RISK PATIENT  Once         09/11/22 1324     Place sequential compression device  Until discontinued         09/11/22 1324     heparin 25,000 units in dextrose 5% 250 mL (100 units/mL) infusion LOW INTENSITY nomogram - [facility]  Continuous        Question Answer Comment   Heparin Infusion Adjustment (DO NOT MODIFY ANSWER)  [email or web address]\Images\Pharmacy\HeparinInfusions\heparin LOW INTENSITY nomogram for [facility] [id number]    Begin at (in units/kg/hr) 12        09/11/22 1259                   Natalie Fish MD  Department of Hospital Medicine   [facility]

## 2022-09-11 NOTE — SUBJECTIVE & OBJECTIVE
Past Medical History:   Diagnosis Date    Anemia     Anxiety     Cataract     Chronic back pain     Dr. Guzman    Fibrocystic breast     Glaucoma     Hyperlipemia     Hypertension     Idiopathic acute pancreatitis 6/18/2022    Osteoarthritis     Osteoporosis     Rheumatology/on Prolia    PVD (peripheral vascular disease) 4/12/2021    Restless leg syndrome     Tobacco dependence     Trouble in sleeping        Past Surgical History:   Procedure Laterality Date    ANGIOGRAPHY OF LOWER EXTREMITY N/A 4/12/2021    Procedure: ANGIOGRAM, LOWER EXTREMITY/left leg;  Surgeon: Maico Adkins MD;  Location: [facility] CATH LAB;  Service: Vascular;  Laterality: N/A;  req 1130 start time/ A243A    AORTOGRAPHY WITH SERIALOGRAPHY N/A 4/14/2021    Procedure: AORTOGRAM, WITH RUNOFF;  Surgeon: Maico Adkins MD;  Location: [facility] CATH LAB;  Service: Vascular;  Laterality: N/A;  left iliac stent with shockwave/req 1030 start    CATARACT EXTRACTION W/  INTRAOCULAR LENS IMPLANT Bilateral 8/ 9 2017    GALLBLADDER SURGERY      HYSTERECTOMY  1972    LUNG SURGERY Right age 15    lung collpase    TOE AMPUTATION Left 4/15/2021    Procedure: AMPUTATION, TOE;  Surgeon: Taylor Anderson DPM;  Location: [facility] OR;  Service: Podiatry;  Laterality: Left;  Hallux (Great Toe)    TOTAL HIP ARTHROPLASTY Bilateral     TOTAL KNEE ARTHROPLASTY Bilateral        Review of patient's allergies indicates:   Allergen Reactions    Indomethacin      Other reaction(s): Headache    K-flex Other (See Comments)     Pancreatitis     Latex, natural rubber Swelling    Penicillins      Other reaction(s): Vomiting    Patient tolerated Rocephin during the 4/2021 encounter and she states that she has tolerated Keflex in the past without issue.    Sulfa (sulfonamide antibiotics) Other (See Comments)     hallucinations    Tolmetin      Other reaction(s): Hives    Bactroban [mupirocin calcium] Rash     Burned skin        No current facility-administered medications on file prior to  encounter.     Current Outpatient Medications on File Prior to Encounter   Medication Sig    amLODIPine (NORVASC) 5 MG tablet TAKE 1 TABLET BY MOUTH EVERY DAY FOR BLOOD PRESSURE    azelastine (ASTELIN) 137 mcg (0.1 %) nasal spray 1 spray (137 mcg total) by Nasal route 2 (two) times daily.    b complex vitamins capsule Take 1 capsule by mouth once daily.    buPROPion (WELLBUTRIN) 75 MG tablet Take 1 tablet (75 mg total) by mouth 2 (two) times daily.    busPIRone (BUSPAR) 10 MG tablet TAKE 1 TABLET BY MOUTH TWICE DAILY    clopidogreL (PLAVIX) 75 mg tablet TAKE 1 TABLET BY MOUTH ONCE A DAY    duloxetine (CYMBALTA) 30 MG capsule Take 30 mg by mouth once daily.    ergocalciferol (ERGOCALCIFEROL) 50,000 unit Cap TAKE 1 CAPSULE BY MOUTH ONE TIME PER WEEK    ferrous sulfate (FEOSOL) 325 mg (65 mg iron) Tab tablet Take 1 tab (325 mg) every other day.    furosemide (LASIX) 20 MG tablet Take 1 tablet (20 mg total) by mouth daily as needed (swelling). (Patient not taking: Reported on 9/1/2022)    HYDROcodone-acetaminophen (NORCO)  mg per tablet Take 1 tablet by mouth 3 (three) times daily as needed.    hydrocodone-acetaminophen 10-325mg (NORCO)  mg Tab Take 1 tablet by mouth every 6 (six) hours as needed.    lisinopriL (PRINIVIL,ZESTRIL) 40 MG tablet TAKE 1 TABLET BY MOUTH EVERY DAY for blood pressure    nutritional supplement/fiber (QUINOA-KALE-HEMP ORAL) Take by mouth.    ON-Q PAIN PUMP by Misc.(Non-Drug; Combo Route) route.    PROLIA 60 mg/mL Syrg     rosuvastatin (CRESTOR) 5 MG tablet TAKE 1 TABLET BY MOUTH ONCE A DAY     Family History       Problem Relation (Age of Onset)    Alcohol abuse Brother    Brain cancer Brother    Breast cancer Mother    Cancer Mother    Heart disease Brother    Heart failure Father    Hyperlipidemia Father    Stroke Brother          Tobacco Use    Smoking status: Some Days     Packs/day: 0.50     Years: 63.00     Pack years: 31.50     Types: Cigarettes     Last attempt to quit:  2021     Years since quittin.2    Smokeless tobacco: Never    Tobacco comments:     started age of  11    Substance and Sexual Activity    Alcohol use: No    Drug use: No    Sexual activity: Yes     Partners: Male     Review of Systems   Constitutional:  Positive for activity change, appetite change, fatigue and fever.   HENT:  Negative for sore throat.    Eyes:  Negative for visual disturbance.   Respiratory:  Positive for cough and shortness of breath. Negative for chest tightness.    Cardiovascular:  Negative for chest pain, palpitations and leg swelling.   Gastrointestinal:  Positive for abdominal pain and nausea. Negative for abdominal distention, constipation, diarrhea and vomiting.        Dry heaves    Endocrine: Negative for polyuria.   Genitourinary:  Negative for decreased urine volume, dysuria, flank pain, frequency and hematuria.   Musculoskeletal:  Positive for back pain and gait problem.   Skin:  Negative for rash.   Neurological:  Positive for weakness. Negative for syncope, speech difficulty, light-headedness and headaches.   Psychiatric/Behavioral:  Negative for confusion, hallucinations and sleep disturbance.    Objective:     Vital Signs (Most Recent):  Temp: 97.8 °F (36.6 °C) (22)  Pulse: 89 (22)  Resp: 18 (22)  BP: (!) 112/58 (22)  SpO2: 99 % (22)   Vital Signs (24h Range):  Temp:  [97.8 °F (36.6 °C)-100.9 °F (38.3 °C)] 97.8 °F (36.6 °C)  Pulse:  [] 89  Resp:  [17-24] 18  SpO2:  [93 %-99 %] 99 %  BP: ()/(53-70) 112/58     Weight: 46.3 kg (102 lb)  Body mass index is 18.66 kg/m².    Physical Exam  Constitutional:       General: She is not in acute distress.     Appearance: She is well-developed and underweight. She is ill-appearing. She is not diaphoretic.   HENT:      Head: Normocephalic and atraumatic.      Mouth/Throat:      Mouth: Mucous membranes are dry.      Pharynx: No oropharyngeal exudate.   Eyes:       Conjunctiva/sclera: Conjunctivae normal.      Pupils: Pupils are equal, round, and reactive to light.   Neck:      Thyroid: No thyromegaly.      Vascular: No JVD.   Cardiovascular:      Rate and Rhythm: Normal rate and regular rhythm.      Heart sounds: Normal heart sounds. No murmur heard.  Pulmonary:      Effort: Pulmonary effort is normal. No respiratory distress.      Breath sounds: Normal breath sounds. No wheezing or rales.   Chest:      Chest wall: No tenderness.   Abdominal:      General: Bowel sounds are normal. There is no distension.      Palpations: Abdomen is soft.      Tenderness: There is abdominal tenderness (across lower abdomen). There is no guarding or rebound.   Musculoskeletal:         General: Normal range of motion.      Cervical back: Normal range of motion and neck supple.      Right lower leg: No edema.      Left lower leg: No edema.   Lymphadenopathy:      Cervical: No cervical adenopathy.   Skin:     General: Skin is warm and dry.      Findings: Bruising present. No rash.   Neurological:      General: No focal deficit present.      Mental Status: She is oriented to person, place, and time. She is lethargic.      Cranial Nerves: No cranial nerve deficit.      Sensory: No sensory deficit.      Motor: Weakness present.      Deep Tendon Reflexes: Reflexes normal.   Psychiatric:         Behavior: Behavior is cooperative.         CRANIAL NERVES     CN III, IV, VI   Pupils are equal, round, and reactive to light.     Significant Labs:   Results for orders placed or performed during the hospital encounter of 09/11/22   CBC auto differential   Result Value Ref Range    WBC 13.53 (H) 3.90 - 12.70 K/uL    RBC 3.71 (L) 4.00 - 5.40 M/uL    Hemoglobin 11.2 (L) 12.0 - 16.0 g/dL    Hematocrit 34.8 (L) 37.0 - 48.5 %    MCV 94 82 - 98 fL    MCH 30.2 27.0 - 31.0 pg    MCHC 32.2 32.0 - 36.0 g/dL    RDW 13.0 11.5 - 14.5 %    Platelets 294 150 - 450 K/uL    MPV 8.9 (L) 9.2 - 12.9 fL    Immature Granulocytes  0.5 0.0 - 0.5 %    Gran # (ANC) 12.1 (H) 1.8 - 7.7 K/uL    Immature Grans (Abs) 0.07 (H) 0.00 - 0.04 K/uL    Lymph # 0.5 (L) 1.0 - 4.8 K/uL    Mono # 0.8 0.3 - 1.0 K/uL    Eos # 0.0 0.0 - 0.5 K/uL    Baso # 0.03 0.00 - 0.20 K/uL    nRBC 0 0 /100 WBC    Gran % 89.7 (H) 38.0 - 73.0 %    Lymph % 3.7 (L) 18.0 - 48.0 %    Mono % 5.9 4.0 - 15.0 %    Eosinophil % 0.0 0.0 - 8.0 %    Basophil % 0.2 0.0 - 1.9 %    Differential Method Automated    Comprehensive metabolic panel   Result Value Ref Range    Sodium 130 (L) 136 - 145 mmol/L    Potassium 3.8 3.5 - 5.1 mmol/L    Chloride 97 95 - 110 mmol/L    CO2 21 (L) 23 - 29 mmol/L    Glucose 187 (H) 70 - 110 mg/dL    BUN 12 8 - 23 mg/dL    Creatinine 0.8 0.5 - 1.4 mg/dL    Calcium 9.5 8.7 - 10.5 mg/dL    Total Protein 7.6 6.0 - 8.4 g/dL    Albumin 3.1 (L) 3.5 - 5.2 g/dL    Total Bilirubin 0.4 0.1 - 1.0 mg/dL    Alkaline Phosphatase 81 55 - 135 U/L    AST 38 10 - 40 U/L    ALT 18 10 - 44 U/L    Anion Gap 12 8 - 16 mmol/L    eGFR >60 >60 mL/min/1.73 m^2   Lipase   Result Value Ref Range    Lipase 24 4 - 60 U/L   Lactic acid, plasma   Result Value Ref Range    Lactate (Lactic Acid) 0.8 0.5 - 2.2 mmol/L   Troponin I   Result Value Ref Range    Troponin I 1.946 (H) 0.000 - 0.026 ng/mL   Procalcitonin   Result Value Ref Range    Procalcitonin 16.30 (H) <0.25 ng/mL   APTT   Result Value Ref Range    aPTT 31.2 21.0 - 32.0 sec   POCT COVID-19 Rapid Screening   Result Value Ref Range    POC Rapid COVID Negative Negative     Acceptable Yes    Echo   Result Value Ref Range    BSA 1.42 m2    TDI SEPTAL 0.07 m/s    LV LATERAL E/E' RATIO 8.58 m/s    LV SEPTAL E/E' RATIO 14.71 m/s    LA WIDTH 3.70 cm    IVC diameter 1.68 cm    Left Ventricular Outflow Tract Mean Velocity 0.87 cm/s    Left Ventricular Outflow Tract Mean Gradient 3.34 mmHg    TDI LATERAL 0.12 m/s    LVIDd 3.44 (A) 3.5 - 6.0 cm    IVS 1.36 (A) 0.6 - 1.1 cm    Posterior Wall 1.50 (A) 0.6 - 1.1 cm    Ao root annulus  2.50 cm    LVIDs 2.23 2.1 - 4.0 cm    FS 35 28 - 44 %    LA volume 60.31 cm3    STJ 2.20 cm    Ascending aorta 2.22 cm    LV mass 174.77 g    LA size 4.45 cm    TAPSE 2.00 cm    Left Ventricle Relative Wall Thickness 0.87 cm    AV mean gradient 6 mmHg    AV valve area 1.87 cm2    AV Velocity Ratio 0.72     AV index (prosthetic) 0.72     MV valve area p 1/2 method 6.14 cm2    E/A ratio 0.86     Mean e' 0.10 m/s    E wave deceleration time 123.62 msec    IVRT 54.23 msec    LVOT diameter 1.82 cm    LVOT area 2.6 cm2    LVOT peak inocente 1.24 m/s    LVOT peak VTI 25.30 cm    Ao peak inocente 1.72 m/s    Ao VTI 35.1 cm    RVOT peak inocente 0.77 m/s    RVOT peak VTI 13.3 cm    Mr max inocente 4.73 m/s    LVOT stroke volume 65.79 cm3    AV peak gradient 12 mmHg    PV mean gradient 1.28 mmHg    E/E' ratio 10.84 m/s    MV Peak E Inocente 1.03 m/s    TR Max Inocente 3.25 m/s    MV stenosis pressure 1/2 time 35.85 ms    MV Peak A Inocente 1.20 m/s    LV Systolic Volume 16.70 mL    LV Systolic Volume Index 11.6 mL/m2    LV Diastolic Volume 48.68 mL    LV Diastolic Volume Index 33.81 mL/m2    LA Volume Index 41.9 mL/m2    LV Mass Index 121 g/m2    RA Major Axis 3.80 cm    Left Atrium Minor Axis 4.35 cm    Left Atrium Major Axis 4.27 cm    Triscuspid Valve Regurgitation Peak Gradient 42 mmHg    RA Width 2.70 cm    Right Atrial Pressure (from IVC) 3 mmHg    EF 55 %    TV rest pulmonary artery pressure 45 mmHg         Significant Imaging:   Imaging Results              X-Ray Chest 1 View (Final result)  Result time 09/11/22 12:30:11   Procedure changed from X-Ray Chest PA And Lateral     Final result by Maximino Pandey MD (Timothy) (09/11/22 12:30:11)                   Impression:      Interval development of a mild interstitial infiltrate involving the right lung with a small right pleural effusion.  Differential considerations include unilateral    edema versus pneumonia.      Electronically signed by: Maximino Pandey MD  Date:    09/11/2022  Time:    12:30                Narrative:    EXAMINATION:  XR CHEST 1 VIEW    CLINICAL HISTORY:  <Diagnosis>, weakness;    COMPARISON:  Comparison 06/18/2022.    FINDINGS:  Heart is normal in size.  Interval development mild interstitial infiltrate involving the right lung.  Left lung is clear.  Possible small right pleural effusion.

## 2022-09-11 NOTE — SUBJECTIVE & OBJECTIVE
Past Medical History:   Diagnosis Date    Anemia     Anxiety     Cataract     Chronic back pain     Dr. Guzman    Fibrocystic breast     Glaucoma     Hyperlipemia     Hypertension     Idiopathic acute pancreatitis 6/18/2022    Osteoarthritis     Osteoporosis     Rheumatology/on Prolia    PVD (peripheral vascular disease) 4/12/2021    Restless leg syndrome     Tobacco dependence     Trouble in sleeping        Past Surgical History:   Procedure Laterality Date    ANGIOGRAPHY OF LOWER EXTREMITY N/A 4/12/2021    Procedure: ANGIOGRAM, LOWER EXTREMITY/left leg;  Surgeon: Maico Adkins MD;  Location: [facility] CATH LAB;  Service: Vascular;  Laterality: N/A;  req 1130 start time/ A243A    AORTOGRAPHY WITH SERIALOGRAPHY N/A 4/14/2021    Procedure: AORTOGRAM, WITH RUNOFF;  Surgeon: Maico Adkins MD;  Location: [facility] CATH LAB;  Service: Vascular;  Laterality: N/A;  left iliac stent with shockwave/req 1030 start    CATARACT EXTRACTION W/  INTRAOCULAR LENS IMPLANT Bilateral 8/ 9 2017    GALLBLADDER SURGERY      HYSTERECTOMY  1972    LUNG SURGERY Right age 15    lung collpase    TOE AMPUTATION Left 4/15/2021    Procedure: AMPUTATION, TOE;  Surgeon: Taylor Anderson DPM;  Location: [facility] OR;  Service: Podiatry;  Laterality: Left;  Hallux (Great Toe)    TOTAL HIP ARTHROPLASTY Bilateral     TOTAL KNEE ARTHROPLASTY Bilateral        Review of patient's allergies indicates:   Allergen Reactions    Indomethacin      Other reaction(s): Headache    K-flex Other (See Comments)     Pancreatitis     Latex, natural rubber Swelling    Penicillins      Other reaction(s): Vomiting    Patient tolerated Rocephin during the 4/2021 encounter and she states that she has tolerated Keflex in the past without issue.    Sulfa (sulfonamide antibiotics) Other (See Comments)     hallucinations    Tolmetin      Other reaction(s): Hives    Bactroban [mupirocin calcium] Rash     Burned skin        No current facility-administered medications on file prior to  encounter.     Current Outpatient Medications on File Prior to Encounter   Medication Sig    amLODIPine (NORVASC) 5 MG tablet TAKE 1 TABLET BY MOUTH EVERY DAY FOR BLOOD PRESSURE    azelastine (ASTELIN) 137 mcg (0.1 %) nasal spray 1 spray (137 mcg total) by Nasal route 2 (two) times daily.    b complex vitamins capsule Take 1 capsule by mouth once daily.    buPROPion (WELLBUTRIN) 75 MG tablet Take 1 tablet (75 mg total) by mouth 2 (two) times daily.    busPIRone (BUSPAR) 10 MG tablet TAKE 1 TABLET BY MOUTH TWICE DAILY    clopidogreL (PLAVIX) 75 mg tablet TAKE 1 TABLET BY MOUTH ONCE A DAY    duloxetine (CYMBALTA) 30 MG capsule Take 30 mg by mouth once daily.    ergocalciferol (ERGOCALCIFEROL) 50,000 unit Cap TAKE 1 CAPSULE BY MOUTH ONE TIME PER WEEK    ferrous sulfate (FEOSOL) 325 mg (65 mg iron) Tab tablet Take 1 tab (325 mg) every other day.    furosemide (LASIX) 20 MG tablet Take 1 tablet (20 mg total) by mouth daily as needed (swelling). (Patient not taking: Reported on 9/1/2022)    HYDROcodone-acetaminophen (NORCO)  mg per tablet Take 1 tablet by mouth 3 (three) times daily as needed.    hydrocodone-acetaminophen 10-325mg (NORCO)  mg Tab Take 1 tablet by mouth every 6 (six) hours as needed.    lisinopriL (PRINIVIL,ZESTRIL) 40 MG tablet TAKE 1 TABLET BY MOUTH EVERY DAY for blood pressure    nutritional supplement/fiber (QUINOA-KALE-HEMP ORAL) Take by mouth.    ON-Q PAIN PUMP by Misc.(Non-Drug; Combo Route) route.    PROLIA 60 mg/mL Syrg     rosuvastatin (CRESTOR) 5 MG tablet TAKE 1 TABLET BY MOUTH ONCE A DAY     Family History       Problem Relation (Age of Onset)    Alcohol abuse Brother    Brain cancer Brother    Breast cancer Mother    Cancer Mother    Heart disease Brother    Heart failure Father    Hyperlipidemia Father    Stroke Brother          Tobacco Use    Smoking status: Some Days     Packs/day: 0.50     Years: 63.00     Pack years: 31.50     Types: Cigarettes     Last attempt to quit:  2021     Years since quittin.2    Smokeless tobacco: Never    Tobacco comments:     started age of  11    Substance and Sexual Activity    Alcohol use: No    Drug use: No    Sexual activity: Yes     Partners: Male     Review of Systems   Constitutional: Positive for decreased appetite, fever and malaise/fatigue.   HENT: Negative.     Eyes: Negative.    Cardiovascular:  Positive for dyspnea on exertion.   Respiratory:  Positive for shortness of breath.    Endocrine: Negative.    Hematologic/Lymphatic: Negative.    Skin: Negative.    Musculoskeletal: Negative.    Gastrointestinal:  Positive for abdominal pain.   Genitourinary: Negative.    Neurological:  Positive for weakness.   Psychiatric/Behavioral: Negative.     Allergic/Immunologic: Negative.    Objective:     Vital Signs (Most Recent):  Temp: 97.8 °F (36.6 °C) (22)  Pulse: 89 (22)  Resp: 18 (22)  BP: (!) 112/58 (22)  SpO2: 99 % (22)   Vital Signs (24h Range):  Temp:  [97.8 °F (36.6 °C)-100.9 °F (38.3 °C)] 97.8 °F (36.6 °C)  Pulse:  [] 89  Resp:  [17-24] 18  SpO2:  [93 %-99 %] 99 %  BP: ()/(53-70) 112/58     Weight: 46.3 kg (102 lb)  Body mass index is 18.66 kg/m².    SpO2: 99 %  O2 Device (Oxygen Therapy): nasal cannula (1L NC)    No intake or output data in the 24 hours ending 22 1712    Lines/Drains/Airways       Peripheral Intravenous Line  Duration                  Peripheral IV - Single Lumen 22 1227 20 G Right Antecubital <1 day                    Physical Exam  Vitals and nursing note reviewed.   Constitutional:       General: She is awake. She is not in acute distress.     Appearance: Normal appearance. She is well-developed. She is ill-appearing. She is not diaphoretic.   HENT:      Head: Normocephalic.   Neck:      Thyroid: No thyromegaly.      Vascular: Normal carotid pulses. No carotid bruit, hepatojugular reflux or JVD.   Cardiovascular:      Rate and Rhythm:  Normal rate and regular rhythm.      Chest Wall: PMI is not displaced.      Pulses: Normal pulses.           Radial pulses are 2+ on the right side and 2+ on the left side.      Heart sounds: Normal heart sounds, S1 normal and S2 normal. No murmur heard.    No friction rub. No gallop.   Pulmonary:      Effort: Pulmonary effort is normal.      Breath sounds: Rhonchi present. No wheezing or rales.   Abdominal:      General: Bowel sounds are normal. There is no abdominal bruit.      Palpations: Abdomen is soft. There is no hepatomegaly, splenomegaly or mass.      Tenderness: There is abdominal tenderness. There is no guarding or rebound.   Musculoskeletal:      Cervical back: Neck supple.   Lymphadenopathy:      Cervical: No cervical adenopathy.   Skin:     General: Skin is warm.   Psychiatric:         Behavior: Behavior normal. Behavior is cooperative.       Significant Labs: ABG: No results for input(s): PH, PCO2, HCO3, POCSATURATED, BE in the last 48 hours., Blood Culture: No results for input(s): LABBLOO in the last 48 hours., BMP:   Recent Labs   Lab 09/11/22  1206   *   *   K 3.8   CL 97   CO2 21*   BUN 12   CREATININE 0.8   CALCIUM 9.5   , CMP   Recent Labs   Lab 09/11/22  1206   *   K 3.8   CL 97   CO2 21*   *   BUN 12   CREATININE 0.8   CALCIUM 9.5   PROT 7.6   ALBUMIN 3.1*   BILITOT 0.4   ALKPHOS 81   AST 38   ALT 18   ANIONGAP 12   , CBC   Recent Labs   Lab 09/11/22  1206   WBC 13.53*   HGB 11.2*   HCT 34.8*      , INR No results for input(s): INR, PROTIME in the last 48 hours., Lipid Panel No results for input(s): CHOL, HDL, LDLCALC, TRIG, CHOLHDL in the last 48 hours., and Troponin   Recent Labs   Lab 09/11/22  1206   TROPONINI 1.946*       Significant Imaging: Echocardiogram: Transthoracic echo (TTE) complete (Cupid Only):   Results for orders placed or performed during the hospital encounter of 09/11/22   Echo   Result Value Ref Range    BSA 1.42 m2    TDI SEPTAL 0.07 m/s     LV LATERAL E/E' RATIO 8.58 m/s    LV SEPTAL E/E' RATIO 14.71 m/s    LA WIDTH 3.70 cm    IVC diameter 1.68 cm    Left Ventricular Outflow Tract Mean Velocity 0.87 cm/s    Left Ventricular Outflow Tract Mean Gradient 3.34 mmHg    TDI LATERAL 0.12 m/s    LVIDd 3.44 (A) 3.5 - 6.0 cm    IVS 1.36 (A) 0.6 - 1.1 cm    Posterior Wall 1.50 (A) 0.6 - 1.1 cm    Ao root annulus 2.50 cm    LVIDs 2.23 2.1 - 4.0 cm    FS 35 28 - 44 %    LA volume 60.31 cm3    STJ 2.20 cm    Ascending aorta 2.22 cm    LV mass 174.77 g    LA size 4.45 cm    TAPSE 2.00 cm    Left Ventricle Relative Wall Thickness 0.87 cm    AV mean gradient 6 mmHg    AV valve area 1.87 cm2    AV Velocity Ratio 0.72     AV index (prosthetic) 0.72     MV valve area p 1/2 method 6.14 cm2    E/A ratio 0.86     Mean e' 0.10 m/s    E wave deceleration time 123.62 msec    IVRT 54.23 msec    LVOT diameter 1.82 cm    LVOT area 2.6 cm2    LVOT peak inocente 1.24 m/s    LVOT peak VTI 25.30 cm    Ao peak inocente 1.72 m/s    Ao VTI 35.1 cm    RVOT peak inocente 0.77 m/s    RVOT peak VTI 13.3 cm    Mr max inocente 4.73 m/s    LVOT stroke volume 65.79 cm3    AV peak gradient 12 mmHg    PV mean gradient 1.28 mmHg    E/E' ratio 10.84 m/s    MV Peak E Inocente 1.03 m/s    TR Max Inocente 3.25 m/s    MV stenosis pressure 1/2 time 35.85 ms    MV Peak A Inocente 1.20 m/s    LV Systolic Volume 16.70 mL    LV Systolic Volume Index 11.6 mL/m2    LV Diastolic Volume 48.68 mL    LV Diastolic Volume Index 33.81 mL/m2    LA Volume Index 41.9 mL/m2    LV Mass Index 121 g/m2    RA Major Axis 3.80 cm    Left Atrium Minor Axis 4.35 cm    Left Atrium Major Axis 4.27 cm    Triscuspid Valve Regurgitation Peak Gradient 42 mmHg    RA Width 2.70 cm    Right Atrial Pressure (from IVC) 3 mmHg    EF 55 %    TV rest pulmonary artery pressure 45 mmHg

## 2022-09-12 PROBLEM — I21.4 NSTEMI (NON-ST ELEVATED MYOCARDIAL INFARCTION): Status: ACTIVE | Noted: 2022-09-11

## 2022-09-12 PROBLEM — N39.0 UTI (URINARY TRACT INFECTION): Status: ACTIVE | Noted: 2022-09-12

## 2022-09-12 LAB
ALBUMIN SERPL BCP-MCNC: 2.4 G/DL (ref 3.5–5.2)
ALP SERPL-CCNC: 63 U/L (ref 55–135)
ALT SERPL W/O P-5'-P-CCNC: 18 U/L (ref 10–44)
ANION GAP SERPL CALC-SCNC: 8 MMOL/L (ref 8–16)
APTT BLDCRRT: 26.1 SEC (ref 21–32)
APTT BLDCRRT: 26.8 SEC (ref 21–32)
APTT BLDCRRT: 36 SEC (ref 21–32)
AST SERPL-CCNC: 35 U/L (ref 10–40)
BACTERIA #/AREA URNS HPF: ABNORMAL /HPF
BASOPHILS # BLD AUTO: 0.01 K/UL (ref 0–0.2)
BASOPHILS # BLD AUTO: 0.02 K/UL (ref 0–0.2)
BASOPHILS NFR BLD: 0.1 % (ref 0–1.9)
BASOPHILS NFR BLD: 0.2 % (ref 0–1.9)
BILIRUB SERPL-MCNC: 0.2 MG/DL (ref 0.1–1)
BILIRUB UR QL STRIP: NEGATIVE
BILIRUB UR QL STRIP: NEGATIVE
BUN SERPL-MCNC: 12 MG/DL (ref 8–23)
CALCIUM SERPL-MCNC: 7.9 MG/DL (ref 8.7–10.5)
CHLORIDE SERPL-SCNC: 105 MMOL/L (ref 95–110)
CLARITY UR: ABNORMAL
CLARITY UR: ABNORMAL
CO2 SERPL-SCNC: 21 MMOL/L (ref 23–29)
COLOR UR: YELLOW
COLOR UR: YELLOW
CREAT SERPL-MCNC: 0.6 MG/DL (ref 0.5–1.4)
DIFFERENTIAL METHOD: ABNORMAL
EOSINOPHIL # BLD AUTO: 0 K/UL (ref 0–0.5)
EOSINOPHIL NFR BLD: 0 % (ref 0–8)
ERYTHROCYTE [DISTWIDTH] IN BLOOD BY AUTOMATED COUNT: 13.1 % (ref 11.5–14.5)
ERYTHROCYTE [DISTWIDTH] IN BLOOD BY AUTOMATED COUNT: 13.2 % (ref 11.5–14.5)
EST. GFR  (NO RACE VARIABLE): >60 ML/MIN/1.73 M^2
GLUCOSE SERPL-MCNC: 137 MG/DL (ref 70–110)
GLUCOSE UR QL STRIP: NEGATIVE
GLUCOSE UR QL STRIP: NEGATIVE
HCT VFR BLD AUTO: 27.4 % (ref 37–48.5)
HCT VFR BLD AUTO: 30.1 % (ref 37–48.5)
HGB BLD-MCNC: 8.7 G/DL (ref 12–16)
HGB BLD-MCNC: 9.2 G/DL (ref 12–16)
HGB UR QL STRIP: ABNORMAL
HGB UR QL STRIP: ABNORMAL
HYALINE CASTS #/AREA URNS LPF: 0 /LPF
IMM GRANULOCYTES # BLD AUTO: 0.06 K/UL (ref 0–0.04)
IMM GRANULOCYTES # BLD AUTO: 0.09 K/UL (ref 0–0.04)
IMM GRANULOCYTES NFR BLD AUTO: 0.6 % (ref 0–0.5)
IMM GRANULOCYTES NFR BLD AUTO: 0.7 % (ref 0–0.5)
INR PPP: 1 (ref 0.8–1.2)
KETONES UR QL STRIP: ABNORMAL
KETONES UR QL STRIP: ABNORMAL
LEUKOCYTE ESTERASE UR QL STRIP: NEGATIVE
LEUKOCYTE ESTERASE UR QL STRIP: NEGATIVE
LYMPHOCYTES # BLD AUTO: 0.2 K/UL (ref 1–4.8)
LYMPHOCYTES # BLD AUTO: 0.8 K/UL (ref 1–4.8)
LYMPHOCYTES NFR BLD: 2.1 % (ref 18–48)
LYMPHOCYTES NFR BLD: 6.2 % (ref 18–48)
MAGNESIUM SERPL-MCNC: 1.6 MG/DL (ref 1.6–2.6)
MCH RBC QN AUTO: 29.5 PG (ref 27–31)
MCH RBC QN AUTO: 30.3 PG (ref 27–31)
MCHC RBC AUTO-ENTMCNC: 30.6 G/DL (ref 32–36)
MCHC RBC AUTO-ENTMCNC: 31.8 G/DL (ref 32–36)
MCV RBC AUTO: 96 FL (ref 82–98)
MCV RBC AUTO: 97 FL (ref 82–98)
MICROSCOPIC COMMENT: ABNORMAL
MONOCYTES # BLD AUTO: 0.5 K/UL (ref 0.3–1)
MONOCYTES # BLD AUTO: 0.9 K/UL (ref 0.3–1)
MONOCYTES NFR BLD: 4.8 % (ref 4–15)
MONOCYTES NFR BLD: 7.4 % (ref 4–15)
NEUTROPHILS # BLD AUTO: 10.6 K/UL (ref 1.8–7.7)
NEUTROPHILS # BLD AUTO: 9.1 K/UL (ref 1.8–7.7)
NEUTROPHILS NFR BLD: 85.5 % (ref 38–73)
NEUTROPHILS NFR BLD: 92.4 % (ref 38–73)
NITRITE UR QL STRIP: NEGATIVE
NITRITE UR QL STRIP: NEGATIVE
NRBC BLD-RTO: 0 /100 WBC
PH UR STRIP: 6 [PH] (ref 5–8)
PH UR STRIP: 6 [PH] (ref 5–8)
PHOSPHATE SERPL-MCNC: 1.5 MG/DL (ref 2.7–4.5)
PLATELET # BLD AUTO: 254 K/UL (ref 150–450)
PLATELET # BLD AUTO: 284 K/UL (ref 150–450)
PMV BLD AUTO: 9.4 FL (ref 9.2–12.9)
PMV BLD AUTO: 9.5 FL (ref 9.2–12.9)
POTASSIUM SERPL-SCNC: 4.9 MMOL/L (ref 3.5–5.1)
PROT SERPL-MCNC: 5.3 G/DL (ref 6–8.4)
PROT UR QL STRIP: ABNORMAL
PROT UR QL STRIP: ABNORMAL
PROTHROMBIN TIME: 10.4 SEC (ref 9–12.5)
RBC # BLD AUTO: 2.87 M/UL (ref 4–5.4)
RBC # BLD AUTO: 3.12 M/UL (ref 4–5.4)
RBC #/AREA URNS HPF: 23 /HPF (ref 0–4)
SODIUM SERPL-SCNC: 134 MMOL/L (ref 136–145)
SP GR UR STRIP: 1.02 (ref 1–1.03)
SP GR UR STRIP: 1.02 (ref 1–1.03)
SQUAMOUS #/AREA URNS HPF: 12 /HPF
TROPONIN I SERPL DL<=0.01 NG/ML-MCNC: 2.36 NG/ML (ref 0–0.03)
TROPONIN I SERPL DL<=0.01 NG/ML-MCNC: 2.44 NG/ML (ref 0–0.03)
TROPONIN I SERPL DL<=0.01 NG/ML-MCNC: 2.87 NG/ML (ref 0–0.03)
UNIDENT CRYS URNS QL MICRO: ABNORMAL
URN SPEC COLLECT METH UR: ABNORMAL
URN SPEC COLLECT METH UR: ABNORMAL
UROBILINOGEN UR STRIP-ACNC: NEGATIVE EU/DL
UROBILINOGEN UR STRIP-ACNC: NEGATIVE EU/DL
WBC # BLD AUTO: 12.43 K/UL (ref 3.9–12.7)
WBC # BLD AUTO: 9.87 K/UL (ref 3.9–12.7)
WBC #/AREA URNS HPF: 39 /HPF (ref 0–5)
WBC CLUMPS URNS QL MICRO: ABNORMAL
YEAST URNS QL MICRO: ABNORMAL

## 2022-09-12 PROCEDURE — 84484 ASSAY OF TROPONIN QUANT: CPT | Performed by: INTERNAL MEDICINE

## 2022-09-12 PROCEDURE — 84100 ASSAY OF PHOSPHORUS: CPT | Performed by: INTERNAL MEDICINE

## 2022-09-12 PROCEDURE — 11000001 HC ACUTE MED/SURG PRIVATE ROOM

## 2022-09-12 PROCEDURE — 85025 COMPLETE CBC W/AUTO DIFF WBC: CPT | Performed by: INTERNAL MEDICINE

## 2022-09-12 PROCEDURE — 21400001 HC TELEMETRY ROOM

## 2022-09-12 PROCEDURE — 85730 THROMBOPLASTIN TIME PARTIAL: CPT | Performed by: INTERNAL MEDICINE

## 2022-09-12 PROCEDURE — 36415 COLL VENOUS BLD VENIPUNCTURE: CPT | Performed by: INTERNAL MEDICINE

## 2022-09-12 PROCEDURE — 84484 ASSAY OF TROPONIN QUANT: CPT | Mod: 91 | Performed by: PHYSICIAN ASSISTANT

## 2022-09-12 PROCEDURE — 94761 N-INVAS EAR/PLS OXIMETRY MLT: CPT

## 2022-09-12 PROCEDURE — 25000003 PHARM REV CODE 250: Performed by: EMERGENCY MEDICINE

## 2022-09-12 PROCEDURE — 83735 ASSAY OF MAGNESIUM: CPT | Performed by: INTERNAL MEDICINE

## 2022-09-12 PROCEDURE — 27000221 HC OXYGEN, UP TO 24 HOURS

## 2022-09-12 PROCEDURE — 80053 COMPREHEN METABOLIC PANEL: CPT | Performed by: INTERNAL MEDICINE

## 2022-09-12 PROCEDURE — 36415 COLL VENOUS BLD VENIPUNCTURE: CPT | Performed by: PHYSICIAN ASSISTANT

## 2022-09-12 PROCEDURE — 94640 AIRWAY INHALATION TREATMENT: CPT

## 2022-09-12 PROCEDURE — 63600175 PHARM REV CODE 636 W HCPCS: Performed by: INTERNAL MEDICINE

## 2022-09-12 PROCEDURE — 87086 URINE CULTURE/COLONY COUNT: CPT | Performed by: INTERNAL MEDICINE

## 2022-09-12 PROCEDURE — 99232 SBSQ HOSP IP/OBS MODERATE 35: CPT | Mod: ,,, | Performed by: PHYSICIAN ASSISTANT

## 2022-09-12 PROCEDURE — 25000003 PHARM REV CODE 250: Performed by: INTERNAL MEDICINE

## 2022-09-12 PROCEDURE — 85610 PROTHROMBIN TIME: CPT | Performed by: INTERNAL MEDICINE

## 2022-09-12 PROCEDURE — 85730 THROMBOPLASTIN TIME PARTIAL: CPT | Mod: 91 | Performed by: INTERNAL MEDICINE

## 2022-09-12 PROCEDURE — 99232 PR SUBSEQUENT HOSPITAL CARE,LEVL II: ICD-10-PCS | Mod: ,,, | Performed by: PHYSICIAN ASSISTANT

## 2022-09-12 PROCEDURE — 85025 COMPLETE CBC W/AUTO DIFF WBC: CPT | Mod: 91 | Performed by: INTERNAL MEDICINE

## 2022-09-12 PROCEDURE — 81000 URINALYSIS NONAUTO W/SCOPE: CPT | Performed by: INTERNAL MEDICINE

## 2022-09-12 PROCEDURE — 25000242 PHARM REV CODE 250 ALT 637 W/ HCPCS: Performed by: INTERNAL MEDICINE

## 2022-09-12 RX ORDER — FAMOTIDINE 20 MG/1
20 TABLET, FILM COATED ORAL 2 TIMES DAILY
Status: DISCONTINUED | OUTPATIENT
Start: 2022-09-12 | End: 2022-09-13

## 2022-09-12 RX ORDER — HEPARIN SODIUM,PORCINE/D5W 25000/250
0-40 INTRAVENOUS SOLUTION INTRAVENOUS CONTINUOUS
Status: DISCONTINUED | OUTPATIENT
Start: 2022-09-12 | End: 2022-09-13

## 2022-09-12 RX ORDER — HEPARIN SODIUM,PORCINE/D5W 25000/250
0-40 INTRAVENOUS SOLUTION INTRAVENOUS CONTINUOUS
Status: DISCONTINUED | OUTPATIENT
Start: 2022-09-12 | End: 2022-09-12

## 2022-09-12 RX ADMIN — FAMOTIDINE 20 MG: 20 TABLET ORAL at 09:09

## 2022-09-12 RX ADMIN — BUSPIRONE HYDROCHLORIDE 10 MG: 10 TABLET ORAL at 09:09

## 2022-09-12 RX ADMIN — HEPARIN SODIUM 14 UNITS/KG/HR: 10000 INJECTION, SOLUTION INTRAVENOUS at 10:09

## 2022-09-12 RX ADMIN — BUPROPION HYDROCHLORIDE 75 MG: 75 TABLET, FILM COATED ORAL at 09:09

## 2022-09-12 RX ADMIN — IPRATROPIUM BROMIDE AND ALBUTEROL SULFATE 3 ML: 2.5; .5 SOLUTION RESPIRATORY (INHALATION) at 08:09

## 2022-09-12 RX ADMIN — ASPIRIN 81 MG CHEWABLE TABLET 81 MG: 81 TABLET CHEWABLE at 09:09

## 2022-09-12 RX ADMIN — Medication 6 MG: at 01:09

## 2022-09-12 RX ADMIN — MIDODRINE HYDROCHLORIDE 5 MG: 5 TABLET ORAL at 09:09

## 2022-09-12 RX ADMIN — BUDESONIDE 0.5 MG: 0.5 INHALANT ORAL at 08:09

## 2022-09-12 RX ADMIN — CEFTRIAXONE 1 G: 1 INJECTION, SOLUTION INTRAVENOUS at 04:09

## 2022-09-12 RX ADMIN — CLOPIDOGREL 75 MG: 75 TABLET, FILM COATED ORAL at 08:09

## 2022-09-12 RX ADMIN — AZITHROMYCIN MONOHYDRATE 500 MG: 500 INJECTION, POWDER, LYOPHILIZED, FOR SOLUTION INTRAVENOUS at 03:09

## 2022-09-12 RX ADMIN — MIDODRINE HYDROCHLORIDE 5 MG: 5 TABLET ORAL at 03:09

## 2022-09-12 RX ADMIN — BUDESONIDE 0.5 MG: 0.5 INHALANT ORAL at 07:09

## 2022-09-12 RX ADMIN — ARFORMOTEROL TARTRATE 15 MCG: 15 SOLUTION RESPIRATORY (INHALATION) at 08:09

## 2022-09-12 RX ADMIN — POLYETHYLENE GLYCOL 3350 17 G: 17 POWDER, FOR SOLUTION ORAL at 09:09

## 2022-09-12 RX ADMIN — ATORVASTATIN CALCIUM 20 MG: 10 TABLET, FILM COATED ORAL at 09:09

## 2022-09-12 RX ADMIN — IPRATROPIUM BROMIDE AND ALBUTEROL SULFATE 3 ML: 2.5; .5 SOLUTION RESPIRATORY (INHALATION) at 07:09

## 2022-09-12 RX ADMIN — IPRATROPIUM BROMIDE AND ALBUTEROL SULFATE 3 ML: 2.5; .5 SOLUTION RESPIRATORY (INHALATION) at 01:09

## 2022-09-12 RX ADMIN — HYDROCODONE BITARTRATE AND ACETAMINOPHEN 1 TABLET: 10; 325 TABLET ORAL at 01:09

## 2022-09-12 RX ADMIN — HEPARIN SODIUM 12 UNITS/KG/HR: 10000 INJECTION, SOLUTION INTRAVENOUS at 05:09

## 2022-09-12 RX ADMIN — DULOXETINE 30 MG: 30 CAPSULE, DELAYED RELEASE ORAL at 09:09

## 2022-09-12 RX ADMIN — METOPROLOL SUCCINATE 12.5 MG: 25 TABLET, EXTENDED RELEASE ORAL at 09:09

## 2022-09-12 RX ADMIN — ARFORMOTEROL TARTRATE 15 MCG: 15 SOLUTION RESPIRATORY (INHALATION) at 07:09

## 2022-09-12 NOTE — SUBJECTIVE & OBJECTIVE
Interval History:     WBC normalized. Troponin bumped to 2.4. Heparin gtt continues along with ASA, Plavix, statin. [facility] plans for tomorrow if afebrile x 24h     Review of Systems   Constitutional:  Positive for activity change, appetite change, fatigue and fever.   HENT:  Negative for sore throat.    Eyes:  Negative for visual disturbance.   Respiratory:  Positive for cough and shortness of breath. Negative for chest tightness.    Cardiovascular:  Negative for chest pain, palpitations and leg swelling.   Gastrointestinal:  Positive for abdominal pain and nausea. Negative for abdominal distention, constipation, diarrhea and vomiting.        Dry heaves    Endocrine: Negative for polyuria.   Genitourinary:  Negative for decreased urine volume, dysuria, flank pain, frequency and hematuria.   Musculoskeletal:  Positive for back pain and gait problem.   Skin:  Negative for rash.   Neurological:  Positive for weakness. Negative for syncope, speech difficulty, light-headedness and headaches.   Psychiatric/Behavioral:  Negative for confusion, hallucinations and sleep disturbance.    Objective:     Vital Signs (Most Recent):  Temp: 99.2 °F (37.3 °C) (09/12/22 0801)  Pulse: 98 (09/12/22 0801)  Resp: 18 (09/12/22 0801)  BP: 114/62 (09/12/22 0801)  SpO2: 100 % (09/12/22 0801) Vital Signs (24h Range):  Temp:  [97.3 °F (36.3 °C)-101.8 °F (38.8 °C)] 99.2 °F (37.3 °C)  Pulse:  [] 98  Resp:  [16-24] 18  SpO2:  [94 %-100 %] 100 %  BP: ()/(51-74) 114/62     Weight: 45.1 kg (99 lb 6.8 oz)  Body mass index is 18.19 kg/m².  No intake or output data in the 24 hours ending 09/12/22 1136   Physical Exam  Constitutional:       General: She is not in acute distress.     Appearance: She is well-developed and underweight. She is ill-appearing. She is not diaphoretic.   HENT:      Head: Normocephalic and atraumatic.      Mouth/Throat:      Mouth: Mucous membranes are dry.      Pharynx: No oropharyngeal exudate.   Eyes:       Conjunctiva/sclera: Conjunctivae normal.      Pupils: Pupils are equal, round, and reactive to light.   Neck:      Thyroid: No thyromegaly.      Vascular: No JVD.   Cardiovascular:      Rate and Rhythm: Normal rate and regular rhythm.      Heart sounds: Normal heart sounds. No murmur heard.  Pulmonary:      Effort: Pulmonary effort is normal. No respiratory distress.      Breath sounds: Normal breath sounds. No wheezing or rales.   Chest:      Chest wall: No tenderness.   Abdominal:      General: Bowel sounds are normal. There is no distension.      Palpations: Abdomen is soft.      Tenderness: There is no abdominal tenderness. There is no guarding or rebound.   Musculoskeletal:         General: Normal range of motion.      Cervical back: Normal range of motion and neck supple.      Right lower leg: No edema.      Left lower leg: No edema.   Lymphadenopathy:      Cervical: No cervical adenopathy.   Skin:     General: Skin is warm and dry.      Findings: Bruising present. No rash.   Neurological:      General: No focal deficit present.      Mental Status: She is oriented to person, place, and time. She is lethargic.      Cranial Nerves: No cranial nerve deficit.      Sensory: No sensory deficit.      Motor: Weakness present.      Deep Tendon Reflexes: Reflexes normal.   Psychiatric:         Behavior: Behavior is cooperative.       Significant Labs: All pertinent labs within the past 24 hours have been reviewed.  BMP:   Recent Labs   Lab 09/12/22 0822   *   *   K 4.9      CO2 21*   BUN 12   CREATININE 0.6   CALCIUM 7.9*   MG 1.6     CBC:   Recent Labs   Lab 09/11/22  1206 09/12/22  0252 09/12/22 0822   WBC 13.53* 9.87 12.43  12.43  12.43   HGB 11.2* 8.7* 9.2*  9.2*  9.2*   HCT 34.8* 27.4* 30.1*  30.1*  30.1*    254 284  284  284     CMP:   Recent Labs   Lab 09/11/22  1206 09/12/22  0822   * 134*   K 3.8 4.9   CL 97 105   CO2 21* 21*   * 137*   BUN 12 12   CREATININE 0.8  0.6   CALCIUM 9.5 7.9*   PROT 7.6 5.3*   ALBUMIN 3.1* 2.4*   BILITOT 0.4 0.2   ALKPHOS 81 63   AST 38 35   ALT 18 18   ANIONGAP 12 8       Significant Imaging:

## 2022-09-12 NOTE — NURSING
Pt APPT resulted at 1703 . After seeing results, MED was stopped, pt checked for bleeding , charge nurse notified as well as MD . Will follow nomogram a

## 2022-09-12 NOTE — PROGRESS NOTES
[facility] Medicine  Progress Note    Patient Name: Keyona Dwyer  MRN: [id number]  Patient Class: OP- Observation   Admission Date: 9/11/2022  Length of Stay: 0 days  Attending Physician: Natalie Fish MD  Primary Care Provider: Ralf Isidro MD        Subjective:     Principal Problem:Sepsis        HPI:  The pt is a 75 yo female with PMHx significant for HTN, Hyperlipidemia, chronic low back pain, OA, Osteoporosis, Prior h/o pancreatitis, PVD, COPD, Life long and current everyday smoker who was brought to the ED by her  for evaluation of 4 days h/o increasing generalized weakness to the point she was unable to get up from bed without assistance , decreased appetite , decreased food and water intake, dry cough, dry heaves, subjective fever . Since yesterday she was experiencing abd pain across the lower abd and increased urination. Denies chest pain, increased SOB, palpitations, nausea , vomiting, diarrhea, constipation, dysuria or incontinence.     Vitals on presentation 115/70, 133, 22, 100.9, 93% on RA  Labs - WBC 13.5K, Hgb 11.2, Plt 294, Na 130, K 3.8, CO2 21, Cr 0.8, Lipase 24, La 0.8, Troponin 1.946.UA- not collected     CXR- Interval development mild interstitial infiltrate involving the right lung.  Left lung is clear.  Possible small right pleural effusion.    EKG- ST, non specific T wave abnormality.      Pt was given NS bolus 1Liter , Cefepime 2 gram IV,  mg  and Heparin infusion per ACS protocol initiated in the ED and  consulted to place pt in observation for further management of sepsis due to pneumonia and elevated troponin.       Overview/Hospital Course:  9/12- Fever 101.8 overnight . Feels better this morning. Denies chest pain . Abd pain is better. No further nausea or dry heaves. SpO2 satisfactory. WBC normalized. Troponin bumped to 2.4. Heparin gtt continues along with ASA, Plavix, statin. [facility] plans for tomorrow if afebrile x 24h       Interval  History:     WBC normalized. Troponin bumped to 2.4. Heparin gtt continues along with ASA, Plavix, statin. [facility] plans for tomorrow if afebrile x 24h     Review of Systems   Constitutional:  Positive for activity change, appetite change, fatigue and fever.   HENT:  Negative for sore throat.    Eyes:  Negative for visual disturbance.   Respiratory:  Positive for cough and shortness of breath. Negative for chest tightness.    Cardiovascular:  Negative for chest pain, palpitations and leg swelling.   Gastrointestinal:  Positive for abdominal pain and nausea. Negative for abdominal distention, constipation, diarrhea and vomiting.        Dry heaves    Endocrine: Negative for polyuria.   Genitourinary:  Negative for decreased urine volume, dysuria, flank pain, frequency and hematuria.   Musculoskeletal:  Positive for back pain and gait problem.   Skin:  Negative for rash.   Neurological:  Positive for weakness. Negative for syncope, speech difficulty, light-headedness and headaches.   Psychiatric/Behavioral:  Negative for confusion, hallucinations and sleep disturbance.    Objective:     Vital Signs (Most Recent):  Temp: 99.2 °F (37.3 °C) (09/12/22 0801)  Pulse: 98 (09/12/22 0801)  Resp: 18 (09/12/22 0801)  BP: 114/62 (09/12/22 0801)  SpO2: 100 % (09/12/22 0801) Vital Signs (24h Range):  Temp:  [97.3 °F (36.3 °C)-101.8 °F (38.8 °C)] 99.2 °F (37.3 °C)  Pulse:  [] 98  Resp:  [16-24] 18  SpO2:  [94 %-100 %] 100 %  BP: ()/(51-74) 114/62     Weight: 45.1 kg (99 lb 6.8 oz)  Body mass index is 18.19 kg/m².  No intake or output data in the 24 hours ending 09/12/22 1136   Physical Exam  Constitutional:       General: She is not in acute distress.     Appearance: She is well-developed and underweight. She is ill-appearing. She is not diaphoretic.   HENT:      Head: Normocephalic and atraumatic.      Mouth/Throat:      Mouth: Mucous membranes are dry.      Pharynx: No oropharyngeal exudate.   Eyes:      Conjunctiva/sclera:  Conjunctivae normal.      Pupils: Pupils are equal, round, and reactive to light.   Neck:      Thyroid: No thyromegaly.      Vascular: No JVD.   Cardiovascular:      Rate and Rhythm: Normal rate and regular rhythm.      Heart sounds: Normal heart sounds. No murmur heard.  Pulmonary:      Effort: Pulmonary effort is normal. No respiratory distress.      Breath sounds: Normal breath sounds. No wheezing or rales.   Chest:      Chest wall: No tenderness.   Abdominal:      General: Bowel sounds are normal. There is no distension.      Palpations: Abdomen is soft.      Tenderness: There is no abdominal tenderness. There is no guarding or rebound.   Musculoskeletal:         General: Normal range of motion.      Cervical back: Normal range of motion and neck supple.      Right lower leg: No edema.      Left lower leg: No edema.   Lymphadenopathy:      Cervical: No cervical adenopathy.   Skin:     General: Skin is warm and dry.      Findings: Bruising present. No rash.   Neurological:      General: No focal deficit present.      Mental Status: She is oriented to person, place, and time. She is lethargic.      Cranial Nerves: No cranial nerve deficit.      Sensory: No sensory deficit.      Motor: Weakness present.      Deep Tendon Reflexes: Reflexes normal.   Psychiatric:         Behavior: Behavior is cooperative.       Significant Labs: All pertinent labs within the past 24 hours have been reviewed.  BMP:   Recent Labs   Lab 09/12/22  0822   *   *   K 4.9      CO2 21*   BUN 12   CREATININE 0.6   CALCIUM 7.9*   MG 1.6     CBC:   Recent Labs   Lab 09/11/22  1206 09/12/22  0252 09/12/22  0822   WBC 13.53* 9.87 12.43  12.43  12.43   HGB 11.2* 8.7* 9.2*  9.2*  9.2*   HCT 34.8* 27.4* 30.1*  30.1*  30.1*    254 284  284  284     CMP:   Recent Labs   Lab 09/11/22  1206 09/12/22  0822   * 134*   K 3.8 4.9   CL 97 105   CO2 21* 21*   * 137*   BUN 12 12   CREATININE 0.8 0.6   CALCIUM 9.5  7.9*   PROT 7.6 5.3*   ALBUMIN 3.1* 2.4*   BILITOT 0.4 0.2   ALKPHOS 81 63   AST 38 35   ALT 18 18   ANIONGAP 12 8       Significant Imaging:       Assessment/Plan:      * Sepsis due to Pneumonia   This patient does have evidence of infective focus  My overall impression is sepsis. Vital signs were reviewed and noted in progress note.  Antibiotics given-   Antibiotics (From admission, onward)    Start     Stop Route Frequency Ordered    09/11/22 1500  cefTRIAXone (ROCEPHIN) 1 g/50 mL D5W IVPB         09/16 1459 IV Every 24 hours (non-standard times) 09/11/22 1325    09/11/22 1400  azithromycin 500 mg in dextrose 5 % 250 mL IVPB (ready to mix system)         09/14 1359 IV Every 24 hours (non-standard times) 09/11/22 1325        Cultures were taken-   Microbiology Results (last 7 days)     Procedure Component Value Units Date/Time    Urine culture [[id number]] Collected: 09/12/22 0715    Order Status: No result Specimen: Urine Updated: 09/12/22 0817    Blood Culture #1 **CANNOT BE ORDERED STAT** [[id number]] Collected: 09/11/22 1206    Order Status: Completed Specimen: Blood from Peripheral, Hand, Right Updated: 09/12/22 0115     Blood Culture, Routine No Growth to date    Blood Culture #2 **CANNOT BE ORDERED STAT** [[id number]] Collected: 09/11/22 1205    Order Status: Completed Specimen: Blood from Peripheral, Antecubital, Right Updated: 09/12/22 0115     Blood Culture, Routine No Growth to date        Latest lactate reviewed, they are-  Recent Labs   Lab 09/11/22 1206 09/11/22  1703   LACTATE 0.8 0.8       Organ dysfunction indicated by none  Source- Lower resp tract     Source control Achieved by- Antimicrobial     SLP consult to assess aspiration risk         Right lower lobe pneumonia  - See plan per Sepsis       NSTEMI (non-ST elevated myocardial infarction)  - Troponin elevated at 1.946 on presentation   -Pt denies cliff chest pain or angina equivalent  -EKG with non specific T wave abnormality  - mg x 1  dose in the ED  -Continue Plavix and Statin   -Heparin gtt initiated per ACS protocol   -Trend troponin   -Consult Cardiology     9/12-  -Troponin bumped to 2.4. Heparin gtt continues along with ASA, Plavix, statin. [facility] plans for tomorrow if afebrile x 24h     COPD, group D, by GOLD 2017 classification  - Inhaled bronchodilators and ICS  -Supplemental oxygen to keep SpO2  90 to 92%      PVD (peripheral vascular disease)  - Continue Plavix and Statin       HTN (hypertension)  - BP is soft  -Hold home antihypertensives in the setting of sepsis   -Monitor BP trend and resume once appropriate       Tobacco abuse disorder  Assistance with smoking cessation was offered, including:  []  Medications  [x]  Counseling  []  Printed Information on Smoking Cessation  []  Referral to a Smoking Cessation Program    Patient was counseled regarding smoking for 3-10 minutes.          UTI (urinary tract infection)  - UA suggestive of UTI  -Follow up urine culture   -Continue Rocephin         VTE Risk Mitigation (From admission, onward)         Ordered     heparin 25,000 units in dextrose 5% (100 units/ml) IV bolus from bag - ADDITIONAL PRN BOLUS - 60 units/kg  As needed (PRN)        Question:  Heparin Infusion Adjustment (DO NOT MODIFY ANSWER)  Answer:  \\[email or web address]\epic\Images\Pharmacy\HeparinInfusions\heparin LOW INTENSITY nomogram for [facility] [id number].pdf    09/12/22 0418     heparin 25,000 units in dextrose 5% (100 units/ml) IV bolus from bag - ADDITIONAL PRN BOLUS - 30 units/kg  As needed (PRN)        Question:  Heparin Infusion Adjustment (DO NOT MODIFY ANSWER)  Answer:  \\[email or web address]\epic\Images\Pharmacy\HeparinInfusions\heparin LOW INTENSITY nomogram for [facility] [id number].pdf    09/12/22 0418     heparin 25,000 units in dextrose 5% 250 mL (100 units/mL) infusion LOW INTENSITY nomogram - [facility]  Continuous        Question Answer Comment   Heparin Infusion Adjustment (DO NOT MODIFY ANSWER)  [email or web address]\epic\Images\Pharmacy\HeparinInfusions\heparin LOW INTENSITY nomogram for [facility] [id number].pdf    Begin at (in units/kg/hr) 12        09/12/22 0418     IP VTE HIGH RISK PATIENT  Once         09/11/22 1324     Place sequential compression device  Until discontinued         09/11/22 1324                Discharge Planning   TOREY:      Code Status: DNR   Is the patient medically ready for discharge?:     Reason for patient still in hospital (select all that apply): Patient trending condition                     Natalie Fish MD  Department of Hospital Medicine   [facility] - Med Surg

## 2022-09-12 NOTE — SUBJECTIVE & OBJECTIVE
Review of Systems   Constitutional: Positive for fever and malaise/fatigue.   HENT: Negative.     Eyes: Negative.    Cardiovascular:  Positive for dyspnea on exertion.   Respiratory:  Positive for shortness of breath.    Endocrine: Negative.    Hematologic/Lymphatic: Negative.    Skin: Negative.    Musculoskeletal:  Positive for arthritis and joint pain.   Gastrointestinal: Negative.    Genitourinary: Negative.    Neurological: Negative.    Psychiatric/Behavioral: Negative.     Allergic/Immunologic: Negative.    Objective:     Vital Signs (Most Recent):  Temp: 99.2 °F (37.3 °C) (09/12/22 0801)  Pulse: 98 (09/12/22 0801)  Resp: 18 (09/12/22 0801)  BP: 114/62 (09/12/22 0801)  SpO2: 100 % (09/12/22 0801) Vital Signs (24h Range):  Temp:  [97.3 °F (36.3 °C)-101.8 °F (38.8 °C)] 99.2 °F (37.3 °C)  Pulse:  [] 98  Resp:  [16-24] 18  SpO2:  [93 %-100 %] 100 %  BP: ()/(51-74) 114/62     Weight: 45.1 kg (99 lb 6.8 oz)  Body mass index is 18.19 kg/m².     SpO2: 100 %  O2 Device (Oxygen Therapy): nasal cannula    No intake or output data in the 24 hours ending 09/12/22 1028    Lines/Drains/Airways       Peripheral Intravenous Line  Duration                  Peripheral IV - Single Lumen 09/11/22 1227 20 G Right Antecubital <1 day                    Physical Exam  Vitals and nursing note reviewed.   Constitutional:       General: She is not in acute distress.     Appearance: She is well-developed. She is ill-appearing. She is not diaphoretic.      Comments: On supplemental O2   HENT:      Head: Normocephalic and atraumatic.   Eyes:      General:         Right eye: No discharge.         Left eye: No discharge.      Pupils: Pupils are equal, round, and reactive to light.   Neck:      Thyroid: No thyromegaly.      Vascular: No JVD.      Trachea: No tracheal deviation.   Cardiovascular:      Rate and Rhythm: Normal rate and regular rhythm.      Heart sounds: Normal heart sounds, S1 normal and S2 normal. No murmur  heard.  Pulmonary:      Effort: Pulmonary effort is normal. No respiratory distress.      Breath sounds: Rhonchi present. No wheezing.   Abdominal:      General: There is no distension.      Tenderness: There is no rebound.   Musculoskeletal:      Cervical back: Neck supple.      Right lower leg: No edema.      Left lower leg: No edema.   Skin:     General: Skin is warm.      Findings: No erythema.   Neurological:      General: No focal deficit present.      Mental Status: She is alert and oriented to person, place, and time.   Psychiatric:         Mood and Affect: Mood normal.         Behavior: Behavior normal.         Thought Content: Thought content normal.       Significant Labs: CMP   Recent Labs   Lab 09/11/22  1206 09/12/22  0822   * 134*   K 3.8 4.9   CL 97 105   CO2 21* 21*   * 137*   BUN 12 12   CREATININE 0.8 0.6   CALCIUM 9.5 7.9*   PROT 7.6 5.3*   ALBUMIN 3.1* 2.4*   BILITOT 0.4 0.2   ALKPHOS 81 63   AST 38 35   ALT 18 18   ANIONGAP 12 8   , CBC   Recent Labs   Lab 09/11/22  1206 09/12/22  0252 09/12/22  0822   WBC 13.53* 9.87 12.43  12.43  12.43   HGB 11.2* 8.7* 9.2*  9.2*  9.2*   HCT 34.8* 27.4* 30.1*  30.1*  30.1*    254 284  284  284   , INR   Recent Labs   Lab 09/12/22  0252   INR 1.0   , Troponin   Recent Labs   Lab 09/11/22  1703 09/11/22  2120 09/12/22  0252   TROPONINI 1.485* 1.267* 2.441*   , and All pertinent lab results from the last 24 hours have been reviewed.    Significant Imaging: Echocardiogram: Transthoracic echo (TTE) complete ([facility]upid Only):   Results for orders placed or performed during the hospital encounter of 09/11/22   Echo   Result Value Ref Range    BSA 1.42 m2    TDI SEPTAL 0.07 m/s    LV LATERAL E/E' RATIO 8.58 m/s    LV SEPTAL E/E' RATIO 14.71 m/s    LA WIDTH 3.70 cm    IVC diameter 1.68 cm    Left Ventricular Outflow Tract Mean Velocity 0.87 cm/s    Left Ventricular Outflow Tract Mean Gradient 3.34 mmHg    TDI LATERAL 0.12 m/s    LVIDd 3.44 (A)  3.5 - 6.0 cm    IVS 1.36 (A) 0.6 - 1.1 cm    Posterior Wall 1.50 (A) 0.6 - 1.1 cm    Ao root annulus 2.50 cm    LVIDs 2.23 2.1 - 4.0 cm    FS 35 28 - 44 %    LA volume 60.31 cm3    STJ 2.20 cm    Ascending aorta 2.22 cm    LV mass 174.77 g    LA size 4.45 cm    TAPSE 2.00 cm    Left Ventricle Relative Wall Thickness 0.87 cm    AV mean gradient 6 mmHg    AV valve area 1.87 cm2    AV Velocity Ratio 0.72     AV index (prosthetic) 0.72     MV valve area p 1/2 method 6.14 cm2    E/A ratio 0.86     Mean e' 0.10 m/s    E wave deceleration time 123.62 msec    IVRT 54.23 msec    LVOT diameter 1.82 cm    LVOT area 2.6 cm2    LVOT peak inocente 1.24 m/s    LVOT peak VTI 25.30 cm    Ao peak inocente 1.72 m/s    Ao VTI 35.1 cm    RVOT peak inocente 0.77 m/s    RVOT peak VTI 13.3 cm    Mr max inocente 4.73 m/s    LVOT stroke volume 65.79 cm3    AV peak gradient 12 mmHg    PV mean gradient 1.28 mmHg    E/E' ratio 10.84 m/s    MV Peak E Inocente 1.03 m/s    TR Max Inocente 3.25 m/s    MV stenosis pressure 1/2 time 35.85 ms    MV Peak A Inocente 1.20 m/s    LV Systolic Volume 16.70 mL    LV Systolic Volume Index 11.6 mL/m2    LV Diastolic Volume 48.68 mL    LV Diastolic Volume Index 33.81 mL/m2    LA Volume Index 41.9 mL/m2    LV Mass Index 121 g/m2    RA Major Axis 3.80 cm    Left Atrium Minor Axis 4.35 cm    Left Atrium Major Axis 4.27 cm    Triscuspid Valve Regurgitation Peak Gradient 42 mmHg    RA Width 2.70 cm    Right Atrial Pressure (from IVC) 3 mmHg    EF 55 %    TV rest pulmonary artery pressure 45 mmHg    Narrative    · The left ventricle is normal in size with concentric hypertrophy and   normal systolic function.  · The estimated ejection fraction is 55%.  · Grade I left ventricular diastolic dysfunction.  · Normal right ventricular size with normal right ventricular systolic   function.  · Moderate left atrial enlargement.  · Mild tricuspid regurgitation.  · There are segmental left ventricular wall motion abnormalities.  · There is pulmonary  hypertension.  · The estimated PA systolic pressure is 45 mmHg.      , EKG: Reviewed, and X-Ray: CXR: X-Ray Chest 1 View (CXR):   Results for orders placed or performed during the hospital encounter of 09/11/22   X-Ray Chest 1 View    Narrative    EXAMINATION:  XR CHEST 1 VIEW    CLINICAL HISTORY:  <Diagnosis>, weakness;    COMPARISON:  Comparison 06/18/2022.    FINDINGS:  Heart is normal in size.  Interval development mild interstitial infiltrate involving the right lung.  Left lung is clear.  Possible small right pleural effusion.      Impression    Interval development of a mild interstitial infiltrate involving the right lung with a small right pleural effusion.  Differential considerations include unilateral    edema versus pneumonia.      Electronically signed by: Maximino Pandey MD  Date:    09/11/2022  Time:    12:30    and X-Ray Chest PA and Lateral (CXR): No results found for this visit on 09/11/22.

## 2022-09-12 NOTE — PROGRESS NOTES
Pharmacist Renal Dose Adjustment Note    Keyona Dwyer is a 76 y.o. female being treated with the medication famotidine.     Patient Data:    Vital Signs (Most Recent):  Temp: 99.2 °F (37.3 °C) (09/12/22 1144)  Pulse: 89 (09/12/22 1349)  Resp: 16 (09/12/22 1349)  BP: (!) 103/56 (09/12/22 1144)  SpO2: 98 % (09/12/22 1349)   Vital Signs (72h Range):  Temp:  [97.3 °F (36.3 °C)-101.8 °F (38.8 °C)]   Pulse:  []   Resp:  [16-24]   BP: ()/(51-74)   SpO2:  [93 %-100 %]      Recent Labs   Lab 09/11/22  1206 09/12/22  0822   CREATININE 0.8 0.6     Serum creatinine: 0.6 mg/dL 09/12/22 0822  Estimated creatinine clearance: 55 mL/min    Famotidine 20 mg daily has been changed to 20 mg twice daily per [facility]'s renal dose adjustment protocol.     Pharmacist's Name: Gaston Smith, PharmD 9/12/2022 2:49 PM  Pharmacist's Extension: [phone]

## 2022-09-12 NOTE — PROGRESS NOTES
[facility] S[facility]  Cardiology  Progress Note    Patient Name: Keyona Dwyer  MRN: [id number]  Admission Date: 9/11/2022  Hospital Length of Stay: 0 days  Code Status: Full Code   Attending Physician: Natalie Fish MD   Primary Care Physician: Ralf Isidro MD  Expected Discharge Date:   Principal Problem:Sepsis    Subjective:   HPI:  Cardiology consulted for NSTEMI.  Pt has h/o PAD, HTN, hyperlipidemia, smoking.  She has h/o PAD with angiogram in April 2021 showing R common iliac occlusion and had PTA L common iliac artery, tx by Dr. Adkins, Vascular Surgery.  She has been weak last few days, and had a fever, GAMEZ, and lower abd pain.  No chest pain sxs.  She presented to ER and started on antibiotics for possible R sided pneumonia.  Troponin elevated 1.9  Ecg showed NSR, anterior T wave abnl.  WBC elevated.  Had low grade fever 100.9 in ER.    Hospital Course:   9/12/22-Patient seen and examined today, resting in bed. Feels ok. SOB improved. No chest pain. +Fever overnight. Labs reviewed. Troponin bumped to 2.441, repeat pending. Echo showed normal EF, +WMA. Plans for LHC tomorrow (once afebrile for 24 hours).        Review of Systems   Constitutional: Positive for fever and malaise/fatigue.   HENT: Negative.     Eyes: Negative.    Cardiovascular:  Positive for dyspnea on exertion.   Respiratory:  Positive for shortness of breath.    Endocrine: Negative.    Hematologic/Lymphatic: Negative.    Skin: Negative.    Musculoskeletal:  Positive for arthritis and joint pain.   Gastrointestinal: Negative.    Genitourinary: Negative.    Neurological: Negative.    Psychiatric/Behavioral: Negative.     Allergic/Immunologic: Negative.    Objective:     Vital Signs (Most Recent):  Temp: 99.2 °F (37.3 °C) (09/12/22 0801)  Pulse: 98 (09/12/22 0801)  Resp: 18 (09/12/22 0801)  BP: 114/62 (09/12/22 0801)  SpO2: 100 % (09/12/22 0801) Vital Signs (24h Range):  Temp:  [97.3 °F (36.3 °C)-101.8 °F (38.8 °C)] 99.2 °F (37.3  °C)  Pulse:  [] 98  Resp:  [16-24] 18  SpO2:  [93 %-100 %] 100 %  BP: ()/(51-74) 114/62     Weight: 45.1 kg (99 lb 6.8 oz)  Body mass index is 18.19 kg/m².     SpO2: 100 %  O2 Device (Oxygen Therapy): nasal cannula    No intake or output data in the 24 hours ending 09/12/22 1028    Lines/Drains/Airways       Peripheral Intravenous Line  Duration                  Peripheral IV - Single Lumen 09/11/22 1227 20 G Right Antecubital <1 day                    Physical Exam  Vitals and nursing note reviewed.   Constitutional:       General: She is not in acute distress.     Appearance: She is well-developed. She is ill-appearing. She is not diaphoretic.      Comments: On supplemental O2   HENT:      Head: Normocephalic and atraumatic.   Eyes:      General:         Right eye: No discharge.         Left eye: No discharge.      Pupils: Pupils are equal, round, and reactive to light.   Neck:      Thyroid: No thyromegaly.      Vascular: No JVD.      Trachea: No tracheal deviation.   Cardiovascular:      Rate and Rhythm: Normal rate and regular rhythm.      Heart sounds: Normal heart sounds, S1 normal and S2 normal. No murmur heard.  Pulmonary:      Effort: Pulmonary effort is normal. No respiratory distress.      Breath sounds: Rhonchi present. No wheezing.   Abdominal:      General: There is no distension.      Tenderness: There is no rebound.   Musculoskeletal:      Cervical back: Neck supple.      Right lower leg: No edema.      Left lower leg: No edema.   Skin:     General: Skin is warm.      Findings: No erythema.   Neurological:      General: No focal deficit present.      Mental Status: She is alert and oriented to person, place, and time.   Psychiatric:         Mood and Affect: Mood normal.         Behavior: Behavior normal.         Thought Content: Thought content normal.       Significant Labs: CMP   Recent Labs   Lab 09/11/22  1206 09/12/22  0822   * 134*   K 3.8 4.9   CL 97 105   CO2 21* 21*   GLU  187* 137*   BUN 12 12   CREATININE 0.8 0.6   CALCIUM 9.5 7.9*   PROT 7.6 5.3*   ALBUMIN 3.1* 2.4*   BILITOT 0.4 0.2   ALKPHOS 81 63   AST 38 35   ALT 18 18   ANIONGAP 12 8   , CBC   Recent Labs   Lab 09/11/22  1206 09/12/22  0252 09/12/22  0822   WBC 13.53* 9.87 12.43  12.43  12.43   HGB 11.2* 8.7* 9.2*  9.2*  9.2*   HCT 34.8* 27.4* 30.1*  30.1*  30.1*    254 284  284  284   , INR   Recent Labs   Lab 09/12/22  0252   INR 1.0   , Troponin   Recent Labs   Lab 09/11/22  1703 09/11/22  2120 09/12/22  0252   TROPONINI 1.485* 1.267* 2.441*   , and All pertinent lab results from the last 24 hours have been reviewed.    Significant Imaging: Echocardiogram: Transthoracic echo (TTE) complete (Cupid Only):   Results for orders placed or performed during the hospital encounter of 09/11/22   Echo   Result Value Ref Range    BSA 1.42 m2    TDI SEPTAL 0.07 m/s    LV LATERAL E/E' RATIO 8.58 m/s    LV SEPTAL E/E' RATIO 14.71 m/s    LA WIDTH 3.70 cm    IVC diameter 1.68 cm    Left Ventricular Outflow Tract Mean Velocity 0.87 cm/s    Left Ventricular Outflow Tract Mean Gradient 3.34 mmHg    TDI LATERAL 0.12 m/s    LVIDd 3.44 (A) 3.5 - 6.0 cm    IVS 1.36 (A) 0.6 - 1.1 cm    Posterior Wall 1.50 (A) 0.6 - 1.1 cm    Ao root annulus 2.50 cm    LVIDs 2.23 2.1 - 4.0 cm    FS 35 28 - 44 %    LA volume 60.31 cm3    STJ 2.20 cm    Ascending aorta 2.22 cm    LV mass 174.77 g    LA size 4.45 cm    TAPSE 2.00 cm    Left Ventricle Relative Wall Thickness 0.87 cm    AV mean gradient 6 mmHg    AV valve area 1.87 cm2    AV Velocity Ratio 0.72     AV index (prosthetic) 0.72     MV valve area p 1/2 method 6.14 cm2    E/A ratio 0.86     Mean e' 0.10 m/s    E wave deceleration time 123.62 msec    IVRT 54.23 msec    LVOT diameter 1.82 cm    LVOT area 2.6 cm2    LVOT peak kalyani 1.24 m/s    LVOT peak VTI 25.30 cm    Ao peak kalyani 1.72 m/s    Ao VTI 35.1 cm    RVOT peak kalyani 0.77 m/s    RVOT peak VTI 13.3 cm    Mr max kalyani 4.73 m/s    LVOT stroke  volume 65.79 cm3    AV peak gradient 12 mmHg    PV mean gradient 1.28 mmHg    E/E' ratio 10.84 m/s    MV Peak E Inocente 1.03 m/s    TR Max Inocente 3.25 m/s    MV stenosis pressure 1/2 time 35.85 ms    MV Peak A Inocente 1.20 m/s    LV Systolic Volume 16.70 mL    LV Systolic Volume Index 11.6 mL/m2    LV Diastolic Volume 48.68 mL    LV Diastolic Volume Index 33.81 mL/m2    LA Volume Index 41.9 mL/m2    LV Mass Index 121 g/m2    RA Major Axis 3.80 cm    Left Atrium Minor Axis 4.35 cm    Left Atrium Major Axis 4.27 cm    Triscuspid Valve Regurgitation Peak Gradient 42 mmHg    RA Width 2.70 cm    Right Atrial Pressure (from IVC) 3 mmHg    EF 55 %    TV rest pulmonary artery pressure 45 mmHg    Narrative    · The left ventricle is normal in size with concentric hypertrophy and   normal systolic function.  · The estimated ejection fraction is 55%.  · Grade I left ventricular diastolic dysfunction.  · Normal right ventricular size with normal right ventricular systolic   function.  · Moderate left atrial enlargement.  · Mild tricuspid regurgitation.  · There are segmental left ventricular wall motion abnormalities.  · There is pulmonary hypertension.  · The estimated PA systolic pressure is 45 mmHg.      , EKG: Reviewed, and X-Ray: CXR: X-Ray Chest 1 View (CXR):   Results for orders placed or performed during the hospital encounter of 09/11/22   X-Ray Chest 1 View    Narrative    EXAMINATION:  XR CHEST 1 VIEW    CLINICAL HISTORY:  <Diagnosis>, weakness;    COMPARISON:  Comparison 06/18/2022.    FINDINGS:  Heart is normal in size.  Interval development mild interstitial infiltrate involving the right lung.  Left lung is clear.  Possible small right pleural effusion.      Impression    Interval development of a mild interstitial infiltrate involving the right lung with a small right pleural effusion.  Differential considerations include unilateral    edema versus pneumonia.      Electronically signed by: Maximino Pandey  MD  Date:    09/11/2022  Time:    12:30    and X-Ray Chest PA and Lateral (CXR): No results found for this visit on 09/11/22.    Assessment and Plan:   Patient who presents with sepsis/NSTEMI. Stable CV wise. Continue abx and OMT. LHC deferred until tmw given active fevers.    * Sepsis due to Pneumonia   -Continue antibiotics for pneumonia.  -Per Hospital Med mgt.      NSTEMI (non-ST elevated myocardial infarction)  NSTEMI with atypical presentation.  Has h/o significant PAD in past.  Likely has significant underlying CAD.  Anterior T wave abnl on ecg.    Serial troponin levels.  IV heparin gtt.  Asa, Plavix, statin.    9/12/22  -Stable overnight, remains chest pain free  -Continue heparin gtt, ASA, Plavix, statin, low dose BB  -Echo showed normal EF, +WMA  -LHC planned today but deferred until tomorrow given active fevers  -Continue OMT interim  Echocardiogram pending.    Would benefit from LHC to rule out significant underlying CAD.    NPO after midnight for possible cath.    Elevated troponin/ NSTEMI   See NSTEMI section.    Tobacco abuse disorder  -Smoking cessation advised.    PVD (peripheral vascular disease)  -Medical tx for now  -ASA, statin  -Smoking cessation.    HTN (hypertension)  HTN control.        VTE Risk Mitigation (From admission, onward)         Ordered     heparin 25,000 units in dextrose 5% (100 units/ml) IV bolus from bag - ADDITIONAL PRN BOLUS - 60 units/kg  As needed (PRN)        Question:  Heparin Infusion Adjustment (DO NOT MODIFY ANSWER)  Answer:  [email or web address]\[employer]\Images\Pharmacy\HeparinInfusions\heparin LOW INTENSITY nomogram for [facility] [id number]    09/12/22 0418     heparin 25,000 units in dextrose 5% (100 units/ml) IV bolus from bag - ADDITIONAL PRN BOLUS - 30 units/kg  As needed (PRN)        Question:  Heparin Infusion Adjustment (DO NOT MODIFY ANSWER)  Answer:  \\[email or web address]\Images\Pharmacy\HeparinInfusions\heparin LOW INTENSITY nomogram for [facility] [id number]    09/12/22 0419      heparin 25,000 units in dextrose 5% 250 mL (100 units/mL) infusion LOW INTENSITY nomogram - [facility]  Continuous        Question Answer Comment   Heparin Infusion Adjustment (DO NOT MODIFY ANSWER) [email or web address]\Images\Pharmacy\HeparinInfusions\heparin LOW INTENSITY nomogram for [facility] [id number]    Begin at (in units/kg/hr) 12        09/12/22 0418     IP VTE HIGH RISK PATIENT  Once         09/11/22 1324     Place sequential compression device  Until discontinued         09/11/22 1324                Ruthie Campuzano PA-C  Cardiology  [facility] - Med Surg

## 2022-09-12 NOTE — NURSING
Pt was transported to room , did not receive handoff from ER nurse , Heparin was noted to be at 40 units / ER dept was called twice in reference to having nurse come and sign handoff ,  stated that nurse was busy. Called again 15 min later , spoke with charge nurse about handoff on heparin ,she stated that nurse was tied up but will send her up . Meanwhile APPT resulted and med was placed on hold MD notified , Charge nurse notified .

## 2022-09-12 NOTE — PROGRESS NOTES
ST swallowing evaluation orders received and chart reviewed.  Pt NPO for scheduled cardiac cath today.  ST to f/u for assessment post-procedure, as clinically indicated.

## 2022-09-12 NOTE — HOSPITAL COURSE
9/12/22-Patient seen and examined today, resting in bed. Feels ok. SOB improved. No chest pain. +Fever overnight. Labs reviewed. Troponin bumped to 2.441, repeat pending. Echo showed normal EF, +WMA. Plans for [facility] tomorrow (once afebrile for 24 hours).    9/13/22-Patient seen and examined today, lying in bed. Awake and alert but intermittently confused, family reports started this AM. No CV complaints. H/H dipped 7.9/24.6 and nursing staff reported dark colored stool. Heparin gtt and DAPT held, bleeding scan pending.    9/14/22-Patient seen and examined today, transferred to ICU overnight due to worsening respiratory status/likely aspiration. Remains on bipap during exam. No overt bleeding reported. EGD yesterday only showed gastritis. Colonoscopy planned today but cancelled due to overnight events.     9/15/22-Patient seen and examined today, sitting up in bed. Bipap weaned off. Feeling better, less SOB. Chest pain free. BNP elevated, diuresis initiated. H/H stable. Discussed with GI/hospital medicine, will resume DAPT and closely monitor.     9/16/22-Patient seen and examined today, sitting up in bed. Feeling much better, SOB nearly resolved. No angina. Diuresed well overnight. Labs reviewed and are stable. Ranexa added.

## 2022-09-12 NOTE — ASSESSMENT & PLAN NOTE
NSTEMI with atypical presentation.  Has h/o significant PAD in past.  Likely has significant underlying CAD.  Anterior T wave abnl on ecg.    Serial troponin levels.  IV heparin gtt.  Asa, Plavix, statin.    9/12/22  -Stable overnight, remains chest pain free  -Continue heparin gtt, ASA, Plavix, statin, low dose BB  -Echo showed normal EF, +WMA  -LHC planned today but deferred until tomorrow given active fevers  -Continue OMT interim  Echocardiogram pending.    Would benefit from LHC to rule out significant underlying CAD.    NPO after midnight for possible cath.

## 2022-09-12 NOTE — HOSPITAL COURSE
9/12- Fever 101.8 overnight . Feels better this morning. Denies chest pain . Abd pain is better. No further nausea or dry heaves. SpO2 satisfactory. WBC normalized. Troponin bumped to 2.4. .Echo resulted LVEF 55%, G1DD, +WMA. Heparin gtt continues along with ASA, Plavix, statin. [facility] plans for tomorrow if afebrile x 24h.    9/13- Tmax 99.3. BP is improving. Per family pt was little confused earlier, however mental status seems better during my exam. No focal weakness appreciated . CT head done and is neg for acute changes. Dark stool reported per nursing. Hgb dropped to 8.2>9.2>11.2. Heparin gtt, Asa, Plavix held. PPI loading dose 80 mg given . Will get NM GI bleed scan. Plan for C placed on hold. WBC normal. Creatinine 0.6, BUN 13. Phos <1.0 and is being replaced. Urine culture -pending. Discussed code status . Pt and  both expressed code status be DNR.     9/14- Rapid response called early this morning. Pt was receiving bowel prep via NG tube for colonoscopy to r/o GI bleed, however developed resp distress and decreased mentation. ABG showed acute hypercapnic resp failure. Pt placed on biPAP and transferred to ICU. No further GI and cardiac intervention planned at this time. WBC increased to 16K , Hgb 11.1 post transfusion x 1 yesterday. Cr 0.8. CTA abd /pelvis neg for acute bleed.     9/15 She is aao x 3 in nad . She is on room  air .  NAEON . Plan to downgrade to tele . She is negative 1 lt  since yesterday . The pro calcitonin is trending down . Will  D/W Cardiology and GI about when to start   DAPT  for NSTEMI .  9/16 NAEON . Re started on DAPT . She is negative 1 lt since admission . PT/OT consulted . No new complaint . SLP rec soft diet .    9/17 Pt was seen and  examined at bedside . She was determined to be suitable for d/c  She is tolerating  dysphagia diet . The H/H has been stable . She did not qualify for home  o2 . PT/OT rec HH.  consulted  for Home health for  PT/OT/SN/SLP . She  will g/u with her PCP and cardiology in 1 to 2 week. She will be d/c on po lasix .

## 2022-09-12 NOTE — ASSESSMENT & PLAN NOTE
This patient does have evidence of infective focus  My overall impression is sepsis. Vital signs were reviewed and noted in progress note.  Antibiotics given-   Antibiotics (From admission, onward)    Start     Stop Route Frequency Ordered    09/11/22 1500  cefTRIAXone (ROCEPHIN) 1 g/50 mL D5W IVPB         09/16 1459 IV Every 24 hours (non-standard times) 09/11/22 1325    09/11/22 1400  azithromycin 500 mg in dextrose 5 % 250 mL IVPB (ready to mix system)         09/14 1359 IV Every 24 hours (non-standard times) 09/11/22 1325        Cultures were taken-   Microbiology Results (last 7 days)     Procedure Component Value Units Date/Time    Urine culture [[id number]] Collected: 09/12/22 0715    Order Status: No result Specimen: Urine Updated: 09/12/22 0817    Blood Culture #1 **CANNOT BE ORDERED STAT** [[id number]] Collected: 09/11/22 1206    Order Status: Completed Specimen: Blood from Peripheral, Hand, Right Updated: 09/12/22 0115     Blood Culture, Routine No Growth to date    Blood Culture #2 **CANNOT BE ORDERED STAT** [[id number]] Collected: 09/11/22 1205    Order Status: Completed Specimen: Blood from Peripheral, Antecubital, Right Updated: 09/12/22 0115     Blood Culture, Routine No Growth to date        Latest lactate reviewed, they are-  Recent Labs   Lab 09/11/22  1206 09/11/22  1703   LACTATE 0.8 0.8       Organ dysfunction indicated by none  Source- Lower resp tract     Source control Achieved by- Antimicrobial     SLP consult to assess aspiration risk

## 2022-09-12 NOTE — ASSESSMENT & PLAN NOTE
- Troponin elevated at 1.946 on presentation   -Pt denies cliff chest pain or angina equivalent  -EKG with non specific T wave abnormality  - mg x 1 dose in the ED  -Continue Plavix and Statin   -Heparin gtt initiated per ACS protocol   -Trend troponin   -Consult Cardiology     9/12-  -Troponin bumped to 2.4. Heparin gtt continues along with ASA, Plavix, statin. [facility] plans for tomorrow if afebrile x 24h

## 2022-09-13 ENCOUNTER — ANESTHESIA (OUTPATIENT)
Dept: ENDOSCOPY | Facility: HOSPITAL | Age: 77
DRG: 871 | End: 2022-09-13
Payer: MEDICARE

## 2022-09-13 ENCOUNTER — ANESTHESIA EVENT (OUTPATIENT)
Dept: ENDOSCOPY | Facility: HOSPITAL | Age: 77
DRG: 871 | End: 2022-09-13
Payer: MEDICARE

## 2022-09-13 PROBLEM — K92.1 MELENA: Status: ACTIVE | Noted: 2022-09-13

## 2022-09-13 LAB
ABO + RH BLD: NORMAL
ALBUMIN SERPL BCP-MCNC: 2.1 G/DL (ref 3.5–5.2)
ALP SERPL-CCNC: 57 U/L (ref 55–135)
ALT SERPL W/O P-5'-P-CCNC: 17 U/L (ref 10–44)
ANION GAP SERPL CALC-SCNC: 9 MMOL/L (ref 8–16)
APTT BLDCRRT: 38.8 SEC (ref 21–32)
APTT BLDCRRT: 49.2 SEC (ref 21–32)
AST SERPL-CCNC: 35 U/L (ref 10–40)
BACTERIA UR CULT: NO GROWTH
BASOPHILS # BLD AUTO: 0.02 K/UL (ref 0–0.2)
BASOPHILS NFR BLD: 0.2 % (ref 0–1.9)
BILIRUB SERPL-MCNC: 0.2 MG/DL (ref 0.1–1)
BLD GP AB SCN CELLS X3 SERPL QL: NORMAL
BUN SERPL-MCNC: 13 MG/DL (ref 8–23)
CALCIUM SERPL-MCNC: 7.7 MG/DL (ref 8.7–10.5)
CHLORIDE SERPL-SCNC: 100 MMOL/L (ref 95–110)
CO2 SERPL-SCNC: 20 MMOL/L (ref 23–29)
CREAT SERPL-MCNC: 0.6 MG/DL (ref 0.5–1.4)
DIFFERENTIAL METHOD: ABNORMAL
EOSINOPHIL # BLD AUTO: 0 K/UL (ref 0–0.5)
EOSINOPHIL NFR BLD: 0.1 % (ref 0–8)
ERYTHROCYTE [DISTWIDTH] IN BLOOD BY AUTOMATED COUNT: 13.5 % (ref 11.5–14.5)
EST. GFR  (NO RACE VARIABLE): >60 ML/MIN/1.73 M^2
GLUCOSE SERPL-MCNC: 117 MG/DL (ref 70–110)
HCT VFR BLD AUTO: 24.6 % (ref 37–48.5)
HCT VFR BLD AUTO: 25.8 % (ref 37–48.5)
HCT VFR BLD AUTO: 26.2 % (ref 37–48.5)
HCT VFR BLD AUTO: 27.2 % (ref 37–48.5)
HGB BLD-MCNC: 7.9 G/DL (ref 12–16)
HGB BLD-MCNC: 8.2 G/DL (ref 12–16)
HGB BLD-MCNC: 8.7 G/DL (ref 12–16)
IMM GRANULOCYTES # BLD AUTO: 0.04 K/UL (ref 0–0.04)
IMM GRANULOCYTES NFR BLD AUTO: 0.5 % (ref 0–0.5)
LYMPHOCYTES # BLD AUTO: 0.6 K/UL (ref 1–4.8)
LYMPHOCYTES NFR BLD: 7.8 % (ref 18–48)
MAGNESIUM SERPL-MCNC: 1.6 MG/DL (ref 1.6–2.6)
MCH RBC QN AUTO: 30.5 PG (ref 27–31)
MCHC RBC AUTO-ENTMCNC: 32.1 G/DL (ref 32–36)
MCV RBC AUTO: 95 FL (ref 82–98)
MONOCYTES # BLD AUTO: 0.6 K/UL (ref 0.3–1)
MONOCYTES NFR BLD: 7.4 % (ref 4–15)
NEUTROPHILS # BLD AUTO: 6.8 K/UL (ref 1.8–7.7)
NEUTROPHILS NFR BLD: 84 % (ref 38–73)
NRBC BLD-RTO: 0 /100 WBC
PHOSPHATE SERPL-MCNC: <1 MG/DL (ref 2.7–4.5)
PLATELET # BLD AUTO: 252 K/UL (ref 150–450)
PMV BLD AUTO: 9.6 FL (ref 9.2–12.9)
POTASSIUM SERPL-SCNC: 3.7 MMOL/L (ref 3.5–5.1)
PROT SERPL-MCNC: 5.5 G/DL (ref 6–8.4)
RBC # BLD AUTO: 2.59 M/UL (ref 4–5.4)
SODIUM SERPL-SCNC: 129 MMOL/L (ref 136–145)
TROPONIN I SERPL DL<=0.01 NG/ML-MCNC: 1.69 NG/ML (ref 0–0.03)
WBC # BLD AUTO: 8.08 K/UL (ref 3.9–12.7)

## 2022-09-13 PROCEDURE — 43235 PR EGD, FLEX, DIAGNOSTIC: ICD-10-PCS | Mod: ,,, | Performed by: INTERNAL MEDICINE

## 2022-09-13 PROCEDURE — C9113 INJ PANTOPRAZOLE SODIUM, VIA: HCPCS | Performed by: INTERNAL MEDICINE

## 2022-09-13 PROCEDURE — 63600175 PHARM REV CODE 636 W HCPCS: Performed by: NURSE ANESTHETIST, CERTIFIED REGISTERED

## 2022-09-13 PROCEDURE — C9113 INJ PANTOPRAZOLE SODIUM, VIA: HCPCS | Performed by: PHYSICIAN ASSISTANT

## 2022-09-13 PROCEDURE — 43235 EGD DIAGNOSTIC BRUSH WASH: CPT | Performed by: INTERNAL MEDICINE

## 2022-09-13 PROCEDURE — 37000008 HC ANESTHESIA 1ST 15 MINUTES: Performed by: INTERNAL MEDICINE

## 2022-09-13 PROCEDURE — 25000003 PHARM REV CODE 250: Performed by: NURSE ANESTHETIST, CERTIFIED REGISTERED

## 2022-09-13 PROCEDURE — 85018 HEMOGLOBIN: CPT | Performed by: INTERNAL MEDICINE

## 2022-09-13 PROCEDURE — 43235 EGD DIAGNOSTIC BRUSH WASH: CPT | Mod: ,,, | Performed by: INTERNAL MEDICINE

## 2022-09-13 PROCEDURE — 86850 RBC ANTIBODY SCREEN: CPT | Performed by: INTERNAL MEDICINE

## 2022-09-13 PROCEDURE — 84100 ASSAY OF PHOSPHORUS: CPT | Performed by: INTERNAL MEDICINE

## 2022-09-13 PROCEDURE — 84484 ASSAY OF TROPONIN QUANT: CPT | Performed by: INTERNAL MEDICINE

## 2022-09-13 PROCEDURE — 37000009 HC ANESTHESIA EA ADD 15 MINS: Performed by: INTERNAL MEDICINE

## 2022-09-13 PROCEDURE — S4991 NICOTINE PATCH NONLEGEND: HCPCS | Performed by: INTERNAL MEDICINE

## 2022-09-13 PROCEDURE — 92610 EVALUATE SWALLOWING FUNCTION: CPT

## 2022-09-13 PROCEDURE — 36415 COLL VENOUS BLD VENIPUNCTURE: CPT | Performed by: INTERNAL MEDICINE

## 2022-09-13 PROCEDURE — 94761 N-INVAS EAR/PLS OXIMETRY MLT: CPT

## 2022-09-13 PROCEDURE — 25000003 PHARM REV CODE 250: Performed by: INTERNAL MEDICINE

## 2022-09-13 PROCEDURE — 86920 COMPATIBILITY TEST SPIN: CPT | Performed by: INTERNAL MEDICINE

## 2022-09-13 PROCEDURE — 85014 HEMATOCRIT: CPT | Mod: 91 | Performed by: PHYSICIAN ASSISTANT

## 2022-09-13 PROCEDURE — 85025 COMPLETE CBC W/AUTO DIFF WBC: CPT | Performed by: INTERNAL MEDICINE

## 2022-09-13 PROCEDURE — 25500020 PHARM REV CODE 255: Performed by: INTERNAL MEDICINE

## 2022-09-13 PROCEDURE — 83735 ASSAY OF MAGNESIUM: CPT | Performed by: INTERNAL MEDICINE

## 2022-09-13 PROCEDURE — 85014 HEMATOCRIT: CPT | Mod: 91 | Performed by: INTERNAL MEDICINE

## 2022-09-13 PROCEDURE — 36415 COLL VENOUS BLD VENIPUNCTURE: CPT | Performed by: PHYSICIAN ASSISTANT

## 2022-09-13 PROCEDURE — 94640 AIRWAY INHALATION TREATMENT: CPT

## 2022-09-13 PROCEDURE — 85018 HEMOGLOBIN: CPT | Mod: 91 | Performed by: PHYSICIAN ASSISTANT

## 2022-09-13 PROCEDURE — 80053 COMPREHEN METABOLIC PANEL: CPT | Performed by: INTERNAL MEDICINE

## 2022-09-13 PROCEDURE — 85730 THROMBOPLASTIN TIME PARTIAL: CPT | Performed by: INTERNAL MEDICINE

## 2022-09-13 PROCEDURE — 99233 SBSQ HOSP IP/OBS HIGH 50: CPT | Mod: ,,, | Performed by: INTERNAL MEDICINE

## 2022-09-13 PROCEDURE — 21400001 HC TELEMETRY ROOM

## 2022-09-13 PROCEDURE — 63600175 PHARM REV CODE 636 W HCPCS: Performed by: PHYSICIAN ASSISTANT

## 2022-09-13 PROCEDURE — 25000242 PHARM REV CODE 250 ALT 637 W/ HCPCS: Performed by: INTERNAL MEDICINE

## 2022-09-13 PROCEDURE — 99233 PR SUBSEQUENT HOSPITAL CARE,LEVL III: ICD-10-PCS | Mod: ,,, | Performed by: INTERNAL MEDICINE

## 2022-09-13 PROCEDURE — 63600175 PHARM REV CODE 636 W HCPCS: Performed by: INTERNAL MEDICINE

## 2022-09-13 RX ORDER — PANTOPRAZOLE SODIUM 40 MG/1
40 TABLET, DELAYED RELEASE ORAL 2 TIMES DAILY
Status: DISCONTINUED | OUTPATIENT
Start: 2022-09-13 | End: 2022-09-13

## 2022-09-13 RX ORDER — MORPHINE SULFATE 4 MG/ML
2 INJECTION, SOLUTION INTRAMUSCULAR; INTRAVENOUS ONCE
Status: DISCONTINUED | OUTPATIENT
Start: 2022-09-13 | End: 2022-09-13

## 2022-09-13 RX ORDER — SODIUM CHLORIDE 9 MG/ML
INJECTION, SOLUTION INTRAVENOUS CONTINUOUS
Status: DISCONTINUED | OUTPATIENT
Start: 2022-09-13 | End: 2022-09-14

## 2022-09-13 RX ORDER — NICOTINE 7MG/24HR
1 PATCH, TRANSDERMAL 24 HOURS TRANSDERMAL DAILY
Status: DISCONTINUED | OUTPATIENT
Start: 2022-09-13 | End: 2022-09-17 | Stop reason: HOSPADM

## 2022-09-13 RX ORDER — PANTOPRAZOLE SODIUM 40 MG/10ML
40 INJECTION, POWDER, LYOPHILIZED, FOR SOLUTION INTRAVENOUS DAILY
Status: DISCONTINUED | OUTPATIENT
Start: 2022-09-13 | End: 2022-09-16

## 2022-09-13 RX ORDER — LIDOCAINE HYDROCHLORIDE 10 MG/ML
INJECTION, SOLUTION EPIDURAL; INFILTRATION; INTRACAUDAL; PERINEURAL
Status: DISCONTINUED | OUTPATIENT
Start: 2022-09-13 | End: 2022-09-13

## 2022-09-13 RX ORDER — SODIUM,POTASSIUM PHOSPHATES 280-250MG
2 POWDER IN PACKET (EA) ORAL
Status: COMPLETED | OUTPATIENT
Start: 2022-09-13 | End: 2022-09-13

## 2022-09-13 RX ORDER — PROPOFOL 10 MG/ML
VIAL (ML) INTRAVENOUS
Status: DISCONTINUED | OUTPATIENT
Start: 2022-09-13 | End: 2022-09-13

## 2022-09-13 RX ORDER — ETOMIDATE 2 MG/ML
INJECTION INTRAVENOUS
Status: DISCONTINUED | OUTPATIENT
Start: 2022-09-13 | End: 2022-09-13

## 2022-09-13 RX ORDER — POLYETHYLENE GLYCOL 3350, SODIUM SULFATE ANHYDROUS, SODIUM BICARBONATE, SODIUM CHLORIDE, POTASSIUM CHLORIDE 236; 22.74; 6.74; 5.86; 2.97 G/4L; G/4L; G/4L; G/4L; G/4L
4000 POWDER, FOR SOLUTION ORAL ONCE
Status: COMPLETED | OUTPATIENT
Start: 2022-09-13 | End: 2022-09-13

## 2022-09-13 RX ORDER — PANTOPRAZOLE SODIUM 40 MG/10ML
80 INJECTION, POWDER, LYOPHILIZED, FOR SOLUTION INTRAVENOUS ONCE
Status: COMPLETED | OUTPATIENT
Start: 2022-09-13 | End: 2022-09-13

## 2022-09-13 RX ORDER — ALPRAZOLAM 0.25 MG/1
0.25 TABLET ORAL 3 TIMES DAILY PRN
Status: DISCONTINUED | OUTPATIENT
Start: 2022-09-13 | End: 2022-09-17 | Stop reason: HOSPADM

## 2022-09-13 RX ORDER — LANOLIN ALCOHOL/MO/W.PET/CERES
800 CREAM (GRAM) TOPICAL ONCE
Status: COMPLETED | OUTPATIENT
Start: 2022-09-13 | End: 2022-09-13

## 2022-09-13 RX ORDER — HYDROCODONE BITARTRATE AND ACETAMINOPHEN 500; 5 MG/1; MG/1
TABLET ORAL
Status: DISCONTINUED | OUTPATIENT
Start: 2022-09-13 | End: 2022-09-17 | Stop reason: HOSPADM

## 2022-09-13 RX ORDER — BISACODYL 5 MG
20 TABLET, DELAYED RELEASE (ENTERIC COATED) ORAL ONCE
Status: COMPLETED | OUTPATIENT
Start: 2022-09-13 | End: 2022-09-13

## 2022-09-13 RX ADMIN — IPRATROPIUM BROMIDE AND ALBUTEROL SULFATE 3 ML: 2.5; .5 SOLUTION RESPIRATORY (INHALATION) at 03:09

## 2022-09-13 RX ADMIN — DULOXETINE 30 MG: 30 CAPSULE, DELAYED RELEASE ORAL at 09:09

## 2022-09-13 RX ADMIN — MAGNESIUM OXIDE TAB 400 MG (241.3 MG ELEMENTAL MG) 800 MG: 400 (241.3 MG) TAB at 09:09

## 2022-09-13 RX ADMIN — SODIUM CHLORIDE: 9 INJECTION, SOLUTION INTRAVENOUS at 04:09

## 2022-09-13 RX ADMIN — POLYETHYLENE GLYCOL 3350, SODIUM SULFATE ANHYDROUS, SODIUM BICARBONATE, SODIUM CHLORIDE, POTASSIUM CHLORIDE 4000 ML: 236; 22.74; 6.74; 5.86; 2.97 POWDER, FOR SOLUTION ORAL at 07:09

## 2022-09-13 RX ADMIN — ARFORMOTEROL TARTRATE 15 MCG: 15 SOLUTION RESPIRATORY (INHALATION) at 08:09

## 2022-09-13 RX ADMIN — IOHEXOL 100 ML: 350 INJECTION, SOLUTION INTRAVENOUS at 08:09

## 2022-09-13 RX ADMIN — SODIUM PHOSPHATE, MONOBASIC, MONOHYDRATE 30 MMOL: 276; 142 INJECTION, SOLUTION INTRAVENOUS at 08:09

## 2022-09-13 RX ADMIN — AZITHROMYCIN MONOHYDRATE 500 MG: 500 INJECTION, POWDER, LYOPHILIZED, FOR SOLUTION INTRAVENOUS at 03:09

## 2022-09-13 RX ADMIN — PROPOFOL 10 MG: 10 INJECTION, EMULSION INTRAVENOUS at 04:09

## 2022-09-13 RX ADMIN — METOPROLOL SUCCINATE 12.5 MG: 25 TABLET, EXTENDED RELEASE ORAL at 09:09

## 2022-09-13 RX ADMIN — ETOMIDATE 4 MG: 2 INJECTION, SOLUTION INTRAVENOUS at 04:09

## 2022-09-13 RX ADMIN — PANTOPRAZOLE SODIUM 40 MG: 40 INJECTION, POWDER, FOR SOLUTION INTRAVENOUS at 06:09

## 2022-09-13 RX ADMIN — ETOMIDATE 2 MG: 2 INJECTION, SOLUTION INTRAVENOUS at 04:09

## 2022-09-13 RX ADMIN — Medication 2 PACKET: at 05:09

## 2022-09-13 RX ADMIN — BISACODYL 20 MG: 5 TABLET, COATED ORAL at 06:09

## 2022-09-13 RX ADMIN — NICOTINE 1 PATCH: 7 PATCH, EXTENDED RELEASE TRANSDERMAL at 09:09

## 2022-09-13 RX ADMIN — CEFTRIAXONE 1 G: 1 INJECTION, SOLUTION INTRAVENOUS at 03:09

## 2022-09-13 RX ADMIN — PANTOPRAZOLE SODIUM 80 MG: 40 INJECTION, POWDER, FOR SOLUTION INTRAVENOUS at 11:09

## 2022-09-13 RX ADMIN — Medication 2 PACKET: at 11:09

## 2022-09-13 RX ADMIN — FAMOTIDINE 20 MG: 20 TABLET ORAL at 09:09

## 2022-09-13 RX ADMIN — BUPROPION HYDROCHLORIDE 75 MG: 75 TABLET, FILM COATED ORAL at 09:09

## 2022-09-13 RX ADMIN — LIDOCAINE HYDROCHLORIDE 50 MG: 10 INJECTION, SOLUTION EPIDURAL; INFILTRATION; INTRACAUDAL; PERINEURAL at 04:09

## 2022-09-13 RX ADMIN — IPRATROPIUM BROMIDE AND ALBUTEROL SULFATE 3 ML: 2.5; .5 SOLUTION RESPIRATORY (INHALATION) at 07:09

## 2022-09-13 RX ADMIN — Medication 2 PACKET: at 06:09

## 2022-09-13 RX ADMIN — BUSPIRONE HYDROCHLORIDE 10 MG: 10 TABLET ORAL at 09:09

## 2022-09-13 RX ADMIN — BUDESONIDE 0.5 MG: 0.5 INHALANT ORAL at 07:09

## 2022-09-13 NOTE — ANESTHESIA POSTPROCEDURE EVALUATION
Anesthesia Post Evaluation    Patient: Keyona Dwyer    Procedure(s) Performed: Procedure(s) (LRB):  EGD (ESOPHAGOGASTRODUODENOSCOPY) (N/A)    Final Anesthesia Type: MAC      Patient location during evaluation: GI PACU  Patient participation: Yes- Able to Participate  Level of consciousness: responds to stimulation  Post-procedure vital signs: reviewed and stable  Pain management: adequate  Airway patency: patent    PONV status at discharge: No PONV  Anesthetic complications: no      Cardiovascular status: blood pressure returned to baseline and hemodynamically stable  Respiratory status: unassisted, spontaneous ventilation and room air  Hydration status: euvolemic  Follow-up not needed.          Vitals Value Taken Time   /73 09/13/22 1706   Temp  09/13/22 1707   Pulse 102 09/13/22 1706   Resp 22 09/13/22 1706   SpO2 100 % 09/13/22 1706         No case tracking events are documented in the log.      Pain/Chen Score: Pain Rating Prior to Med Admin: 8 (9/12/2022  1:42 AM)

## 2022-09-13 NOTE — ANESTHESIA PREPROCEDURE EVALUATION
09/13/2022  Keyona Dwyer is a 76 y.o., female.  76 y.o. female admitted with NSTEMI, placed on heparin, plavix and aspirin and then started with melena today.      Patient Active Problem List   Diagnosis    HTN (hypertension)    Hyperlipidemia    Nicotine dependence, cigarettes, uncomplicated    Osteoarthritis    Lumbar radiculopathy    Osteoporosis    Vitamin D deficiency    Glaucoma of both eyes    Uncomplicated opioid dependence    Chronic seasonal allergic rhinitis due to pollen    COPD, group D, by GOLD 2017 classification    Right hip pain    Atherosclerosis of aorta    Epigastric abdominal pain    Anemia    Osteomyelitis    PVD (peripheral vascular disease)    Tobacco abuse disorder    Status post amputation of great toe, left    Amputation of left great toe    Idiopathic acute pancreatitis    Leukocytosis    Sepsis due to Pneumonia     NSTEMI (non-ST elevated myocardial infarction)    Right lower lobe pneumonia    UTI (urinary tract infection)    Melena     Past Surgical History:   Procedure Laterality Date    ANGIOGRAPHY OF LOWER EXTREMITY N/A 4/12/2021    Procedure: ANGIOGRAM, LOWER EXTREMITY/left leg;  Surgeon: Maico Adkins MD;  Location: [facility] CATH LAB;  Service: Vascular;  Laterality: N/A;  req 1130 start time/ A243A    AORTOGRAPHY WITH SERIALOGRAPHY N/A 4/14/2021    Procedure: AORTOGRAM, WITH RUNOFF;  Surgeon: Maico Adkins MD;  Location: [facility] CATH LAB;  Service: Vascular;  Laterality: N/A;  left iliac stent with shockwave/req 1030 start    CATARACT EXTRACTION W/  INTRAOCULAR LENS IMPLANT Bilateral 8/ 9 2017    GALLBLADDER SURGERY      HYSTERECTOMY  1972    LUNG SURGERY Right age 15    lung collpase    TOE AMPUTATION Left 4/15/2021    Procedure: AMPUTATION, TOE;  Surgeon: Taylor Anderson DPM;  Location: [facility] OR;  Service: Podiatry;  Laterality: Left;   Hallux (Great Toe)    TOTAL HIP ARTHROPLASTY Bilateral     TOTAL KNEE ARTHROPLASTY Bilateral        Pre-op Assessment    I have reviewed the Patient Summary Reports.     I have reviewed the Nursing Notes. I have reviewed the NPO Status.   I have reviewed the Medications.     Review of Systems  Anesthesia Hx:  No problems with previous Anesthesia  Denies Family Hx of Anesthesia complications.   Denies Personal Hx of Anesthesia complications.   Social:  Smoker    Hematology/Oncology:         -- Anemia: Hematology Comments: hyponatremia   EENT/Dental:   chronic allergic rhinitis   Cardiovascular:   Hypertension Past MI (admitted with NSTEMI )  PVD hyperlipidemia ECG has been reviewed. Hx NSTEMI    Pulmonary:   Pneumonia COPD    Renal/:  Renal/ Normal  GI bleed.  Acute blood loss anemia   Hepatic/GI:   GI bleed Hepatic/GI Symptoms: melena.    Musculoskeletal:   Arthritis  Chronic back pain   Neurological:  Neurology Normal Lumbar radiculopathy   Endocrine:  Endocrine Normal        Physical Exam  General: Well nourished, Cooperative and Confusion    Airway:  Mallampati: II   Mouth Opening: Normal  TM Distance: Normal  Neck ROM: Normal ROM    Dental:  Edentulous        Anesthesia Plan  Type of Anesthesia, risks & benefits discussed:    Anesthesia Type: MAC  Intra-op Monitoring Plan: Standard ASA Monitors  Post Op Pain Control Plan: multimodal analgesia  Induction:  IV  Airway Plan: , Post-Induction  Informed Consent: Informed consent signed with the Patient and all parties understand the risks and agree with anesthesia plan.  All questions answered.   ASA Score: 3 Emergent  Day of Surgery Review of History & Physical: H&P Update referred to the surgeon/provider.I have interviewed and examined the patient. I have reviewed the patient's H&P dated:   Anesthesia Plan Notes: Ok to rescind DNR for perioperative period per      Ready For Surgery From Anesthesia Perspective.     .      Chemistry        Component  Value Date/Time     (L) 09/13/2022 0435    K 3.7 09/13/2022 0435     09/13/2022 0435    CO2 20 (L) 09/13/2022 0435    BUN 13 09/13/2022 0435    CREATININE 0.6 09/13/2022 0435     (H) 09/13/2022 0435        Component Value Date/Time    CALCIUM 7.7 (L) 09/13/2022 0435    ALKPHOS 57 09/13/2022 0435    AST 35 09/13/2022 0435    ALT 17 09/13/2022 0435    BILITOT 0.2 09/13/2022 0435    ESTGFRAFRICA >60.0 06/27/2022 0914    EGFRNONAA >60.0 06/27/2022 0914        Lab Results   Component Value Date    WBC 8.08 09/13/2022    WBC 8.08 09/13/2022    WBC 8.08 09/13/2022    HGB 7.9 (L) 09/13/2022    HCT 25.8 (L) 09/13/2022    MCV 95 09/13/2022    MCV 95 09/13/2022    MCV 95 09/13/2022     09/13/2022     09/13/2022     09/13/2022         Sinus tachycardia   T wave abnormality, consider anterior ischemia   Abnormal ECG   When compared with ECG of 18-JUN-2022 11:48,   No significant change was found   Confirmed by MARLA ESCALANTE MD ([id number]) on 9/11/2022 5:29:34 PM     Echo 9/11/22:   The left ventricle is normal in size with concentric hypertrophy and normal systolic function.   The estimated ejection fraction is 55%.   Grade I left ventricular diastolic dysfunction.   Normal right ventricular size with normal right ventricular systolic function.   Moderate left atrial enlargement.   Mild tricuspid regurgitation.   There are segmental left ventricular wall motion abnormalities.   There is pulmonary hypertension.   The estimated PA systolic pressure is 45 mmHg.

## 2022-09-13 NOTE — PLAN OF CARE
Bed locked, in lowest position. Call bell in reach. Purposeful rounding done every two hours. Patient has periods of confusion. Cardiac monitoring in use. Patient denies any pain. Chart check complete. Will continue with plan of care.

## 2022-09-13 NOTE — H&P
PRE PROCEDURE H&P    Patient Name: Keyona Dwyer  MRN: [id number]  : 1945  Date of Procedure:  2022  Referring Physician: Self, Aaareferral  Primary Physician: Ralf Isidro MD  Procedure Physician: Fern Rasmussen MD       Planned Procedure: EGD  Diagnosis: melena  Chief Complaint: Same as above    HPI: Patient is an 76 y.o. female is here for the above.     Past Medical History:   Past Medical History:   Diagnosis Date    Anemia     Anxiety     Cataract     Chronic back pain     Dr. Gumzan    Fibrocystic breast     Glaucoma     Hyperlipemia     Hypertension     Idiopathic acute pancreatitis 2022    NSTEMI (non-ST elevated myocardial infarction) 2022    Osteoarthritis     Osteoporosis     Rheumatology/on Prolia    PVD (peripheral vascular disease) 2021    Restless leg syndrome     Tobacco dependence     Trouble in sleeping         Past Surgical History:  Past Surgical History:   Procedure Laterality Date    ANGIOGRAPHY OF LOWER EXTREMITY N/A 2021    Procedure: ANGIOGRAM, LOWER EXTREMITY/left leg;  Surgeon: Maico Adkins MD;  Location: [facility] CATH LAB;  Service: Vascular;  Laterality: N/A;  req 1130 start time/Rm A243A    AORTOGRAPHY WITH SERIALOGRAPHY N/A 2021    Procedure: AORTOGRAM, WITH RUNOFF;  Surgeon: Maico Adkins MD;  Location: [facility] CATH LAB;  Service: Vascular;  Laterality: N/A;  left iliac stent with shockwave/req 1030 start    CATARACT EXTRACTION W/  INTRAOCULAR LENS IMPLANT Bilateral 2017    GALLBLADDER SURGERY      HYSTERECTOMY  1972    LUNG SURGERY Right age 15    lung collpase    TOE AMPUTATION Left 4/15/2021    Procedure: AMPUTATION, TOE;  Surgeon: Taylor Anderson DPM;  Location: [facility] OR;  Service: Podiatry;  Laterality: Left;  Hallux (Great Toe)    TOTAL HIP ARTHROPLASTY Bilateral     TOTAL KNEE ARTHROPLASTY Bilateral         Home Medications:  Prior to Admission medications    Medication Sig Start Date End Date Taking? Authorizing Provider  [facility] CARDIOLOGY   History & Physical                 Primary Cardiologist: [facility]    Primary Care Physician: Dr. Eladio Oppenheim    Admitting Physician: Dr. Abdoul Ceja:     Patient is a 50 y.o.  male with PMHx of CAD (s/p MI x2, PCI x5), HTN. HLD, DM II, Hypertriglyceridemia, Psoriatic Arthritis, Seizures and Obesity who presented to the ED via EMS with c/o CP. States he has been having intermittent pain over the past 4 days. States episodes would last 1.5-2hrs and spontaneously resolve. He reports the pain was different than the pain he had with previous MIs. States previous CP was L chest, L jaw and down L arm. Pain this time has been located in his mid chest, upper back and shoulders. He denies associated SOB, palpitations, N/V or diaphoresis. States he decided to come to the ED tonight because the pain remained constant. EMS gave 324mg ASA and 2SL NTG which Pt states did not really help the pain. In the ED: EKG w/o acute findings but initial hsTrop 1380.2. He was given IV heparin bolus/gtt and Cardiology was called for admit. Of note on arrival he was mildly tachy with HR 110s and hypertensive with /96 after the NTG. BP during exam 163/109. Pt reports prior MI x2 and PCI x5. Pt states with MI 2019 he \"went out\" in the ED and required multiple defibrillations (cath reports ischemic VF defibrillated to SR). States he has not f/u with Cardiology since approx 5/2019 due to transportation issues. Was taking ASA/Effient but ran out of the Effient \"when the prescription ran out. \" States he called office \"recently\" to request med refill but was told he needed an appt. (Appears from Care Everywhere notes that Pt last requested Effient refill 4/2020. Rx was sent with 2 refills. So Pt likely not taking this since 6/2020.) He does not really know what meds he takes.  Has multiple pill bottles but has remaining pills from months past. Pt states he was taking he meds well but \"recently\" he has missed   amLODIPine (NORVASC) 5 MG tablet TAKE 1 TABLET BY MOUTH EVERY DAY FOR BLOOD PRESSURE 4/11/22   Ralf Isidro MD   azelastine (ASTELIN) 137 mcg (0.1 %) nasal spray 1 spray (137 mcg total) by Nasal route 2 (two) times daily. 2/10/20   Guillermo Rodriguez MD   b complex vitamins capsule Take 1 capsule by mouth once daily.    Historical Provider   buPROPion (WELLBUTRIN) 75 MG tablet Take 1 tablet (75 mg total) by mouth 2 (two) times daily. 9/27/21 9/27/22  Yesy Zavala MD   busPIRone (BUSPAR) 10 MG tablet TAKE 1 TABLET BY MOUTH TWICE DAILY 7/6/22   JASON Joseph   clopidogreL (PLAVIX) 75 mg tablet TAKE 1 TABLET BY MOUTH ONCE A DAY 4/11/22   JASON Joseph   duloxetine (CYMBALTA) 30 MG capsule Take 30 mg by mouth once daily.    Historical Provider   ergocalciferol (ERGOCALCIFEROL) 50,000 unit Cap TAKE 1 CAPSULE BY MOUTH ONE TIME PER WEEK 4/11/22   Holly Mcclain PA-C   ferrous sulfate (FEOSOL) 325 mg (65 mg iron) Tab tablet Take 1 tab (325 mg) every other day. 7/18/22   Drea Mg NP   furosemide (LASIX) 20 MG tablet Take 1 tablet (20 mg total) by mouth daily as needed (swelling).  Patient not taking: Reported on 9/1/2022 5/31/22 5/31/23  JASON Joseph   HYDROcodone-acetaminophen (NORCO)  mg per tablet Take 1 tablet by mouth 3 (three) times daily as needed. 4/25/22      hydrocodone-acetaminophen 10-325mg (NORCO)  mg Tab Take 1 tablet by mouth every 6 (six) hours as needed.    Historical Provider   lisinopriL (PRINIVIL,ZESTRIL) 40 MG tablet TAKE 1 TABLET BY MOUTH EVERY DAY for blood pressure 7/7/22   Ralf Isidro MD   nutritional supplement/fiber (QUINOA-KALE-HEMP ORAL) Take by mouth.    Historical Provider   ON-Q PAIN PUMP by Misc.(Non-Drug; Combo Route) route.    Historical Provider   PROLIA 60 mg/mL Syrg  2/19/21   Historical Provider   rosuvastatin (CRESTOR) 5 MG tablet TAKE 1 TABLET BY MOUTH ONCE A DAY 4/11/22   Mariaelena Fleming, LUIS DANIELP-C     \"a few pills here and there. \" Reports that even when he is taking his meds his BP is \"always high. 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 He states this was actually what he thought was going on with the pain over the past few days. Reports taking insulin for BS but unsure last A1C. States he lost his meter recently. Uses insulin but did not bring meds to hospital. States he has been eating well and has intentionally lost about 40lbs. Was recently required to move as his landlord sold the house he was living in. States he has been out about 2 months. States he has moved 5 times and is now currently living with his ex-wife and her . CARDIAC Hx:  2016 -- NSTEMI with PCI OM2  2/2019 -- STEMI with PCI RCA x4  5/2019 -- LHC with patent stents, no intervention    ECHO 2/2019  · The left ventricular systolic function is normal (55-65%). · Grade I (mild) left ventricular diastolic dysfunction present,   consistent with impaired relaxation. · The left atrium is dilated. · Mild mitral valve regurgitation. · Technically difficult study and Procedure performed with the patient in   a supine position.       Past Medical History:   Diagnosis Date    Acute coronary syndrome ([address] 12/15/2016    Acute pancreatitis 2/15/2018    Arthritis     psoriatic    Diabetes ([address]     Endocrine disease     Hypertension     Nonintractable epilepsy with complex partial seizures ([address] 4/2/2020    Psoriatic arthropathy ([address]     Seizures (HCC)       Past Surgical History:   Procedure Laterality Date    HX APPENDECTOMY      HX CHOLECYSTECTOMY      HX CORONARY STENT PLACEMENT  11/2020    HX HERNIA REPAIR      HX ORTHOPAEDIC      left heel secondary to trauma    IR INSERT NON TUNL CVC OVER 5 YRS  11/25/2020    [place] CARDIAC SURG PROCEDURE UNLIST        Allergies   Allergen Reactions    Peanut Anaphylaxis    Morphine Other (comments)     Anxiety, claustrophobia     Social History     Allergies:  Review of patient's allergies indicates:   Allergen Reactions    Indomethacin      Other reaction(s): Headache    K-flex Other (See Comments)     Pancreatitis     Latex, natural rubber Swelling    Penicillins      Other reaction(s): Vomiting    Patient tolerated Rocephin during the 2021 encounter and she states that she has tolerated Keflex in the past without issue.    Sulfa (sulfonamide antibiotics) Other (See Comments)     hallucinations    Tolmetin      Other reaction(s): Hives    Bactroban [mupirocin calcium] Rash     Burned skin         Social History:   Social History     Socioeconomic History    Marital status:      Spouse name: elizabeth    Number of children: 3   Occupational History    Occupation: retired   Tobacco Use    Smoking status: Some Days     Packs/day: 0.50     Years: 63.00     Pack years: 31.50     Types: Cigarettes     Last attempt to quit: 2021     Years since quittin.2    Smokeless tobacco: Never    Tobacco comments:     started age of  11    Substance and Sexual Activity    Alcohol use: No    Drug use: No    Sexual activity: Yes     Partners: Male     Social Determinants of Health     Financial Resource Strain: Low Risk     Difficulty of Paying Living Expenses: Not hard at all   Food Insecurity: No Food Insecurity    Worried About Running Out of Food in the Last Year: Never true    Ran Out of Food in the Last Year: Never true   Transportation Needs: No Transportation Needs    Lack of Transportation (Medical): No    Lack of Transportation (Non-Medical): No   Physical Activity: Inactive    Days of Exercise per Week: 0 days    Minutes of Exercise per Session: 0 min   Stress: No Stress Concern Present    Feeling of Stress : Not at all   Social Connections: Moderately Integrated    Frequency of Communication with Friends and Family: More than three times a week    Frequency of Social Gatherings with Friends and Family: More than three times a week    Attends  Tobacco Use    Smoking status: Former Smoker     Packs/day: 1.00     Years: 30.00     Pack years: 30.00     Quit date: 2019     Years since quittin.3    Smokeless tobacco: Former User   Substance Use Topics    Alcohol use: No      FH:   Family History   Problem Relation Age of Onset    Thyroid Disease Mother         goiters    Breast Cancer Mother         42's    Atrial Fibrillation Mother     Diabetes Father     Heart Disease Father 48    Diabetes Sister     Heart Disease Paternal Grandfather         Review of Systems  General: +planned weight loss, no weakness, no fatigue, no fever or chills, no diaphoresis  Skin: no rashes, lumps, wounds, sores or other skin changes  HEENT: no headache, dizziness, lightheadedness, vision changes, hearing changes, tinnitus, vertigo, sinus pressure/pain, bleeding gums, sore throat, or hoarseness  Neck: no swollen glands, goiter, pain or stiffness  Respiratory: no cough, no congestion, no sputum, no hemoptysis, no dyspnea, no wheezing  Cardiovascular: + as per HPI, no orthopnea, PND, palpitations or syncope  Gastrointestinal: no increased reflux, no constipation, diarrhea, liver problems, GI bleeding, N/V, no abdominal pain or distension  Urinary: no frequency, urgency, hematuria, burning/pain with urination, flank pain, polyuria, nocturia, or difficulty urinating  Peripheral Vascular: no claudication, leg cramps, prior DVTs, swelling of calves, legs, or feet, color change, or swelling with redness or tenderness  Musculoskeletal: no new muscle or joint pain/stiffness, joint swelling, erythema of joints, or back pain  Psychiatric: no increased depression, +anxiety, +stress  Neurological: no sensory or motor loss, seizures, syncope, tremors, numbness, tingling, no changes in mood, attention, or speech, no changes in orientation, memory, insight, or judgment.    Hematologic: no anemia, easy bruising or bleeding  Endocrine: no thyroid problems, no heat or cold "[employer] Services: More than 4 times per year    Active Member of Clubs or Organizations: No    Attends Club or Organization Meetings: Never    Marital Status:    Housing Stability: Low Risk     Unable to Pay for Housing in the Last Year: No    Number of Places Lived in the Last Year: 1    Unstable Housing in the Last Year: No       Family History:  Family History   Problem Relation Age of Onset    Cancer Mother     Breast cancer Mother     Hyperlipidemia Father     Heart failure Father     Heart disease Brother     Brain cancer Brother     Stroke Brother     Alcohol abuse Brother        ROS: No acute cardiac events, no acute respiratory complaints.     Physical Exam (all patients):    /64   Pulse (!) 116   Temp 98.3 °F (36.8 °C)   Resp 18   Ht 5' 2" (1.575 m)   Wt 45.6 kg (100 lb 8.5 oz)   SpO2 (!) 94%   BMI 18.39 kg/m²   Lungs: Clear to auscultation bilaterally, respirations unlabored  Heart: Regular rate and rhythm, S1 and S2 normal, no obvious murmurs  Abdomen:         Soft, non-tender, bowel sounds normal, no masses, no organomegaly    Lab Results   Component Value Date    WBC 8.08 09/13/2022    WBC 8.08 09/13/2022    WBC 8.08 09/13/2022    MCV 95 09/13/2022    MCV 95 09/13/2022    MCV 95 09/13/2022    RDW 13.5 09/13/2022    RDW 13.5 09/13/2022    RDW 13.5 09/13/2022     09/13/2022     09/13/2022     09/13/2022    INR 1.0 09/12/2022     (H) 09/13/2022    HGBA1C 5.0 12/14/2018    BUN 13 09/13/2022     (L) 09/13/2022    K 3.7 09/13/2022     09/13/2022        SEDATION PLAN: per anesthesia      History reviewed, vital signs satisfactory, cardiopulmonary status satisfactory, sedation options, risks and plans have been discussed with the patient and her .   All their questions were answered and the patient and  agrees to the sedation procedures as planned and the patient is deemed an appropriate candidate for the sedation as " intolerance, excessive sweating, polyuria, polydipsia, +diabetes. Objective:       Visit Vitals  BP (!) 163/109   Pulse 99   Temp 98.1 °F (36.7 °C)   Resp 29   Ht 5' 9\" (1.753 m)   Wt 108 kg (238 lb)   SpO2 97%   BMI 35.15 kg/m²       No intake/output data recorded. No intake/output data recorded. Physical Exam:  General: well developed and nourished, NAD, resting comfortably, appears older than stated age  [de-identified]: pupils equal and round, no abnormalities noted, sclera clear, EOMs intact  Neck: supple, no JVD, trachea midline  Heart: S1S2 with RRR without murmurs, rubs or gallops  Lungs: clear bilaterally, normal effort on RA, no wheezing or rales  Abd: soft, nontender, nondistended, +bowel sounds, obese  Ext: warm, no edema, calves supple/nontender, pulses 2+ bilaterally R&DP  Skin: warm and dry, intact to view  Psychiatric: appropriate mood and affect, cooperative  Neurologic: A&O X 3, moves all 4 equally, CNs intact      ECG: STach without acute ST changes    ECHO: ordered and pending    CXR: no acute findings    Data Review:      Recent Results (from the past 24 hour(s))   TROPONIN-HIGH SENSITIVITY    Collection Time: 06/25/21  8:26 PM   Result Value Ref Range    Troponin-High Sensitivity 1,380.2 (HH) 0 - 14 pg/mL   CBC WITH AUTOMATED DIFF    Collection Time: 06/25/21  8:26 PM   Result Value Ref Range    WBC 11.0 4.3 - 11.1 K/uL    RBC 5.06 4.23 - 5.6 M/uL    HGB 15.6 13.6 - 17.2 g/dL    HCT 47.1 41.1 - 50.3 %    MCV 93.1 79.6 - 97.8 FL    MCH 30.8 26.1 - 32.9 PG    MCHC 33.1 31.4 - 35.0 g/dL    RDW 14.5 11.9 - 14.6 %    PLATELET 261 762 - 471 K/uL    MPV 10.7 9.4 - 12.3 FL    ABSOLUTE NRBC 0.00 0.0 - 0.2 K/uL    DF AUTOMATED      NEUTROPHILS 62 43 - 78 %    LYMPHOCYTES 20 13 - 44 %    MONOCYTES 9 4.0 - 12.0 %    EOSINOPHILS 8 (H) 0.5 - 7.8 %    BASOPHILS 1 0.0 - 2.0 %    IMMATURE GRANULOCYTES 0 0.0 - 5.0 %    ABS. NEUTROPHILS 6.8 1.7 - 8.2 K/UL    ABS. LYMPHOCYTES 2.2 0.5 - 4.6 K/UL    ABS.  MONOCYTES 1.0 0.1 - 1.3 K/UL    ABS. EOSINOPHILS 0.9 (H) 0.0 - 0.8 K/UL    ABS. BASOPHILS 0.1 0.0 - 0.2 K/UL    ABS. IMM. GRANS. 0.0 0.0 - 0.5 K/UL   METABOLIC PANEL, COMPREHENSIVE    Collection Time: 06/25/21  8:26 PM   Result Value Ref Range    Sodium 135 (L) 136 - 145 mmol/L    Potassium 3.8 3.5 - 5.1 mmol/L    Chloride 102 98 - 107 mmol/L    CO2 22 21 - 32 mmol/L    Anion gap 11 7 - 16 mmol/L    Glucose 277 (H) 65 - 100 mg/dL    BUN 8 6 - 23 MG/DL    Creatinine 0.66 (L) 0.8 - 1.5 MG/DL    GFR est AA >60 >60 ml/min/1.73m2    GFR est non-AA >60 >60 ml/min/1.73m2    Calcium 8.8 8.3 - 10.4 MG/DL    Bilirubin, total 0.5 0.2 - 1.1 MG/DL    ALT (SGPT) 20 12 - 65 U/L    AST (SGOT) 31 15 - 37 U/L    Alk. phosphatase 97 50 - 136 U/L    Protein, total 7.4 6.3 - 8.2 g/dL    Albumin 3.1 (L) 3.5 - 5.0 g/dL    Globulin 4.3 (H) 2.3 - 3.5 g/dL    A-G Ratio 0.7 (L) 1.2 - 3.5     LIPASE    Collection Time: 06/25/21  8:26 PM   Result Value Ref Range    Lipase 146 73 - 393 U/L   MAGNESIUM    Collection Time: 06/25/21  8:26 PM   Result Value Ref Range    Magnesium 2.0 1.8 - 2.4 mg/dL   PTT    Collection Time: 06/25/21  8:26 PM   Result Value Ref Range    aPTT 30.1 24.1 - 35.1 SEC   EKG, 12 LEAD, INITIAL    Collection Time: 06/25/21  8:26 PM   Result Value Ref Range    Ventricular Rate 108 BPM    Atrial Rate 108 BPM    P-R Interval 138 ms    QRS Duration 90 ms    Q-T Interval 350 ms    QTC Calculation (Bezet) 469 ms    Calculated P Axis 63 degrees    Calculated R Axis 52 degrees    Calculated T Axis 60 degrees    Diagnosis       !! AGE AND GENDER SPECIFIC ECG ANALYSIS !!   Sinus tachycardia with Fusion complexes  Otherwise normal ECG  When compared with ECG of 24-NOV-2020 11:12,  Fusion complexes are now Present  Nonspecific T wave abnormality no longer evident in Inferior leads           Assessment/Plan:   Principal Problem:    NSTEMI (non-ST elevated myocardial infarction) -- admit to floor, monitor on tele, trend serial Mireille, cont ASA, restart planned.    Procedure explained to patient, informed consent obtained and placed in chart.    Fern Rasmussen  9/13/2022  4:49 PM     BB and statin, check ECHO in AM, NPO with IVF for poss LHC in AM pending clinical course    Active Problems:    CAD (coronary artery disease) -- taking ASA (162mg daily) and will cont 81mg daily, ran out of Effient - hold for now, will restart BB, ACEi and statin      Hypertension -- uncontrolled on arrival and likely at home as well, Pt does not appear to be taking his meds, Lisinopril on PCP list but not with meds brought tonight, Pt unsure when or what he is or isnt taking, brought 2 bottles of Coreg - one filled in 4/21 which was half full and 1 from 5/21 that was almost completely full, discussed importance of taking BP meds, will rx now with IV BB, resume home BB and ACEi, titrate to effect      Hyperlipidemia -- cont statin, check lipids      Hypertriglyceridemia -- supposed to be on [place] but med not with ones he brought, will cont at prescribed dose, Pt not sure if he is taking this      Psoriatic arthropathy -- has upcoming appt with Rheumatology      Anxiety -- cont home meds      Type 2 diabetes mellitus with hyperglycemia, with long-term current use of insulin -- hold basal insulin with NPO status, use SSI for coverage and plan to resume basal coverage post-cath, check A1C      Noncompliance with medications -- ongoing issues, pill  discussed      BMI 35.0-35.9,adult -- has lost 40lb over past few months with diet      Seizure disorder -- not on meds presently, however, doesn't drive and this limits transportation needs      Peripheral polyneuropathy -- cont gabapentin      Will ask CM to see for d/c planning to assist Pt with obtaining meds, transportation to appts and general disability services. Sarah Beasley, NP-C  6/25/2021  10:26 PM    ATTENDING ADDENDUM:    Patient seen and examined by me. Agree with above note by physician extender. Key findings are:  NSTEMI, negative ECG without ST changes but recurrent CP x 4 days, trop trending upwards overnight, prior RCA and OM stenting. Multiple medical and social issues, [facility] today, maximize meds and case mgmt/social svcs to assist with meds, placement, etc.   CV- RRR without murmur  Lungs- Clear bilaterally  Abd- soft, nontender, nondistended  Ext- no edema    Plan: As above. The benefits and risks of left heart catheterization and possible percutaneous intervention were discussed with the patient. Risks including but not limited to bleeding, infection, contrast allergy reaction, acute kidney injury, MI, stroke, emergent CABG and death were discussed. The patient understands the risks of the procedure and wishes to proceed.      Bryon Essex, MD  [facility]  Pager [phone]

## 2022-09-13 NOTE — PROVATION PATIENT INSTRUCTIONS
Discharge Summary/Instructions after an Endoscopic Procedure  Patient Name: Keyona Dwyer  Patient MRN: [id number]  Patient YOB: 1945 Tuesday, September 13, 2022 Fern Rasmussen MD  Dear patient,  As a result of recent federal legislation (The Federal Cures Act), you may   receive lab or pathology results from your procedure in your [facility]   account before your physician is able to contact you. Your physician or   their representative will relay the results to you with their   recommendations at their soonest availability.  Thank you,  RESTRICTIONS:  During your procedure today, you received medications for sedation.  These   medications may affect your judgment, balance and coordination.  Therefore,   for 24 hours, you have the following restrictions:   - DO NOT drive a car, operate machinery, make legal/financial decisions,   sign important papers or drink alcohol.    ACTIVITY:  Today: no heavy lifting, straining or running due to procedural   sedation/anesthesia.  The following day: return to full activity including work.  DIET:  Eat and drink normally unless instructed otherwise.     TREATMENT FOR COMMON SIDE EFFECTS:  - Mild abdominal pain, nausea, belching, bloating or excessive gas:  rest,   eat lightly and use a heating pad.  - Sore Throat: treat with throat lozenges and/or gargle with warm salt   water.  - Because air was used during the procedure, expelling large amounts of air   from your rectum or belching is normal.  - If a bowel prep was taken, you may not have a bowel movement for 1-3 days.    This is normal.  SYMPTOMS TO WATCH FOR AND REPORT TO YOUR PHYSICIAN:  1. Abdominal pain or bloating, other than gas cramps.  2. Chest pain.  3. Back pain.  4. Signs of infection such as: chills or fever occurring within 24 hours   after the procedure.  5. Rectal bleeding, which would show as bright red, maroon, or black stools.   (A tablespoon of blood from the rectum is not serious, especially  if   hemorrhoids are present.)  6. Vomiting.  7. Weakness or dizziness.  GO DIRECTLY TO THE NEAREST EMERGENCY ROOM IF YOU HAVE ANY OF THE FOLLOWING:      Difficulty breathing              Chills and/or fever over 101 F   Persistent vomiting and/or vomiting blood   Severe abdominal pain   Severe chest pain   Black, tarry stools   Bleeding- more than one tablespoon   Any other symptom or condition that you feel may need urgent attention  Your doctor recommends these additional instructions:  If any biopsies were taken, your doctors clinic will contact you in 1 to 2   weeks with any results.  - Return patient to [facility] for [facility] care.   - Clear liquid diet today.   - Continue present medications.   - Begin bowel prep for colonoscopy tomorrow.  For questions, problems or results please call your physician Fern Rasmussen MD at Work:  [phone]  If you have any questions about the above instructions, call the GI   department at [phone] or call the endoscopy unit at [phone]   from 7am until 3 pm.  [facility] [place], EMERGENCY ROOM PHONE NUMBER:   [phone]  IF A COMPLICATION OR EMERGENCY SITUATION ARISES AND YOU ARE UNABLE TO REACH   YOUR PHYSICIAN - GO DIRECTLY TO THE EMERGENCY ROOM.  I have read or have had read to me these discharge instructions for my   procedure and have received a written copy.  I understand these   instructions and will follow-up with my physician if I have any questions.     __________________________________       _____________________________________  Nurse Signature                                          Patient/Designated   Responsible Party Signature  MD Fern Wilhelm MD  9/13/2022 5:16:09 PM  This report has been verified and signed electronically.  Dear patient,  As a result of recent federal legislation (The Federal Cures Act), you may   receive lab or pathology results from your procedure in your [facility]   account  before your physician is able to contact you. Your physician or   their representative will relay the results to you with their   recommendations at their soonest availability.  Thank you,  PROVATION

## 2022-09-13 NOTE — NURSING
Notified The on call team about patients blood Phosphorous being less than 1.0. Dr. Dong prescribed 2 packets of Sodium, Potassium, phosphorous packets. Patient consumed the packets in water.

## 2022-09-13 NOTE — PROGRESS NOTES
[facility] S[facility]  Cardiology  Progress Note    Patient Name: Keyona Dwyer  MRN: [id number]  Admission Date: 9/11/2022  Hospital Length of Stay: 1 days  Code Status: DNR   Attending Physician: Natalie Fish MD   Primary Care Physician: Ralf Isidro MD  Expected Discharge Date:   Principal Problem:Sepsis    Subjective:   HPI:  Cardiology consulted for NSTEMI.  Pt has h/o PAD, HTN, hyperlipidemia, smoking.  She has h/o PAD with angiogram in April 2021 showing R common iliac occlusion and had PTA L common iliac artery, tx by Dr. Adkins, Vascular Surgery.  She has been weak last few days, and had a fever, GAMEZ, and lower abd pain.  No chest pain sxs.  She presented to ER and started on antibiotics for possible R sided pneumonia.  Troponin elevated 1.9  Ecg showed NSR, anterior T wave abnl.  WBC elevated.  Had low grade fever 100.9 in ER.    Hospital Course:   9/12/22-Patient seen and examined today, resting in bed. Feels ok. SOB improved. No chest pain. +Fever overnight. Labs reviewed. Troponin bumped to 2.441, repeat pending. Echo showed normal EF, +WMA. Plans for C tomorrow (once afebrile for 24 hours).    9/13/22-Patient seen and examined today, lying in bed. Awake and alert but intermittently confused, family reports started this AM. No CV complaints. H/H dipped 7.9/24.6 and nursing staff reported dark colored stool. Heparin gtt and DAPT held, bleeding scan pending.          Review of Systems   Constitutional: Positive for malaise/fatigue.   HENT: Negative.     Eyes: Negative.    Cardiovascular:  Positive for dyspnea on exertion.   Respiratory: Negative.     Endocrine: Negative.    Skin: Negative.    Musculoskeletal: Negative.    Gastrointestinal:         Dark colored stool   Genitourinary: Negative.    Neurological: Negative.    Psychiatric/Behavioral:          Confusion   Allergic/Immunologic: Negative.    Objective:     Vital Signs (Most Recent):  Temp: 98.3 °F (36.8 °C) (09/13/22 1125)  Pulse: 98  (09/13/22 1125)  Resp: 16 (09/13/22 1125)  BP: 126/64 (09/13/22 1125)  SpO2: 96 % (09/13/22 1125) Vital Signs (24h Range):  Temp:  [98.3 °F (36.8 °C)-99.3 °F (37.4 °C)] 98.3 °F (36.8 °C)  Pulse:  [] 98  Resp:  [16-20] 16  SpO2:  [94 %-100 %] 96 %  BP: (104-136)/(55-71) 126/64     Weight: 45.6 kg (100 lb 8.5 oz)  Body mass index is 18.39 kg/m².     SpO2: 96 %  O2 Device (Oxygen Therapy): room air      Intake/Output Summary (Last 24 hours) at 9/13/2022 1221  Last data filed at 9/13/2022 0711  Gross per 24 hour   Intake 828.6 ml   Output 400 ml   Net 428.6 ml       Lines/Drains/Airways       Drain  Duration                  Drain/Device  09/13/22 0516 Left lower lower quadrant <1 day    Female External Urinary Catheter 09/13/22 0516 <1 day              Peripheral Intravenous Line  Duration                  Peripheral IV - Single Lumen 09/11/22 1227 20 G Right Antecubital 1 day                    Physical Exam  Vitals and nursing note reviewed.   Constitutional:       General: She is not in acute distress.     Appearance: She is well-developed. She is ill-appearing. She is not diaphoretic.   HENT:      Head: Normocephalic and atraumatic.   Eyes:      General:         Right eye: No discharge.         Left eye: No discharge.      Pupils: Pupils are equal, round, and reactive to light.   Neck:      Thyroid: No thyromegaly.      Vascular: No JVD.      Trachea: No tracheal deviation.   Cardiovascular:      Rate and Rhythm: Normal rate and regular rhythm.      Chest Wall: PMI is not displaced.      Heart sounds: Normal heart sounds, S1 normal and S2 normal. No murmur heard.  Pulmonary:      Effort: Pulmonary effort is normal. No respiratory distress.      Breath sounds: Rhonchi present. No wheezing.      Comments: Coarse sounding BS  Abdominal:      General: There is no distension.      Tenderness: There is no rebound.   Musculoskeletal:      Cervical back: Neck supple.   Skin:     General: Skin is warm and dry.       Findings: No erythema.   Neurological:      Mental Status: She is alert.      Comments: Oriented to person, pleasantly confused   Psychiatric:         Mood and Affect: Mood normal.         Behavior: Behavior normal.       Significant Labs: CMP   Recent Labs   Lab 09/12/22  0822 09/13/22  0435   * 129*   K 4.9 3.7    100   CO2 21* 20*   * 117*   BUN 12 13   CREATININE 0.6 0.6   CALCIUM 7.9* 7.7*   PROT 5.3* 5.5*   ALBUMIN 2.4* 2.1*   BILITOT 0.2 0.2   ALKPHOS 63 57   AST 35 35   ALT 18 17   ANIONGAP 8 9   , CBC   Recent Labs   Lab 09/12/22  0252 09/12/22  0822 09/13/22  0435 09/13/22  0846   WBC 9.87 12.43  12.43  12.43 8.08  8.08  8.08  --    HGB 8.7* 9.2*  9.2*  9.2* 7.9*  7.9*  7.9* 8.2*   HCT 27.4* 30.1*  30.1*  30.1* 24.6*  24.6*  24.6* 27.2*    284  284  284 252  252  252  --    , Troponin   Recent Labs   Lab 09/12/22  1117 09/12/22  1548 09/13/22  0435   TROPONINI 2.874* 2.358* 1.691*   , and All pertinent lab results from the last 24 hours have been reviewed.    Significant Imaging: Echocardiogram: Transthoracic echo (TTE) complete ([facility] Only):   Results for orders placed or performed during the hospital encounter of 09/11/22   Echo   Result Value Ref Range    BSA 1.42 m2    TDI SEPTAL 0.07 m/s    LV LATERAL E/E' RATIO 8.58 m/s    LV SEPTAL E/E' RATIO 14.71 m/s    LA WIDTH 3.70 cm    IVC diameter 1.68 cm    Left Ventricular Outflow Tract Mean Velocity 0.87 cm/s    Left Ventricular Outflow Tract Mean Gradient 3.34 mmHg    TDI LATERAL 0.12 m/s    LVIDd 3.44 (A) 3.5 - 6.0 cm    IVS 1.36 (A) 0.6 - 1.1 cm    Posterior Wall 1.50 (A) 0.6 - 1.1 cm    Ao root annulus 2.50 cm    LVIDs 2.23 2.1 - 4.0 cm    FS 35 28 - 44 %    LA volume 60.31 cm3    STJ 2.20 cm    Ascending aorta 2.22 cm    LV mass 174.77 g    LA size 4.45 cm    TAPSE 2.00 cm    Left Ventricle Relative Wall Thickness 0.87 cm    AV mean gradient 6 mmHg    AV valve area 1.87 cm2    AV Velocity Ratio 0.72     AV  index (prosthetic) 0.72     MV valve area p 1/2 method 6.14 cm2    E/A ratio 0.86     Mean e' 0.10 m/s    E wave deceleration time 123.62 msec    IVRT 54.23 msec    LVOT diameter 1.82 cm    LVOT area 2.6 cm2    LVOT peak inocente 1.24 m/s    LVOT peak VTI 25.30 cm    Ao peak inocente 1.72 m/s    Ao VTI 35.1 cm    RVOT peak inocente 0.77 m/s    RVOT peak VTI 13.3 cm    Mr max inocente 4.73 m/s    LVOT stroke volume 65.79 cm3    AV peak gradient 12 mmHg    PV mean gradient 1.28 mmHg    E/E' ratio 10.84 m/s    MV Peak E Inocente 1.03 m/s    TR Max Inocente 3.25 m/s    MV stenosis pressure 1/2 time 35.85 ms    MV Peak A Inocente 1.20 m/s    LV Systolic Volume 16.70 mL    LV Systolic Volume Index 11.6 mL/m2    LV Diastolic Volume 48.68 mL    LV Diastolic Volume Index 33.81 mL/m2    LA Volume Index 41.9 mL/m2    LV Mass Index 121 g/m2    RA Major Axis 3.80 cm    Left Atrium Minor Axis 4.35 cm    Left Atrium Major Axis 4.27 cm    Triscuspid Valve Regurgitation Peak Gradient 42 mmHg    RA Width 2.70 cm    Right Atrial Pressure (from IVC) 3 mmHg    EF 55 %    TV rest pulmonary artery pressure 45 mmHg    Narrative    · The left ventricle is normal in size with concentric hypertrophy and   normal systolic function.  · The estimated ejection fraction is 55%.  · Grade I left ventricular diastolic dysfunction.  · Normal right ventricular size with normal right ventricular systolic   function.  · Moderate left atrial enlargement.  · Mild tricuspid regurgitation.  · There are segmental left ventricular wall motion abnormalities.  · There is pulmonary hypertension.  · The estimated PA systolic pressure is 45 mmHg.      , EKG: Reviewed, and X-Ray: CXR: X-Ray Chest 1 View (CXR):   Results for orders placed or performed during the hospital encounter of 09/11/22   X-Ray Chest 1 View    Narrative    EXAMINATION:  XR CHEST 1 VIEW    CLINICAL HISTORY:  <Diagnosis>, weakness;    COMPARISON:  Comparison 06/18/2022.    FINDINGS:  Heart is normal in size.  Interval development  mild interstitial infiltrate involving the right lung.  Left lung is clear.  Possible small right pleural effusion.      Impression    Interval development of a mild interstitial infiltrate involving the right lung with a small right pleural effusion.  Differential considerations include unilateral    edema versus pneumonia.      Electronically signed by: Maximino Pandey MD  Date:    09/11/2022  Time:    12:30    and X-Ray Chest PA and Lateral (CXR): No results found for this visit on 09/11/22.    Assessment and Plan:   Patient who presents with PNA/sepsis/NSTEMI. LHC planned but worsening anemia noted on AM labs, bleeding scan pending. Continue OMT as tolerated.    * Sepsis due to Pneumonia   -Continue antibiotics for pneumonia.  -Per Hospital Med mgt.    UTI (urinary tract infection)  -Continue abx    Right lower lobe pneumonia  -Continue abx    NSTEMI (non-ST elevated myocardial infarction)  See NSTEMI section.    9/13/22  -Stable, no angina  -LHC again deferred due to worsening anemia  -Transfuse  -ASA, Plavix, and heparin drip held for now, resume as tolerated  -Continue OMT as tolerated-BB, statin    Tobacco abuse disorder  -Smoking cessation advised.    PVD (peripheral vascular disease)  -Medical tx for now  -ASA, statin  -Smoking cessation.    Anemia  -H/H dipped to 7.9/24.6  -Transfusion ordered  -Dark stool reported this AM  -Heparin gtt and DAPT held, bleeding scan pending    HTN (hypertension)  HTN control.        VTE Risk Mitigation (From admission, onward)         Ordered     heparin 25,000 units in dextrose 5% (100 units/ml) IV bolus from bag - ADDITIONAL PRN BOLUS - 60 units/kg  As needed (PRN)        Question:  Heparin Infusion Adjustment (DO NOT MODIFY ANSWER)  Answer:  \\[email or web address]\epic\Images\Pharmacy\HeparinInfusions\heparin LOW INTENSITY nomogram for [facility] [id number].pdf    09/12/22 0418     heparin 25,000 units in dextrose 5% (100 units/ml) IV bolus from bag - ADDITIONAL PRN BOLUS - 30 units/kg  As  needed (PRN)        Question:  Heparin Infusion Adjustment (DO NOT MODIFY ANSWER)  Answer:  \\[email or web address]\epic\Images\Pharmacy\HeparinInfusions\heparin LOW INTENSITY nomogram for [facility] [id number].pdf    09/12/22 0418     heparin 25,000 units in dextrose 5% 250 mL (100 units/mL) infusion LOW INTENSITY nomogram - [facility]  Continuous        Question Answer Comment   Heparin Infusion Adjustment (DO NOT MODIFY ANSWER) \\[email or web address]\epic\Images\Pharmacy\HeparinInfusions\heparin LOW INTENSITY nomogram for [facility] [id number].pdf    Begin at (in units/kg/hr) 12        09/12/22 0418     IP VTE HIGH RISK PATIENT  Once         09/11/22 1324     Place sequential compression device  Until discontinued         09/11/22 1324                Ruthie Campuzano PA-C  Cardiology  [facility] - Med S[facility]

## 2022-09-13 NOTE — HPI
76 y.o. female admitted with NSTEMI, placed on heparin, plavix and aspirin and then started with melena today.  She had a one point drop in hemoglobin since yesterday.  Pt is mildly confused but denies abdominal pain, nausea, vomiting, hematemesis.  Nurse is at bedside and says he witnessed the melena and it is true melena.   at bedside said that she became slightly confused yesterday evening and was normal this morning but now slightly confused.

## 2022-09-13 NOTE — PLAN OF CARE
[facility] - Med Surg  Initial Discharge Assessment       Primary Care Provider: Ralf Isidro MD    Admission Diagnosis: Weakness [R53.1]  NSTEMI (non-ST elevated myocardial infarction) [I21.4]  Pneumonia of right lower lobe due to infectious organism [J18.9]    Admission Date: 9/11/2022  Expected Discharge Date:     Discharge Barriers Identified: None    Payor: HU[employer] MANAGED MEDICARE / Plan: HU[employer] MEDICARE HMO / Product Type: Capitation /     Extended Emergency Contact Information  Primary Emergency Contact: Pasquale Dwyer  Address: [address] [address] [place]  Mobile Phone: [phone]  Relation: Spouse    Discharge Plan A: Home with family  Discharge Plan B: Home with family      [facility] [address] [address]  Phone: [phone] Fax: [phone]    [id number] - [place] - [address] AT [facility] [address]  [address]  [place] [address]  Phone: [phone] Fax: [phone]    [facility] [place] - [address]  [address]  [facility] [place] [address]  Phone: [phone] Fax: [phone]    [id number] - [place] - [address] AT [facility] [address]  [address]  [place] [address]  Phone: [phone] Fax: [phone]  My Chart; Pending    Swer met with pt and pt's spouse for initial assessment. Swer explained role of discharge planner. Pt lives with spouse and needed assistance with ADLs prior to admission. Pt's family is help at home and will provide transportation at discharge. Pt uses medical equipment, see below. Pt does not have a problem obtaining medication and spouse provides transportation to medical appointments. Pt does have an advanced directive at this time.      SWer provided a transitional care folder, information on advanced directives, information on pharmacy bedside delivery, and discharge planning begins on admission  with contact information for any needs/questions.    [facility] Completed  Initial Assessment (most recent)       Adult Discharge Assessment - 09/13/22 1137          Discharge Assessment    Assessment Type Discharge Planning Assessment     Confirmed/corrected address, phone number and insurance Yes     Confirmed Demographics Correct on Facesheet     Source of Information patient     If unable to respond/provide information was family/caregiver contacted? Yes     Contact Name/Number Pasquale Dwyer (spouse) [phone]     Communicated TOREY with patient/caregiver Date not available/Unable to determine     Reason For Admission sepsis due to pneumonia     Lives With spouse     Facility Arrived From: home     Do you expect to return to your current living situation? Yes     Do you have help at home or someone to help you manage your care at home? Yes     Who are your caregiver(s) and their phone number(s)? spouse     Prior to hospitilization cognitive status: Alert/Oriented     Current cognitive status: Alert/Oriented     Dressing/Bathing Difficulty bathing difficulty, assistance 1 person     Home Accessibility wheelchair accessible     Home Layout Able to live on 1st floor     Equipment Currently Used at Home walker, rolling;rollator;wheelchair     Patient currently being followed by outpatient case management? No     Do you currently have service(s) that help you manage your care at home? No     Do you take prescription medications? Yes     Do you have prescription coverage? Yes     Do you have any problems affording any of your prescribed medications? No     Is the patient taking medications as prescribed? yes     Who is going to help you get home at discharge? spouse     How do you get to doctors appointments? family or friend will provide     Are you on dialysis? No     Do you take coumadin? No     Discharge Plan A Home with family     Discharge Plan B Home with family     DME Needed Upon Discharge  none     Discharge Plan  discussed with: Patient;Spouse/sig other     Name(s) and Number(s) Pasquale Dwyer (spouse) [phone]     Discharge Barriers Identified None        Physical Activity    On average, how many days per week do you engage in moderate to strenuous exercise (like a brisk walk)? 0 days     On average, how many minutes do you engage in exercise at this level? 0 min        Financial Resource Strain    How hard is it for you to pay for the very basics like food, housing, medical care, and heating? Not hard at all        Housing Stability    In the last 12 months, was there a time when you were not able to pay the mortgage or rent on time? No     In the last 12 months, how many places have you lived? 1     In the last 12 months, was there a time when you did not have a steady place to sleep or slept in a shelter (including now)? No        Transportation Needs    In the past 12 months, has lack of transportation kept you from medical appointments or from getting medications? No     In the past 12 months, has lack of transportation kept you from meetings, work, or from getting things needed for daily living? No        Food Insecurity    Within the past 12 months, you worried that your food would run out before you got the money to buy more. Never true     Within the past 12 months, the food you bought just didn't last and you didn't have money to get more. Never true        Stress    Do you feel stress - tense, restless, nervous, or anxious, or unable to sleep at night because your mind is troubled all the time - these days? Not at all        Social Connections    In a typical week, how many times do you talk on the phone with family, friends, or neighbors? More than three times a week     How often do you get together with friends or relatives? More than three times a week     How often do you attend Baptism or Amish services? More than 4 times per year     Do you belong to any clubs or organizations such as Baptism groups,  unions, fraternal or athletic groups, or school groups? No     How often do you attend meetings of the clubs or organizations you belong to? Never     Are you , , , , never , or living with a partner?         Alcohol Use    Q1: How often do you have a drink containing alcohol? Never     Q2: How many drinks containing alcohol do you have on a typical day when you are drinking? Patient does not drink     Q3: How often do you have six or more drinks on one occasion? Never

## 2022-09-13 NOTE — PT/OT/SLP EVAL
Speech Language Pathology Evaluation  Bedside Swallow    Patient Name:  Keyona Dwyer   MRN:  [id number]  Admitting Diagnosis: Sepsis    Recommendations:                 General Recommendations:  Modified barium swallow study  Diet recommendations:  (Pt ordered for clear liquid diet at this time.  Awaiting medical work-up for possible GI bleed.  MBSS recommended following bedside swallow assessment.)   Aspiration Precautions: Frequent oral care and Strict aspiration precautions   General Precautions: Standard, aspiration  Communication strategies:  provide increased time to answer    History:     Past Medical History:   Diagnosis Date    Anemia     Anxiety     Cataract     Chronic back pain     Dr. Guzman    Fibrocystic breast     Glaucoma     Hyperlipemia     Hypertension     Idiopathic acute pancreatitis 6/18/2022    NSTEMI (non-ST elevated myocardial infarction) 9/11/2022    Osteoarthritis     Osteoporosis     Rheumatology/on Prolia    PVD (peripheral vascular disease) 4/12/2021    Restless leg syndrome     Tobacco dependence     Trouble in sleeping        Past Surgical History:   Procedure Laterality Date    ANGIOGRAPHY OF LOWER EXTREMITY N/A 4/12/2021    Procedure: ANGIOGRAM, LOWER EXTREMITY/left leg;  Surgeon: Maico Adkins MD;  Location: [facility] CATH LAB;  Service: Vascular;  Laterality: N/A;  req 1130 start time/ A243A    AORTOGRAPHY WITH SERIALOGRAPHY N/A 4/14/2021    Procedure: AORTOGRAM, WITH RUNOFF;  Surgeon: Maico Adkins MD;  Location: [facility] CATH LAB;  Service: Vascular;  Laterality: N/A;  left iliac stent with shockwave/req 1030 start    CATARACT EXTRACTION W/  INTRAOCULAR LENS IMPLANT Bilateral 8/ 9 2017    GALLBLADDER SURGERY      HYSTERECTOMY  1972    LUNG SURGERY Right age 15    lung collpase    TOE AMPUTATION Left 4/15/2021    Procedure: AMPUTATION, TOE;  Surgeon: Taylor Anderson DPM;  Location: [facility] OR;  Service: Podiatry;  Laterality: Left;  Hallux (Great Toe)    TOTAL HIP ARTHROPLASTY  Bilateral     TOTAL KNEE ARTHROPLASTY Bilateral        Social History: Patient lives with  at home in [place], [place].  Spouse assists pt with ADL's.    Prior Intubation HX:  N/A    Modified Barium Swallow: Recommended following bedside clinical swallow evaluation 9/13/22.    Chest X-Rays: See medical chart.  RLL infiltrate concerning for PNA.    Prior diet: Pt reportedly consumes a regular diet at home. Pt/family denied dysphagia hx; however, reported pt coughs during meals at home.    Subjective     Pt seen bedside for ST swallowing evaluation.  Awake/alert.  Confusion noted during ST questioning.   present at bedside.    Patient goals: To eat/drink    Pain/Comfort:  Pain Rating 1: 0/10  Pain Rating Post-Intervention 1: 0/10  Pain Rating 2: 0/10  Pain Rating Post-Intervention 2: 0/10    Respiratory Status: Room air    Objective:     Oral Musculature Evaluation  Oral Musculature: general weakness  Dentition: edentulous  Secretion Management: coughing on secretions (Unable to cough/productively produce sputum via oral cavity. Congested cough noted with and without PO intake.)  Mucosal Quality: good  Mandibular Strength and Mobility: impaired  Oral Labial Strength and Mobility: WFL  Lingual Strength and Mobility: impaired strength  Velar Elevation: WFL  Buccal Strength and Mobility: WFL  Volitional Cough: present, nonproductive  Volitional Swallow: present, weak  Voice Prior to PO Intake: WFL    Bedside Swallow Eval:   Consistencies Assessed:  Pt consumed thin liquids during bedside CSE, as pt ordered for clear liquid diet at this time and undergoing medical work-up for possible GI bleed.    Oral Phase:   Slow oral transit time    Pharyngeal Phase:   coughing/choking  decreased hyolaryngeal excursion to palpation  Congested cough noted x1 prior to PO intake with weak/nonproductive cough response and persistently during PO consumption of thin liquids.    Compensatory Strategies  None    Treatment: Pt  recommended for an MBSS as a comprehensive swallowing evaluation s/t s/s of dysphagia suspected during bedside CSE, frailty, cognitive deficits and RLL PNA.  Awaiting medical clearance given cardiac dx and possible acute GI bleed.    Assessment:     Keyona Dwyer is a 76 y.o. female with an SLP diagnosis of potential for Dysphagia following bedside CSE (thin liquids only) and CxR results of RLL infiltrate/PNA, warranting MBSS to further assess pt's oropharyngeal swallow.  Pt's family reported coughing with PO consumption at home, prior to admit.  VFSS pending medical clearance as pt undergoing work-up for possible GI bleed at this time.  Recommended daily oral care.    Goals:   Multidisciplinary Problems       SLP Goals          Problem: SLP    Goal Priority Disciplines Outcome   SLP Goal     SLP    Description: 1.  Pt will consume the least restrictive PO diet without incident.  2.  MBSS as comprehensive swallowing evaluation.                       Plan:     Patient to be seen:  2 x/week   Plan of Care expires:  09/20/22  Plan of Care reviewed with:  patient, spouse   SLP Follow-Up:  Yes       Discharge recommendations:   (TBD, pending acute ST progress)   Barriers to Discharge:  None    Time Tracking:     SLP Treatment Date:   09/13/22  Speech Start Time:  1100  Speech Stop Time:  1130     Speech Total Time (min):  30 min    Billable Minutes: Eval Swallow and Oral Function 30 minutes    09/13/2022

## 2022-09-13 NOTE — SUBJECTIVE & OBJECTIVE
Past Medical History:   Diagnosis Date    Anemia     Anxiety     Cataract     Chronic back pain     Dr. Guzman    Fibrocystic breast     Glaucoma     Hyperlipemia     Hypertension     Idiopathic acute pancreatitis 6/18/2022    NSTEMI (non-ST elevated myocardial infarction) 9/11/2022    Osteoarthritis     Osteoporosis     Rheumatology/on Prolia    PVD (peripheral vascular disease) 4/12/2021    Restless leg syndrome     Tobacco dependence     Trouble in sleeping        Past Surgical History:   Procedure Laterality Date    ANGIOGRAPHY OF LOWER EXTREMITY N/A 4/12/2021    Procedure: ANGIOGRAM, LOWER EXTREMITY/left leg;  Surgeon: Maico Adkins MD;  Location: [facility] CATH LAB;  Service: Vascular;  Laterality: N/A;  req 1130 start time/Rm A243A    AORTOGRAPHY WITH SERIALOGRAPHY N/A 4/14/2021    Procedure: AORTOGRAM, WITH RUNOFF;  Surgeon: Maico Adkins MD;  Location: [facility] CATH LAB;  Service: Vascular;  Laterality: N/A;  left iliac stent with shockwave/req 1030 start    CATARACT EXTRACTION W/  INTRAOCULAR LENS IMPLANT Bilateral 8/ 9 2017    GALLBLADDER SURGERY      HYSTERECTOMY  1972    LUNG SURGERY Right age 15    lung collpase    TOE AMPUTATION Left 4/15/2021    Procedure: AMPUTATION, TOE;  Surgeon: Taylor Anderson DPM;  Location: [facility] OR;  Service: Podiatry;  Laterality: Left;  Hallux (Great Toe)    TOTAL HIP ARTHROPLASTY Bilateral     TOTAL KNEE ARTHROPLASTY Bilateral        Review of patient's allergies indicates:   Allergen Reactions    Indomethacin      Other reaction(s): Headache    K-flex Other (See Comments)     Pancreatitis     Latex, natural rubber Swelling    Penicillins      Other reaction(s): Vomiting    Patient tolerated Rocephin during the 4/2021 encounter and she states that she has tolerated Keflex in the past without issue.    Sulfa (sulfonamide antibiotics) Other (See Comments)     hallucinations    Tolmetin      Other reaction(s): Hives    Bactroban [mupirocin calcium] Rash     Burned skin       Family History       Problem Relation (Age of Onset)    Alcohol abuse Brother    Brain cancer Brother    Breast cancer Mother    Cancer Mother    Heart disease Brother    Heart failure Father    Hyperlipidemia Father    Stroke Brother          Tobacco Use    Smoking status: Some Days     Packs/day: 0.50     Years: 63.00     Pack years: 31.50     Types: Cigarettes     Last attempt to quit: 2021     Years since quittin.2    Smokeless tobacco: Never    Tobacco comments:     started age of  11    Substance and Sexual Activity    Alcohol use: No    Drug use: No    Sexual activity: Yes     Partners: Male     Review of Systems   All other systems reviewed and are negative.  Objective:     Vital Signs (Most Recent):  Temp: 98.3 °F (36.8 °C) (22 1125)  Pulse: (!) 116 (22 1530)  Resp: 18 (22 153)  BP: 126/64 (22 1125)  SpO2: (!) 94 % (22 1530)   Vital Signs (24h Range):  Temp:  [98.3 °F (36.8 °C)-99.3 °F (37.4 °C)] 98.3 °F (36.8 °C)  Pulse:  [] 116  Resp:  [16-20] 18  SpO2:  [94 %-100 %] 94 %  BP: (104-136)/(55-71) 126/64     Weight: 45.6 kg (100 lb 8.5 oz) (22 0050)  Body mass index is 18.39 kg/m².      Intake/Output Summary (Last 24 hours) at 2022 1611  Last data filed at 2022 1400  Gross per 24 hour   Intake 1188.6 ml   Output 700 ml   Net 488.6 ml       Lines/Drains/Airways       Drain  Duration                  Drain/Device  22 0516 Left lower lower quadrant <1 day    Female External Urinary Catheter 22 0516 <1 day              Peripheral Intravenous Line  Duration                  Peripheral IV - Single Lumen 22 1227 20 G Right Antecubital 2 days                    Physical Exam  Constitutional:       Appearance: Normal appearance.   HENT:      Head: Normocephalic and atraumatic.   Eyes:      General: No scleral icterus.     Extraocular Movements: Extraocular movements intact.   Cardiovascular:      Rate and Rhythm: Normal rate.       Heart sounds: No murmur heard.  Pulmonary:      Breath sounds: Normal breath sounds. No wheezing.   Abdominal:      General: There is no distension.      Palpations: Abdomen is soft.      Tenderness: There is no abdominal tenderness.   Skin:     General: Skin is warm and dry.   Neurological:      Mental Status: She is alert. She is disoriented.       Significant Labs:  CBC:   Recent Labs   Lab 09/12/22  0252 09/12/22  0822 09/13/22  0435 09/13/22  0846 09/13/22  1328   WBC 9.87 12.43  12.43  12.43 8.08  8.08  8.08  --   --    HGB 8.7* 9.2*  9.2*  9.2* 7.9*  7.9*  7.9* 8.2* 7.9*   HCT 27.4* 30.1*  30.1*  30.1* 24.6*  24.6*  24.6* 27.2* 25.8*    284  284  284 252  252  252  --   --      CMP:   Recent Labs   Lab 09/13/22  0435   *   CALCIUM 7.7*   ALBUMIN 2.1*   PROT 5.5*   *   K 3.7   CO2 20*      BUN 13   CREATININE 0.6   ALKPHOS 57   ALT 17   AST 35   BILITOT 0.2     Coagulation:   Recent Labs   Lab 09/12/22  0252 09/12/22  1117 09/13/22  1053   INR 1.0  --   --    APTT  --    < > 38.8*    < > = values in this interval not displayed.       Significant Imaging:  Imaging results within the past 24 hours have been reviewed.

## 2022-09-13 NOTE — PLAN OF CARE
Problem: Adult Inpatient Plan of Care  Goal: Plan of Care Review  Outcome: Ongoing, Progressing  Goal: Patient-Specific Goal (Individualized)  Outcome: Ongoing, Progressing  Goal: Absence of Hospital-Acquired Illness or Injury  Outcome: Ongoing, Progressing  Goal: Optimal Comfort and Wellbeing  Outcome: Ongoing, Progressing  Goal: Readiness for Transition of Care  Outcome: Ongoing, Progressing     Problem: Infection (Pneumonia)  Goal: Resolution of Infection Signs and Symptoms  Outcome: Ongoing, Progressing     Problem: Skin Injury Risk Increased  Goal: Skin Health and Integrity  Outcome: Ongoing, Progressing

## 2022-09-13 NOTE — ASSESSMENT & PLAN NOTE
-H/H dipped to 7.9/24.6  -Transfusion ordered  -Dark stool reported this AM  -Heparin gtt and DAPT held, bleeding scan pending

## 2022-09-13 NOTE — CONSULTS
[facility] Surg  Wound Care    Patient Name:  Keyona Dwyer   MRN:  [id number]  Date: 2022  Diagnosis: Sepsis    History:     Past Medical History:   Diagnosis Date    Anemia     Anxiety     Cataract     Chronic back pain     Dr. Guzman    Fibrocystic breast     Glaucoma     Hyperlipemia     Hypertension     Idiopathic acute pancreatitis 2022    NSTEMI (non-ST elevated myocardial infarction) 2022    Osteoarthritis     Osteoporosis     Rheumatology/on Prolia    PVD (peripheral vascular disease) 2021    Restless leg syndrome     Tobacco dependence     Trouble in sleeping        Social History     Socioeconomic History    Marital status:      Spouse name: elizabeth    Number of children: 3   Occupational History    Occupation: retired   Tobacco Use    Smoking status: Some Days     Packs/day: 0.50     Years: 63.00     Pack years: 31.50     Types: Cigarettes     Last attempt to quit: 2021     Years since quittin.2    Smokeless tobacco: Never    Tobacco comments:     started age of  11    Substance and Sexual Activity    Alcohol use: No    Drug use: No    Sexual activity: Yes     Partners: Male       Precautions:     Allergies as of 2022 - Reviewed 2022   Allergen Reaction Noted    Indomethacin  2012    K-flex Other (See Comments) 2022    Latex, natural rubber Swelling 2015    Penicillins  2012    Sulfa (sulfonamide antibiotics) Other (See Comments) 2015    Tolmetin  2012    Bactroban [mupirocin calcium] Rash 2020       [facility] Assessment Details/Treatment     Consult received for this 77 yo female with PMHx significant for HTN, Hyperlipidemia, chronic low back pain, OA, Osteoporosis, Prior h/o pancreatitis, PVD, COPD, Life long and current everyday smoker. She is awake and alert. Scabbed/crusted lesion noted to her left cheek near her nose that  reports has been present for almost a year. Strongly recommend dermatology follow  up for work up. May use sween cream daily. Will sign off, please re consult if needed.    09/13/2022

## 2022-09-13 NOTE — ASSESSMENT & PLAN NOTE
- Troponin elevated at 1.946 on presentation   -Pt denies cliff chest pain or angina equivalent  -EKG with non specific T wave abnormality  - mg x 1 dose in the ED  -Continue Plavix and Statin   -Heparin gtt initiated per [facility] protocol   -Trend troponin   -Consult Cardiology     9/12-  -Troponin bumped to 2.4. Heparin gtt continues along with ASA, Plavix, statin. .Echo resulted LVEF 55%, G1DD, +WMA.   [facility] plans for tomorrow if afebrile x 24h   9/13-  -H/H dropped to 8.2. Dark stool reported per nursing. ASA, Plavix and Heparin gtt held . [facility] plan placed on hold

## 2022-09-13 NOTE — ASSESSMENT & PLAN NOTE
See NSTEMI section.    9/13/22  -Stable, no angina  -LHC again deferred due to worsening anemia  -Transfuse  -ASA, Plavix, and heparin drip held for now, resume as tolerated  -Continue OMT as tolerated-BB, statin

## 2022-09-13 NOTE — PROGRESS NOTES
[facility] [place] [facility]  Progress Note    Patient Name: Keyona Dwyer  MRN: [id number]  Patient Class: IP- Inpatient   Admission Date: 9/11/2022  Length of Stay: 1 days  Attending Physician: Natalie Fish MD  Primary Care Provider: Ralf Isidro MD        Subjective:     Principal Problem:Sepsis        HPI:  The pt is a 75 yo female with PMHx significant for HTN, Hyperlipidemia, chronic low back pain, OA, Osteoporosis, Prior h/o pancreatitis, PVD, COPD, Life long and current everyday smoker who was brought to the ED by her  for evaluation of 4 days h/o increasing generalized weakness to the point she was unable to get up from bed without assistance , decreased appetite , decreased food and water intake, dry cough, dry heaves, subjective fever . Since yesterday she was experiencing abd pain across the lower abd and increased urination. Denies chest pain, increased SOB, palpitations, nausea , vomiting, diarrhea, constipation, dysuria or incontinence.     Vitals on presentation 115/70, 133, 22, 100.9, 93% on RA  Labs - WBC 13.5K, Hgb 11.2, Plt 294, Na 130, K 3.8, CO2 21, Cr 0.8, Lipase 24, La 0.8, Troponin 1.946.UA- not collected     CXR- Interval development mild interstitial infiltrate involving the right lung.  Left lung is clear.  Possible small right pleural effusion.    EKG- ST, non specific T wave abnormality.      Pt was given NS bolus 1Liter , Cefepime 2 gram IV,  mg  and Heparin infusion per ACS protocol initiated in the ED and HM consulted to place pt in observation for further management of sepsis due to pneumonia and elevated troponin.       Overview/Hospital Course:  9/12- Fever 101.8 overnight . Feels better this morning. Denies chest pain . Abd pain is better. No further nausea or dry heaves. SpO2 satisfactory. WBC normalized. Troponin bumped to 2.4. .Echo resulted LVEF 55%, G1DD, +WMA. Heparin gtt continues along with ASA, Plavix, statin. [facility] plans for tomorrow if  afebrile x 24h.    9/13- Tmax 99.3. BP is improving. Per family pt was little confused earlier, however mental status seems better during my exam. No focal weakness appreciated . Dark stool reported per nursing. Hgb dropped to 8.2>9.2>11.2. Heparin gtt, Asa, Plavix held. PPI loading dose 80 mg given . Will get NM GI bleed scan. Plan for [facility] placed on hold. WBC normal. Creatinine 0.6, BUN 13. Phos <1.0 and is being replaced. Urine culture -pending. Discussed code status . Pt and  both expressed code status be DNR.       Interval History:   Hgb dropped to 8.2. Dark stool reported . Heparin gtt held . Will get NM GI bleed scan     Review of Systems   Constitutional:  Positive for activity change, appetite change and fatigue.   HENT:  Negative for sore throat.    Eyes:  Negative for visual disturbance.   Respiratory:  Positive for cough and shortness of breath (Improved). Negative for chest tightness.    Cardiovascular:  Negative for chest pain, palpitations and leg swelling.   Gastrointestinal:  Negative for abdominal distention, abdominal pain (resolved), constipation, diarrhea, nausea and vomiting.        Dry heaves    Endocrine: Negative for polyuria.   Genitourinary:  Negative for decreased urine volume, dysuria, flank pain, frequency and hematuria.   Musculoskeletal:  Positive for back pain and gait problem.   Skin:  Negative for rash.   Neurological:  Positive for weakness. Negative for syncope, speech difficulty, light-headedness and headaches.   Psychiatric/Behavioral:  Negative for confusion, hallucinations and sleep disturbance.    Objective:     Vital Signs (Most Recent):  Temp: 98.3 °F (36.8 °C) (09/13/22 1125)  Pulse: 98 (09/13/22 1125)  Resp: 16 (09/13/22 1125)  BP: 126/64 (09/13/22 1125)  SpO2: 96 % (09/13/22 1125) Vital Signs (24h Range):  Temp:  [98.3 °F (36.8 °C)-99.3 °F (37.4 °C)] 98.3 °F (36.8 °C)  Pulse:  [] 98  Resp:  [16-20] 16  SpO2:  [94 %-100 %] 96 %  BP: (103-136)/(55-71) 126/64      Weight: 45.6 kg (100 lb 8.5 oz)  Body mass index is 18.39 kg/m².    Intake/Output Summary (Last 24 hours) at 9/13/2022 1130  Last data filed at 9/13/2022 0711  Gross per 24 hour   Intake 1008.6 ml   Output 400 ml   Net 608.6 ml      Physical Exam  Constitutional:       General: She is not in acute distress.     Appearance: She is well-developed and underweight. She is ill-appearing. She is not diaphoretic.   HENT:      Head: Normocephalic and atraumatic.      Mouth/Throat:      Mouth: Mucous membranes are dry.      Pharynx: No oropharyngeal exudate.   Eyes:      Conjunctiva/sclera: Conjunctivae normal.      Pupils: Pupils are equal, round, and reactive to light.   Neck:      Thyroid: No thyromegaly.      Vascular: No JVD.   Cardiovascular:      Rate and Rhythm: Normal rate and regular rhythm.      Heart sounds: Normal heart sounds. No murmur heard.  Pulmonary:      Effort: Pulmonary effort is normal. No respiratory distress.      Breath sounds: Normal breath sounds. No wheezing or rales.   Chest:      Chest wall: No tenderness.   Abdominal:      General: Bowel sounds are normal. There is no distension.      Palpations: Abdomen is soft.      Tenderness: There is no abdominal tenderness. There is no guarding or rebound.   Musculoskeletal:         General: Normal range of motion.      Cervical back: Normal range of motion and neck supple.      Right lower leg: No edema.      Left lower leg: No edema.   Lymphadenopathy:      Cervical: No cervical adenopathy.   Skin:     General: Skin is warm and dry.      Findings: Bruising present. No rash.   Neurological:      General: No focal deficit present.      Mental Status: She is oriented to person, place, and time. She is lethargic.      Cranial Nerves: No cranial nerve deficit.      Sensory: No sensory deficit.      Motor: Weakness present.      Deep Tendon Reflexes: Reflexes normal.   Psychiatric:         Behavior: Behavior is cooperative.       Significant Labs: All  pertinent labs within the past 24 hours have been reviewed.  BMP:   Recent Labs   Lab 09/13/22  0435   *   *   K 3.7      CO2 20*   BUN 13   CREATININE 0.6   CALCIUM 7.7*   MG 1.6     CBC:   Recent Labs   Lab 09/12/22  0252 09/12/22  0822 09/13/22  0435 09/13/22  0846   WBC 9.87 12.43  12.43  12.43 8.08  8.08  8.08  --    HGB 8.7* 9.2*  9.2*  9.2* 7.9*  7.9*  7.9* 8.2*   HCT 27.4* 30.1*  30.1*  30.1* 24.6*  24.6*  24.6* 27.2*    284  284  284 252  252  252  --      CMP:   Recent Labs   Lab 09/11/22  1206 09/12/22  0822 09/13/22  0435   * 134* 129*   K 3.8 4.9 3.7   CL 97 105 100   CO2 21* 21* 20*   * 137* 117*   BUN 12 12 13   CREATININE 0.8 0.6 0.6   CALCIUM 9.5 7.9* 7.7*   PROT 7.6 5.3* 5.5*   ALBUMIN 3.1* 2.4* 2.1*   BILITOT 0.4 0.2 0.2   ALKPHOS 81 63 57   AST 38 35 35   ALT 18 18 17   ANIONGAP 12 8 9       Significant Imaging:       Assessment/Plan:      * Sepsis due to Pneumonia   This patient does have evidence of infective focus  My overall impression is sepsis. Vital signs were reviewed and noted in progress note.  Antibiotics given-   Antibiotics (From admission, onward)    Start     Stop Route Frequency Ordered    09/11/22 1500  cefTRIAXone (ROCEPHIN) 1 g/50 mL D5W IVPB         09/16 1459 IV Every 24 hours (non-standard times) 09/11/22 1325    09/11/22 1400  azithromycin 500 mg in dextrose 5 % 250 mL IVPB (ready to mix system)         09/14 1359 IV Every 24 hours (non-standard times) 09/11/22 1325        Cultures were taken-   Microbiology Results (last 7 days)     Procedure Component Value Units Date/Time    Blood Culture #1 **CANNOT BE ORDERED STAT** [[id number]] Collected: 09/11/22 1206    Order Status: Completed Specimen: Blood from Peripheral, Hand, Right Updated: 09/12/22 2012     Blood Culture, Routine No Growth to date      No Growth to date    Blood Culture #2 **CANNOT BE ORDERED STAT** [[id number]] Collected: 09/11/22 1205    Order Status:  Completed Specimen: Blood from Peripheral, Antecubital, Right Updated: 09/12/22 2012     Blood Culture, Routine No Growth to date      No Growth to date    Urine culture [[id number]] Collected: 09/12/22 0715    Order Status: No result Specimen: Urine Updated: 09/12/22 0817        Latest lactate reviewed, they are-  Recent Labs   Lab 09/11/22  1206 09/11/22  1703   LACTATE 0.8 0.8       Organ dysfunction indicated by none  Source- Lower resp tract     Source control Achieved by- Antimicrobial     SLP consult to assess aspiration risk         Right lower lobe pneumonia  - See plan per Sepsis       NSTEMI (non-ST elevated myocardial infarction)  - Troponin elevated at 1.946 on presentation   -Pt denies cliff chest pain or angina equivalent  -EKG with non specific T wave abnormality  - mg x 1 dose in the ED  -Continue Plavix and Statin   -Heparin gtt initiated per ACS protocol   -Trend troponin   -Consult Cardiology     9/12-  -Troponin bumped to 2.4. Heparin gtt continues along with ASA, Plavix, statin. .Echo resulted LVEF 55%, G1DD, +WMA.   [facility] plans for tomorrow if afebrile x 24h   9/13-  -H/H dropped to 8.2. Dark stool reported per nursing. ASA, Plavix and Heparin gtt held . [facility] plan placed on hold       COPD, group D, by GOLD 2017 classification  - Inhaled bronchodilators and ICS  -Supplemental oxygen to keep SpO2  90 to 92%      PVD (peripheral vascular disease)  - Continue Plavix and Statin   9/13-  Plavix held for now until rule out GI bleed.     HTN (hypertension)  - BP is soft  -Hold home antihypertensives in the setting of sepsis   -Monitor BP trend and resume once appropriate   9/13-  -BP is improving     Tobacco abuse disorder  Assistance with smoking cessation was offered, including:  []  Medications  [x]  Counseling  []  Printed Information on Smoking Cessation  []  Referral to a Smoking Cessation Program    Patient was counseled regarding smoking for 3-10 minutes.          UTI (urinary tract  infection)  - UA suggestive of UTI  -Follow up urine culture   -Continue Rocephin         VTE Risk Mitigation (From admission, onward)         Ordered     heparin 25,000 units in dextrose 5% (100 units/ml) IV bolus from bag - ADDITIONAL PRN BOLUS - 60 units/kg  As needed (PRN)        Question:  Heparin Infusion Adjustment (DO NOT MODIFY ANSWER)  Answer:  [email or web address]\[email or web address]\Images\Pharmacy\HeparinInfusions\heparin LOW INTENSITY nomogram for [facility] [id number]    09/12/22 0418     heparin 25,000 units in dextrose 5% (100 units/ml) IV bolus from bag - ADDITIONAL PRN BOLUS - 30 units/kg  As needed (PRN)        Question:  Heparin Infusion Adjustment (DO NOT MODIFY ANSWER)  Answer:  [email or web address]\epic\Images\Pharmacy\HeparinInfusions\heparin LOW INTENSITY nomogram for [facility] [id number]    09/12/22 0418     heparin 25,000 units in dextrose 5% 250 mL (100 units/mL) infusion LOW INTENSITY nomogram - [facility]  Continuous        Question Answer Comment   Heparin Infusion Adjustment (DO NOT MODIFY ANSWER) [email or web address]\epic\Images\Pharmacy\HeparinInfusions\heparin LOW INTENSITY nomogram for [facility] [id number]    Begin at (in units/kg/hr) 12        09/12/22 0418     IP VTE HIGH RISK PATIENT  Once         09/11/22 1324     Place sequential compression device  Until discontinued         09/11/22 1324                Discharge Planning   TOREY:      Code Status: DNR   Is the patient medically ready for discharge?:     Reason for patient still in hospital (select all that apply): Patient trending condition                     Natalie Fish MD  Department of Hospital Medicine   [facility]

## 2022-09-13 NOTE — TRANSFER OF CARE
"Anesthesia Transfer of Care Note    Patient: Keyona Dwyer    Procedure(s) Performed: Procedure(s) (LRB):  EGD (ESOPHAGOGASTRODUODENOSCOPY) (N/A)    Patient location: GI    Anesthesia Type: MAC    Transport from OR: Transported from OR on room air with adequate spontaneous ventilation    Post pain: adequate analgesia    Post assessment: no apparent anesthetic complications    Post vital signs: stable    Level of consciousness: responds to stimulation    Nausea/Vomiting: no nausea/vomiting    Complications: none    Transfer of care protocol was followed      Last vitals:   Visit Vitals  /64   Pulse (!) 116   Temp 36.8 °C (98.3 °F)   Resp 18   Ht 5' 2" (1.575 m)   Wt 45.6 kg (100 lb 8.5 oz)   SpO2 (!) 94%   BMI 18.39 kg/m²     "

## 2022-09-13 NOTE — ASSESSMENT & PLAN NOTE
This patient does have evidence of infective focus  My overall impression is sepsis. Vital signs were reviewed and noted in progress note.  Antibiotics given-   Antibiotics (From admission, onward)    Start     Stop Route Frequency Ordered    09/11/22 1500  cefTRIAXone (ROCEPHIN) 1 g/50 mL D5W IVPB         09/16 1459 IV Every 24 hours (non-standard times) 09/11/22 1325    09/11/22 1400  azithromycin 500 mg in dextrose 5 % 250 mL IVPB (ready to mix system)         09/14 1359 IV Every 24 hours (non-standard times) 09/11/22 1325        Cultures were taken-   Microbiology Results (last 7 days)     Procedure Component Value Units Date/Time    Blood Culture #1 **CANNOT BE ORDERED STAT** [[id number]] Collected: 09/11/22 1206    Order Status: Completed Specimen: Blood from Peripheral, Hand, Right Updated: 09/12/22 2012     Blood Culture, Routine No Growth to date      No Growth to date    Blood Culture #2 **CANNOT BE ORDERED STAT** [[id number]] Collected: 09/11/22 1205    Order Status: Completed Specimen: Blood from Peripheral, Antecubital, Right Updated: 09/12/22 2012     Blood Culture, Routine No Growth to date      No Growth to date    Urine culture [[id number]] Collected: 09/12/22 0715    Order Status: No result Specimen: Urine Updated: 09/12/22 0817        Latest lactate reviewed, they are-  Recent Labs   Lab 09/11/22  1206 09/11/22  1703   LACTATE 0.8 0.8       Organ dysfunction indicated by none  Source- Lower resp tract     Source control Achieved by- Antimicrobial     SLP consult to assess aspiration risk

## 2022-09-13 NOTE — ASSESSMENT & PLAN NOTE
Melena with drop in hemoglobin after starting AC for nstemi.  Differential includes PUD, gastritis, esophagitis, AVMS  - IV ppi bid   - npo   - plan for EGD   - monitor h/h q 12 hours and transfuse to keep >7

## 2022-09-13 NOTE — SUBJECTIVE & OBJECTIVE
Interval History:   Hgb dropped to 8.2. Dark stool reported . Heparin gtt held . Will get NM GI bleed scan     Review of Systems   Constitutional:  Positive for activity change, appetite change and fatigue.   HENT:  Negative for sore throat.    Eyes:  Negative for visual disturbance.   Respiratory:  Positive for cough and shortness of breath (Improved). Negative for chest tightness.    Cardiovascular:  Negative for chest pain, palpitations and leg swelling.   Gastrointestinal:  Negative for abdominal distention, abdominal pain (resolved), constipation, diarrhea, nausea and vomiting.        Dry heaves    Endocrine: Negative for polyuria.   Genitourinary:  Negative for decreased urine volume, dysuria, flank pain, frequency and hematuria.   Musculoskeletal:  Positive for back pain and gait problem.   Skin:  Negative for rash.   Neurological:  Positive for weakness. Negative for syncope, speech difficulty, light-headedness and headaches.   Psychiatric/Behavioral:  Negative for confusion, hallucinations and sleep disturbance.    Objective:     Vital Signs (Most Recent):  Temp: 98.3 °F (36.8 °C) (09/13/22 1125)  Pulse: 98 (09/13/22 1125)  Resp: 16 (09/13/22 1125)  BP: 126/64 (09/13/22 1125)  SpO2: 96 % (09/13/22 1125) Vital Signs (24h Range):  Temp:  [98.3 °F (36.8 °C)-99.3 °F (37.4 °C)] 98.3 °F (36.8 °C)  Pulse:  [] 98  Resp:  [16-20] 16  SpO2:  [94 %-100 %] 96 %  BP: (103-136)/(55-71) 126/64     Weight: 45.6 kg (100 lb 8.5 oz)  Body mass index is 18.39 kg/m².    Intake/Output Summary (Last 24 hours) at 9/13/2022 1130  Last data filed at 9/13/2022 0711  Gross per 24 hour   Intake 1008.6 ml   Output 400 ml   Net 608.6 ml      Physical Exam  Constitutional:       General: She is not in acute distress.     Appearance: She is well-developed and underweight. She is ill-appearing. She is not diaphoretic.   HENT:      Head: Normocephalic and atraumatic.      Mouth/Throat:      Mouth: Mucous membranes are dry.       Pharynx: No oropharyngeal exudate.   Eyes:      Conjunctiva/sclera: Conjunctivae normal.      Pupils: Pupils are equal, round, and reactive to light.   Neck:      Thyroid: No thyromegaly.      Vascular: No JVD.   Cardiovascular:      Rate and Rhythm: Normal rate and regular rhythm.      Heart sounds: Normal heart sounds. No murmur heard.  Pulmonary:      Effort: Pulmonary effort is normal. No respiratory distress.      Breath sounds: Normal breath sounds. No wheezing or rales.   Chest:      Chest wall: No tenderness.   Abdominal:      General: Bowel sounds are normal. There is no distension.      Palpations: Abdomen is soft.      Tenderness: There is no abdominal tenderness. There is no guarding or rebound.   Musculoskeletal:         General: Normal range of motion.      Cervical back: Normal range of motion and neck supple.      Right lower leg: No edema.      Left lower leg: No edema.   Lymphadenopathy:      Cervical: No cervical adenopathy.   Skin:     General: Skin is warm and dry.      Findings: Bruising present. No rash.   Neurological:      General: No focal deficit present.      Mental Status: She is oriented to person, place, and time. She is lethargic.      Cranial Nerves: No cranial nerve deficit.      Sensory: No sensory deficit.      Motor: Weakness present.      Deep Tendon Reflexes: Reflexes normal.   Psychiatric:         Behavior: Behavior is cooperative.       Significant Labs: All pertinent labs within the past 24 hours have been reviewed.  BMP:   Recent Labs   Lab 09/13/22  0435   *   *   K 3.7      CO2 20*   BUN 13   CREATININE 0.6   CALCIUM 7.7*   MG 1.6     CBC:   Recent Labs   Lab 09/12/22  0252 09/12/22  0822 09/13/22  0435 09/13/22  0846   WBC 9.87 12.43  12.43  12.43 8.08  8.08  8.08  --    HGB 8.7* 9.2*  9.2*  9.2* 7.9*  7.9*  7.9* 8.2*   HCT 27.4* 30.1*  30.1*  30.1* 24.6*  24.6*  24.6* 27.2*    284  284  284 252  252  252  --      CMP:   Recent  Labs   Lab 09/11/22  1206 09/12/22  0822 09/13/22  0435   * 134* 129*   K 3.8 4.9 3.7   CL 97 105 100   CO2 21* 21* 20*   * 137* 117*   BUN 12 12 13   CREATININE 0.8 0.6 0.6   CALCIUM 9.5 7.9* 7.7*   PROT 7.6 5.3* 5.5*   ALBUMIN 3.1* 2.4* 2.1*   BILITOT 0.4 0.2 0.2   ALKPHOS 81 63 57   AST 38 35 35   ALT 18 18 17   ANIONGAP 12 8 9       Significant Imaging:

## 2022-09-13 NOTE — ASSESSMENT & PLAN NOTE
- BP is soft  -Hold home antihypertensives in the setting of sepsis   -Monitor BP trend and resume once appropriate   9/13-  -BP is improving

## 2022-09-13 NOTE — CONSULTS
[facility]  Gastroenterology  Consult Note    Patient Name: Keyona Dwyer  MRN: [id number]  Admission Date: 9/11/2022  Hospital Length of Stay: 1 days  Code Status: DNR   Attending Provider: Natalie Fish MD   Consulting Provider: Fern Rasmussen MD  Primary Care Physician: Ralf Isidro MD  Principal Problem:Sepsis    Inpatient consult to Gastroenterology  Consult performed by: Fern Rasmussen MD  Consult ordered by: Natalie Fish MD      Subjective:     HPI:  76 y.o. female admitted with NSTEMI, placed on heparin, plavix and aspirin and then started with melena today.  She had a one point drop in hemoglobin since yesterday.  Pt is mildly confused but denies abdominal pain, nausea, vomiting, hematemesis.  Nurse is at bedside and says he witnessed the melena and it is true melena.   at bedside said that she became slightly confused yesterday evening and was normal this morning but now slightly confused.        Past Medical History:   Diagnosis Date    Anemia     Anxiety     Cataract     Chronic back pain     Dr. Guzman    Fibrocystic breast     Glaucoma     Hyperlipemia     Hypertension     Idiopathic acute pancreatitis 6/18/2022    NSTEMI (non-ST elevated myocardial infarction) 9/11/2022    Osteoarthritis     Osteoporosis     Rheumatology/on Prolia    PVD (peripheral vascular disease) 4/12/2021    Restless leg syndrome     Tobacco dependence     Trouble in sleeping        Past Surgical History:   Procedure Laterality Date    ANGIOGRAPHY OF LOWER EXTREMITY N/A 4/12/2021    Procedure: ANGIOGRAM, LOWER EXTREMITY/left leg;  Surgeon: Maico Adkins MD;  Location: [facility] CATH LAB;  Service: Vascular;  Laterality: N/A;  req 1130 start time/ A243A    AORTOGRAPHY WITH SERIALOGRAPHY N/A 4/14/2021    Procedure: AORTOGRAM, WITH RUNOFF;  Surgeon: Maico Adkins MD;  Location: [facility] CATH LAB;  Service: Vascular;  Laterality: N/A;  left iliac stent with shockwave/req 1030 start    CATARACT EXTRACTION W/   INTRAOCULAR LENS IMPLANT Bilateral 2017    GALLBLADDER SURGERY      HYSTERECTOMY  1972    LUNG SURGERY Right age 15    lung collpase    TOE AMPUTATION Left 4/15/2021    Procedure: AMPUTATION, TOE;  Surgeon: Taylor Anderson DPM;  Location: [facility];  Service: Podiatry;  Laterality: Left;  Hallux (Great Toe)    TOTAL HIP ARTHROPLASTY Bilateral     TOTAL KNEE ARTHROPLASTY Bilateral        Review of patient's allergies indicates:   Allergen Reactions    Indomethacin      Other reaction(s): Headache    K-flex Other (See Comments)     Pancreatitis     Latex, natural rubber Swelling    Penicillins      Other reaction(s): Vomiting    Patient tolerated Rocephin during the 2021 encounter and she states that she has tolerated Keflex in the past without issue.    Sulfa (sulfonamide antibiotics) Other (See Comments)     hallucinations    Tolmetin      Other reaction(s): Hives    Bactroban [mupirocin calcium] Rash     Burned skin      Family History       Problem Relation (Age of Onset)    Alcohol abuse Brother    Brain cancer Brother    Breast cancer Mother    Cancer Mother    Heart disease Brother    Heart failure Father    Hyperlipidemia Father    Stroke Brother          Tobacco Use    Smoking status: Some Days     Packs/day: 0.50     Years: 63.00     Pack years: 31.50     Types: Cigarettes     Last attempt to quit: 2021     Years since quittin.2    Smokeless tobacco: Never    Tobacco comments:     started age of  11    Substance and Sexual Activity    Alcohol use: No    Drug use: No    Sexual activity: Yes     Partners: Male     Review of Systems   All other systems reviewed and are negative.  Objective:     Vital Signs (Most Recent):  Temp: 98.3 °F (36.8 °C) (22 1125)  Pulse: (!) 116 (22 1530)  Resp: 18 (22 1530)  BP: 126/64 (22 1125)  SpO2: (!) 94 % (22 1530)   Vital Signs (24h Range):  Temp:  [98.3 °F (36.8 °C)-99.3 °F (37.4 °C)] 98.3 °F (36.8 °C)  Pulse:  []  116  Resp:  [16-20] 18  SpO2:  [94 %-100 %] 94 %  BP: (104-136)/(55-71) 126/64     Weight: 45.6 kg (100 lb 8.5 oz) (09/13/22 0050)  Body mass index is 18.39 kg/m².      Intake/Output Summary (Last 24 hours) at 9/13/2022 1611  Last data filed at 9/13/2022 1400  Gross per 24 hour   Intake 1188.6 ml   Output 700 ml   Net 488.6 ml       Lines/Drains/Airways       Drain  Duration                  Drain/Device  09/13/22 0516 Left lower lower quadrant <1 day    Female External Urinary Catheter 09/13/22 0516 <1 day              Peripheral Intravenous Line  Duration                  Peripheral IV - Single Lumen 09/11/22 1227 20 G Right Antecubital 2 days                    Physical Exam  Constitutional:       Appearance: Normal appearance.   HENT:      Head: Normocephalic and atraumatic.   Eyes:      General: No scleral icterus.     Extraocular Movements: Extraocular movements intact.   Cardiovascular:      Rate and Rhythm: Normal rate.      Heart sounds: No murmur heard.  Pulmonary:      Breath sounds: Normal breath sounds. No wheezing.   Abdominal:      General: There is no distension.      Palpations: Abdomen is soft.      Tenderness: There is no abdominal tenderness.   Skin:     General: Skin is warm and dry.   Neurological:      Mental Status: She is alert. She is disoriented.       Significant Labs:  CBC:   Recent Labs   Lab 09/12/22  0252 09/12/22  0822 09/13/22  0435 09/13/22  0846 09/13/22  1328   WBC 9.87 12.43  12.43  12.43 8.08  8.08  8.08  --   --    HGB 8.7* 9.2*  9.2*  9.2* 7.9*  7.9*  7.9* 8.2* 7.9*   HCT 27.4* 30.1*  30.1*  30.1* 24.6*  24.6*  24.6* 27.2* 25.8*    284  284  284 252  252  252  --   --      CMP:   Recent Labs   Lab 09/13/22  0435   *   CALCIUM 7.7*   ALBUMIN 2.1*   PROT 5.5*   *   K 3.7   CO2 20*      BUN 13   CREATININE 0.6   ALKPHOS 57   ALT 17   AST 35   BILITOT 0.2     Coagulation:   Recent Labs   Lab 09/12/22  0252 09/12/22  1117 09/13/22  1053    INR 1.0  --   --    APTT  --    < > 38.8*    < > = values in this interval not displayed.       Significant Imaging:  Imaging results within the past 24 hours have been reviewed.    Assessment/Plan:     Melena  Melena with drop in hemoglobin after starting AC for nstemi.  Differential includes PUD, gastritis, esophagitis, AVMS  - IV ppi bid   - npo   - plan for EGD   - monitor h/h q 12 hours and transfuse to keep >7    NSTEMI (non-ST elevated myocardial infarction)  Heparin, asa, plavix held in the setting of GI bleed         Thank you for your consult. I will follow-up with patient. Please contact us if you have any additional questions.    Fern Rasmussen MD  Gastroenterology  [facility]

## 2022-09-13 NOTE — SUBJECTIVE & OBJECTIVE
Review of Systems   Constitutional: Positive for malaise/fatigue.   HENT: Negative.     Eyes: Negative.    Cardiovascular:  Positive for dyspnea on exertion.   Respiratory: Negative.     Endocrine: Negative.    Skin: Negative.    Musculoskeletal: Negative.    Gastrointestinal:         Dark colored stool   Genitourinary: Negative.    Neurological: Negative.    Psychiatric/Behavioral:          Confusion   Allergic/Immunologic: Negative.    Objective:     Vital Signs (Most Recent):  Temp: 98.3 °F (36.8 °C) (09/13/22 1125)  Pulse: 98 (09/13/22 1125)  Resp: 16 (09/13/22 1125)  BP: 126/64 (09/13/22 1125)  SpO2: 96 % (09/13/22 1125) Vital Signs (24h Range):  Temp:  [98.3 °F (36.8 °C)-99.3 °F (37.4 °C)] 98.3 °F (36.8 °C)  Pulse:  [] 98  Resp:  [16-20] 16  SpO2:  [94 %-100 %] 96 %  BP: (104-136)/(55-71) 126/64     Weight: 45.6 kg (100 lb 8.5 oz)  Body mass index is 18.39 kg/m².     SpO2: 96 %  O2 Device (Oxygen Therapy): room air      Intake/Output Summary (Last 24 hours) at 9/13/2022 1221  Last data filed at 9/13/2022 0711  Gross per 24 hour   Intake 828.6 ml   Output 400 ml   Net 428.6 ml       Lines/Drains/Airways       Drain  Duration                  Drain/Device  09/13/22 0516 Left lower lower quadrant <1 day    Female External Urinary Catheter 09/13/22 0516 <1 day              Peripheral Intravenous Line  Duration                  Peripheral IV - Single Lumen 09/11/22 1227 20 G Right Antecubital 1 day                    Physical Exam  Vitals and nursing note reviewed.   Constitutional:       General: She is not in acute distress.     Appearance: She is well-developed. She is ill-appearing. She is not diaphoretic.   HENT:      Head: Normocephalic and atraumatic.   Eyes:      General:         Right eye: No discharge.         Left eye: No discharge.      Pupils: Pupils are equal, round, and reactive to light.   Neck:      Thyroid: No thyromegaly.      Vascular: No JVD.      Trachea: No tracheal deviation.    Cardiovascular:      Rate and Rhythm: Normal rate and regular rhythm.      Chest Wall: PMI is not displaced.      Heart sounds: Normal heart sounds, S1 normal and S2 normal. No murmur heard.  Pulmonary:      Effort: Pulmonary effort is normal. No respiratory distress.      Breath sounds: Rhonchi present. No wheezing.      Comments: Coarse sounding BS  Abdominal:      General: There is no distension.      Tenderness: There is no rebound.   Musculoskeletal:      Cervical back: Neck supple.   Skin:     General: Skin is warm and dry.      Findings: No erythema.   Neurological:      Mental Status: She is alert.      Comments: Oriented to person, pleasantly confused   Psychiatric:         Mood and Affect: Mood normal.         Behavior: Behavior normal.       Significant Labs: CMP   Recent Labs   Lab 09/12/22  0822 09/13/22  0435   * 129*   K 4.9 3.7    100   CO2 21* 20*   * 117*   BUN 12 13   CREATININE 0.6 0.6   CALCIUM 7.9* 7.7*   PROT 5.3* 5.5*   ALBUMIN 2.4* 2.1*   BILITOT 0.2 0.2   ALKPHOS 63 57   AST 35 35   ALT 18 17   ANIONGAP 8 9   , CBC   Recent Labs   Lab 09/12/22  0252 09/12/22  0822 09/13/22  0435 09/13/22  0846   WBC 9.87 12.43  12.43  12.43 8.08  8.08  8.08  --    HGB 8.7* 9.2*  9.2*  9.2* 7.9*  7.9*  7.9* 8.2*   HCT 27.4* 30.1*  30.1*  30.1* 24.6*  24.6*  24.6* 27.2*    284  284  284 252  252  252  --    , Troponin   Recent Labs   Lab 09/12/22  1117 09/12/22  1548 09/13/22  0435   TROPONINI 2.874* 2.358* 1.691*   , and All pertinent lab results from the last 24 hours have been reviewed.    Significant Imaging: Echocardiogram: Transthoracic echo (TTE) complete (Cupid Only):   Results for orders placed or performed during the hospital encounter of 09/11/22   Echo   Result Value Ref Range    BSA 1.42 m2    TDI SEPTAL 0.07 m/s    LV LATERAL E/E' RATIO 8.58 m/s    LV SEPTAL E/E' RATIO 14.71 m/s    LA WIDTH 3.70 cm    IVC diameter 1.68 cm    Left Ventricular Outflow  Tract Mean Velocity 0.87 cm/s    Left Ventricular Outflow Tract Mean Gradient 3.34 mmHg    TDI LATERAL 0.12 m/s    LVIDd 3.44 (A) 3.5 - 6.0 cm    IVS 1.36 (A) 0.6 - 1.1 cm    Posterior Wall 1.50 (A) 0.6 - 1.1 cm    Ao root annulus 2.50 cm    LVIDs 2.23 2.1 - 4.0 cm    FS 35 28 - 44 %    LA volume 60.31 cm3    STJ 2.20 cm    Ascending aorta 2.22 cm    LV mass 174.77 g    LA size 4.45 cm    TAPSE 2.00 cm    Left Ventricle Relative Wall Thickness 0.87 cm    AV mean gradient 6 mmHg    AV valve area 1.87 cm2    AV Velocity Ratio 0.72     AV index (prosthetic) 0.72     MV valve area p 1/2 method 6.14 cm2    E/A ratio 0.86     Mean e' 0.10 m/s    E wave deceleration time 123.62 msec    IVRT 54.23 msec    LVOT diameter 1.82 cm    LVOT area 2.6 cm2    LVOT peak inocente 1.24 m/s    LVOT peak VTI 25.30 cm    Ao peak inocente 1.72 m/s    Ao VTI 35.1 cm    RVOT peak inocente 0.77 m/s    RVOT peak VTI 13.3 cm    Mr max inocente 4.73 m/s    LVOT stroke volume 65.79 cm3    AV peak gradient 12 mmHg    PV mean gradient 1.28 mmHg    E/E' ratio 10.84 m/s    MV Peak E Inocente 1.03 m/s    TR Max Inocente 3.25 m/s    MV stenosis pressure 1/2 time 35.85 ms    MV Peak A Inocente 1.20 m/s    LV Systolic Volume 16.70 mL    LV Systolic Volume Index 11.6 mL/m2    LV Diastolic Volume 48.68 mL    LV Diastolic Volume Index 33.81 mL/m2    LA Volume Index 41.9 mL/m2    LV Mass Index 121 g/m2    RA Major Axis 3.80 cm    Left Atrium Minor Axis 4.35 cm    Left Atrium Major Axis 4.27 cm    Triscuspid Valve Regurgitation Peak Gradient 42 mmHg    RA Width 2.70 cm    Right Atrial Pressure (from IVC) 3 mmHg    EF 55 %    TV rest pulmonary artery pressure 45 mmHg    Narrative    · The left ventricle is normal in size with concentric hypertrophy and   normal systolic function.  · The estimated ejection fraction is 55%.  · Grade I left ventricular diastolic dysfunction.  · Normal right ventricular size with normal right ventricular systolic   function.  · Moderate left atrial enlargement.  ·  Mild tricuspid regurgitation.  · There are segmental left ventricular wall motion abnormalities.  · There is pulmonary hypertension.  · The estimated PA systolic pressure is 45 mmHg.      , EKG: Reviewed, and X-Ray: CXR: X-Ray Chest 1 View (CXR):   Results for orders placed or performed during the hospital encounter of 09/11/22   X-Ray Chest 1 View    Narrative    EXAMINATION:  XR CHEST 1 VIEW    CLINICAL HISTORY:  <Diagnosis>, weakness;    COMPARISON:  Comparison 06/18/2022.    FINDINGS:  Heart is normal in size.  Interval development mild interstitial infiltrate involving the right lung.  Left lung is clear.  Possible small right pleural effusion.      Impression    Interval development of a mild interstitial infiltrate involving the right lung with a small right pleural effusion.  Differential considerations include unilateral    edema versus pneumonia.      Electronically signed by: Maximino Pandey MD  Date:    09/11/2022  Time:    12:30    and X-Ray Chest PA and Lateral (CXR): No results found for this visit on 09/11/22.

## 2022-09-14 PROBLEM — J96.02 ACUTE RESPIRATORY FAILURE WITH HYPOXIA AND HYPERCARBIA: Status: ACTIVE | Noted: 2022-09-14

## 2022-09-14 PROBLEM — J96.01 ACUTE RESPIRATORY FAILURE WITH HYPOXIA AND HYPERCARBIA: Status: ACTIVE | Noted: 2022-09-14

## 2022-09-14 LAB
ALBUMIN SERPL BCP-MCNC: 2.6 G/DL (ref 3.5–5.2)
ALLENS TEST: ABNORMAL
ALLENS TEST: ABNORMAL
ALP SERPL-CCNC: 93 U/L (ref 55–135)
ALT SERPL W/O P-5'-P-CCNC: 30 U/L (ref 10–44)
ANION GAP SERPL CALC-SCNC: 18 MMOL/L (ref 8–16)
APTT BLDCRRT: 24.8 SEC (ref 21–32)
AST SERPL-CCNC: 58 U/L (ref 10–40)
BASOPHILS # BLD AUTO: 0.03 K/UL (ref 0–0.2)
BASOPHILS NFR BLD: 0.2 % (ref 0–1.9)
BILIRUB SERPL-MCNC: 0.5 MG/DL (ref 0.1–1)
BLD PROD TYP BPU: NORMAL
BLOOD UNIT EXPIRATION DATE: NORMAL
BLOOD UNIT TYPE CODE: 7300
BLOOD UNIT TYPE: NORMAL
BUN SERPL-MCNC: 9 MG/DL (ref 8–23)
CALCIUM SERPL-MCNC: 7.6 MG/DL (ref 8.7–10.5)
CHLORIDE SERPL-SCNC: 98 MMOL/L (ref 95–110)
CO2 SERPL-SCNC: 18 MMOL/L (ref 23–29)
CODING SYSTEM: NORMAL
CREAT SERPL-MCNC: 0.8 MG/DL (ref 0.5–1.4)
DELSYS: ABNORMAL
DELSYS: ABNORMAL
DIFFERENTIAL METHOD: ABNORMAL
DISPENSE STATUS: NORMAL
EOSINOPHIL # BLD AUTO: 0 K/UL (ref 0–0.5)
EOSINOPHIL NFR BLD: 0 % (ref 0–8)
EP: 5
ERYTHROCYTE [DISTWIDTH] IN BLOOD BY AUTOMATED COUNT: 13.8 % (ref 11.5–14.5)
ERYTHROCYTE [SEDIMENTATION RATE] IN BLOOD BY WESTERGREN METHOD: 12 MM/H
EST. GFR  (NO RACE VARIABLE): >60 ML/MIN/1.73 M^2
FIO2: 100
FIO2: 40
FLOW: 15
GLUCOSE SERPL-MCNC: 196 MG/DL (ref 70–110)
HCO3 UR-SCNC: 23.2 MMOL/L (ref 24–28)
HCO3 UR-SCNC: 23.3 MMOL/L (ref 24–28)
HCT VFR BLD AUTO: 36.9 % (ref 37–48.5)
HGB BLD-MCNC: 11.1 G/DL (ref 12–16)
IMM GRANULOCYTES # BLD AUTO: 0.15 K/UL (ref 0–0.04)
IMM GRANULOCYTES NFR BLD AUTO: 0.9 % (ref 0–0.5)
IP: 15
LYMPHOCYTES # BLD AUTO: 1.1 K/UL (ref 1–4.8)
LYMPHOCYTES NFR BLD: 6.7 % (ref 18–48)
MAGNESIUM SERPL-MCNC: 2 MG/DL (ref 1.6–2.6)
MCH RBC QN AUTO: 29.2 PG (ref 27–31)
MCHC RBC AUTO-ENTMCNC: 30.1 G/DL (ref 32–36)
MCV RBC AUTO: 97 FL (ref 82–98)
MODE: ABNORMAL
MODE: ABNORMAL
MONOCYTES # BLD AUTO: 1.6 K/UL (ref 0.3–1)
MONOCYTES NFR BLD: 9.6 % (ref 4–15)
NEUTROPHILS # BLD AUTO: 13.4 K/UL (ref 1.8–7.7)
NEUTROPHILS NFR BLD: 82.6 % (ref 38–73)
NRBC BLD-RTO: 0 /100 WBC
NUM UNITS TRANS PACKED RBC: NORMAL
PCO2 BLDA: 40.6 MMHG (ref 35–45)
PCO2 BLDA: 77.3 MMHG (ref 35–45)
PH SMN: 7.09 [PH] (ref 7.35–7.45)
PH SMN: 7.37 [PH] (ref 7.35–7.45)
PHOSPHATE SERPL-MCNC: 5.7 MG/DL (ref 2.7–4.5)
PLATELET # BLD AUTO: 468 K/UL (ref 150–450)
PMV BLD AUTO: 9.2 FL (ref 9.2–12.9)
PO2 BLDA: 124 MMHG (ref 80–100)
PO2 BLDA: 130 MMHG (ref 80–100)
POC BE: -2 MMOL/L
POC BE: -7 MMOL/L
POC SATURATED O2: 97 % (ref 95–100)
POC SATURATED O2: 99 % (ref 95–100)
POCT GLUCOSE: 147 MG/DL (ref 70–110)
POCT GLUCOSE: 189 MG/DL (ref 70–110)
POTASSIUM SERPL-SCNC: 3.8 MMOL/L (ref 3.5–5.1)
PROT SERPL-MCNC: 5.9 G/DL (ref 6–8.4)
RBC # BLD AUTO: 3.8 M/UL (ref 4–5.4)
SAMPLE: ABNORMAL
SAMPLE: ABNORMAL
SITE: ABNORMAL
SITE: ABNORMAL
SODIUM SERPL-SCNC: 134 MMOL/L (ref 136–145)
WBC # BLD AUTO: 16.26 K/UL (ref 3.9–12.7)

## 2022-09-14 PROCEDURE — 27000221 HC OXYGEN, UP TO 24 HOURS

## 2022-09-14 PROCEDURE — 99232 PR SUBSEQUENT HOSPITAL CARE,LEVL II: ICD-10-PCS | Mod: ,,, | Performed by: PHYSICIAN ASSISTANT

## 2022-09-14 PROCEDURE — 99232 SBSQ HOSP IP/OBS MODERATE 35: CPT | Mod: ,,, | Performed by: PHYSICIAN ASSISTANT

## 2022-09-14 PROCEDURE — 20000000 HC ICU ROOM

## 2022-09-14 PROCEDURE — 36415 COLL VENOUS BLD VENIPUNCTURE: CPT | Performed by: INTERNAL MEDICINE

## 2022-09-14 PROCEDURE — 99900035 HC TECH TIME PER 15 MIN (STAT)

## 2022-09-14 PROCEDURE — 85730 THROMBOPLASTIN TIME PARTIAL: CPT | Performed by: INTERNAL MEDICINE

## 2022-09-14 PROCEDURE — 99233 SBSQ HOSP IP/OBS HIGH 50: CPT | Mod: ,,, | Performed by: INTERNAL MEDICINE

## 2022-09-14 PROCEDURE — 80053 COMPREHEN METABOLIC PANEL: CPT | Performed by: INTERNAL MEDICINE

## 2022-09-14 PROCEDURE — 99291 CRITICAL CARE FIRST HOUR: CPT | Mod: ,,, | Performed by: INTERNAL MEDICINE

## 2022-09-14 PROCEDURE — 63600175 PHARM REV CODE 636 W HCPCS: Performed by: INTERNAL MEDICINE

## 2022-09-14 PROCEDURE — 82803 BLOOD GASES ANY COMBINATION: CPT

## 2022-09-14 PROCEDURE — 36430 TRANSFUSION BLD/BLD COMPNT: CPT

## 2022-09-14 PROCEDURE — 63600175 PHARM REV CODE 636 W HCPCS: Performed by: PHYSICIAN ASSISTANT

## 2022-09-14 PROCEDURE — 93005 ELECTROCARDIOGRAM TRACING: CPT

## 2022-09-14 PROCEDURE — 94660 CPAP INITIATION&MGMT: CPT

## 2022-09-14 PROCEDURE — 83735 ASSAY OF MAGNESIUM: CPT | Performed by: INTERNAL MEDICINE

## 2022-09-14 PROCEDURE — 85025 COMPLETE CBC W/AUTO DIFF WBC: CPT | Performed by: INTERNAL MEDICINE

## 2022-09-14 PROCEDURE — 25000242 PHARM REV CODE 250 ALT 637 W/ HCPCS: Performed by: INTERNAL MEDICINE

## 2022-09-14 PROCEDURE — 94640 AIRWAY INHALATION TREATMENT: CPT

## 2022-09-14 PROCEDURE — 93010 EKG 12-LEAD: ICD-10-PCS | Mod: ,,, | Performed by: INTERNAL MEDICINE

## 2022-09-14 PROCEDURE — 99291 PR CRITICAL CARE, E/M 30-74 MINUTES: ICD-10-PCS | Mod: ,,, | Performed by: INTERNAL MEDICINE

## 2022-09-14 PROCEDURE — 36600 WITHDRAWAL OF ARTERIAL BLOOD: CPT

## 2022-09-14 PROCEDURE — C9113 INJ PANTOPRAZOLE SODIUM, VIA: HCPCS | Performed by: PHYSICIAN ASSISTANT

## 2022-09-14 PROCEDURE — S4991 NICOTINE PATCH NONLEGEND: HCPCS | Performed by: INTERNAL MEDICINE

## 2022-09-14 PROCEDURE — 87081 CULTURE SCREEN ONLY: CPT | Performed by: NURSE PRACTITIONER

## 2022-09-14 PROCEDURE — 93010 ELECTROCARDIOGRAM REPORT: CPT | Mod: ,,, | Performed by: INTERNAL MEDICINE

## 2022-09-14 PROCEDURE — 84100 ASSAY OF PHOSPHORUS: CPT | Performed by: INTERNAL MEDICINE

## 2022-09-14 PROCEDURE — 94761 N-INVAS EAR/PLS OXIMETRY MLT: CPT

## 2022-09-14 PROCEDURE — 27000190 HC CPAP FULL FACE MASK W/VALVE

## 2022-09-14 PROCEDURE — 25000003 PHARM REV CODE 250: Performed by: INTERNAL MEDICINE

## 2022-09-14 PROCEDURE — P9016 RBC LEUKOCYTES REDUCED: HCPCS | Performed by: INTERNAL MEDICINE

## 2022-09-14 PROCEDURE — 99233 PR SUBSEQUENT HOSPITAL CARE,LEVL III: ICD-10-PCS | Mod: ,,, | Performed by: INTERNAL MEDICINE

## 2022-09-14 RX ORDER — GLUCAGON 1 MG
1 KIT INJECTION
Status: DISCONTINUED | OUTPATIENT
Start: 2022-09-14 | End: 2022-09-16

## 2022-09-14 RX ORDER — IPRATROPIUM BROMIDE AND ALBUTEROL SULFATE 2.5; .5 MG/3ML; MG/3ML
3 SOLUTION RESPIRATORY (INHALATION) ONCE
Status: COMPLETED | OUTPATIENT
Start: 2022-09-14 | End: 2022-09-14

## 2022-09-14 RX ORDER — INSULIN ASPART 100 [IU]/ML
1-10 INJECTION, SOLUTION INTRAVENOUS; SUBCUTANEOUS EVERY 6 HOURS PRN
Status: DISCONTINUED | OUTPATIENT
Start: 2022-09-14 | End: 2022-09-16

## 2022-09-14 RX ORDER — ONDANSETRON 2 MG/ML
4 INJECTION INTRAMUSCULAR; INTRAVENOUS EVERY 4 HOURS PRN
Status: DISCONTINUED | OUTPATIENT
Start: 2022-09-14 | End: 2022-09-17 | Stop reason: HOSPADM

## 2022-09-14 RX ORDER — HALOPERIDOL 5 MG/ML
5 INJECTION INTRAMUSCULAR EVERY 6 HOURS PRN
Status: DISCONTINUED | OUTPATIENT
Start: 2022-09-14 | End: 2022-09-17 | Stop reason: HOSPADM

## 2022-09-14 RX ADMIN — PANTOPRAZOLE SODIUM 40 MG: 40 INJECTION, POWDER, FOR SOLUTION INTRAVENOUS at 10:09

## 2022-09-14 RX ADMIN — NICOTINE 1 PATCH: 7 PATCH, EXTENDED RELEASE TRANSDERMAL at 10:09

## 2022-09-14 RX ADMIN — IPRATROPIUM BROMIDE AND ALBUTEROL SULFATE 3 ML: 2.5; .5 SOLUTION RESPIRATORY (INHALATION) at 07:09

## 2022-09-14 RX ADMIN — BUPROPION HYDROCHLORIDE 75 MG: 75 TABLET, FILM COATED ORAL at 09:09

## 2022-09-14 RX ADMIN — IPRATROPIUM BROMIDE AND ALBUTEROL SULFATE 3 ML: 2.5; .5 SOLUTION RESPIRATORY (INHALATION) at 08:09

## 2022-09-14 RX ADMIN — ARFORMOTEROL TARTRATE 15 MCG: 15 SOLUTION RESPIRATORY (INHALATION) at 08:09

## 2022-09-14 RX ADMIN — BUDESONIDE 0.5 MG: 0.5 INHALANT ORAL at 08:09

## 2022-09-14 RX ADMIN — ONDANSETRON 4 MG: 2 INJECTION INTRAMUSCULAR; INTRAVENOUS at 06:09

## 2022-09-14 RX ADMIN — METHYLPREDNISOLONE SODIUM SUCCINATE 60 MG: 40 INJECTION, POWDER, FOR SOLUTION INTRAMUSCULAR; INTRAVENOUS at 09:09

## 2022-09-14 RX ADMIN — MIDODRINE HYDROCHLORIDE 5 MG: 5 TABLET ORAL at 09:09

## 2022-09-14 RX ADMIN — ATORVASTATIN CALCIUM 20 MG: 10 TABLET, FILM COATED ORAL at 09:09

## 2022-09-14 RX ADMIN — BUSPIRONE HYDROCHLORIDE 10 MG: 10 TABLET ORAL at 09:09

## 2022-09-14 RX ADMIN — CEFTRIAXONE SODIUM 2 G: 2 INJECTION, SOLUTION INTRAVENOUS at 03:09

## 2022-09-14 RX ADMIN — METHYLPREDNISOLONE SODIUM SUCCINATE 60 MG: 40 INJECTION, POWDER, FOR SOLUTION INTRAMUSCULAR; INTRAVENOUS at 02:09

## 2022-09-14 NOTE — ASSESSMENT & PLAN NOTE
This patient does have evidence of infective focus  My overall impression is sepsis. Vital signs were reviewed and noted in progress note.  Antibiotics given-   Antibiotics (From admission, onward)    Start     Stop Route Frequency Ordered    09/11/22 1500  cefTRIAXone (ROCEPHIN) 1 g/50 mL D5W IVPB         09/16 1459 IV Every 24 hours (non-standard times) 09/11/22 1325        Cultures were taken-   Microbiology Results (last 7 days)     Procedure Component Value Units Date/Time    Culture, MRSA [[id number]] Collected: 09/14/22 0956    Order Status: Sent Specimen: MRSA source from Nares, Left Updated: 09/14/22 1047    Blood Culture #1 **CANNOT BE ORDERED STAT** [[id number]] Collected: 09/11/22 1206    Order Status: Completed Specimen: Blood from Peripheral, Hand, Right Updated: 09/13/22 2012     Blood Culture, Routine No Growth to date      No Growth to date      No Growth to date    Blood Culture #2 **CANNOT BE ORDERED STAT** [[id number]] Collected: 09/11/22 1205    Order Status: Completed Specimen: Blood from Peripheral, Antecubital, Right Updated: 09/13/22 2012     Blood Culture, Routine No Growth to date      No Growth to date      No Growth to date    Urine culture [[id number]] Collected: 09/12/22 0715    Order Status: Completed Specimen: Urine Updated: 09/13/22 1852     Urine Culture, Routine No growth    Narrative:      Specimen Source->Urine        Latest lactate reviewed, they are-  Recent Labs   Lab 09/11/22  1703   LACTATE 0.8       Organ dysfunction indicated by Encephalopathy  and Acute respiratory failure  Source- right lung pneumonia    Source control Achieved by- IV Rocephin azithromycin blood culture respiratory cultures

## 2022-09-14 NOTE — ASSESSMENT & PLAN NOTE
- Troponin elevated at 1.946 on presentation   -Pt denies cliff chest pain or angina equivalent  -EKG with non specific T wave abnormality  - mg x 1 dose in the ED  -Continue Plavix and Statin   -Heparin gtt initiated per ACS protocol   -Trend troponin   -Consult Cardiology     9/12-  -Troponin bumped to 2.4. Heparin gtt continues along with ASA, Plavix, statin. .Echo resulted LVEF 55%, G1DD, +WMA.   [facility] plans for tomorrow if afebrile x 24h   9/13-  -H/H dropped to 8.2. Dark stool reported per nursing. ASA, Plavix and Heparin gtt held . [facility] plan placed on hold

## 2022-09-14 NOTE — SUBJECTIVE & OBJECTIVE
Subjective:     Interval History: The patient had a normal EGD yesterday. She was prepping via NGT for colonoscopy today, but declined overnight. Given CXR findings it is suspected she aspirated. WBC count elevated. She is currently in the ICU. Hgb is stable overnight. No overt bleeding reported.     Review of Systems   Constitutional:         See Interval History for daily ROS.    Objective:     Vital Signs (Most Recent):  Temp: 99.1 °F (37.3 °C) (09/14/22 0758)  Pulse: (!) 125 (09/14/22 0800)  Resp: (!) 34 (09/14/22 0800)  BP: (!) 112/57 (09/14/22 0800)  SpO2: 98 % (09/14/22 0800)   Vital Signs (24h Range):  Temp:  [97.4 °F (36.3 °C)-100.7 °F (38.2 °C)] 99.1 °F (37.3 °C)  Pulse:  [] 125  Resp:  [16-34] 34  SpO2:  [83 %-100 %] 98 %  BP: (108-164)/(57-98) 112/57     Weight: 45.6 kg (100 lb 8.5 oz) (09/14/22 0034)  Body mass index is 18.39 kg/m².      Intake/Output Summary (Last 24 hours) at 9/14/2022 0901  Last data filed at 9/14/2022 0508  Gross per 24 hour   Intake 995.42 ml   Output 300 ml   Net 695.42 ml       Lines/Drains/Airways       Drain  Duration             Female External Urinary Catheter 09/14/22 0800 <1 day              Peripheral Intravenous Line  Duration                  Peripheral IV - Single Lumen 09/11/22 0900 22 G Anterior;Left Forearm 3 days         Peripheral IV - Single Lumen 09/13/22 1945 20 G Anterior;Right Upper Arm <1 day                    Physical Exam  Constitutional:       Appearance: She is well-developed. She is ill-appearing.   HENT:      Head: Normocephalic and atraumatic.   Cardiovascular:      Rate and Rhythm: Regular rhythm. Tachycardia present.   Pulmonary:      Effort: No respiratory distress.      Breath sounds: Rhonchi (bilaterally) present.   Abdominal:      General: Bowel sounds are normal. There is no distension.      Palpations: Abdomen is soft.      Tenderness: There is abdominal tenderness (flinches a little with palpation in the left abdomen). There is no  guarding or rebound.   Neurological:      Comments: Eyes are open but she doesn't try to communicate or respond to her name.        Significant Labs:  CBC:   Recent Labs   Lab 09/13/22  0435 09/13/22  0846 09/13/22  1328 09/13/22  2147 09/14/22  0555   WBC 8.08  8.08  8.08  --   --   --  16.26*   HGB 7.9*  7.9*  7.9*   < > 7.9* 8.7* 11.1*   HCT 24.6*  24.6*  24.6*   < > 25.8* 26.2* 36.9*     252  252  --   --   --  468*    < > = values in this interval not displayed.     CMP:   Recent Labs   Lab 09/14/22  0555   *   CALCIUM 7.6*   ALBUMIN 2.6*   PROT 5.9*   *   K 3.8   CO2 18*   CL 98   BUN 9   CREATININE 0.8   ALKPHOS 93   ALT 30   AST 58*   BILITOT 0.5     Coagulation:   Recent Labs   Lab 09/14/22  0555   APTT 24.8         Significant Imaging:  Imaging results within the past 24 hours have been reviewed.

## 2022-09-14 NOTE — PROGRESS NOTES
[facility] - Intensive Care ([facility])  Cardiology  Progress Note    Patient Name: eKyona Dwyer  MRN: [id number]  Admission Date: 9/11/2022  Hospital Length of Stay: 2 days  Code Status: DNR   Attending Physician: Natalie Fish MD   Primary Care Physician: Ralf Isidro MD  Expected Discharge Date:   Principal Problem:Sepsis    Subjective:   HPI:  Cardiology consulted for NSTEMI.  Pt has h/o PAD, HTN, hyperlipidemia, smoking.  She has h/o PAD with angiogram in April 2021 showing R common iliac occlusion and had PTA L common iliac artery, tx by Dr. Adkins, Vascular Surgery.  She has been weak last few days, and had a fever, GAMEZ, and lower abd pain.  No chest pain sxs.  She presented to ER and started on antibiotics for possible R sided pneumonia.  Troponin elevated 1.9  Ecg showed NSR, anterior T wave abnl.  WBC elevated.  Had low grade fever 100.9 in ER.       Hospital Course:   9/12/22-Patient seen and examined today, resting in bed. Feels ok. SOB improved. No chest pain. +Fever overnight. Labs reviewed. Troponin bumped to 2.441, repeat pending. Echo showed normal EF, +WMA. Plans for C tomorrow (once afebrile for 24 hours).    9/13/22-Patient seen and examined today, lying in bed. Awake and alert but intermittently confused, family reports started this AM. No CV complaints. H/H dipped 7.9/24.6 and nursing staff reported dark colored stool. Heparin gtt and DAPT held, bleeding scan pending.    9/14/22-Patient seen and examined today, transferred to ICU overnight due to worsening respiratory status/likely aspiration. Remains on bipap during exam. No overt bleeding reported. EGD yesterday only showed gastritis. Colonoscopy planned today but cancelled due to overnight events.         Review of Systems   Unable to perform ROS: Acuity of condition   Objective:     Vital Signs (Most Recent):  Temp: 98.2 °F (36.8 °C) (09/14/22 1101)  Pulse: 95 (09/14/22 1116)  Resp: 14 (09/14/22 1116)  BP: (!) 86/53 (09/14/22  1101)  SpO2: 100 % (09/14/22 1116)   Vital Signs (24h Range):  Temp:  [97.4 °F (36.3 °C)-100.7 °F (38.2 °C)] 98.2 °F (36.8 °C)  Pulse:  [] 95  Resp:  [13-34] 14  SpO2:  [83 %-100 %] 100 %  BP: ()/(53-98) 86/53     Weight: 45.6 kg (100 lb 8.5 oz)  Body mass index is 18.39 kg/m².     SpO2: 100 %  O2 Device (Oxygen Therapy): BiPAP      Intake/Output Summary (Last 24 hours) at 9/14/2022 1157  Last data filed at 9/14/2022 1100  Gross per 24 hour   Intake 995.42 ml   Output 450 ml   Net 545.42 ml       Lines/Drains/Airways       Drain  Duration             Female External Urinary Catheter 09/14/22 0800 <1 day              Peripheral Intravenous Line  Duration                  Peripheral IV - Single Lumen 09/11/22 0900 22 G Anterior;Left Forearm 3 days         Peripheral IV - Single Lumen 09/13/22 1945 20 G Anterior;Right Upper Arm <1 day                    Physical Exam  Vitals and nursing note reviewed.   Constitutional:       General: She is not in acute distress.     Appearance: She is well-developed. She is ill-appearing. She is not diaphoretic.      Comments: On bipap   HENT:      Head: Normocephalic and atraumatic.   Eyes:      General:         Right eye: No discharge.         Left eye: No discharge.      Pupils: Pupils are equal, round, and reactive to light.   Neck:      Thyroid: No thyromegaly.      Vascular: No JVD.      Trachea: No tracheal deviation.   Cardiovascular:      Rate and Rhythm: Regular rhythm. Tachycardia present.      Heart sounds: Normal heart sounds, S1 normal and S2 normal. No murmur heard.  Pulmonary:      Effort: Pulmonary effort is normal. No respiratory distress.      Breath sounds: No wheezing.      Comments: Rhonchi, coarse BS throughout  Abdominal:      General: There is distension (mild).      Tenderness: There is no abdominal tenderness. There is no rebound.   Musculoskeletal:      Cervical back: Neck supple.      Right lower leg: No edema.      Left lower leg: No edema.    Skin:     General: Skin is warm and dry.      Findings: No erythema.   Neurological:      Mental Status: She is alert.      Comments: Lethargic on bipap       Significant Labs: CMP   Recent Labs   Lab 09/13/22  0435 09/14/22  0555   * 134*   K 3.7 3.8    98   CO2 20* 18*   * 196*   BUN 13 9   CREATININE 0.6 0.8   CALCIUM 7.7* 7.6*   PROT 5.5* 5.9*   ALBUMIN 2.1* 2.6*   BILITOT 0.2 0.5   ALKPHOS 57 93   AST 35 58*   ALT 17 30   ANIONGAP 9 18*   , CBC   Recent Labs   Lab 09/13/22  0435 09/13/22  0846 09/13/22  1328 09/13/22  2147 09/14/22  0555   WBC 8.08  8.08  8.08  --   --   --  16.26*   HGB 7.9*  7.9*  7.9*   < > 7.9* 8.7* 11.1*   HCT 24.6*  24.6*  24.6*   < > 25.8* 26.2* 36.9*     252  252  --   --   --  468*    < > = values in this interval not displayed.   , INR No results for input(s): INR, PROTIME in the last 48 hours., Troponin   Recent Labs   Lab 09/12/22  1548 09/13/22  0435   TROPONINI 2.358* 1.691*   , and All pertinent lab results from the last 24 hours have been reviewed.    Significant Imaging: Echocardiogram: Transthoracic echo (TTE) complete (Cupid Only):   Results for orders placed or performed during the hospital encounter of 09/11/22   Echo   Result Value Ref Range    BSA 1.42 m2    TDI SEPTAL 0.07 m/s    LV LATERAL E/E' RATIO 8.58 m/s    LV SEPTAL E/E' RATIO 14.71 m/s    LA WIDTH 3.70 cm    IVC diameter 1.68 cm    Left Ventricular Outflow Tract Mean Velocity 0.87 cm/s    Left Ventricular Outflow Tract Mean Gradient 3.34 mmHg    TDI LATERAL 0.12 m/s    LVIDd 3.44 (A) 3.5 - 6.0 cm    IVS 1.36 (A) 0.6 - 1.1 cm    Posterior Wall 1.50 (A) 0.6 - 1.1 cm    Ao root annulus 2.50 cm    LVIDs 2.23 2.1 - 4.0 cm    FS 35 28 - 44 %    LA volume 60.31 cm3    STJ 2.20 cm    Ascending aorta 2.22 cm    LV mass 174.77 g    LA size 4.45 cm    TAPSE 2.00 cm    Left Ventricle Relative Wall Thickness 0.87 cm    AV mean gradient 6 mmHg    AV valve area 1.87 cm2    AV Velocity Ratio  0.72     AV index (prosthetic) 0.72     MV valve area p 1/2 method 6.14 cm2    E/A ratio 0.86     Mean e' 0.10 m/s    E wave deceleration time 123.62 msec    IVRT 54.23 msec    LVOT diameter 1.82 cm    LVOT area 2.6 cm2    LVOT peak inocente 1.24 m/s    LVOT peak VTI 25.30 cm    Ao peak inocente 1.72 m/s    Ao VTI 35.1 cm    RVOT peak inocente 0.77 m/s    RVOT peak VTI 13.3 cm    Mr max inocente 4.73 m/s    LVOT stroke volume 65.79 cm3    AV peak gradient 12 mmHg    PV mean gradient 1.28 mmHg    E/E' ratio 10.84 m/s    MV Peak E Inocente 1.03 m/s    TR Max Inocente 3.25 m/s    MV stenosis pressure 1/2 time 35.85 ms    MV Peak A Inocente 1.20 m/s    LV Systolic Volume 16.70 mL    LV Systolic Volume Index 11.6 mL/m2    LV Diastolic Volume 48.68 mL    LV Diastolic Volume Index 33.81 mL/m2    LA Volume Index 41.9 mL/m2    LV Mass Index 121 g/m2    RA Major Axis 3.80 cm    Left Atrium Minor Axis 4.35 cm    Left Atrium Major Axis 4.27 cm    Triscuspid Valve Regurgitation Peak Gradient 42 mmHg    RA Width 2.70 cm    Right Atrial Pressure (from IVC) 3 mmHg    EF 55 %    TV rest pulmonary artery pressure 45 mmHg    Narrative    · The left ventricle is normal in size with concentric hypertrophy and   normal systolic function.  · The estimated ejection fraction is 55%.  · Grade I left ventricular diastolic dysfunction.  · Normal right ventricular size with normal right ventricular systolic   function.  · Moderate left atrial enlargement.  · Mild tricuspid regurgitation.  · There are segmental left ventricular wall motion abnormalities.  · There is pulmonary hypertension.  · The estimated PA systolic pressure is 45 mmHg.      , EKG: Reviewed, and X-Ray: CXR: X-Ray Chest 1 View (CXR):   Results for orders placed or performed during the hospital encounter of 09/11/22   X-Ray Chest 1 View    Narrative    EXAMINATION:  XR CHEST 1 VIEW    CLINICAL HISTORY:  <Diagnosis>, weakness;    COMPARISON:  Comparison 06/18/2022.    FINDINGS:  Heart is normal in size.  Interval  development mild interstitial infiltrate involving the right lung.  Left lung is clear.  Possible small right pleural effusion.      Impression    Interval development of a mild interstitial infiltrate involving the right lung with a small right pleural effusion.  Differential considerations include unilateral    edema versus pneumonia.      Electronically signed by: Maximino Pandey MD  Date:    09/11/2022  Time:    12:30    and X-Ray Chest PA and Lateral (CXR): No results found for this visit on 09/11/22.    Assessment and Plan:   Patient who presents with sepsis/NSTEMI/anemia/melena. GI workup underway however patient with worsening respiratory status/aspiration overnight. Continue supportive care/OMT as tolerated. LHC deferred for now.    * Sepsis due to Pneumonia   -Continue antibiotics for pneumonia.  -Per Hospital Med mgt.      Melena  -Workup and mgmt as per GI and hospital medicine    UTI (urinary tract infection)  -Continue abx    Right lower lobe pneumonia  -Continue abx    NSTEMI (non-ST elevated myocardial infarction)  See NSTEMI section.    9/13/22  -Stable, no angina  -LHC again deferred due to worsening anemia  -Transfuse  -ASA, Plavix, and heparin drip held for now, resume as tolerated  -Continue OMT as tolerated-BB, statin    9/14/22  -Continue OMT as tolerated-BB, statin  -DAPT held due to melena, resume as tolerated  -Medical management, LHC deferred for now given anemia issues/worsening respiratory status    Tobacco abuse disorder  -Smoking cessation advised.    PVD (peripheral vascular disease)  -Medical tx for now  -ASA, statin  -Smoking cessation.    9/14/22  -Resume ASA as tolerated    Anemia  -H/H dipped to 7.9/24.6  -Transfusion ordered  -Dark stool reported this AM  -Heparin gtt and DAPT held, bleeding scan pending    9/14/22  -H/H improved post transfusion  -EGD yesterday showed gastritis, colonoscopy planned today but cancelled due to overnight events  -Resume DAPT as tolerated    HTN  (hypertension)  HTN control.        VTE Risk Mitigation (From admission, onward)         Ordered     IP VTE HIGH RISK PATIENT  Once         09/11/22 1324     Place sequential compression device  Until discontinued         09/11/22 1324                Ruthie Campuzano PA-C  Cardiology  Formerly [facility] - Intensive Care ([facility])

## 2022-09-14 NOTE — SUBJECTIVE & OBJECTIVE
Interval History:   Rapid response called early this morning. Pt placed on biPAP and transferred to ICU.       Review of Systems   Unable to perform ROS: Mental status change   Objective:     Vital Signs (Most Recent):  Temp: 98.4 °F (36.9 °C) (09/14/22 1501)  Pulse: 85 (09/14/22 1501)  Resp: 12 (09/14/22 1501)  BP: (!) 90/55 (09/14/22 1501)  SpO2: 100 % (09/14/22 1501)   Vital Signs (24h Range):  Temp:  [97.4 °F (36.3 °C)-100.7 °F (38.2 °C)] 98.4 °F (36.9 °C)  Pulse:  [] 85  Resp:  [12-34] 12  SpO2:  [83 %-100 %] 100 %  BP: ()/(53-98) 90/55     Weight: 45.6 kg (100 lb 8.5 oz)  Body mass index is 18.39 kg/m².    Intake/Output Summary (Last 24 hours) at 9/14/2022 1551  Last data filed at 9/14/2022 1400  Gross per 24 hour   Intake 635.42 ml   Output 500 ml   Net 135.42 ml      Physical Exam  Constitutional:       General: She is not in acute distress.     Appearance: She is well-developed and underweight. She is ill-appearing. She is not diaphoretic.      Comments: On BiPAP mask. Lethargic    HENT:      Head: Normocephalic and atraumatic.      Mouth/Throat:      Mouth: Mucous membranes are dry.      Pharynx: No oropharyngeal exudate.   Eyes:      Conjunctiva/sclera: Conjunctivae normal.      Pupils: Pupils are equal, round, and reactive to light.   Neck:      Thyroid: No thyromegaly.      Vascular: No JVD.   Cardiovascular:      Rate and Rhythm: Normal rate and regular rhythm.      Heart sounds: Normal heart sounds. No murmur heard.  Pulmonary:      Effort: Pulmonary effort is normal. No respiratory distress.      Breath sounds: Wheezing and rales present.   Chest:      Chest wall: No tenderness.   Abdominal:      General: Bowel sounds are normal. There is no distension.      Palpations: Abdomen is soft.      Tenderness: There is no abdominal tenderness. There is no guarding or rebound.   Musculoskeletal:         General: Normal range of motion.      Cervical back: Normal range of motion and neck supple.       Right lower leg: No edema.      Left lower leg: No edema.   Lymphadenopathy:      Cervical: No cervical adenopathy.   Skin:     General: Skin is warm and dry.      Findings: Bruising present. No rash.   Neurological:      Mental Status: She is lethargic.      Cranial Nerves: No cranial nerve deficit.      Sensory: No sensory deficit.      Motor: Weakness present.      Deep Tendon Reflexes: Reflexes normal.   Psychiatric:         Behavior: Behavior is cooperative.       Significant Labs: All pertinent labs within the past 24 hours have been reviewed.  BMP:   Recent Labs   Lab 09/14/22  0555   *   *   K 3.8   CL 98   CO2 18*   BUN 9   CREATININE 0.8   CALCIUM 7.6*   MG 2.0     CBC:   Recent Labs   Lab 09/13/22  0435 09/13/22  0846 09/13/22  1328 09/13/22  2147 09/14/22  0555   WBC 8.08  8.08  8.08  --   --   --  16.26*   HGB 7.9*  7.9*  7.9*   < > 7.9* 8.7* 11.1*   HCT 24.6*  24.6*  24.6*   < > 25.8* 26.2* 36.9*     252  252  --   --   --  468*    < > = values in this interval not displayed.     CMP:   Recent Labs   Lab 09/13/22 0435 09/14/22  0555   * 134*   K 3.7 3.8    98   CO2 20* 18*   * 196*   BUN 13 9   CREATININE 0.6 0.8   CALCIUM 7.7* 7.6*   PROT 5.5* 5.9*   ALBUMIN 2.1* 2.6*   BILITOT 0.2 0.5   ALKPHOS 57 93   AST 35 58*   ALT 17 30   ANIONGAP 9 18*       Significant Imaging:

## 2022-09-14 NOTE — HPI
76-year-old female patient past medical history HTN, Hyperlipidemia, chronic low back pain, OA, Osteoporosis, Prior h/o pancreatitis, PVD, COPD, Life long and current everyday smoker admitted to the hospital for severe weakness abdominal pain fever decreased activity appetite

## 2022-09-14 NOTE — PT/OT/SLP PROGRESS
Speech Language Pathology      Keyona Dwyer  MRN: [id number]    Patient not seen today secondary to change in medical/respiratory status and transfer to ICU, requiring BIPAP application. ST initial bedside swallowing evaluation completed 9/13/22 with reported concern of dysphagia/aspiration risk and recommended MBSS, pending medical clearance & GI findings. Recommended to continue NPO.  ST deferred today given pt's respiratory decline and will f/u daily for assessment of PO/MBSS readiness, once clinically appropriate.

## 2022-09-14 NOTE — ASSESSMENT & PLAN NOTE
See NSTEMI section.    9/13/22  -Stable, no angina  -LHC again deferred due to worsening anemia  -Transfuse  -ASA, Plavix, and heparin drip held for now, resume as tolerated  -Continue OMT as tolerated-BB, statin    9/14/22  -Continue OMT as tolerated-BB, statin  -DAPT held due to melena, resume as tolerated  -Medical management, LHC deferred for now given anemia issues/worsening respiratory status

## 2022-09-14 NOTE — ASSESSMENT & PLAN NOTE
- Reported per nursing   -H/H dropped from baseline   -Monitor H/H  -Transfuse blood for Hgb 7 or under  -ASA, Plavix and Heparin gtt held  -GI consulted

## 2022-09-14 NOTE — HOSPITAL COURSE
Patient had an unremarkable EGD yesterday.  Overnight she DX experienced a respiratory distress ABG showed acute hypercapnic respiratory failure.  Currently dependent on continues BiPAP transferred to ICU for monitoring.  Chest x-ray ABG reviewed.  T-max on admission 101.8.  O2 sat 98% on 40% via BiPAP.  9/15 patient seen and examined.  Refused BiPAP overnight.  O2 sat 99% on 2 liter/minute.-1 L fluid balance last 24 hours.  On IV Lasix.  SOB weakness decreased activity and appetite.  ABG reviewed  9/16 seen and examined , T amx 99 , cultures unremarkable , negative 1 liter since admission , events overnight noted   9/17 seen and examined.  O2 sat 91% on room air.  Did not qualify for home O2 on home O2 evaluation.  Elevated BNP.  On IV Lasix.  Sitting in chair.  Weakness shortness of breath.

## 2022-09-14 NOTE — ASSESSMENT & PLAN NOTE
Check respiratory culture blood culture.  A right lung pneumonia?  Aspiration versus community-acquired pneumonia.  IV Rocephin azithromycin

## 2022-09-14 NOTE — ASSESSMENT & PLAN NOTE
-H/H dipped to 7.9/24.6  -Transfusion ordered  -Dark stool reported this AM  -Heparin gtt and DAPT held, bleeding scan pending    9/14/22  -H/H improved post transfusion  -EGD yesterday showed gastritis, colonoscopy planned today but cancelled due to overnight events  -Resume DAPT as tolerated

## 2022-09-14 NOTE — ASSESSMENT & PLAN NOTE
- UA suggestive of UTI  -Follow up urine culture   -Continue Rocephin   9/13-  Urine culture - no growth

## 2022-09-14 NOTE — ASSESSMENT & PLAN NOTE
Patient with Hypercapnic and Hypoxic Respiratory failure which is Acute.  she is not on home oxygen. Supplemental oxygen was provided and noted- Oxygen Concentration (%):  [40] 40.   Signs/symptoms of respiratory failure include- tachypnea, increased work of breathing and respiratory distress. Contributing diagnoses includes - CHF, COPD and Pneumonia Labs and images were reviewed. Patient Has recent ABG, which has been reviewed. Will treat underlying causes and adjust management of respiratory failure as follows- nebs IV Solu-Medrol IV antibiotic

## 2022-09-14 NOTE — CONSULTS
[facility] - Intensive [facility] ([facility])  Critical Care Medicine  Consult Note    Patient Name: Keyona Dwyer  MRN: [id number]  Admission Date: 9/11/2022  Hospital Length of Stay: 2 days  Code Status: DNR  Attending Physician: Natalie Fish MD   Primary Care Provider: Ralf Isidro MD   Principal Problem: Sepsis    [unfilled]  Subjective:     HPI:  76-year-old female patient past medical history HTN, Hyperlipidemia, chronic low back pain, OA, Osteoporosis, Prior h/o pancreatitis, PVD, COPD, Life long and current everyday smoker admitted to the hospital for severe weakness abdominal pain fever decreased activity appetite       Hospital/ICU Course:  Patient had an unremarkable EGD yesterday.  Overnight she DX experienced a respiratory distress ABG showed acute hypercapnic respiratory failure.  Currently dependent on continues BiPAP transferred to ICU for monitoring.  Chest x-ray ABG reviewed.  T-max on admission 101.8.  O2 sat 98% on 40% via BiPAP.      Past Medical History:   Diagnosis Date    Anemia     Anxiety     Cataract     Chronic back pain     Dr. Guzman    Fibrocystic breast     Glaucoma     Hyperlipemia     Hypertension     Idiopathic acute pancreatitis 6/18/2022    NSTEMI (non-ST elevated myocardial infarction) 9/11/2022    Osteoarthritis     Osteoporosis     Rheumatology/on Prolia    PVD (peripheral vascular disease) 4/12/2021    Restless leg syndrome     Tobacco dependence     Trouble in sleeping        Past Surgical History:   Procedure Laterality Date    ANGIOGRAPHY OF LOWER EXTREMITY N/A 4/12/2021    Procedure: ANGIOGRAM, LOWER EXTREMITY/left leg;  Surgeon: Maico Adkins MD;  Location: [facility] CATH LAB;  Service: Vascular;  Laterality: N/A;  req 1130 start time/Rm A243A    AORTOGRAPHY WITH SERIALOGRAPHY N/A 4/14/2021    Procedure: AORTOGRAM, WITH RUNOFF;  Surgeon: Maico Adkins MD;  Location: [facility] CATH LAB;  Service: Vascular;  Laterality: N/A;  left iliac stent with shockwave/req  1030 start    CATARACT EXTRACTION W/  INTRAOCULAR LENS IMPLANT Bilateral 2017    ESOPHAGOGASTRODUODENOSCOPY N/A 2022    Procedure: EGD (ESOPHAGOGASTRODUODENOSCOPY);  Surgeon: Fern Rasmussen MD;  Location: [facility] ENDO;  Service: Endoscopy;  Laterality: N/A;    GALLBLADDER SURGERY      HYSTERECTOMY      LUNG SURGERY Right age 15    lung collpase    TOE AMPUTATION Left 4/15/2021    Procedure: AMPUTATION, TOE;  Surgeon: Taylor Anderson DPM;  Location: [facility] OR;  Service: Podiatry;  Laterality: Left;  Hallux (Great Toe)    TOTAL HIP ARTHROPLASTY Bilateral     TOTAL KNEE ARTHROPLASTY Bilateral        Review of patient's allergies indicates:   Allergen Reactions    Indomethacin      Other reaction(s): Headache    K-flex Other (See Comments)     Pancreatitis     Latex, natural rubber Swelling    Penicillins      Other reaction(s): Vomiting    Patient tolerated Rocephin during the 2021 encounter and she states that she has tolerated Keflex in the past without issue.    Sulfa (sulfonamide antibiotics) Other (See Comments)     hallucinations    Tolmetin      Other reaction(s): Hives    Bactroban [mupirocin calcium] Rash     Burned skin        Family History       Problem Relation (Age of Onset)    Alcohol abuse Brother    Brain cancer Brother    Breast cancer Mother    Cancer Mother    Heart disease Brother    Heart failure Father    Hyperlipidemia Father    Stroke Brother          Tobacco Use    Smoking status: Some Days     Packs/day: 0.50     Years: 63.00     Pack years: 31.50     Types: Cigarettes     Last attempt to quit: 2021     Years since quittin.2    Smokeless tobacco: Never    Tobacco comments:     started age of  11    Substance and Sexual Activity    Alcohol use: No    Drug use: No    Sexual activity: Yes     Partners: Male         Review of Systems   Unable to perform ROS: Acuity of condition   Objective:     Vital Signs (Most Recent):  Temp: 98.2 °F (36.8 °C)  (09/14/22 1101)  Pulse: 92 (09/14/22 1300)  Resp: 13 (09/14/22 1300)  BP: 94/61 (09/14/22 1300)  SpO2: 100 % (09/14/22 1300) Vital Signs (24h Range):  Temp:  [97.4 °F (36.3 °C)-100.7 °F (38.2 °C)] 98.2 °F (36.8 °C)  Pulse:  [] 92  Resp:  [12-34] 13  SpO2:  [83 %-100 %] 100 %  BP: ()/(53-98) 94/61     Weight: 45.6 kg (100 lb 8.5 oz)  Body mass index is 18.39 kg/m².      Intake/Output Summary (Last 24 hours) at 9/14/2022 1402  Last data filed at 9/14/2022 1100  Gross per 24 hour   Intake 635.42 ml   Output 450 ml   Net 185.42 ml       Physical Exam  Vitals and nursing note reviewed.   Constitutional:       General: She is not in acute distress.     Appearance: She is well-developed. She is ill-appearing and toxic-appearing.      Comments: On BiPAP   HENT:      Head: Normocephalic and atraumatic.      Nose: Nose normal.   Eyes:      Extraocular Movements: Extraocular movements intact.   Cardiovascular:      Rate and Rhythm: Regular rhythm. Tachycardia present.   Pulmonary:      Effort: Respiratory distress present.      Breath sounds: No stridor. Wheezing present.   Abdominal:      General: There is no distension.   Genitourinary:     Comments: Urinary external catheter  Musculoskeletal:         General: No signs of injury.      Cervical back: Normal range of motion and neck supple.   Skin:     General: Skin is warm and dry.   Neurological:      General: No focal deficit present.      Mental Status: She is alert.      Comments: Unresponsive       Vents:  Oxygen Concentration (%): 40 (09/14/22 1101)    Lines/Drains/Airways       Drain  Duration             Female External Urinary Catheter 09/14/22 0800 <1 day              Peripheral Intravenous Line  Duration                  Peripheral IV - Single Lumen 09/11/22 0900 22 G Anterior;Left Forearm 3 days         Peripheral IV - Single Lumen 09/13/22 1945 20 G Anterior;Right Upper Arm <1 day                    Significant Labs:    CBC/Anemia Profile:  Recent  Labs   Lab 09/13/22  0435 09/13/22  0846 09/13/22  1328 09/13/22  2147 09/14/22  0555   WBC 8.08  8.08  8.08  --   --   --  16.26*   HGB 7.9*  7.9*  7.9*   < > 7.9* 8.7* 11.1*   HCT 24.6*  24.6*  24.6*   < > 25.8* 26.2* 36.9*     252  252  --   --   --  468*   MCV 95  95  95  --   --   --  97   RDW 13.5  13.5  13.5  --   --   --  13.8    < > = values in this interval not displayed.        Chemistries:  Recent Labs   Lab 09/13/22 0435 09/14/22  0555   * 134*   K 3.7 3.8    98   CO2 20* 18*   BUN 13 9   CREATININE 0.6 0.8   CALCIUM 7.7* 7.6*   ALBUMIN 2.1* 2.6*   PROT 5.5* 5.9*   BILITOT 0.2 0.5   ALKPHOS 57 93   ALT 17 30   AST 35 58*   MG 1.6 2.0   PHOS <1.0* 5.7*   EKG 09/14/2022   Vent. Rate : 128 BPM     Atrial Rate : 129 BPM      P-R Int : 136 ms          QRS Dur : 068 ms       QT Int : 394 ms       P-R-T Axes : 060 012 032 degrees      QTc Int : 575 ms     Sinus tachycardia   Low voltage QRS   Cannot rule out Anterior infarct ,age undetermined   Abnormal ECG      2D echo 09/11/2022   left ventricle is normal in size with concentric hypertrophy and normal systolic function.   The estimated ejection fraction is 55%.   Grade I left ventricular diastolic dysfunction.   Normal right ventricular size with normal right ventricular systolic function.   Moderate left atrial enlargement.   Mild tricuspid regurgitation.   There are segmental left ventricular wall motion abnormalities.   There is pulmonary hypertension.   The estimated PA systolic pressure is 45 mmHg.   Latest Reference Range & Units 09/12/22 15:48 09/13/22 04:35   Troponin I 0.000 - 0.026 ng/mL 2.358 (H) 1.691 (H)   (H): Data is abnormally high       Latest Reference Range & Units 09/14/22 07:26   POC PH 7.35 - 7.45  7.087 (LL)   POC PCO2 35 - 45 mmHg 77.3 (HH)   POC PO2 80 - 100 mmHg 124 (H)   POC HCO3 24 - 28 mmol/L 23.3 (L)   POC SATURATED O2 95 - 100 % 97   POC BE -2 to 2 mmol/L -7   FiO2  100   Flow  15    Sample  ARTERIAL   DelSys  NRB   Allens Test  Pass   Site  LR   Mode  SPONT   (LL): Data is critically low  (HH): Data is critically high  (H): Data is abnormally high  (L): Data is abnormally low      PFT personally reviewed 2019 moderately severe COPD and hyperinflation       Significant Imaging:     Chest x-ray 09/14/2022     CLINICAL HISTORY:  tachycardia;     TECHNIQUE:  Single frontal view of the chest was performed.     COMPARISON:  09/11/2022.     FINDINGS:  Heart size is not enlarged.  Aorta demonstrates atherosclerotic disease.  Diffuse chronic interstitial opacities are seen throughout the lungs.  More focal infiltrates are suspected the lung bases which could reflect airspace disease or aspiration.  Trace right pleural effusion.  No pneumothorax.  Advanced degenerative changes.    In comparison to the prior study, there is no additional interval changes.          CT abdomen pelvis 09/13/2022       Impression:     Right basilar pneumonia with associated right-sided mild parapneumonic effusion.  Trace left-sided pleural effusion     Occlusion of the right common iliac artery with reconstitution downstream.  Narrowing of the left common iliac artery likely hemodynamically significant        Low-dose screening CT chest 2020     COMPARISON:  04/26/2018     FINDINGS:  Lungs: There small noncalcified nodular opacities in the lungs bilaterally.  The largest opacity in the right lung appears solid and measures 0.2 cm on series 4, image 208 in the upper lobe.  The largest opacity in the left lung appears solid and measures 0.2 cm on series 4, image 343 in the left lower lobe.  The lungs show findings consistent with emphysema.     Pleura:   No effusion..     Heart and pericardium: Normal size without effusion.     Aorta and vasculature: Atherosclerosis including coronary arteries.     Chest wall and skeletal structures: Unremarkable except age-appropriate degenerative changes.  There is a single lead present in  the spinal canal, terminating in the lower thoracic spine.     Upper abdomen: Unremarkable.     Impression:     Lung-RADS Category:  2 - Benign Appearance or Behavior - continue annual screening with LDCT in 12 months.               ABG  Recent Labs   Lab 09/14/22  0726   PH 7.087*   PO2 124*   PCO2 77.3*   HCO3 23.3*   BE -7     Assessment/Plan:     Pulmonary  Acute respiratory failure with hypoxia and hypercarbia  Patient with Hypercapnic and Hypoxic Respiratory failure which is Acute.  she is not on home oxygen. Supplemental oxygen was provided and noted- Oxygen Concentration (%):  [40] 40.   Signs/symptoms of respiratory failure include- tachypnea, increased work of breathing and respiratory distress. Contributing diagnoses includes - CHF, COPD and Pneumonia Labs and images were reviewed. Patient Has recent ABG, which has been reviewed. Will treat underlying causes and adjust management of respiratory failure as follows- nebs IV Solu-Medrol IV antibiotic    Right lower lobe pneumonia  Check respiratory culture blood culture.  A right lung pneumonia?  Aspiration versus community-acquired pneumonia.  IV Rocephin azithromycin    Cardiac/Vascular  NSTEMI (non-ST elevated myocardial infarction)  Cardiac monitoring.  Likely demand ischemia.  Beta-blocker statin.    Renal/  UTI (urinary tract infection)  On IV Rocephin check urine culture    ID  * Sepsis due to Pneumonia   This patient does have evidence of infective focus  My overall impression is sepsis. Vital signs were reviewed and noted in progress note.  Antibiotics given-   Antibiotics (From admission, onward)    Start     Stop Route Frequency Ordered    09/11/22 1500  cefTRIAXone (ROCEPHIN) 1 g/50 mL D5W IVPB         09/16 1459 IV Every 24 hours (non-standard times) 09/11/22 1325        Cultures were taken-   Microbiology Results (last 7 days)     Procedure Component Value Units Date/Time    Culture, MRSA [[id number]] Collected: 09/14/22 0956    Order Status:  Sent Specimen: MRSA source from Nares, Left Updated: 09/14/22 1047    Blood Culture #1 **CANNOT BE ORDERED STAT** [[id number]] Collected: 09/11/22 1206    Order Status: Completed Specimen: Blood from Peripheral, Hand, Right Updated: 09/13/22 2012     Blood Culture, Routine No Growth to date      No Growth to date      No Growth to date    Blood Culture #2 **CANNOT BE ORDERED STAT** [[id number]] Collected: 09/11/22 1205    Order Status: Completed Specimen: Blood from Peripheral, Antecubital, Right Updated: 09/13/22 2012     Blood Culture, Routine No Growth to date      No Growth to date      No Growth to date    Urine culture [[id number]] Collected: 09/12/22 0715    Order Status: Completed Specimen: Urine Updated: 09/13/22 1852     Urine Culture, Routine No growth    Narrative:      Specimen Source->Urine        Latest lactate reviewed, they are-  Recent Labs   Lab 09/11/22  1703   LACTATE 0.8       Organ dysfunction indicated by Encephalopathy  and Acute respiratory failure  Source- right lung pneumonia    Source control Achieved by- IV Rocephin azithromycin blood culture respiratory cultures      GI  Melena  Transfuse for hemoglobin less than 7.  Colonoscopy when stable.        Critical Care Daily Checklist:    A: Awake: RASS Goal/Actual Goal:    Actual:     B: Spontaneous Breathing Trial Performed?     C: SAT & SBT Coordinated?  Not intubated                      D: Delirium: CAM-ICU     E: Early Mobility Performed? Yes   F: Feeding Goal:    Status:     Current Diet Order   Procedures    Diet NPO Except for: Medication     Order Specific Question:   Except for     Answer:   Medication      AS: Analgesia/Sedation Not sedated   T: Thromboembolic Prophylaxis Mechanical prophylaxis   H: HOB > 300 Yes   U: Stress Ulcer Prophylaxis (if needed) PPI   G: Glucose Control FINGERSTICK P.R.N.   B: Bowel Function Stool Occurrence: 2   I: Indwelling Catheter (Lines & Flores) Necessity Strict I&Os external ring catheter   D:  De-escalation of Antimicrobials/Pharmacotherapies IV Rocephin azithromycin    Plan for the day/ETD BiPAP antibiotic nebs steroids.    Code Status:  Family/Goals of Care: DNR  Spoke to 2 granddaughters and patient's  in ICU     Critical Care Time: 40 minutes  Critical secondary to Patient has a condition that poses threat to life and bodily function: Severe Respiratory Distress     Critical care was time spent personally by me on the following activities: development of treatment plan with patient or surrogate and bedside caregivers, discussions with consultants, evaluation of patient's response to treatment, examination of patient, ordering and performing treatments and interventions, ordering and review of laboratory studies, ordering and review of radiographic studies, pulse oximetry, re-evaluation of patient's condition. This critical care time did not overlap with that of any other provider or involve time for any procedures.    Thank you for your consult. I will follow-up with patient. Please contact us if you have any additional questions.     Pam Beyer MD  Critical Care Medicine  [facility] - Intensive Care hospitals)

## 2022-09-14 NOTE — ASSESSMENT & PLAN NOTE
This patient does have evidence of infective focus  My overall impression is sepsis. Vital signs were reviewed and noted in progress note.  Antibiotics given-   Antibiotics (From admission, onward)    Start     Stop Route Frequency Ordered    09/14/22 1530  cefTRIAXone (ROCEPHIN) 2 g/50 mL D5W IVPB         09/16 1529 IV Every 24 hours (non-standard times) 09/14/22 1421        Cultures were taken-   Microbiology Results (last 7 days)     Procedure Component Value Units Date/Time    Culture, MRSA [[id number]] Collected: 09/14/22 0956    Order Status: Sent Specimen: MRSA source from Nares, Left Updated: 09/14/22 1047    Blood Culture #1 **CANNOT BE ORDERED STAT** [[id number]] Collected: 09/11/22 1206    Order Status: Completed Specimen: Blood from Peripheral, Hand, Right Updated: 09/13/22 2012     Blood Culture, Routine No Growth to date      No Growth to date      No Growth to date    Blood Culture #2 **CANNOT BE ORDERED STAT** [[id number]] Collected: 09/11/22 1205    Order Status: Completed Specimen: Blood from Peripheral, Antecubital, Right Updated: 09/13/22 2012     Blood Culture, Routine No Growth to date      No Growth to date      No Growth to date    Urine culture [[id number]] Collected: 09/12/22 0715    Order Status: Completed Specimen: Urine Updated: 09/13/22 1852     Urine Culture, Routine No growth    Narrative:      Specimen Source->Urine        Latest lactate reviewed, they are-  Recent Labs   Lab 09/11/22  1703   LACTATE 0.8       Organ dysfunction indicated by none  Source- Lower resp tract     Source control Achieved by- Antimicrobial     SLP consult to assess aspiration risk     9/14-  ABG suggestive of acute hypercapnic resp failure   Pt placed on BiPAP and transferred to ICU.

## 2022-09-14 NOTE — ASSESSMENT & PLAN NOTE
- Inhaled bronchodilators and ICS  -Supplemental oxygen to keep SpO2  90 to 92%  9/14-  Short course steroid added

## 2022-09-14 NOTE — PLAN OF CARE
Plan of care reviewed with patient and family, all verbalized understanding. Pt able to be weaned off of BiPAP this shift, CO2 improving. IV abx and steroids. 2L NC. NAD noted. VSS.

## 2022-09-14 NOTE — ASSESSMENT & PLAN NOTE
Patient with Hypercapnic and Hypoxic Respiratory failure which is Acute.  she is not on home oxygen. Supplemental oxygen was provided and noted- Oxygen Concentration (%):  [40] 40.   Signs/symptoms of respiratory failure include- tachypnea, increased work of breathing and lethargy. Contributing diagnoses includes - Aspiration and Pneumonia, COPD exacerbation. Labs and images were reviewed. Patient Has recent ABG, which has been reviewed. Will treat underlying causes and adjust management of respiratory failure as follows-       -Supplemental oxygen   -Ventilatory support via BiPAP  -Inhaled bronchodilators and ICS  -Antibiotic

## 2022-09-14 NOTE — SUBJECTIVE & OBJECTIVE
Past Medical History:   Diagnosis Date    Anemia     Anxiety     Cataract     Chronic back pain     Dr. Guzman    Fibrocystic breast     Glaucoma     Hyperlipemia     Hypertension     Idiopathic acute pancreatitis 6/18/2022    NSTEMI (non-ST elevated myocardial infarction) 9/11/2022    Osteoarthritis     Osteoporosis     Rheumatology/on Prolia    PVD (peripheral vascular disease) 4/12/2021    Restless leg syndrome     Tobacco dependence     Trouble in sleeping        Past Surgical History:   Procedure Laterality Date    ANGIOGRAPHY OF LOWER EXTREMITY N/A 4/12/2021    Procedure: ANGIOGRAM, LOWER EXTREMITY/left leg;  Surgeon: Maico Adkins MD;  Location: [facility] CATH LAB;  Service: Vascular;  Laterality: N/A;  req 1130 start time/Rm A243A    AORTOGRAPHY WITH SERIALOGRAPHY N/A 4/14/2021    Procedure: AORTOGRAM, WITH RUNOFF;  Surgeon: Maico Adkins MD;  Location: [facility] CATH LAB;  Service: Vascular;  Laterality: N/A;  left iliac stent with shockwave/req 1030 start    CATARACT EXTRACTION W/  INTRAOCULAR LENS IMPLANT Bilateral 8/ 9 2017    ESOPHAGOGASTRODUODENOSCOPY N/A 9/13/2022    Procedure: EGD (ESOPHAGOGASTRODUODENOSCOPY);  Surgeon: Fern Rasmussen MD;  Location: [facility] ENDO;  Service: Endoscopy;  Laterality: N/A;    GALLBLADDER SURGERY      HYSTERECTOMY  1972    LUNG SURGERY Right age 15    lung collpase    TOE AMPUTATION Left 4/15/2021    Procedure: AMPUTATION, TOE;  Surgeon: Taylor Anderson DPM;  Location: [facility] OR;  Service: Podiatry;  Laterality: Left;  Hallux (Great Toe)    TOTAL HIP ARTHROPLASTY Bilateral     TOTAL KNEE ARTHROPLASTY Bilateral        Review of patient's allergies indicates:   Allergen Reactions    Indomethacin      Other reaction(s): Headache    K-flex Other (See Comments)     Pancreatitis     Latex, natural rubber Swelling    Penicillins      Other reaction(s): Vomiting    Patient tolerated Rocephin during the 4/2021 encounter and she states that she has tolerated Keflex in the past without  issue.    Sulfa (sulfonamide antibiotics) Other (See Comments)     hallucinations    Tolmetin      Other reaction(s): Hives    Bactroban [mupirocin calcium] Rash     Burned skin        Family History       Problem Relation (Age of Onset)    Alcohol abuse Brother    Brain cancer Brother    Breast cancer Mother    Cancer Mother    Heart disease Brother    Heart failure Father    Hyperlipidemia Father    Stroke Brother          Tobacco Use    Smoking status: Some Days     Packs/day: 0.50     Years: 63.00     Pack years: 31.50     Types: Cigarettes     Last attempt to quit: 2021     Years since quittin.2    Smokeless tobacco: Never    Tobacco comments:     started age of  11    Substance and Sexual Activity    Alcohol use: No    Drug use: No    Sexual activity: Yes     Partners: Male         Review of Systems   Unable to perform ROS: Acuity of condition   Objective:     Vital Signs (Most Recent):  Temp: 98.2 °F (36.8 °C) (22 1101)  Pulse: 92 (22 1300)  Resp: 13 (22 1300)  BP: 94/61 (22 1300)  SpO2: 100 % (22 1300) Vital Signs (24h Range):  Temp:  [97.4 °F (36.3 °C)-100.7 °F (38.2 °C)] 98.2 °F (36.8 °C)  Pulse:  [] 92  Resp:  [12-34] 13  SpO2:  [83 %-100 %] 100 %  BP: ()/(53-98) 94/61     Weight: 45.6 kg (100 lb 8.5 oz)  Body mass index is 18.39 kg/m².      Intake/Output Summary (Last 24 hours) at 2022 1402  Last data filed at 2022 1100  Gross per 24 hour   Intake 635.42 ml   Output 450 ml   Net 185.42 ml       Physical Exam  Vitals and nursing note reviewed.   Constitutional:       General: She is not in acute distress.     Appearance: She is well-developed. She is ill-appearing and toxic-appearing.      Comments: On BiPAP   HENT:      Head: Normocephalic and atraumatic.      Nose: Nose normal.   Eyes:      Extraocular Movements: Extraocular movements intact.   Cardiovascular:      Rate and Rhythm: Regular rhythm. Tachycardia present.   Pulmonary:      Effort:  Respiratory distress present.      Breath sounds: No stridor. Wheezing present.   Abdominal:      General: There is no distension.   Genitourinary:     Comments: Urinary external catheter  Musculoskeletal:         General: No signs of injury.      Cervical back: Normal range of motion and neck supple.   Skin:     General: Skin is warm and dry.   Neurological:      General: No focal deficit present.      Mental Status: She is alert.      Comments: Unresponsive       Vents:  Oxygen Concentration (%): 40 (09/14/22 1101)    Lines/Drains/Airways       Drain  Duration             Female External Urinary Catheter 09/14/22 0800 <1 day              Peripheral Intravenous Line  Duration                  Peripheral IV - Single Lumen 09/11/22 0900 22 G Anterior;Left Forearm 3 days         Peripheral IV - Single Lumen 09/13/22 1945 20 G Anterior;Right Upper Arm <1 day                    Significant Labs:    CBC/Anemia Profile:  Recent Labs   Lab 09/13/22  0435 09/13/22  0846 09/13/22  1328 09/13/22  2147 09/14/22  0555   WBC 8.08  8.08  8.08  --   --   --  16.26*   HGB 7.9*  7.9*  7.9*   < > 7.9* 8.7* 11.1*   HCT 24.6*  24.6*  24.6*   < > 25.8* 26.2* 36.9*     252  252  --   --   --  468*   MCV 95  95  95  --   --   --  97   RDW 13.5  13.5  13.5  --   --   --  13.8    < > = values in this interval not displayed.        Chemistries:  Recent Labs   Lab 09/13/22  0435 09/14/22  0555   * 134*   K 3.7 3.8    98   CO2 20* 18*   BUN 13 9   CREATININE 0.6 0.8   CALCIUM 7.7* 7.6*   ALBUMIN 2.1* 2.6*   PROT 5.5* 5.9*   BILITOT 0.2 0.5   ALKPHOS 57 93   ALT 17 30   AST 35 58*   MG 1.6 2.0   PHOS <1.0* 5.7*   EKG 09/14/2022   Vent. Rate : 128 BPM     Atrial Rate : 129 BPM      P-R Int : 136 ms          QRS Dur : 068 ms       QT Int : 394 ms       P-R-T Axes : 060 012 032 degrees      QTc Int : 575 ms     Sinus tachycardia   Low voltage QRS   Cannot rule out Anterior infarct ,age undetermined   Abnormal ECG      2D echo 09/11/2022  left ventricle is normal in size with concentric hypertrophy and normal systolic function.  The estimated ejection fraction is 55%.  Grade I left ventricular diastolic dysfunction.  Normal right ventricular size with normal right ventricular systolic function.  Moderate left atrial enlargement.  Mild tricuspid regurgitation.  There are segmental left ventricular wall motion abnormalities.  There is pulmonary hypertension.  The estimated PA systolic pressure is 45 mmHg.   Latest Reference Range & Units 09/12/22 15:48 09/13/22 04:35   Troponin I 0.000 - 0.026 ng/mL 2.358 (H) 1.691 (H)   (H): Data is abnormally high       Latest Reference Range & Units 09/14/22 07:26   POC PH 7.35 - 7.45  7.087 (LL)   POC PCO2 35 - 45 mmHg 77.3 (HH)   POC PO2 80 - 100 mmHg 124 (H)   POC HCO3 24 - 28 mmol/L 23.3 (L)   POC SATURATED O2 95 - 100 % 97   POC BE -2 to 2 mmol/L -7   FiO2  100   Flow  15   Sample  ARTERIAL   DelSys  NRB   Allens Test  Pass   Site  LR   Mode  SPONT   (LL): Data is critically low  (HH): Data is critically high  (H): Data is abnormally high  (L): Data is abnormally low      PFT personally reviewed 2019 moderately severe COPD and hyperinflation       Significant Imaging:     Chest x-ray 09/14/2022     CLINICAL HISTORY:  tachycardia;     TECHNIQUE:  Single frontal view of the chest was performed.     COMPARISON:  09/11/2022.     FINDINGS:  Heart size is not enlarged.  Aorta demonstrates atherosclerotic disease.  Diffuse chronic interstitial opacities are seen throughout the lungs.  More focal infiltrates are suspected the lung bases which could reflect airspace disease or aspiration.  Trace right pleural effusion.  No pneumothorax.  Advanced degenerative changes.    In comparison to the prior study, there is no additional interval changes.          CT abdomen pelvis 09/13/2022       Impression:     Right basilar pneumonia with associated right-sided mild parapneumonic effusion.  Trace  left-sided pleural effusion     Occlusion of the right common iliac artery with reconstitution downstream.  Narrowing of the left common iliac artery likely hemodynamically significant        Low-dose screening CT chest 2020     COMPARISON:  04/26/2018     FINDINGS:  Lungs: There small noncalcified nodular opacities in the lungs bilaterally.  The largest opacity in the right lung appears solid and measures 0.2 cm on series 4, image 208 in the upper lobe.  The largest opacity in the left lung appears solid and measures 0.2 cm on series 4, image 343 in the left lower lobe.  The lungs show findings consistent with emphysema.     Pleura:   No effusion..     Heart and pericardium: Normal size without effusion.     Aorta and vasculature: Atherosclerosis including coronary arteries.     Chest wall and skeletal structures: Unremarkable except age-appropriate degenerative changes.  There is a single lead present in the spinal canal, terminating in the lower thoracic spine.     Upper abdomen: Unremarkable.     Impression:     Lung-RADS Category:  2 - Benign Appearance or Behavior - continue annual screening with LDCT in 12 months.

## 2022-09-14 NOTE — ASSESSMENT & PLAN NOTE
Mlena with drop in hemoglobin after starting AC for nstemi.   EGD yesterday was unremarkable.  CTA negative for active bleeding.   No additional overt bleeding reported overnight.  Hgb stable.   No plans for additional endoscopy at this time. Continue to monitor.

## 2022-09-14 NOTE — PROGRESS NOTES
[facility] ([facility])  Gastroenterology  Progress Note    Patient Name: Keyona Dwyer  MRN: [id number]  Admission Date: 9/11/2022  Hospital Length of Stay: 2 days  Code Status: DNR   Attending Provider: Natalie Fish MD  Consulting Provider: Richard Gonzalez PA-C  Primary Care Physician: Ralf Isidro MD  Principal Problem: Sepsis      Subjective:     Interval History: The patient had a normal EGD yesterday. She was prepping via NGT for colonoscopy today, but declined overnight. Given CXR findings it is suspected she aspirated. WBC count elevated. She is currently in the ICU. Hgb is stable overnight. No overt bleeding reported.     Review of Systems   Constitutional:         See Interval History for daily ROS.    Objective:     Vital Signs (Most Recent):  Temp: 99.1 °F (37.3 °C) (09/14/22 0758)  Pulse: (!) 125 (09/14/22 0800)  Resp: (!) 34 (09/14/22 0800)  BP: (!) 112/57 (09/14/22 0800)  SpO2: 98 % (09/14/22 0800)   Vital Signs (24h Range):  Temp:  [97.4 °F (36.3 °C)-100.7 °F (38.2 °C)] 99.1 °F (37.3 °C)  Pulse:  [] 125  Resp:  [16-34] 34  SpO2:  [83 %-100 %] 98 %  BP: (108-164)/(57-98) 112/57     Weight: 45.6 kg (100 lb 8.5 oz) (09/14/22 0034)  Body mass index is 18.39 kg/m².      Intake/Output Summary (Last 24 hours) at 9/14/2022 0901  Last data filed at 9/14/2022 0508  Gross per 24 hour   Intake 995.42 ml   Output 300 ml   Net 695.42 ml       Lines/Drains/Airways       Drain  Duration             Female External Urinary Catheter 09/14/22 0800 <1 day              Peripheral Intravenous Line  Duration                  Peripheral IV - Single Lumen 09/11/22 0900 22 G Anterior;Left Forearm 3 days         Peripheral IV - Single Lumen 09/13/22 1945 20 G Anterior;Right Upper Arm <1 day                    Physical Exam  Constitutional:       Appearance: She is well-developed. She is ill-appearing.   HENT:      Head: Normocephalic and atraumatic.   Cardiovascular:      Rate and Rhythm: Regular  rhythm. Tachycardia present.   Pulmonary:      Effort: No respiratory distress.      Breath sounds: Rhonchi (bilaterally) present.   Abdominal:      General: Bowel sounds are normal. There is no distension.      Palpations: Abdomen is soft.      Tenderness: There is abdominal tenderness (flinches a little with palpation in the left abdomen). There is no guarding or rebound.   Neurological:      Comments: Eyes are open but she doesn't try to communicate or respond to her name.        Significant Labs:  CBC:   Recent Labs   Lab 09/13/22  0435 09/13/22  0846 09/13/22  1328 09/13/22  2147 09/14/22  0555   WBC 8.08  8.08  8.08  --   --   --  16.26*   HGB 7.9*  7.9*  7.9*   < > 7.9* 8.7* 11.1*   HCT 24.6*  24.6*  24.6*   < > 25.8* 26.2* 36.9*     252  252  --   --   --  468*    < > = values in this interval not displayed.     CMP:   Recent Labs   Lab 09/14/22  0555   *   CALCIUM 7.6*   ALBUMIN 2.6*   PROT 5.9*   *   K 3.8   CO2 18*   CL 98   BUN 9   CREATININE 0.8   ALKPHOS 93   ALT 30   AST 58*   BILITOT 0.5     Coagulation:   Recent Labs   Lab 09/14/22  0555   APTT 24.8         Significant Imaging:  Imaging results within the past 24 hours have been reviewed.    Assessment/Plan:     * Sepsis due to Pneumonia   CXR with focal infiltrates. Patient on antibiotics. Management per HM and ICU teams.     NSTEMI (non-ST elevated myocardial infarction)  Management per Cardiology.     Melena  Mlena with drop in hemoglobin after starting AC for nstemi.   EGD yesterday was unremarkable.  CTA negative for active bleeding.   No additional overt bleeding reported overnight.  Hgb stable.   No plans for additional endoscopy at this time. Continue to monitor.            Thank you for your consult. I will follow-up with patient. Please contact us if you have any additional questions.    Richard Gonzalez PA-C  Gastroenterology  [facility] ([facility])

## 2022-09-14 NOTE — SUBJECTIVE & OBJECTIVE
Review of Systems   Unable to perform ROS: Acuity of condition   Objective:     Vital Signs (Most Recent):  Temp: 98.2 °F (36.8 °C) (09/14/22 1101)  Pulse: 95 (09/14/22 1116)  Resp: 14 (09/14/22 1116)  BP: (!) 86/53 (09/14/22 1101)  SpO2: 100 % (09/14/22 1116)   Vital Signs (24h Range):  Temp:  [97.4 °F (36.3 °C)-100.7 °F (38.2 °C)] 98.2 °F (36.8 °C)  Pulse:  [] 95  Resp:  [13-34] 14  SpO2:  [83 %-100 %] 100 %  BP: ()/(53-98) 86/53     Weight: 45.6 kg (100 lb 8.5 oz)  Body mass index is 18.39 kg/m².     SpO2: 100 %  O2 Device (Oxygen Therapy): BiPAP      Intake/Output Summary (Last 24 hours) at 9/14/2022 1157  Last data filed at 9/14/2022 1100  Gross per 24 hour   Intake 995.42 ml   Output 450 ml   Net 545.42 ml       Lines/Drains/Airways       Drain  Duration             Female External Urinary Catheter 09/14/22 0800 <1 day              Peripheral Intravenous Line  Duration                  Peripheral IV - Single Lumen 09/11/22 0900 22 G Anterior;Left Forearm 3 days         Peripheral IV - Single Lumen 09/13/22 1945 20 G Anterior;Right Upper Arm <1 day                    Physical Exam  Vitals and nursing note reviewed.   Constitutional:       General: She is not in acute distress.     Appearance: She is well-developed. She is ill-appearing. She is not diaphoretic.      Comments: On bipap   HENT:      Head: Normocephalic and atraumatic.   Eyes:      General:         Right eye: No discharge.         Left eye: No discharge.      Pupils: Pupils are equal, round, and reactive to light.   Neck:      Thyroid: No thyromegaly.      Vascular: No JVD.      Trachea: No tracheal deviation.   Cardiovascular:      Rate and Rhythm: Regular rhythm. Tachycardia present.      Heart sounds: Normal heart sounds, S1 normal and S2 normal. No murmur heard.  Pulmonary:      Effort: Pulmonary effort is normal. No respiratory distress.      Breath sounds: No wheezing.      Comments: Rhonchi, coarse BS throughout  Abdominal:       General: There is distension (mild).      Tenderness: There is no abdominal tenderness. There is no rebound.   Musculoskeletal:      Cervical back: Neck supple.      Right lower leg: No edema.      Left lower leg: No edema.   Skin:     General: Skin is warm and dry.      Findings: No erythema.   Neurological:      Mental Status: She is alert.      Comments: Lethargic on bipap       Significant Labs: CMP   Recent Labs   Lab 09/13/22  0435 09/14/22  0555   * 134*   K 3.7 3.8    98   CO2 20* 18*   * 196*   BUN 13 9   CREATININE 0.6 0.8   CALCIUM 7.7* 7.6*   PROT 5.5* 5.9*   ALBUMIN 2.1* 2.6*   BILITOT 0.2 0.5   ALKPHOS 57 93   AST 35 58*   ALT 17 30   ANIONGAP 9 18*   , CBC   Recent Labs   Lab 09/13/22  0435 09/13/22  0846 09/13/22  1328 09/13/22  2147 09/14/22  0555   WBC 8.08  8.08  8.08  --   --   --  16.26*   HGB 7.9*  7.9*  7.9*   < > 7.9* 8.7* 11.1*   HCT 24.6*  24.6*  24.6*   < > 25.8* 26.2* 36.9*     252  252  --   --   --  468*    < > = values in this interval not displayed.   , INR No results for input(s): INR, PROTIME in the last 48 hours., Troponin   Recent Labs   Lab 09/12/22  1548 09/13/22  0435   TROPONINI 2.358* 1.691*   , and All pertinent lab results from the last 24 hours have been reviewed.    Significant Imaging: Echocardiogram: Transthoracic echo (TTE) complete (Cupid Only):   Results for orders placed or performed during the hospital encounter of 09/11/22   Echo   Result Value Ref Range    BSA 1.42 m2    TDI SEPTAL 0.07 m/s    LV LATERAL E/E' RATIO 8.58 m/s    LV SEPTAL E/E' RATIO 14.71 m/s    LA WIDTH 3.70 cm    IVC diameter 1.68 cm    Left Ventricular Outflow Tract Mean Velocity 0.87 cm/s    Left Ventricular Outflow Tract Mean Gradient 3.34 mmHg    TDI LATERAL 0.12 m/s    LVIDd 3.44 (A) 3.5 - 6.0 cm    IVS 1.36 (A) 0.6 - 1.1 cm    Posterior Wall 1.50 (A) 0.6 - 1.1 cm    Ao root annulus 2.50 cm    LVIDs 2.23 2.1 - 4.0 cm    FS 35 28 - 44 %    LA volume 60.31  cm3    STJ 2.20 cm    Ascending aorta 2.22 cm    LV mass 174.77 g    LA size 4.45 cm    TAPSE 2.00 cm    Left Ventricle Relative Wall Thickness 0.87 cm    AV mean gradient 6 mmHg    AV valve area 1.87 cm2    AV Velocity Ratio 0.72     AV index (prosthetic) 0.72     MV valve area p 1/2 method 6.14 cm2    E/A ratio 0.86     Mean e' 0.10 m/s    E wave deceleration time 123.62 msec    IVRT 54.23 msec    LVOT diameter 1.82 cm    LVOT area 2.6 cm2    LVOT peak inocente 1.24 m/s    LVOT peak VTI 25.30 cm    Ao peak inocente 1.72 m/s    Ao VTI 35.1 cm    RVOT peak inocente 0.77 m/s    RVOT peak VTI 13.3 cm    Mr max inocente 4.73 m/s    LVOT stroke volume 65.79 cm3    AV peak gradient 12 mmHg    PV mean gradient 1.28 mmHg    E/E' ratio 10.84 m/s    MV Peak E Inocente 1.03 m/s    TR Max Inocente 3.25 m/s    MV stenosis pressure 1/2 time 35.85 ms    MV Peak A Inocente 1.20 m/s    LV Systolic Volume 16.70 mL    LV Systolic Volume Index 11.6 mL/m2    LV Diastolic Volume 48.68 mL    LV Diastolic Volume Index 33.81 mL/m2    LA Volume Index 41.9 mL/m2    LV Mass Index 121 g/m2    RA Major Axis 3.80 cm    Left Atrium Minor Axis 4.35 cm    Left Atrium Major Axis 4.27 cm    Triscuspid Valve Regurgitation Peak Gradient 42 mmHg    RA Width 2.70 cm    Right Atrial Pressure (from IVC) 3 mmHg    EF 55 %    TV rest pulmonary artery pressure 45 mmHg    Narrative    · The left ventricle is normal in size with concentric hypertrophy and   normal systolic function.  · The estimated ejection fraction is 55%.  · Grade I left ventricular diastolic dysfunction.  · Normal right ventricular size with normal right ventricular systolic   function.  · Moderate left atrial enlargement.  · Mild tricuspid regurgitation.  · There are segmental left ventricular wall motion abnormalities.  · There is pulmonary hypertension.  · The estimated PA systolic pressure is 45 mmHg.      , EKG: Reviewed, and X-Ray: CXR: X-Ray Chest 1 View (CXR):   Results for orders placed or performed during the  hospital encounter of 09/11/22   X-Ray Chest 1 View    Narrative    EXAMINATION:  XR CHEST 1 VIEW    CLINICAL HISTORY:  <Diagnosis>, weakness;    COMPARISON:  Comparison 06/18/2022.    FINDINGS:  Heart is normal in size.  Interval development mild interstitial infiltrate involving the right lung.  Left lung is clear.  Possible small right pleural effusion.      Impression    Interval development of a mild interstitial infiltrate involving the right lung with a small right pleural effusion.  Differential considerations include unilateral    edema versus pneumonia.      Electronically signed by: Maximino Pandey MD  Date:    09/11/2022  Time:    12:30    and X-Ray Chest PA and Lateral (CXR): No results found for this visit on 09/11/22.

## 2022-09-14 NOTE — SIGNIFICANT EVENT
Responded to rapid response. Patient altered. Possible aspiration. ABG showed hypercapnic respiratory failure. Will place on bipap. EKG and chest xray ordered. Diffuse wheezing noted. Breathing treatment given by respiratory. Will transfer to ICU.

## 2022-09-14 NOTE — PROGRESS NOTES
[facility] - Intensive Care ([facility])  [facility] Medicine  Progress Note    Patient Name: Keyona Dwyer  MRN: [id number]  Patient Class: IP- Inpatient   Admission Date: 9/11/2022  Length of Stay: 2 days  Attending Physician: Natalie Fish MD  Primary Care Provider: Ralf Isidro MD        Subjective:     Principal Problem:Sepsis        HPI:  The pt is a 75 yo female with PMHx significant for HTN, Hyperlipidemia, chronic low back pain, OA, Osteoporosis, Prior h/o pancreatitis, PVD, COPD, Life long and current everyday smoker who was brought to the ED by her  for evaluation of 4 days h/o increasing generalized weakness to the point she was unable to get up from bed without assistance , decreased appetite , decreased food and water intake, dry cough, dry heaves, subjective fever . Since yesterday she was experiencing abd pain across the lower abd and increased urination. Denies chest pain, increased SOB, palpitations, nausea , vomiting, diarrhea, constipation, dysuria or incontinence.     Vitals on presentation 115/70, 133, 22, 100.9, 93% on RA  Labs - WBC 13.5K, Hgb 11.2, Plt 294, Na 130, K 3.8, CO2 21, Cr 0.8, Lipase 24, La 0.8, Troponin 1.946.UA- not collected     CXR- Interval development mild interstitial infiltrate involving the right lung.  Left lung is clear.  Possible small right pleural effusion.    EKG- ST, non specific T wave abnormality.      Pt was given NS bolus 1Liter , Cefepime 2 gram IV,  mg  and Heparin infusion per ACS protocol initiated in the ED and HM consulted to place pt in observation for further management of sepsis due to pneumonia and elevated troponin.       Overview/Hospital Course:  9/12- Fever 101.8 overnight . Feels better this morning. Denies chest pain . Abd pain is better. No further nausea or dry heaves. SpO2 satisfactory. WBC normalized. Troponin bumped to 2.4. .Echo resulted LVEF 55%, G1DD, +WMA. Heparin gtt continues along with ASA, Plavix, statin. [facility] plans  for tomorrow if afebrile x 24h.    9/13- Tmax 99.3. BP is improving. Per family pt was little confused earlier, however mental status seems better during my exam. No focal weakness appreciated . CT head done and is neg for acute changes. Dark stool reported per nursing. Hgb dropped to 8.2>9.2>11.2. Heparin gtt, Asa, Plavix held. PPI loading dose 80 mg given . Will get NM GI bleed scan. Plan for LHC placed on hold. WBC normal. Creatinine 0.6, BUN 13. Phos <1.0 and is being replaced. Urine culture -pending. Discussed code status . Pt and  both expressed code status be DNR.     9/14- Rapid response called early this morning. Pt was receiving bowel prep via NG tube for colonoscopy to r/o GI bleed, however developed resp distress and decreased mentation. ABG showed acute hypercapnic resp failure. Pt placed on biPAP and transferred to ICU. No further GI and cardiac intervention planned at this time. WBC increased to 16K , Hgb 11.1 post transfusion x 1 yesterday. Cr 0.8. CTA abd /pelvis neg for acute bleed.       Interval History:   Rapid response called early this morning. Pt placed on biPAP and transferred to ICU.       Review of Systems   Unable to perform ROS: Mental status change   Objective:     Vital Signs (Most Recent):  Temp: 98.4 °F (36.9 °C) (09/14/22 1501)  Pulse: 85 (09/14/22 1501)  Resp: 12 (09/14/22 1501)  BP: (!) 90/55 (09/14/22 1501)  SpO2: 100 % (09/14/22 1501)   Vital Signs (24h Range):  Temp:  [97.4 °F (36.3 °C)-100.7 °F (38.2 °C)] 98.4 °F (36.9 °C)  Pulse:  [] 85  Resp:  [12-34] 12  SpO2:  [83 %-100 %] 100 %  BP: ()/(53-98) 90/55     Weight: 45.6 kg (100 lb 8.5 oz)  Body mass index is 18.39 kg/m².    Intake/Output Summary (Last 24 hours) at 9/14/2022 1551  Last data filed at 9/14/2022 1400  Gross per 24 hour   Intake 635.42 ml   Output 500 ml   Net 135.42 ml      Physical Exam  Constitutional:       General: She is not in acute distress.     Appearance: She is well-developed and  underweight. She is ill-appearing. She is not diaphoretic.      Comments: On BiPAP mask. Lethargic    HENT:      Head: Normocephalic and atraumatic.      Mouth/Throat:      Mouth: Mucous membranes are dry.      Pharynx: No oropharyngeal exudate.   Eyes:      Conjunctiva/sclera: Conjunctivae normal.      Pupils: Pupils are equal, round, and reactive to light.   Neck:      Thyroid: No thyromegaly.      Vascular: No JVD.   Cardiovascular:      Rate and Rhythm: Normal rate and regular rhythm.      Heart sounds: Normal heart sounds. No murmur heard.  Pulmonary:      Effort: Pulmonary effort is normal. No respiratory distress.      Breath sounds: Wheezing and rales present.   Chest:      Chest wall: No tenderness.   Abdominal:      General: Bowel sounds are normal. There is no distension.      Palpations: Abdomen is soft.      Tenderness: There is no abdominal tenderness. There is no guarding or rebound.   Musculoskeletal:         General: Normal range of motion.      Cervical back: Normal range of motion and neck supple.      Right lower leg: No edema.      Left lower leg: No edema.   Lymphadenopathy:      Cervical: No cervical adenopathy.   Skin:     General: Skin is warm and dry.      Findings: Bruising present. No rash.   Neurological:      Mental Status: She is lethargic.      Cranial Nerves: No cranial nerve deficit.      Sensory: No sensory deficit.      Motor: Weakness present.      Deep Tendon Reflexes: Reflexes normal.   Psychiatric:         Behavior: Behavior is cooperative.       Significant Labs: All pertinent labs within the past 24 hours have been reviewed.  BMP:   Recent Labs   Lab 09/14/22  0555   *   *   K 3.8   CL 98   CO2 18*   BUN 9   CREATININE 0.8   CALCIUM 7.6*   MG 2.0     CBC:   Recent Labs   Lab 09/13/22  0435 09/13/22  0846 09/13/22  1328 09/13/22  2147 09/14/22  0555   WBC 8.08  8.08  8.08  --   --   --  16.26*   HGB 7.9*  7.9*  7.9*   < > 7.9* 8.7* 11.1*   HCT 24.6*  24.6*   24.6*   < > 25.8* 26.2* 36.9*     252  252  --   --   --  468*    < > = values in this interval not displayed.     CMP:   Recent Labs   Lab 09/13/22  0435 09/14/22  0555   * 134*   K 3.7 3.8    98   CO2 20* 18*   * 196*   BUN 13 9   CREATININE 0.6 0.8   CALCIUM 7.7* 7.6*   PROT 5.5* 5.9*   ALBUMIN 2.1* 2.6*   BILITOT 0.2 0.5   ALKPHOS 57 93   AST 35 58*   ALT 17 30   ANIONGAP 9 18*       Significant Imaging:       Assessment/Plan:      * Sepsis due to Pneumonia   This patient does have evidence of infective focus  My overall impression is sepsis. Vital signs were reviewed and noted in progress note.  Antibiotics given-   Antibiotics (From admission, onward)    Start     Stop Route Frequency Ordered    09/14/22 1530  cefTRIAXone (ROCEPHIN) 2 g/50 mL D5W IVPB         09/16 1529 IV Every 24 hours (non-standard times) 09/14/22 1421        Cultures were taken-   Microbiology Results (last 7 days)     Procedure Component Value Units Date/Time    Culture, MRSA [[id number]] Collected: 09/14/22 0956    Order Status: Sent Specimen: MRSA source from Nares, Left Updated: 09/14/22 1047    Blood Culture #1 **CANNOT BE ORDERED STAT** [[id number]] Collected: 09/11/22 1206    Order Status: Completed Specimen: Blood from Peripheral, Hand, Right Updated: 09/13/22 2012     Blood Culture, Routine No Growth to date      No Growth to date      No Growth to date    Blood Culture #2 **CANNOT BE ORDERED STAT** [[id number]] Collected: 09/11/22 1205    Order Status: Completed Specimen: Blood from Peripheral, Antecubital, Right Updated: 09/13/22 2012     Blood Culture, Routine No Growth to date      No Growth to date      No Growth to date    Urine culture [[id number]] Collected: 09/12/22 0715    Order Status: Completed Specimen: Urine Updated: 09/13/22 1852     Urine Culture, Routine No growth    Narrative:      Specimen Source->Urine        Latest lactate reviewed, they are-  Recent Labs   Lab 09/11/22  1703   LACTATE  0.8       Organ dysfunction indicated by none  Source- Lower resp tract     Source control Achieved by- Antimicrobial     SLP consult to assess aspiration risk     9/14-  ABG suggestive of acute hypercapnic resp failure   Pt placed on BiPAP and transferred to ICU.     Right lower lobe pneumonia  - See plan per Sepsis       NSTEMI (non-ST elevated myocardial infarction)  - Troponin elevated at 1.946 on presentation   -Pt denies cliff chest pain or angina equivalent  -EKG with non specific T wave abnormality  - mg x 1 dose in the ED  -Continue Plavix and Statin   -Heparin gtt initiated per ACS protocol   -Trend troponin   -Consult Cardiology     9/12-  -Troponin bumped to 2.4. Heparin gtt continues along with ASA, Plavix, statin. .Echo resulted LVEF 55%, G1DD, +WMA.   [facility] plans for tomorrow if afebrile x 24h   9/13-  -H/H dropped to 8.2. Dark stool reported per nursing. ASA, Plavix and Heparin gtt held . [facility] plan placed on hold       COPD, group D, by GOLD 2017 classification  - Inhaled bronchodilators and ICS  -Supplemental oxygen to keep SpO2  90 to 92%  9/14-  Short course steroid added     PVD (peripheral vascular disease)  - Continue Plavix and Statin   9/13-  Plavix held for now until rule out GI bleed.     HTN (hypertension)  - BP is soft  -Hold home antihypertensives in the setting of sepsis   -Monitor BP trend and resume once appropriate   9/13-  -BP is improving     Tobacco abuse disorder  Assistance with smoking cessation was offered, including:  []  Medications  [x]  Counseling  []  Printed Information on Smoking Cessation  []  Referral to a Smoking Cessation Program    Patient was counseled regarding smoking for 3-10 minutes.          Acute respiratory failure with hypoxia and hypercarbia  Patient with Hypercapnic and Hypoxic Respiratory failure which is Acute.  she is not on home oxygen. Supplemental oxygen was provided and noted- Oxygen Concentration (%):  [40] 40.   Signs/symptoms of respiratory  failure include- tachypnea, increased work of breathing and lethargy. Contributing diagnoses includes - Aspiration and Pneumonia, COPD exacerbation. Labs and images were reviewed. Patient Has recent ABG, which has been reviewed. Will treat underlying causes and adjust management of respiratory failure as follows-       -Supplemental oxygen   -Ventilatory support via BiPAP  -Inhaled bronchodilators and ICS  -Antibiotic            Melena  - Reported per nursing   -H/H dropped from baseline   -Monitor H/H  -Transfuse blood for Hgb 7 or under  -ASA, Plavix and Heparin gtt held  -GI consulted       UTI (urinary tract infection)  - UA suggestive of UTI  -Follow up urine culture   -Continue Rocephin   9/13-  Urine culture - no growth       VTE Risk Mitigation (From admission, onward)         Ordered     IP VTE HIGH RISK PATIENT  Once         09/11/22 1324     Place sequential compression device  Until discontinued         09/11/22 1324                Discharge Planning   TOREY:      Code Status: DNR   Is the patient medically ready for discharge?:     Reason for patient still in hospital (select all that apply): Patient trending condition  Discharge Plan A: Home with family            Critical care time spent on the evaluation and treatment of severe organ dysfunction, review of pertinent labs and imaging studies, discussions with consulting providers and discussions with patient/family: 30  minutes.      Natalie Fish MD  Department of Hospital Medicine   '[place] - Intensive Care ([facility])

## 2022-09-15 PROBLEM — I50.31 ACUTE DIASTOLIC HEART FAILURE: Status: ACTIVE | Noted: 2022-09-15

## 2022-09-15 LAB
ALBUMIN SERPL BCP-MCNC: 2.2 G/DL (ref 3.5–5.2)
ALP SERPL-CCNC: 73 U/L (ref 55–135)
ALT SERPL W/O P-5'-P-CCNC: 37 U/L (ref 10–44)
ANION GAP SERPL CALC-SCNC: 14 MMOL/L (ref 8–16)
AST SERPL-CCNC: 46 U/L (ref 10–40)
BASOPHILS # BLD AUTO: 0.01 K/UL (ref 0–0.2)
BASOPHILS NFR BLD: 0.2 % (ref 0–1.9)
BILIRUB SERPL-MCNC: 0.2 MG/DL (ref 0.1–1)
BNP SERPL-MCNC: 1234 PG/ML (ref 0–99)
BUN SERPL-MCNC: 12 MG/DL (ref 8–23)
CALCIUM SERPL-MCNC: 6.8 MG/DL (ref 8.7–10.5)
CHLORIDE SERPL-SCNC: 99 MMOL/L (ref 95–110)
CO2 SERPL-SCNC: 19 MMOL/L (ref 23–29)
CREAT SERPL-MCNC: 0.7 MG/DL (ref 0.5–1.4)
DIFFERENTIAL METHOD: ABNORMAL
EOSINOPHIL # BLD AUTO: 0 K/UL (ref 0–0.5)
EOSINOPHIL NFR BLD: 0 % (ref 0–8)
ERYTHROCYTE [DISTWIDTH] IN BLOOD BY AUTOMATED COUNT: 13.8 % (ref 11.5–14.5)
EST. GFR  (NO RACE VARIABLE): >60 ML/MIN/1.73 M^2
GIANT PLATELETS BLD QL SMEAR: PRESENT
GLUCOSE SERPL-MCNC: 110 MG/DL (ref 70–110)
HCT VFR BLD AUTO: 30 % (ref 37–48.5)
HGB BLD-MCNC: 9.9 G/DL (ref 12–16)
IMM GRANULOCYTES # BLD AUTO: 0.04 K/UL (ref 0–0.04)
IMM GRANULOCYTES NFR BLD AUTO: 0.7 % (ref 0–0.5)
LYMPHOCYTES # BLD AUTO: 0.4 K/UL (ref 1–4.8)
LYMPHOCYTES NFR BLD: 7.2 % (ref 18–48)
MAGNESIUM SERPL-MCNC: 1.9 MG/DL (ref 1.6–2.6)
MCH RBC QN AUTO: 30 PG (ref 27–31)
MCHC RBC AUTO-ENTMCNC: 33 G/DL (ref 32–36)
MCV RBC AUTO: 91 FL (ref 82–98)
MONOCYTES # BLD AUTO: 0.1 K/UL (ref 0.3–1)
MONOCYTES NFR BLD: 2.4 % (ref 4–15)
NEUTROPHILS # BLD AUTO: 5.2 K/UL (ref 1.8–7.7)
NEUTROPHILS NFR BLD: 89.5 % (ref 38–73)
NRBC BLD-RTO: 0 /100 WBC
PHOSPHATE SERPL-MCNC: 3 MG/DL (ref 2.7–4.5)
PLATELET # BLD AUTO: 269 K/UL (ref 150–450)
PLATELET BLD QL SMEAR: ABNORMAL
PMV BLD AUTO: 9.8 FL (ref 9.2–12.9)
POCT GLUCOSE: 103 MG/DL (ref 70–110)
POCT GLUCOSE: 107 MG/DL (ref 70–110)
POCT GLUCOSE: 113 MG/DL (ref 70–110)
POCT GLUCOSE: 119 MG/DL (ref 70–110)
POTASSIUM SERPL-SCNC: 3.8 MMOL/L (ref 3.5–5.1)
PROCALCITONIN SERPL IA-MCNC: 6.9 NG/ML
PROT SERPL-MCNC: 4.9 G/DL (ref 6–8.4)
RBC # BLD AUTO: 3.3 M/UL (ref 4–5.4)
SODIUM SERPL-SCNC: 132 MMOL/L (ref 136–145)
WBC # BLD AUTO: 5.85 K/UL (ref 3.9–12.7)

## 2022-09-15 PROCEDURE — 83735 ASSAY OF MAGNESIUM: CPT | Performed by: INTERNAL MEDICINE

## 2022-09-15 PROCEDURE — 80053 COMPREHEN METABOLIC PANEL: CPT | Performed by: INTERNAL MEDICINE

## 2022-09-15 PROCEDURE — 27000221 HC OXYGEN, UP TO 24 HOURS

## 2022-09-15 PROCEDURE — 84145 PROCALCITONIN (PCT): CPT | Performed by: INTERNAL MEDICINE

## 2022-09-15 PROCEDURE — 99900035 HC TECH TIME PER 15 MIN (STAT)

## 2022-09-15 PROCEDURE — 25000242 PHARM REV CODE 250 ALT 637 W/ HCPCS: Performed by: INTERNAL MEDICINE

## 2022-09-15 PROCEDURE — 99291 CRITICAL CARE FIRST HOUR: CPT | Mod: ,,, | Performed by: INTERNAL MEDICINE

## 2022-09-15 PROCEDURE — 85025 COMPLETE CBC W/AUTO DIFF WBC: CPT | Performed by: INTERNAL MEDICINE

## 2022-09-15 PROCEDURE — S4991 NICOTINE PATCH NONLEGEND: HCPCS | Performed by: INTERNAL MEDICINE

## 2022-09-15 PROCEDURE — C9113 INJ PANTOPRAZOLE SODIUM, VIA: HCPCS | Performed by: PHYSICIAN ASSISTANT

## 2022-09-15 PROCEDURE — 83880 ASSAY OF NATRIURETIC PEPTIDE: CPT | Performed by: INTERNAL MEDICINE

## 2022-09-15 PROCEDURE — 84100 ASSAY OF PHOSPHORUS: CPT | Performed by: INTERNAL MEDICINE

## 2022-09-15 PROCEDURE — 63600175 PHARM REV CODE 636 W HCPCS: Performed by: PHYSICIAN ASSISTANT

## 2022-09-15 PROCEDURE — 20000000 HC ICU ROOM

## 2022-09-15 PROCEDURE — 63600175 PHARM REV CODE 636 W HCPCS: Performed by: INTERNAL MEDICINE

## 2022-09-15 PROCEDURE — 94761 N-INVAS EAR/PLS OXIMETRY MLT: CPT

## 2022-09-15 PROCEDURE — 25000003 PHARM REV CODE 250: Performed by: INTERNAL MEDICINE

## 2022-09-15 PROCEDURE — 99232 SBSQ HOSP IP/OBS MODERATE 35: CPT | Mod: ,,, | Performed by: PHYSICIAN ASSISTANT

## 2022-09-15 PROCEDURE — 99232 PR SUBSEQUENT HOSPITAL CARE,LEVL II: ICD-10-PCS | Mod: ,,, | Performed by: PHYSICIAN ASSISTANT

## 2022-09-15 PROCEDURE — 94640 AIRWAY INHALATION TREATMENT: CPT

## 2022-09-15 PROCEDURE — 99291 PR CRITICAL CARE, E/M 30-74 MINUTES: ICD-10-PCS | Mod: ,,, | Performed by: INTERNAL MEDICINE

## 2022-09-15 RX ORDER — FUROSEMIDE 10 MG/ML
40 INJECTION INTRAMUSCULAR; INTRAVENOUS
Status: COMPLETED | OUTPATIENT
Start: 2022-09-15 | End: 2022-09-15

## 2022-09-15 RX ADMIN — BUPROPION HYDROCHLORIDE 75 MG: 75 TABLET, FILM COATED ORAL at 10:09

## 2022-09-15 RX ADMIN — BUPROPION HYDROCHLORIDE 75 MG: 75 TABLET, FILM COATED ORAL at 09:09

## 2022-09-15 RX ADMIN — PANTOPRAZOLE SODIUM 40 MG: 40 INJECTION, POWDER, FOR SOLUTION INTRAVENOUS at 10:09

## 2022-09-15 RX ADMIN — FUROSEMIDE 40 MG: 10 INJECTION, SOLUTION INTRAMUSCULAR; INTRAVENOUS at 09:09

## 2022-09-15 RX ADMIN — NICOTINE 1 PATCH: 7 PATCH, EXTENDED RELEASE TRANSDERMAL at 10:09

## 2022-09-15 RX ADMIN — MIDODRINE HYDROCHLORIDE 5 MG: 5 TABLET ORAL at 09:09

## 2022-09-15 RX ADMIN — BUSPIRONE HYDROCHLORIDE 10 MG: 10 TABLET ORAL at 10:09

## 2022-09-15 RX ADMIN — MIDODRINE HYDROCHLORIDE 5 MG: 5 TABLET ORAL at 03:09

## 2022-09-15 RX ADMIN — FUROSEMIDE 40 MG: 10 INJECTION, SOLUTION INTRAMUSCULAR; INTRAVENOUS at 10:09

## 2022-09-15 RX ADMIN — METHYLPREDNISOLONE SODIUM SUCCINATE 60 MG: 40 INJECTION, POWDER, FOR SOLUTION INTRAMUSCULAR; INTRAVENOUS at 09:09

## 2022-09-15 RX ADMIN — ATORVASTATIN CALCIUM 20 MG: 10 TABLET, FILM COATED ORAL at 09:09

## 2022-09-15 RX ADMIN — BUSPIRONE HYDROCHLORIDE 10 MG: 10 TABLET ORAL at 09:09

## 2022-09-15 RX ADMIN — IPRATROPIUM BROMIDE AND ALBUTEROL SULFATE 3 ML: 2.5; .5 SOLUTION RESPIRATORY (INHALATION) at 07:09

## 2022-09-15 RX ADMIN — MIDODRINE HYDROCHLORIDE 5 MG: 5 TABLET ORAL at 10:09

## 2022-09-15 RX ADMIN — METHYLPREDNISOLONE SODIUM SUCCINATE 60 MG: 40 INJECTION, POWDER, FOR SOLUTION INTRAMUSCULAR; INTRAVENOUS at 10:09

## 2022-09-15 RX ADMIN — IPRATROPIUM BROMIDE AND ALBUTEROL SULFATE 3 ML: 2.5; .5 SOLUTION RESPIRATORY (INHALATION) at 01:09

## 2022-09-15 RX ADMIN — BUDESONIDE 0.5 MG: 0.5 INHALANT ORAL at 07:09

## 2022-09-15 RX ADMIN — METOPROLOL SUCCINATE 12.5 MG: 25 TABLET, EXTENDED RELEASE ORAL at 10:09

## 2022-09-15 RX ADMIN — ARFORMOTEROL TARTRATE 15 MCG: 15 SOLUTION RESPIRATORY (INHALATION) at 07:09

## 2022-09-15 RX ADMIN — CEFTRIAXONE SODIUM 2 G: 2 INJECTION, SOLUTION INTRAVENOUS at 03:09

## 2022-09-15 RX ADMIN — DULOXETINE 30 MG: 30 CAPSULE, DELAYED RELEASE ORAL at 10:09

## 2022-09-15 NOTE — ASSESSMENT & PLAN NOTE
Patient with Hypercapnic and Hypoxic Respiratory failure which is Acute.  she is not on home oxygen. Supplemental oxygen was provided and noted- Oxygen Concentration (%):  [28-40] 28.   Signs/symptoms of respiratory failure include- tachypnea, increased work of breathing and respiratory distress. Contributing diagnoses includes - CHF, COPD and Pneumonia Labs and images were reviewed. Patient Has recent ABG, which has been reviewed. Will treat underlying causes and adjust management of respiratory failure as follows- nebs IV Solu-Medrol IV antibiotic  9/15 albuterol Atrovent ICS Laba.  Home O2 evaluation on discharge.

## 2022-09-15 NOTE — ASSESSMENT & PLAN NOTE
Transfuse for hemoglobin less than 7.  Colonoscopy when stable.  9/15 colonoscopy once stable.  Can be done electively.  No overt bleeding

## 2022-09-15 NOTE — SUBJECTIVE & OBJECTIVE
Review of Systems   Constitutional: Positive for malaise/fatigue.   HENT: Negative.     Eyes: Negative.    Cardiovascular:  Positive for dyspnea on exertion (improved).   Respiratory:  Positive for shortness of breath (nqp8mbnic).    Endocrine: Negative.    Hematologic/Lymphatic: Negative.    Skin: Negative.    Musculoskeletal: Negative.    Gastrointestinal: Negative.    Genitourinary: Negative.    Neurological:  Positive for weakness.   Psychiatric/Behavioral: Negative.     Allergic/Immunologic: Negative.    Objective:     Vital Signs (Most Recent):  Temp: 98.2 °F (36.8 °C) (09/15/22 0800)  Pulse: 94 (09/15/22 1100)  Resp: 17 (09/15/22 1100)  BP: 104/63 (09/15/22 1100)  SpO2: (!) 92 % (09/15/22 1100)   Vital Signs (24h Range):  Temp:  [98.1 °F (36.7 °C)-98.5 °F (36.9 °C)] 98.2 °F (36.8 °C)  Pulse:  [] 94  Resp:  [9-20] 17  SpO2:  [92 %-100 %] 92 %  BP: ()/(53-68) 104/63     Weight: 46.4 kg (102 lb 4.7 oz)  Body mass index is 18.71 kg/m².     SpO2: (!) 92 %  O2 Device (Oxygen Therapy): room air      Intake/Output Summary (Last 24 hours) at 9/15/2022 1159  Last data filed at 9/15/2022 1122  Gross per 24 hour   Intake 139.54 ml   Output 625 ml   Net -485.46 ml       Lines/Drains/Airways       Drain  Duration             Female External Urinary Catheter 09/14/22 0800 1 day              Peripheral Intravenous Line  Duration                  Peripheral IV - Single Lumen 09/11/22 0900 22 G Anterior;Left Forearm 4 days         Peripheral IV - Single Lumen 09/13/22 1945 20 G Anterior;Right Upper Arm 1 day                    Physical Exam  Vitals and nursing note reviewed.   Constitutional:       General: She is not in acute distress.     Appearance: Normal appearance. She is well-developed. She is not diaphoretic.   HENT:      Head: Normocephalic and atraumatic.   Eyes:      General:         Right eye: No discharge.         Left eye: No discharge.      Pupils: Pupils are equal, round, and reactive to light.    Neck:      Thyroid: No thyromegaly.      Vascular: No JVD.      Trachea: No tracheal deviation.   Cardiovascular:      Rate and Rhythm: Normal rate and regular rhythm.      Heart sounds: Normal heart sounds, S1 normal and S2 normal. No murmur heard.  Pulmonary:      Effort: Pulmonary effort is normal. No respiratory distress.      Breath sounds: Rales present. No wheezing.   Abdominal:      General: There is no distension.      Tenderness: There is no rebound.   Musculoskeletal:      Cervical back: Neck supple.      Right lower leg: No edema.      Left lower leg: No edema.   Skin:     General: Skin is warm and dry.      Findings: No erythema.   Neurological:      Mental Status: She is alert and oriented to person, place, and time.   Psychiatric:         Mood and Affect: Mood normal.         Behavior: Behavior normal.         Thought Content: Thought content normal.       Significant Labs: CMP   Recent Labs   Lab 09/14/22  0555 09/15/22  0439   * 132*   K 3.8 3.8   CL 98 99   CO2 18* 19*   * 110   BUN 9 12   CREATININE 0.8 0.7   CALCIUM 7.6* 6.8*   PROT 5.9* 4.9*   ALBUMIN 2.6* 2.2*   BILITOT 0.5 0.2   ALKPHOS 93 73   AST 58* 46*   ALT 30 37   ANIONGAP 18* 14   , CBC   Recent Labs   Lab 09/13/22  2147 09/14/22  0555 09/15/22  0439   WBC  --  16.26* 5.85   HGB 8.7* 11.1* 9.9*   HCT 26.2* 36.9* 30.0*   PLT  --  468* 269   , INR No results for input(s): INR, PROTIME in the last 48 hours., Troponin No results for input(s): TROPONINI in the last 48 hours., and All pertinent lab results from the last 24 hours have been reviewed.    Significant Imaging: Echocardiogram: Transthoracic echo (TTE) complete ([facility] Only):   Results for orders placed or performed during the hospital encounter of 09/11/22   Echo   Result Value Ref Range    BSA 1.42 m2    TDI SEPTAL 0.07 m/s    LV LATERAL E/E' RATIO 8.58 m/s    LV SEPTAL E/E' RATIO 14.71 m/s    LA WIDTH 3.70 cm    IVC diameter 1.68 cm    Left Ventricular Outflow Tract  Mean Velocity 0.87 cm/s    Left Ventricular Outflow Tract Mean Gradient 3.34 mmHg    TDI LATERAL 0.12 m/s    LVIDd 3.44 (A) 3.5 - 6.0 cm    IVS 1.36 (A) 0.6 - 1.1 cm    Posterior Wall 1.50 (A) 0.6 - 1.1 cm    Ao root annulus 2.50 cm    LVIDs 2.23 2.1 - 4.0 cm    FS 35 28 - 44 %    LA volume 60.31 cm3    STJ 2.20 cm    Ascending aorta 2.22 cm    LV mass 174.77 g    LA size 4.45 cm    TAPSE 2.00 cm    Left Ventricle Relative Wall Thickness 0.87 cm    AV mean gradient 6 mmHg    AV valve area 1.87 cm2    AV Velocity Ratio 0.72     AV index (prosthetic) 0.72     MV valve area p 1/2 method 6.14 cm2    E/A ratio 0.86     Mean e' 0.10 m/s    E wave deceleration time 123.62 msec    IVRT 54.23 msec    LVOT diameter 1.82 cm    LVOT area 2.6 cm2    LVOT peak inocente 1.24 m/s    LVOT peak VTI 25.30 cm    Ao peak inocente 1.72 m/s    Ao VTI 35.1 cm    RVOT peak inocente 0.77 m/s    RVOT peak VTI 13.3 cm    Mr max inocente 4.73 m/s    LVOT stroke volume 65.79 cm3    AV peak gradient 12 mmHg    PV mean gradient 1.28 mmHg    E/E' ratio 10.84 m/s    MV Peak E Inocente 1.03 m/s    TR Max Inocente 3.25 m/s    MV stenosis pressure 1/2 time 35.85 ms    MV Peak A Inocente 1.20 m/s    LV Systolic Volume 16.70 mL    LV Systolic Volume Index 11.6 mL/m2    LV Diastolic Volume 48.68 mL    LV Diastolic Volume Index 33.81 mL/m2    LA Volume Index 41.9 mL/m2    LV Mass Index 121 g/m2    RA Major Axis 3.80 cm    Left Atrium Minor Axis 4.35 cm    Left Atrium Major Axis 4.27 cm    Triscuspid Valve Regurgitation Peak Gradient 42 mmHg    RA Width 2.70 cm    Right Atrial Pressure (from IVC) 3 mmHg    EF 55 %    TV rest pulmonary artery pressure 45 mmHg    Narrative    · The left ventricle is normal in size with concentric hypertrophy and   normal systolic function.  · The estimated ejection fraction is 55%.  · Grade I left ventricular diastolic dysfunction.  · Normal right ventricular size with normal right ventricular systolic   function.  · Moderate left atrial enlargement.  · Mild  tricuspid regurgitation.  · There are segmental left ventricular wall motion abnormalities.  · There is pulmonary hypertension.  · The estimated PA systolic pressure is 45 mmHg.      , EKG: Reviewed, and X-Ray: CXR: X-Ray Chest 1 View (CXR):   Results for orders placed or performed during the hospital encounter of 09/11/22   X-Ray Chest 1 View    Narrative    EXAMINATION:  XR CHEST 1 VIEW    CLINICAL HISTORY:  <Diagnosis>, weakness;    COMPARISON:  Comparison 06/18/2022.    FINDINGS:  Heart is normal in size.  Interval development mild interstitial infiltrate involving the right lung.  Left lung is clear.  Possible small right pleural effusion.      Impression    Interval development of a mild interstitial infiltrate involving the right lung with a small right pleural effusion.  Differential considerations include unilateral    edema versus pneumonia.      Electronically signed by: Maximino Pandey MD  Date:    09/11/2022  Time:    12:30

## 2022-09-15 NOTE — PROGRESS NOTES
[facility] - Intensive Care ([facility])  Critical Care Medicine  Progress Note    Patient Name: Keyona Dwyer  MRN: [id number]  Admission Date: 9/11/2022  Hospital Length of Stay: 3 days  Code Status: DNR  Attending Physician: Dmitri Francis, *   Primary Care Provider: Ralf Isidro MD   Principal Problem: Sepsis      Subjective:     HPI:  76-year-old female patient past medical history HTN, Hyperlipidemia, chronic low back pain, OA, Osteoporosis, Prior h/o pancreatitis, PVD, COPD, Life long and current everyday smoker admitted to the hospital for severe weakness abdominal pain fever decreased activity appetite       Hospital/ICU Course:  Patient had an unremarkable EGD yesterday.  Overnight she DX experienced a respiratory distress ABG showed acute hypercapnic respiratory failure.  Currently dependent on continues BiPAP transferred to ICU for monitoring.  Chest x-ray ABG reviewed.  T-max on admission 101.8.  O2 sat 98% on 40% via BiPAP.  9/15 patient seen and examined.  Refused BiPAP overnight.  O2 sat 99% on 2 liter/minute.-1 L fluid balance last 24 hours.  On IV Lasix.  SOB weakness decreased activity and appetite.  ABG reviewed      9/15 patient seen and examined.  Refused BiPAP overnight.  O2 sat 99% on 2 liter/minute.-1 L fluid balance last 24 hours.  On IV Lasix.  SOB weakness decreased activity and appetite.  ABG reviewed  Review of Systems   Constitutional:  Positive for activity change and fatigue. Negative for chills and fever.   HENT:  Negative for drooling, ear discharge and nosebleeds.    Eyes:  Negative for pain, discharge and itching.   Respiratory:  Positive for shortness of breath. Negative for choking.    Cardiovascular:  Negative for chest pain.   Gastrointestinal:  Negative for abdominal distention.   Endocrine: Negative for cold intolerance.   Genitourinary:  Negative for hematuria.   Musculoskeletal:  Positive for arthralgias. Negative for neck stiffness.   Skin:  Negative for rash.    Allergic/Immunologic: Negative for immunocompromised state.   Neurological:  Positive for weakness. Negative for seizures and facial asymmetry.   Hematological:  Negative for adenopathy.   Psychiatric/Behavioral:  Negative for behavioral problems, self-injury and suicidal ideas.    Objective:     Vital Signs (Most Recent):  Temp: 98.2 °F (36.8 °C) (09/15/22 0800)  Pulse: 93 (09/15/22 0800)  Resp: 14 (09/15/22 0800)  BP: (!) 101/58 (09/15/22 0800)  SpO2: 100 % (09/15/22 0800) Vital Signs (24h Range):  Temp:  [98.1 °F (36.7 °C)-98.5 °F (36.9 °C)] 98.2 °F (36.8 °C)  Pulse:  [] 93  Resp:  [9-26] 14  SpO2:  [99 %-100 %] 100 %  BP: ()/(53-68) 101/58     Weight: 46.4 kg (102 lb 4.7 oz)  Body mass index is 18.71 kg/m².      Intake/Output Summary (Last 24 hours) at 9/15/2022 0829  Last data filed at 9/15/2022 0600  Gross per 24 hour   Intake --   Output 975 ml   Net -975 ml         Physical Exam  Vitals and nursing note reviewed.   Constitutional:       General: She is not in acute distress.     Appearance: She is well-developed. She is ill-appearing and toxic-appearing.   HENT:      Head: Normocephalic and atraumatic.      Nose: Nose normal.   Eyes:      Extraocular Movements: Extraocular movements intact.   Cardiovascular:      Rate and Rhythm: Regular rhythm. Tachycardia present.   Pulmonary:      Effort: No respiratory distress.      Breath sounds: No stridor. Wheezing present.   Abdominal:      General: There is no distension.   Genitourinary:     Comments: Urinary external catheter  Musculoskeletal:         General: No signs of injury.      Cervical back: Normal range of motion and neck supple.   Skin:     General: Skin is warm and dry.   Neurological:      General: No focal deficit present.      Mental Status: She is alert.       Vents:  Oxygen Concentration (%): 28 (09/15/22 0720)    Lines/Drains/Airways       Drain  Duration             Female External Urinary Catheter 09/14/22 0800 1 day               Peripheral Intravenous Line  Duration                  Peripheral IV - Single Lumen 09/11/22 0900 22 G Anterior;Left Forearm 3 days         Peripheral IV - Single Lumen 09/13/22 1945 20 G Anterior;Right Upper Arm 1 day                    Significant Labs:    CBC/Anemia Profile:  Recent Labs   Lab 09/13/22  2147 09/14/22  0555 09/15/22  0439   WBC  --  16.26* 5.85   HGB 8.7* 11.1* 9.9*   HCT 26.2* 36.9* 30.0*   PLT  --  468* 269   MCV  --  97 91   RDW  --  13.8 13.8          Chemistries:  Recent Labs   Lab 09/14/22  0555 09/15/22  0439   * 132*   K 3.8 3.8   CL 98 99   CO2 18* 19*   BUN 9 12   CREATININE 0.8 0.7   CALCIUM 7.6* 6.8*   ALBUMIN 2.6* 2.2*   PROT 5.9* 4.9*   BILITOT 0.5 0.2   ALKPHOS 93 73   ALT 30 37   AST 58* 46*   MG 2.0 1.9   PHOS 5.7* 3.0     EKG 09/14/2022   Vent. Rate : 128 BPM     Atrial Rate : 129 BPM      P-R Int : 136 ms          QRS Dur : 068 ms       QT Int : 394 ms       P-R-T Axes : 060 012 032 degrees      QTc Int : 575 ms     Sinus tachycardia   Low voltage QRS   Cannot rule out Anterior infarct ,age undetermined   Abnormal ECG      2D echo 09/11/2022   left ventricle is normal in size with concentric hypertrophy and normal systolic function.   The estimated ejection fraction is 55%.   Grade I left ventricular diastolic dysfunction.   Normal right ventricular size with normal right ventricular systolic function.   Moderate left atrial enlargement.   Mild tricuspid regurgitation.   There are segmental left ventricular wall motion abnormalities.   There is pulmonary hypertension.   The estimated PA systolic pressure is 45 mmHg.   Latest Reference Range & Units 09/12/22 15:48 09/13/22 04:35   Troponin I 0.000 - 0.026 ng/mL 2.358 (H) 1.691 (H)        Latest Reference Range & Units 09/14/22 07:26 09/14/22 16:46   POC PH 7.35 - 7.45  7.087 (LL) 7.365   POC PCO2 35 - 45 mmHg 77.3 (HH) 40.6   POC PO2 80 - 100 mmHg 124 (H) 130 (H)   POC HCO3 24 - 28 mmol/L 23.3 (L) 23.2 (L)   POC  SATURATED O2 95 - 100 % 97 99   POC BE -2 to 2 mmol/L -7 -2   FiO2  100 40   Flow  15    Sample  ARTERIAL ARTERIAL   DelSys  NRB CPAP/BiPAP   Allens Test  Pass Pass   Site  LR RR   Mode  SPONT BiPAP   Cultures unremarkable     PFT personally reviewed 2019 moderately severe COPD and hyperinflation        Latest Reference Range & Units 09/13/22 04:35 09/15/22 04:39   BNP 0 - 99 pg/mL  1,234 (H)   Troponin I 0.000 - 0.026 ng/mL 1.691 (H)    (H): Data is abnormally high  Significant Imaging:     Chest x-ray 09/14/2022     CLINICAL HISTORY:  tachycardia;     TECHNIQUE:  Single frontal view of the chest was performed.     COMPARISON:  09/11/2022.     FINDINGS:  Heart size is not enlarged.  Aorta demonstrates atherosclerotic disease.  Diffuse chronic interstitial opacities are seen throughout the lungs.  More focal infiltrates are suspected the lung bases which could reflect airspace disease or aspiration.  Trace right pleural effusion.  No pneumothorax.  Advanced degenerative changes.    In comparison to the prior study, there is no additional interval changes.          CT abdomen pelvis 09/13/2022       Impression:     Right basilar pneumonia with associated right-sided mild parapneumonic effusion.  Trace left-sided pleural effusion     Occlusion of the right common iliac artery with reconstitution downstream.  Narrowing of the left common iliac artery likely hemodynamically significant        Low-dose screening CT chest 2020     COMPARISON:  04/26/2018     FINDINGS:  Lungs: There small noncalcified nodular opacities in the lungs bilaterally.  The largest opacity in the right lung appears solid and measures 0.2 cm on series 4, image 208 in the upper lobe.  The largest opacity in the left lung appears solid and measures 0.2 cm on series 4, image 343 in the left lower lobe.  The lungs show findings consistent with emphysema.     Pleura:   No effusion..     Heart and pericardium: Normal size without effusion.     Aorta and  vasculature: Atherosclerosis including coronary arteries.     Chest wall and skeletal structures: Unremarkable except age-appropriate degenerative changes.  There is a single lead present in the spinal canal, terminating in the lower thoracic spine.     Upper abdomen: Unremarkable.     Impression:     Lung-RADS Category:  2 - Benign Appearance or Behavior - continue annual screening with LDCT in 12 months.               ABG  Recent Labs   Lab 09/14/22  1646   PH 7.365   PO2 130*   PCO2 40.6   HCO3 23.2*   BE -2     Assessment/Plan:     Pulmonary  Acute respiratory failure with hypoxia and hypercarbia  Patient with Hypercapnic and Hypoxic Respiratory failure which is Acute.  she is not on home oxygen. Supplemental oxygen was provided and noted- Oxygen Concentration (%):  [28-40] 28.   Signs/symptoms of respiratory failure include- tachypnea, increased work of breathing and respiratory distress. Contributing diagnoses includes - CHF, COPD and Pneumonia Labs and images were reviewed. Patient Has recent ABG, which has been reviewed. Will treat underlying causes and adjust management of respiratory failure as follows- nebs IV Solu-Medrol IV antibiotic  9/15 albuterol Atrovent ICS Laba.  Home O2 evaluation on discharge.    Right lower lobe pneumonia  Check respiratory culture blood culture.  A right lung pneumonia?  Aspiration versus community-acquired pneumonia.  IV Rocephin azithromycin    9/15 favorable trend of procalcitonin on IV Rocephin.  Completed azithromycin.  Complete 5 days of Rocephin    Cardiac/Vascular  NSTEMI (non-ST elevated myocardial infarction)  Cardiac monitoring.  Likely demand ischemia.  Beta-blocker statin.    Renal/  UTI (urinary tract infection)  On IV Rocephin check urine culture    ID  * Sepsis due to Pneumonia   This patient does have evidence of infective focus  My overall impression is sepsis. Vital signs were reviewed and noted in progress note.  Antibiotics given-   Antibiotics (From  admission, onward)    Start     Stop Route Frequency Ordered    09/14/22 1530  cefTRIAXone (ROCEPHIN) 2 g/50 mL D5W IVPB         09/16 1529 IV Every 24 hours (non-standard times) 09/14/22 1421        Cultures were taken-   Microbiology Results (last 7 days)     Procedure Component Value Units Date/Time    Blood Culture #1 **CANNOT BE ORDERED STAT** [[id number]] Collected: 09/11/22 1206    Order Status: Completed Specimen: Blood from Peripheral, Hand, Right Updated: 09/14/22 2012     Blood Culture, Routine No Growth to date      No Growth to date      No Growth to date      No Growth to date    Blood Culture #2 **CANNOT BE ORDERED STAT** [[id number]] Collected: 09/11/22 1205    Order Status: Completed Specimen: Blood from Peripheral, Antecubital, Right Updated: 09/14/22 2012     Blood Culture, Routine No Growth to date      No Growth to date      No Growth to date      No Growth to date    Culture, MRSA [[id number]] Collected: 09/14/22 0956    Order Status: Sent Specimen: MRSA source from Nares, Left Updated: 09/14/22 1549    Urine culture [[id number]] Collected: 09/12/22 0715    Order Status: Completed Specimen: Urine Updated: 09/13/22 1852     Urine Culture, Routine No growth    Narrative:      Specimen Source->Urine        Latest lactate reviewed, they are-  No results for input(s): LACTATE in the last 72 hours.    Organ dysfunction indicated by Encephalopathy  and Acute respiratory failure  Source- right lung pneumonia    Source control Achieved by- IV Rocephin azithromycin blood culture respiratory cultures  9/15 completed 5 days of Rocephin.  Completed azithromycin favorable trend of procalcitonin.      GI  Melena  Transfuse for hemoglobin less than 7.  Colonoscopy when stable.  9/15 colonoscopy once stable.  Can be done electively.  No overt bleeding      Critical Care Daily Checklist:    A: Awake: RASS Goal/Actual Goal:    Actual:     B: Spontaneous Breathing Trial Performed?     C: SAT & SBT Coordinated?  Not  intubated                      D: Delirium: CAM-ICU     E: Early Mobility Performed? Yes   F: Feeding Goal:    Status:     Current Diet Order   Procedures    Diet NPO Except for: Medication     Order Specific Question:   Except for     Answer:   Medication      AS: Analgesia/Sedation Not sedated   T: Thromboembolic Prophylaxis Mechanical prophylaxis   H: HOB > 300 Yes   U: Stress Ulcer Prophylaxis (if needed) PPI   G: Glucose Control FINGERSTICK P.R.N.   B: Bowel Function Stool Occurrence: 1   I: Indwelling Catheter (Lines & Flores) Necessity Strict I&Os external catheter   D: De-escalation of Antimicrobials/Pharmacotherapies IV Rocephin     Plan for the day/ETD P.r.n. BiPAP    Code Status:  Family/Goals of Care: DNR  Spoke to 2 granddaughters and patient's  in ICU     Critical Care Time: 35 minutes  Critical secondary to Patient has a condition that poses threat to life and bodily function:  Acute hypoxemic and hypercapnic respiratory failure complicating moderately severe COPD and CHF      Critical care was time spent personally by me on the following activities: development of treatment plan with patient or surrogate and bedside caregivers, discussions with consultants, evaluation of patient's response to treatment, examination of patient, ordering and performing treatments and interventions, ordering and review of laboratory studies, ordering and review of radiographic studies, pulse oximetry, re-evaluation of patient's condition. This critical care time did not overlap with that of any other provider or involve time for any procedures.      Pam Beyer MD  Critical Care Medicine  Formerly [facility] - Intensive Care [facility])

## 2022-09-15 NOTE — SUBJECTIVE & OBJECTIVE
9/15 patient seen and examined.  Refused BiPAP overnight.  O2 sat 99% on 2 liter/minute.-1 L fluid balance last 24 hours.  On IV Lasix.  SOB weakness decreased activity and appetite.  ABG reviewed  Review of Systems   Constitutional:  Positive for activity change and fatigue. Negative for chills and fever.   HENT:  Negative for drooling, ear discharge and nosebleeds.    Eyes:  Negative for pain, discharge and itching.   Respiratory:  Positive for shortness of breath. Negative for choking.    Cardiovascular:  Negative for chest pain.   Gastrointestinal:  Negative for abdominal distention.   Endocrine: Negative for cold intolerance.   Genitourinary:  Negative for hematuria.   Musculoskeletal:  Positive for arthralgias. Negative for neck stiffness.   Skin:  Negative for rash.   Allergic/Immunologic: Negative for immunocompromised state.   Neurological:  Positive for weakness. Negative for seizures and facial asymmetry.   Hematological:  Negative for adenopathy.   Psychiatric/Behavioral:  Negative for behavioral problems, self-injury and suicidal ideas.    Objective:     Vital Signs (Most Recent):  Temp: 98.2 °F (36.8 °C) (09/15/22 0800)  Pulse: 93 (09/15/22 0800)  Resp: 14 (09/15/22 0800)  BP: (!) 101/58 (09/15/22 0800)  SpO2: 100 % (09/15/22 0800) Vital Signs (24h Range):  Temp:  [98.1 °F (36.7 °C)-98.5 °F (36.9 °C)] 98.2 °F (36.8 °C)  Pulse:  [] 93  Resp:  [9-26] 14  SpO2:  [99 %-100 %] 100 %  BP: ()/(53-68) 101/58     Weight: 46.4 kg (102 lb 4.7 oz)  Body mass index is 18.71 kg/m².      Intake/Output Summary (Last 24 hours) at 9/15/2022 0829  Last data filed at 9/15/2022 0600  Gross per 24 hour   Intake --   Output 975 ml   Net -975 ml         Physical Exam  Vitals and nursing note reviewed.   Constitutional:       General: She is not in acute distress.     Appearance: She is well-developed. She is ill-appearing and toxic-appearing.   HENT:      Head: Normocephalic and atraumatic.      Nose: Nose normal.    Eyes:      Extraocular Movements: Extraocular movements intact.   Cardiovascular:      Rate and Rhythm: Regular rhythm. Tachycardia present.   Pulmonary:      Effort: No respiratory distress.      Breath sounds: No stridor. Wheezing present.   Abdominal:      General: There is no distension.   Genitourinary:     Comments: Urinary external catheter  Musculoskeletal:         General: No signs of injury.      Cervical back: Normal range of motion and neck supple.   Skin:     General: Skin is warm and dry.   Neurological:      General: No focal deficit present.      Mental Status: She is alert.       Vents:  Oxygen Concentration (%): 28 (09/15/22 0720)    Lines/Drains/Airways       Drain  Duration             Female External Urinary Catheter 09/14/22 0800 1 day              Peripheral Intravenous Line  Duration                  Peripheral IV - Single Lumen 09/11/22 0900 22 G Anterior;Left Forearm 3 days         Peripheral IV - Single Lumen 09/13/22 1945 20 G Anterior;Right Upper Arm 1 day                    Significant Labs:    CBC/Anemia Profile:  Recent Labs   Lab 09/13/22  2147 09/14/22  0555 09/15/22  0439   WBC  --  16.26* 5.85   HGB 8.7* 11.1* 9.9*   HCT 26.2* 36.9* 30.0*   PLT  --  468* 269   MCV  --  97 91   RDW  --  13.8 13.8          Chemistries:  Recent Labs   Lab 09/14/22  0555 09/15/22  0439   * 132*   K 3.8 3.8   CL 98 99   CO2 18* 19*   BUN 9 12   CREATININE 0.8 0.7   CALCIUM 7.6* 6.8*   ALBUMIN 2.6* 2.2*   PROT 5.9* 4.9*   BILITOT 0.5 0.2   ALKPHOS 93 73   ALT 30 37   AST 58* 46*   MG 2.0 1.9   PHOS 5.7* 3.0     EKG 09/14/2022   Vent. Rate : 128 BPM     Atrial Rate : 129 BPM      P-R Int : 136 ms          QRS Dur : 068 ms       QT Int : 394 ms       P-R-T Axes : 060 012 032 degrees      QTc Int : 575 ms     Sinus tachycardia   Low voltage QRS   Cannot rule out Anterior infarct ,age undetermined   Abnormal ECG     2D echo 09/11/2022  left ventricle is normal in size with concentric hypertrophy and  normal systolic function.  The estimated ejection fraction is 55%.  Grade I left ventricular diastolic dysfunction.  Normal right ventricular size with normal right ventricular systolic function.  Moderate left atrial enlargement.  Mild tricuspid regurgitation.  There are segmental left ventricular wall motion abnormalities.  There is pulmonary hypertension.  The estimated PA systolic pressure is 45 mmHg.   Latest Reference Range & Units 09/12/22 15:48 09/13/22 04:35   Troponin I 0.000 - 0.026 ng/mL 2.358 (H) 1.691 (H)        Latest Reference Range & Units 09/14/22 07:26 09/14/22 16:46   POC PH 7.35 - 7.45  7.087 (LL) 7.365   POC PCO2 35 - 45 mmHg 77.3 (HH) 40.6   POC PO2 80 - 100 mmHg 124 (H) 130 (H)   POC HCO3 24 - 28 mmol/L 23.3 (L) 23.2 (L)   POC SATURATED O2 95 - 100 % 97 99   POC BE -2 to 2 mmol/L -7 -2   FiO2  100 40   Flow  15    Sample  ARTERIAL ARTERIAL   DelSys  NRB CPAP/BiPAP   Allens Test  Pass Pass   Site  LR RR   Mode  SPONT BiPAP   Cultures unremarkable     PFT personally reviewed 2019 moderately severe COPD and hyperinflation        Latest Reference Range & Units 09/13/22 04:35 09/15/22 04:39   BNP 0 - 99 pg/mL  1,234 (H)   Troponin I 0.000 - 0.026 ng/mL 1.691 (H)    (H): Data is abnormally high  Significant Imaging:     Chest x-ray 09/14/2022     CLINICAL HISTORY:  tachycardia;     TECHNIQUE:  Single frontal view of the chest was performed.     COMPARISON:  09/11/2022.     FINDINGS:  Heart size is not enlarged.  Aorta demonstrates atherosclerotic disease.  Diffuse chronic interstitial opacities are seen throughout the lungs.  More focal infiltrates are suspected the lung bases which could reflect airspace disease or aspiration.  Trace right pleural effusion.  No pneumothorax.  Advanced degenerative changes.    In comparison to the prior study, there is no additional interval changes.          CT abdomen pelvis 09/13/2022       Impression:     Right basilar pneumonia with associated right-sided mild  parapneumonic effusion.  Trace left-sided pleural effusion     Occlusion of the right common iliac artery with reconstitution downstream.  Narrowing of the left common iliac artery likely hemodynamically significant        Low-dose screening CT chest 2020     COMPARISON:  04/26/2018     FINDINGS:  Lungs: There small noncalcified nodular opacities in the lungs bilaterally.  The largest opacity in the right lung appears solid and measures 0.2 cm on series 4, image 208 in the upper lobe.  The largest opacity in the left lung appears solid and measures 0.2 cm on series 4, image 343 in the left lower lobe.  The lungs show findings consistent with emphysema.     Pleura:   No effusion..     Heart and pericardium: Normal size without effusion.     Aorta and vasculature: Atherosclerosis including coronary arteries.     Chest wall and skeletal structures: Unremarkable except age-appropriate degenerative changes.  There is a single lead present in the spinal canal, terminating in the lower thoracic spine.     Upper abdomen: Unremarkable.     Impression:     Lung-RADS Category:  2 - Benign Appearance or Behavior - continue annual screening with LDCT in 12 months.

## 2022-09-15 NOTE — ASSESSMENT & PLAN NOTE
This patient does have evidence of infective focus  My overall impression is sepsis. Vital signs were reviewed and noted in progress note.  Antibiotics given-   Antibiotics (From admission, onward)    Start     Stop Route Frequency Ordered    09/14/22 1530  cefTRIAXone (ROCEPHIN) 2 g/50 mL D5W IVPB         09/16 1529 IV Every 24 hours (non-standard times) 09/14/22 1421        Cultures were taken-   Microbiology Results (last 7 days)     Procedure Component Value Units Date/Time    Blood Culture #1 **CANNOT BE ORDERED STAT** [[id number]] Collected: 09/11/22 1206    Order Status: Completed Specimen: Blood from Peripheral, Hand, Right Updated: 09/14/22 2012     Blood Culture, Routine No Growth to date      No Growth to date      No Growth to date      No Growth to date    Blood Culture #2 **CANNOT BE ORDERED STAT** [[id number]] Collected: 09/11/22 1205    Order Status: Completed Specimen: Blood from Peripheral, Antecubital, Right Updated: 09/14/22 2012     Blood Culture, Routine No Growth to date      No Growth to date      No Growth to date      No Growth to date    Culture, MRSA [[id number]] Collected: 09/14/22 0956    Order Status: Sent Specimen: MRSA source from Nares, Left Updated: 09/14/22 1549    Urine culture [[id number]] Collected: 09/12/22 0715    Order Status: Completed Specimen: Urine Updated: 09/13/22 1852     Urine Culture, Routine No growth    Narrative:      Specimen Source->Urine        Latest lactate reviewed, they are-  No results for input(s): LACTATE in the last 72 hours.    Organ dysfunction indicated by none  Source- Lower resp tract     Source control Achieved by- Antimicrobial     SLP consult to assess aspiration risk     9/14-  ABG suggestive of acute hypercapnic resp failure   Pt placed on BiPAP and transferred to ICU.     Improving

## 2022-09-15 NOTE — SUBJECTIVE & OBJECTIVE
Interval History:     Review of Systems   Constitutional:  Positive for activity change and fatigue. Negative for chills and fever.   HENT:  Negative for drooling, ear discharge and nosebleeds.    Eyes:  Negative for pain, discharge and itching.   Respiratory:  Positive for shortness of breath. Negative for choking.    Cardiovascular:  Negative for chest pain.   Gastrointestinal:  Negative for abdominal distention.   Endocrine: Negative for cold intolerance.   Genitourinary:  Negative for hematuria.   Musculoskeletal:  Positive for arthralgias. Negative for neck stiffness.   Skin:  Negative for rash.   Allergic/Immunologic: Negative for immunocompromised state.   Neurological:  Positive for weakness. Negative for seizures and facial asymmetry.   Hematological:  Negative for adenopathy.   Psychiatric/Behavioral:  Negative for behavioral problems, self-injury and suicidal ideas.    Objective:     Vital Signs (Most Recent):  Temp: 98.2 °F (36.8 °C) (09/15/22 0800)  Pulse: 96 (09/15/22 0900)  Resp: 17 (09/15/22 0900)  BP: (!) 103/57 (09/15/22 0900)  SpO2: 97 % (09/15/22 0900)   Vital Signs (24h Range):  Temp:  [98.1 °F (36.7 °C)-98.5 °F (36.9 °C)] 98.2 °F (36.8 °C)  Pulse:  [] 96  Resp:  [9-20] 17  SpO2:  [97 %-100 %] 97 %  BP: ()/(53-68) 103/57     Weight: 46.4 kg (102 lb 4.7 oz)  Body mass index is 18.71 kg/m².    Intake/Output Summary (Last 24 hours) at 9/15/2022 1051  Last data filed at 9/15/2022 0600  Gross per 24 hour   Intake --   Output 825 ml   Net -825 ml      Physical Exam  Vitals and nursing note reviewed.   Constitutional:       General: She is not in acute distress.     Appearance: She is well-developed. She is ill-appearing and toxic-appearing.   HENT:      Head: Normocephalic and atraumatic.      Nose: Nose normal.   Eyes:      Extraocular Movements: Extraocular movements intact.   Cardiovascular:      Rate and Rhythm: Regular rhythm. Tachycardia present.   Pulmonary:      Effort: No  respiratory distress.      Breath sounds: No stridor. Wheezing present.   Abdominal:      General: There is no distension.   Genitourinary:     Comments: Urinary external catheter  Musculoskeletal:         General: No signs of injury.      Cervical back: Normal range of motion and neck supple.   Skin:     General: Skin is warm and dry.   Neurological:      General: No focal deficit present.      Mental Status: She is alert.       Significant Labs: All pertinent labs within the past 24 hours have been reviewed.      Significant Imaging: I have reviewed all pertinent imaging results/findings within the past 24 hours.

## 2022-09-15 NOTE — ASSESSMENT & PLAN NOTE
Check respiratory culture blood culture.  A right lung pneumonia?  Aspiration versus community-acquired pneumonia.  IV Rocephin azithromycin    9/15 favorable trend of procalcitonin on IV Rocephin.  Completed azithromycin.  Complete 5 days of Rocephin

## 2022-09-15 NOTE — ASSESSMENT & PLAN NOTE
- Troponin elevated at 1.946 on presentation   -Pt denies cliff chest pain or angina equivalent  -EKG with non specific T wave abnormality  - mg x 1 dose in the ED  -Continue Plavix and Statin   -Heparin gtt initiated per ACS protocol   -Trend troponin   -Consult Cardiology     9/12-  -Troponin bumped to 2.4. Heparin gtt continues along with ASA, Plavix, statin. .Echo resulted LVEF 55%, G1DD, +WMA.   [facility] plans for tomorrow if afebrile x 24h   9/13-  -H/H dropped to 8.2. Dark stool reported per nursing. ASA, Plavix and Heparin gtt held . LHC plan placed on hold       Blood thiner on hold   OMT

## 2022-09-15 NOTE — ASSESSMENT & PLAN NOTE
Latest Reference Range & Units 09/15/22 04:39   BNP 0 - 99 pg/mL 1,234 (H)   (H): Data is abnormally high    IV LASIX 40 MG X 2 DOSES strict I&Os.

## 2022-09-15 NOTE — ASSESSMENT & PLAN NOTE
-H/H dipped to 7.9/24.6  -Transfusion ordered  -Dark stool reported this AM  -Heparin gtt and DAPT held, bleeding scan pending    9/14/22  -H/H improved post transfusion  -EGD yesterday showed gastritis, colonoscopy planned today but cancelled due to overnight events  -Resume DAPT as tolerated    9/15/22  -H/H stable overnight  -Discussed with GI and hospital medicine, will resume DAPT and closely monitor

## 2022-09-15 NOTE — ASSESSMENT & PLAN NOTE
See NSTEMI section.    9/13/22  -Stable, no angina  -LHC again deferred due to worsening anemia  -Transfuse  -ASA, Plavix, and heparin drip held for now, resume as tolerated  -Continue OMT as tolerated-BB, statin    9/14/22  -Continue OMT as tolerated-BB, statin  -DAPT held due to melena, resume as tolerated  -Medical management, LHC deferred for now given anemia issues/worsening respiratory status    9/15/22  -Continue BB, statin  -DAPT resumed  -Discussed with patient and family, continue OMT/med mgmt

## 2022-09-15 NOTE — ASSESSMENT & PLAN NOTE
Patient with Hypercapnic and Hypoxic Respiratory failure which is Acute.  she is not on home oxygen. Supplemental oxygen was provided and noted- Oxygen Concentration (%):  [28-40] 28.   Signs/symptoms of respiratory failure include- tachypnea, increased work of breathing and lethargy. Contributing diagnoses includes - Aspiration and Pneumonia, COPD exacerbation. Labs and images were reviewed. Patient Has recent ABG, which has been reviewed. Will treat underlying causes and adjust management of respiratory failure as follows-       -Supplemental oxygen   -Ventilatory support via BiPAP  -Inhaled bronchodilators and ICS  -Antibiotic      Improving

## 2022-09-15 NOTE — ASSESSMENT & PLAN NOTE
This patient does have evidence of infective focus  My overall impression is sepsis. Vital signs were reviewed and noted in progress note.  Antibiotics given-   Antibiotics (From admission, onward)    Start     Stop Route Frequency Ordered    09/14/22 1530  cefTRIAXone (ROCEPHIN) 2 g/50 mL D5W IVPB         09/16 1529 IV Every 24 hours (non-standard times) 09/14/22 1421        Cultures were taken-   Microbiology Results (last 7 days)     Procedure Component Value Units Date/Time    Blood Culture #1 **CANNOT BE ORDERED STAT** [[id number]] Collected: 09/11/22 1206    Order Status: Completed Specimen: Blood from Peripheral, Hand, Right Updated: 09/14/22 2012     Blood Culture, Routine No Growth to date      No Growth to date      No Growth to date      No Growth to date    Blood Culture #2 **CANNOT BE ORDERED STAT** [[id number]] Collected: 09/11/22 1205    Order Status: Completed Specimen: Blood from Peripheral, Antecubital, Right Updated: 09/14/22 2012     Blood Culture, Routine No Growth to date      No Growth to date      No Growth to date      No Growth to date    Culture, MRSA [[id number]] Collected: 09/14/22 0956    Order Status: Sent Specimen: MRSA source from Nares, Left Updated: 09/14/22 1549    Urine culture [[id number]] Collected: 09/12/22 0715    Order Status: Completed Specimen: Urine Updated: 09/13/22 1852     Urine Culture, Routine No growth    Narrative:      Specimen Source->Urine        Latest lactate reviewed, they are-  No results for input(s): LACTATE in the last 72 hours.    Organ dysfunction indicated by Encephalopathy  and Acute respiratory failure  Source- right lung pneumonia    Source control Achieved by- IV Rocephin azithromycin blood culture respiratory cultures  9/15 completed 5 days of Rocephin.  Completed azithromycin favorable trend of procalcitonin.

## 2022-09-15 NOTE — PROGRESS NOTES
[facility] - Intensive [facility] ([facility])  Cardiology  Progress Note    Patient Name: Keyona Dwyer  MRN: [id number]  Admission Date: 9/11/2022  Hospital Length of Stay: 3 days  Code Status: DNR   Attending Physician: Dmitri Francis, *   Primary Care Physician: Ralf Isidro MD  Expected Discharge Date:   Principal Problem:Sepsis    Subjective:   HPI:  Cardiology consulted for NSTEMI.  Pt has h/o PAD, HTN, hyperlipidemia, smoking.  She has h/o PAD with angiogram in April 2021 showing R common iliac occlusion and had PTA L common iliac artery, tx by Dr. Adkins, Vascular Surgery.  She has been weak last few days, and had a fever, GAMEZ, and lower abd pain.  No chest pain sxs.  She presented to ER and started on antibiotics for possible R sided pneumonia.  Troponin elevated 1.9  Ecg showed NSR, anterior T wave abnl.  WBC elevated.  Had low grade fever 100.9 in ER.    Hospital Course:   9/12/22-Patient seen and examined today, resting in bed. Feels ok. SOB improved. No chest pain. +Fever overnight. Labs reviewed. Troponin bumped to 2.441, repeat pending. Echo showed normal EF, +WMA. Plans for [facility] tomorrow (once afebrile for 24 hours).    9/13/22-Patient seen and examined today, lying in bed. Awake and alert but intermittently confused, family reports started this AM. No CV complaints. H/H dipped 7.9/24.6 and nursing staff reported dark colored stool. Heparin gtt and DAPT held, bleeding scan pending.    9/14/22-Patient seen and examined today, transferred to ICU overnight due to worsening respiratory status/likely aspiration. Remains on bipap during exam. No overt bleeding reported. EGD yesterday only showed gastritis. Colonoscopy planned today but cancelled due to overnight events.     9/15/22-Patient seen and examined today, sitting up in bed. Bipap weaned off. Feeling better, less SOB. Chest pain free. BNP elevated, diuresis initiated. H/H stable. Discussed with GI/hospital medicine, will resume DAPT and closely  monitor.           Review of Systems   Constitutional: Positive for malaise/fatigue.   HENT: Negative.     Eyes: Negative.    Cardiovascular:  Positive for dyspnea on exertion (improved).   Respiratory:  Positive for shortness of breath ([id number]).    Endocrine: Negative.    Hematologic/Lymphatic: Negative.    Skin: Negative.    Musculoskeletal: Negative.    Gastrointestinal: Negative.    Genitourinary: Negative.    Neurological:  Positive for weakness.   Psychiatric/Behavioral: Negative.     Allergic/Immunologic: Negative.    Objective:     Vital Signs (Most Recent):  Temp: 98.2 °F (36.8 °C) (09/15/22 0800)  Pulse: 94 (09/15/22 1100)  Resp: 17 (09/15/22 1100)  BP: 104/63 (09/15/22 1100)  SpO2: (!) 92 % (09/15/22 1100)   Vital Signs (24h Range):  Temp:  [98.1 °F (36.7 °C)-98.5 °F (36.9 °C)] 98.2 °F (36.8 °C)  Pulse:  [] 94  Resp:  [9-20] 17  SpO2:  [92 %-100 %] 92 %  BP: ()/(53-68) 104/63     Weight: 46.4 kg (102 lb 4.7 oz)  Body mass index is 18.71 kg/m².     SpO2: (!) 92 %  O2 Device (Oxygen Therapy): room air      Intake/Output Summary (Last 24 hours) at 9/15/2022 1159  Last data filed at 9/15/2022 1122  Gross per 24 hour   Intake 139.54 ml   Output 625 ml   Net -485.46 ml       Lines/Drains/Airways       Drain  Duration             Female External Urinary Catheter 09/14/22 0800 1 day              Peripheral Intravenous Line  Duration                  Peripheral IV - Single Lumen 09/11/22 0900 22 G Anterior;Left Forearm 4 days         Peripheral IV - Single Lumen 09/13/22 1945 20 G Anterior;Right Upper Arm 1 day                    Physical Exam  Vitals and nursing note reviewed.   Constitutional:       General: She is not in acute distress.     Appearance: Normal appearance. She is well-developed. She is not diaphoretic.   HENT:      Head: Normocephalic and atraumatic.   Eyes:      General:         Right eye: No discharge.         Left eye: No discharge.      Pupils: Pupils are equal, round, and  reactive to light.   Neck:      Thyroid: No thyromegaly.      Vascular: No JVD.      Trachea: No tracheal deviation.   Cardiovascular:      Rate and Rhythm: Normal rate and regular rhythm.      Heart sounds: Normal heart sounds, S1 normal and S2 normal. No murmur heard.  Pulmonary:      Effort: Pulmonary effort is normal. No respiratory distress.      Breath sounds: Rales present. No wheezing.   Abdominal:      General: There is no distension.      Tenderness: There is no rebound.   Musculoskeletal:      Cervical back: Neck supple.      Right lower leg: No edema.      Left lower leg: No edema.   Skin:     General: Skin is warm and dry.      Findings: No erythema.   Neurological:      Mental Status: She is alert and oriented to person, place, and time.   Psychiatric:         Mood and Affect: Mood normal.         Behavior: Behavior normal.         Thought Content: Thought content normal.       Significant Labs: CMP   Recent Labs   Lab 09/14/22  0555 09/15/22  0439   * 132*   K 3.8 3.8   CL 98 99   CO2 18* 19*   * 110   BUN 9 12   CREATININE 0.8 0.7   CALCIUM 7.6* 6.8*   PROT 5.9* 4.9*   ALBUMIN 2.6* 2.2*   BILITOT 0.5 0.2   ALKPHOS 93 73   AST 58* 46*   ALT 30 37   ANIONGAP 18* 14   , CBC   Recent Labs   Lab 09/13/22 2147 09/14/22  0555 09/15/22  0439   WBC  --  16.26* 5.85   HGB 8.7* 11.1* 9.9*   HCT 26.2* 36.9* 30.0*   PLT  --  468* 269   , INR No results for input(s): INR, PROTIME in the last 48 hours., Troponin No results for input(s): TROPONINI in the last 48 hours., and All pertinent lab results from the last 24 hours have been reviewed.    Significant Imaging: Echocardiogram: Transthoracic echo (TTE) complete (Cupid Only):   Results for orders placed or performed during the hospital encounter of 09/11/22   Echo   Result Value Ref Range    BSA 1.42 m2    TDI SEPTAL 0.07 m/s    LV LATERAL E/E' RATIO 8.58 m/s    LV SEPTAL E/E' RATIO 14.71 m/s    LA WIDTH 3.70 cm    IVC diameter 1.68 cm    Left  Ventricular Outflow Tract Mean Velocity 0.87 cm/s    Left Ventricular Outflow Tract Mean Gradient 3.34 mmHg    TDI LATERAL 0.12 m/s    LVIDd 3.44 (A) 3.5 - 6.0 cm    IVS 1.36 (A) 0.6 - 1.1 cm    Posterior Wall 1.50 (A) 0.6 - 1.1 cm    Ao root annulus 2.50 cm    LVIDs 2.23 2.1 - 4.0 cm    FS 35 28 - 44 %    LA volume 60.31 cm3    STJ 2.20 cm    Ascending aorta 2.22 cm    LV mass 174.77 g    LA size 4.45 cm    TAPSE 2.00 cm    Left Ventricle Relative Wall Thickness 0.87 cm    AV mean gradient 6 mmHg    AV valve area 1.87 cm2    AV Velocity Ratio 0.72     AV index (prosthetic) 0.72     MV valve area p 1/2 method 6.14 cm2    E/A ratio 0.86     Mean e' 0.10 m/s    E wave deceleration time 123.62 msec    IVRT 54.23 msec    LVOT diameter 1.82 cm    LVOT area 2.6 cm2    LVOT peak inocente 1.24 m/s    LVOT peak VTI 25.30 cm    Ao peak inocente 1.72 m/s    Ao VTI 35.1 cm    RVOT peak inocente 0.77 m/s    RVOT peak VTI 13.3 cm    Mr max inocente 4.73 m/s    LVOT stroke volume 65.79 cm3    AV peak gradient 12 mmHg    PV mean gradient 1.28 mmHg    E/E' ratio 10.84 m/s    MV Peak E Inocente 1.03 m/s    TR Max Inocente 3.25 m/s    MV stenosis pressure 1/2 time 35.85 ms    MV Peak A Inocente 1.20 m/s    LV Systolic Volume 16.70 mL    LV Systolic Volume Index 11.6 mL/m2    LV Diastolic Volume 48.68 mL    LV Diastolic Volume Index 33.81 mL/m2    LA Volume Index 41.9 mL/m2    LV Mass Index 121 g/m2    RA Major Axis 3.80 cm    Left Atrium Minor Axis 4.35 cm    Left Atrium Major Axis 4.27 cm    Triscuspid Valve Regurgitation Peak Gradient 42 mmHg    RA Width 2.70 cm    Right Atrial Pressure (from IVC) 3 mmHg    EF 55 %    TV rest pulmonary artery pressure 45 mmHg    Narrative    · The left ventricle is normal in size with concentric hypertrophy and   normal systolic function.  · The estimated ejection fraction is 55%.  · Grade I left ventricular diastolic dysfunction.  · Normal right ventricular size with normal right ventricular systolic   function.  · Moderate left  atrial enlargement.  · Mild tricuspid regurgitation.  · There are segmental left ventricular wall motion abnormalities.  · There is pulmonary hypertension.  · The estimated PA systolic pressure is 45 mmHg.      , EKG: Reviewed, and X-Ray: CXR: X-Ray Chest 1 View (CXR):   Results for orders placed or performed during the hospital encounter of 09/11/22   X-Ray Chest 1 View    Narrative    EXAMINATION:  XR CHEST 1 VIEW    CLINICAL HISTORY:  <Diagnosis>, weakness;    COMPARISON:  Comparison 06/18/2022.    FINDINGS:  Heart is normal in size.  Interval development mild interstitial infiltrate involving the right lung.  Left lung is clear.  Possible small right pleural effusion.      Impression    Interval development of a mild interstitial infiltrate involving the right lung with a small right pleural effusion.  Differential considerations include unilateral    edema versus pneumonia.      Electronically signed by: Maximino Pandey MD  Date:    09/11/2022  Time:    12:30     Assessment and Plan:   Patient who presents with sepsis/NSTEMI. Hospital course complicated by anemia/reported melena as well as worsening respiratory status. Continue OMT for now, DAPT resumed.     * Sepsis due to Pneumonia   -Continue antibiotics for pneumonia.  -Per Hospital Med mgt.      Acute diastolic heart failure  -Continue IV diuresis  -Continue BB  -Strict I's/O's    Melena  -Workup and mgmt as per GI and hospital medicine    9/15/22  -No recurrence    UTI (urinary tract infection)  -Continue abx    Right lower lobe pneumonia  -Continue abx    NSTEMI (non-ST elevated myocardial infarction)  See NSTEMI section.    9/13/22  -Stable, no angina  -LHC again deferred due to worsening anemia  -Transfuse  -ASA, Plavix, and heparin drip held for now, resume as tolerated  -Continue OMT as tolerated-BB, statin    9/14/22  -Continue OMT as tolerated-BB, statin  -DAPT held due to melena, resume as tolerated  -Medical management, LHC deferred for now given  anemia issues/worsening respiratory status    9/15/22  -Continue BB, statin  -DAPT resumed  -Discussed with patient and family, continue OMT/med mgmt    Tobacco abuse disorder  -Smoking cessation advised.    PVD (peripheral vascular disease)  -Medical tx for now  -ASA, statin  -Smoking cessation.    9/14/22  -Resume ASA as tolerated    Anemia  -H/H dipped to 7.9/24.6  -Transfusion ordered  -Dark stool reported this AM  -Heparin gtt and DAPT held, bleeding scan pending    9/14/22  -H/H improved post transfusion  -EGD yesterday showed gastritis, colonoscopy planned today but cancelled due to overnight events  -Resume DAPT as tolerated    9/15/22  -H/H stable overnight  -Discussed with GI and hospital medicine, will resume DAPT and closely monitor      HTN (hypertension)  HTN control.        VTE Risk Mitigation (From admission, onward)         Ordered     IP VTE HIGH RISK PATIENT  Once         09/11/22 1324     Place sequential compression device  Until discontinued         09/11/22 1324                Ruthie Campuzano PA-C  Cardiology  [facility] ([facility])

## 2022-09-15 NOTE — PLAN OF CARE
Problem: Adult Inpatient Plan of Care  Goal: Plan of Care Review  Outcome: Ongoing, Progressing  Goal: Patient-Specific Goal (Individualized)  Outcome: Ongoing, Progressing  Goal: Absence of Hospital-Acquired Illness or Injury  Outcome: Ongoing, Progressing  Goal: Optimal Comfort and Wellbeing  Outcome: Ongoing, Progressing  Goal: Readiness for Transition of Care  Outcome: Ongoing, Progressing     Problem: Adjustment to Illness (Sepsis/Septic Shock)  Goal: Optimal Coping  Outcome: Ongoing, Progressing     Problem: Bleeding (Sepsis/Septic Shock)  Goal: Absence of Bleeding  Outcome: Ongoing, Progressing     Problem: Glycemic Control Impaired (Sepsis/Septic Shock)  Goal: Blood Glucose Level Within Desired Range  Outcome: Ongoing, Progressing     Problem: Infection Progression (Sepsis/Septic Shock)  Goal: Absence of Infection Signs and Symptoms  Outcome: Ongoing, Progressing     Problem: Nutrition Impaired (Sepsis/Septic Shock)  Goal: Optimal Nutrition Intake  Outcome: Ongoing, Progressing     Problem: Impaired Wound Healing  Goal: Optimal Wound Healing  Outcome: Ongoing, Progressing     Problem: Fluid Imbalance (Pneumonia)  Goal: Fluid Balance  Outcome: Ongoing, Progressing     Problem: Infection (Pneumonia)  Goal: Resolution of Infection Signs and Symptoms  Outcome: Ongoing, Progressing     Problem: Respiratory Compromise (Pneumonia)  Goal: Effective Oxygenation and Ventilation  Outcome: Ongoing, Progressing     Problem: Skin Injury Risk Increased  Goal: Skin Health and Integrity  Outcome: Ongoing, Progressing     Problem: Fall Injury Risk  Goal: Absence of Fall and Fall-Related Injury  Outcome: Ongoing, Progressing     Problem: Restraint, Nonbehavioral (Nonviolent)  Goal: Absence of Harm or Injury  Outcome: Ongoing, Progressing

## 2022-09-15 NOTE — PROGRESS NOTES
[facility] ([facility])  [facility] Medicine  Progress Note    Patient Name: Keyona Dwyer  MRN: [id number]  Patient Class: IP- Inpatient   Admission Date: 9/11/2022  Length of Stay: 3 days  Attending Physician: Dmitri Francis, *  Primary Care Provider: Ralf Isidro MD        Subjective:     Principal Problem:Sepsis        HPI:  The pt is a 77 yo female with PMHx significant for HTN, Hyperlipidemia, chronic low back pain, OA, Osteoporosis, Prior h/o pancreatitis, PVD, COPD, Life long and current everyday smoker who was brought to the ED by her  for evaluation of 4 days h/o increasing generalized weakness to the point she was unable to get up from bed without assistance , decreased appetite , decreased food and water intake, dry cough, dry heaves, subjective fever . Since yesterday she was experiencing abd pain across the lower abd and increased urination. Denies chest pain, increased SOB, palpitations, nausea , vomiting, diarrhea, constipation, dysuria or incontinence.     Vitals on presentation 115/70, 133, 22, 100.9, 93% on RA  Labs - WBC 13.5K, Hgb 11.2, Plt 294, Na 130, K 3.8, CO2 21, Cr 0.8, Lipase 24, La 0.8, Troponin 1.946.UA- not collected     CXR- Interval development mild interstitial infiltrate involving the right lung.  Left lung is clear.  Possible small right pleural effusion.    EKG- ST, non specific T wave abnormality.      Pt was given NS bolus 1Liter , Cefepime 2 gram IV,  mg  and Heparin infusion per ACS protocol initiated in the ED and  consulted to place pt in observation for further management of sepsis due to pneumonia and elevated troponin.       Overview/Hospital Course:  9/12- Fever 101.8 overnight . Feels better this morning. Denies chest pain . Abd pain is better. No further nausea or dry heaves. SpO2 satisfactory. WBC normalized. Troponin bumped to 2.4. .Echo resulted LVEF 55%, G1DD, +WMA. Heparin gtt continues along with ASA, Plavix, statin. [facility]  plans for tomorrow if afebrile x 24h.    9/13- Tmax 99.3. BP is improving. Per family pt was little confused earlier, however mental status seems better during my exam. No focal weakness appreciated . CT head done and is neg for acute changes. Dark stool reported per nursing. Hgb dropped to 8.2>9.2>11.2. Heparin gtt, Asa, Plavix held. PPI loading dose 80 mg given . Will get NM GI bleed scan. Plan for LHC placed on hold. WBC normal. Creatinine 0.6, BUN 13. Phos <1.0 and is being replaced. Urine culture -pending. Discussed code status . Pt and  both expressed code status be DNR.     9/14- Rapid response called early this morning. Pt was receiving bowel prep via NG tube for colonoscopy to r/o GI bleed, however developed resp distress and decreased mentation. ABG showed acute hypercapnic resp failure. Pt placed on biPAP and transferred to ICU. No further GI and cardiac intervention planned at this time. WBC increased to 16K , Hgb 11.1 post transfusion x 1 yesterday. Cr 0.8. CTA abd /pelvis neg for acute bleed.     9/15 She is aao x 3 in nad . She is on room  air .  NAEON . Plan to downgrade to tele . She is negative 1 lt  since yesterday . The pro calcitonin is trending down . Will  D/W Cardiology and GI about when to start   DAPT  for NSTEMI .      Interval History:     Review of Systems   Constitutional:  Positive for activity change and fatigue. Negative for chills and fever.   HENT:  Negative for drooling, ear discharge and nosebleeds.    Eyes:  Negative for pain, discharge and itching.   Respiratory:  Positive for shortness of breath. Negative for choking.    Cardiovascular:  Negative for chest pain.   Gastrointestinal:  Negative for abdominal distention.   Endocrine: Negative for cold intolerance.   Genitourinary:  Negative for hematuria.   Musculoskeletal:  Positive for arthralgias. Negative for neck stiffness.   Skin:  Negative for rash.   Allergic/Immunologic: Negative for immunocompromised state.    Neurological:  Positive for weakness. Negative for seizures and facial asymmetry.   Hematological:  Negative for adenopathy.   Psychiatric/Behavioral:  Negative for behavioral problems, self-injury and suicidal ideas.    Objective:     Vital Signs (Most Recent):  Temp: 98.2 °F (36.8 °C) (09/15/22 0800)  Pulse: 96 (09/15/22 0900)  Resp: 17 (09/15/22 0900)  BP: (!) 103/57 (09/15/22 0900)  SpO2: 97 % (09/15/22 0900)   Vital Signs (24h Range):  Temp:  [98.1 °F (36.7 °C)-98.5 °F (36.9 °C)] 98.2 °F (36.8 °C)  Pulse:  [] 96  Resp:  [9-20] 17  SpO2:  [97 %-100 %] 97 %  BP: ()/(53-68) 103/57     Weight: 46.4 kg (102 lb 4.7 oz)  Body mass index is 18.71 kg/m².    Intake/Output Summary (Last 24 hours) at 9/15/2022 1051  Last data filed at 9/15/2022 0600  Gross per 24 hour   Intake --   Output 825 ml   Net -825 ml      Physical Exam  Vitals and nursing note reviewed.   Constitutional:       General: She is not in acute distress.     Appearance: She is well-developed. She is ill-appearing and toxic-appearing.   HENT:      Head: Normocephalic and atraumatic.      Nose: Nose normal.   Eyes:      Extraocular Movements: Extraocular movements intact.   Cardiovascular:      Rate and Rhythm: Regular rhythm. Tachycardia present.   Pulmonary:      Effort: No respiratory distress.      Breath sounds: No stridor. Wheezing present.   Abdominal:      General: There is no distension.   Genitourinary:     Comments: Urinary external catheter  Musculoskeletal:         General: No signs of injury.      Cervical back: Normal range of motion and neck supple.   Skin:     General: Skin is warm and dry.   Neurological:      General: No focal deficit present.      Mental Status: She is alert.       Significant Labs: All pertinent labs within the past 24 hours have been reviewed.      Significant Imaging: I have reviewed all pertinent imaging results/findings within the past 24 hours.        Assessment/Plan:      * Sepsis due to Pneumonia    This patient does have evidence of infective focus  My overall impression is sepsis. Vital signs were reviewed and noted in progress note.  Antibiotics given-   Antibiotics (From admission, onward)    Start     Stop Route Frequency Ordered    09/14/22 1530  cefTRIAXone (ROCEPHIN) 2 g/50 mL D5W IVPB         09/16 1529 IV Every 24 hours (non-standard times) 09/14/22 1421        Cultures were taken-   Microbiology Results (last 7 days)     Procedure Component Value Units Date/Time    Blood Culture #1 **CANNOT BE ORDERED STAT** [[id number]] Collected: 09/11/22 1206    Order Status: Completed Specimen: Blood from Peripheral, Hand, Right Updated: 09/14/22 2012     Blood Culture, Routine No Growth to date      No Growth to date      No Growth to date      No Growth to date    Blood Culture #2 **CANNOT BE ORDERED STAT** [[id number]] Collected: 09/11/22 1205    Order Status: Completed Specimen: Blood from Peripheral, Antecubital, Right Updated: 09/14/22 2012     Blood Culture, Routine No Growth to date      No Growth to date      No Growth to date      No Growth to date    Culture, MRSA [[id number]] Collected: 09/14/22 0956    Order Status: Sent Specimen: MRSA source from Nares, Left Updated: 09/14/22 1549    Urine culture [[id number]] Collected: 09/12/22 0715    Order Status: Completed Specimen: Urine Updated: 09/13/22 1852     Urine Culture, Routine No growth    Narrative:      Specimen Source->Urine        Latest lactate reviewed, they are-  No results for input(s): LACTATE in the last 72 hours.    Organ dysfunction indicated by none  Source- Lower resp tract     Source control Achieved by- Antimicrobial     SLP consult to assess aspiration risk     9/14-  ABG suggestive of acute hypercapnic resp failure   Pt placed on BiPAP and transferred to ICU.     Improving     Acute diastolic heart failure  Cont IV lasix  Cont CHF core measures  Cardiology on board       Acute respiratory failure with hypoxia and hypercarbia  Patient  with Hypercapnic and Hypoxic Respiratory failure which is Acute.  she is not on home oxygen. Supplemental oxygen was provided and noted- Oxygen Concentration (%):  [28-40] 28.   Signs/symptoms of respiratory failure include- tachypnea, increased work of breathing and lethargy. Contributing diagnoses includes - Aspiration and Pneumonia, COPD exacerbation. Labs and images were reviewed. Patient Has recent ABG, which has been reviewed. Will treat underlying causes and adjust management of respiratory failure as follows-       -Supplemental oxygen   -Ventilatory support via BiPAP  -Inhaled bronchodilators and ICS  -Antibiotic      Improving           Melena  - Reported per nursing   -H/H dropped from baseline   -Monitor H/H  -Transfuse blood for Hgb 7 or under  -ASA, Plavix and Heparin gtt held  -GI consulted       UTI (urinary tract infection)  - UA suggestive of UTI  -Follow up urine culture   -Continue Rocephin   9/13-  Urine culture - no growth     Right lower lobe pneumonia  - See plan per Sepsis       NSTEMI (non-ST elevated myocardial infarction)  - Troponin elevated at 1.946 on presentation   -Pt denies cliff chest pain or angina equivalent  -EKG with non specific T wave abnormality  - mg x 1 dose in the ED  -Continue Plavix and Statin   -Heparin gtt initiated per ACS protocol   -Trend troponin   -Consult Cardiology     9/12-  -Troponin bumped to 2.4. Heparin gtt continues along with ASA, Plavix, statin. .Echo resulted LVEF 55%, G1DD, +WMA.   [facility] plans for tomorrow if afebrile x 24h   9/13-  -H/H dropped to 8.2. Dark stool reported per nursing. ASA, Plavix and Heparin gtt held . C plan placed on hold       Blood thiner on hold   OMT        Tobacco abuse disorder  Assistance with smoking cessation was offered, including:  []  Medications  [x]  Counseling  []  Printed Information on Smoking Cessation  []  Referral to a Smoking Cessation Program    Patient was counseled regarding smoking for 3-10  minutes.          PVD (peripheral vascular disease)  - Continue Plavix and Statin   9/13-  Plavix held for now until rule out GI bleed.     Anemia    H/H stable   Cont monitor   S/P EGD     COPD, group D, by GOLD 2017 classification  - Inhaled bronchodilators and ICS  -Supplemental oxygen to keep SpO2  90 to 92%  9/14-  Short course steroid added     HTN (hypertension)  - BP is soft  -Hold home antihypertensives in the setting of sepsis   -Monitor BP trend and resume once appropriate   9/13-  -BP is improving       VTE Risk Mitigation (From admission, onward)         Ordered     IP VTE HIGH RISK PATIENT  Once         09/11/22 1324     Place sequential compression device  Until discontinued         09/11/22 1324                Discharge Planning   TOREY:      Code Status: DNR   Is the patient medically ready for discharge?:     Reason for patient still in hospital (select all that apply): Treatment  Discharge Plan A: Home with family            Critical care time spent on the evaluation and treatment of severe organ dysfunction, review of pertinent labs and imaging studies, discussions with consulting providers and discussions with patient/family: 34 minutes.      Dmitri Francis MD  Department of Hospital Medicine   [facility] ([facility])

## 2022-09-16 PROBLEM — R53.1 WEAKNESS: Status: ACTIVE | Noted: 2022-09-16

## 2022-09-16 LAB
ALBUMIN SERPL BCP-MCNC: 2.4 G/DL (ref 3.5–5.2)
ALP SERPL-CCNC: 68 U/L (ref 55–135)
ALT SERPL W/O P-5'-P-CCNC: 29 U/L (ref 10–44)
ANION GAP SERPL CALC-SCNC: 20 MMOL/L (ref 8–16)
AST SERPL-CCNC: 24 U/L (ref 10–40)
BACTERIA BLD CULT: NORMAL
BACTERIA BLD CULT: NORMAL
BASOPHILS # BLD AUTO: 0.01 K/UL (ref 0–0.2)
BASOPHILS NFR BLD: 0.1 % (ref 0–1.9)
BILIRUB SERPL-MCNC: 0.2 MG/DL (ref 0.1–1)
BUN SERPL-MCNC: 21 MG/DL (ref 8–23)
CALCIUM SERPL-MCNC: 7.5 MG/DL (ref 8.7–10.5)
CHLORIDE SERPL-SCNC: 93 MMOL/L (ref 95–110)
CO2 SERPL-SCNC: 23 MMOL/L (ref 23–29)
CREAT SERPL-MCNC: 0.9 MG/DL (ref 0.5–1.4)
DIFFERENTIAL METHOD: ABNORMAL
EOSINOPHIL # BLD AUTO: 0 K/UL (ref 0–0.5)
EOSINOPHIL NFR BLD: 0 % (ref 0–8)
ERYTHROCYTE [DISTWIDTH] IN BLOOD BY AUTOMATED COUNT: 13.6 % (ref 11.5–14.5)
EST. GFR  (NO RACE VARIABLE): >60 ML/MIN/1.73 M^2
GLUCOSE SERPL-MCNC: 109 MG/DL (ref 70–110)
HCT VFR BLD AUTO: 32.3 % (ref 37–48.5)
HGB BLD-MCNC: 10.7 G/DL (ref 12–16)
IMM GRANULOCYTES # BLD AUTO: 0.06 K/UL (ref 0–0.04)
IMM GRANULOCYTES NFR BLD AUTO: 0.7 % (ref 0–0.5)
LYMPHOCYTES # BLD AUTO: 0.5 K/UL (ref 1–4.8)
LYMPHOCYTES NFR BLD: 6.1 % (ref 18–48)
MAGNESIUM SERPL-MCNC: 1.9 MG/DL (ref 1.6–2.6)
MCH RBC QN AUTO: 30.1 PG (ref 27–31)
MCHC RBC AUTO-ENTMCNC: 33.1 G/DL (ref 32–36)
MCV RBC AUTO: 91 FL (ref 82–98)
MONOCYTES # BLD AUTO: 0.4 K/UL (ref 0.3–1)
MONOCYTES NFR BLD: 5.1 % (ref 4–15)
MRSA SPEC QL CULT: NORMAL
NEUTROPHILS # BLD AUTO: 7.1 K/UL (ref 1.8–7.7)
NEUTROPHILS NFR BLD: 88 % (ref 38–73)
NRBC BLD-RTO: 0 /100 WBC
PHOSPHATE SERPL-MCNC: 2.5 MG/DL (ref 2.7–4.5)
PLATELET # BLD AUTO: 422 K/UL (ref 150–450)
PLATELET BLD QL SMEAR: ABNORMAL
PMV BLD AUTO: 9.2 FL (ref 9.2–12.9)
POCT GLUCOSE: 111 MG/DL (ref 70–110)
POTASSIUM SERPL-SCNC: 3.7 MMOL/L (ref 3.5–5.1)
PROT SERPL-MCNC: 5.3 G/DL (ref 6–8.4)
RBC # BLD AUTO: 3.55 M/UL (ref 4–5.4)
SODIUM SERPL-SCNC: 136 MMOL/L (ref 136–145)
WBC # BLD AUTO: 8.03 K/UL (ref 3.9–12.7)

## 2022-09-16 PROCEDURE — 25000003 PHARM REV CODE 250: Performed by: INTERNAL MEDICINE

## 2022-09-16 PROCEDURE — 63600175 PHARM REV CODE 636 W HCPCS: Performed by: INTERNAL MEDICINE

## 2022-09-16 PROCEDURE — 99291 PR CRITICAL CARE, E/M 30-74 MINUTES: ICD-10-PCS | Mod: ,,, | Performed by: INTERNAL MEDICINE

## 2022-09-16 PROCEDURE — 80053 COMPREHEN METABOLIC PANEL: CPT | Performed by: INTERNAL MEDICINE

## 2022-09-16 PROCEDURE — S4991 NICOTINE PATCH NONLEGEND: HCPCS | Performed by: INTERNAL MEDICINE

## 2022-09-16 PROCEDURE — 84100 ASSAY OF PHOSPHORUS: CPT | Performed by: INTERNAL MEDICINE

## 2022-09-16 PROCEDURE — 99291 CRITICAL CARE FIRST HOUR: CPT | Mod: ,,, | Performed by: INTERNAL MEDICINE

## 2022-09-16 PROCEDURE — 36415 COLL VENOUS BLD VENIPUNCTURE: CPT | Performed by: INTERNAL MEDICINE

## 2022-09-16 PROCEDURE — 83735 ASSAY OF MAGNESIUM: CPT | Performed by: INTERNAL MEDICINE

## 2022-09-16 PROCEDURE — 25000242 PHARM REV CODE 250 ALT 637 W/ HCPCS: Performed by: INTERNAL MEDICINE

## 2022-09-16 PROCEDURE — 99900035 HC TECH TIME PER 15 MIN (STAT)

## 2022-09-16 PROCEDURE — 94640 AIRWAY INHALATION TREATMENT: CPT

## 2022-09-16 PROCEDURE — 99233 SBSQ HOSP IP/OBS HIGH 50: CPT | Mod: ,,, | Performed by: INTERNAL MEDICINE

## 2022-09-16 PROCEDURE — 25000003 PHARM REV CODE 250: Performed by: PHYSICIAN ASSISTANT

## 2022-09-16 PROCEDURE — 92526 ORAL FUNCTION THERAPY: CPT

## 2022-09-16 PROCEDURE — 94761 N-INVAS EAR/PLS OXIMETRY MLT: CPT

## 2022-09-16 PROCEDURE — 21400001 HC TELEMETRY ROOM

## 2022-09-16 PROCEDURE — 85025 COMPLETE CBC W/AUTO DIFF WBC: CPT | Performed by: INTERNAL MEDICINE

## 2022-09-16 PROCEDURE — 11000001 HC ACUTE MED/SURG PRIVATE ROOM

## 2022-09-16 PROCEDURE — 99233 PR SUBSEQUENT HOSPITAL CARE,LEVL III: ICD-10-PCS | Mod: ,,, | Performed by: INTERNAL MEDICINE

## 2022-09-16 RX ORDER — RANOLAZINE 500 MG/1
500 TABLET, EXTENDED RELEASE ORAL 2 TIMES DAILY
Status: DISCONTINUED | OUTPATIENT
Start: 2022-09-16 | End: 2022-09-17 | Stop reason: HOSPADM

## 2022-09-16 RX ORDER — FUROSEMIDE 10 MG/ML
40 INJECTION INTRAMUSCULAR; INTRAVENOUS DAILY
Status: DISCONTINUED | OUTPATIENT
Start: 2022-09-17 | End: 2022-09-17 | Stop reason: HOSPADM

## 2022-09-16 RX ORDER — FUROSEMIDE 10 MG/ML
40 INJECTION INTRAMUSCULAR; INTRAVENOUS ONCE
Status: COMPLETED | OUTPATIENT
Start: 2022-09-16 | End: 2022-09-16

## 2022-09-16 RX ORDER — PANTOPRAZOLE SODIUM 40 MG/1
40 TABLET, DELAYED RELEASE ORAL DAILY
Status: DISCONTINUED | OUTPATIENT
Start: 2022-09-16 | End: 2022-09-17 | Stop reason: HOSPADM

## 2022-09-16 RX ORDER — MIDODRINE HYDROCHLORIDE 2.5 MG/1
2.5 TABLET ORAL 3 TIMES DAILY
Status: DISCONTINUED | OUTPATIENT
Start: 2022-09-16 | End: 2022-09-17

## 2022-09-16 RX ORDER — CLOPIDOGREL BISULFATE 75 MG/1
75 TABLET ORAL DAILY
Status: DISCONTINUED | OUTPATIENT
Start: 2022-09-16 | End: 2022-09-17 | Stop reason: HOSPADM

## 2022-09-16 RX ORDER — ASPIRIN 81 MG/1
81 TABLET ORAL DAILY
Status: DISCONTINUED | OUTPATIENT
Start: 2022-09-16 | End: 2022-09-17 | Stop reason: HOSPADM

## 2022-09-16 RX ADMIN — METHYLPREDNISOLONE SODIUM SUCCINATE 60 MG: 40 INJECTION, POWDER, FOR SOLUTION INTRAMUSCULAR; INTRAVENOUS at 08:09

## 2022-09-16 RX ADMIN — IPRATROPIUM BROMIDE AND ALBUTEROL SULFATE 3 ML: 2.5; .5 SOLUTION RESPIRATORY (INHALATION) at 01:09

## 2022-09-16 RX ADMIN — ASPIRIN 81 MG: 81 TABLET, COATED ORAL at 08:09

## 2022-09-16 RX ADMIN — RANOLAZINE 500 MG: 500 TABLET, EXTENDED RELEASE ORAL at 09:09

## 2022-09-16 RX ADMIN — BUSPIRONE HYDROCHLORIDE 10 MG: 10 TABLET ORAL at 08:09

## 2022-09-16 RX ADMIN — CLOPIDOGREL 75 MG: 75 TABLET, FILM COATED ORAL at 08:09

## 2022-09-16 RX ADMIN — METOPROLOL SUCCINATE 12.5 MG: 25 TABLET, EXTENDED RELEASE ORAL at 08:09

## 2022-09-16 RX ADMIN — BUPROPION HYDROCHLORIDE 75 MG: 75 TABLET, FILM COATED ORAL at 09:09

## 2022-09-16 RX ADMIN — FUROSEMIDE 40 MG: 10 INJECTION, SOLUTION INTRAMUSCULAR; INTRAVENOUS at 08:09

## 2022-09-16 RX ADMIN — PANTOPRAZOLE SODIUM 40 MG: 40 TABLET, DELAYED RELEASE ORAL at 08:09

## 2022-09-16 RX ADMIN — IPRATROPIUM BROMIDE AND ALBUTEROL SULFATE 3 ML: 2.5; .5 SOLUTION RESPIRATORY (INHALATION) at 07:09

## 2022-09-16 RX ADMIN — BUDESONIDE 0.5 MG: 0.5 INHALANT ORAL at 07:09

## 2022-09-16 RX ADMIN — MIDODRINE HYDROCHLORIDE 2.5 MG: 2.5 TABLET ORAL at 09:09

## 2022-09-16 RX ADMIN — ARFORMOTEROL TARTRATE 15 MCG: 15 SOLUTION RESPIRATORY (INHALATION) at 07:09

## 2022-09-16 RX ADMIN — BUPROPION HYDROCHLORIDE 75 MG: 75 TABLET, FILM COATED ORAL at 08:09

## 2022-09-16 RX ADMIN — ATORVASTATIN CALCIUM 20 MG: 10 TABLET, FILM COATED ORAL at 09:09

## 2022-09-16 RX ADMIN — DULOXETINE 30 MG: 30 CAPSULE, DELAYED RELEASE ORAL at 08:09

## 2022-09-16 RX ADMIN — BUSPIRONE HYDROCHLORIDE 10 MG: 10 TABLET ORAL at 09:09

## 2022-09-16 RX ADMIN — MIDODRINE HYDROCHLORIDE 5 MG: 5 TABLET ORAL at 08:09

## 2022-09-16 RX ADMIN — NICOTINE 1 PATCH: 7 PATCH, EXTENDED RELEASE TRANSDERMAL at 08:09

## 2022-09-16 RX ADMIN — ARFORMOTEROL TARTRATE 15 MCG: 15 SOLUTION RESPIRATORY (INHALATION) at 08:09

## 2022-09-16 RX ADMIN — MIDODRINE HYDROCHLORIDE 5 MG: 5 TABLET ORAL at 03:09

## 2022-09-16 NOTE — ASSESSMENT & PLAN NOTE
Cardiac monitoring.  Likely demand ischemia.  Beta-blocker statin.  9/16 BB , statins , Ranexa , plavix

## 2022-09-16 NOTE — PROGRESS NOTES
Pt received from ICU at 1155 this am. Pt up to chair for amanda 1 hr, tolerated well. Denies C/O pain, N/V or SOB at this time. Family visiting at BS. Pt remains very weak, encouraged to use BSC and increase activity level with assistance. POC reviewed with pt and family at BS. Pt/family requesting to have PT/OT evaluation.

## 2022-09-16 NOTE — SUBJECTIVE & OBJECTIVE
Review of Systems   Constitutional: Negative.   HENT: Negative.     Eyes: Negative.    Cardiovascular: Negative.    Respiratory: Negative.     Endocrine: Negative.    Hematologic/Lymphatic: Negative.    Skin: Negative.    Musculoskeletal: Negative.    Gastrointestinal: Negative.    Genitourinary: Negative.    Neurological: Negative.    Psychiatric/Behavioral: Negative.     Allergic/Immunologic: Negative.    Objective:     Vital Signs (Most Recent):  Temp: 98.2 °F (36.8 °C) (09/16/22 1101)  Pulse: 82 (09/16/22 1101)  Resp: 12 (09/16/22 1101)  BP: 118/67 (09/16/22 1101)  SpO2: (!) 92 % (09/16/22 1101)   Vital Signs (24h Range):  Temp:  [97.6 °F (36.4 °C)-99 °F (37.2 °C)] 98.2 °F (36.8 °C)  Pulse:  [82-95] 82  Resp:  [10-20] 12  SpO2:  [91 %-96 %] 92 %  BP: (115-129)/(57-67) 118/67     Weight: 46.4 kg (102 lb 4.7 oz)  Body mass index is 18.71 kg/m².     SpO2: (!) 92 %  O2 Device (Oxygen Therapy): room air      Intake/Output Summary (Last 24 hours) at 9/16/2022 1205  Last data filed at 9/16/2022 1100  Gross per 24 hour   Intake 237.44 ml   Output 2350 ml   Net -2112.56 ml       Lines/Drains/Airways       Drain  Duration             Female External Urinary Catheter 09/14/22 0800 2 days              Peripheral Intravenous Line  Duration                  Peripheral IV - Single Lumen 09/11/22 0900 22 G Anterior;Left Forearm 5 days         Peripheral IV - Single Lumen 09/13/22 1945 20 G Anterior;Right Upper Arm 2 days                    Physical Exam  Vitals and nursing note reviewed.   Constitutional:       General: She is not in acute distress.     Appearance: Normal appearance. She is well-developed. She is not diaphoretic.   HENT:      Head: Normocephalic and atraumatic.   Eyes:      General:         Right eye: No discharge.         Left eye: No discharge.      Pupils: Pupils are equal, round, and reactive to light.   Neck:      Thyroid: No thyromegaly.      Vascular: No JVD.      Trachea: No tracheal deviation.    Cardiovascular:      Rate and Rhythm: Normal rate and regular rhythm.      Heart sounds: Normal heart sounds, S1 normal and S2 normal. No murmur heard.  Pulmonary:      Effort: Pulmonary effort is normal. No respiratory distress.      Breath sounds: No wheezing.      Comments: Faint rales at bases  Abdominal:      General: There is no distension.      Palpations: Abdomen is soft.      Tenderness: There is no rebound.   Musculoskeletal:      Cervical back: Neck supple.   Skin:     General: Skin is warm and dry.      Findings: No erythema.   Neurological:      General: No focal deficit present.      Mental Status: She is alert and oriented to person, place, and time.   Psychiatric:         Mood and Affect: Mood normal.         Behavior: Behavior normal.         Thought Content: Thought content normal.       Significant Labs: CMP   Recent Labs   Lab 09/15/22  0439 09/16/22  0438   * 136   K 3.8 3.7   CL 99 93*   CO2 19* 23    109   BUN 12 21   CREATININE 0.7 0.9   CALCIUM 6.8* 7.5*   PROT 4.9* 5.3*   ALBUMIN 2.2* 2.4*   BILITOT 0.2 0.2   ALKPHOS 73 68   AST 46* 24   ALT 37 29   ANIONGAP 14 20*   , CBC   Recent Labs   Lab 09/15/22  0439 09/16/22  0438   WBC 5.85 8.03   HGB 9.9* 10.7*   HCT 30.0* 32.3*    422   , Troponin No results for input(s): TROPONINI in the last 48 hours., and All pertinent lab results from the last 24 hours have been reviewed.    Significant Imaging: Echocardiogram: Transthoracic echo (TTE) complete (Cupid Only):   Results for orders placed or performed during the hospital encounter of 09/11/22   Echo   Result Value Ref Range    BSA 1.42 m2    TDI SEPTAL 0.07 m/s    LV LATERAL E/E' RATIO 8.58 m/s    LV SEPTAL E/E' RATIO 14.71 m/s    LA WIDTH 3.70 cm    IVC diameter 1.68 cm    Left Ventricular Outflow Tract Mean Velocity 0.87 cm/s    Left Ventricular Outflow Tract Mean Gradient 3.34 mmHg    TDI LATERAL 0.12 m/s    LVIDd 3.44 (A) 3.5 - 6.0 cm    IVS 1.36 (A) 0.6 - 1.1 cm     Posterior Wall 1.50 (A) 0.6 - 1.1 cm    Ao root annulus 2.50 cm    LVIDs 2.23 2.1 - 4.0 cm    FS 35 28 - 44 %    LA volume 60.31 cm3    STJ 2.20 cm    Ascending aorta 2.22 cm    LV mass 174.77 g    LA size 4.45 cm    TAPSE 2.00 cm    Left Ventricle Relative Wall Thickness 0.87 cm    AV mean gradient 6 mmHg    AV valve area 1.87 cm2    AV Velocity Ratio 0.72     AV index (prosthetic) 0.72     MV valve area p 1/2 method 6.14 cm2    E/A ratio 0.86     Mean e' 0.10 m/s    E wave deceleration time 123.62 msec    IVRT 54.23 msec    LVOT diameter 1.82 cm    LVOT area 2.6 cm2    LVOT peak inocente 1.24 m/s    LVOT peak VTI 25.30 cm    Ao peak inocente 1.72 m/s    Ao VTI 35.1 cm    RVOT peak inocente 0.77 m/s    RVOT peak VTI 13.3 cm    Mr max inocente 4.73 m/s    LVOT stroke volume 65.79 cm3    AV peak gradient 12 mmHg    PV mean gradient 1.28 mmHg    E/E' ratio 10.84 m/s    MV Peak E Inocente 1.03 m/s    TR Max Inocente 3.25 m/s    MV stenosis pressure 1/2 time 35.85 ms    MV Peak A Inocente 1.20 m/s    LV Systolic Volume 16.70 mL    LV Systolic Volume Index 11.6 mL/m2    LV Diastolic Volume 48.68 mL    LV Diastolic Volume Index 33.81 mL/m2    LA Volume Index 41.9 mL/m2    LV Mass Index 121 g/m2    RA Major Axis 3.80 cm    Left Atrium Minor Axis 4.35 cm    Left Atrium Major Axis 4.27 cm    Triscuspid Valve Regurgitation Peak Gradient 42 mmHg    RA Width 2.70 cm    Right Atrial Pressure (from IVC) 3 mmHg    EF 55 %    TV rest pulmonary artery pressure 45 mmHg    Narrative    · The left ventricle is normal in size with concentric hypertrophy and   normal systolic function.  · The estimated ejection fraction is 55%.  · Grade I left ventricular diastolic dysfunction.  · Normal right ventricular size with normal right ventricular systolic   function.  · Moderate left atrial enlargement.  · Mild tricuspid regurgitation.  · There are segmental left ventricular wall motion abnormalities.  · There is pulmonary hypertension.  · The estimated PA systolic pressure is  45 mmHg.      , EKG: Reviewes, and X-Ray: CXR: X-Ray Chest 1 View (CXR):   Results for orders placed or performed during the hospital encounter of 09/11/22   X-Ray Chest 1 View    Narrative    EXAMINATION:  XR CHEST 1 VIEW    CLINICAL HISTORY:  <Diagnosis>, weakness;    COMPARISON:  Comparison 06/18/2022.    FINDINGS:  Heart is normal in size.  Interval development mild interstitial infiltrate involving the right lung.  Left lung is clear.  Possible small right pleural effusion.      Impression    Interval development of a mild interstitial infiltrate involving the right lung with a small right pleural effusion.  Differential considerations include unilateral    edema versus pneumonia.      Electronically signed by: Maximino Pandey MD  Date:    09/11/2022  Time:    12:30    and X-Ray Chest PA and Lateral (CXR): No results found for this visit on 09/11/22.

## 2022-09-16 NOTE — ASSESSMENT & PLAN NOTE
- BP is soft  -Hold home antihypertensives in the setting of sepsis   -Monitor BP trend and resume once appropriate   9/13-  -BP is improving     Wean down midodrine

## 2022-09-16 NOTE — PT/OT/SLP PROGRESS
Speech Language Pathology Treatment    Patient Name:  Keyona Dwyer   MRN:  [id number]  Admitting Diagnosis: Sepsis    Recommendations:                 General Recommendations:  Dysphagia therapy and Modified barium swallow study  Diet recommendations:  Soft & Bite Sized Diet - IDDSI Level 6, Liquid Diet Level: Thin liquids - IDDSI Level 0   Aspiration Precautions: Remain upright 30 minutes post meal, Small bites/sips, and Standard aspiration precautions   General Precautions: Standard, aspiration  Communication strategies:  provide increased time to answer    Subjective     Patient alert, cooperative, and seen at bedside with family. She denied any difficulty eating or drinking.  Patient goals: n/a     Pain/Comfort:  Pain Rating 1: 0/10  Pain Rating Post-Intervention 1: 0/10    Respiratory Status: Room air    Objective:     Has the patient been evaluated by SLP for swallowing?   Yes  Keep patient NPO? No   Current Respiratory Status:        Observed po intake of soft solids and thin liquids via cup. Patient with an inconsistent immediate, coarse cough with all consistencies. She is at an increase risk of aspiration due to COPD. Patient would benefit from mbss to assess the safety of the swallow.     Assessment:     Keyona Dwyer is a 76 y.o. female with an SLP diagnosis of Dysphagia.  She is at an increase risk of aspiration due to COPD. Patient would benefit from mbss to assess the safety of the swallow. Diet recommendations Soft & Bite Sized Diet - IDDSI Level 6, Liquid Diet Level: Thin liquids - IDDSI Level 0   Goals:   Multidisciplinary Problems       SLP Goals          Problem: SLP    Goal Priority Disciplines Outcome   SLP Goal     SLP Ongoing, Progressing   Description: 1.  Pt will consume the least restrictive PO diet without incident.  2.  MBSS as comprehensive swallowing evaluation.                       Plan:     Patient to be seen:  2 x/week   Plan of Care expires:  09/20/22  Plan of Care reviewed  with:  patient, spouse, family   SLP Follow-Up:  Yes       Discharge recommendations:   (TBD, pending acute ST progress)     Time Tracking:     SLP Treatment Date:   09/16/22  Speech Start Time:  1431  Speech Stop Time:  1447     Speech Total Time (min):  16 min    Billable Minutes: Treatment Swallowing Dysfunction 14    09/16/2022

## 2022-09-16 NOTE — PLAN OF CARE
[facility] - Med Surg  Discharge Reassessment    Primary Care Provider: Ralf Isidro MD    Expected Discharge Date:     Reassessment (most recent)       Discharge Reassessment - 09/16/22 1209          Discharge Reassessment    Assessment Type Discharge Planning Assessment     Did the patient's condition or plan change since previous assessment? No     Discharge Plan discussed with: Patient     Communicated TOREY with patient/caregiver Date not available/Unable to determine     Discharge Plan A Home with family     Why the patient remains in the hospital Requires continued medical care

## 2022-09-16 NOTE — ASSESSMENT & PLAN NOTE
This patient does have evidence of infective focus  My overall impression is sepsis. Vital signs were reviewed and noted in progress note.  Antibiotics given-   Antibiotics (From admission, onward)    None        Cultures were taken-   Microbiology Results (last 7 days)     Procedure Component Value Units Date/Time    Culture, MRSA [[id number]] Collected: 09/14/22 0956    Order Status: Completed Specimen: MRSA source from Nares, Left Updated: 09/16/22 0739     MRSA Surveillance Screen MRSA isolated    Blood Culture #1 **CANNOT BE ORDERED STAT** [[id number]] Collected: 09/11/22 1206    Order Status: Completed Specimen: Blood from Peripheral, Hand, Right Updated: 09/15/22 2012     Blood Culture, Routine No Growth to date      No Growth to date      No Growth to date      No Growth to date      No Growth to date    Blood Culture #2 **CANNOT BE ORDERED STAT** [[id number]] Collected: 09/11/22 1205    Order Status: Completed Specimen: Blood from Peripheral, Antecubital, Right Updated: 09/15/22 2012     Blood Culture, Routine No Growth to date      No Growth to date      No Growth to date      No Growth to date      No Growth to date    Urine culture [[id number]] Collected: 09/12/22 0715    Order Status: Completed Specimen: Urine Updated: 09/13/22 1852     Urine Culture, Routine No growth    Narrative:      Specimen Source->Urine        Latest lactate reviewed, they are-  No results for input(s): LACTATE in the last 72 hours.    Organ dysfunction indicated by none  Source- Lower resp tract     Source control Achieved by- Antimicrobial     SLP consult to assess aspiration risk     9/14-  ABG suggestive of acute hypercapnic resp failure   Pt placed on BiPAP and transferred to ICU.     Improving   Tx completed

## 2022-09-16 NOTE — ASSESSMENT & PLAN NOTE
See NSTEMI section.    9/13/22  -Stable, no angina  -LHC again deferred due to worsening anemia  -Transfuse  -ASA, Plavix, and heparin drip held for now, resume as tolerated  -Continue OMT as tolerated-BB, statin    9/14/22  -Continue OMT as tolerated-BB, statin  -DAPT held due to melena, resume as tolerated  -Medical management, LHC deferred for now given anemia issues/worsening respiratory status    9/15/22  -Continue BB, statin  -DAPT resumed  -Discussed with patient and family, continue OMT/med mgmt    9/16/22  -Stable, no angina  -Continue BB, statin, ASA, Plavix  -Ranexa added

## 2022-09-16 NOTE — PROGRESS NOTES
[facility]  Cardiology  Progress Note    Patient Name: Keyona Dwyer  MRN: [id number]  Admission Date: 9/11/2022  Hospital Length of Stay: 4 days  Code Status: DNR   Attending Physician: Dmitri Francis, *   Primary Care Physician: Ralf Isidro MD  Expected Discharge Date:   Principal Problem:Sepsis    Subjective:   HPI:  Cardiology consulted for NSTEMI.  Pt has h/o PAD, HTN, hyperlipidemia, smoking.  She has h/o PAD with angiogram in April 2021 showing R common iliac occlusion and had PTA L common iliac artery, tx by Dr. Adkins, Vascular Surgery.  She has been weak last few days, and had a fever, GAMEZ, and lower abd pain.  No chest pain sxs.  She presented to ER and started on antibiotics for possible R sided pneumonia.  Troponin elevated 1.9  Ecg showed NSR, anterior T wave abnl.  WBC elevated.  Had low grade fever 100.9 in ER.    Hospital Course:   9/12/22-Patient seen and examined today, resting in bed. Feels ok. SOB improved. No chest pain. +Fever overnight. Labs reviewed. Troponin bumped to 2.441, repeat pending. Echo showed normal EF, +WMA. Plans for [facility] tomorrow (once afebrile for 24 hours).    9/13/22-Patient seen and examined today, lying in bed. Awake and alert but intermittently confused, family reports started this AM. No CV complaints. H/H dipped 7.9/24.6 and nursing staff reported dark colored stool. Heparin gtt and DAPT held, bleeding scan pending.    9/14/22-Patient seen and examined today, transferred to ICU overnight due to worsening respiratory status/likely aspiration. Remains on bipap during exam. No overt bleeding reported. EGD yesterday only showed gastritis. Colonoscopy planned today but cancelled due to overnight events.     9/15/22-Patient seen and examined today, sitting up in bed. Bipap weaned off. Feeling better, less SOB. Chest pain free. BNP elevated, diuresis initiated. H/H stable. Discussed with GI/hospital medicine, will resume DAPT and closely monitor.      9/16/22-Patient seen and examined today, sitting up in bed. Feeling much better, SOB nearly resolved. No angina. Diuresed well overnight. Labs reviewed and are stable. Ranexa added.          Review of Systems   Constitutional: Negative.   HENT: Negative.     Eyes: Negative.    Cardiovascular: Negative.    Respiratory: Negative.     Endocrine: Negative.    Hematologic/Lymphatic: Negative.    Skin: Negative.    Musculoskeletal: Negative.    Gastrointestinal: Negative.    Genitourinary: Negative.    Neurological: Negative.    Psychiatric/Behavioral: Negative.     Allergic/Immunologic: Negative.    Objective:     Vital Signs (Most Recent):  Temp: 98.2 °F (36.8 °C) (09/16/22 1101)  Pulse: 82 (09/16/22 1101)  Resp: 12 (09/16/22 1101)  BP: 118/67 (09/16/22 1101)  SpO2: (!) 92 % (09/16/22 1101)   Vital Signs (24h Range):  Temp:  [97.6 °F (36.4 °C)-99 °F (37.2 °C)] 98.2 °F (36.8 °C)  Pulse:  [82-95] 82  Resp:  [10-20] 12  SpO2:  [91 %-96 %] 92 %  BP: (115-129)/(57-67) 118/67     Weight: 46.4 kg (102 lb 4.7 oz)  Body mass index is 18.71 kg/m².     SpO2: (!) 92 %  O2 Device (Oxygen Therapy): room air      Intake/Output Summary (Last 24 hours) at 9/16/2022 1205  Last data filed at 9/16/2022 1100  Gross per 24 hour   Intake 237.44 ml   Output 2350 ml   Net -2112.56 ml       Lines/Drains/Airways       Drain  Duration             Female External Urinary Catheter 09/14/22 0800 2 days              Peripheral Intravenous Line  Duration                  Peripheral IV - Single Lumen 09/11/22 0900 22 G Anterior;Left Forearm 5 days         Peripheral IV - Single Lumen 09/13/22 1945 20 G Anterior;Right Upper Arm 2 days                    Physical Exam  Vitals and nursing note reviewed.   Constitutional:       General: She is not in acute distress.     Appearance: Normal appearance. She is well-developed. She is not diaphoretic.   HENT:      Head: Normocephalic and atraumatic.   Eyes:      General:         Right eye: No discharge.          Left eye: No discharge.      Pupils: Pupils are equal, round, and reactive to light.   Neck:      Thyroid: No thyromegaly.      Vascular: No JVD.      Trachea: No tracheal deviation.   Cardiovascular:      Rate and Rhythm: Normal rate and regular rhythm.      Heart sounds: Normal heart sounds, S1 normal and S2 normal. No murmur heard.  Pulmonary:      Effort: Pulmonary effort is normal. No respiratory distress.      Breath sounds: No wheezing.      Comments: Faint rales at bases  Abdominal:      General: There is no distension.      Palpations: Abdomen is soft.      Tenderness: There is no rebound.   Musculoskeletal:      Cervical back: Neck supple.   Skin:     General: Skin is warm and dry.      Findings: No erythema.   Neurological:      General: No focal deficit present.      Mental Status: She is alert and oriented to person, place, and time.   Psychiatric:         Mood and Affect: Mood normal.         Behavior: Behavior normal.         Thought Content: Thought content normal.       Significant Labs: CMP   Recent Labs   Lab 09/15/22  0439 09/16/22  0438   * 136   K 3.8 3.7   CL 99 93*   CO2 19* 23    109   BUN 12 21   CREATININE 0.7 0.9   CALCIUM 6.8* 7.5*   PROT 4.9* 5.3*   ALBUMIN 2.2* 2.4*   BILITOT 0.2 0.2   ALKPHOS 73 68   AST 46* 24   ALT 37 29   ANIONGAP 14 20*   , CBC   Recent Labs   Lab 09/15/22  0439 09/16/22  0438   WBC 5.85 8.03   HGB 9.9* 10.7*   HCT 30.0* 32.3*    422   , Troponin No results for input(s): TROPONINI in the last 48 hours., and All pertinent lab results from the last 24 hours have been reviewed.    Significant Imaging: Echocardiogram: Transthoracic echo (TTE) complete (Cupid Only):   Results for orders placed or performed during the hospital encounter of 09/11/22   Echo   Result Value Ref Range    BSA 1.42 m2    TDI SEPTAL 0.07 m/s    LV LATERAL E/E' RATIO 8.58 m/s    LV SEPTAL E/E' RATIO 14.71 m/s    LA WIDTH 3.70 cm    IVC diameter 1.68 cm    Left  Ventricular Outflow Tract Mean Velocity 0.87 cm/s    Left Ventricular Outflow Tract Mean Gradient 3.34 mmHg    TDI LATERAL 0.12 m/s    LVIDd 3.44 (A) 3.5 - 6.0 cm    IVS 1.36 (A) 0.6 - 1.1 cm    Posterior Wall 1.50 (A) 0.6 - 1.1 cm    Ao root annulus 2.50 cm    LVIDs 2.23 2.1 - 4.0 cm    FS 35 28 - 44 %    LA volume 60.31 cm3    STJ 2.20 cm    Ascending aorta 2.22 cm    LV mass 174.77 g    LA size 4.45 cm    TAPSE 2.00 cm    Left Ventricle Relative Wall Thickness 0.87 cm    AV mean gradient 6 mmHg    AV valve area 1.87 cm2    AV Velocity Ratio 0.72     AV index (prosthetic) 0.72     MV valve area p 1/2 method 6.14 cm2    E/A ratio 0.86     Mean e' 0.10 m/s    E wave deceleration time 123.62 msec    IVRT 54.23 msec    LVOT diameter 1.82 cm    LVOT area 2.6 cm2    LVOT peak inocente 1.24 m/s    LVOT peak VTI 25.30 cm    Ao peak inocente 1.72 m/s    Ao VTI 35.1 cm    RVOT peak inocente 0.77 m/s    RVOT peak VTI 13.3 cm    Mr max inocente 4.73 m/s    LVOT stroke volume 65.79 cm3    AV peak gradient 12 mmHg    PV mean gradient 1.28 mmHg    E/E' ratio 10.84 m/s    MV Peak E Inocente 1.03 m/s    TR Max Inocente 3.25 m/s    MV stenosis pressure 1/2 time 35.85 ms    MV Peak A Inocente 1.20 m/s    LV Systolic Volume 16.70 mL    LV Systolic Volume Index 11.6 mL/m2    LV Diastolic Volume 48.68 mL    LV Diastolic Volume Index 33.81 mL/m2    LA Volume Index 41.9 mL/m2    LV Mass Index 121 g/m2    RA Major Axis 3.80 cm    Left Atrium Minor Axis 4.35 cm    Left Atrium Major Axis 4.27 cm    Triscuspid Valve Regurgitation Peak Gradient 42 mmHg    RA Width 2.70 cm    Right Atrial Pressure (from IVC) 3 mmHg    EF 55 %    TV rest pulmonary artery pressure 45 mmHg    Narrative    · The left ventricle is normal in size with concentric hypertrophy and   normal systolic function.  · The estimated ejection fraction is 55%.  · Grade I left ventricular diastolic dysfunction.  · Normal right ventricular size with normal right ventricular systolic   function.  · Moderate left  atrial enlargement.  · Mild tricuspid regurgitation.  · There are segmental left ventricular wall motion abnormalities.  · There is pulmonary hypertension.  · The estimated PA systolic pressure is 45 mmHg.      , EKG: Reviewes, and X-Ray: CXR: X-Ray Chest 1 View (CXR):   Results for orders placed or performed during the hospital encounter of 09/11/22   X-Ray Chest 1 View    Narrative    EXAMINATION:  XR CHEST 1 VIEW    CLINICAL HISTORY:  <Diagnosis>, weakness;    COMPARISON:  Comparison 06/18/2022.    FINDINGS:  Heart is normal in size.  Interval development mild interstitial infiltrate involving the right lung.  Left lung is clear.  Possible small right pleural effusion.      Impression    Interval development of a mild interstitial infiltrate involving the right lung with a small right pleural effusion.  Differential considerations include unilateral    edema versus pneumonia.      Electronically signed by: Maximino Pandey MD  Date:    09/11/2022  Time:    12:30    and X-Ray Chest PA and Lateral (CXR): No results found for this visit on 09/11/22.    Assessment and Plan:   Patient who presents with sepsis/PNA/NSTEMI/suspected melena. Clinically improved. Stable this AM. Meds adjusted.     * Sepsis due to Pneumonia   -Continue antibiotics for pneumonia.  -Per Hospital Med mgt.      Acute diastolic heart failure  -Continue IV diuresis  -Continue BB  -Strict I's/O's    9/16/22  -Responded well to IV diuresis     Melena  -Workup and mgmt as per GI and hospital medicine    9/15/22  -No recurrence    UTI (urinary tract infection)  -Continue abx    Right lower lobe pneumonia  -Continue abx    NSTEMI (non-ST elevated myocardial infarction)  See NSTEMI section.    9/13/22  -Stable, no angina  -[facility] again deferred due to worsening anemia  -Transfuse  -ASA, Plavix, and heparin drip held for now, resume as tolerated  -Continue OMT as tolerated-BB, statin    9/14/22  -Continue OMT as tolerated-BB, statin  -DAPT held due to  melena, resume as tolerated  -Medical management, C deferred for now given anemia issues/worsening respiratory status    9/15/22  -Continue BB, statin  -DAPT resumed  -Discussed with patient and family, continue OMT/med mgmt    9/16/22  -Stable, no angina  -Continue BB, statin, ASA, Plavix  -Ranexa added      Tobacco abuse disorder  -Smoking cessation advised.    PVD (peripheral vascular disease)  -Medical tx for now  -ASA, statin  -Smoking cessation.    9/14/22  -Resume ASA as tolerated    Anemia  -H/H dipped to 7.9/24.6  -Transfusion ordered  -Dark stool reported this AM  -Heparin gtt and DAPT held, bleeding scan pending    9/14/22  -H/H improved post transfusion  -EGD yesterday showed gastritis, colonoscopy planned today but cancelled due to overnight events  -Resume DAPT as tolerated    9/15/22  -H/H stable overnight  -Discussed with GI and hospital medicine, will resume DAPT and closely monitor      HTN (hypertension)  -HTN control.        VTE Risk Mitigation (From admission, onward)         Ordered     IP VTE HIGH RISK PATIENT  Once         09/11/22 1324     Place sequential compression device  Until discontinued         09/11/22 1324                Ruthie Campuzano PA-C  Cardiology  [facility]

## 2022-09-16 NOTE — ASSESSMENT & PLAN NOTE
Patient with Hypercapnic and Hypoxic Respiratory failure which is Acute.  she is not on home oxygen. Supplemental oxygen was provided and noted-  .   Signs/symptoms of respiratory failure include- tachypnea, increased work of breathing and lethargy. Contributing diagnoses includes - Aspiration and Pneumonia, COPD exacerbation. Labs and images were reviewed. Patient Has recent ABG, which has been reviewed. Will treat underlying causes and adjust management of respiratory failure as follows-       -Supplemental oxygen   -Ventilatory support via BiPAP  -Inhaled bronchodilators and ICS  -Antibiotic      Improving

## 2022-09-16 NOTE — PROGRESS NOTES
Pharmacist Intervention IV to PO Note    Keyona Dwyer is a 76 y.o. female being treated with IV medication pantoprazole    Patient Data:    Vital Signs (Most Recent):  Temp: 97.8 °F (36.6 °C) (09/15/22 2305)  Pulse: 85 (09/16/22 0100)  Resp: 12 (09/16/22 0100)  BP: 122/63 (09/16/22 0100)  SpO2: (!) 94 % (09/16/22 0100)   Vital Signs (72h Range):  Temp:  [97.4 °F (36.3 °C)-100.7 °F (38.2 °C)]   Pulse:  []   Resp:  [9-34]   BP: ()/(53-98)   SpO2:  [83 %-100 %]      CBC:  Recent Labs   Lab 09/13/22 0435 09/13/22  0846 09/13/22  2147 09/14/22  0555 09/15/22  0439   WBC 8.08  8.08  8.08  --   --  16.26* 5.85   RBC 2.59*  2.59*  2.59*  --   --  3.80* 3.30*   HGB 7.9*  7.9*  7.9*   < > 8.7* 11.1* 9.9*   HCT 24.6*  24.6*  24.6*   < > 26.2* 36.9* 30.0*     252  252  --   --  468* 269   MCV 95  95  95  --   --  97 91   MCH 30.5  30.5  30.5  --   --  29.2 30.0   MCHC 32.1  32.1  32.1  --   --  30.1* 33.0    < > = values in this interval not displayed.     CMP:     Recent Labs   Lab 09/13/22  0435 09/14/22  0555 09/15/22  0439   * 196* 110   CALCIUM 7.7* 7.6* 6.8*   ALBUMIN 2.1* 2.6* 2.2*   PROT 5.5* 5.9* 4.9*   * 134* 132*   K 3.7 3.8 3.8   CO2 20* 18* 19*    98 99   BUN 13 9 12   CREATININE 0.6 0.8 0.7   ALKPHOS 57 93 73   ALT 17 30 37   AST 35 58* 46*   BILITOT 0.2 0.5 0.2       Dietary Orders:  Diet Orders            None            Based on the following criteria, this patient qualifies for intravenous to oral conversion:  [x] The patients gastrointestinal tract is functioning (tolerating medications via oral or enteral route for 24 hours and tolerating food or enteral feeds for 24 hours).  [x] The patient is hemodynamically stable for 24 hours (heart rate <100 beats per minute, systolic blood pressure >99 mm Hg, and respiratory rate <20 breaths per minute).  [x] The patient shows clinical improvement (afebrile for at least 24 hours and white blood cell count  downtrending or normalized). Additionally, the patient must be non-neutropenic (absolute neutrophil count >500 cells/mm3).  [x] For antimicrobials, the patient has received IV therapy for at least 24 hours.    IV medication (pantoprazole 40 mg QD) will be changed to oral medication (pantoprazole 40 mg QD)    Pharmacist's Name: Pilar Perez  Pharmacist's Extension: [phone]

## 2022-09-16 NOTE — ASSESSMENT & PLAN NOTE
Patient with Hypercapnic and Hypoxic Respiratory failure which is Acute.  she is not on home oxygen. Supplemental oxygen was provided and noted-  .   Signs/symptoms of respiratory failure include- tachypnea, increased work of breathing and respiratory distress. Contributing diagnoses includes - CHF, COPD and Pneumonia Labs and images were reviewed. Patient Has recent ABG, which has been reviewed. Will treat underlying causes and adjust management of respiratory failure as follows- nebs IV Solu-Medrol IV antibiotic  9/15 albuterol Atrovent ICS Laba.  Home O2 evaluation on discharge.  9/16 O2 duojneb , ICS LABA , dc solumedrol

## 2022-09-16 NOTE — SUBJECTIVE & OBJECTIVE
Interval History:     Review of Systems   Constitutional:  Positive for activity change and fatigue. Negative for chills and fever.   HENT:  Negative for drooling, ear discharge and nosebleeds.    Eyes:  Negative for pain, discharge and itching.   Respiratory:  Positive for shortness of breath. Negative for choking.    Cardiovascular:  Negative for chest pain.   Gastrointestinal:  Negative for abdominal distention.   Endocrine: Negative for cold intolerance.   Genitourinary:  Negative for hematuria.   Musculoskeletal:  Positive for arthralgias. Negative for neck stiffness.   Skin:  Negative for rash.   Allergic/Immunologic: Negative for immunocompromised state.   Neurological:  Positive for weakness. Negative for seizures and facial asymmetry.   Hematological:  Negative for adenopathy.   Psychiatric/Behavioral:  Negative for behavioral problems, self-injury and suicidal ideas.    Objective:     Vital Signs (Most Recent):  Temp: 98.1 °F (36.7 °C) (09/16/22 1240)  Pulse: 87 (09/16/22 1346)  Resp: 16 (09/16/22 1346)  BP: 128/72 (09/16/22 1240)  SpO2: (!) 93 % (09/16/22 1346)   Vital Signs (24h Range):  Temp:  [97.6 °F (36.4 °C)-99 °F (37.2 °C)] 98.1 °F (36.7 °C)  Pulse:  [82-95] 87  Resp:  [10-17] 16  SpO2:  [92 %-96 %] 93 %  BP: (117-129)/(60-72) 128/72     Weight: 46.4 kg (102 lb 4.7 oz)  Body mass index is 18.71 kg/m².    Intake/Output Summary (Last 24 hours) at 9/16/2022 1601  Last data filed at 9/16/2022 1100  Gross per 24 hour   Intake 177.44 ml   Output 1400 ml   Net -1222.56 ml      Physical Exam  Vitals and nursing note reviewed.   Constitutional:       General: She is not in acute distress.     Appearance: She is well-developed. She is ill-appearing.   HENT:      Head: Normocephalic and atraumatic.      Nose: Nose normal.   Eyes:      Extraocular Movements: Extraocular movements intact.      Pupils: Pupils are equal, round, and reactive to light.   Cardiovascular:      Rate and Rhythm: Normal rate and regular  rhythm.   Pulmonary:      Effort: No respiratory distress.      Breath sounds: No stridor. Rales present. No wheezing.   Abdominal:      General: There is no distension.   Musculoskeletal:         General: No signs of injury.      Cervical back: Normal range of motion and neck supple.   Skin:     General: Skin is warm and dry.   Neurological:      General: No focal deficit present.      Mental Status: She is alert. Mental status is at baseline.   Psychiatric:         Mood and Affect: Mood normal.       Significant Labs: All pertinent labs within the past 24 hours have been reviewed.      Significant Imaging: I have reviewed all pertinent imaging results/findings within the past 24 hours.

## 2022-09-16 NOTE — PROGRESS NOTES
[facility] Medicine  Progress Note    Patient Name: Keyona Dwyer  MRN: [id number]  Patient Class: IP- Inpatient   Admission Date: 9/11/2022  Length of Stay: 4 days  Attending Physician: Dmitri Francis, *  Primary Care Provider: Ralf Isidro MD        Subjective:     Principal Problem:Sepsis        HPI:  The pt is a 75 yo female with PMHx significant for HTN, Hyperlipidemia, chronic low back pain, OA, Osteoporosis, Prior h/o pancreatitis, PVD, COPD, Life long and current everyday smoker who was brought to the ED by her  for evaluation of 4 days h/o increasing generalized weakness to the point she was unable to get up from bed without assistance , decreased appetite , decreased food and water intake, dry cough, dry heaves, subjective fever . Since yesterday she was experiencing abd pain across the lower abd and increased urination. Denies chest pain, increased SOB, palpitations, nausea , vomiting, diarrhea, constipation, dysuria or incontinence.     Vitals on presentation 115/70, 133, 22, 100.9, 93% on RA  Labs - WBC 13.5K, Hgb 11.2, Plt 294, Na 130, K 3.8, CO2 21, Cr 0.8, Lipase 24, La 0.8, Troponin 1.946.UA- not collected     CXR- Interval development mild interstitial infiltrate involving the right lung.  Left lung is clear.  Possible small right pleural effusion.    EKG- ST, non specific T wave abnormality.      Pt was given NS bolus 1Liter , Cefepime 2 gram IV,  mg  and Heparin infusion per ACS protocol initiated in the ED and  consulted to place pt in observation for further management of sepsis due to pneumonia and elevated troponin.       Overview/Hospital Course:  9/12- Fever 101.8 overnight . Feels better this morning. Denies chest pain . Abd pain is better. No further nausea or dry heaves. SpO2 satisfactory. WBC normalized. Troponin bumped to 2.4. .Echo resulted LVEF 55%, G1DD, +WMA. Heparin gtt continues along with ASA, Plavix, statin. [facility] plans for  tomorrow if afebrile x 24h.    9/13- Tmax 99.3. BP is improving. Per family pt was little confused earlier, however mental status seems better during my exam. No focal weakness appreciated . CT head done and is neg for acute changes. Dark stool reported per nursing. Hgb dropped to 8.2>9.2>11.2. Heparin gtt, Asa, Plavix held. PPI loading dose 80 mg given . Will get NM GI bleed scan. Plan for [facility] placed on hold. WBC normal. Creatinine 0.6, BUN 13. Phos <1.0 and is being replaced. Urine culture -pending. Discussed code status . Pt and  both expressed code status be DNR.     9/14- Rapid response called early this morning. Pt was receiving bowel prep via NG tube for colonoscopy to r/o GI bleed, however developed resp distress and decreased mentation. ABG showed acute hypercapnic resp failure. Pt placed on biPAP and transferred to ICU. No further GI and cardiac intervention planned at this time. WBC increased to 16K , Hgb 11.1 post transfusion x 1 yesterday. Cr 0.8. CTA abd /pelvis neg for acute bleed.     9/15 She is aao x 3 in nad . She is on room  air .  NAEON . Plan to downgrade to tele . She is negative 1 lt  since yesterday . The pro calcitonin is trending down . Will  D/W Cardiology and GI about when to start   DAPT  for NSTEMI .  9/16 NAEON . Re started on DAPT . She is negative 1 lt since admission . PT/OT consulted . No new complaint . SLP rec soft diet .      Interval History:     Review of Systems   Constitutional:  Positive for activity change and fatigue. Negative for chills and fever.   HENT:  Negative for drooling, ear discharge and nosebleeds.    Eyes:  Negative for pain, discharge and itching.   Respiratory:  Positive for shortness of breath. Negative for choking.    Cardiovascular:  Negative for chest pain.   Gastrointestinal:  Negative for abdominal distention.   Endocrine: Negative for cold intolerance.   Genitourinary:  Negative for hematuria.   Musculoskeletal:  Positive for arthralgias.  Negative for neck stiffness.   Skin:  Negative for rash.   Allergic/Immunologic: Negative for immunocompromised state.   Neurological:  Positive for weakness. Negative for seizures and facial asymmetry.   Hematological:  Negative for adenopathy.   Psychiatric/Behavioral:  Negative for behavioral problems, self-injury and suicidal ideas.    Objective:     Vital Signs (Most Recent):  Temp: 98.1 °F (36.7 °C) (09/16/22 1240)  Pulse: 87 (09/16/22 1346)  Resp: 16 (09/16/22 1346)  BP: 128/72 (09/16/22 1240)  SpO2: (!) 93 % (09/16/22 1346)   Vital Signs (24h Range):  Temp:  [97.6 °F (36.4 °C)-99 °F (37.2 °C)] 98.1 °F (36.7 °C)  Pulse:  [82-95] 87  Resp:  [10-17] 16  SpO2:  [92 %-96 %] 93 %  BP: (117-129)/(60-72) 128/72     Weight: 46.4 kg (102 lb 4.7 oz)  Body mass index is 18.71 kg/m².    Intake/Output Summary (Last 24 hours) at 9/16/2022 1601  Last data filed at 9/16/2022 1100  Gross per 24 hour   Intake 177.44 ml   Output 1400 ml   Net -1222.56 ml      Physical Exam  Vitals and nursing note reviewed.   Constitutional:       General: She is not in acute distress.     Appearance: She is well-developed. She is ill-appearing.   HENT:      Head: Normocephalic and atraumatic.      Nose: Nose normal.   Eyes:      Extraocular Movements: Extraocular movements intact.      Pupils: Pupils are equal, round, and reactive to light.   Cardiovascular:      Rate and Rhythm: Normal rate and regular rhythm.   Pulmonary:      Effort: No respiratory distress.      Breath sounds: No stridor. Rales present. No wheezing.   Abdominal:      General: There is no distension.   Musculoskeletal:         General: No signs of injury.      Cervical back: Normal range of motion and neck supple.   Skin:     General: Skin is warm and dry.   Neurological:      General: No focal deficit present.      Mental Status: She is alert. Mental status is at baseline.   Psychiatric:         Mood and Affect: Mood normal.       Significant Labs: All pertinent labs  within the past 24 hours have been reviewed.      Significant Imaging: I have reviewed all pertinent imaging results/findings within the past 24 hours.        Assessment/Plan:      * Sepsis due to Pneumonia   This patient does have evidence of infective focus  My overall impression is sepsis. Vital signs were reviewed and noted in progress note.  Antibiotics given-   Antibiotics (From admission, onward)    None        Cultures were taken-   Microbiology Results (last 7 days)     Procedure Component Value Units Date/Time    Culture, MRSA [[id number]] Collected: 09/14/22 0956    Order Status: Completed Specimen: MRSA source from Nares, Left Updated: 09/16/22 0739     MRSA Surveillance Screen MRSA isolated    Blood Culture #1 **CANNOT BE ORDERED STAT** [[id number]] Collected: 09/11/22 1206    Order Status: Completed Specimen: Blood from Peripheral, Hand, Right Updated: 09/15/22 2012     Blood Culture, Routine No Growth to date      No Growth to date      No Growth to date      No Growth to date      No Growth to date    Blood Culture #2 **CANNOT BE ORDERED STAT** [[id number]] Collected: 09/11/22 1205    Order Status: Completed Specimen: Blood from Peripheral, Antecubital, Right Updated: 09/15/22 2012     Blood Culture, Routine No Growth to date      No Growth to date      No Growth to date      No Growth to date      No Growth to date    Urine culture [[id number]] Collected: 09/12/22 0715    Order Status: Completed Specimen: Urine Updated: 09/13/22 1852     Urine Culture, Routine No growth    Narrative:      Specimen Source->Urine        Latest lactate reviewed, they are-  No results for input(s): LACTATE in the last 72 hours.    Organ dysfunction indicated by none  Source- Lower resp tract     Source control Achieved by- Antimicrobial     SLP consult to assess aspiration risk     9/14-  ABG suggestive of acute hypercapnic resp failure   Pt placed on BiPAP and transferred to ICU.     Improving   Tx completed      Weakness    PT/OT consulted     Acute diastolic heart failure  Cont IV lasix  Cont CHF core measures  Cardiology on board       Acute respiratory failure with hypoxia and hypercarbia  Patient with Hypercapnic and Hypoxic Respiratory failure which is Acute.  she is not on home oxygen. Supplemental oxygen was provided and noted-  .   Signs/symptoms of respiratory failure include- tachypnea, increased work of breathing and lethargy. Contributing diagnoses includes - Aspiration and Pneumonia, COPD exacerbation. Labs and images were reviewed. Patient Has recent ABG, which has been reviewed. Will treat underlying causes and adjust management of respiratory failure as follows-       -Supplemental oxygen   -Ventilatory support via BiPAP  -Inhaled bronchodilators and ICS  -Antibiotic      Improving           Melena  - Reported per nursing   -H/H dropped from baseline   -Monitor H/H  -Transfuse blood for Hgb 7 or under  -ASA, Plavix and Heparin gtt held  -GI consulted       UTI (urinary tract infection)  - UA suggestive of UTI  -Follow up urine culture   -Continue Rocephin   9/13-  Urine culture - no growth     Right lower lobe pneumonia  - See plan per Sepsis       NSTEMI (non-ST elevated myocardial infarction)  - Troponin elevated at 1.946 on presentation   -Pt denies cliff chest pain or angina equivalent  -EKG with non specific T wave abnormality  - mg x 1 dose in the ED  -Continue Plavix and Statin   -Heparin gtt initiated per ACS protocol   -Trend troponin   -Consult Cardiology     9/12-  -Troponin bumped to 2.4. Heparin gtt continues along with ASA, Plavix, statin. .Echo resulted LVEF 55%, G1DD, +WMA.   [facility] plans for tomorrow if afebrile x 24h   9/13-  -H/H dropped to 8.2. Dark stool reported per nursing. ASA, Plavix and Heparin gtt held . [facility] plan placed on hold       Blood thiner on hold   OMT        Tobacco abuse disorder  Assistance with smoking cessation was offered, including:  []  Medications  [x]   Counseling  []  Printed Information on Smoking Cessation  []  Referral to a Smoking Cessation Program    Patient was counseled regarding smoking for 3-10 minutes.          PVD (peripheral vascular disease)  - Continue Plavix and Statin   9/13-  Plavix held for now until rule out GI bleed.     Anemia    H/H stable   Cont monitor   S/P EGD     COPD, group D, by GOLD 2017 classification  - Inhaled bronchodilators and ICS  -Supplemental oxygen to keep SpO2  90 to 92%  9/14-  Short course steroid added     HTN (hypertension)  - BP is soft  -Hold home antihypertensives in the setting of sepsis   -Monitor BP trend and resume once appropriate   9/13-  -BP is improving     Wean down midodrine       VTE Risk Mitigation (From admission, onward)         Ordered     IP VTE HIGH RISK PATIENT  Once         09/11/22 1324     Place sequential compression device  Until discontinued         09/11/22 1324                Discharge Planning   TOREY:      Code Status: DNR   Is the patient medically ready for discharge?:     Reason for patient still in hospital (select all that apply): Treatment  Discharge Plan A: Home with family                  Dmitri Francis MD  Department of Hospital Medicine   [facility] - Med Surg

## 2022-09-16 NOTE — PROGRESS NOTES
[facility] - Intensive [facility] ([facility])  Critical Care Medicine  Progress Note    Patient Name: Keyona Dwyer  MRN: [id number]  Admission Date: 9/11/2022  Hospital Length of Stay: 4 days  Code Status: DNR  Attending Physician: Dmitri Francis, *   Primary Care Provider: Ralf Isidro MD   Principal Problem: Sepsis      Subjective:     HPI:  76-year-old female patient past medical history HTN, Hyperlipidemia, chronic low back pain, OA, Osteoporosis, Prior h/o pancreatitis, PVD, COPD, Life long and current everyday smoker admitted to the hospital for severe weakness abdominal pain fever decreased activity appetite       Hospital/ICU Course:  Patient had an unremarkable EGD yesterday.  Overnight she DX experienced a respiratory distress ABG showed acute hypercapnic respiratory failure.  Currently dependent on continues BiPAP transferred to ICU for monitoring.  Chest x-ray ABG reviewed.  T-max on admission 101.8.  O2 sat 98% on 40% via BiPAP.  9/15 patient seen and examined.  Refused BiPAP overnight.  O2 sat 99% on 2 liter/minute.-1 L fluid balance last 24 hours.  On IV Lasix.  SOB weakness decreased activity and appetite.  ABG reviewed  9/16 seen and examined , T amx 99 , cultures unremarkable , negative 1 liter since admission , events overnight noted       9/16 seen and examined , T amx 99 , cultures unremarkable , negative 1 liter since admission , events overnight noted   Review of Systems   Constitutional:  Positive for activity change and fatigue. Negative for chills and fever.   HENT:  Negative for drooling, ear discharge and nosebleeds.    Eyes:  Negative for pain, discharge and itching.   Respiratory:  Positive for shortness of breath. Negative for choking.    Cardiovascular:  Negative for chest pain.   Gastrointestinal:  Negative for abdominal distention.   Endocrine: Negative for cold intolerance.   Genitourinary:  Negative for hematuria.   Musculoskeletal:  Positive for arthralgias. Negative for  neck stiffness.   Skin:  Negative for rash.   Allergic/Immunologic: Negative for immunocompromised state.   Neurological:  Positive for weakness. Negative for seizures and facial asymmetry.   Hematological:  Negative for adenopathy.   Psychiatric/Behavioral:  Negative for behavioral problems, self-injury and suicidal ideas.    Objective:     Vital Signs (Most Recent):  Temp: 99 °F (37.2 °C) (09/16/22 0701)  Pulse: 95 (09/16/22 0800)  Resp: 16 (09/16/22 0800)  BP: 127/64 (09/16/22 0800)  SpO2: 96 % (09/16/22 0800) Vital Signs (24h Range):  Temp:  [97.6 °F (36.4 °C)-99 °F (37.2 °C)] 99 °F (37.2 °C)  Pulse:  [82-95] 95  Resp:  [10-20] 16  SpO2:  [91 %-96 %] 96 %  BP: (103-129)/(55-66) 127/64     Weight: 46.4 kg (102 lb 4.7 oz)  Body mass index is 18.71 kg/m².      Intake/Output Summary (Last 24 hours) at 9/16/2022 0930  Last data filed at 9/16/2022 0900  Gross per 24 hour   Intake 136.98 ml   Output 2000 ml   Net -1863.02 ml         Physical Exam  Vitals and nursing note reviewed.   Constitutional:       General: She is not in acute distress.     Appearance: She is well-developed. She is ill-appearing and toxic-appearing.   HENT:      Head: Normocephalic and atraumatic.      Nose: Nose normal.   Eyes:      Extraocular Movements: Extraocular movements intact.   Cardiovascular:      Rate and Rhythm: Regular rhythm. Tachycardia present.   Pulmonary:      Effort: No respiratory distress.      Breath sounds: No stridor. Wheezing present.   Abdominal:      General: There is no distension.   Genitourinary:     Comments: Urinary external catheter  Musculoskeletal:         General: No signs of injury.      Cervical back: Normal range of motion and neck supple.   Skin:     General: Skin is warm and dry.   Neurological:      General: No focal deficit present.      Mental Status: She is alert.       Vents:  Oxygen Concentration (%): 28 (09/15/22 0720)    Lines/Drains/Airways       Drain  Duration             Female External  Urinary Catheter 09/14/22 0800 2 days              Peripheral Intravenous Line  Duration                  Peripheral IV - Single Lumen 09/11/22 0900 22 G Anterior;Left Forearm 5 days         Peripheral IV - Single Lumen 09/13/22 1945 20 G Anterior;Right Upper Arm 2 days                    Significant Labs:    CBC/Anemia Profile:  Recent Labs   Lab 09/15/22  0439 09/16/22  0438   WBC 5.85 8.03   HGB 9.9* 10.7*   HCT 30.0* 32.3*    422   MCV 91 91   RDW 13.8 13.6          Chemistries:  Recent Labs   Lab 09/15/22  0439 09/16/22  0438   * 136   K 3.8 3.7   CL 99 93*   CO2 19* 23   BUN 12 21   CREATININE 0.7 0.9   CALCIUM 6.8* 7.5*   ALBUMIN 2.2* 2.4*   PROT 4.9* 5.3*   BILITOT 0.2 0.2   ALKPHOS 73 68   ALT 37 29   AST 46* 24   MG 1.9 1.9   PHOS 3.0 2.5*     EKG 09/14/2022   Vent. Rate : 128 BPM     Atrial Rate : 129 BPM      P-R Int : 136 ms          QRS Dur : 068 ms       QT Int : 394 ms       P-R-T Axes : 060 012 032 degrees      QTc Int : 575 ms     Sinus tachycardia   Low voltage QRS   Cannot rule out Anterior infarct ,age undetermined   Abnormal ECG      2D echo 09/11/2022   left ventricle is normal in size with concentric hypertrophy and normal systolic function.   The estimated ejection fraction is 55%.   Grade I left ventricular diastolic dysfunction.   Normal right ventricular size with normal right ventricular systolic function.   Moderate left atrial enlargement.   Mild tricuspid regurgitation.   There are segmental left ventricular wall motion abnormalities.   There is pulmonary hypertension.   The estimated PA systolic pressure is 45 mmHg.   Latest Reference Range & Units 09/12/22 15:48 09/13/22 04:35   Troponin I 0.000 - 0.026 ng/mL 2.358 (H) 1.691 (H)        Latest Reference Range & Units 09/14/22 07:26 09/14/22 16:46   POC PH 7.35 - 7.45  7.087 (LL) 7.365   POC PCO2 35 - 45 mmHg 77.3 (HH) 40.6   POC PO2 80 - 100 mmHg 124 (H) 130 (H)   POC HCO3 24 - 28 mmol/L 23.3 (L) 23.2 (L)   POC  SATURATED O2 95 - 100 % 97 99   POC BE -2 to 2 mmol/L -7 -2   FiO2  100 40   Flow  15    Sample  ARTERIAL ARTERIAL   DelSys  NRB CPAP/BiPAP   Allens Test  Pass Pass   Site  LR RR   Mode  SPONT BiPAP   Cultures unremarkable     PFT personally reviewed 2019 moderately severe COPD and hyperinflation        Latest Reference Range & Units 09/13/22 04:35 09/15/22 04:39   BNP 0 - 99 pg/mL  1,234 (H)   Troponin I 0.000 - 0.026 ng/mL 1.691 (H)    (H): Data is abnormally high  Significant Imaging:     Chest x-ray 09/14/2022     CLINICAL HISTORY:  tachycardia;     TECHNIQUE:  Single frontal view of the chest was performed.     COMPARISON:  09/11/2022.     FINDINGS:  Heart size is not enlarged.  Aorta demonstrates atherosclerotic disease.  Diffuse chronic interstitial opacities are seen throughout the lungs.  More focal infiltrates are suspected the lung bases which could reflect airspace disease or aspiration.  Trace right pleural effusion.  No pneumothorax.  Advanced degenerative changes.    In comparison to the prior study, there is no additional interval changes.          CT abdomen pelvis 09/13/2022       Impression:     Right basilar pneumonia with associated right-sided mild parapneumonic effusion.  Trace left-sided pleural effusion     Occlusion of the right common iliac artery with reconstitution downstream.  Narrowing of the left common iliac artery likely hemodynamically significant        Low-dose screening CT chest 2020     COMPARISON:  04/26/2018     FINDINGS:  Lungs: There small noncalcified nodular opacities in the lungs bilaterally.  The largest opacity in the right lung appears solid and measures 0.2 cm on series 4, image 208 in the upper lobe.  The largest opacity in the left lung appears solid and measures 0.2 cm on series 4, image 343 in the left lower lobe.  The lungs show findings consistent with emphysema.     Pleura:   No effusion..     Heart and pericardium: Normal size without effusion.     Aorta and  vasculature: Atherosclerosis including coronary arteries.     Chest wall and skeletal structures: Unremarkable except age-appropriate degenerative changes.  There is a single lead present in the spinal canal, terminating in the lower thoracic spine.     Upper abdomen: Unremarkable.     Impression:     Lung-RADS Category:  2 - Benign Appearance or Behavior - continue annual screening with LDCT in 12 months.               ABG  Recent Labs   Lab 09/14/22  1646   PH 7.365   PO2 130*   PCO2 40.6   HCO3 23.2*   BE -2     Assessment/Plan:     Pulmonary  Acute respiratory failure with hypoxia and hypercarbia  Patient with Hypercapnic and Hypoxic Respiratory failure which is Acute.  she is not on home oxygen. Supplemental oxygen was provided and noted-  .   Signs/symptoms of respiratory failure include- tachypnea, increased work of breathing and respiratory distress. Contributing diagnoses includes - CHF, COPD and Pneumonia Labs and images were reviewed. Patient Has recent ABG, which has been reviewed. Will treat underlying causes and adjust management of respiratory failure as follows- nebs IV Solu-Medrol IV antibiotic  9/15 albuterol Atrovent ICS Laba.  Home O2 evaluation on discharge.  9/16 O2 duojneb , ICS LABA , dc solumedrol     Right lower lobe pneumonia  Check respiratory culture blood culture.  A right lung pneumonia?  Aspiration versus community-acquired pneumonia.  IV Rocephin azithromycin    9/15 favorable trend of procalcitonin on IV Rocephin.  Completed azithromycin.  Complete 5 days of Rocephin    Cardiac/Vascular  Acute diastolic heart failure   Latest Reference Range & Units 09/15/22 04:39   BNP 0 - 99 pg/mL 1,234 (H)   (H): Data is abnormally high    IV LASIX 40 MG X 2 DOSES strict I&Os.   9/16 lasix 20 mg daily home dose     NSTEMI (non-ST elevated myocardial infarction)  Cardiac monitoring.  Likely demand ischemia.  Beta-blocker statin.  9/16 BB , statins , Ranexa , plavix     Renal/  UTI (urinary tract  infection)  On IV Rocephin check urine culture    ID  * Sepsis due to Pneumonia   This patient does have evidence of infective focus  My overall impression is sepsis. Vital signs were reviewed and noted in progress note.  Antibiotics given-   Antibiotics (From admission, onward)    None        Cultures were taken-   Microbiology Results (last 7 days)     Procedure Component Value Units Date/Time    Culture, MRSA [[id number]] Collected: 09/14/22 0956    Order Status: Completed Specimen: MRSA source from Nares, Left Updated: 09/16/22 0739     MRSA Surveillance Screen MRSA isolated    Blood Culture #1 **CANNOT BE ORDERED STAT** [[id number]] Collected: 09/11/22 1206    Order Status: Completed Specimen: Blood from Peripheral, Hand, Right Updated: 09/15/22 2012     Blood Culture, Routine No Growth to date      No Growth to date      No Growth to date      No Growth to date      No Growth to date    Blood Culture #2 **CANNOT BE ORDERED STAT** [[id number]] Collected: 09/11/22 1205    Order Status: Completed Specimen: Blood from Peripheral, Antecubital, Right Updated: 09/15/22 2012     Blood Culture, Routine No Growth to date      No Growth to date      No Growth to date      No Growth to date      No Growth to date    Urine culture [[id number]] Collected: 09/12/22 0715    Order Status: Completed Specimen: Urine Updated: 09/13/22 1852     Urine Culture, Routine No growth    Narrative:      Specimen Source->Urine        Latest lactate reviewed, they are-  No results for input(s): LACTATE in the last 72 hours.    Organ dysfunction indicated by Encephalopathy  and Acute respiratory failure  Source- right lung pneumonia    Source control Achieved by- IV Rocephin azithromycin blood culture respiratory cultures  9/15 completed 5 days of Rocephin.  Completed azithromycin favorable trend of procalcitonin.  9/16 cx unremarkanle , afebnile off abx       GI  Melena  Transfuse for hemoglobin less than 7.  Colonoscopy when stable.  9/15  colonoscopy once stable.  Can be done electively.  No overt bleeding      Critical Care Daily Checklist:    A: Awake: RASS Goal/Actual Goal:    Actual:     B: Spontaneous Breathing Trial Performed?     C: SAT & SBT Coordinated?  Not intubated                      D: Delirium: CAM-ICU Overall CAM-ICU: Negative   E: Early Mobility Performed? Yes   F: Feeding Goal:    Status:     Current Diet Order   Procedures    Diet Cardiac      AS: Analgesia/Sedation Not sedated   T: Thromboembolic Prophylaxis Mechanical prophylaxis   H: HOB > 300 Yes   U: Stress Ulcer Prophylaxis (if needed) PPI   G: Glucose Control FINGERSTICK P.R.N.   B: Bowel Function Stool Occurrence: 1   I: Indwelling Catheter (Lines & Flores) Necessity Strict I&Os external catheter   D: De-escalation of Antimicrobials/Pharmacotherapies off abx     Plan for the day/ETD P.r.n. BiPAP po lasix     Code Status:  Family/Goals of Care: DNR  Yes      Critical Care Time: 35 minutes  Critical secondary to Patient has a condition that poses threat to life and bodily function:  MI complicating  Acute hypoxemic and hypercapnic respiratory failure complicating moderately severe COPD and CHF      Critical care was time spent personally by me on the following activities: development of treatment plan with patient or surrogate and bedside caregivers, discussions with consultants, evaluation of patient's response to treatment, examination of patient, ordering and performing treatments and interventions, ordering and review of laboratory studies, ordering and review of radiographic studies, pulse oximetry, re-evaluation of patient's condition. This critical care time did not overlap with that of any other provider or involve time for any procedures.      Pam Beyer MD  Critical Care Medicine  [facility] - Intensive Care ([facility])

## 2022-09-16 NOTE — ASSESSMENT & PLAN NOTE
Latest Reference Range & Units 09/15/22 04:39   BNP 0 - 99 pg/mL 1,234 (H)   (H): Data is abnormally high    IV LASIX 40 MG X 2 DOSES strict I&Os.   9/16 lasix 20 mg daily home dose

## 2022-09-16 NOTE — SUBJECTIVE & OBJECTIVE
9/16 seen and examined , T amx 99 , cultures unremarkable , negative 1 liter since admission , events overnight noted   Review of Systems   Constitutional:  Positive for activity change and fatigue. Negative for chills and fever.   HENT:  Negative for drooling, ear discharge and nosebleeds.    Eyes:  Negative for pain, discharge and itching.   Respiratory:  Positive for shortness of breath. Negative for choking.    Cardiovascular:  Negative for chest pain.   Gastrointestinal:  Negative for abdominal distention.   Endocrine: Negative for cold intolerance.   Genitourinary:  Negative for hematuria.   Musculoskeletal:  Positive for arthralgias. Negative for neck stiffness.   Skin:  Negative for rash.   Allergic/Immunologic: Negative for immunocompromised state.   Neurological:  Positive for weakness. Negative for seizures and facial asymmetry.   Hematological:  Negative for adenopathy.   Psychiatric/Behavioral:  Negative for behavioral problems, self-injury and suicidal ideas.    Objective:     Vital Signs (Most Recent):  Temp: 99 °F (37.2 °C) (09/16/22 0701)  Pulse: 95 (09/16/22 0800)  Resp: 16 (09/16/22 0800)  BP: 127/64 (09/16/22 0800)  SpO2: 96 % (09/16/22 0800) Vital Signs (24h Range):  Temp:  [97.6 °F (36.4 °C)-99 °F (37.2 °C)] 99 °F (37.2 °C)  Pulse:  [82-95] 95  Resp:  [10-20] 16  SpO2:  [91 %-96 %] 96 %  BP: (103-129)/(55-66) 127/64     Weight: 46.4 kg (102 lb 4.7 oz)  Body mass index is 18.71 kg/m².      Intake/Output Summary (Last 24 hours) at 9/16/2022 0930  Last data filed at 9/16/2022 0900  Gross per 24 hour   Intake 136.98 ml   Output 2000 ml   Net -1863.02 ml         Physical Exam  Vitals and nursing note reviewed.   Constitutional:       General: She is not in acute distress.     Appearance: She is well-developed. She is ill-appearing and toxic-appearing.   HENT:      Head: Normocephalic and atraumatic.      Nose: Nose normal.   Eyes:      Extraocular Movements: Extraocular movements intact.    Cardiovascular:      Rate and Rhythm: Regular rhythm. Tachycardia present.   Pulmonary:      Effort: No respiratory distress.      Breath sounds: No stridor. Wheezing present.   Abdominal:      General: There is no distension.   Genitourinary:     Comments: Urinary external catheter  Musculoskeletal:         General: No signs of injury.      Cervical back: Normal range of motion and neck supple.   Skin:     General: Skin is warm and dry.   Neurological:      General: No focal deficit present.      Mental Status: She is alert.       Vents:  Oxygen Concentration (%): 28 (09/15/22 0720)    Lines/Drains/Airways       Drain  Duration             Female External Urinary Catheter 09/14/22 0800 2 days              Peripheral Intravenous Line  Duration                  Peripheral IV - Single Lumen 09/11/22 0900 22 G Anterior;Left Forearm 5 days         Peripheral IV - Single Lumen 09/13/22 1945 20 G Anterior;Right Upper Arm 2 days                    Significant Labs:    CBC/Anemia Profile:  Recent Labs   Lab 09/15/22  0439 09/16/22  0438   WBC 5.85 8.03   HGB 9.9* 10.7*   HCT 30.0* 32.3*    422   MCV 91 91   RDW 13.8 13.6          Chemistries:  Recent Labs   Lab 09/15/22  0439 09/16/22  0438   * 136   K 3.8 3.7   CL 99 93*   CO2 19* 23   BUN 12 21   CREATININE 0.7 0.9   CALCIUM 6.8* 7.5*   ALBUMIN 2.2* 2.4*   PROT 4.9* 5.3*   BILITOT 0.2 0.2   ALKPHOS 73 68   ALT 37 29   AST 46* 24   MG 1.9 1.9   PHOS 3.0 2.5*     EKG 09/14/2022   Vent. Rate : 128 BPM     Atrial Rate : 129 BPM      P-R Int : 136 ms          QRS Dur : 068 ms       QT Int : 394 ms       P-R-T Axes : 060 012 032 degrees      QTc Int : 575 ms     Sinus tachycardia   Low voltage QRS   Cannot rule out Anterior infarct ,age undetermined   Abnormal ECG     2D echo 09/11/2022  left ventricle is normal in size with concentric hypertrophy and normal systolic function.  The estimated ejection fraction is 55%.  Grade I left ventricular diastolic  dysfunction.  Normal right ventricular size with normal right ventricular systolic function.  Moderate left atrial enlargement.  Mild tricuspid regurgitation.  There are segmental left ventricular wall motion abnormalities.  There is pulmonary hypertension.  The estimated PA systolic pressure is 45 mmHg.   Latest Reference Range & Units 09/12/22 15:48 09/13/22 04:35   Troponin I 0.000 - 0.026 ng/mL 2.358 (H) 1.691 (H)        Latest Reference Range & Units 09/14/22 07:26 09/14/22 16:46   POC PH 7.35 - 7.45  7.087 (LL) 7.365   POC PCO2 35 - 45 mmHg 77.3 (HH) 40.6   POC PO2 80 - 100 mmHg 124 (H) 130 (H)   POC HCO3 24 - 28 mmol/L 23.3 (L) 23.2 (L)   POC SATURATED O2 95 - 100 % 97 99   POC BE -2 to 2 mmol/L -7 -2   FiO2  100 40   Flow  15    Sample  ARTERIAL ARTERIAL   DelSys  NRB CPAP/BiPAP   Allens Test  Pass Pass   Site  LR RR   Mode  SPONT BiPAP   Cultures unremarkable     PFT personally reviewed 2019 moderately severe COPD and hyperinflation        Latest Reference Range & Units 09/13/22 04:35 09/15/22 04:39   BNP 0 - 99 pg/mL  1,234 (H)   Troponin I 0.000 - 0.026 ng/mL 1.691 (H)    (H): Data is abnormally high  Significant Imaging:     Chest x-ray 09/14/2022     CLINICAL HISTORY:  tachycardia;     TECHNIQUE:  Single frontal view of the chest was performed.     COMPARISON:  09/11/2022.     FINDINGS:  Heart size is not enlarged.  Aorta demonstrates atherosclerotic disease.  Diffuse chronic interstitial opacities are seen throughout the lungs.  More focal infiltrates are suspected the lung bases which could reflect airspace disease or aspiration.  Trace right pleural effusion.  No pneumothorax.  Advanced degenerative changes.    In comparison to the prior study, there is no additional interval changes.          CT abdomen pelvis 09/13/2022       Impression:     Right basilar pneumonia with associated right-sided mild parapneumonic effusion.  Trace left-sided pleural effusion     Occlusion of the right common iliac  artery with reconstitution downstream.  Narrowing of the left common iliac artery likely hemodynamically significant        Low-dose screening CT chest 2020     COMPARISON:  04/26/2018     FINDINGS:  Lungs: There small noncalcified nodular opacities in the lungs bilaterally.  The largest opacity in the right lung appears solid and measures 0.2 cm on series 4, image 208 in the upper lobe.  The largest opacity in the left lung appears solid and measures 0.2 cm on series 4, image 343 in the left lower lobe.  The lungs show findings consistent with emphysema.     Pleura:   No effusion..     Heart and pericardium: Normal size without effusion.     Aorta and vasculature: Atherosclerosis including coronary arteries.     Chest wall and skeletal structures: Unremarkable except age-appropriate degenerative changes.  There is a single lead present in the spinal canal, terminating in the lower thoracic spine.     Upper abdomen: Unremarkable.     Impression:     Lung-RADS Category:  2 - Benign Appearance or Behavior - continue annual screening with LDCT in 12 months.

## 2022-09-16 NOTE — ASSESSMENT & PLAN NOTE
This patient does have evidence of infective focus  My overall impression is sepsis. Vital signs were reviewed and noted in progress note.  Antibiotics given-   Antibiotics (From admission, onward)    None        Cultures were taken-   Microbiology Results (last 7 days)     Procedure Component Value Units Date/Time    Culture, MRSA [[id number]] Collected: 09/14/22 0956    Order Status: Completed Specimen: MRSA source from Nares, Left Updated: 09/16/22 0739     MRSA Surveillance Screen MRSA isolated    Blood Culture #1 **CANNOT BE ORDERED STAT** [[id number]] Collected: 09/11/22 1206    Order Status: Completed Specimen: Blood from Peripheral, Hand, Right Updated: 09/15/22 2012     Blood Culture, Routine No Growth to date      No Growth to date      No Growth to date      No Growth to date      No Growth to date    Blood Culture #2 **CANNOT BE ORDERED STAT** [[id number]] Collected: 09/11/22 1205    Order Status: Completed Specimen: Blood from Peripheral, Antecubital, Right Updated: 09/15/22 2012     Blood Culture, Routine No Growth to date      No Growth to date      No Growth to date      No Growth to date      No Growth to date    Urine culture [[id number]] Collected: 09/12/22 0715    Order Status: Completed Specimen: Urine Updated: 09/13/22 1852     Urine Culture, Routine No growth    Narrative:      Specimen Source->Urine        Latest lactate reviewed, they are-  No results for input(s): LACTATE in the last 72 hours.    Organ dysfunction indicated by Encephalopathy  and Acute respiratory failure  Source- right lung pneumonia    Source control Achieved by- IV Rocephin azithromycin blood culture respiratory cultures  9/15 completed 5 days of Rocephin.  Completed azithromycin favorable trend of procalcitonin.  9/16 cx unremarkanle , afebnile off abx

## 2022-09-16 NOTE — PLAN OF CARE
Pt AAOx4. VSS. Remains on room air. Purewick in place, adequate urine output. Pt repositioned independently q2h. Heels floated. No significant changes this shift. Bed low and locked. Call light within reach. Bed alarm on. Will continue to monitor.

## 2022-09-16 NOTE — ASSESSMENT & PLAN NOTE
- Troponin elevated at 1.946 on presentation   -Pt denies cliff chest pain or angina equivalent  -EKG with non specific T wave abnormality  - mg x 1 dose in the ED  -Continue Plavix and Statin   -Heparin gtt initiated per [facility] protocol   -Trend troponin   -Consult Cardiology     9/12-  -Troponin bumped to 2.4. Heparin gtt continues along with ASA, Plavix, statin. .Echo resulted LVEF 55%, G1DD, +WMA.   [facility] plans for tomorrow if afebrile x 24h   9/13-  -H/H dropped to 8.2. Dark stool reported per nursing. ASA, Plavix and Heparin gtt held . [facility] plan placed on hold       Blood thiner on hold   OMT

## 2022-09-17 VITALS
BODY MASS INDEX: 18.83 KG/M2 | WEIGHT: 102.31 LBS | HEIGHT: 62 IN | RESPIRATION RATE: 20 BRPM | HEART RATE: 79 BPM | TEMPERATURE: 98 F | DIASTOLIC BLOOD PRESSURE: 66 MMHG | OXYGEN SATURATION: 91 % | SYSTOLIC BLOOD PRESSURE: 140 MMHG

## 2022-09-17 LAB
ALBUMIN SERPL BCP-MCNC: 2.7 G/DL (ref 3.5–5.2)
ALP SERPL-CCNC: 64 U/L (ref 55–135)
ALT SERPL W/O P-5'-P-CCNC: 25 U/L (ref 10–44)
ANION GAP SERPL CALC-SCNC: 17 MMOL/L (ref 8–16)
AST SERPL-CCNC: 17 U/L (ref 10–40)
BASOPHILS # BLD AUTO: 0 K/UL (ref 0–0.2)
BASOPHILS NFR BLD: 0 % (ref 0–1.9)
BILIRUB SERPL-MCNC: 0.4 MG/DL (ref 0.1–1)
BNP SERPL-MCNC: 1562 PG/ML (ref 0–99)
BUN SERPL-MCNC: 24 MG/DL (ref 8–23)
CALCIUM SERPL-MCNC: 7.8 MG/DL (ref 8.7–10.5)
CHLORIDE SERPL-SCNC: 92 MMOL/L (ref 95–110)
CO2 SERPL-SCNC: 29 MMOL/L (ref 23–29)
CREAT SERPL-MCNC: 1 MG/DL (ref 0.5–1.4)
DIFFERENTIAL METHOD: ABNORMAL
EOSINOPHIL # BLD AUTO: 0 K/UL (ref 0–0.5)
EOSINOPHIL NFR BLD: 0 % (ref 0–8)
ERYTHROCYTE [DISTWIDTH] IN BLOOD BY AUTOMATED COUNT: 13.6 % (ref 11.5–14.5)
EST. GFR  (NO RACE VARIABLE): 58 ML/MIN/1.73 M^2
GLUCOSE SERPL-MCNC: 91 MG/DL (ref 70–110)
HCT VFR BLD AUTO: 35.9 % (ref 37–48.5)
HGB BLD-MCNC: 11.2 G/DL (ref 12–16)
IMM GRANULOCYTES # BLD AUTO: 0.08 K/UL (ref 0–0.04)
IMM GRANULOCYTES NFR BLD AUTO: 1.2 % (ref 0–0.5)
LYMPHOCYTES # BLD AUTO: 0.8 K/UL (ref 1–4.8)
LYMPHOCYTES NFR BLD: 12.4 % (ref 18–48)
MAGNESIUM SERPL-MCNC: 1.9 MG/DL (ref 1.6–2.6)
MCH RBC QN AUTO: 29.2 PG (ref 27–31)
MCHC RBC AUTO-ENTMCNC: 31.2 G/DL (ref 32–36)
MCV RBC AUTO: 94 FL (ref 82–98)
MONOCYTES # BLD AUTO: 0.8 K/UL (ref 0.3–1)
MONOCYTES NFR BLD: 12.2 % (ref 4–15)
NEUTROPHILS # BLD AUTO: 5 K/UL (ref 1.8–7.7)
NEUTROPHILS NFR BLD: 74.2 % (ref 38–73)
NRBC BLD-RTO: 0 /100 WBC
PHOSPHATE SERPL-MCNC: 1.2 MG/DL (ref 2.7–4.5)
PLATELET # BLD AUTO: 522 K/UL (ref 150–450)
PMV BLD AUTO: 9.2 FL (ref 9.2–12.9)
POTASSIUM SERPL-SCNC: 3.5 MMOL/L (ref 3.5–5.1)
PROT SERPL-MCNC: 5.7 G/DL (ref 6–8.4)
RBC # BLD AUTO: 3.83 M/UL (ref 4–5.4)
SODIUM SERPL-SCNC: 138 MMOL/L (ref 136–145)
WBC # BLD AUTO: 6.78 K/UL (ref 3.9–12.7)

## 2022-09-17 PROCEDURE — 80053 COMPREHEN METABOLIC PANEL: CPT | Performed by: INTERNAL MEDICINE

## 2022-09-17 PROCEDURE — 85025 COMPLETE CBC W/AUTO DIFF WBC: CPT | Performed by: INTERNAL MEDICINE

## 2022-09-17 PROCEDURE — 97162 PT EVAL MOD COMPLEX 30 MIN: CPT

## 2022-09-17 PROCEDURE — 94618 PULMONARY STRESS TESTING: CPT

## 2022-09-17 PROCEDURE — 25000242 PHARM REV CODE 250 ALT 637 W/ HCPCS: Performed by: INTERNAL MEDICINE

## 2022-09-17 PROCEDURE — 99233 SBSQ HOSP IP/OBS HIGH 50: CPT | Mod: ,,, | Performed by: INTERNAL MEDICINE

## 2022-09-17 PROCEDURE — 94640 AIRWAY INHALATION TREATMENT: CPT

## 2022-09-17 PROCEDURE — 92526 ORAL FUNCTION THERAPY: CPT

## 2022-09-17 PROCEDURE — 83735 ASSAY OF MAGNESIUM: CPT | Performed by: INTERNAL MEDICINE

## 2022-09-17 PROCEDURE — 99900035 HC TECH TIME PER 15 MIN (STAT)

## 2022-09-17 PROCEDURE — 97167 OT EVAL HIGH COMPLEX 60 MIN: CPT

## 2022-09-17 PROCEDURE — 63600175 PHARM REV CODE 636 W HCPCS: Performed by: INTERNAL MEDICINE

## 2022-09-17 PROCEDURE — 97530 THERAPEUTIC ACTIVITIES: CPT

## 2022-09-17 PROCEDURE — 25000003 PHARM REV CODE 250: Performed by: INTERNAL MEDICINE

## 2022-09-17 PROCEDURE — 97116 GAIT TRAINING THERAPY: CPT

## 2022-09-17 PROCEDURE — S4991 NICOTINE PATCH NONLEGEND: HCPCS | Performed by: INTERNAL MEDICINE

## 2022-09-17 PROCEDURE — 94761 N-INVAS EAR/PLS OXIMETRY MLT: CPT

## 2022-09-17 PROCEDURE — 92521 EVALUATION OF SPEECH FLUENCY: CPT

## 2022-09-17 PROCEDURE — 83880 ASSAY OF NATRIURETIC PEPTIDE: CPT | Performed by: INTERNAL MEDICINE

## 2022-09-17 PROCEDURE — 25000003 PHARM REV CODE 250: Performed by: PHYSICIAN ASSISTANT

## 2022-09-17 PROCEDURE — 36415 COLL VENOUS BLD VENIPUNCTURE: CPT | Performed by: INTERNAL MEDICINE

## 2022-09-17 PROCEDURE — 84100 ASSAY OF PHOSPHORUS: CPT | Performed by: INTERNAL MEDICINE

## 2022-09-17 PROCEDURE — 99233 PR SUBSEQUENT HOSPITAL CARE,LEVL III: ICD-10-PCS | Mod: ,,, | Performed by: INTERNAL MEDICINE

## 2022-09-17 RX ORDER — RANOLAZINE 500 MG/1
500 TABLET, EXTENDED RELEASE ORAL 2 TIMES DAILY
Qty: 60 TABLET | Refills: 3 | Status: SHIPPED | OUTPATIENT
Start: 2022-09-17 | End: 2022-09-18

## 2022-09-17 RX ORDER — FUROSEMIDE 20 MG/1
TABLET ORAL
Qty: 30 TABLET | Refills: 2 | Status: SHIPPED | OUTPATIENT
Start: 2022-09-17 | End: 2023-07-05

## 2022-09-17 RX ORDER — METOPROLOL SUCCINATE 25 MG/1
12.5 TABLET, EXTENDED RELEASE ORAL DAILY
Qty: 45 TABLET | Refills: 3 | Status: SHIPPED | OUTPATIENT
Start: 2022-09-18 | End: 2022-09-17 | Stop reason: SDUPTHER

## 2022-09-17 RX ORDER — ASPIRIN 81 MG/1
81 TABLET ORAL DAILY
Qty: 90 TABLET | Refills: 3 | Status: SHIPPED | OUTPATIENT
Start: 2022-09-18 | End: 2023-02-17 | Stop reason: SDUPTHER

## 2022-09-17 RX ORDER — FUROSEMIDE 20 MG/1
TABLET ORAL
Qty: 30 TABLET | Refills: 2 | Status: SHIPPED | OUTPATIENT
Start: 2022-09-17 | End: 2022-09-17 | Stop reason: SDUPTHER

## 2022-09-17 RX ORDER — CLOPIDOGREL BISULFATE 75 MG/1
75 TABLET ORAL DAILY
Qty: 90 TABLET | Refills: 2 | Status: SHIPPED | OUTPATIENT
Start: 2022-09-17 | End: 2023-02-17 | Stop reason: SDUPTHER

## 2022-09-17 RX ORDER — ASPIRIN 81 MG/1
81 TABLET ORAL DAILY
Qty: 90 TABLET | Refills: 3 | Status: SHIPPED | OUTPATIENT
Start: 2022-09-18 | End: 2022-09-17 | Stop reason: SDUPTHER

## 2022-09-17 RX ORDER — METOPROLOL SUCCINATE 25 MG/1
12.5 TABLET, EXTENDED RELEASE ORAL DAILY
Qty: 45 TABLET | Refills: 3 | Status: SHIPPED | OUTPATIENT
Start: 2022-09-18 | End: 2023-02-17 | Stop reason: SDUPTHER

## 2022-09-17 RX ADMIN — PANTOPRAZOLE SODIUM 40 MG: 40 TABLET, DELAYED RELEASE ORAL at 08:09

## 2022-09-17 RX ADMIN — CLOPIDOGREL 75 MG: 75 TABLET, FILM COATED ORAL at 08:09

## 2022-09-17 RX ADMIN — DULOXETINE 30 MG: 30 CAPSULE, DELAYED RELEASE ORAL at 08:09

## 2022-09-17 RX ADMIN — ARFORMOTEROL TARTRATE 15 MCG: 15 SOLUTION RESPIRATORY (INHALATION) at 07:09

## 2022-09-17 RX ADMIN — METOPROLOL SUCCINATE 12.5 MG: 25 TABLET, EXTENDED RELEASE ORAL at 08:09

## 2022-09-17 RX ADMIN — FUROSEMIDE 40 MG: 10 INJECTION, SOLUTION INTRAMUSCULAR; INTRAVENOUS at 08:09

## 2022-09-17 RX ADMIN — BUPROPION HYDROCHLORIDE 75 MG: 75 TABLET, FILM COATED ORAL at 08:09

## 2022-09-17 RX ADMIN — BUDESONIDE 0.5 MG: 0.5 INHALANT ORAL at 07:09

## 2022-09-17 RX ADMIN — IPRATROPIUM BROMIDE AND ALBUTEROL SULFATE 3 ML: 2.5; .5 SOLUTION RESPIRATORY (INHALATION) at 07:09

## 2022-09-17 RX ADMIN — RANOLAZINE 500 MG: 500 TABLET, EXTENDED RELEASE ORAL at 08:09

## 2022-09-17 RX ADMIN — NICOTINE 1 PATCH: 7 PATCH, EXTENDED RELEASE TRANSDERMAL at 08:09

## 2022-09-17 RX ADMIN — BUSPIRONE HYDROCHLORIDE 10 MG: 10 TABLET ORAL at 08:09

## 2022-09-17 RX ADMIN — ASPIRIN 81 MG: 81 TABLET, COATED ORAL at 08:09

## 2022-09-17 RX ADMIN — MIDODRINE HYDROCHLORIDE 2.5 MG: 2.5 TABLET ORAL at 08:09

## 2022-09-17 NOTE — PLAN OF CARE
Referral sent to [facility]. Pending approval. Left message with [facility] for call back.Joyce Sauer LMSW 9/17/2022 12:08 PM    Accepted to [facility] via [employer].Joyce Sauer LMSW 9/17/2022 12:28 PM

## 2022-09-17 NOTE — PROGRESS NOTES
Home Oxygen Evaluation - [facility] - Cardiopulmonary Department      Date Performed: 9/17/2022      1) Patient's Home O2 Sat on room air, while at rest: Room Air SpO2 At Rest: 93 %        If O2 sats on room air at rest are 88% or below, patient qualifies.  Document O2 liter flow needed in Step 2.  If O2 sats are 89% or above, complete Step 3.        2)  If patient is not ambulated and O2 sats are <88%, what is the O2 liter flow required to meet ordered saturation?      If O2 sats on room air while exercising remain 89% or above patient does not qualify, no further testing needed Document N/A in step 3. If O2 sats on room air while exercising are 88% or below, continue to Step 4.    3) Patient's O2 Sat on room air while exercising: Room Air SpO2 During Ambulation: 91 %        4) Patient's O2 Sat while exercising on O2:   at           (Must show improvement from #4 for patients to qualify)

## 2022-09-17 NOTE — ASSESSMENT & PLAN NOTE
Patient with Hypercapnic and Hypoxic Respiratory failure which is Acute.  she is not on home oxygen. Supplemental oxygen was provided and noted-  .   Signs/symptoms of respiratory failure include- tachypnea, increased work of breathing and respiratory distress. Contributing diagnoses includes - CHF, COPD and Pneumonia Labs and images were reviewed. Patient Has recent ABG, which has been reviewed. Will treat underlying causes and adjust management of respiratory failure as follows- nebs IV Solu-Medrol IV antibiotic  9/15 albuterol Atrovent ICS Laba.  Home O2 evaluation on discharge.  9/16 O2 duojneb , ICS LABA , dc solumedrol   9/17 BNP 1500.  IV Lasix.  Lasix p.o. on DC

## 2022-09-17 NOTE — PLAN OF CARE
PT evolve complete The following goals will be met in 14 days  Pt will be MOD IND with bed mobility  Pt Mod IND with transfers  Pt Amb 50 ft with RW with SPV

## 2022-09-17 NOTE — PROGRESS NOTES
[facility]  Pulmonology  Progress Note    Patient Name: Keyona Dwyer  MRN: [id number]  Admission Date: 9/11/2022  Hospital Length of Stay: 5 days  Code Status: DNR  Attending Provider: Dmitri Francis, *  Primary Care Provider: Ralf Isidro MD   Principal Problem: Sepsis    Subjective:     9/17 seen and examined.  O2 sat 91% on room air.  Did not qualify for home O2 on home O2 evaluation.  Elevated BNP.  On IV Lasix.  Sitting in chair.  Weakness shortness of breath.  Review of Systems   Constitutional:  Positive for activity change and fatigue. Negative for chills and fever.   HENT:  Negative for drooling, ear discharge and nosebleeds.    Eyes:  Negative for pain, discharge and itching.   Respiratory:  Positive for shortness of breath. Negative for choking.    Cardiovascular:  Negative for chest pain.   Gastrointestinal:  Negative for abdominal distention.   Endocrine: Negative for cold intolerance.   Genitourinary:  Negative for hematuria.   Musculoskeletal:  Positive for arthralgias. Negative for neck stiffness.   Skin:  Negative for rash.   Allergic/Immunologic: Negative for immunocompromised state.   Neurological:  Positive for weakness. Negative for seizures and facial asymmetry.   Hematological:  Negative for adenopathy.   Psychiatric/Behavioral:  Negative for behavioral problems, self-injury and suicidal ideas.    Objective:     Vital Signs (Most Recent):  Temp: 98.1 °F (36.7 °C) (09/17/22 1200)  Pulse: 79 (09/17/22 1200)  Resp: 20 (09/17/22 1200)  BP: (!) 140/66 (09/17/22 1200)  SpO2: (!) 91 % (09/17/22 1200) Vital Signs (24h Range):  Temp:  [97.6 °F (36.4 °C)-98.3 °F (36.8 °C)] 98.1 °F (36.7 °C)  Pulse:  [78-97] 79  Resp:  [14-20] 20  SpO2:  [84 %-98 %] 91 %  BP: (116-168)/(66-80) 140/66     Weight: 46.4 kg (102 lb 4.7 oz)  Body mass index is 18.71 kg/m².      Intake/Output Summary (Last 24 hours) at 9/17/2022 1306  Last data filed at 9/16/2022 1700  Gross per 24 hour   Intake 50 ml    Output --   Net 50 ml         Physical Exam  Vitals and nursing note reviewed.   Constitutional:       General: She is not in acute distress.     Appearance: She is well-developed. She is ill-appearing and toxic-appearing.   HENT:      Head: Normocephalic and atraumatic.      Nose: Nose normal.   Eyes:      Extraocular Movements: Extraocular movements intact.   Cardiovascular:      Rate and Rhythm: Regular rhythm. Tachycardia present.   Pulmonary:      Effort: No respiratory distress.      Breath sounds: No stridor. Wheezing present.   Abdominal:      General: There is no distension.   Genitourinary:     Comments: Urinary external catheter  Musculoskeletal:         General: No signs of injury.      Cervical back: Normal range of motion and neck supple.   Skin:     General: Skin is warm and dry.   Neurological:      General: No focal deficit present.      Mental Status: She is alert.       Vents:  Oxygen Concentration (%): 28 (09/15/22 0720)    Lines/Drains/Airways       Peripheral Intravenous Line  Duration                  Peripheral IV - Single Lumen 09/17/22 0832 20 G Right Antecubital <1 day                    Significant Labs:    CBC/Anemia Profile:  Recent Labs   Lab 09/16/22  0438 09/17/22  0627   WBC 8.03 6.78   HGB 10.7* 11.2*   HCT 32.3* 35.9*    522*   MCV 91 94   RDW 13.6 13.6          Chemistries:  Recent Labs   Lab 09/16/22  0438 09/17/22  0627    138   K 3.7 3.5   CL 93* 92*   CO2 23 29   BUN 21 24*   CREATININE 0.9 1.0   CALCIUM 7.5* 7.8*   ALBUMIN 2.4* 2.7*   PROT 5.3* 5.7*   BILITOT 0.2 0.4   ALKPHOS 68 64   ALT 29 25   AST 24 17   MG 1.9 1.9   PHOS 2.5* 1.2*     EKG 09/14/2022   Vent. Rate : 128 BPM     Atrial Rate : 129 BPM      P-R Int : 136 ms          QRS Dur : 068 ms       QT Int : 394 ms       P-R-T Axes : 060 012 032 degrees      QTc Int : 575 ms     Sinus tachycardia   Low voltage QRS   Cannot rule out Anterior infarct ,age undetermined   Abnormal ECG      2D echo  09/11/2022   left ventricle is normal in size with concentric hypertrophy and normal systolic function.   The estimated ejection fraction is 55%.   Grade I left ventricular diastolic dysfunction.   Normal right ventricular size with normal right ventricular systolic function.   Moderate left atrial enlargement.   Mild tricuspid regurgitation.   There are segmental left ventricular wall motion abnormalities.   There is pulmonary hypertension.   The estimated PA systolic pressure is 45 mmHg.   Latest Reference Range & Units 09/12/22 15:48 09/13/22 04:35   Troponin I 0.000 - 0.026 ng/mL 2.358 (H) 1.691 (H)        Latest Reference Range & Units 09/14/22 07:26 09/14/22 16:46   POC PH 7.35 - 7.45  7.087 (LL) 7.365   POC PCO2 35 - 45 mmHg 77.3 (HH) 40.6   POC PO2 80 - 100 mmHg 124 (H) 130 (H)   POC HCO3 24 - 28 mmol/L 23.3 (L) 23.2 (L)   POC SATURATED O2 95 - 100 % 97 99   POC BE -2 to 2 mmol/L -7 -2   FiO2  100 40   Flow  15    Sample  ARTERIAL ARTERIAL   DelSys  NRB CPAP/BiPAP   Allens Test  Pass Pass   Site  LR RR   Mode  SPONT BiPAP   Cultures unremarkable     PFT personally reviewed 2019 moderately severe COPD and hyperinflation        Latest Reference Range & Units 09/13/22 04:35 09/15/22 04:39   BNP 0 - 99 pg/mL  1,234 (H)   Troponin I 0.000 - 0.026 ng/mL 1.691 (H)    (H): Data is abnormally high  Significant Imaging:     Chest x-ray 09/14/2022     CLINICAL HISTORY:  tachycardia;     TECHNIQUE:  Single frontal view of the chest was performed.     COMPARISON:  09/11/2022.     FINDINGS:  Heart size is not enlarged.  Aorta demonstrates atherosclerotic disease.  Diffuse chronic interstitial opacities are seen throughout the lungs.  More focal infiltrates are suspected the lung bases which could reflect airspace disease or aspiration.  Trace right pleural effusion.  No pneumothorax.  Advanced degenerative changes.    In comparison to the prior study, there is no additional interval changes.          CT abdomen  pelvis 09/13/2022       Impression:     Right basilar pneumonia with associated right-sided mild parapneumonic effusion.  Trace left-sided pleural effusion     Occlusion of the right common iliac artery with reconstitution downstream.  Narrowing of the left common iliac artery likely hemodynamically significant        Low-dose screening CT chest 2020     COMPARISON:  04/26/2018     FINDINGS:  Lungs: There small noncalcified nodular opacities in the lungs bilaterally.  The largest opacity in the right lung appears solid and measures 0.2 cm on series 4, image 208 in the upper lobe.  The largest opacity in the left lung appears solid and measures 0.2 cm on series 4, image 343 in the left lower lobe.  The lungs show findings consistent with emphysema.     Pleura:   No effusion..     Heart and pericardium: Normal size without effusion.     Aorta and vasculature: Atherosclerosis including coronary arteries.     Chest wall and skeletal structures: Unremarkable except age-appropriate degenerative changes.  There is a single lead present in the spinal canal, terminating in the lower thoracic spine.     Upper abdomen: Unremarkable.     Impression:     Lung-RADS Category:  2 - Benign Appearance or Behavior - continue annual screening with LDCT in 12 months.               ABG  Recent Labs   Lab 09/14/22  1646   PH 7.365   PO2 130*   PCO2 40.6   HCO3 23.2*   BE -2     Assessment/Plan:     Acute diastolic heart failure   Latest Reference Range & Units 09/15/22 04:39   BNP 0 - 99 pg/mL 1,234 (H)   (H): Data is abnormally high    IV LASIX 40 MG X 2 DOSES strict I&Os.   9/16 lasix 20 mg daily home dose   9/17 continue IV Lasix.  [id number].  O2 sat 91% on room air.  Did not need oxygen as per home O2 eval    Acute respiratory failure with hypoxia and hypercarbia  Patient with Hypercapnic and Hypoxic Respiratory failure which is Acute.  she is not on home oxygen. Supplemental oxygen was provided and noted-  .   Signs/symptoms of  respiratory failure include- tachypnea, increased work of breathing and respiratory distress. Contributing diagnoses includes - CHF, COPD and Pneumonia Labs and images were reviewed. Patient Has recent ABG, which has been reviewed. Will treat underlying causes and adjust management of respiratory failure as follows- nebs IV Solu-Medrol IV antibiotic  9/15 albuterol Atrovent ICS Laba.  Home O2 evaluation on discharge.  9/16 O2 duojneb , ICS LABA , dc solumedrol   9/17 BNP 1500.  IV Lasix.  Lasix p.o. on DC    NSTEMI (non-ST elevated myocardial infarction)  Cardiac monitoring.  Likely demand ischemia.  Beta-blocker statin.  9/16 BB , statins , Ranexa , plavix   9/17 continue Ranexa Plavix beta-blocker and statin and aspirin    COPD, group D, by GOLD 2017 classification  PFT 2019 moderately severe COPD.  Albuterol trilogy Ellipta on discharge.  DuoNeb Brovana budesonide inpatient           Pam Beyer MD  Pulmonology  [facility]
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
## 2022-09-17 NOTE — PLAN OF CARE
[facility] - [facility]  Discharge Final Note    Primary Care Provider: Ralf Isidro MD    Expected Discharge Date: 9/17/2022    Final Discharge Note (most recent)       Final Note - 09/17/22 1311          Final Note    Assessment Type Final Discharge Note (P)      Anticipated Discharge Disposition Home-Health Care Svc (P)    [facility]       Post-Acute Status    Post-Acute Authorization Home Health (P)    [facility]    Home Health Status Set-up Complete/Auth obtained (P)      Discharge Delays None known at this time (P)                      Important Message from Medicare             Mariana Peraza LMSW 9/17/2022 1:12 PM

## 2022-09-17 NOTE — PROGRESS NOTES
Discharge instructions given to pt and SO at BS. Discussed awaiting  approval/set up for DC. Verbalized understanding. Denies questions needs at this time. Will DC once home health set up completed.

## 2022-09-17 NOTE — PLAN OF CARE
Problem: Adult Inpatient Plan of Care  Goal: Plan of Care Review  Outcome: Ongoing, Progressing  Goal: Patient-Specific Goal (Individualized)  Outcome: Ongoing, Progressing  Goal: Absence of Hospital-Acquired Illness or Injury  Outcome: Ongoing, Progressing  Goal: Optimal Comfort and Wellbeing  Outcome: Ongoing, Progressing  Goal: Readiness for Transition of Care  Outcome: Ongoing, Progressing     Problem: Adjustment to Illness (Sepsis/Septic Shock)  Goal: Optimal Coping  Outcome: Ongoing, Progressing     Problem: Bleeding (Sepsis/Septic Shock)  Goal: Absence of Bleeding  Outcome: Ongoing, Progressing     Problem: Glycemic Control Impaired (Sepsis/Septic Shock)  Goal: Blood Glucose Level Within Desired Range  Outcome: Ongoing, Progressing     Problem: Nutrition Impaired (Sepsis/Septic Shock)  Goal: Optimal Nutrition Intake  Outcome: Ongoing, Progressing     Problem: Impaired Wound Healing  Goal: Optimal Wound Healing  Outcome: Ongoing, Progressing     Problem: Fluid Imbalance (Pneumonia)  Goal: Fluid Balance  Outcome: Ongoing, Progressing     Problem: Infection (Pneumonia)  Goal: Resolution of Infection Signs and Symptoms  Outcome: Ongoing, Progressing     Problem: Respiratory Compromise (Pneumonia)  Goal: Effective Oxygenation and Ventilation  Outcome: Ongoing, Progressing     Problem: Skin Injury Risk Increased  Goal: Skin Health and Integrity  Outcome: Ongoing, Progressing     Problem: Fall Injury Risk  Goal: Absence of Fall and Fall-Related Injury  Outcome: Ongoing, Progressing     Problem: Restraint, Nonbehavioral (Nonviolent)  Goal: Absence of Harm or Injury  Outcome: Ongoing, Progressing

## 2022-09-17 NOTE — SUBJECTIVE & OBJECTIVE
9/17 seen and examined.  O2 sat 91% on room air.  Did not qualify for home O2 on home O2 evaluation.  Elevated BNP.  On IV Lasix.  Sitting in chair.  Weakness shortness of breath.  Review of Systems   Constitutional:  Positive for activity change and fatigue. Negative for chills and fever.   HENT:  Negative for drooling, ear discharge and nosebleeds.    Eyes:  Negative for pain, discharge and itching.   Respiratory:  Positive for shortness of breath. Negative for choking.    Cardiovascular:  Negative for chest pain.   Gastrointestinal:  Negative for abdominal distention.   Endocrine: Negative for cold intolerance.   Genitourinary:  Negative for hematuria.   Musculoskeletal:  Positive for arthralgias. Negative for neck stiffness.   Skin:  Negative for rash.   Allergic/Immunologic: Negative for immunocompromised state.   Neurological:  Positive for weakness. Negative for seizures and facial asymmetry.   Hematological:  Negative for adenopathy.   Psychiatric/Behavioral:  Negative for behavioral problems, self-injury and suicidal ideas.    Objective:     Vital Signs (Most Recent):  Temp: 98.1 °F (36.7 °C) (09/17/22 1200)  Pulse: 79 (09/17/22 1200)  Resp: 20 (09/17/22 1200)  BP: (!) 140/66 (09/17/22 1200)  SpO2: (!) 91 % (09/17/22 1200) Vital Signs (24h Range):  Temp:  [97.6 °F (36.4 °C)-98.3 °F (36.8 °C)] 98.1 °F (36.7 °C)  Pulse:  [78-97] 79  Resp:  [14-20] 20  SpO2:  [84 %-98 %] 91 %  BP: (116-168)/(66-80) 140/66     Weight: 46.4 kg (102 lb 4.7 oz)  Body mass index is 18.71 kg/m².      Intake/Output Summary (Last 24 hours) at 9/17/2022 1306  Last data filed at 9/16/2022 1700  Gross per 24 hour   Intake 50 ml   Output --   Net 50 ml         Physical Exam  Vitals and nursing note reviewed.   Constitutional:       General: She is not in acute distress.     Appearance: She is well-developed. She is ill-appearing and toxic-appearing.   HENT:      Head: Normocephalic and atraumatic.      Nose: Nose normal.   Eyes:       Extraocular Movements: Extraocular movements intact.   Cardiovascular:      Rate and Rhythm: Regular rhythm. Tachycardia present.   Pulmonary:      Effort: No respiratory distress.      Breath sounds: No stridor. Wheezing present.   Abdominal:      General: There is no distension.   Genitourinary:     Comments: Urinary external catheter  Musculoskeletal:         General: No signs of injury.      Cervical back: Normal range of motion and neck supple.   Skin:     General: Skin is warm and dry.   Neurological:      General: No focal deficit present.      Mental Status: She is alert.       Vents:  Oxygen Concentration (%): 28 (09/15/22 0720)    Lines/Drains/Airways       Peripheral Intravenous Line  Duration                  Peripheral IV - Single Lumen 09/17/22 0832 20 G Right Antecubital <1 day                    Significant Labs:    CBC/Anemia Profile:  Recent Labs   Lab 09/16/22 0438 09/17/22  0627   WBC 8.03 6.78   HGB 10.7* 11.2*   HCT 32.3* 35.9*    522*   MCV 91 94   RDW 13.6 13.6          Chemistries:  Recent Labs   Lab 09/16/22 0438 09/17/22  0627    138   K 3.7 3.5   CL 93* 92*   CO2 23 29   BUN 21 24*   CREATININE 0.9 1.0   CALCIUM 7.5* 7.8*   ALBUMIN 2.4* 2.7*   PROT 5.3* 5.7*   BILITOT 0.2 0.4   ALKPHOS 68 64   ALT 29 25   AST 24 17   MG 1.9 1.9   PHOS 2.5* 1.2*     EKG 09/14/2022   Vent. Rate : 128 BPM     Atrial Rate : 129 BPM      P-R Int : 136 ms          QRS Dur : 068 ms       QT Int : 394 ms       P-R-T Axes : 060 012 032 degrees      QTc Int : 575 ms     Sinus tachycardia   Low voltage QRS   Cannot rule out Anterior infarct ,age undetermined   Abnormal ECG     2D echo 09/11/2022  left ventricle is normal in size with concentric hypertrophy and normal systolic function.  The estimated ejection fraction is 55%.  Grade I left ventricular diastolic dysfunction.  Normal right ventricular size with normal right ventricular systolic function.  Moderate left atrial enlargement.  Mild  tricuspid regurgitation.  There are segmental left ventricular wall motion abnormalities.  There is pulmonary hypertension.  The estimated PA systolic pressure is 45 mmHg.   Latest Reference Range & Units 09/12/22 15:48 09/13/22 04:35   Troponin I 0.000 - 0.026 ng/mL 2.358 (H) 1.691 (H)        Latest Reference Range & Units 09/14/22 07:26 09/14/22 16:46   POC PH 7.35 - 7.45  7.087 (LL) 7.365   POC PCO2 35 - 45 mmHg 77.3 (HH) 40.6   POC PO2 80 - 100 mmHg 124 (H) 130 (H)   POC HCO3 24 - 28 mmol/L 23.3 (L) 23.2 (L)   POC SATURATED O2 95 - 100 % 97 99   POC BE -2 to 2 mmol/L -7 -2   FiO2  100 40   Flow  15    Sample  ARTERIAL ARTERIAL   DelSys  NRB CPAP/BiPAP   Allens Test  Pass Pass   Site  LR RR   Mode  SPONT BiPAP   Cultures unremarkable     PFT personally reviewed 2019 moderately severe COPD and hyperinflation        Latest Reference Range & Units 09/13/22 04:35 09/15/22 04:39   BNP 0 - 99 pg/mL  1,234 (H)   Troponin I 0.000 - 0.026 ng/mL 1.691 (H)    (H): Data is abnormally high  Significant Imaging:     Chest x-ray 09/14/2022     CLINICAL HISTORY:  tachycardia;     TECHNIQUE:  Single frontal view of the chest was performed.     COMPARISON:  09/11/2022.     FINDINGS:  Heart size is not enlarged.  Aorta demonstrates atherosclerotic disease.  Diffuse chronic interstitial opacities are seen throughout the lungs.  More focal infiltrates are suspected the lung bases which could reflect airspace disease or aspiration.  Trace right pleural effusion.  No pneumothorax.  Advanced degenerative changes.    In comparison to the prior study, there is no additional interval changes.          CT abdomen pelvis 09/13/2022       Impression:     Right basilar pneumonia with associated right-sided mild parapneumonic effusion.  Trace left-sided pleural effusion     Occlusion of the right common iliac artery with reconstitution downstream.  Narrowing of the left common iliac artery likely hemodynamically significant        Low-dose  screening CT chest 2020     COMPARISON:  04/26/2018     FINDINGS:  Lungs: There small noncalcified nodular opacities in the lungs bilaterally.  The largest opacity in the right lung appears solid and measures 0.2 cm on series 4, image 208 in the upper lobe.  The largest opacity in the left lung appears solid and measures 0.2 cm on series 4, image 343 in the left lower lobe.  The lungs show findings consistent with emphysema.     Pleura:   No effusion..     Heart and pericardium: Normal size without effusion.     Aorta and vasculature: Atherosclerosis including coronary arteries.     Chest wall and skeletal structures: Unremarkable except age-appropriate degenerative changes.  There is a single lead present in the spinal canal, terminating in the lower thoracic spine.     Upper abdomen: Unremarkable.     Impression:     Lung-RADS Category:  2 - Benign Appearance or Behavior - continue annual screening with LDCT in 12 months.

## 2022-09-17 NOTE — PLAN OF CARE
OT EVAL PERFORMED.  PATIENT EDUCATED RE:  PURPOSE OF OT.  PATIENT PARTICIPATED IN FUNC MOBILITY AND SELF CARE TASKS.  PATIENT WOULD BENEFIT FROM CONT SKILLED OT INTERVENTION.  OT RECOMMENDS HH VS SNF AT D/C.
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
## 2022-09-17 NOTE — PT/OT/SLP PROGRESS
Speech Language Pathology Treatment    Patient Name:  Keyona Dwyer   MRN:  [id number]  Admitting Diagnosis: Sepsis    Recommendations:                 General Recommendations:  Aspiration precautions  Diet recommendations:  Soft & Bite Sized Diet - IDDSI Level 6, Liquid Diet Level: Thin liquids - IDDSI Level 0   Aspiration Precautions: 1 bite/sip at a time, Alternating bites/sips, Assistance with meals, Avoid talking while eating, Continue alternate means of nutrition, Double swallow with each bite/sip, Eliminate distractions, Feed only when awake/alert, Small bites/sips, and Standard aspiration precautions   General Precautions: Standard, aspiration  Communication strategies:  none    Subjective     Pt being discharged today   Patient goals: pt wanting to go home      Pain/Comfort:   0/10    Respiratory Status: Room air    Objective:     Has the patient been evaluated by SLP for swallowing?    yes  Keep patient NPO?   no  Current Respiratory Status:    RA        Assessment:     Keyona Dwyer is a 76 y.o. female with an SLP diagnosis of Dysphagia.  Swallowing strategies reviewed with patient and her .  Pt is recommended to continue with S.T. via home health and this recommendation was reviewed with pt,  and M.D. who was to write order.  Pt reported that she was tolerating her diet without difficulties at this time.      Goals:   Multidisciplinary Problems       SLP Goals          Problem: SLP    Goal Priority Disciplines Outcome   SLP Goal     SLP Ongoing, Progressing   Description: 1.  Pt will consume the least restrictive PO diet without incident.  2.  MBSS as comprehensive swallowing evaluation.                       Plan:     Patient to be seen:  2 x/week   Plan of Care expires:  09/20/22  Plan of Care reviewed with:  patient, spouse, family   SLP Follow-Up:  Yes       Discharge recommendations:  Home Health S.T. for Dysphagia  Barriers to Discharge:  n/a    Time Tracking:     SLP Treatment  Date:    0917/22  Speech Start Time:   1218  Speech Stop Time:     1230   Speech Total Time (min):   18 min    Billable Minutes: 18 minutes    09/17/2022

## 2022-09-17 NOTE — ASSESSMENT & PLAN NOTE
Latest Reference Range & Units 09/15/22 04:39   BNP 0 - 99 pg/mL 1,234 (H)   (H): Data is abnormally high    IV LASIX 40 MG X 2 DOSES strict I&Os.   9/16 lasix 20 mg daily home dose   9/17 continue IV Lasix.  [id number].  O2 sat 91% on room air.  Did not need oxygen as per home O2 eval

## 2022-09-17 NOTE — ASSESSMENT & PLAN NOTE
Cardiac monitoring.  Likely demand ischemia.  Beta-blocker statin.  9/16 BB , statins , Ranexa , plavix   9/17 continue Ranexa Plavix beta-blocker and statin and aspirin

## 2022-09-17 NOTE — DISCHARGE SUMMARY
[facility] Medicine  Discharge Summary      Patient Name: Keyona Dwyer  MRN: [id number]  Patient Class: IP- Inpatient  Admission Date: 9/11/2022  Hospital Length of Stay: 5 days  Discharge Date and Time:  09/17/2022 1:31 PM  Attending Physician: Dmitri Francis, *   Discharging Provider: Dmitri Fracnis MD  Primary Care Provider: Ralf Isidro MD      HPI:   The pt is a 77 yo female with PMHx significant for HTN, Hyperlipidemia, chronic low back pain, OA, Osteoporosis, Prior h/o pancreatitis, PVD, COPD, Life long and current everyday smoker who was brought to the ED by her  for evaluation of 4 days h/o increasing generalized weakness to the point she was unable to get up from bed without assistance , decreased appetite , decreased food and water intake, dry cough, dry heaves, subjective fever . Since yesterday she was experiencing abd pain across the lower abd and increased urination. Denies chest pain, increased SOB, palpitations, nausea , vomiting, diarrhea, constipation, dysuria or incontinence.     Vitals on presentation 115/70, 133, 22, 100.9, 93% on RA  Labs - WBC 13.5K, Hgb 11.2, Plt 294, Na 130, K 3.8, CO2 21, Cr 0.8, Lipase 24, La 0.8, Troponin 1.946.UA- not collected     CXR- Interval development mild interstitial infiltrate involving the right lung.  Left lung is clear.  Possible small right pleural effusion.    EKG- ST, non specific T wave abnormality.      Pt was given NS bolus 1Liter , Cefepime 2 gram IV,  mg  and Heparin infusion per ACS protocol initiated in the ED and  consulted to place pt in observation for further management of sepsis due to pneumonia and elevated troponin.       Procedure(s) (LRB):  EGD (ESOPHAGOGASTRODUODENOSCOPY) (N/A)      Hospital Course:   9/12- Fever 101.8 overnight . Feels better this morning. Denies chest pain . Abd pain is better. No further nausea or dry heaves. SpO2 satisfactory. WBC normalized. Troponin bumped  to 2.4. .Echo resulted LVEF 55%, G1DD, +WMA. Heparin gtt continues along with ASA, Plavix, statin. [facility] plans for tomorrow if afebrile x 24h.    9/13- Tmax 99.3. BP is improving. Per family pt was little confused earlier, however mental status seems better during my exam. No focal weakness appreciated . CT head done and is neg for acute changes. Dark stool reported per nursing. Hgb dropped to 8.2>9.2>11.2. Heparin gtt, Asa, Plavix held. PPI loading dose 80 mg given . Will get NM GI bleed scan. Plan for LHC placed on hold. WBC normal. Creatinine 0.6, BUN 13. Phos <1.0 and is being replaced. Urine culture -pending. Discussed code status . Pt and  both expressed code status be DNR.     9/14- Rapid response called early this morning. Pt was receiving bowel prep via NG tube for colonoscopy to r/o GI bleed, however developed resp distress and decreased mentation. ABG showed acute hypercapnic resp failure. Pt placed on biPAP and transferred to ICU. No further GI and cardiac intervention planned at this time. WBC increased to 16K , Hgb 11.1 post transfusion x 1 yesterday. Cr 0.8. CTA abd /pelvis neg for acute bleed.     9/15 She is aao x 3 in nad . She is on room  air .  NAEON . Plan to downgrade to tele . She is negative 1 lt  since yesterday . The pro calcitonin is trending down . Will  D/W Cardiology and GI about when to start   DAPT  for NSTEMI .  9/16 NAEON . Re started on DAPT . She is negative 1 lt since admission . PT/OT consulted . No new complaint . SLP rec soft diet .    9/17 Pt was seen and  examined at bedside . She was determined to be suitable for d/c  She is tolerating  dysphagia diet . The H/H has been stable . She did not qualify for home  o2 . PT/OT rec HH.  consulted  for Home health for  PT/OT/SN/SLP . She will g/u with her PCP and cardiology in 1 to 2 week. She will be d/c on po lasix .        Goals of Care Treatment Preferences:  Code Status: DNR      Consults:   Consults (From  admission, onward)        Status Ordering Provider     Inpatient consult to Social Work  Once        Provider:  (Not yet assigned)    Completed CONSTANCE GOODWIN     Inpatient consult to Critical Care Medicine  Once        Provider:  Pam Beyer MD    Completed DANG FAGAN     Inpatient consult to Gastroenterology  Once        Provider:  Fern Rasmussen MD    Completed DANG FAGAN     Inpatient consult to Cardiology  Once        Provider:  Raffaele Puckett MD    Completed DANG FAGAN          No new Assessment & Plan notes have been filed under this hospital service since the last note was generated.  Service: Hospital Medicine    Final Active Diagnoses:    Diagnosis Date Noted POA    PRINCIPAL PROBLEM:  Sepsis due to Pneumonia  [A41.9] 09/11/2022 Yes    Weakness [R53.1] 09/16/2022 Yes    Acute diastolic heart failure [I50.31] 09/15/2022 Yes    Acute respiratory failure with hypoxia and hypercarbia [J96.01, J96.02] 09/14/2022 Yes    Melena [K92.1] 09/13/2022 No    UTI (urinary tract infection) [N39.0] 09/12/2022 Yes    NSTEMI (non-ST elevated myocardial infarction) [I21.4] 09/11/2022 Yes    Right lower lobe pneumonia [J18.9] 09/11/2022 Yes    Tobacco abuse disorder [Z72.0] 04/13/2021 Yes    PVD (peripheral vascular disease) [I73.9] 04/12/2021 Yes    Anemia [D64.9] 05/29/2020 Yes    COPD, group D, by GOLD 2017 classification [J44.9] 03/13/2018 Yes    HTN (hypertension) [I10] 09/23/2013 Yes     Chronic      Problems Resolved During this Admission:       Discharged Condition: stable    Disposition: Home-Health Care Svc    Follow Up:    Patient Instructions:      Ambulatory referral/consult to [facility]   Standing Status: Future   Referral Priority: Routine Referral Type: Consultation   Referral Reason: Specialty Services Required   Number of Visits Requested: 1     Ambulatory referral/consult to Home Health   Standing Status: Future   Referral Priority: Routine  Referral Type: Home Health   Referral Reason: Specialty Services Required   Requested Specialty: Home Health Services   Number of Visits Requested: 1     Diet Cardiac     Activity as tolerated       Significant Diagnostic Studies: Labs:   BMP:   Recent Labs   Lab 09/16/22 0438 09/17/22  0627    91    138   K 3.7 3.5   CL 93* 92*   CO2 23 29   BUN 21 24*   CREATININE 0.9 1.0   CALCIUM 7.5* 7.8*   MG 1.9 1.9   , CMP   Recent Labs   Lab 09/16/22 0438 09/17/22  0627    138   K 3.7 3.5   CL 93* 92*   CO2 23 29    91   BUN 21 24*   CREATININE 0.9 1.0   CALCIUM 7.5* 7.8*   PROT 5.3* 5.7*   ALBUMIN 2.4* 2.7*   BILITOT 0.2 0.4   ALKPHOS 68 64   AST 24 17   ALT 29 25   ANIONGAP 20* 17*    and CBC   Recent Labs   Lab 09/16/22 0438 09/17/22 0627   WBC 8.03 6.78   HGB 10.7* 11.2*   HCT 32.3* 35.9*    522*     Microbiology:   Blood Culture   Lab Results   Component Value Date    LABBLOO No growth after 5 days. 09/11/2022       Pending Diagnostic Studies:     None         Medications:  Reconciled Home Medications:      Medication List      START taking these medications    aspirin 81 MG EC tablet  Commonly known as: ECOTRIN  Take 1 tablet (81 mg total) by mouth once daily.  Start taking on: September 18, 2022     metoprolol succinate 25 MG 24 hr tablet  Commonly known as: TOPROL-XL  Take 0.5 tablets (12.5 mg total) by mouth once daily.  Start taking on: September 18, 2022     ranolazine 500 MG Tb12  Commonly known as: RANEXA  Take 1 tablet (500 mg total) by mouth 2 (two) times daily.        CHANGE how you take these medications    furosemide 20 MG tablet  Commonly known as: LASIX  Take 2 tablets (40 mg total) by mouth once daily for 5 days, THEN 1 tablet (20 mg total) once daily.  Start taking on: September 17, 2022  What changed: See the new instructions.        CONTINUE taking these medications    amLODIPine 5 MG tablet  Commonly known as: NORVASC  TAKE 1 TABLET BY MOUTH EVERY DAY FOR BLOOD  PRESSURE     azelastine 137 mcg (0.1 %) nasal spray  Commonly known as: ASTELIN  1 spray (137 mcg total) by Nasal route 2 (two) times daily.     b complex vitamins capsule  Take 1 capsule by mouth once daily.     buPROPion 75 MG tablet  Commonly known as: WELLBUTRIN  Take 1 tablet (75 mg total) by mouth 2 (two) times daily.     busPIRone 10 MG tablet  Commonly known as: BUSPAR  TAKE 1 TABLET BY MOUTH TWICE DAILY     clopidogreL 75 mg tablet  Commonly known as: PLAVIX  Take 1 tablet (75 mg total) by mouth once daily.     DULoxetine 30 MG capsule  Commonly known as: CYMBALTA  Take 30 mg by mouth once daily.     ergocalciferol 50,000 unit Cap  Commonly known as: ERGOCALCIFEROL  TAKE 1 CAPSULE BY MOUTH ONE TIME PER WEEK     ferrous sulfate 325 mg (65 mg iron) Tab tablet  Commonly known as: FEOSOL  Take 1 tab (325 mg) every other day.     * HYDROcodone-acetaminophen  mg per tablet  Commonly known as: NORCO  Take 1 tablet by mouth every 6 (six) hours as needed.     * HYDROcodone-acetaminophen  mg per tablet  Commonly known as: NORCO  Take 1 tablet by mouth 3 (three) times daily as needed.     lisinopriL 40 MG tablet  Commonly known as: PRINIVIL,ZESTRIL  TAKE 1 TABLET BY MOUTH EVERY DAY for blood pressure     ON-Q PAIN PUMP  by Misc.(Non-Drug; Combo Route) route.     PROLIA 60 mg/mL Syrg  Generic drug: denosumab     QUINOA-KALE-HEMP ORAL  Take by mouth.     rosuvastatin 5 MG tablet  Commonly known as: CRESTOR  TAKE 1 TABLET BY MOUTH ONCE A DAY         * This list has 2 medication(s) that are the same as other medications prescribed for you. Read the directions carefully, and ask your doctor or other care provider to review them with you.                Indwelling Lines/Drains at time of discharge:   Lines/Drains/Airways     None                 Time spent on the discharge of patient: 31 minutes         Dmitri Francis MD  Department of Hospital Medicine  [facility]

## 2022-09-17 NOTE — ASSESSMENT & PLAN NOTE
PFT 2019 moderately severe COPD.  Albuterol trilogy Ellipta on discharge.  Jeff Dutta budesonide inpatient

## 2022-09-18 ENCOUNTER — NURSE TRIAGE (OUTPATIENT)
Dept: ADMINISTRATIVE | Facility: CLINIC | Age: 77
End: 2022-09-18
Payer: MEDICARE

## 2022-09-18 RX ORDER — RANOLAZINE 500 MG/1
500 TABLET, EXTENDED RELEASE ORAL 2 TIMES DAILY
Qty: 60 TABLET | Refills: 3 | Status: SHIPPED | OUTPATIENT
Start: 2022-09-18 | End: 2022-10-19 | Stop reason: SDUPTHER

## 2022-09-18 NOTE — TELEPHONE ENCOUNTER
Placed call to pt listed contact number. Left message will make second attempt in 15min.      2nd attempt unsuccessful - LM for pt to return call to [phone] for continued or worsening symptoms and to call 911 for medical emergencies.   Reason for Disposition   Message left on unidentified voice mail.  Phone number verified.    Protocols used: No Contact or Duplicate Contact Call-A-AH

## 2022-09-18 NOTE — TELEPHONE ENCOUNTER
Pt's granddaughter calling on behalf of pt. Reports her Ranexa was not at the pharmacy. I have switched this over for her and called to confirm w/ [facility]. Updated her in this regard.    Reason for Disposition   [1] Prescription prescribed recently is not at pharmacy AND [2] triager has access to patient's EMR AND [3] prescription is recorded in the EMR    Protocols used: Medication Question Call-A-AH     Tissue Cultured Epidermal Autograft Text: The defect edges were debeveled with a #15 scalpel blade.  Given the location of the defect, shape of the defect and the proximity to free margins a tissue cultured epidermal autograft was deemed most appropriate.  The graft was then trimmed to fit the size of the defect.  The graft was then placed in the primary defect and oriented appropriately.

## 2022-09-19 ENCOUNTER — PATIENT OUTREACH (OUTPATIENT)
Dept: ADMINISTRATIVE | Facility: CLINIC | Age: 77
End: 2022-09-19
Payer: MEDICARE

## 2022-09-19 ENCOUNTER — TELEPHONE (OUTPATIENT)
Dept: INTERNAL MEDICINE | Facility: CLINIC | Age: 77
End: 2022-09-19
Payer: MEDICARE

## 2022-09-19 DIAGNOSIS — R53.1 WEAKNESS: Primary | ICD-10-CM

## 2022-09-19 DIAGNOSIS — A41.9 SEPSIS DUE TO PNEUMONIA: Primary | ICD-10-CM

## 2022-09-19 DIAGNOSIS — J18.9 SEPSIS DUE TO PNEUMONIA: Primary | ICD-10-CM

## 2022-09-19 DIAGNOSIS — Z91.81 AT HIGH RISK FOR INJURY RELATED TO FALL: ICD-10-CM

## 2022-09-19 NOTE — TELEPHONE ENCOUNTER
Spoke with pt's granddaughter, she said, pt is very weak, dizzy and unable to walk. She hasn't had bp meds today. Please advise.

## 2022-09-19 NOTE — TELEPHONE ENCOUNTER
----- Message from Alyssa Saldana sent at 9/19/2022 10:23 AM CDT -----  [facility]. She stated that pt blood pressure is running really low blood pressure reading is 61/46 pulse 81 54/41 pulse 87,  Pt did take her blood pressure medicine this morning.  Also that pt is needing an order for a bed side commode. Please call her back at [phone]. Thanks KB

## 2022-09-20 ENCOUNTER — PATIENT OUTREACH (OUTPATIENT)
Dept: ADMINISTRATIVE | Facility: HOSPITAL | Age: 77
End: 2022-09-20
Payer: MEDICARE

## 2022-09-20 ENCOUNTER — TELEPHONE (OUTPATIENT)
Dept: INTERNAL MEDICINE | Facility: CLINIC | Age: 77
End: 2022-09-20
Payer: MEDICARE

## 2022-09-20 NOTE — TELEPHONE ENCOUNTER
----- Message from Brigitte Bhatt sent at 9/20/2022  8:50 AM CDT -----  .Type:  Patient Returning Call    Who Called:ms casiano/granddaughter  Who Left Messsage for Patient:  Does the patient know what this is regarding?:yes  Would the patient rather a call back or a response via [facility]?   Best Call Back Number:[phone]

## 2022-09-20 NOTE — TELEPHONE ENCOUNTER
Spoke with pt's granddaughter, pt's bp this mororning before meds was 100/56. Pt is feeling a little better. She's not as weak and dizzy as she was yesterday. She's not drinking water.she only had diet coke and coffee. Granddaughter checked bp while on the phone, pt's bp is 81/50. Please advise

## 2022-09-20 NOTE — TELEPHONE ENCOUNTER
Pt's grandson notified. He verbalized understanding. Pt will make appt for tomorrow with Mariaelena.

## 2022-09-20 NOTE — PROGRESS NOTES
HOSPITAL FOLLOW UP: Called patient, no answer. Left a voicemail. Trying to confirm Hospital follow up for 9/21/22 with Mariaelena Fleming NP

## 2022-09-20 NOTE — TELEPHONE ENCOUNTER
She needs to hold BP med completely until pressure comes up.  But, she may consider being seen to determine if there is a reason (infection or something) causing her BP to drop so low.

## 2022-09-21 ENCOUNTER — OFFICE VISIT (OUTPATIENT)
Dept: INTERNAL MEDICINE | Facility: CLINIC | Age: 77
End: 2022-09-21
Payer: MEDICARE

## 2022-09-21 VITALS
HEART RATE: 66 BPM | SYSTOLIC BLOOD PRESSURE: 100 MMHG | WEIGHT: 98 LBS | DIASTOLIC BLOOD PRESSURE: 60 MMHG | TEMPERATURE: 98 F | BODY MASS INDEX: 18.03 KG/M2 | HEIGHT: 62 IN | OXYGEN SATURATION: 95 %

## 2022-09-21 DIAGNOSIS — Z72.0 TOBACCO ABUSE DISORDER: ICD-10-CM

## 2022-09-21 DIAGNOSIS — A41.9 SEPSIS DUE TO PNEUMONIA: ICD-10-CM

## 2022-09-21 DIAGNOSIS — Z09 HOSPITAL DISCHARGE FOLLOW-UP: Primary | ICD-10-CM

## 2022-09-21 DIAGNOSIS — I25.2 HX OF NON-ST ELEVATION MYOCARDIAL INFARCTION (NSTEMI): ICD-10-CM

## 2022-09-21 DIAGNOSIS — J18.9 SEPSIS DUE TO PNEUMONIA: ICD-10-CM

## 2022-09-21 PROCEDURE — 3288F FALL RISK ASSESSMENT DOCD: CPT | Mod: CPTII,S$GLB,, | Performed by: NURSE PRACTITIONER

## 2022-09-21 PROCEDURE — 99499 UNLISTED E&M SERVICE: CPT | Mod: HCNC,S$GLB,, | Performed by: NURSE PRACTITIONER

## 2022-09-21 PROCEDURE — 99496 TRANSJ CARE MGMT HIGH F2F 7D: CPT | Mod: S$GLB,,, | Performed by: NURSE PRACTITIONER

## 2022-09-21 PROCEDURE — 3074F PR MOST RECENT SYSTOLIC BLOOD PRESSURE < 130 MM HG: ICD-10-PCS | Mod: CPTII,S$GLB,, | Performed by: NURSE PRACTITIONER

## 2022-09-21 PROCEDURE — 3288F PR FALLS RISK ASSESSMENT DOCUMENTED: ICD-10-PCS | Mod: CPTII,S$GLB,, | Performed by: NURSE PRACTITIONER

## 2022-09-21 PROCEDURE — 1126F AMNT PAIN NOTED NONE PRSNT: CPT | Mod: CPTII,S$GLB,, | Performed by: NURSE PRACTITIONER

## 2022-09-21 PROCEDURE — 3078F DIAST BP <80 MM HG: CPT | Mod: CPTII,S$GLB,, | Performed by: NURSE PRACTITIONER

## 2022-09-21 PROCEDURE — 1101F PR PT FALLS ASSESS DOC 0-1 FALLS W/OUT INJ PAST YR: ICD-10-PCS | Mod: CPTII,S$GLB,, | Performed by: NURSE PRACTITIONER

## 2022-09-21 PROCEDURE — 99496 TRANSITIONAL CARE MANAGE SERVICE 7 DAY DISCHARGE: ICD-10-PCS | Mod: S$GLB,,, | Performed by: NURSE PRACTITIONER

## 2022-09-21 PROCEDURE — 1159F PR MEDICATION LIST DOCUMENTED IN MEDICAL RECORD: ICD-10-PCS | Mod: CPTII,S$GLB,, | Performed by: NURSE PRACTITIONER

## 2022-09-21 PROCEDURE — 1159F MED LIST DOCD IN RCRD: CPT | Mod: CPTII,S$GLB,, | Performed by: NURSE PRACTITIONER

## 2022-09-21 PROCEDURE — 3078F PR MOST RECENT DIASTOLIC BLOOD PRESSURE < 80 MM HG: ICD-10-PCS | Mod: CPTII,S$GLB,, | Performed by: NURSE PRACTITIONER

## 2022-09-21 PROCEDURE — 1126F PR PAIN SEVERITY QUANTIFIED, NO PAIN PRESENT: ICD-10-PCS | Mod: CPTII,S$GLB,, | Performed by: NURSE PRACTITIONER

## 2022-09-21 PROCEDURE — 1101F PT FALLS ASSESS-DOCD LE1/YR: CPT | Mod: CPTII,S$GLB,, | Performed by: NURSE PRACTITIONER

## 2022-09-21 PROCEDURE — 99999 PR PBB SHADOW E&M-EST. PATIENT-LVL V: CPT | Mod: PBBFAC,,, | Performed by: NURSE PRACTITIONER

## 2022-09-21 PROCEDURE — 3074F SYST BP LT 130 MM HG: CPT | Mod: CPTII,S$GLB,, | Performed by: NURSE PRACTITIONER

## 2022-09-21 PROCEDURE — 99999 PR PBB SHADOW E&M-EST. PATIENT-LVL V: ICD-10-PCS | Mod: PBBFAC,,, | Performed by: NURSE PRACTITIONER

## 2022-09-21 NOTE — PROGRESS NOTES
Subjective:       Patient ID: Keyona Dwyer is a 76 y.o. female.    Chief Complaint: Hospital Follow Up and Fatigue    Transitional Care Note    Family and/or Caretaker present at visit?  Yes.  Pasquale  Diagnostic tests reviewed/disposition: No diagnosic tests pending after this hospitalization.  Disease/illness education: yes  Home health/community services discussion/referrals: Patient has home health established at [facility].   Establishment or re-establishment of referral orders for community resources: No other necessary community resources.   Discussion with other health care providers: No discussion with other health care providers necessary.     Patient here for hospital follow up  Went to the hospital for weakness  Was found to have pneumonia and nstemi  Was admitted for sepsis due to pneumonia and nstemi  Hemoglobin dropped so c scope was ordered but she had an issue during the clean out and had to go to ICU for a period of time as well  Ended up having egd  Was d/c home on lasix bid x 5 days then daily, metoprolol and ranexa  She has [facility] for therapy and skilled nursing  Feels weak since hospital d/c; looking forward to doing some therapy   Was told to see cards in 1-2 weeks  Smokes 10-12 cigarettes/day      The pt is a 77 yo female with PMHx significant for HTN, Hyperlipidemia, chronic low back pain, OA, Osteoporosis, Prior h/o pancreatitis, PVD, COPD, Life long and current everyday smoker who was brought to the ED by her  for evaluation of 4 days h/o increasing generalized weakness to the point she was unable to get up from bed without assistance , decreased appetite , decreased food and water intake, dry cough, dry heaves, subjective fever . Since yesterday she was experiencing abd pain across the lower abd and increased urination. Denies chest pain, increased SOB, palpitations, nausea , vomiting, diarrhea, constipation, dysuria or incontinence.      Vitals on presentation  115/70, 133, 22, 100.9, 93% on RA  Labs - WBC 13.5K, Hgb 11.2, Plt 294, Na 130, K 3.8, CO2 21, Cr 0.8, Lipase 24, La 0.8, Troponin 1.946.UA- not collected      CXR- Interval development mild interstitial infiltrate involving the right lung.  Left lung is clear.  Possible small right pleural effusion.     EKG- ST, non specific T wave abnormality.       Pt was given NS bolus 1Liter , Cefepime 2 gram IV,  mg  and Heparin infusion per ACS protocol initiated in the ED and HM consulted to place pt in observation for further management of sepsis due to pneumonia and elevated troponin.         Procedure(s) (LRB):  EGD (ESOPHAGOGASTRODUODENOSCOPY) (N/A)       Hospital Course:   9/12- Fever 101.8 overnight . Feels better this morning. Denies chest pain . Abd pain is better. No further nausea or dry heaves. SpO2 satisfactory. WBC normalized. Troponin bumped to 2.4. .Echo resulted LVEF 55%, G1DD, +WMA. Heparin gtt continues along with ASA, Plavix, statin. [facility] plans for tomorrow if afebrile x 24h.     9/13- Tmax 99.3. BP is improving. Per family pt was little confused earlier, however mental status seems better during my exam. No focal weakness appreciated . CT head done and is neg for acute changes. Dark stool reported per nursing. Hgb dropped to 8.2>9.2>11.2. Heparin gtt, Asa, Plavix held. PPI loading dose 80 mg given . Will get NM GI bleed scan. Plan for C placed on hold. WBC normal. Creatinine 0.6, BUN 13. Phos <1.0 and is being replaced. Urine culture -pending. Discussed code status . Pt and  both expressed code status be DNR.      9/14- Rapid response called early this morning. Pt was receiving bowel prep via NG tube for colonoscopy to r/o GI bleed, however developed resp distress and decreased mentation. ABG showed acute hypercapnic resp failure. Pt placed on biPAP and transferred to ICU. No further GI and cardiac intervention planned at this time. WBC increased to 16K , Hgb 11.1 post transfusion x 1  "yesterday. Cr 0.8. CTA abd /pelvis neg for acute bleed.      9/15 She is aao x 3 in nad . She is on room  air .  NAEON . Plan to downgrade to tele . She is negative 1 lt  since yesterday . The pro calcitonin is trending down . Will  D/W Cardiology and GI about when to start   DAPT  for NSTEMI .  9/16 NAEON . Re started on DAPT . She is negative 1 lt since admission . PT/OT consulted . No new complaint . SLP rec soft diet .     9/17 Pt was seen and  examined at bedside . She was determined to be suitable for d/c  She is tolerating  dysphagia diet . The H/H has been stable . She did not qualify for home  o2 . PT/OT rec HH.  consulted  for Home health for  PT/OT/SN/SLP . She will g/u with her PCP and cardiology in 1 to 2 week. She will be d/c on po lasix .        /60   Pulse 66   Temp 97.8 °F (36.6 °C) (Temporal)   Ht 5' 2" (1.575 m)   Wt 44.5 kg (98 lb)   SpO2 95%   BMI 17.92 kg/m²     Review of Systems   Constitutional:  Positive for activity change. Negative for appetite change, chills, diaphoresis, fatigue, fever and unexpected weight change.   HENT: Negative.     Respiratory:  Negative for cough and shortness of breath.    Cardiovascular:  Negative for chest pain, palpitations and leg swelling.   Gastrointestinal: Negative.    Genitourinary: Negative.    Musculoskeletal: Negative.    Skin:  Negative for color change, pallor, rash and wound.   Allergic/Immunologic: Negative for immunocompromised state.   Neurological:  Positive for weakness. Negative for dizziness and facial asymmetry.   Hematological:  Negative for adenopathy. Does not bruise/bleed easily.   Psychiatric/Behavioral:  Negative for agitation, behavioral problems and confusion.      Objective:      Physical Exam  Vitals and nursing note reviewed.   Constitutional:       General: She is not in acute distress.     Appearance: Normal appearance. She is well-developed. She is not diaphoretic.   HENT:      Head: Normocephalic and " atraumatic.   Cardiovascular:      Rate and Rhythm: Normal rate and regular rhythm.      Heart sounds: Normal heart sounds. No murmur heard.  Pulmonary:      Effort: Pulmonary effort is normal. No respiratory distress.      Breath sounds: Normal breath sounds.   Musculoskeletal:         General: Normal range of motion.      Comments: In wheelchair   Skin:     General: Skin is warm and dry.      Findings: No rash.   Neurological:      Mental Status: She is alert.   Psychiatric:         Mood and Affect: Mood normal.         Behavior: Behavior normal. Behavior is cooperative.         Thought Content: Thought content normal.         Judgment: Judgment normal.       Assessment:       1. Hospital discharge follow-up    2. Sepsis due to pneumonia    3. Hx of non-ST elevation myocardial infarction (NSTEMI)    4. Tobacco abuse disorder        Plan:       Keyona was seen today for hospital follow up and fatigue.    Diagnoses and all orders for this visit:    Hospital discharge follow-up  -     Ambulatory referral/consult to Cardiology; Future    Sepsis due to pneumonia    Hx of non-ST elevation myocardial infarction (NSTEMI)    Tobacco abuse disorder    Consult cards  Proceed with hh therapy   Follow up with Dr. GOLDBERG march or sooner if needed

## 2022-10-07 ENCOUNTER — LAB VISIT (OUTPATIENT)
Dept: LAB | Facility: HOSPITAL | Age: 77
End: 2022-10-07
Attending: INTERNAL MEDICINE
Payer: MEDICARE

## 2022-10-07 ENCOUNTER — OFFICE VISIT (OUTPATIENT)
Dept: CARDIOLOGY | Facility: CLINIC | Age: 77
End: 2022-10-07
Payer: MEDICARE

## 2022-10-07 VITALS
HEART RATE: 80 BPM | HEIGHT: 62 IN | BODY MASS INDEX: 38.14 KG/M2 | SYSTOLIC BLOOD PRESSURE: 100 MMHG | WEIGHT: 207.25 LBS | OXYGEN SATURATION: 98 % | DIASTOLIC BLOOD PRESSURE: 60 MMHG

## 2022-10-07 DIAGNOSIS — I10 PRIMARY HYPERTENSION: Chronic | ICD-10-CM

## 2022-10-07 DIAGNOSIS — I50.31 ACUTE DIASTOLIC HEART FAILURE: ICD-10-CM

## 2022-10-07 DIAGNOSIS — I21.4 NSTEMI (NON-ST ELEVATED MYOCARDIAL INFARCTION): Primary | ICD-10-CM

## 2022-10-07 DIAGNOSIS — D64.9 ANEMIA, UNSPECIFIED TYPE: ICD-10-CM

## 2022-10-07 DIAGNOSIS — I21.4 NSTEMI (NON-ST ELEVATED MYOCARDIAL INFARCTION): ICD-10-CM

## 2022-10-07 DIAGNOSIS — I70.262 ATHEROSCLEROSIS OF NATIVE ARTERY OF LEFT LOWER EXTREMITY WITH GANGRENE: ICD-10-CM

## 2022-10-07 DIAGNOSIS — K92.1 MELENA: ICD-10-CM

## 2022-10-07 DIAGNOSIS — I70.0 ATHEROSCLEROSIS OF AORTA: ICD-10-CM

## 2022-10-07 DIAGNOSIS — I73.9 PVD (PERIPHERAL VASCULAR DISEASE): ICD-10-CM

## 2022-10-07 DIAGNOSIS — Z09 HOSPITAL DISCHARGE FOLLOW-UP: ICD-10-CM

## 2022-10-07 DIAGNOSIS — A41.9 SEPSIS, DUE TO UNSPECIFIED ORGANISM, UNSPECIFIED WHETHER ACUTE ORGAN DYSFUNCTION PRESENT: ICD-10-CM

## 2022-10-07 LAB
ALBUMIN SERPL BCP-MCNC: 3 G/DL (ref 3.5–5.2)
ALP SERPL-CCNC: 58 U/L (ref 55–135)
ALT SERPL W/O P-5'-P-CCNC: 6 U/L (ref 10–44)
ANION GAP SERPL CALC-SCNC: 13 MMOL/L (ref 8–16)
AST SERPL-CCNC: 16 U/L (ref 10–40)
BASOPHILS # BLD AUTO: 0.07 K/UL (ref 0–0.2)
BASOPHILS NFR BLD: 1.1 % (ref 0–1.9)
BILIRUB SERPL-MCNC: 0.4 MG/DL (ref 0.1–1)
BNP SERPL-MCNC: 123 PG/ML (ref 0–99)
BUN SERPL-MCNC: 14 MG/DL (ref 8–23)
CALCIUM SERPL-MCNC: 8.8 MG/DL (ref 8.7–10.5)
CHLORIDE SERPL-SCNC: 100 MMOL/L (ref 95–110)
CO2 SERPL-SCNC: 24 MMOL/L (ref 23–29)
CREAT SERPL-MCNC: 0.8 MG/DL (ref 0.5–1.4)
DIFFERENTIAL METHOD: ABNORMAL
EOSINOPHIL # BLD AUTO: 0.3 K/UL (ref 0–0.5)
EOSINOPHIL NFR BLD: 5.1 % (ref 0–8)
ERYTHROCYTE [DISTWIDTH] IN BLOOD BY AUTOMATED COUNT: 14.9 % (ref 11.5–14.5)
EST. GFR  (NO RACE VARIABLE): >60 ML/MIN/1.73 M^2
GLUCOSE SERPL-MCNC: 120 MG/DL (ref 70–110)
HCT VFR BLD AUTO: 33.6 % (ref 37–48.5)
HGB BLD-MCNC: 10.4 G/DL (ref 12–16)
IMM GRANULOCYTES # BLD AUTO: 0.02 K/UL (ref 0–0.04)
IMM GRANULOCYTES NFR BLD AUTO: 0.3 % (ref 0–0.5)
LYMPHOCYTES # BLD AUTO: 2 K/UL (ref 1–4.8)
LYMPHOCYTES NFR BLD: 32.6 % (ref 18–48)
MAGNESIUM SERPL-MCNC: 1.8 MG/DL (ref 1.6–2.6)
MCH RBC QN AUTO: 30.4 PG (ref 27–31)
MCHC RBC AUTO-ENTMCNC: 31 G/DL (ref 32–36)
MCV RBC AUTO: 98 FL (ref 82–98)
MONOCYTES # BLD AUTO: 0.5 K/UL (ref 0.3–1)
MONOCYTES NFR BLD: 8.6 % (ref 4–15)
NEUTROPHILS # BLD AUTO: 3.3 K/UL (ref 1.8–7.7)
NEUTROPHILS NFR BLD: 52.3 % (ref 38–73)
NRBC BLD-RTO: 0 /100 WBC
PLATELET # BLD AUTO: 433 K/UL (ref 150–450)
PMV BLD AUTO: 9.2 FL (ref 9.2–12.9)
POTASSIUM SERPL-SCNC: 4.5 MMOL/L (ref 3.5–5.1)
PROT SERPL-MCNC: 6.2 G/DL (ref 6–8.4)
RBC # BLD AUTO: 3.42 M/UL (ref 4–5.4)
SODIUM SERPL-SCNC: 137 MMOL/L (ref 136–145)
WBC # BLD AUTO: 6.25 K/UL (ref 3.9–12.7)

## 2022-10-07 PROCEDURE — 83735 ASSAY OF MAGNESIUM: CPT | Performed by: INTERNAL MEDICINE

## 2022-10-07 PROCEDURE — 99215 OFFICE O/P EST HI 40 MIN: CPT | Mod: S$GLB,,, | Performed by: INTERNAL MEDICINE

## 2022-10-07 PROCEDURE — 3078F DIAST BP <80 MM HG: CPT | Mod: CPTII,S$GLB,, | Performed by: INTERNAL MEDICINE

## 2022-10-07 PROCEDURE — 3074F PR MOST RECENT SYSTOLIC BLOOD PRESSURE < 130 MM HG: ICD-10-PCS | Mod: CPTII,S$GLB,, | Performed by: INTERNAL MEDICINE

## 2022-10-07 PROCEDURE — 1126F AMNT PAIN NOTED NONE PRSNT: CPT | Mod: CPTII,S$GLB,, | Performed by: INTERNAL MEDICINE

## 2022-10-07 PROCEDURE — 36415 COLL VENOUS BLD VENIPUNCTURE: CPT | Performed by: INTERNAL MEDICINE

## 2022-10-07 PROCEDURE — 3078F PR MOST RECENT DIASTOLIC BLOOD PRESSURE < 80 MM HG: ICD-10-PCS | Mod: CPTII,S$GLB,, | Performed by: INTERNAL MEDICINE

## 2022-10-07 PROCEDURE — 1111F DSCHRG MED/CURRENT MED MERGE: CPT | Mod: CPTII,S$GLB,, | Performed by: INTERNAL MEDICINE

## 2022-10-07 PROCEDURE — 80053 COMPREHEN METABOLIC PANEL: CPT | Performed by: INTERNAL MEDICINE

## 2022-10-07 PROCEDURE — 1111F PR DISCHARGE MEDS RECONCILED W/ CURRENT OUTPATIENT MED LIST: ICD-10-PCS | Mod: CPTII,S$GLB,, | Performed by: INTERNAL MEDICINE

## 2022-10-07 PROCEDURE — 1159F MED LIST DOCD IN RCRD: CPT | Mod: CPTII,S$GLB,, | Performed by: INTERNAL MEDICINE

## 2022-10-07 PROCEDURE — 85025 COMPLETE CBC W/AUTO DIFF WBC: CPT | Performed by: INTERNAL MEDICINE

## 2022-10-07 PROCEDURE — 1160F PR REVIEW ALL MEDS BY PRESCRIBER/CLIN PHARMACIST DOCUMENTED: ICD-10-PCS | Mod: CPTII,S$GLB,, | Performed by: INTERNAL MEDICINE

## 2022-10-07 PROCEDURE — 99215 PR OFFICE/OUTPT VISIT, EST, LEVL V, 40-54 MIN: ICD-10-PCS | Mod: S$GLB,,, | Performed by: INTERNAL MEDICINE

## 2022-10-07 PROCEDURE — 1101F PR PT FALLS ASSESS DOC 0-1 FALLS W/OUT INJ PAST YR: ICD-10-PCS | Mod: CPTII,S$GLB,, | Performed by: INTERNAL MEDICINE

## 2022-10-07 PROCEDURE — 1126F PR PAIN SEVERITY QUANTIFIED, NO PAIN PRESENT: ICD-10-PCS | Mod: CPTII,S$GLB,, | Performed by: INTERNAL MEDICINE

## 2022-10-07 PROCEDURE — 1160F RVW MEDS BY RX/DR IN RCRD: CPT | Mod: CPTII,S$GLB,, | Performed by: INTERNAL MEDICINE

## 2022-10-07 PROCEDURE — 99499 UNLISTED E&M SERVICE: CPT | Mod: HCNC,S$GLB,, | Performed by: INTERNAL MEDICINE

## 2022-10-07 PROCEDURE — 1101F PT FALLS ASSESS-DOCD LE1/YR: CPT | Mod: CPTII,S$GLB,, | Performed by: INTERNAL MEDICINE

## 2022-10-07 PROCEDURE — 3074F SYST BP LT 130 MM HG: CPT | Mod: CPTII,S$GLB,, | Performed by: INTERNAL MEDICINE

## 2022-10-07 PROCEDURE — 83880 ASSAY OF NATRIURETIC PEPTIDE: CPT | Performed by: INTERNAL MEDICINE

## 2022-10-07 PROCEDURE — 99999 PR PBB SHADOW E&M-EST. PATIENT-LVL V: CPT | Mod: PBBFAC,,, | Performed by: INTERNAL MEDICINE

## 2022-10-07 PROCEDURE — 3288F PR FALLS RISK ASSESSMENT DOCUMENTED: ICD-10-PCS | Mod: CPTII,S$GLB,, | Performed by: INTERNAL MEDICINE

## 2022-10-07 PROCEDURE — 1159F PR MEDICATION LIST DOCUMENTED IN MEDICAL RECORD: ICD-10-PCS | Mod: CPTII,S$GLB,, | Performed by: INTERNAL MEDICINE

## 2022-10-07 PROCEDURE — 3288F FALL RISK ASSESSMENT DOCD: CPT | Mod: CPTII,S$GLB,, | Performed by: INTERNAL MEDICINE

## 2022-10-07 PROCEDURE — 99999 PR PBB SHADOW E&M-EST. PATIENT-LVL V: ICD-10-PCS | Mod: PBBFAC,,, | Performed by: INTERNAL MEDICINE

## 2022-10-07 NOTE — PROGRESS NOTES
Subjective:   Patient ID:  Keyona Dwyer is a 76 y.o. female who presents for cardiac consult of [facility]      HPI  The patient came in today for cardiac consult of [facility]      Sept 2022 HPI:  Cardiology consulted for NSTEMI.  Pt has h/o PAD, HTN, hyperlipidemia, smoking.  She has h/o PAD with angiogram in April 2021 showing R common iliac occlusion and had PTA L common iliac artery, tx by Dr. Adkins, Vascular Surgery.  She has been weak last few days, and had a fever, GAMEZ, and lower abd pain.  No chest pain sxs.  She presented to ER and started on antibiotics for possible R sided pneumonia.  Troponin elevated 1.9  Ecg showed NSR, anterior T wave abnl.  WBC elevated.  Had low grade fever 100.9 in ER.     Hospital Course:   9/12/22-Patient seen and examined today, resting in bed. Feels ok. SOB improved. No chest pain. +Fever overnight. Labs reviewed. Troponin bumped to 2.441, repeat pending. Echo showed normal EF, +WMA. Plans for [facility] tomorrow (once afebrile for 24 hours).  9/13/22-Patient seen and examined today, lying in bed. Awake and alert but intermittently confused, family reports started this AM. No CV complaints. H/H dipped 7.9/24.6 and nursing staff reported dark colored stool. Heparin gtt and DAPT held, bleeding scan pending.  9/14/22-Patient seen and examined today, transferred to ICU overnight due to worsening respiratory status/likely aspiration. Remains on bipap during exam. No overt bleeding reported. EGD yesterday only showed gastritis. Colonoscopy planned today but cancelled due to overnight events.   9/15/22-Patient seen and examined today, sitting up in bed. Bipap weaned off. Feeling better, less SOB. Chest pain free. BNP elevated, diuresis initiated. H/H stable. Discussed with GI/hospital medicine, will resume DAPT and closely monitor.   9/16/22-Patient seen and examined today, sitting up in bed. Feeling much better, SOB nearly resolved. No angina. Diuresed well overnight. Labs  reviewed and are stable. Ranexa added.    10/7/22  Hosp follow up for NSTEMI, GIB, sepsis. BP and Hr well controlled today. She feels well now, no CP/SOB. She still smokes 1/2 pack day.     Patient feels no chest pain, no sob, no leg swelling, no PND, no palpitation, no dizziness, no syncope, no CNS symptoms.    Patient has fairly good exercise tolerance.    Patient is compliant with medications.    Results for orders placed during the hospital encounter of 09/11/22    Echo    Interpretation Summary  · The left ventricle is normal in size with concentric hypertrophy and normal systolic function.  · The estimated ejection fraction is 55%.  · Grade I left ventricular diastolic dysfunction.  · Normal right ventricular size with normal right ventricular systolic function.  · Moderate left atrial enlargement.  · Mild tricuspid regurgitation.  · There are segmental left ventricular wall motion abnormalities.  · There is pulmonary hypertension.  · The estimated PA systolic pressure is 45 mmHg.      Past Medical History:   Diagnosis Date    Acute diastolic heart failure 9/15/2022    Acute respiratory failure with hypoxia and hypercarbia 9/14/2022    Anemia     Anxiety     Cataract     Chronic back pain     Dr. Guzman    Fibrocystic breast     Glaucoma     Hyperlipemia     Hypertension     Idiopathic acute pancreatitis 6/18/2022    NSTEMI (non-ST elevated myocardial infarction) 9/11/2022    Osteoarthritis     Osteoporosis     Rheumatology/on Prolia    PVD (peripheral vascular disease) 4/12/2021    Restless leg syndrome     Tobacco dependence     Trouble in sleeping        Past Surgical History:   Procedure Laterality Date    ANGIOGRAPHY OF LOWER EXTREMITY N/A 4/12/2021    Procedure: ANGIOGRAM, LOWER EXTREMITY/left leg;  Surgeon: Maico Adkins MD;  [facility]: [facility] CATH LAB;  Service: Vascular;  Laterality: N/A;  req 1130 start time/Rm A243A    AORTOGRAPHY WITH SERIALOGRAPHY N/A 4/14/2021    Procedure: AORTOGRAM, WITH  RUNOFF;  Surgeon: Maico Adkins MD;  Location: [facility] CATH LAB;  Service: Vascular;  Laterality: N/A;  left iliac stent with shockwave/req 1030 start    CATARACT EXTRACTION W/  INTRAOCULAR LENS IMPLANT Bilateral 2017    ESOPHAGOGASTRODUODENOSCOPY N/A 2022    Procedure: EGD (ESOPHAGOGASTRODUODENOSCOPY);  Surgeon: Fern Rasmussen MD;  Location: [facility] ENDO;  Service: Endoscopy;  Laterality: N/A;    GALLBLADDER SURGERY      HYSTERECTOMY      LUNG SURGERY Right age 15    lung collpase    TOE AMPUTATION Left 4/15/2021    Procedure: AMPUTATION, TOE;  Surgeon: Taylor Anderson DPM;  Location: [facility] OR;  Service: Podiatry;  Laterality: Left;  Hallux (Great Toe)    TOTAL HIP ARTHROPLASTY Bilateral     TOTAL KNEE ARTHROPLASTY Bilateral        Social History     Tobacco Use    Smoking status: Some Days     Packs/day: 0.50     Years: 63.00     Pack years: 31.50     Types: Cigarettes     Last attempt to quit: 2021     Years since quittin.2    Smokeless tobacco: Never    Tobacco comments:     started age of  11    Substance Use Topics    Alcohol use: No    Drug use: No       Family History   Problem Relation Age of Onset    Cancer Mother     Breast cancer Mother     Hyperlipidemia Father     Heart failure Father     Heart disease Brother     Brain cancer Brother     Stroke Brother     Alcohol abuse Brother        Patient's Medications   New Prescriptions    No medications on file   Previous Medications    AMLODIPINE (NORVASC) 5 MG TABLET    TAKE 1 TABLET BY MOUTH EVERY DAY FOR BLOOD PRESSURE    ASPIRIN (ECOTRIN) 81 MG EC TABLET    Take 1 tablet (81 mg total) by mouth once daily.    AZELASTINE (ASTELIN) 137 MCG (0.1 %) NASAL SPRAY    1 spray (137 mcg total) by Nasal route 2 (two) times daily.    B COMPLEX VITAMINS CAPSULE    Take 1 capsule by mouth once daily.    BUPROPION (WELLBUTRIN) 75 MG TABLET    Take 1 tablet (75 mg total) by mouth 2 (two) times daily.    BUSPIRONE (BUSPAR) 10 MG TABLET    TAKE 1 TABLET  BY MOUTH TWICE DAILY    CLOPIDOGREL (PLAVIX) 75 MG TABLET    Take 1 tablet (75 mg total) by mouth once daily.    DULOXETINE (CYMBALTA) 30 MG CAPSULE    Take 30 mg by mouth once daily.    ERGOCALCIFEROL (ERGOCALCIFEROL) 50,000 UNIT CAP    TAKE 1 CAPSULE BY MOUTH ONE TIME PER WEEK    FERROUS SULFATE (FEOSOL) 325 MG (65 MG IRON) TAB TABLET    Take 1 tab (325 mg) every other day.    FUROSEMIDE (LASIX) 20 MG TABLET    Take 2 tablets (40 mg total) by mouth once daily for 5 days, THEN 1 tablet (20 mg total) once daily.    HYDROCODONE-ACETAMINOPHEN (NORCO)  MG PER TABLET    Take 1 tablet by mouth 3 (three) times daily as needed.    HYDROCODONE-ACETAMINOPHEN 10-325MG (NORCO)  MG TAB    Take 1 tablet by mouth every 6 (six) hours as needed.    LISINOPRIL (PRINIVIL,ZESTRIL) 40 MG TABLET    TAKE 1 TABLET BY MOUTH EVERY DAY for blood pressure    METOPROLOL SUCCINATE (TOPROL-XL) 25 MG 24 HR TABLET    Take 0.5 tablets (12.5 mg total) by mouth once daily.    NUTRITIONAL SUPPLEMENT/FIBER (QUINOA-KALE-HEMP ORAL)    Take by mouth.    ON-Q PAIN PUMP    by Misc.(Non-Drug; Combo Route) route.    PROLIA 60 MG/ML SYRG        RANOLAZINE (RANEXA) 500 MG TB12    Take 1 tablet (500 mg total) by mouth 2 (two) times daily.    ROSUVASTATIN (CRESTOR) 5 MG TABLET    TAKE 1 TABLET BY MOUTH ONCE A DAY   Modified Medications    No medications on file   Discontinued Medications    No medications on file       Review of Systems   Constitutional:  Positive for malaise/fatigue.   HENT: Negative.     Eyes: Negative.    Respiratory:  Positive for shortness of breath.    Cardiovascular: Negative.    Gastrointestinal: Negative.    Genitourinary: Negative.    Musculoskeletal: Negative.    Skin: Negative.    Neurological: Negative.    Endo/Heme/Allergies: Negative.    Psychiatric/Behavioral: Negative.     All 12 systems otherwise negative.    Wt Readings from Last 3 Encounters:   10/07/22 94 kg (207 lb 3.7 oz)   09/21/22 44.5 kg (98 lb)   09/15/22  "46.4 kg (102 lb 4.7 oz)     Temp Readings from Last 3 Encounters:   09/21/22 97.8 °F (36.6 °C) (Temporal)   09/17/22 98.1 °F (36.7 °C) (Oral)   09/01/22 97.5 °F (36.4 °C) (Temporal)     BP Readings from Last 3 Encounters:   10/07/22 100/60   09/21/22 100/60   09/17/22 (!) 140/66     Pulse Readings from Last 3 Encounters:   10/07/22 80   09/21/22 66   09/17/22 79       /60 (BP Location: Left arm, Patient Position: Sitting, BP Method: Large (Manual))   Pulse 80   Ht 5' 2" (1.575 m)   Wt 94 kg (207 lb 3.7 oz)   SpO2 98%   BMI 37.90 kg/m²     Objective:   Physical Exam  Vitals and nursing note reviewed.   Constitutional:       General: She is not in acute distress.     Appearance: She is well-developed. She is not diaphoretic.   HENT:      Head: Normocephalic and atraumatic.      Nose: Nose normal.   Eyes:      General: No scleral icterus.     Conjunctiva/sclera: Conjunctivae normal.   Neck:      Thyroid: No thyromegaly.      Vascular: No JVD.   Cardiovascular:      Rate and Rhythm: Normal rate and regular rhythm.      Heart sounds: S1 normal and S2 normal. Murmur heard.     No friction rub. No gallop. No S3 or S4 sounds.   Pulmonary:      Effort: Pulmonary effort is normal. No respiratory distress.      Breath sounds: Normal breath sounds. No stridor. No wheezing or rales.   Chest:      Chest wall: No tenderness.   Abdominal:      General: Bowel sounds are normal. There is no distension.      Palpations: Abdomen is soft. There is no mass.      Tenderness: There is no abdominal tenderness. There is no rebound.   Genitourinary:     Comments: Deferred  Musculoskeletal:         General: No tenderness or deformity. Normal range of motion.      Cervical back: Normal range of motion and neck supple.   Lymphadenopathy:      Cervical: No cervical adenopathy.   Skin:     General: Skin is warm and dry.      Coloration: Skin is not pale.      Findings: No erythema or rash.   Neurological:      Mental Status: She is " alert and oriented to person, place, and time.      Motor: No abnormal muscle tone.      Coordination: Coordination normal.   Psychiatric:         Behavior: Behavior normal.         Thought Content: Thought content normal.         Judgment: Judgment normal.       Lab Results   Component Value Date     09/17/2022    K 3.5 09/17/2022    CL 92 (L) 09/17/2022    CO2 29 09/17/2022    BUN 24 (H) 09/17/2022    CREATININE 1.0 09/17/2022    GLU 91 09/17/2022    HGBA1C 5.0 12/14/2018    MG 1.9 09/17/2022    AST 17 09/17/2022    ALT 25 09/17/2022    ALBUMIN 2.7 (L) 09/17/2022    PROT 5.7 (L) 09/17/2022    BILITOT 0.4 09/17/2022    WBC 6.78 09/17/2022    HGB 11.2 (L) 09/17/2022    HCT 35.9 (L) 09/17/2022    MCV 94 09/17/2022     (H) 09/17/2022    INR 1.0 09/12/2022    TSH 1.055 08/03/2021    CHOL 66 (L) 06/19/2022    HDL 39 (L) 06/19/2022    LDLCALC 20.0 (L) 06/19/2022    TRIG 35 06/19/2022    BNP 1,562 (H) 09/17/2022     Assessment:      1. NSTEMI (non-ST elevated myocardial infarction)    2. Hospital discharge follow-up    3. PVD (peripheral vascular disease)    4. Atherosclerosis of aorta    5. 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 Primary hypertension    7. Acute diastolic heart failure    8. Sepsis, due to unspecified organism, unspecified whether acute organ dysfunction present    9. Anemia, unspecified type    10. Melena        Plan:        * Sepsis due to Pneumonia   -sp antibiotics for pneumonia.     Acute diastolic heart failure  -Continue IV diuresis  -Continue BB  -Strict I's/O's     9/16/22  -Responded well to IV diuresis      Melena  -Workup and mgmt as per GI and hospital medicine     9/15/22  -No recurrence     UTI (urinary tract infection)  -Continue abx     Right lower lobe pneumonia  -Continue abx     NSTEMI (non-ST elevated myocardial infarction)  See NSTEMI section.     9/13/22  -Stable, no angina  -LHC again deferred due to worsening  anemia  -Transfuse  -ASA, Plavix, and heparin drip held for now, resume as tolerated  -Continue OMT as tolerated-BB, statin     9/14/22  -Continue OMT as tolerated-BB, statin  -DAPT held due to melena, resume as tolerated  -Medical management, LHC deferred for now given anemia issues/worsening respiratory status     9/15/22  -Continue BB, statin  -DAPT resumed  -Discussed with patient and family, continue OMT/med mgmt     9/16/22  -Stable, no angina  -Continue BB, statin, ASA, Plavix  -Ranexa added    10/7/22 - order pharm nuclear stress test to r/o ischemia      Tobacco abuse disorder  -Smoking cessation advised.     PVD (peripheral vascular disease)  -Medical tx for now  -ASA, statin  -Smoking cessation.     9/14/22  -Resume ASA as tolerated     Anemia    9/15/22  -H/H stable overnight  -Discussed with GI and hospital medicine, will resume DAPT and closely monitor        HTN (hypertension)   - titrate meds     Thank you for allowing me to participate in this patient's care. Please do not hesitate to contact me with any questions or concerns. Consult note has been forwarded to the referral physician.

## 2022-10-10 ENCOUNTER — TELEPHONE (OUTPATIENT)
Dept: HEMATOLOGY/ONCOLOGY | Facility: CLINIC | Age: 77
End: 2022-10-10

## 2022-10-10 ENCOUNTER — OFFICE VISIT (OUTPATIENT)
Dept: HEMATOLOGY/ONCOLOGY | Facility: CLINIC | Age: 77
End: 2022-10-10
Payer: MEDICARE

## 2022-10-10 ENCOUNTER — LAB VISIT (OUTPATIENT)
Dept: LAB | Facility: HOSPITAL | Age: 77
End: 2022-10-10
Attending: NURSE PRACTITIONER
Payer: MEDICARE

## 2022-10-10 VITALS
WEIGHT: 94 LBS | BODY MASS INDEX: 17.3 KG/M2 | HEART RATE: 76 BPM | SYSTOLIC BLOOD PRESSURE: 129 MMHG | OXYGEN SATURATION: 98 % | DIASTOLIC BLOOD PRESSURE: 60 MMHG | TEMPERATURE: 99 F | HEIGHT: 62 IN

## 2022-10-10 DIAGNOSIS — R23.3 EASY BRUISING: ICD-10-CM

## 2022-10-10 DIAGNOSIS — D50.9 IRON DEFICIENCY ANEMIA, UNSPECIFIED IRON DEFICIENCY ANEMIA TYPE: ICD-10-CM

## 2022-10-10 DIAGNOSIS — D64.9 ANEMIA, UNSPECIFIED TYPE: ICD-10-CM

## 2022-10-10 DIAGNOSIS — F17.210 CIGARETTE SMOKER: ICD-10-CM

## 2022-10-10 DIAGNOSIS — E53.8 B12 DEFICIENCY: ICD-10-CM

## 2022-10-10 DIAGNOSIS — D50.9 IRON DEFICIENCY ANEMIA, UNSPECIFIED IRON DEFICIENCY ANEMIA TYPE: Primary | ICD-10-CM

## 2022-10-10 DIAGNOSIS — E87.5 HYPERKALEMIA: Primary | ICD-10-CM

## 2022-10-10 LAB
ALBUMIN SERPL BCP-MCNC: 3.2 G/DL (ref 3.5–5.2)
ALP SERPL-CCNC: 48 U/L (ref 55–135)
ALT SERPL W/O P-5'-P-CCNC: 7 U/L (ref 10–44)
ANION GAP SERPL CALC-SCNC: 8 MMOL/L (ref 8–16)
AST SERPL-CCNC: 14 U/L (ref 10–40)
BASOPHILS # BLD AUTO: 0.08 K/UL (ref 0–0.2)
BASOPHILS NFR BLD: 1.5 % (ref 0–1.9)
BILIRUB SERPL-MCNC: 0.4 MG/DL (ref 0.1–1)
BUN SERPL-MCNC: 12 MG/DL (ref 8–23)
CALCIUM SERPL-MCNC: 9.1 MG/DL (ref 8.7–10.5)
CHLORIDE SERPL-SCNC: 101 MMOL/L (ref 95–110)
CO2 SERPL-SCNC: 26 MMOL/L (ref 23–29)
CREAT SERPL-MCNC: 0.8 MG/DL (ref 0.5–1.4)
DIFFERENTIAL METHOD: ABNORMAL
EOSINOPHIL # BLD AUTO: 0.3 K/UL (ref 0–0.5)
EOSINOPHIL NFR BLD: 4.8 % (ref 0–8)
ERYTHROCYTE [DISTWIDTH] IN BLOOD BY AUTOMATED COUNT: 15 % (ref 11.5–14.5)
EST. GFR  (NO RACE VARIABLE): >60 ML/MIN/1.73 M^2
FERRITIN SERPL-MCNC: 77 NG/ML (ref 20–300)
GLUCOSE SERPL-MCNC: 95 MG/DL (ref 70–110)
HCT VFR BLD AUTO: 33.9 % (ref 37–48.5)
HGB BLD-MCNC: 10.4 G/DL (ref 12–16)
IMM GRANULOCYTES # BLD AUTO: 0.02 K/UL (ref 0–0.04)
IMM GRANULOCYTES NFR BLD AUTO: 0.4 % (ref 0–0.5)
IRON SERPL-MCNC: 50 UG/DL (ref 30–160)
LYMPHOCYTES # BLD AUTO: 1.9 K/UL (ref 1–4.8)
LYMPHOCYTES NFR BLD: 34.4 % (ref 18–48)
MCH RBC QN AUTO: 30 PG (ref 27–31)
MCHC RBC AUTO-ENTMCNC: 30.7 G/DL (ref 32–36)
MCV RBC AUTO: 98 FL (ref 82–98)
MONOCYTES # BLD AUTO: 0.5 K/UL (ref 0.3–1)
MONOCYTES NFR BLD: 9.9 % (ref 4–15)
NEUTROPHILS # BLD AUTO: 2.7 K/UL (ref 1.8–7.7)
NEUTROPHILS NFR BLD: 49 % (ref 38–73)
NRBC BLD-RTO: 0 /100 WBC
PLATELET # BLD AUTO: 408 K/UL (ref 150–450)
PMV BLD AUTO: 8.8 FL (ref 9.2–12.9)
POTASSIUM SERPL-SCNC: 5.7 MMOL/L (ref 3.5–5.1)
PROT SERPL-MCNC: 6.1 G/DL (ref 6–8.4)
RBC # BLD AUTO: 3.47 M/UL (ref 4–5.4)
SATURATED IRON: 17 % (ref 20–50)
SODIUM SERPL-SCNC: 135 MMOL/L (ref 136–145)
TOTAL IRON BINDING CAPACITY: 299 UG/DL (ref 250–450)
TRANSFERRIN SERPL-MCNC: 202 MG/DL (ref 200–375)
WBC # BLD AUTO: 5.46 K/UL (ref 3.9–12.7)

## 2022-10-10 PROCEDURE — 3288F PR FALLS RISK ASSESSMENT DOCUMENTED: ICD-10-PCS | Mod: CPTII,S$GLB,, | Performed by: NURSE PRACTITIONER

## 2022-10-10 PROCEDURE — 1159F MED LIST DOCD IN RCRD: CPT | Mod: CPTII,S$GLB,, | Performed by: NURSE PRACTITIONER

## 2022-10-10 PROCEDURE — 99214 OFFICE O/P EST MOD 30 MIN: CPT | Mod: S$GLB,,, | Performed by: NURSE PRACTITIONER

## 2022-10-10 PROCEDURE — 3074F PR MOST RECENT SYSTOLIC BLOOD PRESSURE < 130 MM HG: ICD-10-PCS | Mod: CPTII,S$GLB,, | Performed by: NURSE PRACTITIONER

## 2022-10-10 PROCEDURE — 1111F DSCHRG MED/CURRENT MED MERGE: CPT | Mod: CPTII,S$GLB,, | Performed by: NURSE PRACTITIONER

## 2022-10-10 PROCEDURE — 99999 PR PBB SHADOW E&M-EST. PATIENT-LVL V: ICD-10-PCS | Mod: PBBFAC,,, | Performed by: NURSE PRACTITIONER

## 2022-10-10 PROCEDURE — 82728 ASSAY OF FERRITIN: CPT | Performed by: NURSE PRACTITIONER

## 2022-10-10 PROCEDURE — 1101F PT FALLS ASSESS-DOCD LE1/YR: CPT | Mod: CPTII,S$GLB,, | Performed by: NURSE PRACTITIONER

## 2022-10-10 PROCEDURE — 3078F DIAST BP <80 MM HG: CPT | Mod: CPTII,S$GLB,, | Performed by: NURSE PRACTITIONER

## 2022-10-10 PROCEDURE — 1160F PR REVIEW ALL MEDS BY PRESCRIBER/CLIN PHARMACIST DOCUMENTED: ICD-10-PCS | Mod: CPTII,S$GLB,, | Performed by: NURSE PRACTITIONER

## 2022-10-10 PROCEDURE — 36415 COLL VENOUS BLD VENIPUNCTURE: CPT | Mod: PN | Performed by: NURSE PRACTITIONER

## 2022-10-10 PROCEDURE — 3288F FALL RISK ASSESSMENT DOCD: CPT | Mod: CPTII,S$GLB,, | Performed by: NURSE PRACTITIONER

## 2022-10-10 PROCEDURE — 99999 PR PBB SHADOW E&M-EST. PATIENT-LVL V: CPT | Mod: PBBFAC,,, | Performed by: NURSE PRACTITIONER

## 2022-10-10 PROCEDURE — 84466 ASSAY OF TRANSFERRIN: CPT | Performed by: NURSE PRACTITIONER

## 2022-10-10 PROCEDURE — 1111F PR DISCHARGE MEDS RECONCILED W/ CURRENT OUTPATIENT MED LIST: ICD-10-PCS | Mod: CPTII,S$GLB,, | Performed by: NURSE PRACTITIONER

## 2022-10-10 PROCEDURE — 1160F RVW MEDS BY RX/DR IN RCRD: CPT | Mod: CPTII,S$GLB,, | Performed by: NURSE PRACTITIONER

## 2022-10-10 PROCEDURE — 3074F SYST BP LT 130 MM HG: CPT | Mod: CPTII,S$GLB,, | Performed by: NURSE PRACTITIONER

## 2022-10-10 PROCEDURE — 1126F PR PAIN SEVERITY QUANTIFIED, NO PAIN PRESENT: ICD-10-PCS | Mod: CPTII,S$GLB,, | Performed by: NURSE PRACTITIONER

## 2022-10-10 PROCEDURE — 80053 COMPREHEN METABOLIC PANEL: CPT | Performed by: NURSE PRACTITIONER

## 2022-10-10 PROCEDURE — 99214 PR OFFICE/OUTPT VISIT, EST, LEVL IV, 30-39 MIN: ICD-10-PCS | Mod: S$GLB,,, | Performed by: NURSE PRACTITIONER

## 2022-10-10 PROCEDURE — 1159F PR MEDICATION LIST DOCUMENTED IN MEDICAL RECORD: ICD-10-PCS | Mod: CPTII,S$GLB,, | Performed by: NURSE PRACTITIONER

## 2022-10-10 PROCEDURE — 1126F AMNT PAIN NOTED NONE PRSNT: CPT | Mod: CPTII,S$GLB,, | Performed by: NURSE PRACTITIONER

## 2022-10-10 PROCEDURE — 1101F PR PT FALLS ASSESS DOC 0-1 FALLS W/OUT INJ PAST YR: ICD-10-PCS | Mod: CPTII,S$GLB,, | Performed by: NURSE PRACTITIONER

## 2022-10-10 PROCEDURE — 85025 COMPLETE CBC W/AUTO DIFF WBC: CPT | Performed by: NURSE PRACTITIONER

## 2022-10-10 PROCEDURE — 3078F PR MOST RECENT DIASTOLIC BLOOD PRESSURE < 80 MM HG: ICD-10-PCS | Mod: CPTII,S$GLB,, | Performed by: NURSE PRACTITIONER

## 2022-10-10 NOTE — PROGRESS NOTES
Subjective:      Patient ID: Keyona Dwyer is a 76 y.o. female.    Chief Complaint: easy bruising    HPI:  Patient is a 76 year old female who presents today for further evaluation and recommendations of iron deficiency anemia.  She has been referred to the hematology by her PCP, Dr. Ralf Isidro.  Had diagnosis of acute pancreatitis requiring hospitalization in 6/2022 with complaints of abdominal pain.  Other chronic diagnosis include anxiety, chronic back pain, hyperlipidemia, htn, osteoporosis, RVT, and restless leg syndrome.       Patient states that she got bit by a brown recluse and got sick due to antibiotics and has some weight loss.  States that she was on cephalexin 500 mg PO TID and caused stomach distress with weight loss and episodic nausea/vomiting.      Is a current smoker since she was 12 years old - 1 pack per day for 60 years. Now smokes 3-10 per day.  She denies alcohol use.      She denies f/c/ns or lymphadenopathy.      Interval History:  10/10/2022: With hospitalization in 9/2022 d/t pneumonia, NSTEMI, and GI bleed.  EGD done as well as bleeding scan and found with no active bleeding although patient did have dark stools during hospitalizations with a drop in hgb requiring blood transfusion.  She was unable to tolerate GI prep for colonoscopy.  At last visit work up for anemia found with elevated MMA.  She was placed on B12 2000 mcg PO daily.  Also found with LOR started on ferrous sulfate 325 mg PO and is currently taking iron tablets every other day.  States that she is taking stool softeners daily.  States has not noted dark stools since hospitalization.  3+ blood found in urine during hospitalizations.     Social History     Socioeconomic History    Marital status:      Spouse name: elizabeth    Number of children: 3   Occupational History    Occupation: retired   Tobacco Use    Smoking status: Some Days     Packs/day: 0.50     Years: 63.00     Pack years: 31.50     Types:  Cigarettes     Last attempt to quit: 2021     Years since quittin.2    Smokeless tobacco: Never    Tobacco comments:     started age of  11    Substance and Sexual Activity    Alcohol use: No    Drug use: No    Sexual activity: Yes     Partners: Male     Social Determinants of Health     Financial Resource Strain: Low Risk     Difficulty of Paying Living Expenses: Not hard at all   Food Insecurity: No Food Insecurity    Worried About Running Out of Food in the Last Year: Never true    Ran Out of Food in the Last Year: Never true   Transportation Needs: No Transportation Needs    Lack of Transportation (Medical): No    Lack of Transportation (Non-Medical): No   Physical Activity: Inactive    Days of Exercise per Week: 0 days    Minutes of Exercise per Session: 0 min   Stress: No Stress Concern Present    Feeling of Stress : Not at all   Social Connections: Moderately Integrated    Frequency of Communication with Friends and Family: More than three times a week    Frequency of Social Gatherings with Friends and Family: More than three times a week    Attends Gnosticism Services: More than 4 times per year    Active Member of Clubs or Organizations: No    Attends Club or Organization Meetings: Never    Marital Status:    Housing Stability: Low Risk     Unable to Pay for Housing in the Last Year: No    Number of Places Lived in the Last Year: 1    Unstable Housing in the Last Year: No       Family History   Problem Relation Age of Onset    Cancer Mother     Breast cancer Mother     Hyperlipidemia Father     Heart failure Father     Heart disease Brother     Brain cancer Brother     Stroke Brother     Alcohol abuse Brother        Past Surgical History:   Procedure Laterality Date    ANGIOGRAPHY OF LOWER EXTREMITY N/A 2021    Procedure: ANGIOGRAM, LOWER EXTREMITY/left leg;  Surgeon: Maico Adkins MD;  Location: [facility] CATH LAB;  Service: Vascular;  Laterality: N/A;  req 1130 start time/Rm A243A     AORTOGRAPHY WITH SERIALOGRAPHY N/A 4/14/2021    Procedure: AORTOGRAM, WITH RUNOFF;  Surgeon: Maico Adkins MD;  Location: [facility] CATH LAB;  Service: Vascular;  Laterality: N/A;  left iliac stent with shockwave/req 1030 start    CATARACT EXTRACTION W/  INTRAOCULAR LENS IMPLANT Bilateral 8/ 9 2017    ESOPHAGOGASTRODUODENOSCOPY N/A 9/13/2022    Procedure: EGD (ESOPHAGOGASTRODUODENOSCOPY);  Surgeon: Fern Rasmussen MD;  Location: [facility] ENDO;  Service: Endoscopy;  Laterality: N/A;    GALLBLADDER SURGERY      HYSTERECTOMY  1972    LUNG SURGERY Right age 15    lung collpase    TOE AMPUTATION Left 4/15/2021    Procedure: AMPUTATION, TOE;  Surgeon: Taylor Anderson DPM;  Location: [facility] OR;  Service: Podiatry;  Laterality: Left;  Hallux (Great Toe)    TOTAL HIP ARTHROPLASTY Bilateral     TOTAL KNEE ARTHROPLASTY Bilateral        Past Medical History:   Diagnosis Date    Acute diastolic heart failure 9/15/2022    Acute respiratory failure with hypoxia and hypercarbia 9/14/2022    Anemia     Anxiety     Cataract     Chronic back pain     Dr. Guzman    Fibrocystic breast     Glaucoma     Hyperlipemia     Hypertension     Idiopathic acute pancreatitis 6/18/2022    NSTEMI (non-ST elevated myocardial infarction) 9/11/2022    Osteoarthritis     Osteoporosis     Rheumatology/on Prolia    PVD (peripheral vascular disease) 4/12/2021    Restless leg syndrome     Tobacco dependence     Trouble in sleeping        Review of Systems   Constitutional: Negative.    HENT: Negative.     Eyes: Negative.    Respiratory: Negative.     Cardiovascular: Negative.    Gastrointestinal:  Positive for constipation (using stool softeners with relief).   Endocrine: Negative.    Genitourinary: Negative.    Musculoskeletal: Negative.    Skin: Negative.    Allergic/Immunologic: Negative.    Neurological: Negative.    Hematological:  Bruises/bleeds easily (multiple bruises with oozing blood to arm and legs).   Psychiatric/Behavioral: Negative.           Medication List with Changes/Refills   Current Medications    ASPIRIN (ECOTRIN) 81 MG EC TABLET    Take 1 tablet (81 mg total) by mouth once daily.    AZELASTINE (ASTELIN) 137 MCG (0.1 %) NASAL SPRAY    1 spray (137 mcg total) by Nasal route 2 (two) times daily.    B COMPLEX VITAMINS CAPSULE    Take 1 capsule by mouth once daily.    BUPROPION (WELLBUTRIN) 75 MG TABLET    Take 1 tablet (75 mg total) by mouth 2 (two) times daily.    BUSPIRONE (BUSPAR) 10 MG TABLET    TAKE 1 TABLET BY MOUTH TWICE DAILY    CLOPIDOGREL (PLAVIX) 75 MG TABLET    Take 1 tablet (75 mg total) by mouth once daily.    DULOXETINE (CYMBALTA) 30 MG CAPSULE    Take 30 mg by mouth once daily.    ERGOCALCIFEROL (ERGOCALCIFEROL) 50,000 UNIT CAP    TAKE 1 CAPSULE BY MOUTH ONE TIME PER WEEK    FERROUS SULFATE (FEOSOL) 325 MG (65 MG IRON) TAB TABLET    Take 1 tab (325 mg) every other day.    FUROSEMIDE (LASIX) 20 MG TABLET    Take 2 tablets (40 mg total) by mouth once daily for 5 days, THEN 1 tablet (20 mg total) once daily.    HYDROCODONE-ACETAMINOPHEN (NORCO)  MG PER TABLET    Take 1 tablet by mouth 3 (three) times daily as needed.    HYDROCODONE-ACETAMINOPHEN 10-325MG (NORCO)  MG TAB    Take 1 tablet by mouth every 6 (six) hours as needed.    LISINOPRIL (PRINIVIL,ZESTRIL) 40 MG TABLET    TAKE 1 TABLET BY MOUTH EVERY DAY for blood pressure    METOPROLOL SUCCINATE (TOPROL-XL) 25 MG 24 HR TABLET    Take 0.5 tablets (12.5 mg total) by mouth once daily.    NUTRITIONAL SUPPLEMENT/FIBER (QUINOA-KALE-HEMP ORAL)    Take by mouth.    ON-Q PAIN PUMP    by Misc.(Non-Drug; Combo Route) route.    PROLIA 60 MG/ML SYRG        RANOLAZINE (RANEXA) 500 MG TB12    Take 1 tablet (500 mg total) by mouth 2 (two) times daily.    ROSUVASTATIN (CRESTOR) 5 MG TABLET    TAKE 1 TABLET BY MOUTH ONCE A DAY        Objective:     Vitals:    10/10/22 0822   BP: 129/60   Pulse: 76   Temp: 98.6 °F (37 °C)       Physical Exam  Vitals and nursing note reviewed.    Constitutional:       Appearance: Normal appearance.   HENT:      Head: Normocephalic and atraumatic.      Right Ear: External ear normal.      Left Ear: External ear normal.   Cardiovascular:      Rate and Rhythm: Normal rate and regular rhythm.      Heart sounds: Normal heart sounds, S1 normal and S2 normal.   Pulmonary:      Effort: Pulmonary effort is normal.      Breath sounds: Normal breath sounds.   Abdominal:      General: There is no distension.   Musculoskeletal:         General: Normal range of motion.      Cervical back: Normal range of motion.   Skin:     General: Skin is warm and dry.      Findings: Bruising (bilateral arms and legs - with oozing red blood to multiple spots on right lower leg) present.   Neurological:      General: No focal deficit present.      Mental Status: She is alert and oriented to person, place, and time.   Psychiatric:         Attention and Perception: Attention and perception normal.         Mood and Affect: Mood and affect normal.         Speech: Speech normal.         Behavior: Behavior normal. Behavior is cooperative.         Thought Content: Thought content normal.         Cognition and Memory: Cognition and memory normal.         Judgment: Judgment normal.       Assessment:     Problem List Items Addressed This Visit          Oncology    Anemia - Primary    Relevant Orders    Occult blood x 1, stool    Occult blood x 1, stool    Occult blood x 1, stool    CBC Auto Differential    Comprehensive Metabolic Panel    Ferritin    Iron and TIBC     Other Visit Diagnoses       B12 deficiency        Easy bruising        Cigarette smoker                      Plan:   Iron deficiency anemia, unspecified iron deficiency anemia type  -     Occult blood x 1, stool; Future; Expected date: 10/10/2022  -     Occult blood x 1, stool; Future; Expected date: 10/10/2022  -     Occult blood x 1, stool; Future; Expected date: 10/10/2022  -     CBC Auto Differential; Future; Expected date:  10/10/2022  -     Comprehensive Metabolic Panel; Future; Expected date: 10/10/2022  -     Ferritin; Future; Expected date: 10/10/2022  -     Iron and TIBC; Future; Expected date: 10/10/2022    B12 deficiency    Easy bruising    Cigarette smoker    Continue B12 2000 mcg PO daily.  Continue ferrous sulfate 325 mg PO every other day.  Occult stools x 3. If very iron deficient, will repleat with IV iron due to cardiac concerns and current constipation.  Wait for labs to result to make final decision.  F/u in 2 months with labs prior.       Encourage continued working on smoking cessation.  Patient states that she would like to do her way or else she will end up smoking just as much again.     Collaborating Provider:  Dr. Kris Alcocer    Thank You,  Tiarra Mg, LUIS DANIELP-C  Hematology Oncology

## 2022-10-10 NOTE — PROGRESS NOTES
Please let patient know that her potassium is elevated.  Please have her avoid potassium rich foods.  Would like to recheck potassium in a couple of days.  Foods to avoid = oranges, bananas, avocados, tomatoes, potatoes, cereals.  I will place order for potassium lab.

## 2022-10-10 NOTE — TELEPHONE ENCOUNTER
----- Message from Drea Mg NP sent at 10/10/2022  1:46 PM CDT -----  Please let patient know that her potassium is elevated.  Please have her avoid potassium rich foods.  Would like to recheck potassium in a couple of days.  Foods to avoid = oranges, bananas, avocados, tomatoes, potatoes, cereals.  I will place order   for potassium lab.

## 2022-10-17 ENCOUNTER — LAB VISIT (OUTPATIENT)
Dept: LAB | Facility: HOSPITAL | Age: 77
End: 2022-10-17
Attending: NURSE PRACTITIONER
Payer: MEDICARE

## 2022-10-17 ENCOUNTER — TELEPHONE (OUTPATIENT)
Dept: HEMATOLOGY/ONCOLOGY | Facility: CLINIC | Age: 77
End: 2022-10-17
Payer: MEDICARE

## 2022-10-17 ENCOUNTER — IMMUNIZATION (OUTPATIENT)
Dept: INTERNAL MEDICINE | Facility: CLINIC | Age: 77
End: 2022-10-17
Payer: MEDICARE

## 2022-10-17 DIAGNOSIS — E87.5 HYPERKALEMIA: ICD-10-CM

## 2022-10-17 LAB — POTASSIUM SERPL-SCNC: 5 MMOL/L (ref 3.5–5.1)

## 2022-10-17 PROCEDURE — G0008 FLU VACCINE - QUADRIVALENT - ADJUVANTED: ICD-10-PCS | Mod: S$GLB,,, | Performed by: INTERNAL MEDICINE

## 2022-10-17 PROCEDURE — 84132 ASSAY OF SERUM POTASSIUM: CPT | Performed by: NURSE PRACTITIONER

## 2022-10-17 PROCEDURE — G0008 ADMIN INFLUENZA VIRUS VAC: HCPCS | Mod: S$GLB,,, | Performed by: INTERNAL MEDICINE

## 2022-10-17 PROCEDURE — 90694 VACC AIIV4 NO PRSRV 0.5ML IM: CPT | Mod: S$GLB,,, | Performed by: INTERNAL MEDICINE

## 2022-10-17 PROCEDURE — 36415 COLL VENOUS BLD VENIPUNCTURE: CPT | Mod: PO | Performed by: NURSE PRACTITIONER

## 2022-10-17 PROCEDURE — 90694 FLU VACCINE - QUADRIVALENT - ADJUVANTED: ICD-10-PCS | Mod: S$GLB,,, | Performed by: INTERNAL MEDICINE

## 2022-10-17 NOTE — TELEPHONE ENCOUNTER
----- Message from Drea Mg NP sent at 10/17/2022 12:52 PM CDT -----  Please let patient know that her potassium has normalized.     Drea Granger'

## 2022-10-19 RX ORDER — RANOLAZINE 500 MG/1
500 TABLET, EXTENDED RELEASE ORAL 2 TIMES DAILY
Qty: 60 TABLET | Refills: 3 | Status: SHIPPED | OUTPATIENT
Start: 2022-10-19 | End: 2023-01-09 | Stop reason: SDUPTHER

## 2022-10-19 NOTE — TELEPHONE ENCOUNTER
----- Message from Lorenza Arreguin sent at 10/19/2022 11:17 AM CDT -----  Contact: Pat/ [facility]  Type:  RX Refill Request    Who Called:  Pat  Refill or New Rx: Refill  RX Name and Strength:Ranolazine 500mg  How is the patient currently taking it? (ex. 1XDay): 2x  Is this a 30 day or 90 day RX: 30 days  Preferred Pharmacy with phone number:   [facility] [address] [address]  Phone: [phone] Fax: [phone]  [facility] [address] [facility] [id number] [address] [address]  Phone: [phone] Fax: [phone]  Local or Mail Order: Local  Ordering Provider: Dr. Berg  Would the patient rather a call back or a response via [facility]? Call  Best Call Back Number: [phone]   Additional Information:

## 2022-10-19 NOTE — TELEPHONE ENCOUNTER
----- Message from Lorenza Arreguin sent at 10/19/2022 11:17 AM CDT -----  Contact: Pat/ [facility]  Type:  RX Refill Request    Who Called:  Pat  Refill or New Rx: Refill  RX Name and Strength:Ranolazine 500mg  How is the patient currently taking it? (ex. 1XDay): 2x  Is this a 30 day or 90 day RX: 30 days  Preferred Pharmacy with phone number:   [facility] [address] [address]  Phone: [phone] Fax: [phone]  [facility] [address] [facility] [phone] [address] [address]  Phone: [phone] Fax: [phone]  Local or Mail Order: Local  Ordering Provider: Dr. Berg  Would the patient rather a call back or a response via [facility]? Call  Best Call Back Number: [phone]   Additional Information:

## 2022-10-24 ENCOUNTER — LAB VISIT (OUTPATIENT)
Dept: LAB | Facility: HOSPITAL | Age: 77
End: 2022-10-24
Attending: NURSE PRACTITIONER
Payer: MEDICARE

## 2022-10-24 DIAGNOSIS — D50.9 IRON DEFICIENCY ANEMIA, UNSPECIFIED IRON DEFICIENCY ANEMIA TYPE: ICD-10-CM

## 2022-10-24 PROCEDURE — 82272 OCCULT BLD FECES 1-3 TESTS: CPT | Performed by: NURSE PRACTITIONER

## 2022-10-25 LAB
OB PNL STL: NEGATIVE

## 2022-10-26 ENCOUNTER — EXTERNAL HOME HEALTH (OUTPATIENT)
Dept: HOME HEALTH SERVICES | Facility: HOSPITAL | Age: 77
End: 2022-10-26
Payer: MEDICARE

## 2022-11-04 ENCOUNTER — HOSPITAL ENCOUNTER (OUTPATIENT)
Dept: RADIOLOGY | Facility: HOSPITAL | Age: 77
Discharge: HOME | End: 2022-11-04
Attending: INTERNAL MEDICINE
Payer: MEDICARE

## 2022-11-04 ENCOUNTER — HOSPITAL ENCOUNTER (OUTPATIENT)
Dept: CARDIOLOGY | Facility: HOSPITAL | Age: 77
Discharge: HOME | End: 2022-11-04
Attending: INTERNAL MEDICINE
Payer: MEDICARE

## 2022-11-04 ENCOUNTER — TELEPHONE (OUTPATIENT)
Dept: CARDIOLOGY | Facility: CLINIC | Age: 77
End: 2022-11-04
Payer: MEDICARE

## 2022-11-04 DIAGNOSIS — I70.0 ATHEROSCLEROSIS OF AORTA: ICD-10-CM

## 2022-11-04 DIAGNOSIS — I73.9 PVD (PERIPHERAL VASCULAR DISEASE): ICD-10-CM

## 2022-11-04 DIAGNOSIS — I50.31 ACUTE DIASTOLIC HEART FAILURE: ICD-10-CM

## 2022-11-04 DIAGNOSIS — D64.9 ANEMIA, UNSPECIFIED TYPE: ICD-10-CM

## 2022-11-04 DIAGNOSIS — Z09 HOSPITAL DISCHARGE FOLLOW-UP: ICD-10-CM

## 2022-11-04 DIAGNOSIS — I21.4 NSTEMI (NON-ST ELEVATED MYOCARDIAL INFARCTION): ICD-10-CM

## 2022-11-04 LAB
CV STRESS BASE HR: 77 BPM
DIASTOLIC BLOOD PRESSURE: 73 MMHG
NUC REST EJECTION FRACTION: 76
NUC STRESS EJECTION FRACTION: 69 %
OHS CV CPX 85 PERCENT MAX PREDICTED HEART RATE MALE: 117
OHS CV CPX MAX PREDICTED HEART RATE: 137
OHS CV CPX PATIENT IS FEMALE: 1
OHS CV CPX PATIENT IS MALE: 0
OHS CV CPX PEAK DIASTOLIC BLOOD PRESSURE: 86 MMHG
OHS CV CPX PEAK HEAR RATE: 118 BPM
OHS CV CPX PEAK RATE PRESSURE PRODUCT: NORMAL
OHS CV CPX PEAK SYSTOLIC BLOOD PRESSURE: 194 MMHG
OHS CV CPX PERCENT MAX PREDICTED HEART RATE ACHIEVED: 86
OHS CV CPX RATE PRESSURE PRODUCT PRESENTING: NORMAL
STRESS ECHO POST EXERCISE DUR SEC: 54 SECONDS
SYSTOLIC BLOOD PRESSURE: 137 MMHG

## 2022-11-04 PROCEDURE — 78452 HT MUSCLE IMAGE SPECT MULT: CPT | Mod: 26,,, | Performed by: INTERNAL MEDICINE

## 2022-11-04 PROCEDURE — 63600175 PHARM REV CODE 636 W HCPCS: Performed by: INTERNAL MEDICINE

## 2022-11-04 PROCEDURE — 93017 CV STRESS TEST TRACING ONLY: CPT

## 2022-11-04 PROCEDURE — A9502 TC99M TETROFOSMIN: HCPCS

## 2022-11-04 PROCEDURE — 78452 NUCLEAR STRESS - CARDIOLOGY INTERPRETED (CUPID ONLY): ICD-10-PCS | Mod: 26,,, | Performed by: INTERNAL MEDICINE

## 2022-11-04 PROCEDURE — 93016 CV STRESS TEST SUPVJ ONLY: CPT | Mod: ,,, | Performed by: INTERNAL MEDICINE

## 2022-11-04 PROCEDURE — 93016 NUCLEAR STRESS - CARDIOLOGY INTERPRETED (CUPID ONLY): ICD-10-PCS | Mod: ,,, | Performed by: INTERNAL MEDICINE

## 2022-11-04 PROCEDURE — 93018 NUCLEAR STRESS - CARDIOLOGY INTERPRETED (CUPID ONLY): ICD-10-PCS | Mod: ,,, | Performed by: INTERNAL MEDICINE

## 2022-11-04 PROCEDURE — 93018 CV STRESS TEST I&R ONLY: CPT | Mod: ,,, | Performed by: INTERNAL MEDICINE

## 2022-11-04 RX ORDER — REGADENOSON 0.08 MG/ML
0.4 INJECTION, SOLUTION INTRAVENOUS ONCE
Status: COMPLETED | OUTPATIENT
Start: 2022-11-04 | End: 2022-11-04

## 2022-11-04 RX ADMIN — REGADENOSON 0.4 MG: 0.08 INJECTION, SOLUTION INTRAVENOUS at 10:11

## 2022-11-04 NOTE — TELEPHONE ENCOUNTER
LvM for pt in regards    Please contact the patient and let them know that their results of nuclear stress test is normal.    51.5

## 2022-11-18 ENCOUNTER — TELEPHONE (OUTPATIENT)
Dept: HEMATOLOGY/ONCOLOGY | Facility: CLINIC | Age: 77
End: 2022-11-18
Payer: MEDICARE

## 2022-11-18 DIAGNOSIS — C44.90 SKIN CANCER: Primary | ICD-10-CM

## 2022-11-18 NOTE — TELEPHONE ENCOUNTER
Nurse spoke with in regards to scheduling for a dermatology  appt, pt stated she will bring in pathology report on 12/19 when she is being seen by Drea Mg. Pt appt on 11/21 has been canceled per pts request

## 2022-11-29 ENCOUNTER — PES CALL (OUTPATIENT)
Dept: ADMINISTRATIVE | Facility: CLINIC | Age: 77
End: 2022-11-29
Payer: MEDICARE

## 2022-12-06 ENCOUNTER — PES CALL (OUTPATIENT)
Dept: ADMINISTRATIVE | Facility: CLINIC | Age: 77
End: 2022-12-06
Payer: MEDICARE

## 2022-12-12 PROBLEM — J18.9 RIGHT LOWER LOBE PNEUMONIA: Status: RESOLVED | Noted: 2022-09-11 | Resolved: 2022-12-12

## 2022-12-12 PROBLEM — N39.0 UTI (URINARY TRACT INFECTION): Status: RESOLVED | Noted: 2022-09-12 | Resolved: 2022-12-12

## 2022-12-19 ENCOUNTER — TELEPHONE (OUTPATIENT)
Dept: HEMATOLOGY/ONCOLOGY | Facility: CLINIC | Age: 77
End: 2022-12-19
Payer: MEDICARE

## 2022-12-19 PROBLEM — I21.4 NSTEMI (NON-ST ELEVATED MYOCARDIAL INFARCTION): Status: RESOLVED | Noted: 2022-09-11 | Resolved: 2022-12-19

## 2022-12-19 PROBLEM — J96.02 ACUTE RESPIRATORY FAILURE WITH HYPOXIA AND HYPERCARBIA: Status: RESOLVED | Noted: 2022-09-14 | Resolved: 2022-12-19

## 2022-12-19 PROBLEM — A41.9 SEPSIS: Status: RESOLVED | Noted: 2022-09-11 | Resolved: 2022-12-19

## 2022-12-19 PROBLEM — J96.01 ACUTE RESPIRATORY FAILURE WITH HYPOXIA AND HYPERCARBIA: Status: RESOLVED | Noted: 2022-09-14 | Resolved: 2022-12-19

## 2022-12-19 NOTE — TELEPHONE ENCOUNTER
Nurse spoke with pt in regards to rescheduling her appt due to no completing lab work. Pt has been rescheduled. Verbalized understanding

## 2022-12-28 ENCOUNTER — TELEPHONE (OUTPATIENT)
Dept: DERMATOLOGY | Facility: CLINIC | Age: 77
End: 2022-12-28
Payer: MEDICARE

## 2022-12-28 NOTE — TELEPHONE ENCOUNTER
Called patient left vm in regards to appointment being canceled due to provider not being in office at that location.

## 2023-01-10 RX ORDER — RANOLAZINE 500 MG/1
500 TABLET, EXTENDED RELEASE ORAL 2 TIMES DAILY
Qty: 60 TABLET | Refills: 3 | Status: SHIPPED | OUTPATIENT
Start: 2023-01-10 | End: 2023-02-17 | Stop reason: SDUPTHER

## 2023-01-11 ENCOUNTER — PES CALL (OUTPATIENT)
Dept: ADMINISTRATIVE | Facility: CLINIC | Age: 78
End: 2023-01-11
Payer: MEDICARE

## 2023-01-20 ENCOUNTER — LAB VISIT (OUTPATIENT)
Dept: LAB | Facility: HOSPITAL | Age: 78
End: 2023-01-20
Attending: NURSE PRACTITIONER
Payer: MEDICARE

## 2023-01-20 DIAGNOSIS — D50.9 IRON DEFICIENCY ANEMIA, UNSPECIFIED IRON DEFICIENCY ANEMIA TYPE: ICD-10-CM

## 2023-01-20 LAB
ALBUMIN SERPL BCP-MCNC: 3.4 G/DL (ref 3.5–5.2)
ALP SERPL-CCNC: 47 U/L (ref 55–135)
ALT SERPL W/O P-5'-P-CCNC: 19 U/L (ref 10–44)
ANION GAP SERPL CALC-SCNC: 11 MMOL/L (ref 8–16)
AST SERPL-CCNC: 20 U/L (ref 10–40)
BASOPHILS # BLD AUTO: 0.04 K/UL (ref 0–0.2)
BASOPHILS NFR BLD: 0.7 % (ref 0–1.9)
BILIRUB SERPL-MCNC: 0.3 MG/DL (ref 0.1–1)
BUN SERPL-MCNC: 12 MG/DL (ref 8–23)
CALCIUM SERPL-MCNC: 9.6 MG/DL (ref 8.7–10.5)
CHLORIDE SERPL-SCNC: 103 MMOL/L (ref 95–110)
CO2 SERPL-SCNC: 26 MMOL/L (ref 23–29)
CREAT SERPL-MCNC: 0.8 MG/DL (ref 0.5–1.4)
DIFFERENTIAL METHOD: ABNORMAL
EOSINOPHIL # BLD AUTO: 0.2 K/UL (ref 0–0.5)
EOSINOPHIL NFR BLD: 3.6 % (ref 0–8)
ERYTHROCYTE [DISTWIDTH] IN BLOOD BY AUTOMATED COUNT: 13.2 % (ref 11.5–14.5)
EST. GFR  (NO RACE VARIABLE): >60 ML/MIN/1.73 M^2
FERRITIN SERPL-MCNC: 51 NG/ML (ref 20–300)
GLUCOSE SERPL-MCNC: 119 MG/DL (ref 70–110)
HCT VFR BLD AUTO: 34.8 % (ref 37–48.5)
HGB BLD-MCNC: 10.9 G/DL (ref 12–16)
IMM GRANULOCYTES # BLD AUTO: 0.02 K/UL (ref 0–0.04)
IMM GRANULOCYTES NFR BLD AUTO: 0.3 % (ref 0–0.5)
IRON SERPL-MCNC: 47 UG/DL (ref 30–160)
LYMPHOCYTES # BLD AUTO: 1.9 K/UL (ref 1–4.8)
LYMPHOCYTES NFR BLD: 31.3 % (ref 18–48)
MCH RBC QN AUTO: 31 PG (ref 27–31)
MCHC RBC AUTO-ENTMCNC: 31.3 G/DL (ref 32–36)
MCV RBC AUTO: 99 FL (ref 82–98)
MONOCYTES # BLD AUTO: 0.5 K/UL (ref 0.3–1)
MONOCYTES NFR BLD: 7.8 % (ref 4–15)
NEUTROPHILS # BLD AUTO: 3.4 K/UL (ref 1.8–7.7)
NEUTROPHILS NFR BLD: 56.3 % (ref 38–73)
NRBC BLD-RTO: 0 /100 WBC
PLATELET # BLD AUTO: 222 K/UL (ref 150–450)
PMV BLD AUTO: 9 FL (ref 9.2–12.9)
POTASSIUM SERPL-SCNC: 4.6 MMOL/L (ref 3.5–5.1)
PROT SERPL-MCNC: 6.5 G/DL (ref 6–8.4)
RBC # BLD AUTO: 3.52 M/UL (ref 4–5.4)
SATURATED IRON: 14 % (ref 20–50)
SODIUM SERPL-SCNC: 140 MMOL/L (ref 136–145)
TOTAL IRON BINDING CAPACITY: 333 UG/DL (ref 250–450)
TRANSFERRIN SERPL-MCNC: 225 MG/DL (ref 200–375)
WBC # BLD AUTO: 6.04 K/UL (ref 3.9–12.7)

## 2023-01-20 PROCEDURE — 82728 ASSAY OF FERRITIN: CPT | Mod: HCNC | Performed by: NURSE PRACTITIONER

## 2023-01-20 PROCEDURE — 80053 COMPREHEN METABOLIC PANEL: CPT | Mod: HCNC | Performed by: NURSE PRACTITIONER

## 2023-01-20 PROCEDURE — 36415 COLL VENOUS BLD VENIPUNCTURE: CPT | Mod: HCNC | Performed by: NURSE PRACTITIONER

## 2023-01-20 PROCEDURE — 85025 COMPLETE CBC W/AUTO DIFF WBC: CPT | Mod: HCNC | Performed by: NURSE PRACTITIONER

## 2023-01-20 PROCEDURE — 84466 ASSAY OF TRANSFERRIN: CPT | Mod: HCNC | Performed by: NURSE PRACTITIONER

## 2023-01-24 ENCOUNTER — TELEPHONE (OUTPATIENT)
Dept: HEMATOLOGY/ONCOLOGY | Facility: CLINIC | Age: 78
End: 2023-01-24
Payer: MEDICARE

## 2023-01-24 ENCOUNTER — LAB VISIT (OUTPATIENT)
Dept: LAB | Facility: HOSPITAL | Age: 78
End: 2023-01-24
Attending: NURSE PRACTITIONER
Payer: MEDICARE

## 2023-01-24 DIAGNOSIS — M81.0 AGE-RELATED OSTEOPOROSIS WITHOUT CURRENT PATHOLOGICAL FRACTURE: ICD-10-CM

## 2023-01-24 LAB
ALBUMIN SERPL BCP-MCNC: 3.5 G/DL (ref 3.5–5.2)
ALP SERPL-CCNC: 43 U/L (ref 55–135)
ALT SERPL W/O P-5'-P-CCNC: 8 U/L (ref 10–44)
ANION GAP SERPL CALC-SCNC: 8 MMOL/L (ref 8–16)
AST SERPL-CCNC: 16 U/L (ref 10–40)
BILIRUB SERPL-MCNC: 0.3 MG/DL (ref 0.1–1)
BUN SERPL-MCNC: 15 MG/DL (ref 8–23)
CALCIUM SERPL-MCNC: 9.8 MG/DL (ref 8.7–10.5)
CHLORIDE SERPL-SCNC: 103 MMOL/L (ref 95–110)
CO2 SERPL-SCNC: 29 MMOL/L (ref 23–29)
CREAT SERPL-MCNC: 0.8 MG/DL (ref 0.5–1.4)
EST. GFR  (NO RACE VARIABLE): >60 ML/MIN/1.73 M^2
GLUCOSE SERPL-MCNC: 111 MG/DL (ref 70–110)
POTASSIUM SERPL-SCNC: 4.8 MMOL/L (ref 3.5–5.1)
PROT SERPL-MCNC: 6.4 G/DL (ref 6–8.4)
SODIUM SERPL-SCNC: 140 MMOL/L (ref 136–145)

## 2023-01-24 PROCEDURE — 80053 COMPREHEN METABOLIC PANEL: CPT | Mod: HCNC

## 2023-01-24 PROCEDURE — 36415 COLL VENOUS BLD VENIPUNCTURE: CPT | Mod: HCNC,PN

## 2023-01-24 NOTE — TELEPHONE ENCOUNTER
Called spoke to patient I told her she did not have to do labs again today she stated she was already there getting the labs done and hung up I called back  answered and said she was gone to the back I told him she didn't need to get it done he said she gone to the back so what ever. I said ok sorry thank you have a good day.

## 2023-01-24 NOTE — TELEPHONE ENCOUNTER
----- Message from Nanda Russo sent at 1/24/2023  8:38 AM CST -----  Regarding: lab orders  Good morning,     The pt got blood work done on 01/20/2023 at [facility].  They came in today because there is an appointment for blood work in [place] but no lab orders are linked to that appointment.  Please call the pt at [phone] to let them know if they need to get blood work done again or the blood work they got done on 01/20/2023 will be good enough.      Thanks!

## 2023-02-02 ENCOUNTER — TELEPHONE (OUTPATIENT)
Dept: HEMATOLOGY/ONCOLOGY | Facility: CLINIC | Age: 78
End: 2023-02-02
Payer: MEDICARE

## 2023-02-02 NOTE — TELEPHONE ENCOUNTER
----- Message from Lorenza Arreguin sent at 2/2/2023 11:01 AM CST -----  Contact: Pasquale/   Type:  Patient Returning Call    Who Called: Pasquale  Who Left Message for Patient: Bruna  Does the patient know what this is regarding?:Appointment  Would the patient rather a call back or a response via [facility]?  Call  Best Call Back Number: [phone]  Additional Information:

## 2023-02-02 NOTE — TELEPHONE ENCOUNTER
Nurse spoke with pt spouse  in regards to rescheduling her appt. Pt appt has been rescheduled. Verbalized understanding

## 2023-02-09 ENCOUNTER — PES CALL (OUTPATIENT)
Dept: ADMINISTRATIVE | Facility: CLINIC | Age: 78
End: 2023-02-09
Payer: MEDICARE

## 2023-02-09 ENCOUNTER — OFFICE VISIT (OUTPATIENT)
Dept: HEMATOLOGY/ONCOLOGY | Facility: CLINIC | Age: 78
End: 2023-02-09
Payer: MEDICARE

## 2023-02-09 VITALS
HEIGHT: 62 IN | HEART RATE: 71 BPM | OXYGEN SATURATION: 90 % | SYSTOLIC BLOOD PRESSURE: 126 MMHG | BODY MASS INDEX: 18.41 KG/M2 | DIASTOLIC BLOOD PRESSURE: 71 MMHG | WEIGHT: 100.06 LBS

## 2023-02-09 DIAGNOSIS — C44.90 SKIN CANCER: ICD-10-CM

## 2023-02-09 DIAGNOSIS — R70.0 ELEVATED SED RATE: ICD-10-CM

## 2023-02-09 DIAGNOSIS — D63.8 ANEMIA OF CHRONIC DISEASE: ICD-10-CM

## 2023-02-09 DIAGNOSIS — E53.8 B12 DEFICIENCY: ICD-10-CM

## 2023-02-09 DIAGNOSIS — K21.9 GASTROESOPHAGEAL REFLUX DISEASE, UNSPECIFIED WHETHER ESOPHAGITIS PRESENT: ICD-10-CM

## 2023-02-09 DIAGNOSIS — D50.9 IRON DEFICIENCY ANEMIA, UNSPECIFIED IRON DEFICIENCY ANEMIA TYPE: Primary | ICD-10-CM

## 2023-02-09 DIAGNOSIS — K59.09 OTHER CONSTIPATION: ICD-10-CM

## 2023-02-09 PROCEDURE — 3288F PR FALLS RISK ASSESSMENT DOCUMENTED: ICD-10-PCS | Mod: HCNC,CPTII,S$GLB, | Performed by: NURSE PRACTITIONER

## 2023-02-09 PROCEDURE — 1159F PR MEDICATION LIST DOCUMENTED IN MEDICAL RECORD: ICD-10-PCS | Mod: HCNC,CPTII,S$GLB, | Performed by: NURSE PRACTITIONER

## 2023-02-09 PROCEDURE — 1160F RVW MEDS BY RX/DR IN RCRD: CPT | Mod: HCNC,CPTII,S$GLB, | Performed by: NURSE PRACTITIONER

## 2023-02-09 PROCEDURE — 1125F PR PAIN SEVERITY QUANTIFIED, PAIN PRESENT: ICD-10-PCS | Mod: HCNC,CPTII,S$GLB, | Performed by: NURSE PRACTITIONER

## 2023-02-09 PROCEDURE — 3078F DIAST BP <80 MM HG: CPT | Mod: HCNC,CPTII,S$GLB, | Performed by: NURSE PRACTITIONER

## 2023-02-09 PROCEDURE — 3078F PR MOST RECENT DIASTOLIC BLOOD PRESSURE < 80 MM HG: ICD-10-PCS | Mod: HCNC,CPTII,S$GLB, | Performed by: NURSE PRACTITIONER

## 2023-02-09 PROCEDURE — 99214 OFFICE O/P EST MOD 30 MIN: CPT | Mod: HCNC,S$GLB,, | Performed by: NURSE PRACTITIONER

## 2023-02-09 PROCEDURE — 99214 PR OFFICE/OUTPT VISIT, EST, LEVL IV, 30-39 MIN: ICD-10-PCS | Mod: HCNC,S$GLB,, | Performed by: NURSE PRACTITIONER

## 2023-02-09 PROCEDURE — 1160F PR REVIEW ALL MEDS BY PRESCRIBER/CLIN PHARMACIST DOCUMENTED: ICD-10-PCS | Mod: HCNC,CPTII,S$GLB, | Performed by: NURSE PRACTITIONER

## 2023-02-09 PROCEDURE — 1125F AMNT PAIN NOTED PAIN PRSNT: CPT | Mod: HCNC,CPTII,S$GLB, | Performed by: NURSE PRACTITIONER

## 2023-02-09 PROCEDURE — 3074F PR MOST RECENT SYSTOLIC BLOOD PRESSURE < 130 MM HG: ICD-10-PCS | Mod: HCNC,CPTII,S$GLB, | Performed by: NURSE PRACTITIONER

## 2023-02-09 PROCEDURE — 3074F SYST BP LT 130 MM HG: CPT | Mod: HCNC,CPTII,S$GLB, | Performed by: NURSE PRACTITIONER

## 2023-02-09 PROCEDURE — 3288F FALL RISK ASSESSMENT DOCD: CPT | Mod: HCNC,CPTII,S$GLB, | Performed by: NURSE PRACTITIONER

## 2023-02-09 PROCEDURE — 99999 PR PBB SHADOW E&M-EST. PATIENT-LVL IV: ICD-10-PCS | Mod: PBBFAC,HCNC,, | Performed by: NURSE PRACTITIONER

## 2023-02-09 PROCEDURE — 99999 PR PBB SHADOW E&M-EST. PATIENT-LVL IV: CPT | Mod: PBBFAC,HCNC,, | Performed by: NURSE PRACTITIONER

## 2023-02-09 PROCEDURE — 1159F MED LIST DOCD IN RCRD: CPT | Mod: HCNC,CPTII,S$GLB, | Performed by: NURSE PRACTITIONER

## 2023-02-09 PROCEDURE — 1101F PT FALLS ASSESS-DOCD LE1/YR: CPT | Mod: HCNC,CPTII,S$GLB, | Performed by: NURSE PRACTITIONER

## 2023-02-09 PROCEDURE — 1101F PR PT FALLS ASSESS DOC 0-1 FALLS W/OUT INJ PAST YR: ICD-10-PCS | Mod: HCNC,CPTII,S$GLB, | Performed by: NURSE PRACTITIONER

## 2023-02-09 RX ORDER — OMEPRAZOLE 40 MG/1
40 CAPSULE, DELAYED RELEASE ORAL NIGHTLY
Qty: 90 CAPSULE | Refills: 3 | Status: ON HOLD | OUTPATIENT
Start: 2023-02-09 | End: 2024-03-21 | Stop reason: HOSPADM

## 2023-02-09 RX ORDER — DOCUSATE SODIUM 100 MG/1
100 CAPSULE, LIQUID FILLED ORAL 2 TIMES DAILY
Qty: 180 CAPSULE | Refills: 2 | Status: SHIPPED | OUTPATIENT
Start: 2023-02-09

## 2023-02-09 NOTE — PROGRESS NOTES
Subjective:      Patient ID: Keyona Dwyer is a 77 y.o. female.    Chief Complaint: face pain    HPI:   Patient is a 77 year old female who presents today for further evaluation and recommendations of iron deficiency anemia.  She has been referred to the hematology by her PCP, Dr. Ralf Isidro.  Had diagnosis of acute pancreatitis requiring hospitalization in 6/2022 with complaints of abdominal pain.  Other chronic diagnosis include anxiety, chronic back pain, hyperlipidemia, htn, osteoporosis, RVT, and restless leg syndrome.       Patient states that she got bit by a brown recluse and got sick due to antibiotics and has some weight loss.  States that she was on cephalexin 500 mg PO TID and caused stomach distress with weight loss and episodic nausea/vomiting.      Is a current smoker since she was 12 years old - 1 pack per day for 60 years. Now smokes 3-10 per day.  She denies alcohol use.      She denies f/c/ns or lymphadenopathy.       Interval History:  10/10/2022: With hospitalization in 9/2022 d/t pneumonia, NSTEMI, and GI bleed.  EGD done as well as bleeding scan and found with no active bleeding although patient did have dark stools during hospitalizations with a drop in hgb requiring blood transfusion.  She was unable to tolerate GI prep for colonoscopy.  At last visit work up for anemia found with elevated MMA.  She was placed on B12 2000 mcg PO daily.  Also found with LOR started on ferrous sulfate 325 mg PO and is currently taking iron tablets every other day.  States that she is taking stool softeners daily.  States has not noted dark stools since hospitalization.  3+ blood found in urine during hospitalizations    2/9/2023 Had surgery to face to remove skin cancer one week ago - part of left side nose had to be removed - done by Dr. Aguilar Wallace dermatology.  Currently with bandages on her face that are CDI.  Has not noticed any abnormal bleeding.  States that she has been off of her iron  tablets this past week.  Was taking every other day.  Is taking B12 tablets daily.  States that when she takes the oral iron she has constipation requiring stool softeners.      Social History     Socioeconomic History    Marital status:      Spouse name: elizabeth    Number of children: 3   Occupational History    Occupation: retired   Tobacco Use    Smoking status: Every Day     Packs/day: 0.50     Years: 63.00     Pack years: 31.50     Types: Cigarettes    Smokeless tobacco: Never    Tobacco comments:     started age of  11    Substance and Sexual Activity    Alcohol use: No    Drug use: No    Sexual activity: Yes     Partners: Male     Social Determinants of Health     Financial Resource Strain: Low Risk     Difficulty of Paying Living Expenses: Not hard at all   Food Insecurity: No Food Insecurity    Worried About Running Out of Food in the Last Year: Never true    Ran Out of Food in the Last Year: Never true   Transportation Needs: No Transportation Needs    Lack of Transportation (Medical): No    Lack of Transportation (Non-Medical): No   Physical Activity: Inactive    Days of Exercise per Week: 0 days    Minutes of Exercise per Session: 0 min   Stress: No Stress Concern Present    Feeling of Stress : Not at all   Social Connections: Moderately Integrated    Frequency of Communication with Friends and Family: More than three times a week    Frequency of Social Gatherings with Friends and Family: More than three times a week    Attends Sabianism Services: More than 4 times per year    Active Member of Clubs or Organizations: No    Attends Club or Organization Meetings: Never    Marital Status:    Housing Stability: Low Risk     Unable to Pay for Housing in the Last Year: No    Number of Places Lived in the Last Year: 1    Unstable Housing in the Last Year: No       Family History   Problem Relation Age of Onset    Cancer Mother     Breast cancer Mother     Hyperlipidemia Father     Heart failure  Father     Heart disease Brother     Brain cancer Brother     Stroke Brother     Alcohol abuse Brother        Past Surgical History:   Procedure Laterality Date    ANGIOGRAPHY OF LOWER EXTREMITY N/A 4/12/2021    Procedure: ANGIOGRAM, LOWER EXTREMITY/left leg;  Surgeon: Maico Adkins MD;  Location: [facility] CATH [facility];  Service: Vascular;  Laterality: N/A;  req 1130 start time/Rm A243A    AORTOGRAPHY WITH SERIALOGRAPHY N/A 4/14/2021    Procedure: AORTOGRAM, WITH RUNOFF;  Surgeon: Maico Adkins MD;  Location: [facility] CATH LAB;  Service: Vascular;  Laterality: N/A;  left iliac stent with shockwave/req 1030 start    CATARACT EXTRACTION W/  INTRAOCULAR LENS IMPLANT Bilateral 8/ 9 2017    ESOPHAGOGASTRODUODENOSCOPY N/A 9/13/2022    Procedure: EGD (ESOPHAGOGASTRODUODENOSCOPY);  Surgeon: Fern Rasmussen MD;  Location: [facility] ENDO;  Service: Endoscopy;  Laterality: N/A;    GALLBLADDER SURGERY      HYSTERECTOMY  1972    LUNG SURGERY Right age 15    lung collpase    TOE AMPUTATION Left 4/15/2021    Procedure: AMPUTATION, TOE;  Surgeon: Taylor Anderson DPM;  Location: [facility] OR;  Service: Podiatry;  Laterality: Left;  Hallux (Great Toe)    TOTAL HIP ARTHROPLASTY Bilateral     TOTAL KNEE ARTHROPLASTY Bilateral        Past Medical History:   Diagnosis Date    Acute diastolic heart failure 9/15/2022    Acute respiratory failure with hypoxia and hypercarbia 9/14/2022    Anemia     Anxiety     Cataract     Chronic back pain     Dr. Guzman    Fibrocystic breast     Glaucoma     Hyperlipemia     Hypertension     Idiopathic acute pancreatitis 6/18/2022    NSTEMI (non-ST elevated myocardial infarction) 9/11/2022    Osteoarthritis     Osteoporosis     Rheumatology/on Prolia    PVD (peripheral vascular disease) 4/12/2021    Restless leg syndrome     Tobacco dependence     Trouble in sleeping        Review of Systems   Constitutional: Negative.    HENT: Negative.  Negative for nosebleeds.    Eyes: Negative.    Respiratory: Negative.      Cardiovascular: Negative.    Gastrointestinal:  Positive for constipation (using stool softeners with relief). Negative for anal bleeding and blood in stool.   Endocrine: Negative.    Genitourinary: Negative.  Negative for hematuria.   Musculoskeletal: Negative.    Skin:  Positive for wound. Rash: to face with bandage to her face - cdi.  Allergic/Immunologic: Negative.    Neurological: Negative.    Hematological:  Bruises/bleeds easily.   Psychiatric/Behavioral: Negative.          Medication List with Changes/Refills   New Medications    DOCUSATE SODIUM (COLACE) 100 MG CAPSULE    Take 1 capsule (100 mg total) by mouth 2 (two) times daily.    OMEPRAZOLE (PRILOSEC) 40 MG CAPSULE    Take 1 capsule (40 mg total) by mouth every evening.   Current Medications    ASPIRIN (ECOTRIN) 81 MG EC TABLET    Take 1 tablet (81 mg total) by mouth once daily.    AZELASTINE (ASTELIN) 137 MCG (0.1 %) NASAL SPRAY    1 spray (137 mcg total) by Nasal route 2 (two) times daily.    B COMPLEX VITAMINS CAPSULE    Take 1 capsule by mouth once daily.    BUPROPION (WELLBUTRIN) 75 MG TABLET    Take 1 tablet (75 mg total) by mouth 2 (two) times daily.    BUSPIRONE (BUSPAR) 10 MG TABLET    TAKE 1 TABLET BY MOUTH TWICE DAILY    CLOPIDOGREL (PLAVIX) 75 MG TABLET    Take 1 tablet (75 mg total) by mouth once daily.    DULOXETINE (CYMBALTA) 30 MG CAPSULE    Take 30 mg by mouth once daily.    ERGOCALCIFEROL (ERGOCALCIFEROL) 50,000 UNIT CAP    TAKE 1 CAPSULE BY MOUTH ONE TIME PER WEEK    FERROUS SULFATE (FEOSOL) 325 MG (65 MG IRON) TAB TABLET    Take 1 tab (325 mg) every other day.    FUROSEMIDE (LASIX) 20 MG TABLET    Take 2 tablets (40 mg total) by mouth once daily for 5 days, THEN 1 tablet (20 mg total) once daily.    HYDROCODONE-ACETAMINOPHEN (NORCO)  MG PER TABLET    Take 1 tablet by mouth 3 (three) times daily as needed.    HYDROCODONE-ACETAMINOPHEN 10-325MG (NORCO)  MG TAB    Take 1 tablet by mouth every 6 (six) hours as needed.     LISINOPRIL (PRINIVIL,ZESTRIL) 40 MG TABLET    TAKE 1 TABLET BY MOUTH EVERY DAY for blood pressure    METOPROLOL SUCCINATE (TOPROL-XL) 25 MG 24 HR TABLET    Take 0.5 tablets (12.5 mg total) by mouth once daily.    NUTRITIONAL SUPPLEMENT/FIBER (QUINOA-KALE-HEMP ORAL)    Take by mouth.    ON-Q PAIN PUMP    by Misc.(Non-Drug; Combo Route) route.    PROLIA 60 MG/ML SYRG        RANOLAZINE (RANEXA) 500 MG TB12    Take 1 tablet (500 mg total) by mouth 2 (two) times daily.    ROSUVASTATIN (CRESTOR) 5 MG TABLET    TAKE 1 TABLET BY MOUTH ONCE A DAY        Objective:     Vitals:    02/09/23 0833   BP: 126/71   Pulse: 71       Physical Exam  Vitals and nursing note reviewed.   Constitutional:       Appearance: Normal appearance.   HENT:      Head: Normocephalic and atraumatic.      Right Ear: External ear normal.      Left Ear: External ear normal.   Cardiovascular:      Rate and Rhythm: Normal rate and regular rhythm.      Heart sounds: Normal heart sounds, S1 normal and S2 normal.   Pulmonary:      Effort: Pulmonary effort is normal.      Breath sounds: Normal breath sounds.   Abdominal:      General: There is no distension.   Musculoskeletal:         General: Normal range of motion.      Cervical back: Normal range of motion.   Skin:     General: Skin is warm and dry.      Findings: Bruising and wound (to face covered with bandage - CDI) present.   Neurological:      General: No focal deficit present.      Mental Status: She is alert and oriented to person, place, and time.   Psychiatric:         Attention and Perception: Attention and perception normal.         Mood and Affect: Mood and affect normal.         Speech: Speech normal.         Behavior: Behavior normal. Behavior is cooperative.         Thought Content: Thought content normal.         Cognition and Memory: Cognition and memory normal.         Judgment: Judgment normal.       Assessment:     Problem List Items Addressed This Visit          Oncology    Anemia -  Primary    Relevant Medications    docusate sodium (COLACE) 100 MG capsule    Other Relevant Orders    CBC Auto Differential    Comprehensive Metabolic Panel    Ferritin    Iron and TIBC     Other Visit Diagnoses       Anemia of chronic disease        Relevant Orders    CBC Auto Differential    Elevated sed rate        Relevant Orders    CBC Auto Differential    B12 deficiency        Relevant Orders    CBC Auto Differential    Skin cancer        Gastroesophageal reflux disease, unspecified whether esophagitis present        Relevant Medications    omeprazole (PRILOSEC) 40 MG capsule    Other Relevant Orders    CBC Auto Differential    Comprehensive Metabolic Panel    Ferritin    Iron and TIBC    Other constipation        Relevant Medications    docusate sodium (COLACE) 100 MG capsule            Lab Results   Component Value Date    WBC 6.04 01/20/2023    RBC 3.52 (L) 01/20/2023    HGB 10.9 (L) 01/20/2023    HCT 34.8 (L) 01/20/2023    MCV 99 (H) 01/20/2023    MCH 31.0 01/20/2023    MCHC 31.3 (L) 01/20/2023    RDW 13.2 01/20/2023     01/20/2023    MPV 9.0 (L) 01/20/2023    GRAN 3.4 01/20/2023    GRAN 56.3 01/20/2023    LYMPH 1.9 01/20/2023    LYMPH 31.3 01/20/2023    MONO 0.5 01/20/2023    MONO 7.8 01/20/2023    EOS 0.2 01/20/2023    BASO 0.04 01/20/2023    EOSINOPHIL 3.6 01/20/2023    BASOPHIL 0.7 01/20/2023      Lab Results   Component Value Date     01/24/2023    K 4.8 01/24/2023     01/24/2023    CO2 29 01/24/2023    BUN 15 01/24/2023    CREATININE 0.8 01/24/2023    CALCIUM 9.8 01/24/2023    ANIONGAP 8 01/24/2023    ESTGFRAFRICA >60.0 06/27/2022    EGFRNONAA >60.0 06/27/2022     Lab Results   Component Value Date    ALT 8 (L) 01/24/2023    AST 16 01/24/2023    ALKPHOS 43 (L) 01/24/2023    BILITOT 0.3 01/24/2023     Lab Results   Component Value Date    UIBC 222 01/30/2006    IRON 47 01/20/2023    TRANSFERRIN 225 01/20/2023    TIBC 333 01/20/2023    FESATURATED 14 (L) 01/20/2023      Lab Results    Component Value Date    FERRITIN 51 01/20/2023     Lab Results   Component Value Date    QVWTXNPK81 912 07/18/2022     Lab Results   Component Value Date    FOLATE 13.3 07/18/2022     Lab Results   Component Value Date    TSH 1.055 08/03/2021         Plan:   Iron deficiency anemia, unspecified iron deficiency anemia type  -     CBC Auto Differential; Future; Expected date: 02/09/2023  -     Comprehensive Metabolic Panel; Future; Expected date: 02/09/2023  -     Ferritin; Future; Expected date: 02/09/2023  -     Iron and TIBC; Future; Expected date: 02/09/2023  -     docusate sodium (COLACE) 100 MG capsule; Take 1 capsule (100 mg total) by mouth 2 (two) times daily.  Dispense: 180 capsule; Refill: 2    Anemia of chronic disease  -     CBC Auto Differential; Future; Expected date: 02/09/2023    Elevated sed rate  -     CBC Auto Differential; Future; Expected date: 02/09/2023    B12 deficiency  -     CBC Auto Differential; Future; Expected date: 02/09/2023    Skin cancer    Gastroesophageal reflux disease, unspecified whether esophagitis present  -     omeprazole (PRILOSEC) 40 MG capsule; Take 1 capsule (40 mg total) by mouth every evening.  Dispense: 90 capsule; Refill: 3  -     CBC Auto Differential; Future; Expected date: 02/09/2023  -     Comprehensive Metabolic Panel; Future; Expected date: 02/09/2023  -     Ferritin; Future; Expected date: 02/09/2023  -     Iron and TIBC; Future; Expected date: 02/09/2023    Other constipation  -     docusate sodium (COLACE) 100 MG capsule; Take 1 capsule (100 mg total) by mouth 2 (two) times daily.  Dispense: 180 capsule; Refill: 2    Continue B12 daily.  Restart oral iron every other day with stool softeners daily.  F/u in 3 months with labs prio - cbc, cmp, iron studies at [place].  F/u in [place] in the morning    Med Onc Chart Routing      Follow up with physician    Follow up with [facility] 3 months. F/u in 3 months with labs prio - cbc, cmp, iron studies at [place].    Infusion scheduling note n/a   Injection scheduling note n/a   Labs   Lab interval:  See above   Imaging   N/a   Pharmacy appointment No pharmacy appointment needed      Other referrals No additional referrals needed        Collaborating Provider:  Dr. Kris Alcocer    Thank You,  LUIS DANIEL WeberP-C  Hematology Oncology

## 2023-02-15 ENCOUNTER — PES CALL (OUTPATIENT)
Dept: ADMINISTRATIVE | Facility: CLINIC | Age: 78
End: 2023-02-15
Payer: MEDICARE

## 2023-02-17 ENCOUNTER — OFFICE VISIT (OUTPATIENT)
Dept: CARDIOLOGY | Facility: CLINIC | Age: 78
End: 2023-02-17
Payer: MEDICARE

## 2023-02-17 VITALS
SYSTOLIC BLOOD PRESSURE: 124 MMHG | HEART RATE: 72 BPM | DIASTOLIC BLOOD PRESSURE: 70 MMHG | WEIGHT: 98.56 LBS | OXYGEN SATURATION: 93 % | BODY MASS INDEX: 18.02 KG/M2

## 2023-02-17 DIAGNOSIS — I25.2 HISTORY OF NON-ST ELEVATION MYOCARDIAL INFARCTION (NSTEMI): Primary | ICD-10-CM

## 2023-02-17 DIAGNOSIS — I10 ESSENTIAL HYPERTENSION: Chronic | ICD-10-CM

## 2023-02-17 DIAGNOSIS — J44.9 COPD, GROUP D, BY GOLD 2017 CLASSIFICATION: ICD-10-CM

## 2023-02-17 DIAGNOSIS — I73.9 PVD (PERIPHERAL VASCULAR DISEASE): ICD-10-CM

## 2023-02-17 DIAGNOSIS — Z72.0 TOBACCO ABUSE DISORDER: ICD-10-CM

## 2023-02-17 DIAGNOSIS — I50.32 CHRONIC HEART FAILURE WITH PRESERVED EJECTION FRACTION: ICD-10-CM

## 2023-02-17 DIAGNOSIS — I70.262 ATHEROSCLEROSIS OF NATIVE ARTERY OF LEFT LOWER EXTREMITY WITH GANGRENE: ICD-10-CM

## 2023-02-17 DIAGNOSIS — I10 PRIMARY HYPERTENSION: ICD-10-CM

## 2023-02-17 PROCEDURE — 99214 PR OFFICE/OUTPT VISIT, EST, LEVL IV, 30-39 MIN: ICD-10-PCS | Mod: HCNC,S$GLB,, | Performed by: INTERNAL MEDICINE

## 2023-02-17 PROCEDURE — 1126F PR PAIN SEVERITY QUANTIFIED, NO PAIN PRESENT: ICD-10-PCS | Mod: HCNC,CPTII,S$GLB, | Performed by: INTERNAL MEDICINE

## 2023-02-17 PROCEDURE — 1160F PR REVIEW ALL MEDS BY PRESCRIBER/CLIN PHARMACIST DOCUMENTED: ICD-10-PCS | Mod: HCNC,CPTII,S$GLB, | Performed by: INTERNAL MEDICINE

## 2023-02-17 PROCEDURE — 99999 PR PBB SHADOW E&M-EST. PATIENT-LVL IV: ICD-10-PCS | Mod: PBBFAC,HCNC,, | Performed by: INTERNAL MEDICINE

## 2023-02-17 PROCEDURE — 1101F PR PT FALLS ASSESS DOC 0-1 FALLS W/OUT INJ PAST YR: ICD-10-PCS | Mod: HCNC,CPTII,S$GLB, | Performed by: INTERNAL MEDICINE

## 2023-02-17 PROCEDURE — 99499 RISK ADDL DX/OHS AUDIT: ICD-10-PCS | Mod: HCNC,S$GLB,, | Performed by: INTERNAL MEDICINE

## 2023-02-17 PROCEDURE — 3074F SYST BP LT 130 MM HG: CPT | Mod: HCNC,CPTII,S$GLB, | Performed by: INTERNAL MEDICINE

## 2023-02-17 PROCEDURE — 3074F PR MOST RECENT SYSTOLIC BLOOD PRESSURE < 130 MM HG: ICD-10-PCS | Mod: HCNC,CPTII,S$GLB, | Performed by: INTERNAL MEDICINE

## 2023-02-17 PROCEDURE — 99999 PR PBB SHADOW E&M-EST. PATIENT-LVL IV: CPT | Mod: PBBFAC,HCNC,, | Performed by: INTERNAL MEDICINE

## 2023-02-17 PROCEDURE — 1101F PT FALLS ASSESS-DOCD LE1/YR: CPT | Mod: HCNC,CPTII,S$GLB, | Performed by: INTERNAL MEDICINE

## 2023-02-17 PROCEDURE — 3078F PR MOST RECENT DIASTOLIC BLOOD PRESSURE < 80 MM HG: ICD-10-PCS | Mod: HCNC,CPTII,S$GLB, | Performed by: INTERNAL MEDICINE

## 2023-02-17 PROCEDURE — 3078F DIAST BP <80 MM HG: CPT | Mod: HCNC,CPTII,S$GLB, | Performed by: INTERNAL MEDICINE

## 2023-02-17 PROCEDURE — 1159F MED LIST DOCD IN RCRD: CPT | Mod: HCNC,CPTII,S$GLB, | Performed by: INTERNAL MEDICINE

## 2023-02-17 PROCEDURE — 1160F RVW MEDS BY RX/DR IN RCRD: CPT | Mod: HCNC,CPTII,S$GLB, | Performed by: INTERNAL MEDICINE

## 2023-02-17 PROCEDURE — 1126F AMNT PAIN NOTED NONE PRSNT: CPT | Mod: HCNC,CPTII,S$GLB, | Performed by: INTERNAL MEDICINE

## 2023-02-17 PROCEDURE — 99499 UNLISTED E&M SERVICE: CPT | Mod: HCNC,S$GLB,, | Performed by: INTERNAL MEDICINE

## 2023-02-17 PROCEDURE — 3288F PR FALLS RISK ASSESSMENT DOCUMENTED: ICD-10-PCS | Mod: HCNC,CPTII,S$GLB, | Performed by: INTERNAL MEDICINE

## 2023-02-17 PROCEDURE — 3288F FALL RISK ASSESSMENT DOCD: CPT | Mod: HCNC,CPTII,S$GLB, | Performed by: INTERNAL MEDICINE

## 2023-02-17 PROCEDURE — 1159F PR MEDICATION LIST DOCUMENTED IN MEDICAL RECORD: ICD-10-PCS | Mod: HCNC,CPTII,S$GLB, | Performed by: INTERNAL MEDICINE

## 2023-02-17 PROCEDURE — 99214 OFFICE O/P EST MOD 30 MIN: CPT | Mod: HCNC,S$GLB,, | Performed by: INTERNAL MEDICINE

## 2023-02-17 RX ORDER — CLOPIDOGREL BISULFATE 75 MG/1
75 TABLET ORAL DAILY
Qty: 90 TABLET | Refills: 2 | Status: SHIPPED | OUTPATIENT
Start: 2023-02-17 | End: 2023-05-17 | Stop reason: SDUPTHER

## 2023-02-17 RX ORDER — RANOLAZINE 500 MG/1
500 TABLET, EXTENDED RELEASE ORAL 2 TIMES DAILY
Qty: 180 TABLET | Refills: 1 | Status: SHIPPED | OUTPATIENT
Start: 2023-02-17 | End: 2023-04-10 | Stop reason: SDUPTHER

## 2023-02-17 RX ORDER — IBUPROFEN 200 MG
1 TABLET ORAL DAILY
Qty: 28 PATCH | Refills: 1 | Status: SHIPPED | OUTPATIENT
Start: 2023-02-17 | End: 2023-03-27 | Stop reason: SDUPTHER

## 2023-02-17 RX ORDER — METOPROLOL SUCCINATE 25 MG/1
12.5 TABLET, EXTENDED RELEASE ORAL DAILY
Qty: 45 TABLET | Refills: 3 | Status: SHIPPED | OUTPATIENT
Start: 2023-02-17 | End: 2023-03-01

## 2023-02-17 RX ORDER — ASPIRIN 81 MG/1
81 TABLET ORAL DAILY
Qty: 90 TABLET | Refills: 3 | Status: SHIPPED | OUTPATIENT
Start: 2023-02-17 | End: 2024-02-17

## 2023-02-17 RX ORDER — LISINOPRIL 40 MG/1
40 TABLET ORAL DAILY
Qty: 90 TABLET | Refills: 2 | Status: SHIPPED | OUTPATIENT
Start: 2023-02-17 | End: 2023-03-01

## 2023-02-17 NOTE — PROGRESS NOTES
Subjective:   Patient ID:  Keyona Dwyer is a 77 y.o. female who presents for cardiac consult of No chief complaint on file.        HPI  The patient came in today for cardiac consult of No chief complaint on file.      Sept 2022 HPI:  Cardiology consulted for NSTEMI.  Pt has h/o PAD, HTN, hyperlipidemia, smoking.  She has h/o PAD with angiogram in April 2021 showing R common iliac occlusion and had PTA L common iliac artery, tx by Dr. Adkins, Vascular Surgery.  She has been weak last few days, and had a fever, GAMEZ, and lower abd pain.  No chest pain sxs.  She presented to ER and started on antibiotics for possible R sided pneumonia.  Troponin elevated 1.9  Ecg showed NSR, anterior T wave abnl.  WBC elevated.  Had low grade fever 100.9 in ER.     Hospital Course:   9/12/22-Patient seen and examined today, resting in bed. Feels ok. SOB improved. No chest pain. +Fever overnight. Labs reviewed. Troponin bumped to 2.441, repeat pending. Echo showed normal EF, +WMA. Plans for [facility] tomorrow (once afebrile for 24 hours).  9/13/22-Patient seen and examined today, lying in bed. Awake and alert but intermittently confused, family reports started this AM. No CV complaints. H/H dipped 7.9/24.6 and nursing staff reported dark colored stool. Heparin gtt and DAPT held, bleeding scan pending.  9/14/22-Patient seen and examined today, transferred to ICU overnight due to worsening respiratory status/likely aspiration. Remains on bipap during exam. No overt bleeding reported. EGD yesterday only showed gastritis. Colonoscopy planned today but cancelled due to overnight events.   9/15/22-Patient seen and examined today, sitting up in bed. Bipap weaned off. Feeling better, less SOB. Chest pain free. BNP elevated, diuresis initiated. H/H stable. Discussed with GI/hospital medicine, will resume DAPT and closely monitor.   9/16/22-Patient seen and examined today, sitting up in bed. Feeling much better, SOB nearly resolved. No angina.  Diuresed well overnight. Labs reviewed and are stable. Ranexa added.    10/7/22  Hosp follow up for NSTEMI, GIB, sepsis. BP and Hr well controlled today. She feels well now, no CP/SOB. She still smokes 1/2 pack day.     2/17/23  Nuclear stress 11/2022 neg for ischemia, normal EF. BP and Hr stable today. BMI 18 - 98 lbs. Overall doing well wants to quit smoking. Had recent skin cancer surgery on face.     Patient feels no chest pain, no sob, no leg swelling, no PND, no palpitation, no dizziness, no syncope, no CNS symptoms.    Patient has fairly good exercise tolerance.    Patient is compliant with medications.    Results for orders placed during the hospital encounter of 11/04/22    Nuclear Stress - Cardiology Interpreted    Interpretation Summary    Normal myocardial perfusion scan. There is no evidence of myocardial ischemia or infarction.    The gated perfusion images showed an ejection fraction of 76% at rest. The gated perfusion images showed an ejection fraction of 69% post stress.    The EKG portion of this study is negative for ischemia.    There were no arrhythmias during stress.    Stress ECG: There was hypertensive blood pressure response with stress.      Results for orders placed during the hospital encounter of 09/11/22    Echo    Interpretation Summary  · The left ventricle is normal in size with concentric hypertrophy and normal systolic function.  · The estimated ejection fraction is 55%.  · Grade I left ventricular diastolic dysfunction.  · Normal right ventricular size with normal right ventricular systolic function.  · Moderate left atrial enlargement.  · Mild tricuspid regurgitation.  · There are segmental left ventricular wall motion abnormalities.  · There is pulmonary hypertension.  · The estimated PA systolic pressure is 45 mmHg.      Past Medical History:   Diagnosis Date    Acute diastolic heart failure 9/15/2022    Acute respiratory failure with hypoxia and hypercarbia 9/14/2022    Anemia      Anxiety     Cataract     Chronic back pain     Dr. Guzman    Fibrocystic breast     Glaucoma     Hyperlipemia     Hypertension     Idiopathic acute pancreatitis 6/18/2022    NSTEMI (non-ST elevated myocardial infarction) 9/11/2022    Osteoarthritis     Osteoporosis     Rheumatology/on Prolia    PVD (peripheral vascular disease) 4/12/2021    Restless leg syndrome     Tobacco dependence     Trouble in sleeping        Past Surgical History:   Procedure Laterality Date    ANGIOGRAPHY OF LOWER EXTREMITY N/A 4/12/2021    Procedure: ANGIOGRAM, LOWER EXTREMITY/left leg;  Surgeon: Maico Adkins MD;  Location: [facility] CATH LAB;  Service: Vascular;  Laterality: N/A;  req 1130 start time/Rm A243A    AORTOGRAPHY WITH SERIALOGRAPHY N/A 4/14/2021    Procedure: AORTOGRAM, WITH RUNOFF;  Surgeon: Maico Adkins MD;  Location: [facility] CATH LAB;  Service: Vascular;  Laterality: N/A;  left iliac stent with shockwave/req 1030 start    CATARACT EXTRACTION W/  INTRAOCULAR LENS IMPLANT Bilateral 8/ 9 2017    ESOPHAGOGASTRODUODENOSCOPY N/A 9/13/2022    Procedure: EGD (ESOPHAGOGASTRODUODENOSCOPY);  Surgeon: Fern Rasmussen MD;  Location: [facility] ENDO;  Service: Endoscopy;  Laterality: N/A;    GALLBLADDER SURGERY      HYSTERECTOMY  1972    LUNG SURGERY Right age 15    lung collpase    TOE AMPUTATION Left 4/15/2021    Procedure: AMPUTATION, TOE;  Surgeon: Taylor Anderson DPM;  Location: [facility] OR;  Service: Podiatry;  Laterality: Left;  Hallux (Great Toe)    TOTAL HIP ARTHROPLASTY Bilateral     TOTAL KNEE ARTHROPLASTY Bilateral        Social History     Tobacco Use    Smoking status: Every Day     Packs/day: 0.50     Years: 63.00     Pack years: 31.50     Types: Cigarettes    Smokeless tobacco: Never    Tobacco comments:     started age of  11    Substance Use Topics    Alcohol use: No    Drug use: No       Family History   Problem Relation Age of Onset    Cancer Mother     Breast cancer Mother     Hyperlipidemia Father     Heart failure Father      Heart disease Brother     Brain cancer Brother     Stroke Brother     Alcohol abuse Brother        Patient's Medications   New Prescriptions    No medications on file   Previous Medications    ASPIRIN (ECOTRIN) 81 MG EC TABLET    Take 1 tablet (81 mg total) by mouth once daily.    AZELASTINE (ASTELIN) 137 MCG (0.1 %) NASAL SPRAY    1 spray (137 mcg total) by Nasal route 2 (two) times daily.    B COMPLEX VITAMINS CAPSULE    Take 1 capsule by mouth once daily.    BUPROPION (WELLBUTRIN) 75 MG TABLET    Take 1 tablet (75 mg total) by mouth 2 (two) times daily.    BUSPIRONE (BUSPAR) 10 MG TABLET    TAKE 1 TABLET BY MOUTH TWICE DAILY    CLOPIDOGREL (PLAVIX) 75 MG TABLET    Take 1 tablet (75 mg total) by mouth once daily.    DOCUSATE SODIUM (COLACE) 100 MG CAPSULE    Take 1 capsule (100 mg total) by mouth 2 (two) times daily.    DULOXETINE (CYMBALTA) 30 MG CAPSULE    Take 30 mg by mouth once daily.    ERGOCALCIFEROL (ERGOCALCIFEROL) 50,000 UNIT CAP    TAKE 1 CAPSULE BY MOUTH ONE TIME PER WEEK    FERROUS SULFATE (FEOSOL) 325 MG (65 MG IRON) TAB TABLET    Take 1 tab (325 mg) every other day.    FUROSEMIDE (LASIX) 20 MG TABLET    Take 2 tablets (40 mg total) by mouth once daily for 5 days, THEN 1 tablet (20 mg total) once daily.    HYDROCODONE-ACETAMINOPHEN (NORCO)  MG PER TABLET    Take 1 tablet by mouth 3 (three) times daily as needed.    HYDROCODONE-ACETAMINOPHEN 10-325MG (NORCO)  MG TAB    Take 1 tablet by mouth every 6 (six) hours as needed.    LISINOPRIL (PRINIVIL,ZESTRIL) 40 MG TABLET    TAKE 1 TABLET BY MOUTH EVERY DAY for blood pressure    METOPROLOL SUCCINATE (TOPROL-XL) 25 MG 24 HR TABLET    Take 0.5 tablets (12.5 mg total) by mouth once daily.    NUTRITIONAL SUPPLEMENT/FIBER (QUINOA-KALE-HEMP ORAL)    Take by mouth.    OMEPRAZOLE (PRILOSEC) 40 MG CAPSULE    Take 1 capsule (40 mg total) by mouth every evening.    ON-Q PAIN PUMP    by Misc.(Non-Drug; Combo Route) route.    PROLIA 60 MG/ML SYRG         RANOLAZINE (RANEXA) 500 MG TB12    Take 1 tablet (500 mg total) by mouth 2 (two) times daily.    ROSUVASTATIN (CRESTOR) 5 MG TABLET    TAKE 1 TABLET BY MOUTH ONCE A DAY   Modified Medications    No medications on file   Discontinued Medications    No medications on file       Review of Systems   Constitutional:  Positive for malaise/fatigue.   HENT: Negative.     Eyes: Negative.    Respiratory:  Positive for shortness of breath.    Cardiovascular: Negative.    Gastrointestinal: Negative.    Genitourinary: Negative.    Musculoskeletal: Negative.    Skin: Negative.    Neurological: Negative.    Endo/Heme/Allergies: Negative.    Psychiatric/Behavioral: Negative.     All 12 systems otherwise negative.    Wt Readings from Last 3 Encounters:   02/17/23 44.7 kg (98 lb 8.7 oz)   02/09/23 45.4 kg (100 lb 1.4 oz)   10/10/22 42.6 kg (94 lb)     Temp Readings from Last 3 Encounters:   10/10/22 98.6 °F (37 °C) (Temporal)   09/21/22 97.8 °F (36.6 °C) (Temporal)   09/17/22 98.1 °F (36.7 °C) (Oral)     BP Readings from Last 3 Encounters:   02/17/23 124/70   02/09/23 126/71   10/10/22 129/60     Pulse Readings from Last 3 Encounters:   02/17/23 72   02/09/23 71   10/10/22 76       /70   Pulse 72   Wt 44.7 kg (98 lb 8.7 oz)   SpO2 (!) 93%   BMI 18.02 kg/m²     Objective:   Physical Exam  Vitals and nursing note reviewed.   Constitutional:       General: She is not in acute distress.     Appearance: She is well-developed. She is not diaphoretic.   HENT:      Head: Normocephalic and atraumatic.      Nose: Nose normal.   Eyes:      General: No scleral icterus.     Conjunctiva/sclera: Conjunctivae normal.   Neck:      Thyroid: No thyromegaly.      Vascular: No JVD.   Cardiovascular:      Rate and Rhythm: Normal rate and regular rhythm.      Heart sounds: S1 normal and S2 normal. Murmur heard.     No friction rub. No gallop. No S3 or S4 sounds.   Pulmonary:      Effort: Pulmonary effort is normal. No respiratory distress.       Breath sounds: Normal breath sounds. No stridor. No wheezing or rales.   Chest:      Chest wall: No tenderness.   Abdominal:      General: Bowel sounds are normal. There is no distension.      Palpations: Abdomen is soft. There is no mass.      Tenderness: There is no abdominal tenderness. There is no rebound.   Genitourinary:     Comments: Deferred  Musculoskeletal:         General: No tenderness or deformity. Normal range of motion.      Cervical back: Normal range of motion and neck supple.   Lymphadenopathy:      Cervical: No cervical adenopathy.   Skin:     General: Skin is warm and dry.      Coloration: Skin is not pale.      Findings: No erythema or rash.   Neurological:      Mental Status: She is alert and oriented to person, place, and time.      Motor: No abnormal muscle tone.      Coordination: Coordination normal.   Psychiatric:         Behavior: Behavior normal.         Thought Content: Thought content normal.         Judgment: Judgment normal.       Lab Results   Component Value Date     01/24/2023    K 4.8 01/24/2023     01/24/2023    CO2 29 01/24/2023    BUN 15 01/24/2023    CREATININE 0.8 01/24/2023     (H) 01/24/2023    HGBA1C 5.0 12/14/2018    MG 1.8 10/07/2022    AST 16 01/24/2023    ALT 8 (L) 01/24/2023    ALBUMIN 3.5 01/24/2023    PROT 6.4 01/24/2023    BILITOT 0.3 01/24/2023    WBC 6.04 01/20/2023    HGB 10.9 (L) 01/20/2023    HCT 34.8 (L) 01/20/2023    MCV 99 (H) 01/20/2023     01/20/2023    INR 1.0 09/12/2022    TSH 1.055 08/03/2021    CHOL 66 (L) 06/19/2022    HDL 39 (L) 06/19/2022    LDLCALC 20.0 (L) 06/19/2022    TRIG 35 06/19/2022     (H) 10/07/2022     Assessment:      1. History of non-ST elevation myocardial infarction (NSTEMI)    2. PVD (peripheral vascular disease)    3. 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 Primary hypertension    5. Tobacco abuse disorder    6. COPD, group D, by GOLD 2017 classification    7.  Chronic heart failure with preserved ejection fraction          Plan:        HFPEF  - cont Lasix    Melena  -Workup and mgmt as per GI and hospital medicine     9/15/22  -No recurrence        NSTEMI (non-ST elevated myocardial infarction)     cont med management - asa, plavix,   10/7/22 - order pharm nuclear stress test to r/o ischemia  - negative 11/2022     Tobacco abuse disorder with COPD  -Smoking cessation advised.  - will start nicotine patch      PVD (peripheral vascular disease)  -Medical tx for now  -ASA, statin  -Smoking cessation.        Anemia  -Discussed with GI and hospital medicine, will resume DAPT and closely monitor        HTN (hypertension)   - titrate meds     Thank you for allowing me to participate in this patient's care. Please do not hesitate to contact me with any questions or concerns. Consult note has been forwarded to the referral physician.

## 2023-02-20 ENCOUNTER — LAB VISIT (OUTPATIENT)
Dept: LAB | Facility: HOSPITAL | Age: 78
End: 2023-02-20
Payer: MEDICARE

## 2023-02-20 DIAGNOSIS — M81.0 AGE-RELATED OSTEOPOROSIS WITHOUT CURRENT PATHOLOGICAL FRACTURE: ICD-10-CM

## 2023-02-20 LAB
ALBUMIN SERPL BCP-MCNC: 3.1 G/DL (ref 3.5–5.2)
ALP SERPL-CCNC: 58 U/L (ref 55–135)
ALT SERPL W/O P-5'-P-CCNC: 8 U/L (ref 10–44)
ANION GAP SERPL CALC-SCNC: 11 MMOL/L (ref 8–16)
AST SERPL-CCNC: 15 U/L (ref 10–40)
BILIRUB SERPL-MCNC: 0.4 MG/DL (ref 0.1–1)
BUN SERPL-MCNC: 19 MG/DL (ref 8–23)
CALCIUM SERPL-MCNC: 9.7 MG/DL (ref 8.7–10.5)
CHLORIDE SERPL-SCNC: 103 MMOL/L (ref 95–110)
CO2 SERPL-SCNC: 25 MMOL/L (ref 23–29)
CREAT SERPL-MCNC: 0.9 MG/DL (ref 0.5–1.4)
EST. GFR  (NO RACE VARIABLE): >60 ML/MIN/1.73 M^2
GLUCOSE SERPL-MCNC: 112 MG/DL (ref 70–110)
POTASSIUM SERPL-SCNC: 4.8 MMOL/L (ref 3.5–5.1)
PROT SERPL-MCNC: 6.3 G/DL (ref 6–8.4)
SODIUM SERPL-SCNC: 139 MMOL/L (ref 136–145)

## 2023-02-20 PROCEDURE — 36415 COLL VENOUS BLD VENIPUNCTURE: CPT | Mod: HCNC,PN

## 2023-02-20 PROCEDURE — 80053 COMPREHEN METABOLIC PANEL: CPT | Mod: HCNC

## 2023-02-22 PROBLEM — E53.8 B12 DEFICIENCY: Status: ACTIVE | Noted: 2023-02-22

## 2023-02-22 PROBLEM — M46.1 SACROILIITIS: Status: ACTIVE | Noted: 2023-02-22

## 2023-02-22 PROBLEM — I27.20 PULMONARY HYPERTENSION: Status: ACTIVE | Noted: 2023-02-22

## 2023-02-22 PROBLEM — I25.2 HISTORY OF NON-ST ELEVATION MYOCARDIAL INFARCTION (NSTEMI): Status: ACTIVE | Noted: 2023-02-22

## 2023-02-22 PROBLEM — I50.32 CHRONIC HEART FAILURE WITH PRESERVED EJECTION FRACTION: Status: ACTIVE | Noted: 2022-09-15

## 2023-02-22 PROBLEM — M79.2 NEURALGIA AND NEURITIS: Status: ACTIVE | Noted: 2023-02-22

## 2023-02-27 ENCOUNTER — TELEPHONE (OUTPATIENT)
Dept: RHEUMATOLOGY | Facility: CLINIC | Age: 78
End: 2023-02-27

## 2023-02-27 ENCOUNTER — OFFICE VISIT (OUTPATIENT)
Dept: RHEUMATOLOGY | Facility: CLINIC | Age: 78
End: 2023-02-27
Payer: MEDICARE

## 2023-02-27 VITALS
SYSTOLIC BLOOD PRESSURE: 116 MMHG | HEART RATE: 62 BPM | WEIGHT: 102.5 LBS | BODY MASS INDEX: 18.86 KG/M2 | DIASTOLIC BLOOD PRESSURE: 63 MMHG | HEIGHT: 62 IN

## 2023-02-27 DIAGNOSIS — M81.0 AGE-RELATED OSTEOPOROSIS WITHOUT CURRENT PATHOLOGICAL FRACTURE: Primary | ICD-10-CM

## 2023-02-27 DIAGNOSIS — E55.9 VITAMIN D DEFICIENCY: ICD-10-CM

## 2023-02-27 DIAGNOSIS — M15.9 PRIMARY OSTEOARTHRITIS INVOLVING MULTIPLE JOINTS: ICD-10-CM

## 2023-02-27 DIAGNOSIS — Z51.81 MEDICATION MONITORING ENCOUNTER: ICD-10-CM

## 2023-02-27 DIAGNOSIS — E87.5 HYPERKALEMIA: ICD-10-CM

## 2023-02-27 PROCEDURE — 3074F SYST BP LT 130 MM HG: CPT | Mod: HCNC,CPTII,S$GLB,

## 2023-02-27 PROCEDURE — 3078F PR MOST RECENT DIASTOLIC BLOOD PRESSURE < 80 MM HG: ICD-10-PCS | Mod: HCNC,CPTII,S$GLB,

## 2023-02-27 PROCEDURE — 1101F PT FALLS ASSESS-DOCD LE1/YR: CPT | Mod: HCNC,CPTII,S$GLB,

## 2023-02-27 PROCEDURE — 3288F PR FALLS RISK ASSESSMENT DOCUMENTED: ICD-10-PCS | Mod: HCNC,CPTII,S$GLB,

## 2023-02-27 PROCEDURE — 1125F PR PAIN SEVERITY QUANTIFIED, PAIN PRESENT: ICD-10-PCS | Mod: HCNC,CPTII,S$GLB,

## 2023-02-27 PROCEDURE — 99999 PR PBB SHADOW E&M-EST. PATIENT-LVL V: CPT | Mod: PBBFAC,HCNC,,

## 2023-02-27 PROCEDURE — 1101F PR PT FALLS ASSESS DOC 0-1 FALLS W/OUT INJ PAST YR: ICD-10-PCS | Mod: HCNC,CPTII,S$GLB,

## 2023-02-27 PROCEDURE — 99999 PR PBB SHADOW E&M-EST. PATIENT-LVL V: ICD-10-PCS | Mod: PBBFAC,HCNC,,

## 2023-02-27 PROCEDURE — 3074F PR MOST RECENT SYSTOLIC BLOOD PRESSURE < 130 MM HG: ICD-10-PCS | Mod: HCNC,CPTII,S$GLB,

## 2023-02-27 PROCEDURE — 99214 OFFICE O/P EST MOD 30 MIN: CPT | Mod: HCNC,S$GLB,,

## 2023-02-27 PROCEDURE — 99214 PR OFFICE/OUTPT VISIT, EST, LEVL IV, 30-39 MIN: ICD-10-PCS | Mod: HCNC,S$GLB,,

## 2023-02-27 PROCEDURE — 1159F MED LIST DOCD IN RCRD: CPT | Mod: HCNC,CPTII,S$GLB,

## 2023-02-27 PROCEDURE — 3288F FALL RISK ASSESSMENT DOCD: CPT | Mod: HCNC,CPTII,S$GLB,

## 2023-02-27 PROCEDURE — 3078F DIAST BP <80 MM HG: CPT | Mod: HCNC,CPTII,S$GLB,

## 2023-02-27 PROCEDURE — 1160F PR REVIEW ALL MEDS BY PRESCRIBER/CLIN PHARMACIST DOCUMENTED: ICD-10-PCS | Mod: HCNC,CPTII,S$GLB,

## 2023-02-27 PROCEDURE — 1125F AMNT PAIN NOTED PAIN PRSNT: CPT | Mod: HCNC,CPTII,S$GLB,

## 2023-02-27 PROCEDURE — 1160F RVW MEDS BY RX/DR IN RCRD: CPT | Mod: HCNC,CPTII,S$GLB,

## 2023-02-27 PROCEDURE — 1159F PR MEDICATION LIST DOCUMENTED IN MEDICAL RECORD: ICD-10-PCS | Mod: HCNC,CPTII,S$GLB,

## 2023-02-27 RX ORDER — SODIUM CHLORIDE 0.9 % (FLUSH) 0.9 %
10 SYRINGE (ML) INJECTION
Status: CANCELLED | OUTPATIENT
Start: 2023-02-27

## 2023-02-27 RX ORDER — HEPARIN 100 UNIT/ML
500 SYRINGE INTRAVENOUS
Status: CANCELLED | OUTPATIENT
Start: 2023-02-27

## 2023-02-27 RX ORDER — ZOLEDRONIC ACID 5 MG/100ML
5 INJECTION, SOLUTION INTRAVENOUS
Status: CANCELLED | OUTPATIENT
Start: 2023-02-27

## 2023-02-27 RX ORDER — ACETAMINOPHEN 325 MG/1
650 TABLET ORAL
Status: CANCELLED | OUTPATIENT
Start: 2023-02-27

## 2023-02-27 NOTE — Clinical Note
New start on Reclast  Was on prolia, insurance now not covering. Plan to start reclast within next week if possible so we can use CMP from 2/20/23.   Thank you!

## 2023-02-27 NOTE — Clinical Note
Erin,   This patient said she received a letter that her insurance no longer covers prolia. However, we have an approval letter stating it was approved for the year.   Can you help with clarification of this? We can try to transition to reclast if it is truly not covered.   Thank you! Holly Mcclain PA-C

## 2023-02-27 NOTE — PROGRESS NOTES
RHEUMATOLOGY OUTPATIENT CLINIC NOTE    02/27/2023    Subjective:       Patient ID: Keyona Dwyer is a 77 y.o. female.    Chief Complaint: Osteoporosis      HPI   Keyona Dwyer is a 77 y.o. pleasant female here for rheumatology follow up for osteoporosis and OA of multiple joints.     Pt had heart attack in Sept 2022. Also had some skin cancer removed from her face. This is doing well and healing.     Currently on Prolia every 6 months.  Tolerating well.  Vitamin-D 50 K weekly.  Uses rolling walker to ambulate. She states she got a letter from her insurance saying they no longer will pay for prolia. She did not bring the letter with her this morning.  No falls or fractures since last appointment. No planned invasive dental work.    History of bilateral total hip replacements.  OA in her hands, especially 1st CMC joints.  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  On pain medication per pain mgmt bone and joint.     Rheumatologic review of systems negative otherwise.     Physical exam: chronic dec ROM L elbow. OA changes multiple proximal and distal IP joints.  Able to rise from a chair independently.  Walks with a rolling walker.  Steady gait.  No kyphosis. Dentures top and bottom    Past Medical History:   Diagnosis Date    Acute diastolic heart failure 9/15/2022    Acute respiratory failure with hypoxia and hypercarbia 9/14/2022    Anemia     Anxiety     Cataract     Chronic back pain     Dr. Guzman    Fibrocystic breast     Glaucoma     Hyperlipemia     Hypertension     Idiopathic acute pancreatitis 6/18/2022    NSTEMI (non-ST elevated myocardial infarction) 9/11/2022    Osteoarthritis     Osteoporosis     Rheumatology/on Prolia    PVD (peripheral vascular disease) 4/12/2021    Restless leg syndrome     Tobacco dependence     Trouble in sleeping      Past Surgical History:   Procedure Laterality Date    ANGIOGRAPHY OF LOWER EXTREMITY N/A 4/12/2021    Procedure: ANGIOGRAM, LOWER  EXTREMITY/left leg;  Surgeon: Maico Adkins MD;  Location: [facility] CATH LAB;  Service: Vascular;  Laterality: N/A;  req 1130 start time/Rm A243A    AORTOGRAPHY WITH SERIALOGRAPHY N/A 4/14/2021    Procedure: AORTOGRAM, WITH RUNOFF;  Surgeon: Maico Adkins MD;  Location: [facility] CATH LAB;  Service: Vascular;  Laterality: N/A;  left iliac stent with shockwave/req 1030 start    CATARACT EXTRACTION W/  INTRAOCULAR LENS IMPLANT Bilateral 8/ 9 2017    ESOPHAGOGASTRODUODENOSCOPY N/A 9/13/2022    Procedure: EGD (ESOPHAGOGASTRODUODENOSCOPY);  Surgeon: Fern Rasmussen MD;  Location: [facility] ENDO;  Service: Endoscopy;  Laterality: N/A;    GALLBLADDER SURGERY      HYSTERECTOMY  1972    LUNG SURGERY Right age 15    lung collpase    TOE AMPUTATION Left 4/15/2021    Procedure: AMPUTATION, TOE;  Surgeon: Taylor Anderson DPM;  Location: [facility] OR;  Service: Podiatry;  Laterality: Left;  Hallux (Great Toe)    TOTAL HIP ARTHROPLASTY Bilateral     TOTAL KNEE ARTHROPLASTY Bilateral      Family History   Problem Relation Age of Onset    Cancer Mother     Breast cancer Mother     Hyperlipidemia Father     Heart failure Father     Heart disease Brother     Brain cancer Brother     Stroke Brother     Alcohol abuse Brother      Social History     Socioeconomic History    Marital status:      Spouse name: elizabeth    Number of children: 3   Occupational History    Occupation: retired   Tobacco Use    Smoking status: Every Day     Packs/day: 0.50     Years: 63.00     Pack years: 31.50     Types: Cigarettes    Smokeless tobacco: Never    Tobacco comments:     started age of  11    Substance and Sexual Activity    Alcohol use: No    Drug use: No    Sexual activity: Yes     Partners: Male     Social Determinants of Health     Financial Resource Strain: Low Risk     Difficulty of Paying Living Expenses: Not hard at all   Food Insecurity: No Food Insecurity    Worried About Running Out of Food in the Last Year: Never true    Ran Out of Food in the Last  "Year: Never true   Transportation Needs: No Transportation Needs    Lack of Transportation (Medical): No    Lack of Transportation (Non-Medical): No   Physical Activity: Inactive    Days of Exercise per Week: 0 days    Minutes of Exercise per Session: 0 min   Stress: No Stress Concern Present    Feeling of Stress : Not at all   Social Connections: Moderately Integrated    Frequency of Communication with Friends and Family: More than three times a week    Frequency of Social Gatherings with Friends and Family: More than three times a week    Attends Latter-day Services: More than 4 times per year    Active Member of Clubs or Organizations: No    Attends Club or Organization Meetings: Never    Marital Status:    Housing Stability: Low Risk     Unable to Pay for Housing in the Last Year: No    Number of Places Lived in the Last Year: 1    Unstable Housing in the Last Year: No     Review of patient's allergies indicates:   Allergen Reactions    Indomethacin      Other reaction(s): Headache    K-flex Other (See Comments)     Pancreatitis     Latex, natural rubber Swelling    Penicillins      Other reaction(s): Vomiting    Patient tolerated Rocephin during the 4/2021 encounter and she states that she has tolerated Keflex in the past without issue.    Sulfa (sulfonamide antibiotics) Other (See Comments)     hallucinations    Tolmetin      Other reaction(s): Hives    Bactroban [mupirocin calcium] Rash     Burned skin            Objective:   /63   Pulse 62   Ht 5' 2" (1.575 m)   Wt 46.5 kg (102 lb 8.2 oz)   BMI 18.75 kg/m²   Immunization History   Administered Date(s) Administered    COVID-19, MRNA, LN-S, PF (MODERNA FULL 0.5 ML DOSE) 01/06/2021, 02/03/2021, 10/29/2021    Influenza (FLUAD) - Quadrivalent - Adjuvanted - PF *Preferred* (65+) 09/22/2020, 10/17/2022    Influenza (FLUAD) - Trivalent - Adjuvanted - PF (65+) 11/01/2017    Influenza - High Dose - PF (65 years and older) 12/28/2009, 10/05/2011, " 10/11/2013, 10/28/2014, 10/08/2015, 10/26/2016, 10/24/2018, 09/16/2019, 10/07/2021    Influenza - Trivalent (ADULT) 11/19/2007, 11/21/2008    Influenza A (H1N1) 2009 Monovalent - IM 12/28/2009    Influenza Split 11/19/2007, 11/21/2008    Pneumococcal Conjugate - 13 Valent 11/11/2016    Pneumococcal Polysaccharide - 23 Valent 07/21/2015    Tdap 02/13/2015        Recent Results (from the past 672 hour(s))   Comprehensive Metabolic Panel    Collection Time: 02/20/23  9:48 AM   Result Value Ref Range    Sodium 139 136 - 145 mmol/L    Potassium 4.8 3.5 - 5.1 mmol/L    Chloride 103 95 - 110 mmol/L    CO2 25 23 - 29 mmol/L    Glucose 112 (H) 70 - 110 mg/dL    BUN 19 8 - 23 mg/dL    Creatinine 0.9 0.5 - 1.4 mg/dL    Calcium 9.7 8.7 - 10.5 mg/dL    Total Protein 6.3 6.0 - 8.4 g/dL    Albumin 3.1 (L) 3.5 - 5.2 g/dL    Total Bilirubin 0.4 0.1 - 1.0 mg/dL    Alkaline Phosphatase 58 55 - 135 U/L    AST 15 10 - 40 U/L    ALT 8 (L) 10 - 44 U/L    Anion Gap 11 8 - 16 mmol/L    eGFR >60 >60 mL/min/1.73 m^2        No results found for: TBGOLDPLUS   Lab Results   Component Value Date    HEPCAB Negative 08/28/2020      Dexa 8/15/2022   Spine-2.2, rad 33%-4.4, rad total-4.9, stable at spine and hip.    Assessment:       1. Age-related osteoporosis without current pathological fracture    2. Vitamin D deficiency    3. Medication monitoring encounter    4. Primary osteoarthritis involving multiple joints    5. Hyperkalemia            Impression:     Osteoporosis: on prolia q 6 mos, failed bisphosphonates; improved bmd;  has done PT balance training (started prolia 10/2015). No falls since last appt. Walks with walker.   She says insurance denied prolia but we have approval letter in chart.      Vit D def: taking weekly vit D, level 36     Medication monitoring; no issue with prolia, labs reviewed see below; mobic caused Gi upset     OA mult joints: prosper hands, lumbar spine, has pain pump--mobic caused GI upset, turmeric not helpful. On  norco per pain mgmt.     Potassium previously elevated 5.9. Currently no symptoms. Now within normal.    Plan:          Encourage patient to find insurance letter when she gets home to see what it said regarding prolia approval.   We will reach out to pre-cert for clarification as well.    Plan to either continue prolia (due now) or transition to reclast.   Discussed side effects and risks of Reclast with patient including APR.     Labs reviewed and done within past 14 days  Ca  9.7, Creat  0.9, eGFR >60      Weight bearing activities as tolerated. Caution to avoid falls.     Cont weekly vit D 50K u/day for now. Will recheck in august 2023.   Cont Ca in diet     Rec voltaren gel for joint pain    If continue prolia - repeat dexa 8/2024  If transition to Reclast - repeat dexa 1 year following first reclast (3/2024)     RTC in 6 months with prolia, cmp, vit D      Holly Mcclain PA-C  [facility]  Rheumatology       35 minutes of total time spent on the encounter, which includes face to face time and non-face to face time preparing to see the patient (eg, review of tests), Obtaining and/or reviewing separately obtained history, Documenting clinical information in the electronic or other health record, Independently interpreting results (not separately reported) and communicating results to the patient/family/caregiver, or Care coordination (not separately reported).

## 2023-03-01 ENCOUNTER — OFFICE VISIT (OUTPATIENT)
Dept: INTERNAL MEDICINE | Facility: CLINIC | Age: 78
End: 2023-03-01
Payer: MEDICARE

## 2023-03-01 VITALS
TEMPERATURE: 97 F | SYSTOLIC BLOOD PRESSURE: 110 MMHG | DIASTOLIC BLOOD PRESSURE: 80 MMHG | HEART RATE: 76 BPM | HEIGHT: 62 IN | BODY MASS INDEX: 18.7 KG/M2 | WEIGHT: 101.63 LBS

## 2023-03-01 DIAGNOSIS — F11.20 UNCOMPLICATED OPIOID DEPENDENCE: ICD-10-CM

## 2023-03-01 DIAGNOSIS — M81.0 OSTEOPOROSIS, UNSPECIFIED OSTEOPOROSIS TYPE, UNSPECIFIED PATHOLOGICAL FRACTURE PRESENCE: ICD-10-CM

## 2023-03-01 DIAGNOSIS — E78.5 HYPERLIPIDEMIA, UNSPECIFIED HYPERLIPIDEMIA TYPE: ICD-10-CM

## 2023-03-01 DIAGNOSIS — D64.9 ANEMIA, UNSPECIFIED TYPE: ICD-10-CM

## 2023-03-01 DIAGNOSIS — I10 ESSENTIAL HYPERTENSION: Primary | ICD-10-CM

## 2023-03-01 DIAGNOSIS — K21.9 GASTROESOPHAGEAL REFLUX DISEASE, UNSPECIFIED WHETHER ESOPHAGITIS PRESENT: ICD-10-CM

## 2023-03-01 DIAGNOSIS — M46.1 SACROILIITIS: ICD-10-CM

## 2023-03-01 DIAGNOSIS — I73.9 PVD (PERIPHERAL VASCULAR DISEASE): ICD-10-CM

## 2023-03-01 DIAGNOSIS — F41.1 GAD (GENERALIZED ANXIETY DISORDER): ICD-10-CM

## 2023-03-01 PROCEDURE — 99999 PR PBB SHADOW E&M-EST. PATIENT-LVL V: ICD-10-PCS | Mod: PBBFAC,HCNC,, | Performed by: FAMILY MEDICINE

## 2023-03-01 PROCEDURE — 1125F AMNT PAIN NOTED PAIN PRSNT: CPT | Mod: HCNC,CPTII,S$GLB, | Performed by: FAMILY MEDICINE

## 2023-03-01 PROCEDURE — 1125F PR PAIN SEVERITY QUANTIFIED, PAIN PRESENT: ICD-10-PCS | Mod: HCNC,CPTII,S$GLB, | Performed by: FAMILY MEDICINE

## 2023-03-01 PROCEDURE — 3079F DIAST BP 80-89 MM HG: CPT | Mod: HCNC,CPTII,S$GLB, | Performed by: FAMILY MEDICINE

## 2023-03-01 PROCEDURE — 1101F PT FALLS ASSESS-DOCD LE1/YR: CPT | Mod: HCNC,CPTII,S$GLB, | Performed by: FAMILY MEDICINE

## 2023-03-01 PROCEDURE — 1159F MED LIST DOCD IN RCRD: CPT | Mod: HCNC,CPTII,S$GLB, | Performed by: FAMILY MEDICINE

## 2023-03-01 PROCEDURE — 1160F RVW MEDS BY RX/DR IN RCRD: CPT | Mod: HCNC,CPTII,S$GLB, | Performed by: FAMILY MEDICINE

## 2023-03-01 PROCEDURE — 3079F PR MOST RECENT DIASTOLIC BLOOD PRESSURE 80-89 MM HG: ICD-10-PCS | Mod: HCNC,CPTII,S$GLB, | Performed by: FAMILY MEDICINE

## 2023-03-01 PROCEDURE — 3074F PR MOST RECENT SYSTOLIC BLOOD PRESSURE < 130 MM HG: ICD-10-PCS | Mod: HCNC,CPTII,S$GLB, | Performed by: FAMILY MEDICINE

## 2023-03-01 PROCEDURE — 99214 PR OFFICE/OUTPT VISIT, EST, LEVL IV, 30-39 MIN: ICD-10-PCS | Mod: HCNC,S$GLB,, | Performed by: FAMILY MEDICINE

## 2023-03-01 PROCEDURE — 99214 OFFICE O/P EST MOD 30 MIN: CPT | Mod: HCNC,S$GLB,, | Performed by: FAMILY MEDICINE

## 2023-03-01 PROCEDURE — 3074F SYST BP LT 130 MM HG: CPT | Mod: HCNC,CPTII,S$GLB, | Performed by: FAMILY MEDICINE

## 2023-03-01 PROCEDURE — 1101F PR PT FALLS ASSESS DOC 0-1 FALLS W/OUT INJ PAST YR: ICD-10-PCS | Mod: HCNC,CPTII,S$GLB, | Performed by: FAMILY MEDICINE

## 2023-03-01 PROCEDURE — 1160F PR REVIEW ALL MEDS BY PRESCRIBER/CLIN PHARMACIST DOCUMENTED: ICD-10-PCS | Mod: HCNC,CPTII,S$GLB, | Performed by: FAMILY MEDICINE

## 2023-03-01 PROCEDURE — 99999 PR PBB SHADOW E&M-EST. PATIENT-LVL V: CPT | Mod: PBBFAC,HCNC,, | Performed by: FAMILY MEDICINE

## 2023-03-01 PROCEDURE — 3288F PR FALLS RISK ASSESSMENT DOCUMENTED: ICD-10-PCS | Mod: HCNC,CPTII,S$GLB, | Performed by: FAMILY MEDICINE

## 2023-03-01 PROCEDURE — 1159F PR MEDICATION LIST DOCUMENTED IN MEDICAL RECORD: ICD-10-PCS | Mod: HCNC,CPTII,S$GLB, | Performed by: FAMILY MEDICINE

## 2023-03-01 PROCEDURE — 3288F FALL RISK ASSESSMENT DOCD: CPT | Mod: HCNC,CPTII,S$GLB, | Performed by: FAMILY MEDICINE

## 2023-03-01 NOTE — PROGRESS NOTES
Subjective:      Patient ID: Keyona Dwyer is a 77 y.o. female.    Chief Complaint:  FU    HPI 77 y.o.   female patient with a PMHx of anemia, anxiety, chronic back pain, hyperlipidemia, hypertension, osteoarthritis, and PVD presents to clinic for fu on chronic conidtions. The patient was last seen on September 1, 2022      Been under care of [facility].  Had cancer removed from her face one month ago with fairly extensive grafting.  She is all bandaged up and still with stitches.  Going today to see specialist.  Today she reports she is no longer taking her BP medication because her BP had been dropping too low. Patient reports she is feeling better than she had in years.   Trying to remain active.  Will be starting Reclast soon//insurance won't cover prolia anymore.  Not smoking, using patch at this time.  No falls.  Using walker    Past Medical History:   Diagnosis Date    Acute diastolic heart failure 9/15/2022    Acute respiratory failure with hypoxia and hypercarbia 9/14/2022    Anemia     Anxiety     Cataract     Chronic back pain     Dr. Guzman    Fibrocystic breast     Glaucoma     Hyperlipemia     Hypertension     Idiopathic acute pancreatitis 6/18/2022    NSTEMI (non-ST elevated myocardial infarction) 9/11/2022    Osteoarthritis     Osteoporosis     Rheumatology/on Prolia    PVD (peripheral vascular disease) 4/12/2021    Restless leg syndrome     Tobacco dependence     Trouble in sleeping      Family History   Problem Relation Age of Onset    Cancer Mother     Breast cancer Mother     Hyperlipidemia Father     Heart failure Father     Heart disease Brother     Brain cancer Brother     Stroke Brother     Alcohol abuse Brother      Past Surgical History:   Procedure Laterality Date    ANGIOGRAPHY OF LOWER EXTREMITY N/A 4/12/2021    Procedure: ANGIOGRAM, LOWER EXTREMITY/left leg;  Surgeon: Maico Adkins MD;  Location: [facility] CATH LAB;  Service: Vascular;  Laterality: N/A;  req 1130 start time/Rm  "[id number]    AORTOGRAPHY WITH SERIALOGRAPHY N/A 4/14/2021    Procedure: AORTOGRAM, WITH RUNOFF;  Surgeon: Maico Adkins MD;  Location: [facility] CATH LAB;  Service: Vascular;  Laterality: N/A;  left iliac stent with shockwave/req 1030 start    CATARACT EXTRACTION W/  INTRAOCULAR LENS IMPLANT Bilateral 8/ 9 2017    ESOPHAGOGASTRODUODENOSCOPY N/A 9/13/2022    Procedure: EGD (ESOPHAGOGASTRODUODENOSCOPY);  Surgeon: Fern Rasmussen MD;  Location: [facility] ENDO;  Service: Endoscopy;  Laterality: N/A;    GALLBLADDER SURGERY      HYSTERECTOMY  1972    LUNG SURGERY Right age 15    lung collpase    TOE AMPUTATION Left 4/15/2021    Procedure: AMPUTATION, TOE;  Surgeon: Taylor Anderson DPM;  Location: [facility] OR;  Service: Podiatry;  Laterality: Left;  Hallux (Great Toe)    TOTAL HIP ARTHROPLASTY Bilateral     TOTAL KNEE ARTHROPLASTY Bilateral      Social History     Tobacco Use    Smoking status: Former     Packs/day: 0.50     Years: 63.00     Pack years: 31.50     Types: Cigarettes    Smokeless tobacco: Never    Tobacco comments:     started age of  11    Substance Use Topics    Alcohol use: No    Drug use: No       /80   Pulse 76   Temp 97.2 °F (36.2 °C)   Ht 5' 2" (1.575 m)   Wt 46.1 kg (101 lb 10.1 oz)   BMI 18.59 kg/m²     Review of Systems   Constitutional:  Positive for activity change. Negative for appetite change, chills, diaphoresis, fatigue, fever and unexpected weight change.   HENT:  Negative for ear pain, hearing loss, postnasal drip, rhinorrhea and tinnitus.    Eyes:  Negative for visual disturbance.   Respiratory:  Negative for cough, shortness of breath and wheezing.    Cardiovascular:  Negative for chest pain, palpitations and leg swelling.   Gastrointestinal:  Negative for abdominal distention.   Genitourinary:  Negative for dysuria, frequency, hematuria and urgency.   Musculoskeletal:  Negative for back pain and joint swelling.   Neurological:  Negative for weakness and headaches.   Hematological:  Negative " for adenopathy.   Psychiatric/Behavioral:  Negative for confusion and decreased concentration.      Objective:     Physical Exam  Vitals and nursing note reviewed.   Constitutional:       General: She is not in acute distress.     Appearance: She is well-developed.   Neck:      Thyroid: No thyromegaly.   Cardiovascular:      Rate and Rhythm: Normal rate and regular rhythm.      Heart sounds: Normal heart sounds. No murmur heard.    No gallop.   Pulmonary:      Effort: Pulmonary effort is normal. No respiratory distress.      Breath sounds: Normal breath sounds. No wheezing or rales.   Abdominal:      General: Bowel sounds are normal. There is no distension.      Palpations: Abdomen is soft.      Tenderness: There is no abdominal tenderness. There is no guarding.   Musculoskeletal:      Cervical back: Normal range of motion and neck supple.   Skin:     General: Skin is warm and dry.      Findings: No rash.   Neurological:      Mental Status: She is alert and oriented to person, place, and time.      Cranial Nerves: No cranial nerve deficit.   Psychiatric:         Behavior: Behavior normal.         Thought Content: Thought content normal.         Judgment: Judgment normal.       Lab Results   Component Value Date    WBC 6.04 01/20/2023    HGB 10.9 (L) 01/20/2023    HCT 34.8 (L) 01/20/2023     01/20/2023    CHOL 66 (L) 06/19/2022    TRIG 35 06/19/2022    HDL 39 (L) 06/19/2022    ALT 8 (L) 02/20/2023    AST 15 02/20/2023     02/20/2023    K 4.8 02/20/2023     02/20/2023    CREATININE 0.9 02/20/2023    BUN 19 02/20/2023    CO2 25 02/20/2023    TSH 1.055 08/03/2021    INR 1.0 09/12/2022    HGBA1C 5.0 12/14/2018       Assessment:     1. Essential hypertension    2. Hyperlipidemia, unspecified hyperlipidemia type    3. Gastroesophageal reflux disease, unspecified whether esophagitis present    4. Anemia, unspecified type    5. PVD (peripheral vascular disease)    6. ASHLY (generalized anxiety disorder)     7. Osteoporosis, unspecified osteoporosis type, unspecified pathological fracture presence    8. Sacroiliitis    9. Uncomplicated opioid dependence         Plan:     Essential hypertension    Hyperlipidemia, unspecified hyperlipidemia type    Gastroesophageal reflux disease, unspecified whether esophagitis present    Anemia, unspecified type    PVD (peripheral vascular disease)    ASHLY (generalized anxiety disorder)    Osteoporosis, unspecified osteoporosis type, unspecified pathological fracture presence    Sacroiliitis    Uncomplicated opioid dependence        Vitals reviewed. BP within normal range at 110/80  BP normal//no meds  Outside labs reviewed and discussed.  Cont statin/plavix/ASA  Cont wellbutrin for mood  PPI for GERD  Cont per specialty  F/u 6 mos//then annually    Documentation entered by Matheus Barrera, acting as scribe for Dr. Ralf Dias. 03/01/2023 7:30 AM.

## 2023-03-06 ENCOUNTER — OFFICE VISIT (OUTPATIENT)
Dept: INTERNAL MEDICINE | Facility: CLINIC | Age: 78
End: 2023-03-06
Payer: MEDICARE

## 2023-03-06 ENCOUNTER — LAB VISIT (OUTPATIENT)
Dept: LAB | Facility: HOSPITAL | Age: 78
End: 2023-03-06
Payer: MEDICARE

## 2023-03-06 VITALS
HEART RATE: 86 BPM | SYSTOLIC BLOOD PRESSURE: 134 MMHG | DIASTOLIC BLOOD PRESSURE: 74 MMHG | HEIGHT: 62 IN | RESPIRATION RATE: 20 BRPM | WEIGHT: 101.88 LBS | BODY MASS INDEX: 18.75 KG/M2 | TEMPERATURE: 100 F | OXYGEN SATURATION: 99 %

## 2023-03-06 DIAGNOSIS — S98.112A AMPUTATION OF LEFT GREAT TOE: ICD-10-CM

## 2023-03-06 DIAGNOSIS — M81.0 AGE-RELATED OSTEOPOROSIS WITHOUT CURRENT PATHOLOGICAL FRACTURE: ICD-10-CM

## 2023-03-06 DIAGNOSIS — F11.20 UNCOMPLICATED OPIOID DEPENDENCE: ICD-10-CM

## 2023-03-06 DIAGNOSIS — I10 PRIMARY HYPERTENSION: Chronic | ICD-10-CM

## 2023-03-06 DIAGNOSIS — J44.9 COPD, GROUP D, BY GOLD 2017 CLASSIFICATION: ICD-10-CM

## 2023-03-06 DIAGNOSIS — E78.5 HYPERLIPIDEMIA, UNSPECIFIED HYPERLIPIDEMIA TYPE: Chronic | ICD-10-CM

## 2023-03-06 DIAGNOSIS — D50.9 IRON DEFICIENCY ANEMIA, UNSPECIFIED IRON DEFICIENCY ANEMIA TYPE: ICD-10-CM

## 2023-03-06 DIAGNOSIS — Z51.81 MEDICATION MONITORING ENCOUNTER: ICD-10-CM

## 2023-03-06 DIAGNOSIS — I70.0 ATHEROSCLEROSIS OF AORTA: ICD-10-CM

## 2023-03-06 DIAGNOSIS — H40.9 GLAUCOMA OF BOTH EYES, UNSPECIFIED GLAUCOMA TYPE: ICD-10-CM

## 2023-03-06 DIAGNOSIS — M79.2 NEURALGIA AND NEURITIS: ICD-10-CM

## 2023-03-06 DIAGNOSIS — Z99.89 DEPENDENCE ON OTHER ENABLING MACHINES AND DEVICES: ICD-10-CM

## 2023-03-06 DIAGNOSIS — E55.9 VITAMIN D DEFICIENCY: ICD-10-CM

## 2023-03-06 DIAGNOSIS — M46.1 SACROILIITIS: ICD-10-CM

## 2023-03-06 DIAGNOSIS — I50.32 CHRONIC HEART FAILURE WITH PRESERVED EJECTION FRACTION: ICD-10-CM

## 2023-03-06 DIAGNOSIS — C44.300 SKIN CANCER OF FACE: ICD-10-CM

## 2023-03-06 DIAGNOSIS — J30.1 CHRONIC SEASONAL ALLERGIC RHINITIS DUE TO POLLEN: ICD-10-CM

## 2023-03-06 DIAGNOSIS — E53.8 B12 DEFICIENCY: ICD-10-CM

## 2023-03-06 DIAGNOSIS — M54.16 LUMBAR RADICULOPATHY: ICD-10-CM

## 2023-03-06 DIAGNOSIS — I25.2 HISTORY OF NON-ST ELEVATION MYOCARDIAL INFARCTION (NSTEMI): ICD-10-CM

## 2023-03-06 DIAGNOSIS — I27.20 PULMONARY HYPERTENSION: ICD-10-CM

## 2023-03-06 DIAGNOSIS — F17.210 NICOTINE DEPENDENCE, CIGARETTES, UNCOMPLICATED: Chronic | ICD-10-CM

## 2023-03-06 DIAGNOSIS — I73.9 PVD (PERIPHERAL VASCULAR DISEASE): ICD-10-CM

## 2023-03-06 DIAGNOSIS — R26.9 ABNORMALITY OF GAIT AND MOBILITY: ICD-10-CM

## 2023-03-06 DIAGNOSIS — Z00.00 ENCOUNTER FOR PREVENTIVE HEALTH EXAMINATION: Primary | ICD-10-CM

## 2023-03-06 DIAGNOSIS — M81.8 OTHER OSTEOPOROSIS WITHOUT CURRENT PATHOLOGICAL FRACTURE: ICD-10-CM

## 2023-03-06 LAB
ALBUMIN SERPL BCP-MCNC: 3.3 G/DL (ref 3.5–5.2)
ALP SERPL-CCNC: 46 U/L (ref 55–135)
ALT SERPL W/O P-5'-P-CCNC: <5 U/L (ref 10–44)
ANION GAP SERPL CALC-SCNC: 12 MMOL/L (ref 8–16)
AST SERPL-CCNC: 14 U/L (ref 10–40)
BILIRUB SERPL-MCNC: 0.4 MG/DL (ref 0.1–1)
BUN SERPL-MCNC: 11 MG/DL (ref 8–23)
CALCIUM SERPL-MCNC: 9.9 MG/DL (ref 8.7–10.5)
CHLORIDE SERPL-SCNC: 106 MMOL/L (ref 95–110)
CO2 SERPL-SCNC: 22 MMOL/L (ref 23–29)
CREAT SERPL-MCNC: 0.8 MG/DL (ref 0.5–1.4)
EST. GFR  (NO RACE VARIABLE): >60 ML/MIN/1.73 M^2
GLUCOSE SERPL-MCNC: 87 MG/DL (ref 70–110)
POTASSIUM SERPL-SCNC: 5 MMOL/L (ref 3.5–5.1)
PROT SERPL-MCNC: 6.5 G/DL (ref 6–8.4)
SODIUM SERPL-SCNC: 140 MMOL/L (ref 136–145)

## 2023-03-06 PROCEDURE — 99999 PR PBB SHADOW E&M-EST. PATIENT-LVL V: ICD-10-PCS | Mod: PBBFAC,HCNC,, | Performed by: NURSE PRACTITIONER

## 2023-03-06 PROCEDURE — G0439 PR MEDICARE ANNUAL WELLNESS SUBSEQUENT VISIT: ICD-10-PCS | Mod: HCNC,S$GLB,, | Performed by: NURSE PRACTITIONER

## 2023-03-06 PROCEDURE — 1160F RVW MEDS BY RX/DR IN RCRD: CPT | Mod: HCNC,CPTII,S$GLB, | Performed by: NURSE PRACTITIONER

## 2023-03-06 PROCEDURE — 99499 UNLISTED E&M SERVICE: CPT | Mod: HCNC,S$GLB,, | Performed by: NURSE PRACTITIONER

## 2023-03-06 PROCEDURE — G0439 PPPS, SUBSEQ VISIT: HCPCS | Mod: HCNC,S$GLB,, | Performed by: NURSE PRACTITIONER

## 2023-03-06 PROCEDURE — 3288F PR FALLS RISK ASSESSMENT DOCUMENTED: ICD-10-PCS | Mod: HCNC,CPTII,S$GLB, | Performed by: NURSE PRACTITIONER

## 2023-03-06 PROCEDURE — 1101F PT FALLS ASSESS-DOCD LE1/YR: CPT | Mod: HCNC,CPTII,S$GLB, | Performed by: NURSE PRACTITIONER

## 2023-03-06 PROCEDURE — 3288F FALL RISK ASSESSMENT DOCD: CPT | Mod: HCNC,CPTII,S$GLB, | Performed by: NURSE PRACTITIONER

## 2023-03-06 PROCEDURE — 3078F PR MOST RECENT DIASTOLIC BLOOD PRESSURE < 80 MM HG: ICD-10-PCS | Mod: HCNC,CPTII,S$GLB, | Performed by: NURSE PRACTITIONER

## 2023-03-06 PROCEDURE — 1101F PR PT FALLS ASSESS DOC 0-1 FALLS W/OUT INJ PAST YR: ICD-10-PCS | Mod: HCNC,CPTII,S$GLB, | Performed by: NURSE PRACTITIONER

## 2023-03-06 PROCEDURE — 99499 RISK ADDL DX/OHS AUDIT: ICD-10-PCS | Mod: HCNC,S$GLB,, | Performed by: NURSE PRACTITIONER

## 2023-03-06 PROCEDURE — 3075F PR MOST RECENT SYSTOLIC BLOOD PRESS GE 130-139MM HG: ICD-10-PCS | Mod: HCNC,CPTII,S$GLB, | Performed by: NURSE PRACTITIONER

## 2023-03-06 PROCEDURE — 99999 PR PBB SHADOW E&M-EST. PATIENT-LVL V: CPT | Mod: PBBFAC,HCNC,, | Performed by: NURSE PRACTITIONER

## 2023-03-06 PROCEDURE — 1170F FXNL STATUS ASSESSED: CPT | Mod: HCNC,CPTII,S$GLB, | Performed by: NURSE PRACTITIONER

## 2023-03-06 PROCEDURE — 1159F MED LIST DOCD IN RCRD: CPT | Mod: HCNC,CPTII,S$GLB, | Performed by: NURSE PRACTITIONER

## 2023-03-06 PROCEDURE — 36415 COLL VENOUS BLD VENIPUNCTURE: CPT | Mod: HCNC,PO

## 2023-03-06 PROCEDURE — 3078F DIAST BP <80 MM HG: CPT | Mod: HCNC,CPTII,S$GLB, | Performed by: NURSE PRACTITIONER

## 2023-03-06 PROCEDURE — 1170F PR FUNCTIONAL STATUS ASSESSED: ICD-10-PCS | Mod: HCNC,CPTII,S$GLB, | Performed by: NURSE PRACTITIONER

## 2023-03-06 PROCEDURE — 1126F PR PAIN SEVERITY QUANTIFIED, NO PAIN PRESENT: ICD-10-PCS | Mod: HCNC,CPTII,S$GLB, | Performed by: NURSE PRACTITIONER

## 2023-03-06 PROCEDURE — 1126F AMNT PAIN NOTED NONE PRSNT: CPT | Mod: HCNC,CPTII,S$GLB, | Performed by: NURSE PRACTITIONER

## 2023-03-06 PROCEDURE — 80053 COMPREHEN METABOLIC PANEL: CPT | Mod: HCNC

## 2023-03-06 PROCEDURE — 1160F PR REVIEW ALL MEDS BY PRESCRIBER/CLIN PHARMACIST DOCUMENTED: ICD-10-PCS | Mod: HCNC,CPTII,S$GLB, | Performed by: NURSE PRACTITIONER

## 2023-03-06 PROCEDURE — 3075F SYST BP GE 130 - 139MM HG: CPT | Mod: HCNC,CPTII,S$GLB, | Performed by: NURSE PRACTITIONER

## 2023-03-06 PROCEDURE — 1159F PR MEDICATION LIST DOCUMENTED IN MEDICAL RECORD: ICD-10-PCS | Mod: HCNC,CPTII,S$GLB, | Performed by: NURSE PRACTITIONER

## 2023-03-06 NOTE — PROGRESS NOTES
"  Keyona Dwyer presented for a follow-up Medicare AWV today. The following components were reviewed and updated:    Medical history  Family History  Social history  Allergies and Current Medications  Health Risk Assessment  Health Maintenance  Care Team    **See Completed Assessments for Annual Wellness visit with in the encounter summary    The following assessments were completed:  Depression Screening  Cognitive function Screening  Timed Get Up Test  Whisper Test          Vitals:    03/06/23 0758   BP: 134/74   BP Location: Right arm   Patient Position: Sitting   Pulse: 86   Resp: 20   Temp: 99.5 °F (37.5 °C)   TempSrc: Oral   SpO2: 99%   Weight: 46.2 kg (101 lb 13.6 oz)   Height: 5' 2" (1.575 m)     Body mass index is 18.63 kg/m².   ]    Physical Exam  Constitutional:       Appearance: Normal appearance.      Comments: Ambulates with rolling walker    HENT:      Head: Normocephalic and atraumatic.   Cardiovascular:      Rate and Rhythm: Normal rate and regular rhythm.      Pulses: Normal pulses.      Heart sounds: Normal heart sounds.   Pulmonary:      Effort: Pulmonary effort is normal.      Breath sounds: Normal breath sounds.   Abdominal:      General: Bowel sounds are normal.   Musculoskeletal:         General: Normal range of motion.   Skin:     General: Skin is warm and dry.      Capillary Refill: Capillary refill takes 2 to 3 seconds.   Neurological:      Mental Status: She is alert.      Gait: Gait abnormal.   Psychiatric:         Mood and Affect: Mood normal.         Behavior: Behavior normal.         Thought Content: Thought content normal.         Judgment: Judgment normal.       Diagnoses and health risks identified today and associated recommendations/orders:  1. Encounter for preventive health examination  Reviewed and discussed preventive health screenings and vaccinations with the patient.   Encouraged Shingrix and COVID-19 vaccinations at local pharmacy.    2. COPD, group D, by GOLD 2017 " classification  Patient reports this condition has been stable; she is not needing medical therapy to control symptoms. She is actively working to stop smoking with use of nicotine patches and mints. Continue current treatment plan as previously prescribed with your PCP.     3. Pulmonary hypertension  Stable. Continue current treatment plan as previously prescribed with your Cardiologist.     Echo: 9/11/2022   The left ventricle is normal in size with concentric hypertrophy and normal systolic function.  The estimated ejection fraction is 55%.  Grade I left ventricular diastolic dysfunction.  Normal right ventricular size with normal right ventricular systolic function.  Moderate left atrial enlargement.  Mild tricuspid regurgitation.  There are segmental left ventricular wall motion abnormalities.  There is pulmonary hypertension.  The estimated PA systolic pressure is 45 mmHg.    4. Chronic heart failure with preserved ejection fraction  Stable, euvolemic on exam. Using Lasix PRN but has not used in weeks. She is compliant with sodium restriction. Continue current treatment plan as previously prescribed with your Cardiologist.     5. PVD (peripheral vascular disease)  Stable on DAPT with ASA and Plavix. Lipids are controlled with Crestor. Continue current treatment plan as previously prescribed with your Cardiologist.     6. Atherosclerosis of aorta  CXR 9/2022.   Stable on DAPT with ASA and Plavix. Lipids are controlled with Crestor. Continue current treatment plan as previously prescribed with your Cardiologist.     7. Uncomplicated opioid dependence  2/2 chronic back pain and neuralgia. Stable, followed by pain management. Continue current treatment plan as previously prescribed with your pain management specialist.     8. Sacroiliitis  Stable with Norco PRN and pain pump in place. Continue current treatment plan as previously prescribed with your pain management specialist.     9. Amputation of left great  toe  Stable. Continue current treatment plan as previously prescribed with your PCP    10. History of non-ST elevation myocardial infarction (NSTEMI)  Stable on DAPT, Crestor, Ranexa. Continue current treatment plan as previously prescribed with your Cardiologist.     11. Primary hypertension  Stable, no longer taking blood pressure medication 2/2 hypotension. Continue current treatment plan as previously prescribed with your PCP and Cardiologist.     12. Hyperlipidemia, unspecified hyperlipidemia type  Stable/controlled on Crestor. Continue current treatment plan as previously prescribed with your PCP and Cardiologist.     Lab Results   Component Value Date    CHOL 66 (L) 06/19/2022    CHOL 147 08/03/2021    CHOL 181 08/28/2020     Lab Results   Component Value Date    HDL 39 (L) 06/19/2022    HDL 69 08/03/2021    HDL 61 08/28/2020     Lab Results   Component Value Date    LDLCALC 20.0 (L) 06/19/2022    LDLCALC 63.8 08/03/2021    LDLCALC 94.6 08/28/2020     Lab Results   Component Value Date    TRIG 35 06/19/2022    TRIG 71 08/03/2021    TRIG 127 08/28/2020     Lab Results   Component Value Date    CHOLHDL 59.1 (H) 06/19/2022    CHOLHDL 46.9 08/03/2021    CHOLHDL 33.7 08/28/2020       13. Other osteoporosis without current pathological fracture  DEXA 8/2022.   Followed by rheumatology; previously on Prolia but will be switching to Reclast this month.   Stable without h/o fractures. Continue current treatment plan as previously prescribed with your Rheumatologist.     14. Iron deficiency anemia, unspecified iron deficiency anemia type  Stable on iron supplementation. Continue current treatment plan as previously prescribed with your HemOnc.     15. B12 deficiency  Stable on vitamin B complex supplement. Continue current treatment plan as previously prescribed with your HemOnc.     16. Vitamin D deficiency  Stable on vitamin D supplement. Continue current treatment plan as previously prescribed with your PCP and  rheumatology.     17. Glaucoma of both eyes, unspecified glaucoma type  Stable. Continue current treatment plan as previously prescribed with your ophthalmologist.     18. Lumbar radiculopathy  Stable and well-controlled with Norco PRN and Q pain pump. Continue current treatment plan as previously prescribed with your pain management specialist.     19. Neuralgia and neuritis  Stable and well-controlled with Norco PRN and Q pain pump. Continue current treatment plan as previously prescribed with your pain management specialist.     20. Chronic seasonal allergic rhinitis due to pollen  Stable on astelin. Continue current treatment plan as previously prescribed with your PCP    21. Nicotine dependence, cigarettes, uncomplicated  Patient is actively in process of quitting. She is using nicotine patches and replacing cigarettes with mints. She is successful thus far. Encouraged her to rejoin smoking cessation program, but she is not interested at this time. She will let us know if she changes her mind.   Encouraged continued efforts.     22. Abnormality of gait and mobility  Ambulates with use of rolling walker; no falls/injuries since implementing rolling walker. We discussed fall prevention today.     23. Dependence on other enabling machines and devices  Ambulates with use of rolling walker; no falls/injuries since implementing rolling walker. We discussed fall prevention today.     24. Skin cancer of face   Patient is s/p Mohs procedure by Dr. Wallace at [facility] dermatology. She is currently bandaged but healing well per her report. Continue current treatment plan as previously prescribed with your dermatologist.     Opioid Risk Score         Value Time User    Opioid Risk Score  1 3/6/2023 12:29 PM Julia Bull NP           I offered to discuss advanced care planning, including how to pick a person who would make decisions for you if you were unable to make them for yourself, called a health care power of  , and what kind of decisions you might make such as use of life sustaining treatments such as ventilators and tube feeding when faced with a life limiting illness recorded on a living will that they will need to know. (How you want to be cared for as you near the end of your natural life)      X  Patient has advanced directives written and agrees to provide copies to the institution.    Provided Keyona with a 5-10 year written screening schedule and personal prevention plan. Recommendations were developed using the USPSTF age appropriate recommendations. Education, counseling, and referrals were provided as needed.  After Visit Summary printed and given to patient which includes a list of additional screenings\tests needed.    Follow up in about 1 year (around 3/6/2024).      Julia Bull NP

## 2023-03-06 NOTE — PATIENT INSTRUCTIONS
Counseling and Referral of Other Preventative  (Italic type indicates deductible and co-insurance are waived)    Patient Name: Keyona Dwyer  Today's Date: 3/6/2023    Health Maintenance       Date Due Completion Date    Shingles Vaccine (1 of 2) Never done ---    COVID-19 Vaccine (5 - Booster for Moderna series) 07/08/2022 5/13/2022    Lipid Panel 06/19/2023 6/19/2022    Override on 12/14/2018: Done    Override on 11/11/2016: Done    DEXA Scan 08/15/2024 8/15/2022    Override on 4/30/2013: Done (REPEAT 2 YRS)    TETANUS VACCINE 02/13/2025 2/13/2015        No orders of the defined types were placed in this encounter.    The following information is provided to all patients.  This information is to help you find resources for any of the problems found today that may be affecting your health:                Living healthy guide: www.[facility].[place].[facility]      Understanding Diabetes: www.diabetes.org      Eating healthy: www.cdc.gov/healthyweight      CDC home safety checklist: www.cdc.gov/steadi/patient.html      Agency on Aging: www.goea.[place].[facility]      Alcoholics anonymous (AA): www.aa.org      Physical Activity: www.sheryl.nih.gov/pc0fbwr      Tobacco use: www.quitwithusla.org     Counseling and Referral of Other Preventative  (Italic type indicates deductible and co-insurance are waived)    Patient Name: Keyona Dwyer  Today's Date: 3/6/2023    Health Maintenance       Date Due Completion Date    Shingles Vaccine (1 of 2) Never done ---    COVID-19 Vaccine (5 - Booster for Moderna series) 07/08/2022 5/13/2022    Lipid Panel 06/19/2023 6/19/2022    Override on 12/14/2018: Done    Override on 11/11/2016: Done    DEXA Scan 08/15/2024 8/15/2022    Override on 4/30/2013: Done (REPEAT 2 YRS)    TETANUS VACCINE 02/13/2025 2/13/2015        No orders of the defined types were placed in this encounter.    The following information is provided to all patients.  This information is to help you find resources for any of the  problems found today that may be affecting your health:                Living healthy guide: www.[facility].[place].[facility]      Understanding Diabetes: www.diabetes.org      Eating healthy: www.cdc.gov/healthyweight      CDC home safety checklist: www.cdc.gov/steadi/patient.html      Agency on Aging: www.goea.[place]      Alcoholics anonymous (AA): www.aa.org      Physical Activity: www.sheryl.nih.gov/mn1inmx      Tobacco use: www.quitwithusla.org

## 2023-03-10 ENCOUNTER — INFUSION (OUTPATIENT)
Dept: INFUSION THERAPY | Facility: HOSPITAL | Age: 78
End: 2023-03-10
Payer: MEDICARE

## 2023-03-10 VITALS
SYSTOLIC BLOOD PRESSURE: 124 MMHG | OXYGEN SATURATION: 95 % | HEART RATE: 79 BPM | RESPIRATION RATE: 18 BRPM | DIASTOLIC BLOOD PRESSURE: 69 MMHG | TEMPERATURE: 97 F

## 2023-03-10 DIAGNOSIS — M81.8 OTHER OSTEOPOROSIS WITHOUT CURRENT PATHOLOGICAL FRACTURE: Primary | ICD-10-CM

## 2023-03-10 PROCEDURE — 25000003 PHARM REV CODE 250: Mod: HCNC

## 2023-03-10 PROCEDURE — 96365 THER/PROPH/DIAG IV INF INIT: CPT | Mod: HCNC

## 2023-03-10 PROCEDURE — 63600175 PHARM REV CODE 636 W HCPCS: Mod: HCNC

## 2023-03-10 RX ORDER — ACETAMINOPHEN 325 MG/1
650 TABLET ORAL
OUTPATIENT
Start: 2023-03-10

## 2023-03-10 RX ORDER — ZOLEDRONIC ACID 5 MG/100ML
5 INJECTION, SOLUTION INTRAVENOUS
OUTPATIENT
Start: 2023-03-10

## 2023-03-10 RX ORDER — SODIUM CHLORIDE 0.9 % (FLUSH) 0.9 %
10 SYRINGE (ML) INJECTION
OUTPATIENT
Start: 2023-03-10

## 2023-03-10 RX ORDER — ZOLEDRONIC ACID 5 MG/100ML
5 INJECTION, SOLUTION INTRAVENOUS
Status: COMPLETED | OUTPATIENT
Start: 2023-03-10 | End: 2023-03-10

## 2023-03-10 RX ORDER — ACETAMINOPHEN 325 MG/1
650 TABLET ORAL
Status: COMPLETED | OUTPATIENT
Start: 2023-03-10 | End: 2023-03-10

## 2023-03-10 RX ORDER — HEPARIN 100 UNIT/ML
500 SYRINGE INTRAVENOUS
OUTPATIENT
Start: 2023-03-10

## 2023-03-10 RX ADMIN — ACETAMINOPHEN 650 MG: 325 TABLET ORAL at 11:03

## 2023-03-10 RX ADMIN — ZOLEDRONIC ACID 5 MG: 5 INJECTION, SOLUTION INTRAVENOUS at 11:03

## 2023-03-27 ENCOUNTER — TELEPHONE (OUTPATIENT)
Dept: CARDIOLOGY | Facility: CLINIC | Age: 78
End: 2023-03-27
Payer: MEDICARE

## 2023-03-27 RX ORDER — IBUPROFEN 200 MG
1 TABLET ORAL DAILY
Qty: 28 PATCH | Refills: 1 | Status: SHIPPED | OUTPATIENT
Start: 2023-03-27 | End: 2023-09-05

## 2023-03-27 NOTE — TELEPHONE ENCOUNTER
Tried calling pt in regards to         Refill or New Rx.:  nicotine (NICODERM CQ) 14 mg/24 hr 28 patch 1 2/17/2023  No  Sig - Route: Place 1 patch onto the skin once daily. Cannot smoke with the patch - Transdermal  Sent to pharmacy as: nicotine (NICODERM CQ) 14 mg/24 hr  Class: Normal  Order: [id number]    Preferred Pharmacy with phone number:     [facility] [address] [address]  [address] [address] [address]  Phone: [phone] Fax: [phone]      Local or Mail Order:local          Ordering Provider: Otis Medina Call Back Number: [phone]           Additional Information: please call pt she has questions.

## 2023-04-10 ENCOUNTER — TELEPHONE (OUTPATIENT)
Dept: CARDIOLOGY | Facility: CLINIC | Age: 78
End: 2023-04-10
Payer: MEDICARE

## 2023-04-10 RX ORDER — RANOLAZINE 500 MG/1
500 TABLET, EXTENDED RELEASE ORAL 2 TIMES DAILY
Qty: 180 TABLET | Refills: 1 | Status: SHIPPED | OUTPATIENT
Start: 2023-04-10 | End: 2023-08-18 | Stop reason: SDUPTHER

## 2023-04-10 NOTE — TELEPHONE ENCOUNTER
Initiated PA for Nicotine patch through CoverMyMeds.    Denied  today  The Medicare rule in the Prescription Drug Manual (Chapter 6, Section 10.10) says over-the-counter (OTC) drugs are excluded from Medicare Part D coverage. Your drug is an over-the-counter drug. Per Medicare rules, it is not covered.

## 2023-05-03 ENCOUNTER — TELEPHONE (OUTPATIENT)
Dept: HEMATOLOGY/ONCOLOGY | Facility: CLINIC | Age: 78
End: 2023-05-03
Payer: MEDICARE

## 2023-05-03 NOTE — TELEPHONE ENCOUNTER
Nurse spoke with pt in regards to rescheduling her appt due to provider being out of the office. Pt has been rescheduled. Verbalized understanding of change. Call ended well

## 2023-05-15 ENCOUNTER — LAB VISIT (OUTPATIENT)
Dept: LAB | Facility: HOSPITAL | Age: 78
End: 2023-05-15
Attending: NURSE PRACTITIONER
Payer: MEDICARE

## 2023-05-15 DIAGNOSIS — E53.8 B12 DEFICIENCY: ICD-10-CM

## 2023-05-15 DIAGNOSIS — R70.0 ELEVATED SED RATE: ICD-10-CM

## 2023-05-15 DIAGNOSIS — D50.9 IRON DEFICIENCY ANEMIA, UNSPECIFIED IRON DEFICIENCY ANEMIA TYPE: ICD-10-CM

## 2023-05-15 DIAGNOSIS — K21.9 GASTROESOPHAGEAL REFLUX DISEASE, UNSPECIFIED WHETHER ESOPHAGITIS PRESENT: ICD-10-CM

## 2023-05-15 DIAGNOSIS — D63.8 ANEMIA OF CHRONIC DISEASE: ICD-10-CM

## 2023-05-15 LAB
ALBUMIN SERPL BCP-MCNC: 3.8 G/DL (ref 3.5–5.2)
ALP SERPL-CCNC: 48 U/L (ref 55–135)
ALT SERPL W/O P-5'-P-CCNC: 7 U/L (ref 10–44)
ANION GAP SERPL CALC-SCNC: 11 MMOL/L (ref 8–16)
AST SERPL-CCNC: 15 U/L (ref 10–40)
BASOPHILS # BLD AUTO: 0.09 K/UL (ref 0–0.2)
BASOPHILS NFR BLD: 1.3 % (ref 0–1.9)
BILIRUB SERPL-MCNC: 0.3 MG/DL (ref 0.1–1)
BUN SERPL-MCNC: 21 MG/DL (ref 8–23)
CALCIUM SERPL-MCNC: 8.9 MG/DL (ref 8.7–10.5)
CHLORIDE SERPL-SCNC: 105 MMOL/L (ref 95–110)
CO2 SERPL-SCNC: 22 MMOL/L (ref 23–29)
CREAT SERPL-MCNC: 1.1 MG/DL (ref 0.5–1.4)
DIFFERENTIAL METHOD: ABNORMAL
EOSINOPHIL # BLD AUTO: 0.6 K/UL (ref 0–0.5)
EOSINOPHIL NFR BLD: 8.3 % (ref 0–8)
ERYTHROCYTE [DISTWIDTH] IN BLOOD BY AUTOMATED COUNT: 13.7 % (ref 11.5–14.5)
EST. GFR  (NO RACE VARIABLE): 51 ML/MIN/1.73 M^2
FERRITIN SERPL-MCNC: 55 NG/ML (ref 20–300)
GLUCOSE SERPL-MCNC: 96 MG/DL (ref 70–110)
HCT VFR BLD AUTO: 32.7 % (ref 37–48.5)
HGB BLD-MCNC: 9.9 G/DL (ref 12–16)
IMM GRANULOCYTES # BLD AUTO: 0.02 K/UL (ref 0–0.04)
IMM GRANULOCYTES NFR BLD AUTO: 0.3 % (ref 0–0.5)
IRON SERPL-MCNC: 74 UG/DL (ref 30–160)
LYMPHOCYTES # BLD AUTO: 2.9 K/UL (ref 1–4.8)
LYMPHOCYTES NFR BLD: 42.8 % (ref 18–48)
MCH RBC QN AUTO: 30.8 PG (ref 27–31)
MCHC RBC AUTO-ENTMCNC: 30.3 G/DL (ref 32–36)
MCV RBC AUTO: 102 FL (ref 82–98)
MONOCYTES # BLD AUTO: 0.6 K/UL (ref 0.3–1)
MONOCYTES NFR BLD: 8.3 % (ref 4–15)
NEUTROPHILS # BLD AUTO: 2.7 K/UL (ref 1.8–7.7)
NEUTROPHILS NFR BLD: 39 % (ref 38–73)
NRBC BLD-RTO: 0 /100 WBC
PLATELET # BLD AUTO: 260 K/UL (ref 150–450)
PMV BLD AUTO: 9.3 FL (ref 9.2–12.9)
POTASSIUM SERPL-SCNC: 4.8 MMOL/L (ref 3.5–5.1)
PROT SERPL-MCNC: 6.8 G/DL (ref 6–8.4)
RBC # BLD AUTO: 3.21 M/UL (ref 4–5.4)
SATURATED IRON: 20 % (ref 20–50)
SODIUM SERPL-SCNC: 138 MMOL/L (ref 136–145)
TOTAL IRON BINDING CAPACITY: 363 UG/DL (ref 250–450)
TRANSFERRIN SERPL-MCNC: 245 MG/DL (ref 200–375)
WBC # BLD AUTO: 6.84 K/UL (ref 3.9–12.7)

## 2023-05-15 PROCEDURE — 80053 COMPREHEN METABOLIC PANEL: CPT | Performed by: NURSE PRACTITIONER

## 2023-05-15 PROCEDURE — 36415 COLL VENOUS BLD VENIPUNCTURE: CPT | Mod: PN | Performed by: NURSE PRACTITIONER

## 2023-05-15 PROCEDURE — 82728 ASSAY OF FERRITIN: CPT | Performed by: NURSE PRACTITIONER

## 2023-05-15 PROCEDURE — 84466 ASSAY OF TRANSFERRIN: CPT | Performed by: NURSE PRACTITIONER

## 2023-05-15 PROCEDURE — 85025 COMPLETE CBC W/AUTO DIFF WBC: CPT | Performed by: NURSE PRACTITIONER

## 2023-05-17 RX ORDER — CLOPIDOGREL BISULFATE 75 MG/1
75 TABLET ORAL DAILY
Qty: 90 TABLET | Refills: 0 | Status: SHIPPED | OUTPATIENT
Start: 2023-05-17 | End: 2023-05-25 | Stop reason: SDUPTHER

## 2023-05-17 NOTE — TELEPHONE ENCOUNTER
No care due was identified.  [facility] Embedded Care Due Messages. Reference number: [id number].   5/17/2023 9:38:23 AM CDT

## 2023-05-17 NOTE — TELEPHONE ENCOUNTER
----- Message from Allyson Osorio sent at 5/17/2023  9:12 AM CDT -----  Contact: Keyona Rand is calling to speak to the nurse regarding requesting a refill on the medication clopidogreL (PLAVIX) 75 mg tablet she is currently using [facility]      Thanks  LJ

## 2023-05-25 ENCOUNTER — OFFICE VISIT (OUTPATIENT)
Dept: HEMATOLOGY/ONCOLOGY | Facility: CLINIC | Age: 78
End: 2023-05-25
Payer: MEDICARE

## 2023-05-25 VITALS
SYSTOLIC BLOOD PRESSURE: 134 MMHG | BODY MASS INDEX: 20.05 KG/M2 | DIASTOLIC BLOOD PRESSURE: 69 MMHG | HEART RATE: 85 BPM | WEIGHT: 108.94 LBS | HEIGHT: 62 IN | OXYGEN SATURATION: 96 %

## 2023-05-25 DIAGNOSIS — D64.9 ANEMIA, UNSPECIFIED TYPE: Primary | ICD-10-CM

## 2023-05-25 DIAGNOSIS — E53.8 B12 DEFICIENCY: ICD-10-CM

## 2023-05-25 DIAGNOSIS — F17.210 CIGARETTE SMOKER: ICD-10-CM

## 2023-05-25 DIAGNOSIS — D53.9 MACROCYTIC ANEMIA: ICD-10-CM

## 2023-05-25 DIAGNOSIS — D50.9 IRON DEFICIENCY ANEMIA, UNSPECIFIED IRON DEFICIENCY ANEMIA TYPE: ICD-10-CM

## 2023-05-25 PROCEDURE — 3288F PR FALLS RISK ASSESSMENT DOCUMENTED: ICD-10-PCS | Mod: CPTII,S$GLB,, | Performed by: NURSE PRACTITIONER

## 2023-05-25 PROCEDURE — 1101F PR PT FALLS ASSESS DOC 0-1 FALLS W/OUT INJ PAST YR: ICD-10-PCS | Mod: CPTII,S$GLB,, | Performed by: NURSE PRACTITIONER

## 2023-05-25 PROCEDURE — 1159F PR MEDICATION LIST DOCUMENTED IN MEDICAL RECORD: ICD-10-PCS | Mod: CPTII,S$GLB,, | Performed by: NURSE PRACTITIONER

## 2023-05-25 PROCEDURE — 3078F DIAST BP <80 MM HG: CPT | Mod: CPTII,S$GLB,, | Performed by: NURSE PRACTITIONER

## 2023-05-25 PROCEDURE — 99999 PR PBB SHADOW E&M-EST. PATIENT-LVL IV: ICD-10-PCS | Mod: PBBFAC,,, | Performed by: NURSE PRACTITIONER

## 2023-05-25 PROCEDURE — 3288F FALL RISK ASSESSMENT DOCD: CPT | Mod: CPTII,S$GLB,, | Performed by: NURSE PRACTITIONER

## 2023-05-25 PROCEDURE — 1101F PT FALLS ASSESS-DOCD LE1/YR: CPT | Mod: CPTII,S$GLB,, | Performed by: NURSE PRACTITIONER

## 2023-05-25 PROCEDURE — 1159F MED LIST DOCD IN RCRD: CPT | Mod: CPTII,S$GLB,, | Performed by: NURSE PRACTITIONER

## 2023-05-25 PROCEDURE — 99999 PR PBB SHADOW E&M-EST. PATIENT-LVL IV: CPT | Mod: PBBFAC,,, | Performed by: NURSE PRACTITIONER

## 2023-05-25 PROCEDURE — 99214 OFFICE O/P EST MOD 30 MIN: CPT | Mod: S$GLB,,, | Performed by: NURSE PRACTITIONER

## 2023-05-25 PROCEDURE — 1160F RVW MEDS BY RX/DR IN RCRD: CPT | Mod: CPTII,S$GLB,, | Performed by: NURSE PRACTITIONER

## 2023-05-25 PROCEDURE — 1126F PR PAIN SEVERITY QUANTIFIED, NO PAIN PRESENT: ICD-10-PCS | Mod: CPTII,S$GLB,, | Performed by: NURSE PRACTITIONER

## 2023-05-25 PROCEDURE — 1160F PR REVIEW ALL MEDS BY PRESCRIBER/CLIN PHARMACIST DOCUMENTED: ICD-10-PCS | Mod: CPTII,S$GLB,, | Performed by: NURSE PRACTITIONER

## 2023-05-25 PROCEDURE — 3075F SYST BP GE 130 - 139MM HG: CPT | Mod: CPTII,S$GLB,, | Performed by: NURSE PRACTITIONER

## 2023-05-25 PROCEDURE — 3075F PR MOST RECENT SYSTOLIC BLOOD PRESS GE 130-139MM HG: ICD-10-PCS | Mod: CPTII,S$GLB,, | Performed by: NURSE PRACTITIONER

## 2023-05-25 PROCEDURE — 1126F AMNT PAIN NOTED NONE PRSNT: CPT | Mod: CPTII,S$GLB,, | Performed by: NURSE PRACTITIONER

## 2023-05-25 PROCEDURE — 99214 PR OFFICE/OUTPT VISIT, EST, LEVL IV, 30-39 MIN: ICD-10-PCS | Mod: S$GLB,,, | Performed by: NURSE PRACTITIONER

## 2023-05-25 PROCEDURE — 3078F PR MOST RECENT DIASTOLIC BLOOD PRESSURE < 80 MM HG: ICD-10-PCS | Mod: CPTII,S$GLB,, | Performed by: NURSE PRACTITIONER

## 2023-05-25 RX ORDER — GABAPENTIN 600 MG/1
TABLET ORAL
Status: ON HOLD | COMMUNITY
Start: 2023-05-12 | End: 2024-03-21 | Stop reason: HOSPADM

## 2023-05-25 RX ORDER — OFLOXACIN 3 MG/ML
SOLUTION AURICULAR (OTIC)
Status: ON HOLD | COMMUNITY
Start: 2023-05-11 | End: 2024-03-21 | Stop reason: HOSPADM

## 2023-05-25 RX ORDER — FERROUS SULFATE 325(65) MG
TABLET ORAL
Qty: 45 TABLET | Refills: 1 | Status: SHIPPED | OUTPATIENT
Start: 2023-05-25 | End: 2023-09-05

## 2023-05-25 RX ORDER — CLOPIDOGREL BISULFATE 75 MG/1
75 TABLET ORAL DAILY
Qty: 90 TABLET | Refills: 1 | Status: SHIPPED | OUTPATIENT
Start: 2023-05-25 | End: 2023-08-18 | Stop reason: SDUPTHER

## 2023-05-25 RX ORDER — CLOPIDOGREL BISULFATE 75 MG/1
75 TABLET ORAL DAILY
Qty: 90 TABLET | Refills: 1 | Status: SHIPPED | OUTPATIENT
Start: 2023-05-25 | End: 2023-05-25 | Stop reason: SDUPTHER

## 2023-05-25 NOTE — PROGRESS NOTES
Subjective:      Patient ID: Keyona Dwyer is a 77 y.o. female.    Chief Complaint: lab discussion    HPI:  Patient is a 77 year old female who presents today for further evaluation and recommendations of iron deficiency anemia.  She has been referred to the hematology by her PCP, Dr. Ralf Isidro.  Had diagnosis of acute pancreatitis requiring hospitalization in 6/2022 with complaints of abdominal pain.  Other chronic diagnosis include anxiety, chronic back pain, hyperlipidemia, htn, osteoporosis, RVT, and restless leg syndrome.       Patient states that she got bit by a brown recluse and got sick due to antibiotics and has some weight loss.  States that she was on cephalexin 500 mg PO TID and caused stomach distress with weight loss and episodic nausea/vomiting.      Is a current smoker since she was 12 years old - 1 pack per day for 60 years. Now smokes 3-10 per day.  She denies alcohol use.      She denies f/c/ns or lymphadenopathy.       Interval History:  10/10/2022: With hospitalization in 9/2022 d/t pneumonia, NSTEMI, and GI bleed.  EGD done as well as bleeding scan and found with no active bleeding although patient did have dark stools during hospitalizations with a drop in hgb requiring blood transfusion.  She was unable to tolerate GI prep for colonoscopy.  At last visit work up for anemia found with elevated MMA.  She was placed on B12 2000 mcg PO daily.  Also found with LOR started on ferrous sulfate 325 mg PO and is currently taking iron tablets every other day.  States that she is taking stool softeners daily.  States has not noted dark stools since hospitalization.  3+ blood found in urine during hospitalizations     2/9/2023 Had surgery to face to remove skin cancer one week ago - part of left side nose had to be removed - done by Dr. Aguilar Wallace dermatology.  Currently with bandages on her face that are CDI.  Has not noticed any abnormal bleeding.  States that she has been off of her  iron tablets this past week.  Was taking every other day.  Is taking B12 tablets daily.  States that when she takes the oral iron she has constipation requiring stool softeners.     5/25/2023 She was to continue B12 daily and continue oral iron supplements every other day since her last visit in 2/2023.  Presents today to evaluate response.   Denies dark stools.  Denies any unusual bleeding. Has gained 8 lbs.  States that she is feeling better then she has in a long time.          Social History     Socioeconomic History    Marital status:      Spouse name: elizabeth    Number of children: 3   Occupational History    Occupation: retired   Tobacco Use    Smoking status: Former     Packs/day: 0.50     Years: 63.00     Pack years: 31.50     Types: Cigarettes    Smokeless tobacco: Never    Tobacco comments:     started age of  11    Substance and Sexual Activity    Alcohol use: No    Drug use: No    Sexual activity: Yes     Partners: Male     Social Determinants of Health     Financial Resource Strain: Low Risk     Difficulty of Paying Living Expenses: Not hard at all   Food Insecurity: No Food Insecurity    Worried About Running Out of Food in the Last Year: Never true    Ran Out of Food in the Last Year: Never true   Transportation Needs: No Transportation Needs    Lack of Transportation (Medical): No    Lack of Transportation (Non-Medical): No   Physical Activity: Insufficiently Active    Days of Exercise per Week: 7 days    Minutes of Exercise per Session: 20 min   Stress: No Stress Concern Present    Feeling of Stress : Not at all   Social Connections: Moderately Integrated    Frequency of Communication with Friends and Family: Three times a week    Frequency of Social Gatherings with Friends and Family: More than three times a week    Attends Jainism Services: More than 4 times per year    Active Member of Clubs or Organizations: No    Attends Club or Organization Meetings: Never    Marital Status:     Housing Stability: Low Risk     Unable to Pay for Housing in the Last Year: No    Number of Places Lived in the Last Year: 1    Unstable Housing in the Last Year: No       Family History   Problem Relation Age of Onset    Cancer Mother     Breast cancer Mother     Hyperlipidemia Father     Heart failure Father     Heart disease Brother     Brain cancer Brother     Stroke Brother     Alcohol abuse Brother        Past Surgical History:   Procedure Laterality Date    ANGIOGRAPHY OF LOWER EXTREMITY N/A 4/12/2021    Procedure: ANGIOGRAM, LOWER EXTREMITY/left leg;  Surgeon: Maico Adkins MD;  Location: [facility];  Service: Vascular;  Laterality: N/A;  req 1130 start time/ A243A    AORTOGRAPHY WITH SERIALOGRAPHY N/A 4/14/2021    Procedure: AORTOGRAM, WITH RUNOFF;  Surgeon: Maico Adkins MD;  Location: [facility];  Service: Vascular;  Laterality: N/A;  left iliac stent with shockwave/req 1030 start    CATARACT EXTRACTION W/  INTRAOCULAR LENS IMPLANT Bilateral 8/ 9 2017    ESOPHAGOGASTRODUODENOSCOPY N/A 9/13/2022    Procedure: EGD (ESOPHAGOGASTRODUODENOSCOPY);  Surgeon: Fern Rasmussen MD;  Location: [facility];  Service: Endoscopy;  Laterality: N/A;    GALLBLADDER SURGERY      HYSTERECTOMY  1972    LUNG SURGERY Right age 15    lung collpase    TOE AMPUTATION Left 4/15/2021    Procedure: AMPUTATION, TOE;  Surgeon: Taylor Anderson DPM;  Location: [facility];  Service: Podiatry;  Laterality: Left;  Hallux (Great Toe)    TOTAL HIP ARTHROPLASTY Bilateral     TOTAL KNEE ARTHROPLASTY Bilateral        Past Medical History:   Diagnosis Date    Acute diastolic heart failure 09/15/2022    Acute respiratory failure with hypoxia and hypercarbia 09/14/2022    Anemia     Anxiety     Cancer     Cataract     Chronic back pain     Dr. Guzman    Fibrocystic breast     Glaucoma     Hyperlipemia     Hypertension     Idiopathic acute pancreatitis 06/18/2022    NSTEMI (non-ST elevated myocardial infarction) 09/11/2022     Osteoarthritis     Osteoporosis     Rheumatology/on Prolia    Pneumonia     Polyneuropathy     PVD (peripheral vascular disease) 04/12/2021    Restless leg syndrome     Tobacco dependence     Trouble in sleeping        Review of Systems   Constitutional: Negative.    HENT: Negative.  Negative for nosebleeds.    Eyes: Negative.    Respiratory: Negative.     Cardiovascular: Negative.    Gastrointestinal:  Positive for constipation (using stool softeners with relief). Negative for anal bleeding and blood in stool.   Endocrine: Negative.    Genitourinary: Negative.  Negative for hematuria.   Musculoskeletal: Negative.    Skin: Negative.  Negative for wound.   Allergic/Immunologic: Negative.    Neurological: Negative.    Hematological:  Does not bruise/bleed easily.   Psychiatric/Behavioral: Negative.          Worried d/t  having returned cancer diagnosis        Medication List with Changes/Refills   Current Medications    ASPIRIN (ECOTRIN) 81 MG EC TABLET    Take 1 tablet (81 mg total) by mouth once daily.    AZELASTINE (ASTELIN) 137 MCG (0.1 %) NASAL SPRAY    1 spray (137 mcg total) by Nasal route 2 (two) times daily.    B COMPLEX VITAMINS CAPSULE    Take 1 capsule by mouth once daily.    BUPROPION (WELLBUTRIN) 75 MG TABLET    Take 1 tablet (75 mg total) by mouth 2 (two) times daily.    BUSPIRONE (BUSPAR) 10 MG TABLET    TAKE 1 TABLET BY MOUTH TWICE DAILY    CLOPIDOGREL (PLAVIX) 75 MG TABLET    Take 1 tablet (75 mg total) by mouth once daily.    DOCUSATE SODIUM (COLACE) 100 MG CAPSULE    Take 1 capsule (100 mg total) by mouth 2 (two) times daily.    DULOXETINE (CYMBALTA) 30 MG CAPSULE    Take 30 mg by mouth once daily.    ERGOCALCIFEROL (ERGOCALCIFEROL) 50,000 UNIT CAP    TAKE 1 CAPSULE BY MOUTH EVERY WEEK    FUROSEMIDE (LASIX) 20 MG TABLET    Take 2 tablets (40 mg total) by mouth once daily for 5 days, THEN 1 tablet (20 mg total) once daily.    GABAPENTIN (NEURONTIN) 600 MG TABLET         HYDROCODONE-ACETAMINOPHEN (NORCO)  MG PER TABLET    Take 1 tablet by mouth 3 (three) times daily as needed.    NICOTINE (NICODERM CQ) 14 MG/24 HR    Place 1 patch onto the skin once daily. Cannot smoke with the patch    NUTRITIONAL SUPPLEMENT/FIBER (QUINOA-KALE-HEMP ORAL)    Take by mouth.    OFLOXACIN (FLOXIN) 0.3 % OTIC SOLUTION    instill 5 DROPS into the right ear IN THE MORNING and before bedtime FOR 10 DAYS    OMEPRAZOLE (PRILOSEC) 40 MG CAPSULE    Take 1 capsule (40 mg total) by mouth every evening.    ON-Q PAIN PUMP    by Misc.(Non-Drug; Combo Route) route.    RANOLAZINE (RANEXA) 500 MG TB12    Take 1 tablet (500 mg total) by mouth 2 (two) times daily.    ROSUVASTATIN (CRESTOR) 5 MG TABLET    TAKE 1 TABLET BY MOUTH ONCE A DAY   Changed and/or Refilled Medications    Modified Medication Previous Medication    FERROUS SULFATE (FEOSOL) 325 MG (65 MG IRON) TAB TABLET ferrous sulfate (FEOSOL) 325 mg (65 mg iron) Tab tablet       Take 1 tab (325 mg) every other day.    Take 1 tab (325 mg) every other day.        Objective:     Vitals:    05/25/23 0847   BP: 134/69   Pulse: 85       Physical Exam  Vitals reviewed.   Constitutional:       Appearance: Normal appearance.   HENT:      Head: Normocephalic and atraumatic.      Right Ear: External ear normal.      Left Ear: External ear normal.   Cardiovascular:      Rate and Rhythm: Normal rate and regular rhythm.      Heart sounds: Normal heart sounds, S1 normal and S2 normal.   Pulmonary:      Effort: Pulmonary effort is normal.      Breath sounds: Decreased breath sounds present.   Abdominal:      General: There is no distension.   Musculoskeletal:         General: Normal range of motion.      Cervical back: Normal range of motion.   Skin:     General: Skin is warm and dry.   Neurological:      General: No focal deficit present.      Mental Status: She is alert and oriented to person, place, and time.      Gait: Gait abnormal.   Psychiatric:         Attention  and Perception: Attention and perception normal.         Mood and Affect: Mood and affect normal.         Speech: Speech normal.         Behavior: Behavior normal. Behavior is cooperative.         Thought Content: Thought content normal.         Cognition and Memory: Cognition and memory normal.         Judgment: Judgment normal.       Assessment:     Problem List Items Addressed This Visit          Oncology    Anemia - Primary    Relevant Medications    ferrous sulfate (FEOSOL) 325 mg (65 mg iron) Tab tablet    Other Relevant Orders    CBC Auto Differential    Ferritin    Iron and TIBC    CBC Auto Differential    Comprehensive Metabolic Panel    Ferritin    Iron and TIBC    Vitamin B12    Pathologist Interpretation Differential       Endocrine    B12 deficiency     Other Visit Diagnoses       Macrocytic anemia        Relevant Orders    CBC Auto Differential    Vitamin B12    Cigarette smoker                Lab Results   Component Value Date    WBC 6.84 05/15/2023    RBC 3.21 (L) 05/15/2023    HGB 9.9 (L) 05/15/2023    HCT 32.7 (L) 05/15/2023     (H) 05/15/2023    MCH 30.8 05/15/2023    MCHC 30.3 (L) 05/15/2023    RDW 13.7 05/15/2023     05/15/2023    MPV 9.3 05/15/2023    GRAN 2.7 05/15/2023    GRAN 39.0 05/15/2023    LYMPH 2.9 05/15/2023    LYMPH 42.8 05/15/2023    MONO 0.6 05/15/2023    MONO 8.3 05/15/2023    EOS 0.6 (H) 05/15/2023    BASO 0.09 05/15/2023    EOSINOPHIL 8.3 (H) 05/15/2023    BASOPHIL 1.3 05/15/2023      Lab Results   Component Value Date     05/15/2023    K 4.8 05/15/2023     05/15/2023    CO2 22 (L) 05/15/2023    BUN 21 05/15/2023    CREATININE 1.1 05/15/2023    CALCIUM 8.9 05/15/2023    ANIONGAP 11 05/15/2023    ESTGFRAFRICA >60.0 06/27/2022    EGFRNONAA >60.0 06/27/2022     Lab Results   Component Value Date    ALT 7 (L) 05/15/2023    AST 15 05/15/2023    ALKPHOS 48 (L) 05/15/2023    BILITOT 0.3 05/15/2023     Lab Results   Component Value Date    [facility] 222 01/30/2006     IRON 74 05/15/2023    TRANSFERRIN 245 05/15/2023    TIBC 363 05/15/2023    FESATURATED 20 05/15/2023      Lab Results   Component Value Date    FERRITIN 55 05/15/2023     Lab Results   Component Value Date    ASHXWQMJ22 912 07/18/2022       Plan:   Anemia, unspecified type  -     CBC Auto Differential; Future; Expected date: 05/25/2023  -     Comprehensive Metabolic Panel; Future; Expected date: 05/25/2023  -     Ferritin; Future; Expected date: 05/25/2023  -     Iron and TIBC; Future; Expected date: 05/25/2023  -     Vitamin B12; Future; Expected date: 05/25/2023  -     Pathologist Interpretation Differential; Future; Expected date: 05/25/2023    Iron deficiency anemia, unspecified iron deficiency anemia type  -     CBC Auto Differential; Future; Expected date: 05/25/2023  -     Ferritin; Future; Expected date: 05/25/2023  -     Iron and TIBC; Future; Expected date: 05/25/2023  -     ferrous sulfate (FEOSOL) 325 mg (65 mg iron) Tab tablet; Take 1 tab (325 mg) every other day.  Dispense: 45 tablet; Refill: 1    Macrocytic anemia  -     CBC Auto Differential; Future; Expected date: 05/25/2023  -     Vitamin B12; Future; Expected date: 05/25/2023    B12 deficiency    Cigarette smoker    Continue B supplement daily. Continue ferrous sulfate 325 mg PO daily. Discussed possible MDS requiring bone marrow biopsy for diagnosis in the near future.  Continue to monitor.      F/u in 3 months with labs prior - cbc, cmp, iron studies, B12, path review.        Med Onc Chart Routing      Follow up with physician    Follow up with [facility] 3 months. f/u in 3 months with labs piror in [facility] and in person visit in [facility]   Infusion scheduling note n/a   Injection scheduling note n/a   Labs   Scheduling:  Preferred lab:  Lab interval:  Cbc, cmp, iron studies, B12 in [place] or [place] prior to next visit   Imaging   N/a   Pharmacy appointment No pharmacy appointment needed      Other referrals   No additional referrals needed            Collaborating Provider:  Dr. Kris Alcocer    Thank You,  Tiarra Mg, FNP-C  Hematology Oncology

## 2023-07-04 DIAGNOSIS — M25.473 ANKLE SWELLING, UNSPECIFIED LATERALITY: ICD-10-CM

## 2023-07-05 RX ORDER — FUROSEMIDE 20 MG/1
20 TABLET ORAL DAILY
Qty: 90 TABLET | Refills: 3 | Status: SHIPPED | OUTPATIENT
Start: 2023-07-05 | End: 2023-08-30 | Stop reason: SDUPTHER

## 2023-08-18 RX ORDER — CLOPIDOGREL BISULFATE 75 MG/1
75 TABLET ORAL DAILY
Qty: 90 TABLET | Refills: 1 | Status: SHIPPED | OUTPATIENT
Start: 2023-08-18 | End: 2024-02-16 | Stop reason: SDUPTHER

## 2023-08-18 RX ORDER — RANOLAZINE 500 MG/1
500 TABLET, EXTENDED RELEASE ORAL 2 TIMES DAILY
Qty: 180 TABLET | Refills: 1 | Status: SHIPPED | OUTPATIENT
Start: 2023-08-18 | End: 2024-02-16 | Stop reason: SDUPTHER

## 2023-08-18 NOTE — TELEPHONE ENCOUNTER
Need Eliquis & Renexa - last seen 2/2023        ----- Message from Allyson Osorio sent at 8/18/2023  1:11 PM CDT -----  Contact: Keyona Rand is calling to speak to the nurse regarding a refill request on her heart medication and blood pressure medication, she is out as of today. Please call her at [phone]    Thanks  LJ

## 2023-08-21 ENCOUNTER — LAB VISIT (OUTPATIENT)
Dept: LAB | Facility: HOSPITAL | Age: 78
End: 2023-08-21
Attending: FAMILY MEDICINE
Payer: MEDICARE

## 2023-08-21 DIAGNOSIS — Z51.81 MEDICATION MONITORING ENCOUNTER: ICD-10-CM

## 2023-08-21 DIAGNOSIS — E55.9 VITAMIN D DEFICIENCY: ICD-10-CM

## 2023-08-21 DIAGNOSIS — M81.0 AGE-RELATED OSTEOPOROSIS WITHOUT CURRENT PATHOLOGICAL FRACTURE: ICD-10-CM

## 2023-08-21 LAB
25(OH)D3+25(OH)D2 SERPL-MCNC: 40 NG/ML (ref 30–96)
ALBUMIN SERPL BCP-MCNC: 3.7 G/DL (ref 3.5–5.2)
ALP SERPL-CCNC: 57 U/L (ref 55–135)
ALT SERPL W/O P-5'-P-CCNC: 9 U/L (ref 10–44)
ANION GAP SERPL CALC-SCNC: 12 MMOL/L (ref 8–16)
AST SERPL-CCNC: 18 U/L (ref 10–40)
BILIRUB SERPL-MCNC: 0.4 MG/DL (ref 0.1–1)
BUN SERPL-MCNC: 17 MG/DL (ref 8–23)
CALCIUM SERPL-MCNC: 9.6 MG/DL (ref 8.7–10.5)
CHLORIDE SERPL-SCNC: 105 MMOL/L (ref 95–110)
CO2 SERPL-SCNC: 24 MMOL/L (ref 23–29)
CREAT SERPL-MCNC: 1 MG/DL (ref 0.5–1.4)
EST. GFR  (NO RACE VARIABLE): 57 ML/MIN/1.73 M^2
GLUCOSE SERPL-MCNC: 132 MG/DL (ref 70–110)
POTASSIUM SERPL-SCNC: 3.7 MMOL/L (ref 3.5–5.1)
PROT SERPL-MCNC: 6.7 G/DL (ref 6–8.4)
SODIUM SERPL-SCNC: 141 MMOL/L (ref 136–145)

## 2023-08-21 PROCEDURE — 80053 COMPREHEN METABOLIC PANEL: CPT | Mod: HCNC

## 2023-08-21 PROCEDURE — 36415 COLL VENOUS BLD VENIPUNCTURE: CPT | Mod: HCNC,PN

## 2023-08-21 PROCEDURE — 82306 VITAMIN D 25 HYDROXY: CPT | Mod: HCNC

## 2023-08-29 DIAGNOSIS — I70.262 ATHEROSCLEROSIS OF NATIVE ARTERY OF LEFT LOWER EXTREMITY WITH GANGRENE: Primary | ICD-10-CM

## 2023-08-30 ENCOUNTER — HOSPITAL ENCOUNTER (OUTPATIENT)
Dept: CARDIOLOGY | Facility: HOSPITAL | Age: 78
Discharge: HOME | End: 2023-08-30
Attending: INTERNAL MEDICINE
Payer: MEDICARE

## 2023-08-30 ENCOUNTER — OFFICE VISIT (OUTPATIENT)
Dept: CARDIOLOGY | Facility: CLINIC | Age: 78
End: 2023-08-30
Payer: MEDICARE

## 2023-08-30 VITALS
HEIGHT: 62 IN | HEART RATE: 76 BPM | SYSTOLIC BLOOD PRESSURE: 130 MMHG | WEIGHT: 104.63 LBS | DIASTOLIC BLOOD PRESSURE: 72 MMHG | OXYGEN SATURATION: 93 % | BODY MASS INDEX: 19.25 KG/M2 | BODY MASS INDEX: 19.11 KG/M2 | WEIGHT: 104.5 LBS

## 2023-08-30 DIAGNOSIS — I70.262 ATHEROSCLEROSIS OF NATIVE ARTERY OF LEFT LOWER EXTREMITY WITH GANGRENE: ICD-10-CM

## 2023-08-30 DIAGNOSIS — M25.473 ANKLE SWELLING, UNSPECIFIED LATERALITY: ICD-10-CM

## 2023-08-30 PROCEDURE — 1160F PR REVIEW ALL MEDS BY PRESCRIBER/CLIN PHARMACIST DOCUMENTED: ICD-10-PCS | Mod: HCNC,CPTII,S$GLB, | Performed by: INTERNAL MEDICINE

## 2023-08-30 PROCEDURE — 93010 EKG 12-LEAD: ICD-10-PCS | Mod: HCNC,,, | Performed by: INTERNAL MEDICINE

## 2023-08-30 PROCEDURE — 1101F PT FALLS ASSESS-DOCD LE1/YR: CPT | Mod: HCNC,CPTII,S$GLB, | Performed by: INTERNAL MEDICINE

## 2023-08-30 PROCEDURE — 3075F PR MOST RECENT SYSTOLIC BLOOD PRESS GE 130-139MM HG: ICD-10-PCS | Mod: HCNC,CPTII,S$GLB, | Performed by: INTERNAL MEDICINE

## 2023-08-30 PROCEDURE — 99214 PR OFFICE/OUTPT VISIT, EST, LEVL IV, 30-39 MIN: ICD-10-PCS | Mod: HCNC,S$GLB,, | Performed by: INTERNAL MEDICINE

## 2023-08-30 PROCEDURE — 99999 PR PBB SHADOW E&M-EST. PATIENT-LVL V: ICD-10-PCS | Mod: PBBFAC,HCNC,, | Performed by: INTERNAL MEDICINE

## 2023-08-30 PROCEDURE — 93010 ELECTROCARDIOGRAM REPORT: CPT | Mod: HCNC,,, | Performed by: INTERNAL MEDICINE

## 2023-08-30 PROCEDURE — 1159F MED LIST DOCD IN RCRD: CPT | Mod: HCNC,CPTII,S$GLB, | Performed by: INTERNAL MEDICINE

## 2023-08-30 PROCEDURE — 1126F PR PAIN SEVERITY QUANTIFIED, NO PAIN PRESENT: ICD-10-PCS | Mod: HCNC,CPTII,S$GLB, | Performed by: INTERNAL MEDICINE

## 2023-08-30 PROCEDURE — 3078F PR MOST RECENT DIASTOLIC BLOOD PRESSURE < 80 MM HG: ICD-10-PCS | Mod: HCNC,CPTII,S$GLB, | Performed by: INTERNAL MEDICINE

## 2023-08-30 PROCEDURE — 1159F PR MEDICATION LIST DOCUMENTED IN MEDICAL RECORD: ICD-10-PCS | Mod: HCNC,CPTII,S$GLB, | Performed by: INTERNAL MEDICINE

## 2023-08-30 PROCEDURE — 93005 ELECTROCARDIOGRAM TRACING: CPT | Mod: HCNC

## 2023-08-30 PROCEDURE — 3288F PR FALLS RISK ASSESSMENT DOCUMENTED: ICD-10-PCS | Mod: HCNC,CPTII,S$GLB, | Performed by: INTERNAL MEDICINE

## 2023-08-30 PROCEDURE — 3078F DIAST BP <80 MM HG: CPT | Mod: HCNC,CPTII,S$GLB, | Performed by: INTERNAL MEDICINE

## 2023-08-30 PROCEDURE — 99999 PR PBB SHADOW E&M-EST. PATIENT-LVL V: CPT | Mod: PBBFAC,HCNC,, | Performed by: INTERNAL MEDICINE

## 2023-08-30 PROCEDURE — 3075F SYST BP GE 130 - 139MM HG: CPT | Mod: HCNC,CPTII,S$GLB, | Performed by: INTERNAL MEDICINE

## 2023-08-30 PROCEDURE — 1126F AMNT PAIN NOTED NONE PRSNT: CPT | Mod: HCNC,CPTII,S$GLB, | Performed by: INTERNAL MEDICINE

## 2023-08-30 PROCEDURE — 1160F RVW MEDS BY RX/DR IN RCRD: CPT | Mod: HCNC,CPTII,S$GLB, | Performed by: INTERNAL MEDICINE

## 2023-08-30 PROCEDURE — 99214 OFFICE O/P EST MOD 30 MIN: CPT | Mod: HCNC,S$GLB,, | Performed by: INTERNAL MEDICINE

## 2023-08-30 PROCEDURE — 1101F PR PT FALLS ASSESS DOC 0-1 FALLS W/OUT INJ PAST YR: ICD-10-PCS | Mod: HCNC,CPTII,S$GLB, | Performed by: INTERNAL MEDICINE

## 2023-08-30 PROCEDURE — 3288F FALL RISK ASSESSMENT DOCD: CPT | Mod: HCNC,CPTII,S$GLB, | Performed by: INTERNAL MEDICINE

## 2023-08-30 RX ORDER — FUROSEMIDE 20 MG/1
20 TABLET ORAL DAILY PRN
Qty: 30 TABLET | Refills: 3 | Status: SHIPPED | OUTPATIENT
Start: 2023-08-30 | End: 2024-03-22

## 2023-08-30 NOTE — PROGRESS NOTES
Subjective:   Patient ID:  Keyona Dwyer is a 77 y.o. female who presents for cardiac consult of No chief complaint on file.        HPI  The patient came in today for cardiac consult of No chief complaint on file.      Sept 2022 HPI:  Cardiology consulted for NSTEMI.  Pt has h/o PAD, HTN, hyperlipidemia, smoking.  She has h/o PAD with angiogram in April 2021 showing R common iliac occlusion and had PTA L common iliac artery, tx by Dr. Adkins, Vascular Surgery.  She has been weak last few days, and had a fever, GAMEZ, and lower abd pain.  No chest pain sxs.  She presented to ER and started on antibiotics for possible R sided pneumonia.  Troponin elevated 1.9  Ecg showed NSR, anterior T wave abnl.  WBC elevated.  Had low grade fever 100.9 in ER.     Hospital Course:   9/12/22-Patient seen and examined today, resting in bed. Feels ok. SOB improved. No chest pain. +Fever overnight. Labs reviewed. Troponin bumped to 2.441, repeat pending. Echo showed normal EF, +WMA. Plans for [facility] tomorrow (once afebrile for 24 hours).  9/13/22-Patient seen and examined today, lying in bed. Awake and alert but intermittently confused, family reports started this AM. No CV complaints. H/H dipped 7.9/24.6 and nursing staff reported dark colored stool. Heparin gtt and DAPT held, bleeding scan pending.  9/14/22-Patient seen and examined today, transferred to ICU overnight due to worsening respiratory status/likely aspiration. Remains on bipap during exam. No overt bleeding reported. EGD yesterday only showed gastritis. Colonoscopy planned today but cancelled due to overnight events.   9/15/22-Patient seen and examined today, sitting up in bed. Bipap weaned off. Feeling better, less SOB. Chest pain free. BNP elevated, diuresis initiated. H/H stable. Discussed with GI/hospital medicine, will resume DAPT and closely monitor.   9/16/22-Patient seen and examined today, sitting up in bed. Feeling much better, SOB nearly resolved. No angina.  Diuresed well overnight. Labs reviewed and are stable. Ranexa added.    10/7/22  Hosp follow up for NSTEMI, GIB, sepsis. BP and Hr well controlled today. She feels well now, no CP/SOB. She still smokes 1/2 pack day.     2/17/23  Nuclear stress 11/2022 neg for ischemia, normal EF. BP and Hr stable today. BMI 18 - 98 lbs. Overall doing well wants to quit smoking. Had recent skin cancer surgery on face.     8/30/23  BP and HR well controlled. BMI 19 - 104 lbs. Overall doing well, no CP/SOB.     Patient feels no chest pain, no sob, no leg swelling, no PND, no palpitation, no dizziness, no syncope, no CNS symptoms.    Patient has fairly good exercise tolerance.    Patient is compliant with medications.    Results for orders placed during the hospital encounter of 11/04/22    Nuclear Stress - Cardiology Interpreted    Interpretation Summary    Normal myocardial perfusion scan. There is no evidence of myocardial ischemia or infarction.    The gated perfusion images showed an ejection fraction of 76% at rest. The gated perfusion images showed an ejection fraction of 69% post stress.    The EKG portion of this study is negative for ischemia.    There were no arrhythmias during stress.    Stress ECG: There was hypertensive blood pressure response with stress.      Results for orders placed during the hospital encounter of 09/11/22    Echo    Interpretation Summary  · The left ventricle is normal in size with concentric hypertrophy and normal systolic function.  · The estimated ejection fraction is 55%.  · Grade I left ventricular diastolic dysfunction.  · Normal right ventricular size with normal right ventricular systolic function.  · Moderate left atrial enlargement.  · Mild tricuspid regurgitation.  · There are segmental left ventricular wall motion abnormalities.  · There is pulmonary hypertension.  · The estimated PA systolic pressure is 45 mmHg.      Past Medical History:   Diagnosis Date    Acute diastolic heart  failure 09/15/2022    Acute respiratory failure with hypoxia and hypercarbia 09/14/2022    Anemia     Anxiety     Cancer     Cataract     Chronic back pain     Dr. Guzman    Fibrocystic breast     Glaucoma     Hyperlipemia     Hypertension     Idiopathic acute pancreatitis 06/18/2022    NSTEMI (non-ST elevated myocardial infarction) 09/11/2022    Osteoarthritis     Osteoporosis     Rheumatology/on Prolia    Pneumonia     Polyneuropathy     PVD (peripheral vascular disease) 04/12/2021    Restless leg syndrome     Tobacco dependence     Trouble in sleeping        Past Surgical History:   Procedure Laterality Date    ANGIOGRAPHY OF LOWER EXTREMITY N/A 4/12/2021    Procedure: ANGIOGRAM, LOWER EXTREMITY/left leg;  Surgeon: Maico Adkins MD;  Location: [facility] CATH LAB;  Service: Vascular;  Laterality: N/A;  req 1130 start time/ A243A    AORTOGRAPHY WITH SERIALOGRAPHY N/A 4/14/2021    Procedure: AORTOGRAM, WITH RUNOFF;  Surgeon: Maico Adkins MD;  Location: [facility] CATH LAB;  Service: Vascular;  Laterality: N/A;  left iliac stent with shockwave/req 1030 start    CATARACT EXTRACTION W/  INTRAOCULAR LENS IMPLANT Bilateral 8/ 9 2017    ESOPHAGOGASTRODUODENOSCOPY N/A 9/13/2022    Procedure: EGD (ESOPHAGOGASTRODUODENOSCOPY);  Surgeon: Fern Rasmussen MD;  Location: [facility] ENDO;  Service: Endoscopy;  Laterality: N/A;    GALLBLADDER SURGERY      HYSTERECTOMY  1972    LUNG SURGERY Right age 15    lung collpase    TOE AMPUTATION Left 4/15/2021    Procedure: AMPUTATION, TOE;  Surgeon: Taylor Anderson DPM;  Location: [facility] OR;  Service: Podiatry;  Laterality: Left;  Hallux (Great Toe)    TOTAL HIP ARTHROPLASTY Bilateral     TOTAL KNEE ARTHROPLASTY Bilateral        Social History     Tobacco Use    Smoking status: Former     Current packs/day: 0.50     Average packs/day: 0.5 packs/day for 63.0 years (31.5 ttl pk-yrs)     Types: Cigarettes    Smokeless tobacco: Never    Tobacco comments:     started age of  11    Substance Use Topics     Alcohol use: No    Drug use: No       Family History   Problem Relation Age of Onset    Cancer Mother     Breast cancer Mother     Hyperlipidemia Father     Heart failure Father     Heart disease Brother     Brain cancer Brother     Stroke Brother     Alcohol abuse Brother        Patient's Medications   New Prescriptions    No medications on file   Previous Medications    ASPIRIN (ECOTRIN) 81 MG EC TABLET    Take 1 tablet (81 mg total) by mouth once daily.    AZELASTINE (ASTELIN) 137 MCG (0.1 %) NASAL SPRAY    1 spray (137 mcg total) by Nasal route 2 (two) times daily.    B COMPLEX VITAMINS CAPSULE    Take 1 capsule by mouth once daily.    BUPROPION (WELLBUTRIN) 75 MG TABLET    Take 1 tablet (75 mg total) by mouth 2 (two) times daily.    BUSPIRONE (BUSPAR) 10 MG TABLET    TAKE 1 TABLET BY MOUTH TWICE DAILY    CLOPIDOGREL (PLAVIX) 75 MG TABLET    Take 1 tablet (75 mg total) by mouth once daily.    DOCUSATE SODIUM (COLACE) 100 MG CAPSULE    Take 1 capsule (100 mg total) by mouth 2 (two) times daily.    DULOXETINE (CYMBALTA) 30 MG CAPSULE    Take 30 mg by mouth once daily.    ERGOCALCIFEROL (ERGOCALCIFEROL) 50,000 UNIT CAP    TAKE 1 CAPSULE BY MOUTH EVERY WEEK    FERROUS SULFATE (FEOSOL) 325 MG (65 MG IRON) TAB TABLET    Take 1 tab (325 mg) every other day.    FUROSEMIDE (LASIX) 20 MG TABLET    Take 1 tablet (20 mg total) by mouth once daily.    GABAPENTIN (NEURONTIN) 600 MG TABLET        HYDROCODONE-ACETAMINOPHEN (NORCO)  MG PER TABLET    Take 1 tablet by mouth 3 (three) times daily as needed.    NICOTINE (NICODERM CQ) 14 MG/24 HR    Place 1 patch onto the skin once daily. Cannot smoke with the patch    NUTRITIONAL SUPPLEMENT/FIBER (QUINOA-KALE-HEMP ORAL)    Take by mouth.    OFLOXACIN (FLOXIN) 0.3 % OTIC SOLUTION    instill 5 DROPS into the right ear IN THE MORNING and before bedtime FOR 10 DAYS    OMEPRAZOLE (PRILOSEC) 40 MG CAPSULE    Take 1 capsule (40 mg total) by mouth every evening.    ON-Q PAIN PUMP    " by Misc.(Non-Drug; Combo Route) route.    RANOLAZINE (RANEXA) 500 MG TB12    Take 1 tablet (500 mg total) by mouth 2 (two) times daily.    ROSUVASTATIN (CRESTOR) 5 MG TABLET    TAKE 1 TABLET BY MOUTH ONCE A DAY   Modified Medications    No medications on file   Discontinued Medications    No medications on file       Review of Systems   Constitutional:  Positive for malaise/fatigue.   HENT: Negative.     Eyes: Negative.    Respiratory:  Positive for shortness of breath.    Cardiovascular: Negative.    Gastrointestinal: Negative.    Genitourinary: Negative.    Musculoskeletal: Negative.    Skin: Negative.    Neurological: Negative.    Endo/Heme/Allergies: Negative.    Psychiatric/Behavioral: Negative.     All 12 systems otherwise negative.      Wt Readings from Last 3 Encounters:   08/30/23 47.4 kg (104 lb 9.7 oz)   08/30/23 47.4 kg (104 lb 8 oz)   05/25/23 49.4 kg (108 lb 14.5 oz)     Temp Readings from Last 3 Encounters:   03/10/23 97.4 °F (36.3 °C)   03/06/23 99.5 °F (37.5 °C) (Oral)   03/01/23 97.2 °F (36.2 °C)     BP Readings from Last 3 Encounters:   08/30/23 130/72   05/25/23 134/69   03/10/23 124/69     Pulse Readings from Last 3 Encounters:   08/30/23 76   05/25/23 85   03/10/23 79       /72 (BP Location: Right arm, Patient Position: Sitting, BP Method: Medium (Manual))   Pulse 76   Ht 5' 2" (1.575 m)   Wt 47.4 kg (104 lb 9.7 oz)   SpO2 (!) 93%   BMI 19.13 kg/m²     Objective:   Physical Exam  Vitals and nursing note reviewed.   Constitutional:       General: She is not in acute distress.     Appearance: She is well-developed. She is not diaphoretic.   HENT:      Head: Normocephalic and atraumatic.      Nose: Nose normal.   Eyes:      General: No scleral icterus.     Conjunctiva/sclera: Conjunctivae normal.   Neck:      Thyroid: No thyromegaly.      Vascular: No JVD.   Cardiovascular:      Rate and Rhythm: Normal rate and regular rhythm.      Heart sounds: S1 normal and S2 normal. Murmur heard. "      No friction rub. No gallop. No S3 or S4 sounds.   Pulmonary:      Effort: Pulmonary effort is normal. No respiratory distress.      Breath sounds: Normal breath sounds. No stridor. No wheezing or rales.   Chest:      Chest wall: No tenderness.   Abdominal:      General: Bowel sounds are normal. There is no distension.      Palpations: Abdomen is soft. There is no mass.      Tenderness: There is no abdominal tenderness. There is no rebound.   Genitourinary:     Comments: Deferred  Musculoskeletal:         General: No tenderness or deformity. Normal range of motion.      Cervical back: Normal range of motion and neck supple.   Lymphadenopathy:      Cervical: No cervical adenopathy.   Skin:     General: Skin is warm and dry.      Coloration: Skin is not pale.      Findings: No erythema or rash.   Neurological:      Mental Status: She is alert and oriented to person, place, and time.      Motor: No abnormal muscle tone.      Coordination: Coordination normal.   Psychiatric:         Behavior: Behavior normal.         Thought Content: Thought content normal.         Judgment: Judgment normal.         Lab Results   Component Value Date     08/21/2023    K 3.7 08/21/2023     08/21/2023    CO2 24 08/21/2023    BUN 17 08/21/2023    CREATININE 1.0 08/21/2023     (H) 08/21/2023    HGBA1C 5.0 12/14/2018    MG 1.8 10/07/2022    AST 18 08/21/2023    ALT 9 (L) 08/21/2023    ALBUMIN 3.7 08/21/2023    PROT 6.7 08/21/2023    BILITOT 0.4 08/21/2023    WBC 6.84 05/15/2023    HGB 9.9 (L) 05/15/2023    HCT 32.7 (L) 05/15/2023     (H) 05/15/2023     05/15/2023    INR 1.0 09/12/2022    TSH 1.055 08/03/2021    CHOL 66 (L) 06/19/2022    HDL 39 (L) 06/19/2022    LDLCALC 20.0 (L) 06/19/2022    TRIG 35 06/19/2022     (H) 10/07/2022     Assessment:      No diagnosis found.        Plan:        HFPEF  - cont Lasix PRN     Melena  -Workup and mgmt as per GI and hospital medicine     9/15/22  -No  recurrence        NSTEMI (non-ST elevated myocardial infarction)     cont med management - asa, plavix,   10/7/22 - order pharm nuclear stress test to r/o ischemia  - negative 11/2022     Tobacco abuse disorder with COPD  -Smoking cessation advised.  - tried nicotine patch      PVD (peripheral vascular disease)  -Medical tx for now  -ASA, plavix  -Smoking cessation.      Anemia  -Discussed with GI and hospital medicine, will resume DAPT and closely monitor    HTN (hypertension)   - titrate meds     Thank you for allowing me to participate in this patient's care. Please do not hesitate to contact me with any questions or concerns. Consult note has been forwarded to the referral physician.

## 2023-08-31 ENCOUNTER — OFFICE VISIT (OUTPATIENT)
Dept: HEMATOLOGY/ONCOLOGY | Facility: CLINIC | Age: 78
End: 2023-08-31
Payer: MEDICARE

## 2023-08-31 VITALS
DIASTOLIC BLOOD PRESSURE: 81 MMHG | BODY MASS INDEX: 19.39 KG/M2 | HEIGHT: 62 IN | OXYGEN SATURATION: 93 % | SYSTOLIC BLOOD PRESSURE: 159 MMHG | WEIGHT: 105.38 LBS | HEART RATE: 78 BPM

## 2023-08-31 DIAGNOSIS — Z86.2 HISTORY OF IRON DEFICIENCY ANEMIA: ICD-10-CM

## 2023-08-31 DIAGNOSIS — E53.8 B12 DEFICIENCY: ICD-10-CM

## 2023-08-31 DIAGNOSIS — D53.9 MACROCYTIC ANEMIA: Primary | ICD-10-CM

## 2023-08-31 PROCEDURE — 99214 OFFICE O/P EST MOD 30 MIN: CPT | Mod: HCNC,S$GLB,, | Performed by: NURSE PRACTITIONER

## 2023-08-31 PROCEDURE — 99999 PR PBB SHADOW E&M-EST. PATIENT-LVL IV: CPT | Mod: PBBFAC,HCNC,, | Performed by: NURSE PRACTITIONER

## 2023-08-31 PROCEDURE — 3288F PR FALLS RISK ASSESSMENT DOCUMENTED: ICD-10-PCS | Mod: HCNC,CPTII,S$GLB, | Performed by: NURSE PRACTITIONER

## 2023-08-31 PROCEDURE — 99214 PR OFFICE/OUTPT VISIT, EST, LEVL IV, 30-39 MIN: ICD-10-PCS | Mod: HCNC,S$GLB,, | Performed by: NURSE PRACTITIONER

## 2023-08-31 PROCEDURE — 1126F AMNT PAIN NOTED NONE PRSNT: CPT | Mod: HCNC,CPTII,S$GLB, | Performed by: NURSE PRACTITIONER

## 2023-08-31 PROCEDURE — 1159F PR MEDICATION LIST DOCUMENTED IN MEDICAL RECORD: ICD-10-PCS | Mod: HCNC,CPTII,S$GLB, | Performed by: NURSE PRACTITIONER

## 2023-08-31 PROCEDURE — 1126F PR PAIN SEVERITY QUANTIFIED, NO PAIN PRESENT: ICD-10-PCS | Mod: HCNC,CPTII,S$GLB, | Performed by: NURSE PRACTITIONER

## 2023-08-31 PROCEDURE — 3079F DIAST BP 80-89 MM HG: CPT | Mod: HCNC,CPTII,S$GLB, | Performed by: NURSE PRACTITIONER

## 2023-08-31 PROCEDURE — 1159F MED LIST DOCD IN RCRD: CPT | Mod: HCNC,CPTII,S$GLB, | Performed by: NURSE PRACTITIONER

## 2023-08-31 PROCEDURE — 1101F PT FALLS ASSESS-DOCD LE1/YR: CPT | Mod: HCNC,CPTII,S$GLB, | Performed by: NURSE PRACTITIONER

## 2023-08-31 PROCEDURE — 3077F SYST BP >= 140 MM HG: CPT | Mod: HCNC,CPTII,S$GLB, | Performed by: NURSE PRACTITIONER

## 2023-08-31 PROCEDURE — 1160F PR REVIEW ALL MEDS BY PRESCRIBER/CLIN PHARMACIST DOCUMENTED: ICD-10-PCS | Mod: HCNC,CPTII,S$GLB, | Performed by: NURSE PRACTITIONER

## 2023-08-31 PROCEDURE — 3079F PR MOST RECENT DIASTOLIC BLOOD PRESSURE 80-89 MM HG: ICD-10-PCS | Mod: HCNC,CPTII,S$GLB, | Performed by: NURSE PRACTITIONER

## 2023-08-31 PROCEDURE — 1160F RVW MEDS BY RX/DR IN RCRD: CPT | Mod: HCNC,CPTII,S$GLB, | Performed by: NURSE PRACTITIONER

## 2023-08-31 PROCEDURE — 3288F FALL RISK ASSESSMENT DOCD: CPT | Mod: HCNC,CPTII,S$GLB, | Performed by: NURSE PRACTITIONER

## 2023-08-31 PROCEDURE — 3077F PR MOST RECENT SYSTOLIC BLOOD PRESSURE >= 140 MM HG: ICD-10-PCS | Mod: HCNC,CPTII,S$GLB, | Performed by: NURSE PRACTITIONER

## 2023-08-31 PROCEDURE — 99999 PR PBB SHADOW E&M-EST. PATIENT-LVL IV: ICD-10-PCS | Mod: PBBFAC,HCNC,, | Performed by: NURSE PRACTITIONER

## 2023-08-31 PROCEDURE — 1101F PR PT FALLS ASSESS DOC 0-1 FALLS W/OUT INJ PAST YR: ICD-10-PCS | Mod: HCNC,CPTII,S$GLB, | Performed by: NURSE PRACTITIONER

## 2023-08-31 RX ORDER — IPRATROPIUM BROMIDE 42 UG/1
2 SPRAY, METERED NASAL 2 TIMES DAILY
COMMUNITY
Start: 2023-08-08

## 2023-08-31 NOTE — PROGRESS NOTES
Subjective:      Patient ID: Keyona Dwyer is a 77 y.o. female.    Chief Complaint: lab discussion    HPI:  77 year old female who presents today for further evaluation and recommendations of iron deficiency anemia.  She has been referred to the hematology by her PCP, Dr. Ralf Isidro.  Had diagnosis of acute pancreatitis requiring hospitalization in 6/2022 with complaints of abdominal pain.  Other chronic diagnosis include anxiety, chronic back pain, hyperlipidemia, htn, osteoporosis, RVT, and restless leg syndrome.       Patient states that she got bit by a brown recluse and got sick due to antibiotics and has some weight loss.  States that she was on cephalexin 500 mg PO TID and caused stomach distress with weight loss and episodic nausea/vomiting.      Is a current smoker since she was 12 years old - 1 pack per day for 60 years. Now smokes 3-10 per day.  She denies alcohol use.      She denies f/c/ns or lymphadenopathy.       Interval History:  10/10/2022: With hospitalization in 9/2022 d/t pneumonia, NSTEMI, and GI bleed.  EGD done as well as bleeding scan and found with no active bleeding although patient did have dark stools during hospitalizations with a drop in hgb requiring blood transfusion.  She was unable to tolerate GI prep for colonoscopy.  At last visit work up for anemia found with elevated MMA.  She was placed on B12 2000 mcg PO daily.  Also found with LOR started on ferrous sulfate 325 mg PO and is currently taking iron tablets every other day.  States that she is taking stool softeners daily.  States has not noted dark stools since hospitalization.  3+ blood found in urine during hospitalizations     2/9/2023 Had surgery to face to remove skin cancer one week ago - part of left side nose had to be removed - done by Dr. Aguilar Wallace dermatology.  Currently with bandages on her face that are CDI.  Has not noticed any abnormal bleeding.  States that she has been off of her iron tablets  this past week.  Was taking every other day.  Is taking B12 tablets daily.  States that when she takes the oral iron she has constipation requiring stool softeners.      5/25/2023 She was to continue B12 daily and continue oral iron supplements every other day since her last visit in 2/2023.  Presents today to evaluate response.   Denies dark stools.  Denies any unusual bleeding. Has gained 8 lbs.  States that she is feeling better then she has in a long time.      Interval History:  8/31/2023  Has continued ferrous sulfate 325 mg PO daily and surjit B complex daily supplement daily.  States that she has reduced her smoking to less then a pack/day.  States that she took her iron tablet after doing labs on 8/30/2023.  Noted now with elevated iron. Slight macrocytic anemia - improved.  States that she feels better then she has in a long time.         Social History     Socioeconomic History    Marital status:      Spouse name: elizabeth    Number of children: 3   Occupational History    Occupation: retired   Tobacco Use    Smoking status: Former     Current packs/day: 0.50     Average packs/day: 0.5 packs/day for 63.0 years (31.5 ttl pk-yrs)     Types: Cigarettes    Smokeless tobacco: Never    Tobacco comments:     started age of  11    Substance and Sexual Activity    Alcohol use: No    Drug use: No    Sexual activity: Yes     Partners: Male     Social Determinants of Health     Financial Resource Strain: Low Risk  (3/6/2023)    Overall Financial Resource Strain (CARDIA)     Difficulty of Paying Living Expenses: Not hard at all   Food Insecurity: No Food Insecurity (3/6/2023)    Hunger Vital Sign     Worried About Running Out of Food in the Last Year: Never true     Ran Out of Food in the Last Year: Never true   Transportation Needs: No Transportation Needs (3/6/2023)    PRAPARE - Transportation     Lack of Transportation (Medical): No     Lack of Transportation (Non-Medical): No   Physical Activity: Insufficiently  Active (3/6/2023)    Exercise Vital Sign     Days of Exercise per Week: 7 days     Minutes of Exercise per Session: 20 min   Stress: No Stress Concern Present (3/6/2023)    [employer] - Occupational Stress Questionnaire     Feeling of Stress : Not at all   Social Connections: Moderately Integrated (3/6/2023)    Social Connection and Isolation Panel [NHANES]     Frequency of Communication with Friends and Family: Three times a week     Frequency of Social Gatherings with Friends and Family: More than three times a week     Attends [employer] Services: More than 4 times per year     Active Member of Clubs or Organizations: No     Attends Club or Organization Meetings: Never     Marital Status:    Housing Stability: Low Risk  (3/6/2023)    Housing Stability Vital Sign     Unable to Pay for Housing in the Last Year: No     Number of Places Lived in the Last Year: 1     Unstable Housing in the Last Year: No       Family History   Problem Relation Age of Onset    Cancer Mother     Breast cancer Mother     Hyperlipidemia Father     Heart failure Father     Heart disease Brother     Brain cancer Brother     Stroke Brother     Alcohol abuse Brother        Past Surgical History:   Procedure Laterality Date    ANGIOGRAPHY OF LOWER EXTREMITY N/A 4/12/2021    Procedure: ANGIOGRAM, LOWER EXTREMITY/left leg;  Surgeon: Maico Adkins MD;  Location: [facility] CATH LAB;  Service: Vascular;  Laterality: N/A;  req 1130 start time/Rm A243A    AORTOGRAPHY WITH SERIALOGRAPHY N/A 4/14/2021    Procedure: AORTOGRAM, WITH RUNOFF;  Surgeon: Maico Adkins MD;  Location: [facility] CATH LAB;  Service: Vascular;  Laterality: N/A;  left iliac stent with shockwave/req 1030 start    CATARACT EXTRACTION W/  INTRAOCULAR LENS IMPLANT Bilateral 8/ 9 2017    ESOPHAGOGASTRODUODENOSCOPY N/A 9/13/2022    Procedure: EGD (ESOPHAGOGASTRODUODENOSCOPY);  Surgeon: Fern Rasmussen MD;  Location: [facility] ENDO;  Service: Endoscopy;  Laterality:  N/A;    GALLBLADDER SURGERY      HYSTERECTOMY  1972    LUNG SURGERY Right age 15    lung collpase    TOE AMPUTATION Left 4/15/2021    Procedure: AMPUTATION, TOE;  Surgeon: Taylor Anderson DPM;  Location: [facility];  Service: Podiatry;  Laterality: Left;  Hallux (Great Toe)    TOTAL HIP ARTHROPLASTY Bilateral     TOTAL KNEE ARTHROPLASTY Bilateral        Past Medical History:   Diagnosis Date    Acute diastolic heart failure 09/15/2022    Acute respiratory failure with hypoxia and hypercarbia 09/14/2022    Anemia     Anxiety     Cancer     Cataract     Chronic back pain     Dr. Guzman    Fibrocystic breast     Glaucoma     Hyperlipemia     Hypertension     Idiopathic acute pancreatitis 06/18/2022    NSTEMI (non-ST elevated myocardial infarction) 09/11/2022    Osteoarthritis     Osteoporosis     Rheumatology/on Prolia    Pneumonia     Polyneuropathy     PVD (peripheral vascular disease) 04/12/2021    Restless leg syndrome     Tobacco dependence     Trouble in sleeping        Review of Systems   Constitutional: Negative.    HENT: Negative.     Eyes: Negative.    Respiratory: Negative.     Cardiovascular: Negative.    Gastrointestinal: Negative.    Endocrine: Negative.    Genitourinary: Negative.    Musculoskeletal: Negative.    Skin: Negative.    Allergic/Immunologic: Negative.    Neurological: Negative.    Hematological: Negative.    Psychiatric/Behavioral: Negative.            Medication List with Changes/Refills   Current Medications    ASPIRIN (ECOTRIN) 81 MG EC TABLET    Take 1 tablet (81 mg total) by mouth once daily.    AZELASTINE (ASTELIN) 137 MCG (0.1 %) NASAL SPRAY    1 spray (137 mcg total) by Nasal route 2 (two) times daily.    B COMPLEX VITAMINS CAPSULE    Take 1 capsule by mouth once daily.    BUPROPION (WELLBUTRIN) 75 MG TABLET    Take 1 tablet (75 mg total) by mouth 2 (two) times daily.    BUSPIRONE (BUSPAR) 10 MG TABLET    TAKE 1 TABLET BY MOUTH TWICE DAILY    CLOPIDOGREL (PLAVIX) 75 MG TABLET     Take 1 tablet (75 mg total) by mouth once daily.    DOCUSATE SODIUM (COLACE) 100 MG CAPSULE    Take 1 capsule (100 mg total) by mouth 2 (two) times daily.    DULOXETINE (CYMBALTA) 30 MG CAPSULE    Take 30 mg by mouth once daily.    ERGOCALCIFEROL (ERGOCALCIFEROL) 50,000 UNIT CAP    TAKE 1 CAPSULE BY MOUTH EVERY WEEK    FERROUS SULFATE (FEOSOL) 325 MG (65 MG IRON) TAB TABLET    Take 1 tab (325 mg) every other day.    FUROSEMIDE (LASIX) 20 MG TABLET    Take 1 tablet (20 mg total) by mouth daily as needed (edema).    GABAPENTIN (NEURONTIN) 600 MG TABLET        HYDROCODONE-ACETAMINOPHEN (NORCO)  MG PER TABLET    Take 1 tablet by mouth 3 (three) times daily as needed.    IPRATROPIUM (ATROVENT) 42 MCG (0.06 %) NASAL SPRAY    2 sprays by Each Nostril route 2 (two) times daily.    NICOTINE (NICODERM CQ) 14 MG/24 HR    Place 1 patch onto the skin once daily. Cannot smoke with the patch    NUTRITIONAL SUPPLEMENT/FIBER (QUINOA-KALE-HEMP ORAL)    Take by mouth.    OFLOXACIN (FLOXIN) 0.3 % OTIC SOLUTION    instill 5 DROPS into the right ear IN THE MORNING and before bedtime FOR 10 DAYS    OMEPRAZOLE (PRILOSEC) 40 MG CAPSULE    Take 1 capsule (40 mg total) by mouth every evening.    ON-Q PAIN PUMP    by Misc.(Non-Drug; Combo Route) route.    RANOLAZINE (RANEXA) 500 MG TB12    Take 1 tablet (500 mg total) by mouth 2 (two) times daily.    ROSUVASTATIN (CRESTOR) 5 MG TABLET    TAKE 1 TABLET BY MOUTH ONCE A DAY        Objective:     Vitals:    08/31/23 0915   BP: (!) 159/81   Pulse: 78       Physical Exam  Vitals reviewed.   Constitutional:       Appearance: Normal appearance.   HENT:      Head: Normocephalic and atraumatic.      Right Ear: External ear normal.      Left Ear: External ear normal.   Cardiovascular:      Rate and Rhythm: Normal rate and regular rhythm.      Heart sounds: Normal heart sounds, S1 normal and S2 normal.   Pulmonary:      Effort: Pulmonary effort is normal.      Breath sounds: Normal breath sounds.    Abdominal:      General: There is no distension.   Musculoskeletal:         General: Normal range of motion.      Cervical back: Normal range of motion.   Skin:     General: Skin is warm and dry.   Neurological:      General: No focal deficit present.      Mental Status: She is alert and oriented to person, place, and time.   Psychiatric:         Attention and Perception: Attention and perception normal.         Mood and Affect: Mood and affect normal.         Speech: Speech normal.         Behavior: Behavior normal. Behavior is cooperative.         Thought Content: Thought content normal.         Cognition and Memory: Cognition and memory normal.         Judgment: Judgment normal.         Assessment:     Problem List Items Addressed This Visit          Endocrine    B12 deficiency    Relevant Orders    CBC Auto Differential    Vitamin B12     Other Visit Diagnoses       Macrocytic anemia    -  Primary    Relevant Orders    CBC Auto Differential    Vitamin B12    History of iron deficiency anemia        Relevant Orders    CBC Auto Differential    Comprehensive Metabolic Panel    Ferritin    Iron and TIBC            Lab Results   Component Value Date    WBC 6.01 08/30/2023    RBC 3.61 (L) 08/30/2023    HGB 11.6 (L) 08/30/2023    HCT 36.1 (L) 08/30/2023     (H) 08/30/2023    MCH 32.1 (H) 08/30/2023    MCHC 32.1 08/30/2023    RDW 14.8 (H) 08/30/2023     08/30/2023    MPV 8.9 (L) 08/30/2023    GRAN 2.9 08/30/2023    GRAN 48.7 08/30/2023    LYMPH 2.3 08/30/2023    LYMPH 38.8 08/30/2023    MONO 0.5 08/30/2023    MONO 8.3 08/30/2023    EOS 0.2 08/30/2023    BASO 0.06 08/30/2023    EOSINOPHIL 3.0 08/30/2023    BASOPHIL 1.0 08/30/2023      Lab Results   Component Value Date     08/30/2023    K 4.7 08/30/2023     08/30/2023    CO2 28 08/30/2023    BUN 15 08/30/2023    CREATININE 1.0 08/30/2023    CALCIUM 9.8 08/30/2023    ANIONGAP 12 08/30/2023    ESTGFRAFRICA >60.0 06/27/2022    EGFRNONAA >60.0  06/27/2022     Lab Results   Component Value Date    ALT 5 (L) 08/30/2023    AST 11 08/30/2023    ALKPHOS 54 (L) 08/30/2023    BILITOT 0.3 08/30/2023     Lab Results   Component Value Date    UIBC 222 01/30/2006    IRON 291 (H) 08/30/2023    TRANSFERRIN 230 08/30/2023    TIBC 340 08/30/2023    FESATURATED 86 (H) 08/30/2023      Lab Results   Component Value Date    FERRITIN 60 08/30/2023     Lab Results   Component Value Date    [id number] 08/30/2023     Med Onc Chart Routing      Follow up with physician    Follow up with [facility] . F/u in 3 months with labs prior in [place] - in person visit   Infusion scheduling note   n/a   Injection scheduling note n/a   Labs   Scheduling:  Preferred lab: [facility]  Lab interval:  cbc, cmp, iron studies and B12 prior   Imaging   N/a   Pharmacy appointment No pharmacy appointment needed      Other referrals     No additional referrals needed  n/a        Continue B supplement daily. Stop iron supplement. Discussed possible MDS requiring bone marrow biopsy for diagnosis in the near future.  Continue to monitor.       F/u in 3 months with labs prior - cbc, cmp, iron studies, B12      Plan:   Macrocytic anemia  -     CBC Auto Differential; Future; Expected date: 08/31/2023  -     Vitamin B12; Future; Expected date: 08/31/2023    B12 deficiency  -     CBC Auto Differential; Future; Expected date: 08/31/2023  -     Vitamin B12; Future; Expected date: 08/31/2023    History of iron deficiency anemia  -     CBC Auto Differential; Future; Expected date: 08/31/2023  -     Comprehensive Metabolic Panel; Future; Expected date: 08/31/2023  -     Ferritin; Future; Expected date: 08/31/2023  -     Iron and TIBC; Future; Expected date: 08/31/2023        Collaborating Provider:  Dr. Kris Alcocer    Thank You,  Tiarra Mg, FNP-C  Hematology Oncology

## 2023-09-05 ENCOUNTER — OFFICE VISIT (OUTPATIENT)
Dept: INTERNAL MEDICINE | Facility: CLINIC | Age: 78
End: 2023-09-05
Payer: MEDICARE

## 2023-09-05 VITALS
BODY MASS INDEX: 19.23 KG/M2 | TEMPERATURE: 98 F | OXYGEN SATURATION: 95 % | DIASTOLIC BLOOD PRESSURE: 64 MMHG | HEART RATE: 92 BPM | WEIGHT: 105.19 LBS | SYSTOLIC BLOOD PRESSURE: 116 MMHG

## 2023-09-05 DIAGNOSIS — N18.31 STAGE 3A CHRONIC KIDNEY DISEASE: ICD-10-CM

## 2023-09-05 DIAGNOSIS — I10 PRIMARY HYPERTENSION: ICD-10-CM

## 2023-09-05 DIAGNOSIS — E55.9 VITAMIN D DEFICIENCY: ICD-10-CM

## 2023-09-05 DIAGNOSIS — R73.09 ABNORMAL GLUCOSE: ICD-10-CM

## 2023-09-05 DIAGNOSIS — Z00.00 ANNUAL PHYSICAL EXAM: Primary | ICD-10-CM

## 2023-09-05 DIAGNOSIS — I70.0 ATHEROSCLEROSIS OF AORTA: ICD-10-CM

## 2023-09-05 DIAGNOSIS — E78.5 HYPERLIPIDEMIA, UNSPECIFIED HYPERLIPIDEMIA TYPE: ICD-10-CM

## 2023-09-05 PROCEDURE — 99397 PER PM REEVAL EST PAT 65+ YR: CPT | Mod: HCNC,S$GLB,, | Performed by: FAMILY MEDICINE

## 2023-09-05 PROCEDURE — 3288F PR FALLS RISK ASSESSMENT DOCUMENTED: ICD-10-PCS | Mod: HCNC,CPTII,S$GLB, | Performed by: FAMILY MEDICINE

## 2023-09-05 PROCEDURE — 99999 PR PBB SHADOW E&M-EST. PATIENT-LVL IV: ICD-10-PCS | Mod: PBBFAC,HCNC,, | Performed by: FAMILY MEDICINE

## 2023-09-05 PROCEDURE — 3078F DIAST BP <80 MM HG: CPT | Mod: HCNC,CPTII,S$GLB, | Performed by: FAMILY MEDICINE

## 2023-09-05 PROCEDURE — 1159F PR MEDICATION LIST DOCUMENTED IN MEDICAL RECORD: ICD-10-PCS | Mod: HCNC,CPTII,S$GLB, | Performed by: FAMILY MEDICINE

## 2023-09-05 PROCEDURE — 3074F PR MOST RECENT SYSTOLIC BLOOD PRESSURE < 130 MM HG: ICD-10-PCS | Mod: HCNC,CPTII,S$GLB, | Performed by: FAMILY MEDICINE

## 2023-09-05 PROCEDURE — 1159F MED LIST DOCD IN RCRD: CPT | Mod: HCNC,CPTII,S$GLB, | Performed by: FAMILY MEDICINE

## 2023-09-05 PROCEDURE — 99397 PR PREVENTIVE VISIT,EST,65 & OVER: ICD-10-PCS | Mod: HCNC,S$GLB,, | Performed by: FAMILY MEDICINE

## 2023-09-05 PROCEDURE — 3074F SYST BP LT 130 MM HG: CPT | Mod: HCNC,CPTII,S$GLB, | Performed by: FAMILY MEDICINE

## 2023-09-05 PROCEDURE — 99999 PR PBB SHADOW E&M-EST. PATIENT-LVL IV: CPT | Mod: PBBFAC,HCNC,, | Performed by: FAMILY MEDICINE

## 2023-09-05 PROCEDURE — 3078F PR MOST RECENT DIASTOLIC BLOOD PRESSURE < 80 MM HG: ICD-10-PCS | Mod: HCNC,CPTII,S$GLB, | Performed by: FAMILY MEDICINE

## 2023-09-05 PROCEDURE — 1101F PT FALLS ASSESS-DOCD LE1/YR: CPT | Mod: HCNC,CPTII,S$GLB, | Performed by: FAMILY MEDICINE

## 2023-09-05 PROCEDURE — 1101F PR PT FALLS ASSESS DOC 0-1 FALLS W/OUT INJ PAST YR: ICD-10-PCS | Mod: HCNC,CPTII,S$GLB, | Performed by: FAMILY MEDICINE

## 2023-09-05 PROCEDURE — 1160F PR REVIEW ALL MEDS BY PRESCRIBER/CLIN PHARMACIST DOCUMENTED: ICD-10-PCS | Mod: HCNC,CPTII,S$GLB, | Performed by: FAMILY MEDICINE

## 2023-09-05 PROCEDURE — 1160F RVW MEDS BY RX/DR IN RCRD: CPT | Mod: HCNC,CPTII,S$GLB, | Performed by: FAMILY MEDICINE

## 2023-09-05 PROCEDURE — 3288F FALL RISK ASSESSMENT DOCD: CPT | Mod: HCNC,CPTII,S$GLB, | Performed by: FAMILY MEDICINE

## 2023-09-05 NOTE — PROGRESS NOTES
Subjective:      Patient ID: Keyona Dwyer is a 77 y.o. female.    Chief Complaint:  Annual    78 yo here for annual review.  No cp/sob, normal bowels  Followed by specialty  Mood is good  1 fall in past few mos//tripped on     Past Medical History:   Diagnosis Date    Acute diastolic heart failure 09/15/2022    Acute respiratory failure with hypoxia and hypercarbia 09/14/2022    Anemia     Anxiety     Cancer     Cataract     Chronic back pain     Dr. Guzman    Fibrocystic breast     Glaucoma     Hyperlipemia     Hypertension     Idiopathic acute pancreatitis 06/18/2022    NSTEMI (non-ST elevated myocardial infarction) 09/11/2022    Osteoarthritis     Osteoporosis     Rheumatology/on Prolia    Pneumonia     Polyneuropathy     PVD (peripheral vascular disease) 04/12/2021    Restless leg syndrome     Tobacco dependence     Trouble in sleeping      Family History   Problem Relation Age of Onset    Cancer Mother     Breast cancer Mother     Hyperlipidemia Father     Heart failure Father     Heart disease Brother     Brain cancer Brother     Stroke Brother     Alcohol abuse Brother      Past Surgical History:   Procedure Laterality Date    ANGIOGRAPHY OF LOWER EXTREMITY N/A 4/12/2021    Procedure: ANGIOGRAM, LOWER EXTREMITY/left leg;  Surgeon: Maico Adkins MD;  Location: [facility] CATH LAB;  Service: Vascular;  Laterality: N/A;  req 1130 start time/Rm A243A    AORTOGRAPHY WITH SERIALOGRAPHY N/A 4/14/2021    Procedure: AORTOGRAM, WITH RUNOFF;  Surgeon: Maico Adkins MD;  Location: [facility] CATH LAB;  Service: Vascular;  Laterality: N/A;  left iliac stent with shockwave/req 1030 start    CATARACT EXTRACTION W/  INTRAOCULAR LENS IMPLANT Bilateral 8/ 9 2017    ESOPHAGOGASTRODUODENOSCOPY N/A 9/13/2022    Procedure: EGD (ESOPHAGOGASTRODUODENOSCOPY);  Surgeon: Fern Rasmussen MD;  Location: [facility] ENDO;  Service: Endoscopy;  Laterality: N/A;    GALLBLADDER SURGERY      HYSTERECTOMY  1972    LUNG SURGERY Right age 15    lung  collpase    TOE AMPUTATION Left 4/15/2021    Procedure: AMPUTATION, TOE;  Surgeon: Taylor Anderson DPM;  Location: [facility];  Service: Podiatry;  Laterality: Left;  Hallux (Great Toe)    TOTAL HIP ARTHROPLASTY Bilateral     TOTAL KNEE ARTHROPLASTY Bilateral      Social History     Tobacco Use    Smoking status: Former     Current packs/day: 0.50     Average packs/day: 0.5 packs/day for 63.0 years (31.5 ttl pk-yrs)     Types: Cigarettes    Smokeless tobacco: Never    Tobacco comments:     started age of  11    Substance Use Topics    Alcohol use: No    Drug use: No       /64   Pulse 92   Temp 97.9 °F (36.6 °C)   Wt 47.7 kg (105 lb 2.6 oz)   SpO2 95%   BMI 19.23 kg/m²     Review of Systems   Constitutional:  Negative for activity change, appetite change, chills, diaphoresis, fatigue, fever and unexpected weight change.   HENT:  Negative for ear pain, hearing loss, postnasal drip, rhinorrhea and tinnitus.    Eyes:  Negative for visual disturbance.   Respiratory:  Negative for cough, shortness of breath and wheezing.    Cardiovascular:  Negative for chest pain, palpitations and leg swelling.   Gastrointestinal:  Negative for abdominal distention.   Genitourinary:  Negative for dysuria, frequency, hematuria and urgency.   Musculoskeletal:  Negative for back pain and joint swelling.   Neurological:  Negative for weakness and headaches.   Hematological:  Negative for adenopathy.   Psychiatric/Behavioral:  Negative for confusion and decreased concentration.        Objective:     Physical Exam  Vitals and nursing note reviewed.   Constitutional:       General: She is not in acute distress.     Appearance: She is well-developed.   Eyes:      Conjunctiva/sclera: Conjunctivae normal.      Pupils: Pupils are equal, round, and reactive to light.   Neck:      Thyroid: No thyromegaly.   Cardiovascular:      Rate and Rhythm: Normal rate and regular rhythm.      Heart sounds: Normal heart sounds.   Pulmonary:       Effort: Pulmonary effort is normal. No respiratory distress.      Breath sounds: Normal breath sounds. No wheezing or rales.   Abdominal:      General: Bowel sounds are normal. There is no distension.      Palpations: Abdomen is soft.      Tenderness: There is no abdominal tenderness. There is no guarding.   Musculoskeletal:         General: Normal range of motion.      Cervical back: Normal range of motion and neck supple.   Skin:     General: Skin is warm and dry.      Findings: No rash.   Neurological:      Mental Status: She is alert and oriented to person, place, and time.      Cranial Nerves: No cranial nerve deficit.   Psychiatric:         Mood and Affect: Mood normal.         Behavior: Behavior normal.         Thought Content: Thought content normal.         Judgment: Judgment normal.         Lab Results   Component Value Date    WBC 6.01 08/30/2023    HGB 11.6 (L) 08/30/2023    HCT 36.1 (L) 08/30/2023     08/30/2023    CHOL 66 (L) 06/19/2022    TRIG 35 06/19/2022    HDL 39 (L) 06/19/2022    ALT 5 (L) 08/30/2023    AST 11 08/30/2023     08/30/2023    K 4.7 08/30/2023     08/30/2023    CREATININE 1.0 08/30/2023    BUN 15 08/30/2023    CO2 28 08/30/2023    TSH 1.055 08/03/2021    INR 1.0 09/12/2022    HGBA1C 5.0 12/14/2018       Assessment:     1. Annual physical exam    2. Primary hypertension    3. Atherosclerosis of aorta    4. Hyperlipidemia, unspecified hyperlipidemia type    5. Vitamin D deficiency    6. Abnormal glucose    7. Stage 3a chronic kidney disease         Plan:     Annual physical exam    Primary hypertension    Atherosclerosis of aorta    Hyperlipidemia, unspecified hyperlipidemia type  -     Lipid Panel; Future; Expected date: 09/06/2023  -     TSH; Future; Expected date: 09/06/2023    Vitamin D deficiency  -     Vitamin D; Future; Expected date: 09/06/2023    Abnormal glucose  -     Hemoglobin A1C; Future; Expected date: 09/06/2023    Stage 3a chronic kidney  disease    Update screening labs  Cont meds//cont specialty  Fall precautions  Fu annually and PRN

## 2023-09-06 ENCOUNTER — LAB VISIT (OUTPATIENT)
Dept: LAB | Facility: HOSPITAL | Age: 78
End: 2023-09-06
Attending: FAMILY MEDICINE
Payer: MEDICARE

## 2023-09-06 DIAGNOSIS — R73.09 ABNORMAL GLUCOSE: ICD-10-CM

## 2023-09-06 DIAGNOSIS — E55.9 VITAMIN D DEFICIENCY: ICD-10-CM

## 2023-09-06 DIAGNOSIS — E78.5 HYPERLIPIDEMIA, UNSPECIFIED HYPERLIPIDEMIA TYPE: ICD-10-CM

## 2023-09-06 LAB
25(OH)D3+25(OH)D2 SERPL-MCNC: 39 NG/ML (ref 30–96)
CHOLEST SERPL-MCNC: 147 MG/DL (ref 120–199)
CHOLEST/HDLC SERPL: 1.9 {RATIO} (ref 2–5)
ESTIMATED AVG GLUCOSE: 100 MG/DL (ref 68–131)
HBA1C MFR BLD: 5.1 % (ref 4–5.6)
HDLC SERPL-MCNC: 77 MG/DL (ref 40–75)
HDLC SERPL: 52.4 % (ref 20–50)
LDLC SERPL CALC-MCNC: 49.6 MG/DL (ref 63–159)
NONHDLC SERPL-MCNC: 70 MG/DL
TRIGL SERPL-MCNC: 102 MG/DL (ref 30–150)
TSH SERPL DL<=0.005 MIU/L-ACNC: 1.01 UIU/ML (ref 0.4–4)

## 2023-09-06 PROCEDURE — 83036 HEMOGLOBIN GLYCOSYLATED A1C: CPT | Mod: HCNC | Performed by: FAMILY MEDICINE

## 2023-09-06 PROCEDURE — 84443 ASSAY THYROID STIM HORMONE: CPT | Mod: HCNC | Performed by: FAMILY MEDICINE

## 2023-09-06 PROCEDURE — 36415 COLL VENOUS BLD VENIPUNCTURE: CPT | Mod: HCNC,PO | Performed by: FAMILY MEDICINE

## 2023-09-06 PROCEDURE — 82306 VITAMIN D 25 HYDROXY: CPT | Mod: HCNC | Performed by: FAMILY MEDICINE

## 2023-09-06 PROCEDURE — 80061 LIPID PANEL: CPT | Mod: HCNC | Performed by: FAMILY MEDICINE

## 2023-10-03 RX ORDER — BUSPIRONE HYDROCHLORIDE 10 MG/1
TABLET ORAL
Qty: 60 TABLET | Refills: 8 | Status: SHIPPED | OUTPATIENT
Start: 2023-10-03

## 2023-10-25 ENCOUNTER — OFFICE VISIT (OUTPATIENT)
Dept: INTERNAL MEDICINE | Facility: CLINIC | Age: 78
End: 2023-10-25
Payer: MEDICARE

## 2023-10-25 ENCOUNTER — HOSPITAL ENCOUNTER (OUTPATIENT)
Dept: RADIOLOGY | Facility: HOSPITAL | Age: 78
Discharge: HOME | End: 2023-10-25
Attending: NURSE PRACTITIONER
Payer: MEDICARE

## 2023-10-25 VITALS
TEMPERATURE: 98 F | DIASTOLIC BLOOD PRESSURE: 70 MMHG | HEART RATE: 95 BPM | OXYGEN SATURATION: 96 % | WEIGHT: 106.5 LBS | SYSTOLIC BLOOD PRESSURE: 130 MMHG | BODY MASS INDEX: 19.48 KG/M2
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
## 2023-10-25 DIAGNOSIS — M25.511 ACUTE PAIN OF RIGHT SHOULDER: ICD-10-CM
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
## 2023-10-25 DIAGNOSIS — M25.511 ACUTE PAIN OF RIGHT SHOULDER: Primary | ICD-10-CM

## 2023-10-25 PROCEDURE — 1100F PTFALLS ASSESS-DOCD GE2>/YR: CPT | Mod: HCNC,CPTII,S$GLB, | Performed by: NURSE PRACTITIONER

## 2023-10-25 PROCEDURE — 1125F AMNT PAIN NOTED PAIN PRSNT: CPT | Mod: HCNC,CPTII,S$GLB, | Performed by: NURSE PRACTITIONER

## 2023-10-25 PROCEDURE — 73060 XR HUMERUS 2 VIEW RIGHT: ICD-10-PCS | Mod: 26,HCNC,RT, | Performed by: RADIOLOGY

## 2023-10-25 PROCEDURE — 3075F SYST BP GE 130 - 139MM HG: CPT | Mod: HCNC,CPTII,S$GLB, | Performed by: NURSE PRACTITIONER

## 2023-10-25 PROCEDURE — 73030 X-RAY EXAM OF SHOULDER: CPT | Mod: 26,HCNC,RT, | Performed by: RADIOLOGY

## 2023-10-25 PROCEDURE — 73030 XR SHOULDER COMPLETE 2 OR MORE VIEWS RIGHT: ICD-10-PCS | Mod: 26,HCNC,RT, | Performed by: RADIOLOGY

## 2023-10-25 PROCEDURE — 1160F PR REVIEW ALL MEDS BY PRESCRIBER/CLIN PHARMACIST DOCUMENTED: ICD-10-PCS | Mod: HCNC,CPTII,S$GLB, | Performed by: NURSE PRACTITIONER

## 2023-10-25 PROCEDURE — 99213 OFFICE O/P EST LOW 20 MIN: CPT | Mod: HCNC,S$GLB,, | Performed by: NURSE PRACTITIONER

## 2023-10-25 PROCEDURE — 1160F RVW MEDS BY RX/DR IN RCRD: CPT | Mod: HCNC,CPTII,S$GLB, | Performed by: NURSE PRACTITIONER

## 2023-10-25 PROCEDURE — 99213 PR OFFICE/OUTPT VISIT, EST, LEVL III, 20-29 MIN: ICD-10-PCS | Mod: HCNC,S$GLB,, | Performed by: NURSE PRACTITIONER

## 2023-10-25 PROCEDURE — 99999 PR PBB SHADOW E&M-EST. PATIENT-LVL V: ICD-10-PCS | Mod: PBBFAC,HCNC,, | Performed by: NURSE PRACTITIONER

## 2023-10-25 PROCEDURE — 1159F MED LIST DOCD IN RCRD: CPT | Mod: HCNC,CPTII,S$GLB, | Performed by: NURSE PRACTITIONER

## 2023-10-25 PROCEDURE — 3288F FALL RISK ASSESSMENT DOCD: CPT | Mod: HCNC,CPTII,S$GLB, | Performed by: NURSE PRACTITIONER

## 2023-10-25 PROCEDURE — 1100F PR PT FALLS ASSESS DOC 2+ FALLS/FALL W/INJURY/YR: ICD-10-PCS | Mod: HCNC,CPTII,S$GLB, | Performed by: NURSE PRACTITIONER

## 2023-10-25 PROCEDURE — 3288F PR FALLS RISK ASSESSMENT DOCUMENTED: ICD-10-PCS | Mod: HCNC,CPTII,S$GLB, | Performed by: NURSE PRACTITIONER

## 2023-10-25 PROCEDURE — 73060 X-RAY EXAM OF HUMERUS: CPT | Mod: 26,HCNC,RT, | Performed by: RADIOLOGY

## 2023-10-25 PROCEDURE — 3078F DIAST BP <80 MM HG: CPT | Mod: HCNC,CPTII,S$GLB, | Performed by: NURSE PRACTITIONER

## 2023-10-25 PROCEDURE — 1159F PR MEDICATION LIST DOCUMENTED IN MEDICAL RECORD: ICD-10-PCS | Mod: HCNC,CPTII,S$GLB, | Performed by: NURSE PRACTITIONER

## 2023-10-25 PROCEDURE — 73030 X-RAY EXAM OF SHOULDER: CPT | Mod: TC,HCNC,FY,PO,RT

## 2023-10-25 PROCEDURE — 1125F PR PAIN SEVERITY QUANTIFIED, PAIN PRESENT: ICD-10-PCS | Mod: HCNC,CPTII,S$GLB, | Performed by: NURSE PRACTITIONER

## 2023-10-25 PROCEDURE — 73060 X-RAY EXAM OF HUMERUS: CPT | Mod: TC,HCNC,FY,PO,RT

## 2023-10-25 PROCEDURE — 99999 PR PBB SHADOW E&M-EST. PATIENT-LVL V: CPT | Mod: PBBFAC,HCNC,, | Performed by: NURSE PRACTITIONER

## 2023-10-25 PROCEDURE — 3075F PR MOST RECENT SYSTOLIC BLOOD PRESS GE 130-139MM HG: ICD-10-PCS | Mod: HCNC,CPTII,S$GLB, | Performed by: NURSE PRACTITIONER

## 2023-10-25 PROCEDURE — 3078F PR MOST RECENT DIASTOLIC BLOOD PRESSURE < 80 MM HG: ICD-10-PCS | Mod: HCNC,CPTII,S$GLB, | Performed by: NURSE PRACTITIONER

## 2023-10-25 RX ORDER — GENTAMICIN SULFATE 3 MG/ML
SOLUTION/ DROPS OPHTHALMIC
Status: ON HOLD | COMMUNITY
Start: 2023-10-25 | End: 2024-03-21 | Stop reason: HOSPADM

## 2023-10-25 RX ORDER — CIPROFLOXACIN 500 MG/1
500 TABLET ORAL 2 TIMES DAILY
Status: ON HOLD | COMMUNITY
Start: 2023-10-24 | End: 2024-03-21 | Stop reason: HOSPADM

## 2023-10-25 NOTE — PROGRESS NOTES
Subjective:       Patient ID: Keyona Dwyer is a 77 y.o. female.    Chief Complaint: Shoulder Pain    Patient here for a fall  6 days ago, was at foot of bed standing spreading a blanket  She tripped on foot of bedpost  Fell to the ground  Fell onto right side  Felt ok immediately but a few days later started with right shoulder pain  Taking pain pills from pain mgt  Pain is 9/10  Worse with movement, range of motion       Shoulder Pain   Pertinent negatives include no fever.     /70   Pulse 95   Temp 98.4 °F (36.9 °C)   Wt 48.3 kg (106 lb 7.7 oz)   SpO2 96%   BMI 19.48 kg/m²     Review of Systems   Constitutional:  Positive for activity change. Negative for appetite change, chills, diaphoresis, fatigue, fever and unexpected weight change.   HENT: Negative.     Respiratory:  Negative for cough and shortness of breath.    Cardiovascular:  Negative for chest pain, palpitations and leg swelling.   Gastrointestinal: Negative.    Genitourinary: Negative.    Musculoskeletal:  Positive for arthralgias.   Skin:  Negative for color change, pallor, rash and wound.   Allergic/Immunologic: Negative for immunocompromised state.   Neurological: Negative.  Negative for dizziness and facial asymmetry.   Hematological:  Negative for adenopathy. Does not bruise/bleed easily.   Psychiatric/Behavioral:  Negative for agitation, behavioral problems and confusion.        Objective:      Physical Exam  Vitals and nursing note reviewed.   Constitutional:       General: She is not in acute distress.     Appearance: Normal appearance. She is well-developed. She is not diaphoretic.   HENT:      Head: Normocephalic and atraumatic.   Cardiovascular:      Rate and Rhythm: Normal rate.   Pulmonary:      Effort: Pulmonary effort is normal.   Musculoskeletal:      Right shoulder: Tenderness and bony tenderness present. No deformity, effusion, laceration or crepitus. Decreased range of motion. Normal strength. Normal pulse.      Right  upper arm: Tenderness and bony tenderness present. No swelling, edema, deformity or lacerations.   Skin:     General: Skin is warm and dry.      Findings: No rash.   Neurological:      Mental Status: She is alert.   Psychiatric:         Mood and Affect: Mood normal.         Behavior: Behavior normal. Behavior is cooperative.         Thought Content: Thought content normal.         Judgment: Judgment normal.         Assessment:       1. Acute pain of right shoulder    2. Fall at home, initial encounter        Plan:       Keyona was seen today for shoulder pain.    Diagnoses and all orders for this visit:    Acute pain of right shoulder  -     X-Ray Humerus 2 View Right; Future  -     X-ray Shoulder 2 or More Views Right; Future    Fall at home, initial encounter  -     X-Ray Humerus 2 View Right; Future  -     X-ray Shoulder 2 or More Views Right; Future      Patient taking prescribed pain meds from pain mgt  Will get xray  Recommend rest, ice  Shoulder immobilizer given  Orthopedics if not improving

## 2023-10-26 ENCOUNTER — TELEPHONE (OUTPATIENT)
Dept: HEMATOLOGY/ONCOLOGY | Facility: CLINIC | Age: 78
End: 2023-10-26
Payer: MEDICARE

## 2023-10-26 NOTE — PROGRESS NOTES
Please let patient know that arthritic changes are seen in the right arm and shoulder. No other abnormal findings seen on xray.    Thank you,   Tiarra Mg - LUIS DANIELP -ASHLYN  Hematology/Oncology

## 2023-10-26 NOTE — TELEPHONE ENCOUNTER
----- Message from Drea Mg NP sent at 10/26/2023  9:14 AM CDT -----  Please let patient know that arthritic changes are seen in the right arm and shoulder. No other abnormal findings seen on xray.    Thank you,   Tiarra Mg - FNP -C  Hematology/Oncology

## 2023-11-22 ENCOUNTER — LAB VISIT (OUTPATIENT)
Dept: LAB | Facility: HOSPITAL | Age: 78
End: 2023-11-22
Attending: NURSE PRACTITIONER
Payer: MEDICARE

## 2023-11-22 DIAGNOSIS — E53.8 B12 DEFICIENCY: ICD-10-CM

## 2023-11-22 DIAGNOSIS — D53.9 MACROCYTIC ANEMIA: ICD-10-CM

## 2023-11-22 DIAGNOSIS — Z86.2 HISTORY OF IRON DEFICIENCY ANEMIA: ICD-10-CM

## 2023-11-22 LAB
ALBUMIN SERPL BCP-MCNC: 3.8 G/DL (ref 3.5–5.2)
ALP SERPL-CCNC: 59 U/L (ref 55–135)
ALT SERPL W/O P-5'-P-CCNC: 12 U/L (ref 10–44)
ANION GAP SERPL CALC-SCNC: 10 MMOL/L (ref 8–16)
AST SERPL-CCNC: 18 U/L (ref 10–40)
BASOPHILS # BLD AUTO: 0.06 K/UL (ref 0–0.2)
BASOPHILS NFR BLD: 1.3 % (ref 0–1.9)
BILIRUB SERPL-MCNC: 0.4 MG/DL (ref 0.1–1)
BUN SERPL-MCNC: 16 MG/DL (ref 8–23)
CALCIUM SERPL-MCNC: 9 MG/DL (ref 8.7–10.5)
CHLORIDE SERPL-SCNC: 100 MMOL/L (ref 95–110)
CO2 SERPL-SCNC: 27 MMOL/L (ref 23–29)
CREAT SERPL-MCNC: 1 MG/DL (ref 0.5–1.4)
DIFFERENTIAL METHOD: ABNORMAL
EOSINOPHIL # BLD AUTO: 0.3 K/UL (ref 0–0.5)
EOSINOPHIL NFR BLD: 5.7 % (ref 0–8)
ERYTHROCYTE [DISTWIDTH] IN BLOOD BY AUTOMATED COUNT: 13.8 % (ref 11.5–14.5)
EST. GFR  (NO RACE VARIABLE): 57 ML/MIN/1.73 M^2
FERRITIN SERPL-MCNC: 72 NG/ML (ref 20–300)
GLUCOSE SERPL-MCNC: 109 MG/DL (ref 70–110)
HCT VFR BLD AUTO: 33.7 % (ref 37–48.5)
HGB BLD-MCNC: 10.6 G/DL (ref 12–16)
IMM GRANULOCYTES # BLD AUTO: 0.01 K/UL (ref 0–0.04)
IMM GRANULOCYTES NFR BLD AUTO: 0.2 % (ref 0–0.5)
IRON SERPL-MCNC: 46 UG/DL (ref 30–160)
LYMPHOCYTES # BLD AUTO: 1.9 K/UL (ref 1–4.8)
LYMPHOCYTES NFR BLD: 40.7 % (ref 18–48)
MCH RBC QN AUTO: 31.9 PG (ref 27–31)
MCHC RBC AUTO-ENTMCNC: 31.5 G/DL (ref 32–36)
MCV RBC AUTO: 102 FL (ref 82–98)
MONOCYTES # BLD AUTO: 0.3 K/UL (ref 0.3–1)
MONOCYTES NFR BLD: 7 % (ref 4–15)
NEUTROPHILS # BLD AUTO: 2.1 K/UL (ref 1.8–7.7)
NEUTROPHILS NFR BLD: 45.1 % (ref 38–73)
NRBC BLD-RTO: 0 /100 WBC
PLATELET # BLD AUTO: 222 K/UL (ref 150–450)
PMV BLD AUTO: 9.3 FL (ref 9.2–12.9)
POTASSIUM SERPL-SCNC: 4 MMOL/L (ref 3.5–5.1)
PROT SERPL-MCNC: 6.7 G/DL (ref 6–8.4)
RBC # BLD AUTO: 3.32 M/UL (ref 4–5.4)
SATURATED IRON: 13 % (ref 20–50)
SODIUM SERPL-SCNC: 137 MMOL/L (ref 136–145)
TOTAL IRON BINDING CAPACITY: 364 UG/DL (ref 250–450)
TRANSFERRIN SERPL-MCNC: 246 MG/DL (ref 200–375)
VIT B12 SERPL-MCNC: 395 PG/ML (ref 210–950)
WBC # BLD AUTO: 4.72 K/UL (ref 3.9–12.7)

## 2023-11-22 PROCEDURE — 80053 COMPREHEN METABOLIC PANEL: CPT | Mod: HCNC | Performed by: NURSE PRACTITIONER

## 2023-11-22 PROCEDURE — 36415 COLL VENOUS BLD VENIPUNCTURE: CPT | Mod: HCNC,PN | Performed by: NURSE PRACTITIONER

## 2023-11-22 PROCEDURE — 84466 ASSAY OF TRANSFERRIN: CPT | Mod: HCNC | Performed by: NURSE PRACTITIONER

## 2023-11-22 PROCEDURE — 85025 COMPLETE CBC W/AUTO DIFF WBC: CPT | Mod: HCNC | Performed by: NURSE PRACTITIONER

## 2023-11-22 PROCEDURE — 83540 ASSAY OF IRON: CPT | Mod: HCNC | Performed by: NURSE PRACTITIONER

## 2023-11-22 PROCEDURE — 82728 ASSAY OF FERRITIN: CPT | Mod: HCNC | Performed by: NURSE PRACTITIONER

## 2023-11-22 PROCEDURE — 82607 VITAMIN B-12: CPT | Mod: HCNC | Performed by: NURSE PRACTITIONER

## 2023-11-27 ENCOUNTER — HOSPITAL ENCOUNTER (OUTPATIENT)
Dept: RADIOLOGY | Facility: HOSPITAL | Age: 78
Discharge: HOME | End: 2023-11-27
Attending: NURSE PRACTITIONER
Payer: MEDICARE

## 2023-11-27 ENCOUNTER — OFFICE VISIT (OUTPATIENT)
Dept: HEMATOLOGY/ONCOLOGY | Facility: CLINIC | Age: 78
End: 2023-11-27
Payer: MEDICARE

## 2023-11-27 VITALS
HEART RATE: 101 BPM | HEIGHT: 62 IN | SYSTOLIC BLOOD PRESSURE: 125 MMHG | OXYGEN SATURATION: 95 % | TEMPERATURE: 98 F | WEIGHT: 104.5 LBS | DIASTOLIC BLOOD PRESSURE: 73 MMHG | BODY MASS INDEX: 19.23 KG/M2

## 2023-11-27 DIAGNOSIS — M25.511 ACUTE PAIN OF RIGHT SHOULDER: Primary | ICD-10-CM

## 2023-11-27 DIAGNOSIS — E53.8 B12 DEFICIENCY: ICD-10-CM

## 2023-11-27 DIAGNOSIS — R76.8 ELEVATED SERUM IMMUNOGLOBULIN FREE LIGHT CHAIN LEVEL: ICD-10-CM

## 2023-11-27 DIAGNOSIS — M25.511 ACUTE PAIN OF RIGHT SHOULDER: ICD-10-CM
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
## 2023-11-27 DIAGNOSIS — D50.9 IRON DEFICIENCY ANEMIA, UNSPECIFIED IRON DEFICIENCY ANEMIA TYPE: ICD-10-CM

## 2023-11-27 DIAGNOSIS — D53.9 MACROCYTIC ANEMIA: Primary | ICD-10-CM

## 2023-11-27 PROCEDURE — 1159F PR MEDICATION LIST DOCUMENTED IN MEDICAL RECORD: ICD-10-PCS | Mod: HCNC,CPTII,S$GLB, | Performed by: NURSE PRACTITIONER

## 2023-11-27 PROCEDURE — 99999 PR PBB SHADOW E&M-EST. PATIENT-LVL IV: ICD-10-PCS | Mod: PBBFAC,HCNC,, | Performed by: NURSE PRACTITIONER

## 2023-11-27 PROCEDURE — 99214 OFFICE O/P EST MOD 30 MIN: CPT | Mod: HCNC,S$GLB,, | Performed by: NURSE PRACTITIONER

## 2023-11-27 PROCEDURE — 3078F PR MOST RECENT DIASTOLIC BLOOD PRESSURE < 80 MM HG: ICD-10-PCS | Mod: HCNC,CPTII,S$GLB, | Performed by: NURSE PRACTITIONER

## 2023-11-27 PROCEDURE — 99214 PR OFFICE/OUTPT VISIT, EST, LEVL IV, 30-39 MIN: ICD-10-PCS | Mod: HCNC,S$GLB,, | Performed by: NURSE PRACTITIONER

## 2023-11-27 PROCEDURE — 73030 X-RAY EXAM OF SHOULDER: CPT | Mod: 26,HCNC,RT, | Performed by: RADIOLOGY

## 2023-11-27 PROCEDURE — 3074F PR MOST RECENT SYSTOLIC BLOOD PRESSURE < 130 MM HG: ICD-10-PCS | Mod: HCNC,CPTII,S$GLB, | Performed by: NURSE PRACTITIONER

## 2023-11-27 PROCEDURE — 73030 XR SHOULDER COMPLETE 2 OR MORE VIEWS RIGHT: ICD-10-PCS | Mod: 26,HCNC,RT, | Performed by: RADIOLOGY

## 2023-11-27 PROCEDURE — 3288F FALL RISK ASSESSMENT DOCD: CPT | Mod: HCNC,CPTII,S$GLB, | Performed by: NURSE PRACTITIONER

## 2023-11-27 PROCEDURE — 1160F RVW MEDS BY RX/DR IN RCRD: CPT | Mod: HCNC,CPTII,S$GLB, | Performed by: NURSE PRACTITIONER

## 2023-11-27 PROCEDURE — 3074F SYST BP LT 130 MM HG: CPT | Mod: HCNC,CPTII,S$GLB, | Performed by: NURSE PRACTITIONER

## 2023-11-27 PROCEDURE — 99999 PR PBB SHADOW E&M-EST. PATIENT-LVL IV: CPT | Mod: PBBFAC,HCNC,, | Performed by: NURSE PRACTITIONER

## 2023-11-27 PROCEDURE — 1125F PR PAIN SEVERITY QUANTIFIED, PAIN PRESENT: ICD-10-PCS | Mod: HCNC,CPTII,S$GLB, | Performed by: NURSE PRACTITIONER

## 2023-11-27 PROCEDURE — 1100F PTFALLS ASSESS-DOCD GE2>/YR: CPT | Mod: HCNC,CPTII,S$GLB, | Performed by: NURSE PRACTITIONER

## 2023-11-27 PROCEDURE — 3078F DIAST BP <80 MM HG: CPT | Mod: HCNC,CPTII,S$GLB, | Performed by: NURSE PRACTITIONER

## 2023-11-27 PROCEDURE — 3288F PR FALLS RISK ASSESSMENT DOCUMENTED: ICD-10-PCS | Mod: HCNC,CPTII,S$GLB, | Performed by: NURSE PRACTITIONER

## 2023-11-27 PROCEDURE — 1159F MED LIST DOCD IN RCRD: CPT | Mod: HCNC,CPTII,S$GLB, | Performed by: NURSE PRACTITIONER

## 2023-11-27 PROCEDURE — 1100F PR PT FALLS ASSESS DOC 2+ FALLS/FALL W/INJURY/YR: ICD-10-PCS | Mod: HCNC,CPTII,S$GLB, | Performed by: NURSE PRACTITIONER

## 2023-11-27 PROCEDURE — 1125F AMNT PAIN NOTED PAIN PRSNT: CPT | Mod: HCNC,CPTII,S$GLB, | Performed by: NURSE PRACTITIONER

## 2023-11-27 PROCEDURE — 1160F PR REVIEW ALL MEDS BY PRESCRIBER/CLIN PHARMACIST DOCUMENTED: ICD-10-PCS | Mod: HCNC,CPTII,S$GLB, | Performed by: NURSE PRACTITIONER

## 2023-11-27 PROCEDURE — 73030 X-RAY EXAM OF SHOULDER: CPT | Mod: TC,HCNC,PN,RT

## 2023-11-27 RX ORDER — CLOTRIMAZOLE AND BETAMETHASONE DIPROPIONATE 10; .64 MG/G; MG/G
CREAM TOPICAL
COMMUNITY
Start: 2023-11-09

## 2023-11-27 NOTE — PROGRESS NOTES
Subjective:      Patient ID: Keyona Dwyer is a 78 y.o. female.    Chief Complaint: right shoulder pain    HPI:  77 yo female who presents today for further evaluation and recommendations of iron deficiency anemia.  She has been referred to the hematology by her PCP, Dr. Ralf Isidro.  Had diagnosis of acute pancreatitis requiring hospitalization in 6/2022 with complaints of abdominal pain.  Other chronic diagnosis include anxiety, chronic back pain, hyperlipidemia, htn, osteoporosis, RVT, and restless leg syndrome.       Patient states that she got bit by a brown recluse and got sick due to antibiotics and has some weight loss.  States that she was on cephalexin 500 mg PO TID and caused stomach distress with weight loss and episodic nausea/vomiting.      Is a current smoker since she was 12 years old - 1 pack per day for 60 years. Now smokes 3-10 per day.  She denies alcohol use.      She denies f/c/ns or lymphadenopathy.       Interval History:  10/10/2022: With hospitalization in 9/2022 d/t pneumonia, NSTEMI, and GI bleed.  EGD done as well as bleeding scan and found with no active bleeding although patient did have dark stools during hospitalizations with a drop in hgb requiring blood transfusion.  She was unable to tolerate GI prep for colonoscopy.  At last visit work up for anemia found with elevated MMA.  She was placed on B12 2000 mcg PO daily.  Also found with LOR started on ferrous sulfate 325 mg PO and is currently taking iron tablets every other day.  States that she is taking stool softeners daily.  States has not noted dark stools since hospitalization.  3+ blood found in urine during hospitalizations     2/9/2023 Had surgery to face to remove skin cancer one week ago - part of left side nose had to be removed - done by Dr. Aguilar Wallace dermatology.  Currently with bandages on her face that are CDI.  Has not noticed any abnormal bleeding.  States that she has been off of her iron tablets this  past week.  Was taking every other day.  Is taking B12 tablets daily.  States that when she takes the oral iron she has constipation requiring stool softeners.      5/25/2023 She was to continue B12 daily and continue oral iron supplements every other day since her last visit in 2/2023.  Presents today to evaluate response.   Denies dark stools.  Denies any unusual bleeding. Has gained 8 lbs.  States that she is feeling better then she has in a long time.       Interval History:  8/31/2023  Has continued ferrous sulfate 325 mg PO daily and surjit B complex daily supplement daily.  States that she has reduced her smoking to less then a pack/day.  States that she took her iron tablet after doing labs on 8/30/2023.  Noted now with elevated iron. Slight macrocytic anemia - improved.  States that she feels better then she has in a long time.     Interval History  11/27/2023 At last visit in 8/2023 we stopped oral iron supplement and continued Surjit B complex daily.  Presents today with stable chronic anemia with noted macrocytosis present for the past year - stable. Saturated iron of 13%.  C/o right shoulder pain due to right should pain and decreased rom from fall that occurred at least 6 weeks ago or longer.  Had xray 10/25/2023 done just showing arthritis. Denies any abnormal bleeding.          Social History     Socioeconomic History    Marital status:      Spouse name: elizabeth    Number of children: 3   Occupational History    Occupation: retired   Tobacco Use    Smoking status: Every Day     Current packs/day: 0.50     Average packs/day: 0.5 packs/day for 63.0 years (31.5 ttl pk-yrs)     Types: Cigarettes    Smokeless tobacco: Never    Tobacco comments:     started age of  11    Substance and Sexual Activity    Alcohol use: No    Drug use: No    Sexual activity: Yes     Partners: Male     Social Determinants of Health     Financial Resource Strain: Low Risk  (3/6/2023)    Overall Financial Resource Strain  (CARDIA)     Difficulty of Paying Living Expenses: Not hard at all   Food Insecurity: No Food Insecurity (3/6/2023)    Hunger Vital Sign     Worried About Running Out of Food in the Last Year: Never true     Ran Out of Food in the Last Year: Never true   Transportation Needs: No Transportation Needs (3/6/2023)    PRAPARE - Transportation     Lack of Transportation (Medical): No     Lack of Transportation (Non-Medical): No   Physical Activity: Insufficiently Active (3/6/2023)    Exercise Vital Sign     Days of Exercise per Week: 7 days     Minutes of Exercise per Session: 20 min   Stress: No Stress Concern Present (3/6/2023)    [employer] - Occupational Stress Questionnaire     Feeling of Stress : Not at all   Social Connections: Moderately Integrated (3/6/2023)    Social Connection and Isolation Panel [NHANES]     Frequency of Communication with Friends and Family: Three times a week     Frequency of Social Gatherings with Friends and Family: More than three times a week     Attends [employer] Services: More than 4 times per year     Active Member of Clubs or Organizations: No     Attends Club or Organization Meetings: Never     Marital Status:    Housing Stability: Low Risk  (3/6/2023)    Housing Stability Vital Sign     Unable to Pay for Housing in the Last Year: No     Number of Places Lived in the Last Year: 1     Unstable Housing in the Last Year: No       Family History   Problem Relation Age of Onset    Cancer Mother     Breast cancer Mother     Hyperlipidemia Father     Heart failure Father     Heart disease Brother     Brain cancer Brother     Stroke Brother     Alcohol abuse Brother        Past Surgical History:   Procedure Laterality Date    ANGIOGRAPHY OF LOWER EXTREMITY N/A 4/12/2021    Procedure: ANGIOGRAM, LOWER EXTREMITY/left leg;  Surgeon: Maico Adkins MD;  Location: [facility] CATH LAB;  Service: Vascular;  Laterality: N/A;  req 1130 start time/Rm A243A    AORTOGRAPHY  WITH SERIALOGRAPHY N/A 4/14/2021    Procedure: AORTOGRAM, WITH RUNOFF;  Surgeon: Maico Adkins MD;  Location: [facility] CATH LAB;  Service: Vascular;  Laterality: N/A;  left iliac stent with shockwave/req 1030 start    CATARACT EXTRACTION W/  INTRAOCULAR LENS IMPLANT Bilateral 8/ 9 2017    ESOPHAGOGASTRODUODENOSCOPY N/A 9/13/2022    Procedure: EGD (ESOPHAGOGASTRODUODENOSCOPY);  Surgeon: Fern Rasmussen MD;  Location: [facility] ENDO;  Service: Endoscopy;  Laterality: N/A;    GALLBLADDER SURGERY      HYSTERECTOMY  1972    LUNG SURGERY Right age 15    lung collpase    TOE AMPUTATION Left 4/15/2021    Procedure: AMPUTATION, TOE;  Surgeon: Taylor Anderson DPM;  Location: [facility] OR;  Service: Podiatry;  Laterality: Left;  Hallux (Great Toe)    TOTAL HIP ARTHROPLASTY Bilateral     TOTAL KNEE ARTHROPLASTY Bilateral        Past Medical History:   Diagnosis Date    Acute diastolic heart failure 09/15/2022    Acute respiratory failure with hypoxia and hypercarbia 09/14/2022    Anemia     Anxiety     Cancer     Cataract     Chronic back pain     Dr. Guzman    Fibrocystic breast     Glaucoma     Hyperlipemia     Hypertension     Idiopathic acute pancreatitis 06/18/2022    NSTEMI (non-ST elevated myocardial infarction) 09/11/2022    Osteoarthritis     Osteoporosis     Rheumatology/on Prolia    Pneumonia     Polyneuropathy     PVD (peripheral vascular disease) 04/12/2021    Restless leg syndrome     Tobacco dependence     Trouble in sleeping        Review of Systems   Constitutional: Negative.    HENT: Negative.     Eyes: Negative.    Respiratory: Negative.     Cardiovascular: Negative.    Gastrointestinal: Negative.    Endocrine: Negative.    Genitourinary: Negative.    Musculoskeletal:  Positive for arthralgias.   Skin: Negative.    Allergic/Immunologic: Negative.    Neurological: Negative.    Hematological: Negative.    Psychiatric/Behavioral: Negative.            Medication List with Changes/Refills   Current Medications     ASPIRIN (ECOTRIN) 81 MG EC TABLET    Take 1 tablet (81 mg total) by mouth once daily.    AZELASTINE (ASTELIN) 137 MCG (0.1 %) NASAL SPRAY    1 spray (137 mcg total) by Nasal route 2 (two) times daily.    B COMPLEX VITAMINS CAPSULE    Take 1 capsule by mouth once daily.    BUPROPION (WELLBUTRIN) 75 MG TABLET    Take 1 tablet (75 mg total) by mouth 2 (two) times daily.    BUSPIRONE (BUSPAR) 10 MG TABLET    TAKE 1 TABLET BY MOUTH TWICE DAILY    CIPROFLOXACIN HCL (CIPRO) 500 MG TABLET    Take 500 mg by mouth 2 (two) times daily.    CLOPIDOGREL (PLAVIX) 75 MG TABLET    Take 1 tablet (75 mg total) by mouth once daily.    CLOTRIMAZOLE-BETAMETHASONE 1-0.05% (LOTRISONE) CREAM    Apply to external ear with finger once a week    DOCUSATE SODIUM (COLACE) 100 MG CAPSULE    Take 1 capsule (100 mg total) by mouth 2 (two) times daily.    DULOXETINE (CYMBALTA) 30 MG CAPSULE    Take 30 mg by mouth once daily.    ERGOCALCIFEROL (ERGOCALCIFEROL) 50,000 UNIT CAP    TAKE 1 CAPSULE BY MOUTH EVERY WEEK    FUROSEMIDE (LASIX) 20 MG TABLET    Take 1 tablet (20 mg total) by mouth daily as needed (edema).    GABAPENTIN (NEURONTIN) 600 MG TABLET        GENTAMICIN (GARAMYCIN) 0.3 % OPHTHALMIC SOLUTION    4 drops into right ear twice daily for 2 weeks    HYDROCODONE-ACETAMINOPHEN (NORCO)  MG PER TABLET    Take 1 tablet by mouth 3 (three) times daily as needed.    IPRATROPIUM (ATROVENT) 42 MCG (0.06 %) NASAL SPRAY    2 sprays by Each Nostril route 2 (two) times daily.    OFLOXACIN (FLOXIN) 0.3 % OTIC SOLUTION    instill 5 DROPS into the right ear IN THE MORNING and before bedtime FOR 10 DAYS    OMEPRAZOLE (PRILOSEC) 40 MG CAPSULE    Take 1 capsule (40 mg total) by mouth every evening.    ON-Q PAIN PUMP    by Misc.(Non-Drug; Combo Route) route.    RANOLAZINE (RANEXA) 500 MG TB12    Take 1 tablet (500 mg total) by mouth 2 (two) times daily.    ROSUVASTATIN (CRESTOR) 5 MG TABLET    TAKE 1 TABLET BY MOUTH ONCE A DAY        Objective:     Vitals:     11/27/23 0846   BP: 125/73   Pulse: 101   Temp: 97.7 °F (36.5 °C)       Physical Exam  Vitals reviewed.   Constitutional:       Appearance: Normal appearance.   HENT:      Head: Normocephalic and atraumatic.      Right Ear: External ear normal.      Left Ear: External ear normal.   Cardiovascular:      Rate and Rhythm: Normal rate and regular rhythm.      Heart sounds: Normal heart sounds, S1 normal and S2 normal.   Pulmonary:      Effort: Pulmonary effort is normal.      Breath sounds: Normal breath sounds.   Abdominal:      General: There is no distension.   Musculoskeletal:      Right shoulder: Tenderness present. Decreased range of motion.      Cervical back: Normal range of motion.   Skin:     General: Skin is warm and dry.   Neurological:      General: No focal deficit present.      Mental Status: She is alert and oriented to person, place, and time.   Psychiatric:         Attention and Perception: Attention and perception normal.         Mood and Affect: Mood and affect normal.         Speech: Speech normal.         Behavior: Behavior normal. Behavior is cooperative.         Thought Content: Thought content normal.         Cognition and Memory: Cognition and memory normal.         Judgment: Judgment normal.         Assessment:     Problem List Items Addressed This Visit          Oncology    Anemia    Relevant Orders    CBC Auto Differential    Comprehensive Metabolic Panel    Iron and TIBC    Ferritin       Endocrine    B12 deficiency    Relevant Orders    CBC Auto Differential     Other Visit Diagnoses       Macrocytic anemia    -  Primary    Relevant Orders    CBC Auto Differential    Comprehensive Metabolic Panel    Elevated serum immunoglobulin free light chain level        Relevant Orders    CBC Auto Differential    Comprehensive Metabolic Panel    Immunoglobulin Free LT Chains Blood    Fall, sequela        Relevant Orders    X-Ray Shoulder Trauma 3 view Right    Acute pain of right shoulder         Relevant Orders    X-Ray Shoulder Trauma 3 view Right            Lab Results   Component Value Date    WBC 4.72 11/22/2023    RBC 3.32 (L) 11/22/2023    HGB 10.6 (L) 11/22/2023    HCT 33.7 (L) 11/22/2023     (H) 11/22/2023    MCH 31.9 (H) 11/22/2023    MCHC 31.5 (L) 11/22/2023    RDW 13.8 11/22/2023     11/22/2023    MPV 9.3 11/22/2023    GRAN 2.1 11/22/2023    GRAN 45.1 11/22/2023    LYMPH 1.9 11/22/2023    LYMPH 40.7 11/22/2023    MONO 0.3 11/22/2023    MONO 7.0 11/22/2023    EOS 0.3 11/22/2023    BASO 0.06 11/22/2023    EOSINOPHIL 5.7 11/22/2023    BASOPHIL 1.3 11/22/2023      Lab Results   Component Value Date     11/22/2023    K 4.0 11/22/2023     11/22/2023    CO2 27 11/22/2023    BUN 16 11/22/2023    CREATININE 1.0 11/22/2023    CALCIUM 9.0 11/22/2023    ANIONGAP 10 11/22/2023    ESTGFRAFRICA >60.0 06/27/2022    EGFRNONAA >60.0 06/27/2022     Lab Results   Component Value Date    ALT 12 11/22/2023    AST 18 11/22/2023    ALKPHOS 59 11/22/2023    BILITOT 0.4 11/22/2023     Lab Results   Component Value Date    UIBC 222 01/30/2006    IRON 46 11/22/2023    TRANSFERRIN 246 11/22/2023    TIBC 364 11/22/2023    FESATURATED 13 (L) 11/22/2023      Lab Results   Component Value Date    FERRITIN 72 11/22/2023     Lab Results   Component Value Date    BHETTALM53 395 11/22/2023     Component Ref Range & Units 1 yr ago   [place] Free Light Chains 0.33 - 1.94 mg/dL 6.15 High    [place] Free Light Chains 0.57 - 2.63 mg/dL 3.66 High    Kappa/Lambda FLC Ratio 0.26 - 1.65 1.68 High        Plan:   Macrocytic anemia  -     CBC Auto Differential; Future; Expected date: 11/27/2023  -     Comprehensive Metabolic Panel; Future; Expected date: 11/27/2023    B12 deficiency  -     CBC Auto Differential; Future; Expected date: 11/27/2023    Iron deficiency anemia, unspecified iron deficiency anemia type  -     CBC Auto Differential; Future; Expected date: 11/27/2023  -     Comprehensive Metabolic Panel; Future;  Expected date: 11/27/2023  -     Iron and TIBC; Future; Expected date: 11/27/2023  -     Ferritin; Future; Expected date: 11/27/2023    Elevated serum immunoglobulin free light chain level  -     CBC Auto Differential; Future; Expected date: 11/27/2023  -     Comprehensive Metabolic Panel; Future; Expected date: 11/27/2023  -     Immunoglobulin Free LT Chains Blood; Future; Expected date: 11/27/2023    Fall, sequela  -     X-Ray Shoulder Trauma 3 view Right; Future; Expected date: 11/27/2023    Acute pain of right shoulder  -     X-Ray Shoulder Trauma 3 view Right; Future; Expected date: 11/27/2023    Restart oral iron twice weekly on Tues/Thurs.        Med Onc Chart Routing      Follow up with physician    Follow up with [facility] 3 months. in [place] - in person visit with labs prior - prefers early appointments   Infusion scheduling note   n/a   Injection scheduling note n/a   Labs   Scheduling:  Preferred lab: [facility]  Lab interval:  cbc,iron studies, flc, cmp   Imaging   Right shoulder xray   Pharmacy appointment No pharmacy appointment needed      Other referrals       No additional referrals needed  n/a         Anemia and macrocytosis is stable.  Continue Patrick B complex.  Restart ferrous sulfate 325 mg PO twice weekly - Tues/Thursday.  Xray shoulder today.  Will refer to ortho if no abnormalities found on Xray for more intensive workup.  We discuss possible bmbx in the future however due to stability currently will continue to monitor.     Total time spent on encounter: 35 minutes     Collaborating Provider:  Dr. Kris Alcocer    Thank You,  LUIS DANIEL WeberP-C  Hematology Oncology

## 2023-11-29 ENCOUNTER — TELEPHONE (OUTPATIENT)
Dept: HEMATOLOGY/ONCOLOGY | Facility: CLINIC | Age: 78
End: 2023-11-29
Payer: MEDICARE

## 2023-11-29 NOTE — TELEPHONE ENCOUNTER
----- Message from Alyssa Snyder MA sent at 11/27/2023  4:21 PM CST -----    ----- Message -----  From: Drea Mg NP  Sent: 11/27/2023   9:44 AM CST  To: Saurav Shepard Staff    Can we please get her scheduled to see ortho? I placed a referral.      Drea Granger'

## 2023-12-11 ENCOUNTER — OFFICE VISIT (OUTPATIENT)
Dept: SPORTS MEDICINE | Facility: CLINIC | Age: 78
End: 2023-12-11
Payer: MEDICARE

## 2023-12-11 DIAGNOSIS — M25.511 ACUTE PAIN OF RIGHT SHOULDER: Primary | ICD-10-CM

## 2023-12-11 DIAGNOSIS — M75.41 SUBACROMIAL IMPINGEMENT OF RIGHT SHOULDER: ICD-10-CM

## 2023-12-11 DIAGNOSIS — M67.911 TENDINOPATHY OF RIGHT ROTATOR CUFF: ICD-10-CM

## 2023-12-11 PROCEDURE — 1125F AMNT PAIN NOTED PAIN PRSNT: CPT | Mod: HCNC,CPTII,S$GLB, | Performed by: STUDENT IN AN ORGANIZED HEALTH CARE EDUCATION/TRAINING PROGRAM

## 2023-12-11 PROCEDURE — 1125F PR PAIN SEVERITY QUANTIFIED, PAIN PRESENT: ICD-10-PCS | Mod: HCNC,CPTII,S$GLB, | Performed by: STUDENT IN AN ORGANIZED HEALTH CARE EDUCATION/TRAINING PROGRAM

## 2023-12-11 PROCEDURE — 3288F PR FALLS RISK ASSESSMENT DOCUMENTED: ICD-10-PCS | Mod: HCNC,CPTII,S$GLB, | Performed by: STUDENT IN AN ORGANIZED HEALTH CARE EDUCATION/TRAINING PROGRAM

## 2023-12-11 PROCEDURE — 1100F PTFALLS ASSESS-DOCD GE2>/YR: CPT | Mod: HCNC,CPTII,S$GLB, | Performed by: STUDENT IN AN ORGANIZED HEALTH CARE EDUCATION/TRAINING PROGRAM

## 2023-12-11 PROCEDURE — 1160F PR REVIEW ALL MEDS BY PRESCRIBER/CLIN PHARMACIST DOCUMENTED: ICD-10-PCS | Mod: HCNC,CPTII,S$GLB, | Performed by: STUDENT IN AN ORGANIZED HEALTH CARE EDUCATION/TRAINING PROGRAM

## 2023-12-11 PROCEDURE — 1100F PR PT FALLS ASSESS DOC 2+ FALLS/FALL W/INJURY/YR: ICD-10-PCS | Mod: HCNC,CPTII,S$GLB, | Performed by: STUDENT IN AN ORGANIZED HEALTH CARE EDUCATION/TRAINING PROGRAM

## 2023-12-11 PROCEDURE — 1159F PR MEDICATION LIST DOCUMENTED IN MEDICAL RECORD: ICD-10-PCS | Mod: HCNC,CPTII,S$GLB, | Performed by: STUDENT IN AN ORGANIZED HEALTH CARE EDUCATION/TRAINING PROGRAM

## 2023-12-11 PROCEDURE — 99999 PR PBB SHADOW E&M-EST. PATIENT-LVL V: CPT | Mod: PBBFAC,HCNC,, | Performed by: STUDENT IN AN ORGANIZED HEALTH CARE EDUCATION/TRAINING PROGRAM

## 2023-12-11 PROCEDURE — 1160F RVW MEDS BY RX/DR IN RCRD: CPT | Mod: HCNC,CPTII,S$GLB, | Performed by: STUDENT IN AN ORGANIZED HEALTH CARE EDUCATION/TRAINING PROGRAM

## 2023-12-11 PROCEDURE — 99204 OFFICE O/P NEW MOD 45 MIN: CPT | Mod: HCNC,S$GLB,, | Performed by: STUDENT IN AN ORGANIZED HEALTH CARE EDUCATION/TRAINING PROGRAM

## 2023-12-11 PROCEDURE — 3288F FALL RISK ASSESSMENT DOCD: CPT | Mod: HCNC,CPTII,S$GLB, | Performed by: STUDENT IN AN ORGANIZED HEALTH CARE EDUCATION/TRAINING PROGRAM

## 2023-12-11 PROCEDURE — 99204 PR OFFICE/OUTPT VISIT, NEW, LEVL IV, 45-59 MIN: ICD-10-PCS | Mod: HCNC,S$GLB,, | Performed by: STUDENT IN AN ORGANIZED HEALTH CARE EDUCATION/TRAINING PROGRAM

## 2023-12-11 PROCEDURE — 1159F MED LIST DOCD IN RCRD: CPT | Mod: HCNC,CPTII,S$GLB, | Performed by: STUDENT IN AN ORGANIZED HEALTH CARE EDUCATION/TRAINING PROGRAM

## 2023-12-11 PROCEDURE — 99999 PR PBB SHADOW E&M-EST. PATIENT-LVL V: ICD-10-PCS | Mod: PBBFAC,HCNC,, | Performed by: STUDENT IN AN ORGANIZED HEALTH CARE EDUCATION/TRAINING PROGRAM

## 2023-12-11 NOTE — PATIENT INSTRUCTIONS
Assessment:  Keyona Dwyer is a 78 y.o. female with a chief complaint of Pain and Injury of the Right Shoulder    Encounter Diagnoses   Name Primary?    Acute pain of right shoulder Yes    Subacromial impingement of right shoulder     Tendinopathy of right rotator cuff       Plan:  XR reviewed - mild degenerative changes about the right shoulder, with subtle cortical irregularity at the rotator cuff insertion.    Labs reviewed - A1c 5.1%, Cr 1.0, GFR 57, LFTs WNL  History and clinical exam is consistent with acute right shoulder pain due to subacromial impingement, rotator cuff tendinopathy.  Good strength of the rotator cuff, making it unlikely that there is a larger high-grade rotator cuff tear.    Diagnosis, treatment options, prognosis discussed in detail.    Recommend for conservative management.  Patient is not interested in injections or surgical intervention.    We will refer for physical therapy at [facility] here in [place].    Recommend to avoid NSAIDs given medical comorbidities, chronic kidney disease.    Continue regular pain medications, as prescribed by outside provider.  No medication changes at this time.    If not improving, or if pain worsening, we can consider for a corticosteroid injection to the shoulder, either the subacromial bursa or deeper into the glenohumeral joint.    Follow-up:  3 months or sooner if there are any problems between now and then.    Thank you for choosing [facility] and Dr. Vito Montgomery for your orthopedic & sports medicine care. It is our goal to provide you with exceptional care that will help keep you healthy, active, and get you back in the game.    Please do not hesitate to reach out to us via email, phone, or MyChart with any questions, concerns, or feedback.    If you are experiencing pain/discomfort ,or have questions after 5pm and would like to be connected to the [facility] on-call team, please  call this number and specify which Sports Medicine provider is treating you: [phone]

## 2023-12-11 NOTE — PROGRESS NOTES
Patient ID: Keyona Dwyer  YOB: 1945  MRN: [id number]    Chief Complaint: Pain and Injury of the Right Shoulder    Referred By: Drea Mg NP    Occupation: Retired    History of Present Illness: Keyona Dwyer is a 78 y.o. female who presents today with Pain and Injury of the Right Shoulder    She injured her right shoulder at the end of October 2023 when she tripped on her rug and fell onto her right shoulder with her arm at her side.  She has had constant pain and inability to lift her arm overhead since then.  The pain is on the lateral shoulder and down the upper lateral arm.  This is problematic for her because she has limited function from her left arm due to an old injury and subsequent surgery.  She is on many medications for other health problems.  She has not had any formal treatment for this shoulder.    Past Medical History:   Past Medical History:   Diagnosis Date    Acute diastolic heart failure 09/15/2022    Acute respiratory failure with hypoxia and hypercarbia 09/14/2022    Anemia     Anxiety     Cancer     Cataract     Chronic back pain     Dr. Guzman    Fibrocystic breast     Glaucoma     Hyperlipemia     Hypertension     Idiopathic acute pancreatitis 06/18/2022    NSTEMI (non-ST elevated myocardial infarction) 09/11/2022    Osteoarthritis     Osteoporosis     Rheumatology/on [place]    Pneumonia     Polyneuropathy     PVD (peripheral vascular disease) 04/12/2021    Restless leg syndrome     Tobacco dependence     Trouble in sleeping      Past Surgical History:   Procedure Laterality Date    ANGIOGRAPHY OF LOWER EXTREMITY N/A 04/12/2021    Procedure: ANGIOGRAM, LOWER EXTREMITY/left leg;  Surgeon: Maico Adkins MD;  Location: [facility] CATH LAB;  Service: Vascular;  Laterality: N/A;  req 1130 start time/Rm A243A    AORTOGRAPHY WITH SERIALOGRAPHY N/A 04/14/2021    Procedure: AORTOGRAM, WITH RUNOFF;  Surgeon: Maico Adkins MD;  Location: [facility] CATH LAB;  Service:  Vascular;  Laterality: N/A;  left iliac stent with shockwave/req 1030 start    CATARACT EXTRACTION W/  INTRAOCULAR LENS IMPLANT Bilateral 8/ 9 2017    ESOPHAGOGASTRODUODENOSCOPY N/A 09/13/2022    Procedure: EGD (ESOPHAGOGASTRODUODENOSCOPY);  Surgeon: Fern Rasmussen MD;  Location: [facility] ENDO;  Service: Endoscopy;  Laterality: N/A;    GALLBLADDER SURGERY      HYSTERECTOMY  1972    LUNG SURGERY Right age 15    lung collpase    SHOULDER SURGERY      TOE AMPUTATION Left 04/15/2021    Procedure: AMPUTATION, TOE;  Surgeon: Taylor Anderson DPM;  Location: [facility] OR;  Service: Podiatry;  Laterality: Left;  Hallux (Great Toe)    TOTAL HIP ARTHROPLASTY Bilateral     TOTAL KNEE ARTHROPLASTY Bilateral      Family History   Problem Relation Age of Onset    Cancer Mother     Breast cancer Mother     Hyperlipidemia Father     Heart failure Father     Heart disease Brother     Brain cancer Brother     Stroke Brother     Alcohol abuse Brother      Social History     Socioeconomic History    Marital status:      Spouse name: edward    Number of children: 3   Occupational History    Occupation: retired   Tobacco Use    Smoking status: Every Day     Current packs/day: 0.50     Average packs/day: 0.5 packs/day for 63.0 years (31.5 ttl pk-yrs)     Types: Cigarettes    Smokeless tobacco: Never    Tobacco comments:     started age of  11    Substance and Sexual Activity    Alcohol use: No    Drug use: No    Sexual activity: Yes     Partners: Male     Social Determinants of Health     Financial Resource Strain: Low Risk  (3/6/2023)    Overall Financial Resource Strain (CARDIA)     Difficulty of Paying Living Expenses: Not hard at all   Food Insecurity: No Food Insecurity (3/6/2023)    Hunger Vital Sign     Worried About Running Out of Food in the Last Year: Never true     Ran Out of Food in the Last Year: Never true   Transportation Needs: No Transportation Needs (3/6/2023)    PRAPARE - Transportation     Lack of Transportation  (Medical): No     Lack of Transportation (Non-Medical): No   Physical Activity: Insufficiently Active (3/6/2023)    Exercise Vital Sign     Days of Exercise per Week: 7 days     Minutes of Exercise per Session: 20 min   Stress: No Stress Concern Present (3/6/2023)    [employer] - Occupational Stress Questionnaire     Feeling of Stress : Not at all   Social Connections: Moderately Integrated (3/6/2023)    Social Connection and Isolation Panel [NHANES]     Frequency of Communication with Friends and Family: Three times a week     Frequency of Social Gatherings with Friends and Family: More than three times a week     Attends Qu[employer] Services: More than 4 times per year     Active Member of Clubs or Organizations: No     Attends Club or Organization Meetings: Never     Marital Status:    Housing Stability: Low Risk  (3/6/2023)    Housing Stability Vital Sign     Unable to Pay for Housing in the Last Year: No     Number of Places Lived in the Last Year: 1     Unstable Housing in the Last Year: No     Medication List with Changes/Refills   Current Medications    ASPIRIN (ECOTRIN) 81 MG EC TABLET    Take 1 tablet (81 mg total) by mouth once daily.    AZELASTINE (ASTELIN) 137 MCG (0.1 %) NASAL SPRAY    1 spray (137 mcg total) by Nasal route 2 (two) times daily.    B COMPLEX VITAMINS CAPSULE    Take 1 capsule by mouth once daily.    BUPROPION (WELLBUTRIN) 75 MG TABLET    Take 1 tablet (75 mg total) by mouth 2 (two) times daily.    BUSPIRONE (BUSPAR) 10 MG TABLET    TAKE 1 TABLET BY MOUTH TWICE DAILY    CIPROFLOXACIN HCL (CIPRO) 500 MG TABLET    Take 500 mg by mouth 2 (two) times daily.    CLOPIDOGREL (PLAVIX) 75 MG TABLET    Take 1 tablet (75 mg total) by mouth once daily.    CLOTRIMAZOLE-BETAMETHASONE 1-0.05% (LOTRISONE) CREAM    Apply to external ear with finger once a week    DOCUSATE SODIUM (COLACE) 100 MG CAPSULE    Take 1 capsule (100 mg total) by mouth 2 (two) times daily.     DULOXETINE (CYMBALTA) 30 MG CAPSULE    Take 30 mg by mouth once daily.    ERGOCALCIFEROL (ERGOCALCIFEROL) 50,000 UNIT CAP    TAKE 1 CAPSULE BY MOUTH EVERY WEEK    FUROSEMIDE (LASIX) 20 MG TABLET    Take 1 tablet (20 mg total) by mouth daily as needed (edema).    GABAPENTIN (NEURONTIN) 600 MG TABLET        GENTAMICIN (GARAMYCIN) 0.3 % OPHTHALMIC SOLUTION    4 drops into right ear twice daily for 2 weeks    HYDROCODONE-ACETAMINOPHEN (NORCO)  MG PER TABLET    Take 1 tablet by mouth 3 (three) times daily as needed.    IPRATROPIUM (ATROVENT) 42 MCG (0.06 %) NASAL SPRAY    2 sprays by Each Nostril route 2 (two) times daily.    OFLOXACIN (FLOXIN) 0.3 % OTIC SOLUTION    instill 5 DROPS into the right ear IN THE MORNING and before bedtime FOR 10 DAYS    OMEPRAZOLE (PRILOSEC) 40 MG CAPSULE    Take 1 capsule (40 mg total) by mouth every evening.    ON-Q PAIN PUMP    by Misc.(Non-Drug; Combo Route) route.    RANOLAZINE (RANEXA) 500 MG TB12    Take 1 tablet (500 mg total) by mouth 2 (two) times daily.    ROSUVASTATIN (CRESTOR) 5 MG TABLET    TAKE 1 TABLET BY MOUTH ONCE A DAY     Review of patient's allergies indicates:   Allergen Reactions    Indomethacin      Other reaction(s): Headache    K-flex Other (See Comments)     Pancreatitis     Latex, natural rubber Swelling    Penicillins      Other reaction(s): Vomiting    Patient tolerated Rocephin during the 4/2021 encounter and she states that she has tolerated Keflex in the past without issue.    Sulfa (sulfonamide antibiotics) Other (See Comments)     hallucinations    Tolmetin      Other reaction(s): Hives    Bactroban [mupirocin calcium] Rash     Burned skin        Physical Exam:   There is no height or weight on file to calculate BMI.    Physical Exam  Detailed MSK exam:     Right Shoulder:  Inspection: No swelling, erythema or ecchymosis.   Palpation: Tenderness to palpation lateral subacromial space  Range of motion: 155 deg Flexion         70 deg External Rotation  in Adduction         IR to Lumbar  Strength:  5/5 Abduction    5/5 External Rotation in Adduction    5/5 Internal Rotation   Special Tests: Positive Valdez-Rohith    Positive Neer's    Positive Speed's   N/V Exam:  Radial: Normal motor (EPL/thumbs up)              Normal sensory (dorsal hand)   Median: Normal motor (FPL/A-OK)      Normal sensory (thumb)   Ulnar:  Normal motor (Interossei/scissors-spread)     Normal sensory (5th finger)   LABC: Normal sensory (lateral forearm)   MABC: Normal sensory (medial forearm)   MC: Normal motor (elbow flexion)   Axillary: Normal motor/sensory (deltoid)  Normal radial and ulnar pulses, warm and well perfused with capillary refill < 2 sec       Imaging:  X-ray Shoulder 2 or More Views Right  Narrative: EXAMINATION:  XR SHOULDER COMPLETE 2 OR MORE VIEWS RIGHT    CLINICAL HISTORY:  Unspecified fall, sequelafall on shoulder, reduced range of motion, pain;    TECHNIQUE:  Standard radiography performed.  Four views.    COMPARISON:  None    FINDINGS:  Bone density and architecture are normal.  No acute findings.  Impression: No fractures.    Electronically signed by: Maximino Pandey MD  Date:    11/27/2023  Time:    09:37    Relevant imaging results were reviewed and interpreted by me and per my read:  Mild degenerative changes about the right shoulder.  There is subtle cortical irregularity of the footprint of the rotator cuff, likely supraspinatus.  Normal alignment overall.  No calcific tendinopathy.  No fractures or other acute abnormalities.    This was discussed with the patient and / or family today.     Patient Instructions   Assessment:  Keyona Dwyer is a 78 y.o. female with a chief complaint of Pain and Injury of the Right Shoulder    Encounter Diagnoses   Name Primary?    Acute pain of right shoulder Yes    Subacromial impingement of right shoulder     Tendinopathy of right rotator cuff       Plan:  XR reviewed - mild degenerative changes about the right shoulder,  with subtle cortical irregularity at the rotator cuff insertion.    Labs reviewed - A1c 5.1%, Cr 1.0, GFR 57, LFTs WNL  History and clinical exam is consistent with acute right shoulder pain due to subacromial impingement, rotator cuff tendinopathy.  Good strength of the rotator cuff, making it unlikely that there is a larger high-grade rotator cuff tear.    Diagnosis, treatment options, prognosis discussed in detail.    Recommend for conservative management.  Patient is not interested in injections or surgical intervention.    We will refer for physical therapy at [facility] here in [place].    Recommend to avoid NSAIDs given medical comorbidities, chronic kidney disease.    Continue regular pain medications, as prescribed by outside provider.  No medication changes at this time.    If not improving, or if pain worsening, we can consider for a corticosteroid injection to the shoulder, either the subacromial bursa or deeper into the glenohumeral joint.    Follow-up:  3 months or sooner if there are any problems between now and then.    Thank you for choosing [facility] and Dr. Vito Montgomery for your orthopedic & sports medicine care. It is our goal to provide you with exceptional care that will help keep you healthy, active, and get you back in the game.    Please do not hesitate to reach out to us via email, phone, or MyChart with any questions, concerns, or feedback.    If you are experiencing pain/discomfort ,or have questions after 5pm and would like to be connected to the [facility] on-call team, please call this number and specify which Sports Medicine provider is treating you: [phone]       A copy of today's visit note has been sent to the referring provider.           Vito Montgomery MD  Primary Care Sports Medicine    Disclaimer: This note was prepared using a voice recognition system and is likely to have sound alike errors within the  text.

## 2023-12-18 RX ORDER — ROSUVASTATIN CALCIUM 5 MG/1
TABLET, COATED ORAL
Qty: 90 TABLET | Refills: 2 | Status: ON HOLD | OUTPATIENT
Start: 2023-12-18 | End: 2024-03-21 | Stop reason: HOSPADM

## 2023-12-18 NOTE — TELEPHONE ENCOUNTER
Refill Routing Note   Medication(s) are not appropriate for processing by [facility] for the following reason(s):        Non-participating provider: also, pt will need new PCP soon    [facility] action(s):  Route      Medication Therapy Plan:         Appointments  past 12m or future 3m with PCP    Date Provider   Last Visit   10/25/2023 Mariaelena Fleming FNP-C   Next Visit   Visit date not found Mariaelena Fleming FNP-C   ED visits in past 90 days: 0        Note composed:12:14 PM 12/18/2023

## 2024-02-01 ENCOUNTER — TELEPHONE (OUTPATIENT)
Dept: INTERNAL MEDICINE | Facility: CLINIC | Age: 79
End: 2024-02-01
Payer: MEDICARE

## 2024-02-16 RX ORDER — CLOPIDOGREL BISULFATE 75 MG/1
75 TABLET ORAL DAILY
Qty: 90 TABLET | Refills: 1 | Status: SHIPPED | OUTPATIENT
Start: 2024-02-16 | End: 2024-05-24 | Stop reason: SDUPTHER

## 2024-02-16 RX ORDER — RANOLAZINE 500 MG/1
500 TABLET, EXTENDED RELEASE ORAL 2 TIMES DAILY
Qty: 180 TABLET | Refills: 1 | Status: SHIPPED | OUTPATIENT
Start: 2024-02-16 | End: 2024-05-17 | Stop reason: SDUPTHER

## 2024-02-27 ENCOUNTER — LAB VISIT (OUTPATIENT)
Dept: LAB | Facility: HOSPITAL | Age: 79
End: 2024-02-27
Attending: NURSE PRACTITIONER
Payer: MEDICARE

## 2024-02-27 DIAGNOSIS — E53.8 B12 DEFICIENCY: ICD-10-CM

## 2024-02-27 DIAGNOSIS — D50.9 IRON DEFICIENCY ANEMIA, UNSPECIFIED IRON DEFICIENCY ANEMIA TYPE: ICD-10-CM

## 2024-02-27 DIAGNOSIS — D53.9 MACROCYTIC ANEMIA: ICD-10-CM

## 2024-02-27 DIAGNOSIS — R76.8 ELEVATED SERUM IMMUNOGLOBULIN FREE LIGHT CHAIN LEVEL: ICD-10-CM

## 2024-02-27 LAB
ALBUMIN SERPL BCP-MCNC: 3.4 G/DL (ref 3.5–5.2)
ALP SERPL-CCNC: 52 U/L (ref 55–135)
ALT SERPL W/O P-5'-P-CCNC: 7 U/L (ref 10–44)
ANION GAP SERPL CALC-SCNC: 12 MMOL/L (ref 8–16)
AST SERPL-CCNC: 16 U/L (ref 10–40)
BASOPHILS # BLD AUTO: 0.05 K/UL (ref 0–0.2)
BASOPHILS NFR BLD: 1.2 % (ref 0–1.9)
BILIRUB SERPL-MCNC: 0.4 MG/DL (ref 0.1–1)
BUN SERPL-MCNC: 13 MG/DL (ref 8–23)
CALCIUM SERPL-MCNC: 9.2 MG/DL (ref 8.7–10.5)
CHLORIDE SERPL-SCNC: 101 MMOL/L (ref 95–110)
CO2 SERPL-SCNC: 25 MMOL/L (ref 23–29)
CREAT SERPL-MCNC: 0.9 MG/DL (ref 0.5–1.4)
DIFFERENTIAL METHOD BLD: ABNORMAL
EOSINOPHIL # BLD AUTO: 0.1 K/UL (ref 0–0.5)
EOSINOPHIL NFR BLD: 3.2 % (ref 0–8)
ERYTHROCYTE [DISTWIDTH] IN BLOOD BY AUTOMATED COUNT: 14.7 % (ref 11.5–14.5)
EST. GFR  (NO RACE VARIABLE): >60 ML/MIN/1.73 M^2
FERRITIN SERPL-MCNC: 65 NG/ML (ref 20–300)
GLUCOSE SERPL-MCNC: 108 MG/DL (ref 70–110)
HCT VFR BLD AUTO: 36.7 % (ref 37–48.5)
HGB BLD-MCNC: 11.4 G/DL (ref 12–16)
IMM GRANULOCYTES # BLD AUTO: 0.01 K/UL (ref 0–0.04)
IMM GRANULOCYTES NFR BLD AUTO: 0.2 % (ref 0–0.5)
IRON SERPL-MCNC: 247 UG/DL (ref 30–160)
LYMPHOCYTES # BLD AUTO: 1.7 K/UL (ref 1–4.8)
LYMPHOCYTES NFR BLD: 40.3 % (ref 18–48)
MCH RBC QN AUTO: 32.5 PG (ref 27–31)
MCHC RBC AUTO-ENTMCNC: 31.1 G/DL (ref 32–36)
MCV RBC AUTO: 105 FL (ref 82–98)
MONOCYTES # BLD AUTO: 0.4 K/UL (ref 0.3–1)
MONOCYTES NFR BLD: 8.1 % (ref 4–15)
NEUTROPHILS # BLD AUTO: 2 K/UL (ref 1.8–7.7)
NEUTROPHILS NFR BLD: 47 % (ref 38–73)
NRBC BLD-RTO: 0 /100 WBC
PLATELET # BLD AUTO: 193 K/UL (ref 150–450)
PMV BLD AUTO: 9 FL (ref 9.2–12.9)
POTASSIUM SERPL-SCNC: 3.6 MMOL/L (ref 3.5–5.1)
PROT SERPL-MCNC: 6.3 G/DL (ref 6–8.4)
RBC # BLD AUTO: 3.51 M/UL (ref 4–5.4)
SATURATED IRON: 78 % (ref 20–50)
SODIUM SERPL-SCNC: 138 MMOL/L (ref 136–145)
TOTAL IRON BINDING CAPACITY: 318 UG/DL (ref 250–450)
TRANSFERRIN SERPL-MCNC: 215 MG/DL (ref 200–375)
WBC # BLD AUTO: 4.32 K/UL (ref 3.9–12.7)

## 2024-02-27 PROCEDURE — 82728 ASSAY OF FERRITIN: CPT | Mod: HCNC | Performed by: NURSE PRACTITIONER

## 2024-02-27 PROCEDURE — 83540 ASSAY OF IRON: CPT | Mod: HCNC | Performed by: NURSE PRACTITIONER

## 2024-02-27 PROCEDURE — 80053 COMPREHEN METABOLIC PANEL: CPT | Mod: HCNC | Performed by: NURSE PRACTITIONER

## 2024-02-27 PROCEDURE — 85025 COMPLETE CBC W/AUTO DIFF WBC: CPT | Mod: HCNC | Performed by: NURSE PRACTITIONER

## 2024-02-27 PROCEDURE — 83521 IG LIGHT CHAINS FREE EACH: CPT | Mod: HCNC | Performed by: NURSE PRACTITIONER

## 2024-02-27 PROCEDURE — 36415 COLL VENOUS BLD VENIPUNCTURE: CPT | Mod: HCNC,PO | Performed by: NURSE PRACTITIONER

## 2024-02-28 LAB
KAPPA LC SER QL IA: 3.58 MG/DL (ref 0.33–1.94)
KAPPA LC/LAMBDA SER IA: 1.54 (ref 0.26–1.65)
LAMBDA LC SER QL IA: 2.32 MG/DL (ref 0.57–2.63)

## 2024-02-29 ENCOUNTER — OFFICE VISIT (OUTPATIENT)
Dept: HEMATOLOGY/ONCOLOGY | Facility: CLINIC | Age: 79
End: 2024-02-29
Payer: MEDICARE

## 2024-02-29 VITALS
SYSTOLIC BLOOD PRESSURE: 192 MMHG | HEIGHT: 62 IN | DIASTOLIC BLOOD PRESSURE: 82 MMHG | OXYGEN SATURATION: 98 % | HEART RATE: 87 BPM | WEIGHT: 104.94 LBS | BODY MASS INDEX: 19.31 KG/M2

## 2024-02-29 DIAGNOSIS — E53.8 B12 DEFICIENCY: ICD-10-CM

## 2024-02-29 DIAGNOSIS — E83.19 INCREASED STORAGE IRON: ICD-10-CM

## 2024-02-29 DIAGNOSIS — R76.8 ELEVATED SERUM IMMUNOGLOBULIN FREE LIGHT CHAIN LEVEL: ICD-10-CM

## 2024-02-29 DIAGNOSIS — D53.9 MACROCYTIC ANEMIA: Primary | ICD-10-CM

## 2024-02-29 PROCEDURE — 1101F PT FALLS ASSESS-DOCD LE1/YR: CPT | Mod: HCNC,CPTII,S$GLB, | Performed by: NURSE PRACTITIONER

## 2024-02-29 PROCEDURE — 3077F SYST BP >= 140 MM HG: CPT | Mod: HCNC,CPTII,S$GLB, | Performed by: NURSE PRACTITIONER

## 2024-02-29 PROCEDURE — 99214 OFFICE O/P EST MOD 30 MIN: CPT | Mod: HCNC,S$GLB,, | Performed by: NURSE PRACTITIONER

## 2024-02-29 PROCEDURE — 3079F DIAST BP 80-89 MM HG: CPT | Mod: HCNC,CPTII,S$GLB, | Performed by: NURSE PRACTITIONER

## 2024-02-29 PROCEDURE — 1159F MED LIST DOCD IN RCRD: CPT | Mod: HCNC,CPTII,S$GLB, | Performed by: NURSE PRACTITIONER

## 2024-02-29 PROCEDURE — 99999 PR PBB SHADOW E&M-EST. PATIENT-LVL IV: CPT | Mod: PBBFAC,HCNC,, | Performed by: NURSE PRACTITIONER

## 2024-02-29 PROCEDURE — 1160F RVW MEDS BY RX/DR IN RCRD: CPT | Mod: HCNC,CPTII,S$GLB, | Performed by: NURSE PRACTITIONER

## 2024-02-29 PROCEDURE — 1126F AMNT PAIN NOTED NONE PRSNT: CPT | Mod: HCNC,CPTII,S$GLB, | Performed by: NURSE PRACTITIONER

## 2024-02-29 PROCEDURE — 3288F FALL RISK ASSESSMENT DOCD: CPT | Mod: HCNC,CPTII,S$GLB, | Performed by: NURSE PRACTITIONER

## 2024-02-29 NOTE — PROGRESS NOTES
Subjective:      Patient ID: Keyona Dwyer is a 78 y.o. female.    Chief Complaint: no complaints    HPI:   77 yo female who presents today for further evaluation and recommendations of iron deficiency anemia.  She has been referred to the hematology by her PCP, Dr. Ralf Isidro.  Had diagnosis of acute pancreatitis requiring hospitalization in 6/2022 with complaints of abdominal pain.  Other chronic diagnosis include anxiety, chronic back pain, hyperlipidemia, htn, osteoporosis, RVT, and restless leg syndrome.       Patient states that she got bit by a brown recluse and got sick due to antibiotics and has some weight loss.  States that she was on cephalexin 500 mg PO TID and caused stomach distress with weight loss and episodic nausea/vomiting.      Is a current smoker since she was 12 years old - 1 pack per day for 60 years. Now smokes 3-10 per day.  She denies alcohol use.      She denies f/c/ns or lymphadenopathy.       Interval History:  10/10/2022: With hospitalization in 9/2022 d/t pneumonia, NSTEMI, and GI bleed.  EGD done as well as bleeding scan and found with no active bleeding although patient did have dark stools during hospitalizations with a drop in hgb requiring blood transfusion.  She was unable to tolerate GI prep for colonoscopy.  At last visit work up for anemia found with elevated MMA.  She was placed on B12 2000 mcg PO daily.  Also found with LOR started on ferrous sulfate 325 mg PO and is currently taking iron tablets every other day.  States that she is taking stool softeners daily.  States has not noted dark stools since hospitalization.  3+ blood found in urine during hospitalizations     2/9/2023 Had surgery to face to remove skin cancer one week ago - part of left side nose had to be removed - done by Dr. Aguilar Wallace dermatology.  Currently with bandages on her face that are CDI.  Has not noticed any abnormal bleeding.  States that she has been off of her iron tablets this past  week.  Was taking every other day.  Is taking B12 tablets daily.  States that when she takes the oral iron she has constipation requiring stool softeners.      5/25/2023 She was to continue B12 daily and continue oral iron supplements every other day since her last visit in 2/2023.  Presents today to evaluate response.   Denies dark stools.  Denies any unusual bleeding. Has gained 8 lbs.  States that she is feeling better then she has in a long time.       Interval History:  8/31/2023  Has continued ferrous sulfate 325 mg PO daily and surjit B complex daily supplement daily.  States that she has reduced her smoking to less then a pack/day.  States that she took her iron tablet after doing labs on 8/30/2023.  Noted now with elevated iron. Slight macrocytic anemia - improved.  States that she feels better then she has in a long time.      Interval History  11/27/2023 At last visit in 8/2023 we stopped oral iron supplement and continued Surjit B complex daily.  Presents today with stable chronic anemia with noted macrocytosis present for the past year - stable. Saturated iron of 13%.  C/o right shoulder pain due to right should pain and decreased rom from fall that occurred at least 6 weeks ago or longer.  Had xray 10/25/2023 done just showing arthritis. Denies any abnormal bleeding.      Interval History:  2/29/2024  States that her shoulder is feeling better.  She is going to PT three times per week.  Rotary cuff was dislocated from her shoulder.  Still smoking cigarettes daily but has reduced amount. Continues to take a B complex daily.  States that she has been eating liver 2-3 times/week.  Noted elevated liver indices.   With continued macrocytic anemia.  Hgb 11.4 mcv 105.  BP elevated.  States that she ate fast food yesterrday.  Cardiology has taken her off all of her BP medications due to her BP getting too low.  Denies HA, blurred vision, sob, chest tightness or chest pain.      I have reviewed all of the patient's  relevant lab work available in the medical record and have utilized this in my evaluation and management recommendations today.      Social History     Socioeconomic History    Marital status:      Spouse name: elizabeth    Number of children: 3   Occupational History    Occupation: retired   Tobacco Use    Smoking status: Every Day     Current packs/day: 0.50     Average packs/day: 0.5 packs/day for 63.0 years (31.5 ttl pk-yrs)     Types: Cigarettes    Smokeless tobacco: Never    Tobacco comments:     started age of  11    Substance and Sexual Activity    Alcohol use: No    Drug use: No    Sexual activity: Yes     Partners: Male     Social Determinants of Health     Financial Resource Strain: Low Risk  (3/6/2023)    Overall Financial Resource Strain (CARDIA)     Difficulty of Paying Living Expenses: Not hard at all   Food Insecurity: No Food Insecurity (3/6/2023)    Hunger Vital Sign     Worried About Running Out of Food in the Last Year: Never true     Ran Out of Food in the Last Year: Never true   Transportation Needs: No Transportation Needs (3/6/2023)    PRAPARE - Transportation     Lack of Transportation (Medical): No     Lack of Transportation (Non-Medical): No   Physical Activity: Insufficiently Active (3/6/2023)    Exercise Vital Sign     Days of Exercise per Week: 7 days     Minutes of Exercise per Session: 20 min   Stress: No Stress Concern Present (3/6/2023)    [place] [employer] - Occupational Stress Questionnaire     Feeling of Stress : Not at all   Social Connections: Moderately Integrated (3/6/2023)    Social Connection and Isolation Panel [NHANES]     Frequency of Communication with Friends and Family: Three times a week     Frequency of Social Gatherings with Friends and Family: More than three times a week     Attends Church Services: More than 4 times per year     Active Member of Clubs or Organizations: No     Attends Club or Organization Meetings: Never     Marital  Status:    Housing Stability: Low Risk  (3/6/2023)    Housing Stability Vital Sign     Unable to Pay for Housing in the Last Year: No     Number of Places Lived in the Last Year: 1     Unstable Housing in the Last Year: No       Family History   Problem Relation Age of Onset    Cancer Mother     Breast cancer Mother     Hyperlipidemia Father     Heart failure Father     Heart disease Brother     Brain cancer Brother     Stroke Brother     Alcohol abuse Brother        Past Surgical History:   Procedure Laterality Date    ANGIOGRAPHY OF LOWER EXTREMITY N/A 04/12/2021    Procedure: ANGIOGRAM, LOWER EXTREMITY/left leg;  Surgeon: Maico Adkins MD;  Location: [facility] CATH LAB;  Service: Vascular;  Laterality: N/A;  req 1130 start time/Rm A243A    AORTOGRAPHY WITH SERIALOGRAPHY N/A 04/14/2021    Procedure: AORTOGRAM, WITH RUNOFF;  Surgeon: Maico Adkins MD;  Location: [facility] CATH LAB;  Service: Vascular;  Laterality: N/A;  left iliac stent with shockwave/req 1030 start    CATARACT EXTRACTION W/  INTRAOCULAR LENS IMPLANT Bilateral 8/ 9 2017    ESOPHAGOGASTRODUODENOSCOPY N/A 09/13/2022    Procedure: EGD (ESOPHAGOGASTRODUODENOSCOPY);  Surgeon: Fern Rasmussen MD;  Location: [facility] ENDO;  Service: Endoscopy;  Laterality: N/A;    GALLBLADDER SURGERY      HYSTERECTOMY  1972    LUNG SURGERY Right age 15    lung collpase    SHOULDER SURGERY      TOE AMPUTATION Left 04/15/2021    Procedure: AMPUTATION, TOE;  Surgeon: Taylor Anderson DPM;  Location: [facility] OR;  Service: Podiatry;  Laterality: Left;  Hallux (Great Toe)    TOTAL HIP ARTHROPLASTY Bilateral     TOTAL KNEE ARTHROPLASTY Bilateral        Past Medical History:   Diagnosis Date    Acute diastolic heart failure 09/15/2022    Acute respiratory failure with hypoxia and hypercarbia 09/14/2022    Anemia     Anxiety     Cancer     Cataract     Chronic back pain     Dr. Guzman    Fibrocystic breast     Glaucoma     Hyperlipemia     Hypertension     Idiopathic acute  pancreatitis 06/18/2022    NSTEMI (non-ST elevated myocardial infarction) 09/11/2022    Osteoarthritis     Osteoporosis     Rheumatology/on Prolia    Pneumonia     Polyneuropathy     PVD (peripheral vascular disease) 04/12/2021    Restless leg syndrome     Tobacco dependence     Trouble in sleeping        Review of Systems   Constitutional: Negative.    HENT: Negative.     Eyes: Negative.  Negative for visual disturbance.   Respiratory: Negative.  Negative for shortness of breath.    Cardiovascular: Negative.  Negative for chest pain.   Gastrointestinal: Negative.    Endocrine: Negative.    Genitourinary: Negative.    Musculoskeletal: Negative.    Skin: Negative.    Allergic/Immunologic: Negative.    Neurological: Negative.  Negative for headaches.   Hematological: Negative.    Psychiatric/Behavioral: Negative.            Medication List with Changes/Refills   Current Medications    ASPIRIN (ECOTRIN) 81 MG EC TABLET    Take 1 tablet (81 mg total) by mouth once daily.    AZELASTINE (ASTELIN) 137 MCG (0.1 %) NASAL SPRAY    1 spray (137 mcg total) by Nasal route 2 (two) times daily.    B COMPLEX VITAMINS CAPSULE    Take 1 capsule by mouth once daily.    BUPROPION (WELLBUTRIN) 75 MG TABLET    Take 1 tablet (75 mg total) by mouth 2 (two) times daily.    BUSPIRONE (BUSPAR) 10 MG TABLET    TAKE 1 TABLET BY MOUTH TWICE DAILY    CIPROFLOXACIN HCL (CIPRO) 500 MG TABLET    Take 500 mg by mouth 2 (two) times daily.    CLOPIDOGREL (PLAVIX) 75 MG TABLET    Take 1 tablet (75 mg total) by mouth once daily.    CLOTRIMAZOLE-BETAMETHASONE 1-0.05% (LOTRISONE) CREAM    Apply to external ear with finger once a week    DOCUSATE SODIUM (COLACE) 100 MG CAPSULE    Take 1 capsule (100 mg total) by mouth 2 (two) times daily.    DULOXETINE (CYMBALTA) 30 MG CAPSULE    Take 30 mg by mouth once daily.    ERGOCALCIFEROL (ERGOCALCIFEROL) 50,000 UNIT CAP    TAKE 1 CAPSULE BY MOUTH EVERY WEEK    FUROSEMIDE (LASIX) 20 MG TABLET    Take 1 tablet (20 mg  total) by mouth daily as needed (edema).    GABAPENTIN (NEURONTIN) 600 MG TABLET        GENTAMICIN (GARAMYCIN) 0.3 % OPHTHALMIC SOLUTION    4 drops into right ear twice daily for 2 weeks    HYDROCODONE-ACETAMINOPHEN (NORCO)  MG PER TABLET    Take 1 tablet by mouth 3 (three) times daily as needed.    IPRATROPIUM (ATROVENT) 42 MCG (0.06 %) NASAL SPRAY    2 sprays by Each Nostril route 2 (two) times daily.    OFLOXACIN (FLOXIN) 0.3 % OTIC SOLUTION    instill 5 DROPS into the right ear IN THE MORNING and before bedtime FOR 10 DAYS    OMEPRAZOLE (PRILOSEC) 40 MG CAPSULE    Take 1 capsule (40 mg total) by mouth every evening.    ON-Q PAIN PUMP    by Misc.(Non-Drug; Combo Route) route.    RANOLAZINE (RANEXA) 500 MG TB12    Take 1 tablet (500 mg total) by mouth 2 (two) times daily.    ROSUVASTATIN (CRESTOR) 5 MG TABLET    TAKE 1 TABLET BY MOUTH ONCE A DAY        Objective:     Vitals:    02/29/24 1415   BP: (!) 192/82   Pulse: 87       Physical Exam  Vitals reviewed.   Constitutional:       Appearance: Normal appearance.   HENT:      Head: Normocephalic and atraumatic.      Right Ear: External ear normal.      Left Ear: External ear normal.   Cardiovascular:      Rate and Rhythm: Normal rate and regular rhythm.      Heart sounds: Normal heart sounds, S1 normal and S2 normal.   Pulmonary:      Effort: Pulmonary effort is normal.      Breath sounds: Normal breath sounds.   Abdominal:      General: There is no distension.   Musculoskeletal:         General: Normal range of motion.      Cervical back: Normal range of motion.   Skin:     General: Skin is warm and dry.   Neurological:      General: No focal deficit present.      Mental Status: She is alert and oriented to person, place, and time.      Gait: Gait abnormal.   Psychiatric:         Attention and Perception: Attention and perception normal.         Mood and Affect: Mood and affect normal.         Speech: Speech normal.         Behavior: Behavior normal. Behavior  is cooperative.         Thought Content: Thought content normal.         Cognition and Memory: Cognition and memory normal.         Judgment: Judgment normal.         Assessment:     Problem List Items Addressed This Visit          Endocrine    B12 deficiency    Relevant Orders    CBC Auto Differential    Comprehensive Metabolic Panel     Other Visit Diagnoses       Macrocytic anemia    -  Primary    Relevant Orders    CBC Auto Differential    Comprehensive Metabolic Panel    Elevated serum immunoglobulin free light chain level        Relevant Orders    CBC Auto Differential    Comprehensive Metabolic Panel    Increased storage iron        Relevant Orders    CBC Auto Differential    Comprehensive Metabolic Panel    Iron and TIBC    Ferritin            Lab Results   Component Value Date    WBC 4.32 02/27/2024    RBC 3.51 (L) 02/27/2024    HGB 11.4 (L) 02/27/2024    HCT 36.7 (L) 02/27/2024     (H) 02/27/2024    MCH 32.5 (H) 02/27/2024    MCHC 31.1 (L) 02/27/2024    RDW 14.7 (H) 02/27/2024     02/27/2024    MPV 9.0 (L) 02/27/2024    GRAN 2.0 02/27/2024    GRAN 47.0 02/27/2024    LYMPH 1.7 02/27/2024    LYMPH 40.3 02/27/2024    MONO 0.4 02/27/2024    MONO 8.1 02/27/2024    EOS 0.1 02/27/2024    BASO 0.05 02/27/2024    EOSINOPHIL 3.2 02/27/2024    BASOPHIL 1.2 02/27/2024      Lab Results   Component Value Date     02/27/2024    K 3.6 02/27/2024     02/27/2024    CO2 25 02/27/2024    BUN 13 02/27/2024    CREATININE 0.9 02/27/2024    CALCIUM 9.2 02/27/2024    ANIONGAP 12 02/27/2024    ESTGFRAFRICA >60.0 06/27/2022    EGFRNONAA >60.0 06/27/2022     Lab Results   Component Value Date    ALT 7 (L) 02/27/2024    AST 16 02/27/2024    ALKPHOS 52 (L) 02/27/2024    BILITOT 0.4 02/27/2024     Lab Results   Component Value Date    IRON 247 (H) 02/27/2024    TRANSFERRIN 215 02/27/2024    TIBC 318 02/27/2024    FESATURATED 78 (H) 02/27/2024    FERRITIN 65 02/27/2024      Lab Results   Component Value Date     ZQIILQIN18 395 11/22/2023     Lab Results   Component Value Date    FOLATE 13.3 07/18/2022     Lab Results   Component Value Date    TSH 1.010 09/06/2023     [place] Free Light Chains 0.33 - 1.94 mg/dL 3.58 High  6.15 High    Lambda Free Light Chains 0.57 - 2.63 mg/dL 2.32 3.66 High    Kappa/Lambda FLC Ratio 0.26 - 1.65 1.54 1.68 High      Plan:   Macrocytic anemia  -     CBC Auto Differential; Future; Expected date: 02/29/2024  -     Comprehensive Metabolic Panel; Future; Expected date: 02/29/2024    Elevated serum immunoglobulin free light chain level  -     CBC Auto Differential; Future; Expected date: 02/29/2024  -     Comprehensive Metabolic Panel; Future; Expected date: 02/29/2024    B12 deficiency  -     CBC Auto Differential; Future; Expected date: 02/29/2024  -     Comprehensive Metabolic Panel; Future; Expected date: 02/29/2024    Increased storage iron  -     CBC Auto Differential; Future; Expected date: 02/29/2024  -     Comprehensive Metabolic Panel; Future; Expected date: 02/29/2024  -     Iron and TIBC; Future; Expected date: 02/29/2024  -     Ferritin; Future; Expected date: 02/29/2024    Reduce oral iron intake - liver.  We discussed possible MDS; however her anemia is very stable and not decreasing.  FLC ratio has normalized.        Med Onc Chart Routing      Follow up with physician    Follow up with BYRON 4 months. in person visit in [place]   Infusion scheduling note   n/a   Injection scheduling note n/a   Labs   Scheduling:  Preferred lab: [facility]  Lab interval:  cbc, cmp, iron studies   Imaging   N/a   Pharmacy appointment No pharmacy appointment needed      Other referrals       No additional referrals needed  n/a         BP elevated today.  Denies any pain.  States that she ate a [employer] hamburger yesterday.  States that she will cut out fast food intake and this is causing her BP to be elevated.  Continue B complex daily.  Reduce high iron content foods.   Total time spent on  encounter: 30 minutes    Collaborating Provider:  Dr. Kris Alcocer    Thank You,  Tiarra Mg, LUIS DANIELP-C  Benign Hematology

## 2024-03-06 ENCOUNTER — HOSPITAL ENCOUNTER (EMERGENCY)
Facility: HOSPITAL | Age: 79
Discharge: ANOTHER HOSPITAL | End: 2024-03-06
Attending: EMERGENCY MEDICINE
Payer: MEDICARE

## 2024-03-06 VITALS
RESPIRATION RATE: 18 BRPM | BODY MASS INDEX: 19.27 KG/M2 | TEMPERATURE: 98 F | WEIGHT: 104.75 LBS | OXYGEN SATURATION: 99 % | SYSTOLIC BLOOD PRESSURE: 134 MMHG | HEIGHT: 62 IN | HEART RATE: 80 BPM | DIASTOLIC BLOOD PRESSURE: 58 MMHG

## 2024-03-06 DIAGNOSIS — S06.5XAA SUBDURAL HEMATOMA: Primary | ICD-10-CM

## 2024-03-06 DIAGNOSIS — I10 UNCONTROLLED HYPERTENSION: ICD-10-CM

## 2024-03-06 DIAGNOSIS — S59.902A ELBOW INJURY, LEFT, INITIAL ENCOUNTER: ICD-10-CM

## 2024-03-06 DIAGNOSIS — S22.32XA CLOSED FRACTURE OF ONE RIB OF LEFT SIDE, INITIAL ENCOUNTER: ICD-10-CM

## 2024-03-06 DIAGNOSIS — M25.552 LEFT HIP PAIN: ICD-10-CM

## 2024-03-06 DIAGNOSIS — R07.81 RIB PAIN ON LEFT SIDE: ICD-10-CM
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
## 2024-03-06 PROBLEM — I25.10 CORONARY ARTERY DISEASE WITHOUT ANGINA PECTORIS: Status: ACTIVE | Noted: 2024-03-06

## 2024-03-06 LAB
ABO + RH BLD: NORMAL
ALBUMIN SERPL BCP-MCNC: 3.8 G/DL (ref 3.5–5.2)
ALP SERPL-CCNC: 67 U/L (ref 55–135)
ALT SERPL W/O P-5'-P-CCNC: 15 U/L (ref 10–44)
ANION GAP SERPL CALC-SCNC: 10 MMOL/L (ref 8–16)
APTT PPP: <21 SEC (ref 21–32)
AST SERPL-CCNC: 31 U/L (ref 10–40)
BASOPHILS # BLD AUTO: 0.05 K/UL (ref 0–0.2)
BASOPHILS NFR BLD: 0.9 % (ref 0–1.9)
BILIRUB SERPL-MCNC: 0.8 MG/DL (ref 0.1–1)
BLD GP AB SCN CELLS X3 SERPL QL: NORMAL
BUN SERPL-MCNC: 12 MG/DL (ref 8–23)
CALCIUM SERPL-MCNC: 9.6 MG/DL (ref 8.7–10.5)
CHLORIDE SERPL-SCNC: 99 MMOL/L (ref 95–110)
CO2 SERPL-SCNC: 28 MMOL/L (ref 23–29)
CREAT SERPL-MCNC: 0.8 MG/DL (ref 0.5–1.4)
DIFFERENTIAL METHOD BLD: ABNORMAL
EOSINOPHIL # BLD AUTO: 0.1 K/UL (ref 0–0.5)
EOSINOPHIL NFR BLD: 1.8 % (ref 0–8)
ERYTHROCYTE [DISTWIDTH] IN BLOOD BY AUTOMATED COUNT: 14.6 % (ref 11.5–14.5)
EST. GFR  (NO RACE VARIABLE): >60 ML/MIN/1.73 M^2
GLUCOSE SERPL-MCNC: 96 MG/DL (ref 70–110)
HCT VFR BLD AUTO: 36.6 % (ref 37–48.5)
HCV AB SERPL QL IA: NEGATIVE
HEP C VIRUS HOLD SPECIMEN: NORMAL
HGB BLD-MCNC: 11.6 G/DL (ref 12–16)
HIV 1+2 AB+HIV1 P24 AG SERPL QL IA: NEGATIVE
IMM GRANULOCYTES # BLD AUTO: 0.02 K/UL (ref 0–0.04)
IMM GRANULOCYTES NFR BLD AUTO: 0.4 % (ref 0–0.5)
INR PPP: 0.9 (ref 0.8–1.2)
LYMPHOCYTES # BLD AUTO: 1.3 K/UL (ref 1–4.8)
LYMPHOCYTES NFR BLD: 23.3 % (ref 18–48)
MCH RBC QN AUTO: 32.3 PG (ref 27–31)
MCHC RBC AUTO-ENTMCNC: 31.7 G/DL (ref 32–36)
MCV RBC AUTO: 102 FL (ref 82–98)
MONOCYTES # BLD AUTO: 0.5 K/UL (ref 0.3–1)
MONOCYTES NFR BLD: 9.1 % (ref 4–15)
NEUTROPHILS # BLD AUTO: 3.5 K/UL (ref 1.8–7.7)
NEUTROPHILS NFR BLD: 64.5 % (ref 38–73)
NRBC BLD-RTO: 0 /100 WBC
PLATELET # BLD AUTO: 198 K/UL (ref 150–450)
PMV BLD AUTO: 8.9 FL (ref 9.2–12.9)
POTASSIUM SERPL-SCNC: 3.7 MMOL/L (ref 3.5–5.1)
PROT SERPL-MCNC: 7.3 G/DL (ref 6–8.4)
PROTHROMBIN TIME: 10.3 SEC (ref 9–12.5)
RBC # BLD AUTO: 3.59 M/UL (ref 4–5.4)
SODIUM SERPL-SCNC: 137 MMOL/L (ref 136–145)
SPECIMEN OUTDATE: NORMAL
WBC # BLD AUTO: 5.41 K/UL (ref 3.9–12.7)

## 2024-03-06 PROCEDURE — 80053 COMPREHEN METABOLIC PANEL: CPT | Mod: HCNC | Performed by: EMERGENCY MEDICINE

## 2024-03-06 PROCEDURE — 96375 TX/PRO/DX INJ NEW DRUG ADDON: CPT | Mod: HCNC

## 2024-03-06 PROCEDURE — 85025 COMPLETE CBC W/AUTO DIFF WBC: CPT | Mod: HCNC | Performed by: EMERGENCY MEDICINE

## 2024-03-06 PROCEDURE — 85610 PROTHROMBIN TIME: CPT | Mod: HCNC | Performed by: EMERGENCY MEDICINE

## 2024-03-06 PROCEDURE — 87389 HIV-1 AG W/HIV-1&-2 AB AG IA: CPT | Mod: HCNC | Performed by: EMERGENCY MEDICINE

## 2024-03-06 PROCEDURE — 86803 HEPATITIS C AB TEST: CPT | Mod: HCNC | Performed by: EMERGENCY MEDICINE

## 2024-03-06 PROCEDURE — 93010 ELECTROCARDIOGRAM REPORT: CPT | Mod: HCNC,,, | Performed by: INTERNAL MEDICINE

## 2024-03-06 PROCEDURE — 99291 CRITICAL CARE FIRST HOUR: CPT | Mod: HCNC

## 2024-03-06 PROCEDURE — 96374 THER/PROPH/DIAG INJ IV PUSH: CPT | Mod: HCNC

## 2024-03-06 PROCEDURE — 63600175 PHARM REV CODE 636 W HCPCS: Mod: HCNC | Performed by: EMERGENCY MEDICINE

## 2024-03-06 PROCEDURE — 85730 THROMBOPLASTIN TIME PARTIAL: CPT | Mod: HCNC | Performed by: EMERGENCY MEDICINE

## 2024-03-06 PROCEDURE — 93005 ELECTROCARDIOGRAM TRACING: CPT | Mod: HCNC

## 2024-03-06 PROCEDURE — 86901 BLOOD TYPING SEROLOGIC RH(D): CPT | Mod: HCNC | Performed by: EMERGENCY MEDICINE

## 2024-03-06 RX ORDER — LABETALOL HYDROCHLORIDE 5 MG/ML
40 INJECTION, SOLUTION INTRAVENOUS
Status: DISCONTINUED | OUTPATIENT
Start: 2024-03-06 | End: 2024-03-06

## 2024-03-06 RX ORDER — ONDANSETRON HYDROCHLORIDE 2 MG/ML
4 INJECTION, SOLUTION INTRAVENOUS
Status: COMPLETED | OUTPATIENT
Start: 2024-03-06 | End: 2024-03-06

## 2024-03-06 RX ORDER — MORPHINE SULFATE 4 MG/ML
4 INJECTION, SOLUTION INTRAMUSCULAR; INTRAVENOUS
Status: COMPLETED | OUTPATIENT
Start: 2024-03-06 | End: 2024-03-06

## 2024-03-06 RX ORDER — LABETALOL HYDROCHLORIDE 5 MG/ML
20 INJECTION, SOLUTION INTRAVENOUS
Status: COMPLETED | OUTPATIENT
Start: 2024-03-06 | End: 2024-03-06

## 2024-03-06 RX ADMIN — LABETALOL HYDROCHLORIDE 20 MG: 5 INJECTION INTRAVENOUS at 11:03

## 2024-03-06 RX ADMIN — ONDANSETRON 4 MG: 2 INJECTION INTRAMUSCULAR; INTRAVENOUS at 11:03

## 2024-03-06 RX ADMIN — MORPHINE SULFATE 4 MG: 4 INJECTION INTRAVENOUS at 11:03

## 2024-03-06 NOTE — ED PROVIDER NOTES
SCRIBE #1 NOTE: I, Joseph Godinez, am scribing for, and in the presence of, Aparna Riley MD. I have scribed the entire note.       History     Chief Complaint   Patient presents with    Fall     Patient slipped and fell last night, landing on left side. Left elbow edematous and bruising, patient also c/o pain near left rib area and back pain.     Review of patient's allergies indicates:   Allergen Reactions    Indomethacin      Other reaction(s): Headache    K-flex Other (See Comments)     Pancreatitis     Latex, natural rubber Swelling    Penicillins      Other reaction(s): Vomiting    Patient tolerated Rocephin during the 4/2021 encounter and she states that she has tolerated Keflex in the past without issue.    Sulfa (sulfonamide antibiotics) Other (See Comments)     hallucinations    Tolmetin      Other reaction(s): Hives    Bactroban [mupirocin calcium] Rash     Burned skin          History of Present Illness     HPI    3/6/2024, 10:29 AM  History obtained from the patient and       History of Present Illness: Keyona Dwyer is a 78 y.o. female patient with a PMHx of chronic back pain, HLD, HTN, NSTEMI, osteoarthritis, PVD, restless leg syndrome, and tobacco dependence who presents to the Emergency Department for evaluation after a fall which occurred yesterday PM. Pt states that she lost her  on a door handle, and the wind pushed her onto her L side, hitting her head (-LOC). Associated sxs include LLE soreness, L sided rib pain, L hip pain, and HA. The rib pain worsens whenever she takes a deep breath. Patient denies any CP, SOB, focal weakness, dizziness, nausea or vomiting, and all other sxs at this time. Prior Tx includes pain medication this am with improvement. She has a morphine pump to her spine. Pt notes that she uses a walker and falls often. She is on plavix and ASA 81 mg.  notes that pt has had both hip and both knees replaced. No further complaints or concerns at this  time.       Arrival mode: Personal vehicle      PCP: Ralf Neal MD        Past Medical History:  Past Medical History:   Diagnosis Date    Acute diastolic heart failure 09/15/2022    Acute respiratory failure with hypoxia and hypercarbia 09/14/2022    Anemia     Anxiety     Cancer     Cataract     Chronic back pain     Dr. Guzman    Fibrocystic breast     Glaucoma     Hyperlipemia     Hypertension     Idiopathic acute pancreatitis 06/18/2022    NSTEMI (non-ST elevated myocardial infarction) 09/11/2022    Osteoarthritis     Osteoporosis     Rheumatology/on Prolia    Pneumonia     Polyneuropathy     PVD (peripheral vascular disease) 04/12/2021    Restless leg syndrome     Tobacco dependence     Trouble in sleeping        Past Surgical History:  Past Surgical History:   Procedure Laterality Date    ANGIOGRAPHY OF LOWER EXTREMITY N/A 04/12/2021    Procedure: ANGIOGRAM, LOWER EXTREMITY/left leg;  Surgeon: Maico Adkins MD;  Location: [facility] CATH LAB;  Service: Vascular;  Laterality: N/A;  req 1130 start time/ [id number]    AORTOGRAPHY WITH SERIALOGRAPHY N/A 04/14/2021    Procedure: AORTOGRAM, WITH RUNOFF;  Surgeon: Maico Adkins MD;  Location: [facility] CATH LAB;  Service: Vascular;  Laterality: N/A;  left iliac stent with shockwave/req 1030 start    CATARACT EXTRACTION W/  INTRAOCULAR LENS IMPLANT Bilateral 8/ 9 2017    ESOPHAGOGASTRODUODENOSCOPY N/A 09/13/2022    Procedure: EGD (ESOPHAGOGASTRODUODENOSCOPY);  Surgeon: Fern Rasmussen MD;  Location: [facility] ENDO;  Service: Endoscopy;  Laterality: N/A;    GALLBLADDER SURGERY      HYSTERECTOMY  1972    LUNG SURGERY Right age 15    lung collpase    SHOULDER SURGERY      TOE AMPUTATION Left 04/15/2021    Procedure: AMPUTATION, TOE;  Surgeon: Taylor Anderson DPM;  Location: [facility] OR;  Service: Podiatry;  Laterality: Left;  Hallux (Great Toe)    TOTAL HIP ARTHROPLASTY Bilateral     TOTAL KNEE ARTHROPLASTY Bilateral          Family History:  Family History   Problem Relation  Age of Onset    Cancer Mother     Breast cancer Mother     Hyperlipidemia Father     Heart failure Father     Heart disease Brother     Brain cancer Brother     Stroke Brother     Alcohol abuse Brother        Social History:  Social History     Tobacco Use    Smoking status: Every Day     Current packs/day: 0.50     Average packs/day: 0.5 packs/day for 63.0 years (31.5 ttl pk-yrs)     Types: Cigarettes    Smokeless tobacco: Never    Tobacco comments:     started age of  11    Substance and Sexual Activity    Alcohol use: No    Drug use: No    Sexual activity: Yes     Partners: Male        Review of Systems     Review of Systems   Constitutional:  Negative for fever.   HENT:  Negative for sore throat.    Respiratory:  Negative for shortness of breath.    Cardiovascular:  Negative for chest pain.   Gastrointestinal:  Negative for nausea.   Genitourinary:  Negative for dysuria.   Musculoskeletal:  Positive for arthralgias (L hip) and myalgias (LLE). Negative for back pain.        (+) L sided rib pain   Skin:  Negative for rash.   Neurological:  Positive for headaches. Negative for syncope and weakness.   Hematological:  Does not bruise/bleed easily.   All other systems reviewed and are negative.     Physical Exam     Initial Vitals [03/06/24 1005]   BP Pulse Resp Temp SpO2   (!) 199/88 93 16 98.2 °F (36.8 °C) (!) 94 %      MAP       --          Physical Exam  Nursing Notes and Vital Signs Reviewed.  Constitutional: Patient is in no acute distress. Well-developed and well-nourished.  Head: No obvious head trauma. Normocephalic.  Eyes: PERRL. EOM intact. Small bruise lateral and inferior to the inferior orbital region, no step offs, no tenderness.  Conjunctivae are not pale. No scleral icterus.  ENT: Mucous membranes are moist. Oropharynx is clear and symmetric.    Neck: Supple. Full ROM. No lymphadenopathy.  Cardiovascular: Regular rate. Regular rhythm. No murmurs, rubs, or gallops. Distal pulses are 2+ and  symmetric.  Pulmonary/Chest: No respiratory distress. Clear to auscultation bilaterally. No wheezing or rales. No chest wall trauma. L anterolateral chest wall pain.   Abdominal: Soft and non-distended.  There is no tenderness.  No rebound, guarding, or rigidity. Good bowel sounds.  Genitourinary: No CVA tenderness  Musculoskeletal: Moves all extremities. No midline C-spine, T-spine, or L-spine tenderness. Soft tissue contusion to L elbow, no bony deformity. L elbow chronically held in flexion d/t prior injury. Soft tissue swelling and tenderness of L hip. Pelvis is stable, left leg does appear shortened but patient has had prior left hip replacement.  No edema. No calf tenderness.  Skin: Soft tissue contusion to L elbow  Neurological:  Alert, awake, and appropriate.  Normal speech.  No acute focal neurological deficits are appreciated.  Psychiatric: Normal affect. Good eye contact. Appropriate in content.     ED Course   Critical Care    Date/Time: 3/6/2024 12:02 PM    Performed by: Aparna Riley MD  Authorized by: Aparna Riley MD  Direct patient critical care time: 35 minutes  Additional history critical care time: 8 minutes  Ordering / reviewing critical care time: 12 minutes  Documentation critical care time: 10 minutes  Consulting other physicians critical care time: 5 minutes  Total critical care time (exclusive of procedural time) : 70 minutes  Critical care time was exclusive of separately billable procedures and treating other patients and teaching time.  Critical care was necessary to treat or prevent imminent or life-threatening deterioration of the following conditions: CNS failure or compromise and trauma (subdural hematoma).  Critical care was time spent personally by me on the following activities: development of treatment plan with patient or surrogate, discussions with consultants, interpretation of cardiac output measurements, evaluation of patient's response to treatment, examination of  "patient, obtaining history from patient or surrogate, ordering and performing treatments and interventions, ordering and review of laboratory studies, ordering and review of radiographic studies, pulse oximetry, re-evaluation of patient's condition, review of old charts and blood draw for specimens.        ED Vital Signs:  Vitals:    03/06/24 1005 03/06/24 1114 03/06/24 1123 03/06/24 1152   BP: (!) 199/88 (!) 200/104  (!) 195/91   Pulse: 93 91     Resp: 16 18 18    Temp: 98.2 °F (36.8 °C) 98.2 °F (36.8 °C)     TempSrc: Oral Oral     SpO2: (!) 94%      Weight:       Height: 5' 2" (1.575 m)       03/06/24 1207 03/06/24 1209 03/06/24 1230 03/06/24 1237   BP:  (!) 174/85 (!) 165/72 (!) 155/63   Pulse:  89 76 76   Resp:  18  18   Temp:  98.2 °F (36.8 °C)  98.1 °F (36.7 °C)   TempSrc:  Oral  Oral   SpO2:   100%    Weight: 47.5 kg (104 lb 11.5 oz)      Height:        03/06/24 1349   BP: (!) 128/58   Pulse: 80   Resp: 18   Temp: 98.1 °F (36.7 °C)   TempSrc: Oral   SpO2: 99%   Weight:    Height:        Abnormal Lab Results:  Labs Reviewed   CBC W/ AUTO DIFFERENTIAL - Abnormal; Notable for the following components:       Result Value    RBC 3.59 (*)     Hemoglobin 11.6 (*)     Hematocrit 36.6 (*)      (*)     MCH 32.3 (*)     MCHC 31.7 (*)     RDW 14.6 (*)     MPV 8.9 (*)     All other components within normal limits   HIV 1 / 2 ANTIBODY    Narrative:     Release to patient->Immediate   HEPATITIS C ANTIBODY    Narrative:     Release to patient->Immediate   HEP C VIRUS HOLD SPECIMEN    Narrative:     Release to patient->Immediate   COMPREHENSIVE METABOLIC PANEL   PROTIME-INR   APTT   TYPE & SCREEN        All Lab Results:  Results for orders placed or performed during the hospital encounter of 03/06/24   HIV 1/2 Ag/Ab (4th Gen)   Result Value Ref Range    HIV 1/2 Ag/Ab Negative Negative   Hepatitis C Antibody   Result Value Ref Range    Hepatitis C Ab Negative Negative   HCV Virus Hold Specimen   Result Value Ref Range "    HEP C Virus Hold Specimen Hold for HCV sendout    CBC auto differential   Result Value Ref Range    WBC 5.41 3.90 - 12.70 K/uL    RBC 3.59 (L) 4.00 - 5.40 M/uL    Hemoglobin 11.6 (L) 12.0 - 16.0 g/dL    Hematocrit 36.6 (L) 37.0 - 48.5 %     (H) 82 - 98 fL    MCH 32.3 (H) 27.0 - 31.0 pg    MCHC 31.7 (L) 32.0 - 36.0 g/dL    RDW 14.6 (H) 11.5 - 14.5 %    Platelets 198 150 - 450 K/uL    MPV 8.9 (L) 9.2 - 12.9 fL    Immature Granulocytes 0.4 0.0 - 0.5 %    Gran # (ANC) 3.5 1.8 - 7.7 K/uL    Immature Grans (Abs) 0.02 0.00 - 0.04 K/uL    Lymph # 1.3 1.0 - 4.8 K/uL    Mono # 0.5 0.3 - 1.0 K/uL    Eos # 0.1 0.0 - 0.5 K/uL    Baso # 0.05 0.00 - 0.20 K/uL    nRBC 0 0 /100 WBC    Gran % 64.5 38.0 - 73.0 %    Lymph % 23.3 18.0 - 48.0 %    Mono % 9.1 4.0 - 15.0 %    Eosinophil % 1.8 0.0 - 8.0 %    Basophil % 0.9 0.0 - 1.9 %    Differential Method Automated    Comprehensive metabolic panel   Result Value Ref Range    Sodium 137 136 - 145 mmol/L    Potassium 3.7 3.5 - 5.1 mmol/L    Chloride 99 95 - 110 mmol/L    CO2 28 23 - 29 mmol/L    Glucose 96 70 - 110 mg/dL    BUN 12 8 - 23 mg/dL    Creatinine 0.8 0.5 - 1.4 mg/dL    Calcium 9.6 8.7 - 10.5 mg/dL    Total Protein 7.3 6.0 - 8.4 g/dL    Albumin 3.8 3.5 - 5.2 g/dL    Total Bilirubin 0.8 0.1 - 1.0 mg/dL    Alkaline Phosphatase 67 55 - 135 U/L    AST 31 10 - 40 U/L    ALT 15 10 - 44 U/L    eGFR >60 >60 mL/min/1.73 m^2    Anion Gap 10 8 - 16 mmol/L   Protime-INR   Result Value Ref Range    Prothrombin Time 10.3 9.0 - 12.5 sec    INR 0.9 0.8 - 1.2   APTT   Result Value Ref Range    aPTT <21.0 21.0 - 32.0 sec   EKG 12-lead   Result Value Ref Range    QRS Duration 72 ms    [facility] QTC Calculation 482 ms   Type & Screen   Result Value Ref Range    Group & Rh B POS     Indirect Charlee NEG     Specimen Outdate 03/09/2024 23:59         Imaging Results:  Imaging Results              X-Ray Chest AP Portable (Final result)  Result time 03/06/24 11:38:13      Final result by Skip Corcoran  MD JOANN (03/06/24 11:38:13)                   Impression:      Nondisplaced left 2nd posterior rib fracture.    Small right pleural effusion versus pleural thickening (decreased amount of right costophrenic sulcus blunting from comparison exam).      Electronically signed by: Skip Corcoran MD  Date:    03/06/2024  Time:    11:38               Narrative:    EXAMINATION:  XR CHEST AP PORTABLE    CLINICAL HISTORY:  Unspecified fall, initial encounter    TECHNIQUE:  Single frontal view of the chest was performed.    COMPARISON:  09/14/2022    FINDINGS:  The cardiomediastinal silhouette is normal.  Aortic atherosclerosis    The lungs are clear no acute infiltrates.  Mild blunting right costophrenic sulcus.    Nondisplaced fracture left 2nd posterior rib.  No advanced arthritic changes.                                       X-Ray Hip 2 or 3 views Left (with Pelvis when performed) (Final result)  Result time 03/06/24 11:38:14      Final result by Michael Lemus MD (03/06/24 11:38:14)                   Impression:      The acetabular component of the left hip prosthesis may have shifted slightly when compared to prior exam.  Differences may be secondary to imaging technique.  Clinical correlation is recommended.      Electronically signed by: Michael Lemus  Date:    03/06/2024  Time:    11:38               Narrative:    EXAMINATION:  XR HIP WITH PELVIS WHEN PERFORMED, 2 OR 3 VIEWS LEFT    CLINICAL HISTORY:  Pain in left hip    TECHNIQUE:  AP view of the pelvis and frog leg lateral view of the left hip were performed.    COMPARISON:  May 29, 2018.    FINDINGS:  The acetabular component of the left hip prosthesis may have shifted slightly when compared to prior exam.  No definite dislocation.  Similar loosening about the right hip prosthesis femoral component.  Electronic device is again demonstrated overlying the left upper pelvis.  No acute fracture.                                       X-Ray Ribs 2 View Left (Final  result)  Result time 03/06/24 11:33:08      Final result by Skip Corcoran MD (03/06/24 11:33:08)                   Impression:      Nondisplaced fracture left 2nd posterior rib      Electronically signed by: Skip Corcoran MD  Date:    03/06/2024  Time:    11:33               Narrative:    EXAMINATION:  XR RIBS 2 VIEW LEFT    CLINICAL HISTORY:  Pleurodynia    TECHNIQUE:  Two views of the left ribs were performed.    COMPARISON:  09/14/2022    FINDINGS:  Subtle nondisplaced fracture left 2nd posterior rib along the superior cortex.  No additional fractures.    Visualized lungs are clear.                                       X-Ray Elbow Complete Left (Final result)  Result time 03/06/24 11:31:21      Final result by Skip Corcoran MD (03/06/24 11:31:21)                   Impression:      No acute osseous findings.      Electronically signed by: Skip Corcoran MD  Date:    03/06/2024  Time:    11:31               Narrative:    EXAMINATION:  XR ELBOW COMPLETE 3 VIEW LEFT    CLINICAL HISTORY:  Unspecified injury of left elbow, initial encounter    TECHNIQUE:  Three views left hip.    COMPARISON:  None    FINDINGS:  Negative for acute fracture or dislocation.    Advanced arthritic changes at the elbow joint.    Chronic postsurgical/traumatic deformity of the ulna with some of the diaphysis missing and areas of heterotopic ossification.    No significant soft tissue findings.                                       CT Head Without Contrast (Final result)  Result time 03/06/24 11:16:04      Final result by Alexis Chaney MD (03/06/24 11:16:04)                   Impression:      Small left subdural hematoma measuring up to 5 mm in thickness.  Minimal mass effect on the adjacent left cerebral hemisphere.  No significant midline shift.    Cerebral volume loss and nonspecific white matter changes likely related to chronic microvascular ischemia.      Electronically signed by: Alexis  [place]port  Date:    03/06/2024  Time:    11:16               Narrative:    EXAMINATION:  CT HEAD WITHOUT CONTRAST    CLINICAL HISTORY:  Head trauma, minor (Age >= 65y);    TECHNIQUE:  Low dose axial images were obtained through the head.  Coronal and sagittal reformations were also performed. Contrast was not administered.    All CT scans at this location are performed using dose optimization techniques including the following: Automated exposure control; adjustment of the mA and/or kv; use of iterative reconstruction technique.    COMPARISON:  CT dated 09/13/2022    FINDINGS:  Mild cerebral volume loss with associated sulcal and ventricular prominence, not unexpected for patient's age.    There is a small left subdural hematoma measuring up to 5 mm in thickness.  Minimal mass effect on the left cerebral hemisphere.  No significant midline shift.  No other site of intracranial hemorrhage identified.    Hypoattenuation noted throughout the cerebral white matter.    No evidence of acute territorial infarct or mass lesion.    Midline structures and posterior fossa structures appear unremarkable.  Basal cisterns are clear.  Bilateral carotid siphon and distal vertebral artery calcification noted.    Calvarium is intact without acute or aggressive abnormality.  Postsurgical appearance of the bilateral orbital lens.  Orbits and globes otherwise within normal limits.  Visualized paranasal sinuses and mastoid air cells are clear.                                       The EKG was ordered, reviewed, and independently interpreted by the ED provider.  Interpretation time: 12:36  Rate: 53 BPM  Rhythm: sinus bradycardia  Interpretation: Left axis deviation. Anterior infarct, age undetermined. No STEMI.           The Emergency Provider reviewed the vital signs and test results, which are outlined above.     ED Discussion     11:55 AM: Pt informed staff that she is also on aspirin 81 mg qd and plavix 75 mg qd.     12:29 PM: Attempted  transfer to [facility], but [facility] declined due to at capacity.  Will attempt to transfer pt to another facility.     1PM:  patient and  would like to try [facility].     2:33 PM: Consult with Dr. Carlisle (General Surgery) at [facility] concerning pt. There are no neurosurgery services, which the patient requires, offered at [facility] at this time. Dr. Carlisle expresses understanding and will accept transfer.  Accepting Facility: [facility]  Accepting Physician: Tyler Carlisle MD    2:33 PM: Re-evaluated pt. Informed pt and family that there are no neurosurgery services available at this time. GCS 15. Platelet count over 100,000 so no indication for platelet transfusion. I have discussed test results, shared treatment plan, and the need for transfer with patient and family at bedside. All historical, clinical, radiographic, and laboratory findings were reviewed with the patient/family in detail. Patient will be transferred by [facility] services with  care required en route. Patient understands that there could be unforeseen motor vehicle accidents or loss of vital signs that could result in potential death or permanent disability. Pt and family express understanding at this time and agree with all information. All questions answered. Pt and family have no further questions or concerns at this time. Pt is ready for transfer.            Medical Decision Making  DDX:  1. ICH  2. Traumatic Head Injury  3. Rib fracture  4. PTX  5. Elbow fx  6. Hip injury    CT head consistent with left subdural, xrays of chest and ribs consistent with rib fracture, no PTX, no elbow fx or hip fx, lab work reviewed and otherwise normal, initially hypertensive but given one dose of iv labetalol with improvement and stabilization of BP, ECG no ischemic changes, patient has remainded with GCS 15, she has been accepted to [facility] for neurosurgical and neurocritical care.            Pt not receiving platelets because indications for giving platelets are if pt's count is <100,000 and there is evidence of CNS bleed. Pt does not meet criteria with platelet count of 198,000 based on Uptodate recommendations.      Amount and/or Complexity of Data Reviewed  Independent Historian: spouse     Details:  provided additional pt hx.   Labs: ordered. Decision-making details documented in ED Course.  Radiology: ordered. Decision-making details documented in ED Course.  ECG/medicine tests: ordered and independent interpretation performed. Decision-making details documented in ED Course.    Risk  Prescription drug management.  Decision regarding hospitalization.                ED Medication(s):  Medications   morphine injection 4 mg (4 mg Intravenous Given by Other 3/6/24 1123)   ondansetron injection 4 mg (4 mg Intravenous Given by Other 3/6/24 1124)   labetaloL injection 20 mg (20 mg Intravenous Given 3/6/24 1152)       New Prescriptions    No medications on file               Scribe Attestation:   Scribe #1: I performed the above scribed service and the documentation accurately describes the services I performed. I attest to the accuracy of the note.     Attending:   Physician Attestation Statement for Scribe #1: I, Aparna Riley MD, personally performed the services described in this documentation, as scribed by Joseph Godinez, in my presence, and it is both accurate and complete.           Clinical Impression       ICD-10-CM ICD-9-CM   1. Subdural hematoma  S06.5XAA 432.1   2. Rib pain on left side  R07.81 786.50   3. Fall  W19.XXXA E888.9   4. Elbow injury, left, initial encounter  S59.902A 959.3   5. Left hip pain  M25.552 719.45   6. Closed fracture of one rib of left side, initial encounter  S22.32XA 807.01       Disposition:   Disposition: Transferred  Condition: Serious         Aparna Riley MD  03/06/24 2721

## 2024-03-07 ENCOUNTER — TELEPHONE (OUTPATIENT)
Dept: NEUROSURGERY | Facility: CLINIC | Age: 79
End: 2024-03-07
Payer: MEDICARE

## 2024-03-07 DIAGNOSIS — S06.5XAA SUBDURAL HEMATOMA, ACUTE: Primary | ICD-10-CM

## 2024-03-07 NOTE — TELEPHONE ENCOUNTER
----- Message from Jovanna Kelsey NP sent at 3/7/2024 11:04 AM CST -----  Regarding: Follow up [facility] in 2 weeksk with CT wo head  Dr. Valle would like a 2 week follow up  with repeat head CT w/o contrast for [facility]

## 2024-03-08 PROBLEM — Z71.89 ACP (ADVANCE CARE PLANNING): Status: ACTIVE | Noted: 2024-03-08

## 2024-03-10 PROBLEM — J96.01 ACUTE HYPOXEMIC RESPIRATORY FAILURE: Status: ACTIVE | Noted: 2024-03-10

## 2024-03-10 LAB
OHS QRS DURATION: 72 MS
OHS QTC CALCULATION: 482 MS

## 2024-03-12 PROBLEM — J18.9 PNEUMONIA: Status: ACTIVE | Noted: 2024-03-12

## 2024-03-15 PROBLEM — R53.81 DEBILITY: Status: ACTIVE | Noted: 2022-09-16

## 2024-03-17 PROBLEM — J96.01 ACUTE HYPOXEMIC RESPIRATORY FAILURE: Status: RESOLVED | Noted: 2024-03-10 | Resolved: 2024-03-17

## 2024-03-19 PROBLEM — M25.552 PAIN OF LEFT HIP: Status: ACTIVE | Noted: 2024-03-19

## 2024-03-20 PROBLEM — J18.9 PNEUMONIA: Status: RESOLVED | Noted: 2024-03-12 | Resolved: 2024-03-20

## 2024-03-22 ENCOUNTER — TELEPHONE (OUTPATIENT)
Dept: NEUROSURGERY | Facility: CLINIC | Age: 79
End: 2024-03-22

## 2024-03-22 ENCOUNTER — TELEPHONE (OUTPATIENT)
Dept: INTERNAL MEDICINE | Facility: CLINIC | Age: 79
End: 2024-03-22
Payer: MEDICARE

## 2024-03-22 ENCOUNTER — HOSPITAL ENCOUNTER (OUTPATIENT)
Dept: RADIOLOGY | Facility: HOSPITAL | Age: 79
Discharge: HOME | End: 2024-03-22
Attending: NEUROLOGICAL SURGERY
Payer: MEDICARE

## 2024-03-22 DIAGNOSIS — M25.473 ANKLE SWELLING, UNSPECIFIED LATERALITY: ICD-10-CM

## 2024-03-22 DIAGNOSIS — S06.5XAA SUBDURAL HEMATOMA, ACUTE: ICD-10-CM

## 2024-03-22 PROCEDURE — 70450 CT HEAD/BRAIN W/O DYE: CPT | Mod: 26,HCNC,, | Performed by: RADIOLOGY

## 2024-03-22 PROCEDURE — 70450 CT HEAD/BRAIN W/O DYE: CPT | Mod: TC,HCNC

## 2024-03-22 RX ORDER — ERGOCALCIFEROL 1.25 MG/1
CAPSULE ORAL
Qty: 12 CAPSULE | Refills: 3 | OUTPATIENT
Start: 2024-03-22

## 2024-03-22 RX ORDER — FUROSEMIDE 20 MG/1
20 TABLET ORAL
Qty: 90 TABLET | Refills: 0 | Status: SHIPPED | OUTPATIENT
Start: 2024-03-22 | End: 2024-05-17 | Stop reason: SDUPTHER

## 2024-03-22 NOTE — TELEPHONE ENCOUNTER
Spoke w staff at facility who states she will not be able to make it to her appt d/t lack of transportation and pt was admitted yesterday 3/21, short notice of appt. Will call CT on [place] and try to have imaging done there and will have a virtual f/u for results.   ----- Message from Jenna Mcdonough, Patient Care Assistant sent at 3/22/2024  9:45 AM CDT -----  Contact: Nelsy Jordan  Type: Needs Medical Advice    Who Called: Nelsy Jordan  Best Call Back Number: [phone]  Inquiry/Question: [facility] is calling to see if the pt is able to be seen in [place] due to the pt is residing at their facility. Please advise Thank you~

## 2024-04-15 ENCOUNTER — TELEPHONE (OUTPATIENT)
Dept: INTERNAL MEDICINE | Facility: CLINIC | Age: 79
End: 2024-04-15
Payer: MEDICARE

## 2024-04-15 NOTE — TELEPHONE ENCOUNTER
Called and spoke to Estrella with home health. Pt needs to establish care with new PCP. Scheduled appointment for tomorrow morning at the [facility]. Pt agrees that appt time and location will work.

## 2024-04-15 NOTE — TELEPHONE ENCOUNTER
----- Message from Ana Bunn sent at 4/15/2024 12:45 PM CDT -----  Regarding: Estrella/[facility]/[phone]  States she needs to schedule an appt. She is a former patient of Dr Isaias Isidro. Nothing available until October. States she had a sub dural hemotoma and a fx ribs, she also has CHF, COPD, hypertension and amemia. States she was just discharged from [facility] and she was at [facility], before going to [facility]. Please call pt [phone]. Thank you

## 2024-04-16 ENCOUNTER — OFFICE VISIT (OUTPATIENT)
Dept: INTERNAL MEDICINE | Facility: CLINIC | Age: 79
End: 2024-04-16
Payer: MEDICARE

## 2024-04-16 VITALS
BODY MASS INDEX: 18.22 KG/M2 | HEART RATE: 94 BPM | WEIGHT: 99 LBS | RESPIRATION RATE: 12 BRPM | TEMPERATURE: 98 F | SYSTOLIC BLOOD PRESSURE: 132 MMHG | DIASTOLIC BLOOD PRESSURE: 80 MMHG | OXYGEN SATURATION: 98 % | HEIGHT: 62 IN

## 2024-04-16 DIAGNOSIS — F11.20 UNCOMPLICATED OPIOID DEPENDENCE: ICD-10-CM

## 2024-04-16 DIAGNOSIS — I10 PRIMARY HYPERTENSION: Primary | Chronic | ICD-10-CM

## 2024-04-16 DIAGNOSIS — E53.8 B12 DEFICIENCY: ICD-10-CM

## 2024-04-16 DIAGNOSIS — D50.9 IRON DEFICIENCY ANEMIA, UNSPECIFIED IRON DEFICIENCY ANEMIA TYPE: ICD-10-CM

## 2024-04-16 DIAGNOSIS — M86.672 OTHER CHRONIC OSTEOMYELITIS OF LEFT FOOT: ICD-10-CM

## 2024-04-16 DIAGNOSIS — J44.9 COPD, GROUP D, BY GOLD 2017 CLASSIFICATION: ICD-10-CM

## 2024-04-16 DIAGNOSIS — I50.32 CHRONIC HEART FAILURE WITH PRESERVED EJECTION FRACTION: ICD-10-CM

## 2024-04-16 DIAGNOSIS — I70.0 ATHEROSCLEROSIS OF AORTA: ICD-10-CM

## 2024-04-16 DIAGNOSIS — I70.262 ATHEROSCLEROSIS OF NATIVE ARTERY OF LEFT LOWER EXTREMITY WITH GANGRENE: ICD-10-CM

## 2024-04-16 DIAGNOSIS — I25.10 CORONARY ARTERY DISEASE WITHOUT ANGINA PECTORIS, UNSPECIFIED VESSEL OR LESION TYPE, UNSPECIFIED WHETHER NATIVE OR TRANSPLANTED HEART: ICD-10-CM

## 2024-04-16 DIAGNOSIS — I73.9 PVD (PERIPHERAL VASCULAR DISEASE): ICD-10-CM

## 2024-04-16 DIAGNOSIS — S98.112A AMPUTATION OF LEFT GREAT TOE: ICD-10-CM

## 2024-04-16 DIAGNOSIS — G89.4 CHRONIC PAIN SYNDROME: ICD-10-CM

## 2024-04-16 DIAGNOSIS — M81.0 OSTEOPOROSIS, UNSPECIFIED OSTEOPOROSIS TYPE, UNSPECIFIED PATHOLOGICAL FRACTURE PRESENCE: ICD-10-CM

## 2024-04-16 DIAGNOSIS — N18.31 STAGE 3A CHRONIC KIDNEY DISEASE: ICD-10-CM

## 2024-04-16 PROBLEM — G89.29 CHRONIC PAIN: Status: ACTIVE | Noted: 2024-04-16

## 2024-04-16 PROBLEM — K92.1 MELENA: Status: RESOLVED | Noted: 2022-09-13 | Resolved: 2024-04-16

## 2024-04-16 PROBLEM — R10.13 EPIGASTRIC ABDOMINAL PAIN: Status: RESOLVED | Noted: 2020-02-10 | Resolved: 2024-04-16

## 2024-04-16 PROBLEM — M86.9 OSTEOMYELITIS: Status: RESOLVED | Noted: 2021-04-07 | Resolved: 2024-04-16

## 2024-04-16 PROBLEM — M25.551 RIGHT HIP PAIN: Status: RESOLVED | Noted: 2018-05-08 | Resolved: 2024-04-16

## 2024-04-16 PROCEDURE — 3288F FALL RISK ASSESSMENT DOCD: CPT | Mod: HCNC,CPTII,S$GLB, | Performed by: FAMILY MEDICINE

## 2024-04-16 PROCEDURE — 1159F MED LIST DOCD IN RCRD: CPT | Mod: HCNC,CPTII,S$GLB, | Performed by: FAMILY MEDICINE

## 2024-04-16 PROCEDURE — 3079F DIAST BP 80-89 MM HG: CPT | Mod: HCNC,CPTII,S$GLB, | Performed by: FAMILY MEDICINE

## 2024-04-16 PROCEDURE — 1100F PTFALLS ASSESS-DOCD GE2>/YR: CPT | Mod: HCNC,CPTII,S$GLB, | Performed by: FAMILY MEDICINE

## 2024-04-16 PROCEDURE — 1111F DSCHRG MED/CURRENT MED MERGE: CPT | Mod: HCNC,CPTII,S$GLB, | Performed by: FAMILY MEDICINE

## 2024-04-16 PROCEDURE — 1126F AMNT PAIN NOTED NONE PRSNT: CPT | Mod: HCNC,CPTII,S$GLB, | Performed by: FAMILY MEDICINE

## 2024-04-16 PROCEDURE — 99214 OFFICE O/P EST MOD 30 MIN: CPT | Mod: HCNC,S$GLB,, | Performed by: FAMILY MEDICINE

## 2024-04-16 PROCEDURE — G2211 COMPLEX E/M VISIT ADD ON: HCPCS | Mod: HCNC,S$GLB,, | Performed by: FAMILY MEDICINE

## 2024-04-16 PROCEDURE — 3075F SYST BP GE 130 - 139MM HG: CPT | Mod: HCNC,CPTII,S$GLB, | Performed by: FAMILY MEDICINE

## 2024-04-16 PROCEDURE — 99999 PR PBB SHADOW E&M-EST. PATIENT-LVL V: CPT | Mod: PBBFAC,HCNC,, | Performed by: FAMILY MEDICINE

## 2024-04-16 RX ORDER — ERGOCALCIFEROL 1.25 MG/1
50000 CAPSULE ORAL
COMMUNITY

## 2024-04-16 RX ORDER — GABAPENTIN 600 MG/1
600 TABLET ORAL 3 TIMES DAILY
COMMUNITY

## 2024-04-16 RX ORDER — FERROUS SULFATE 325(65) MG
325 TABLET ORAL
COMMUNITY

## 2024-04-16 RX ORDER — ROSUVASTATIN CALCIUM 5 MG/1
5 TABLET, COATED ORAL DAILY
COMMUNITY
End: 2024-05-17

## 2024-04-16 NOTE — PATIENT INSTRUCTIONS
Please obtain the RSV vaccine via your pharmacy  Shingrix new shingles vaccine  via a pharmacy  .       negative...

## 2024-04-16 NOTE — PROGRESS NOTES
Subjective:      Patient ID: Keyona Dwyer is a 78 y.o. female.    Chief Complaint: Establish Care    HPI b12def,anemia utd hemat  Osteopor utd rheum prolia  Cad /chfutd dr garcia  Pvd hx dr adkins    Chronic low back pain/opiate use utd pain mgmt dr del toro    3/24 subdur hemat transferred to [facility] in  for PT; now feeling well;has home health      Past Medical History:   Diagnosis Date    Acute diastolic heart failure 09/15/2022    Acute respiratory failure with hypoxia and hypercarbia 09/14/2022    Anemia     hematol    Anxiety     CAD (coronary artery disease)     Cancer     Cataract     Chronic back pain     Dr. Del Toro    Chronic pain 4/16/2024    Coronary artery disease without angina pectoris 03/06/2024    Fibrocystic breast     Glaucoma     Hyperlipemia     Hypertension     Idiopathic acute pancreatitis 06/18/2022    NSTEMI (non-ST elevated myocardial infarction) 09/11/2022    Osteoarthritis     Osteoporosis     Rheumatology/on Prolia    Pancreatitis     Pneumonia     Polyneuropathy     PVD (peripheral vascular disease) 04/12/2021    PVD (peripheral vascular disease)     dr adkins    Restless leg syndrome     Subdural hematoma     Tobacco dependence     Trouble in sleeping       Past Surgical History:   Procedure Laterality Date    ANGIOGRAPHY OF LOWER EXTREMITY N/A 04/12/2021    Procedure: ANGIOGRAM, LOWER EXTREMITY/left leg;  Surgeon: Maico Adkins MD;  Location: [facility] CATH LAB;  Service: Vascular;  Laterality: N/A;  req 1130 start time/ A243A    AORTOGRAPHY WITH SERIALOGRAPHY N/A 04/14/2021    Procedure: AORTOGRAM, WITH RUNOFF;  Surgeon: Maico Adkins MD;  Location: [facility] CATH LAB;  Service: Vascular;  Laterality: N/A;  left iliac stent with shockwave/req 1030 start    CATARACT EXTRACTION W/  INTRAOCULAR LENS IMPLANT Bilateral 8/ 9 2017    ESOPHAGOGASTRODUODENOSCOPY N/A 09/13/2022    Procedure: EGD (ESOPHAGOGASTRODUODENOSCOPY);  Surgeon: Fern Rasmussen MD;  Location: [facility] ENDO;   Service: Endoscopy;  Laterality: N/A;    GALLBLADDER SURGERY      HYSTERECTOMY  1972    LUNG SURGERY Right age 15    lung collpase    SHOULDER SURGERY      TOE AMPUTATION Left 04/15/2021    Procedure: AMPUTATION, TOE;  Surgeon: Taylor Anderson DPM;  Location: [facility];  Service: Podiatry;  Laterality: Left;  Hallux (Great Toe)    TOTAL HIP ARTHROPLASTY Bilateral     TOTAL KNEE ARTHROPLASTY Bilateral       Social History     Socioeconomic History    Marital status:      Spouse name: edward    Number of children: 3   Occupational History    Occupation: retired   Tobacco Use    Smoking status: Every Day     Current packs/day: 0.50     Average packs/day: 0.5 packs/day for 63.0 years (31.5 ttl pk-yrs)     Types: Cigarettes    Smokeless tobacco: Current    Tobacco comments:     started age of  11    Substance and Sexual Activity    Alcohol use: No    Drug use: No    Sexual activity: Yes     Partners: Male     Social Determinants of Health     Financial Resource Strain: Low Risk  (3/7/2024)    Overall Financial Resource Strain (CARDIA)     Difficulty of Paying Living Expenses: Not hard at all   Food Insecurity: No Food Insecurity (3/7/2024)    Hunger Vital Sign     Worried About Running Out of Food in the Last Year: Never true     Ran Out of Food in the Last Year: Never true   Transportation Needs: No Transportation Needs (3/7/2024)    PRAPARE - Transportation     Lack of Transportation (Medical): No     Lack of Transportation (Non-Medical): No   Physical Activity: Insufficiently Active (3/6/2023)    Exercise Vital Sign     Days of Exercise per Week: 7 days     Minutes of Exercise per Session: 20 min   Stress: No Stress Concern Present (3/6/2023)    [employer] [place] [employer] - Occupational Stress Questionnaire     Feeling of Stress : Not at all   Social Connections: Moderately Integrated (3/6/2023)    Social Connection and Isolation Panel [NHANES]     Frequency of Communication with Friends and  Family: Three times a week     Frequency of Social Gatherings with Friends and Family: More than three times a week     Attends Faith Services: More than 4 times per year     Active Member of Clubs or Organizations: No     Attends Club or Organization Meetings: Never     Marital Status:    Housing Stability: Low Risk  (3/7/2024)    Housing Stability Vital Sign     Unable to Pay for Housing in the Last Year: No     Number of Places Lived in the Last Year: 1     Unstable Housing in the Last Year: No      Family History   Problem Relation Name Age of Onset    Cancer Mother      Breast cancer Mother      Hyperlipidemia Father      Heart failure Father      Heart disease Brother      Brain cancer Brother      Stroke Brother      Alcohol abuse Brother        Review of Systems        Objective:   Here w husb  Physical Examgen nad  Cvrrr  Chest ctabilat  Assessment:         ICD-10-CM ICD-9-CM   1. Primary hypertension  I10 401.9   2. Osteoporosis, unspecified osteoporosis type, unspecified pathological fracture presence  M81.0 733.00   3. COPD, group D, by GOLD 2017 classification  J44.9 496   4. Atherosclerosis of aorta  I70.0 440.0   5. Iron deficiency anemia, unspecified iron deficiency anemia type  D50.9 280.9   6. Amputation of left great toe  S98.112A 895.0   7. PVD (peripheral vascular disease)  I73.9 443.9   8. Chronic heart failure with preserved ejection fraction  I50.32 428.9   9. B12 deficiency  E53.8 266.2   10. Coronary artery disease without angina pectoris, unspecified vessel or lesion type, unspecified whether native or transplanted heart  I25.10 414.00   11. Chronic pain syndrome  G89.4 338.4   12. Other chronic osteomyelitis of left foot  M86.672 730.17   13. Stage 3a chronic kidney disease  N18.31 585.3   14. 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 Uncomplicated opioid dependence  F11.20 304.00      Plan:    F/u rheum, card, vasc, ), hemat  when due    1. Primary hypertension    2. Osteoporosis, unspecified osteoporosis type, unspecified pathological fracture presence    3. COPD, group D, by GOLD 2017 classification    4. Atherosclerosis of aorta  Overview:  Noted on CT 5/8/2018      5. Iron deficiency anemia, unspecified iron deficiency anemia type    6. Amputation of left great toe    7. PVD (peripheral vascular disease)    8. Chronic heart failure with preserved ejection fraction    9. B12 deficiency    10. Coronary artery disease without angina pectoris, unspecified vessel or lesion type, unspecified whether native or transplanted heart    11. Chronic pain syndrome    12. Other chronic osteomyelitis of left foot    13. Stage 3a chronic kidney disease    14. 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 Uncomplicated opioid dependence       Declines lung ca screening    F/u sugey 6 months

## 2024-05-09 ENCOUNTER — TELEPHONE (OUTPATIENT)
Dept: NEUROSURGERY | Facility: CLINIC | Age: 79
End: 2024-05-09
Payer: MEDICARE

## 2024-05-09 DIAGNOSIS — I62.00 NONTRAUMATIC SUBDURAL HEMORRHAGE, UNSPECIFIED: Primary | ICD-10-CM

## 2024-05-09 NOTE — TELEPHONE ENCOUNTER
Spoke sonu lyman who states that they are the ones transporting her to the appts, she accepted the avail time/date. Requested a virtual f/u, this was approved. Informed them they would need to setup the FTBprohart to do this. Also informed them that if they aren't able to complete this and they need any help r/s for in-person visit to call us and we would be glad to assist.

## 2024-05-09 NOTE — PROGRESS NOTES
C3 nurse spoke with Keyona Dwyer (spouse) for a TCC post hospital discharge follow up call. The patient does not have a scheduled HOSFU appointment with Ralf Isidro MD within 5-7 days post hospital discharge date 09/17/2022. C3 nurse was unable to schedule HOSFU appointment in [facility].    Message sent to PCP staff requesting they contact patient and schedule follow up appointment.    Patient's spouse verbalized preference to see NP for home referral vs. PCP.     NP home referral placed.         4H: impaired joint mobility, motor function, muscle performance, and range of motion associated with joint arthroplasty

## 2024-05-16 NOTE — PROGRESS NOTES
Subjective:   Patient ID:  Keyona Dwyer is a 78 y.o. female who presents for cardiac consult of No chief complaint on file.        HPI  The patient came in today for cardiac consult of No chief complaint on file.    Keyona Dwyer is a 78 y.o. female pt with CAD h/o NSTEMI, PAD, HTN, HLD, pulm HTN, HFPEF, CKD3, COPD, tobacco use, lumbar radic, OA presents for CV follow up.     8/30/23  BP and HR well controlled. BMI 19 - 104 lbs. Overall doing well, no CP/SOB.     5/17/24 March 2024 Hospital Course:   Admitted to [facility] for subdural hematoma on chronic plavix/aspirin. Neurosurgery recommended no intervention; repeat CT stable. Gen surg consulted for rib fracture and ortho eval for L hip pain (treated with ice and PT/OT). Patient developed delirium and AHRF. Work up suspicious for PNA given lower lobe opacity seen on imaging and elevated procal as well as volume overload; she completed Abx and diuresed. Patient significantly improved and back to mental baseline and o2 requirements improved to 1-2L O2. Persistently having difficulty with taking deep breaths and cough d/t pain with rib fracture; aerobika improved this as well. Patient successfully stepped down to neuroscience floor and planning for SNF discharge.     Patient resides at [place], [place]. Delayed discharged (as patient was reported inability to see NSGY/schedule future head imaging in [place]).  NSGY ultimately recommended to resume ASA on 3/17.  Repeat CT head was reviewed by NSGY on 3/19, ok to resume plavix, and repeat CT head imaging in about 2 weeks.  SW consulted for discharge planning.  She will need outpt f/u with NSGY (Dr. Valle).   Pt is stable for discharge to SNF.   Medications as per reconciliation form.   F/u with PCP and Neuro in 1-2 wks.          Had a fall with subdural in March, stable now. Back on DAPT.   She has not quit smoking.   ECHO 3/2024 with normal bi V function, PASP 39 mmHg.         Results for orders placed  during the hospital encounter of 03/06/24    Echo    Interpretation Summary    Left Ventricle: The left ventricle is normal in size. Normal wall thickness. There is normal systolic function with a visually estimated ejection fraction of 60 - 65%.    Right Ventricle: Normal right ventricular cavity size. Systolic function is normal.    Pulmonary Artery: There is borderline elevated pulmonary hypertension. The estimated pulmonary artery systolic pressure is 39 mmHg.    IVC/SVC: Normal venous pressure at 3 mmHg.    No apparent significant valve disease.    Technically difficult study      Results for orders placed during the hospital encounter of 11/04/22    Nuclear Stress - Cardiology Interpreted    Interpretation Summary    Normal myocardial perfusion scan. There is no evidence of myocardial ischemia or infarction.    The gated perfusion images showed an ejection fraction of 76% at rest. The gated perfusion images showed an ejection fraction of 69% post stress.    The EKG portion of this study is negative for ischemia.    There were no arrhythmias during stress.    Stress ECG: There was hypertensive blood pressure response with stress.      Results for orders placed during the hospital encounter of 09/11/22    Echo    Interpretation Summary  · The left ventricle is normal in size with concentric hypertrophy and normal systolic function.  · The estimated ejection fraction is 55%.  · Grade I left ventricular diastolic dysfunction.  · Normal right ventricular size with normal right ventricular systolic function.  · Moderate left atrial enlargement.  · Mild tricuspid regurgitation.  · There are segmental left ventricular wall motion abnormalities.  · There is pulmonary hypertension.  · The estimated PA systolic pressure is 45 mmHg.      Past Medical History:   Diagnosis Date    Acute diastolic heart failure 09/15/2022    Acute respiratory failure with hypoxia and hypercarbia 09/14/2022    Anemia     hematol    Anxiety      CAD (coronary artery disease)     Cancer     Cataract     Chronic back pain     Dr. Guzman    Chronic pain 04/16/2024    Coronary artery disease without angina pectoris 03/06/2024    Fibrocystic breast     Glaucoma     Hyperlipemia     Hypertension     Idiopathic acute pancreatitis 06/18/2022    NSTEMI (non-ST elevated myocardial infarction) 09/11/2022    Osteoarthritis     Osteoporosis     Rheumatology/on Prolia    Pancreatitis     Pneumonia     Polyneuropathy     PVD (peripheral vascular disease) 04/12/2021    PVD (peripheral vascular disease)     dr adkins    Restless leg syndrome     Subdural hematoma 03/2024    Tobacco dependence     Trouble in sleeping        Past Surgical History:   Procedure Laterality Date    ANGIOGRAPHY OF LOWER EXTREMITY N/A 04/12/2021    Procedure: ANGIOGRAM, LOWER EXTREMITY/left leg;  Surgeon: Maico Adkins MD;  Location: [facility] CATH LAB;  Service: Vascular;  Laterality: N/A;  req 1130 start time/ A243A    AORTOGRAPHY WITH SERIALOGRAPHY N/A 04/14/2021    Procedure: AORTOGRAM, WITH RUNOFF;  Surgeon: Maico Adkins MD;  Location: [facility] CATH LAB;  Service: Vascular;  Laterality: N/A;  left iliac stent with shockwave/req 1030 start    CATARACT EXTRACTION W/  INTRAOCULAR LENS IMPLANT Bilateral 8/ 9 2017    ESOPHAGOGASTRODUODENOSCOPY N/A 09/13/2022    Procedure: EGD (ESOPHAGOGASTRODUODENOSCOPY);  Surgeon: Fern Rasmussen MD;  Location: [facility] ENDO;  Service: Endoscopy;  Laterality: N/A;    GALLBLADDER SURGERY      HYSTERECTOMY  1972    LUNG SURGERY Right age 15    lung collpase    SHOULDER SURGERY      TOE AMPUTATION Left 04/15/2021    Procedure: AMPUTATION, TOE;  Surgeon: Taylor Anderson DPM;  Location: [facility] OR;  Service: Podiatry;  Laterality: Left;  Hallux (Great Toe)    TOTAL HIP ARTHROPLASTY Bilateral     TOTAL KNEE ARTHROPLASTY Bilateral        Social History     Tobacco Use    Smoking status: Every Day     Current packs/day: 0.50     Average packs/day: 0.5 packs/day for 63.0 years  (31.5 ttl pk-yrs)     Types: Cigarettes    Smokeless tobacco: Current    Tobacco comments:     started age of  11    Substance Use Topics    Alcohol use: No    Drug use: No       Family History   Problem Relation Name Age of Onset    Cancer Mother      Breast cancer Mother      Hyperlipidemia Father      Heart failure Father      Heart disease Brother      Brain cancer Brother      Stroke Brother      Alcohol abuse Brother         Patient's Medications   New Prescriptions    No medications on file   Previous Medications    ASPIRIN (ECOTRIN) 81 MG EC TABLET    Take 1 tablet (81 mg total) by mouth once daily.    AZELASTINE (ASTELIN) 137 MCG (0.1 %) NASAL SPRAY    1 spray (137 mcg total) by Nasal route 2 (two) times daily.    B COMPLEX VITAMINS CAPSULE    Take 1 capsule by mouth once daily.    BUPROPION (WELLBUTRIN) 75 MG TABLET    Take 1 tablet (75 mg total) by mouth 2 (two) times daily.    BUSPIRONE (BUSPAR) 10 MG TABLET    TAKE 1 TABLET BY MOUTH TWICE DAILY    CLOPIDOGREL (PLAVIX) 75 MG TABLET    Take 1 tablet (75 mg total) by mouth once daily.    CLOTRIMAZOLE-BETAMETHASONE 1-0.05% (LOTRISONE) CREAM    Apply to external ear with finger once a week    DOCUSATE SODIUM (COLACE) 100 MG CAPSULE    Take 1 capsule (100 mg total) by mouth 2 (two) times daily.    DULOXETINE (CYMBALTA) 30 MG CAPSULE    Take 30 mg by mouth once daily.    ERGOCALCIFEROL (ERGOCALCIFEROL) 50,000 UNIT CAP    TAKE 1 CAPSULE BY MOUTH EVERY WEEK    ERGOCALCIFEROL (VITAMIN D2) 50,000 UNIT CAP    Take 50,000 Units by mouth every 7 days.    FERROUS SULFATE (FEOSOL) 325 MG (65 MG IRON) TAB TABLET    Take 325 mg by mouth daily with breakfast.    GABAPENTIN (NEURONTIN) 600 MG TABLET    Take 600 mg by mouth 3 (three) times daily.    HYDROCODONE-ACETAMINOPHEN (NORCO)  MG PER TABLET    Take 1 tablet by mouth 3 (three) times daily as needed.    IPRATROPIUM (ATROVENT) 42 MCG (0.06 %) NASAL SPRAY    2 sprays by Each Nostril route 2 (two) times daily.     ON-Q PAIN PUMP    by Misc.(Non-Drug; Combo Route) route.   Modified Medications    Modified Medication Previous Medication    AMLODIPINE (NORVASC) 5 MG TABLET amLODIPine (NORVASC) 5 MG tablet       Take 1 tablet (5 mg total) by mouth once daily.    Take 1 tablet (5 mg total) by mouth once daily.    ATORVASTATIN (LIPITOR) 20 MG TABLET atorvastatin (LIPITOR) 20 MG tablet       Take 1 tablet (20 mg total) by mouth once daily.    Take 1 tablet (20 mg total) by mouth once daily.    FUROSEMIDE (LASIX) 20 MG TABLET furosemide (LASIX) 20 MG tablet       Take 1 tablet (20 mg total) by mouth once daily. Do not take if BP is below 110/70    TAKE 1 TABLET BY MOUTH ONCE DAILY    RANOLAZINE (RANEXA) 500 MG TB12 ranolazine (RANEXA) 500 MG Tb12       Take 1 tablet (500 mg total) by mouth 2 (two) times daily.    Take 1 tablet (500 mg total) by mouth 2 (two) times daily.   Discontinued Medications    ROSUVASTATIN (CRESTOR) 5 MG TABLET    Take 5 mg by mouth once daily.       Review of Systems   Constitutional:  Positive for malaise/fatigue.   HENT: Negative.     Eyes: Negative.    Respiratory:  Positive for shortness of breath.    Cardiovascular: Negative.    Gastrointestinal: Negative.    Genitourinary: Negative.    Musculoskeletal: Negative.    Skin: Negative.    Neurological: Negative.    Endo/Heme/Allergies: Negative.    Psychiatric/Behavioral: Negative.     All 12 systems otherwise negative.      Wt Readings from Last 3 Encounters:   05/17/24 43.6 kg (96 lb 1.9 oz)   04/16/24 44.9 kg (98 lb 15.8 oz)   03/12/24 44.8 kg (98 lb 12.3 oz)     Temp Readings from Last 3 Encounters:   04/16/24 98 °F (36.7 °C) (Tympanic)   03/21/24 98 °F (36.7 °C)   03/06/24 98.1 °F (36.7 °C) (Oral)     BP Readings from Last 3 Encounters:   05/17/24 (!) 140/74   04/16/24 132/80   03/21/24 138/75     Pulse Readings from Last 3 Encounters:   05/17/24 83   04/16/24 94   03/21/24 91       BP (!) 140/74 (BP Location: Right arm, Patient Position: Sitting, BP  "Method: Medium (Manual))   Pulse 83   Ht 5' 2" (1.575 m)   Wt 43.6 kg (96 lb 1.9 oz)   SpO2 (!) 93%   BMI 17.58 kg/m²     Objective:   Physical Exam  Vitals and nursing note reviewed.   Constitutional:       General: She is not in acute distress.     Appearance: She is well-developed. She is not diaphoretic.   HENT:      Head: Normocephalic and atraumatic.      Nose: Nose normal.   Eyes:      General: No scleral icterus.     Conjunctiva/sclera: Conjunctivae normal.   Neck:      Thyroid: No thyromegaly.      Vascular: No JVD.   Cardiovascular:      Rate and Rhythm: Normal rate and regular rhythm.      Heart sounds: S1 normal and S2 normal. Murmur heard.      No friction rub. No gallop. No S3 or S4 sounds.   Pulmonary:      Effort: Pulmonary effort is normal. No respiratory distress.      Breath sounds: Normal breath sounds. No stridor. No wheezing or rales.   Chest:      Chest wall: No tenderness.   Abdominal:      General: Bowel sounds are normal. There is no distension.      Palpations: Abdomen is soft. There is no mass.      Tenderness: There is no abdominal tenderness. There is no rebound.   Genitourinary:     Comments: Deferred  Musculoskeletal:         General: No tenderness or deformity. Normal range of motion.      Cervical back: Normal range of motion and neck supple.   Lymphadenopathy:      Cervical: No cervical adenopathy.   Skin:     General: Skin is warm and dry.      Coloration: Skin is not pale.      Findings: No erythema or rash.   Neurological:      Mental Status: She is alert and oriented to person, place, and time.      Motor: No abnormal muscle tone.      Coordination: Coordination normal.   Psychiatric:         Behavior: Behavior normal.         Thought Content: Thought content normal.         Judgment: Judgment normal.         Lab Results   Component Value Date     03/20/2024    K 4.0 03/20/2024    CL 97 03/20/2024    CO2 36 (H) 03/20/2024    BUN 24 (H) 03/20/2024    CREATININE 0.84 " 03/20/2024     (H) 03/20/2024    HGBA1C 5.1 09/06/2023    MG 1.9 03/14/2024    AST 31 03/18/2024    ALT 20 03/18/2024    ALBUMIN 3.4 (L) 03/18/2024    PROT 6.5 03/18/2024    BILITOT 0.8 03/18/2024    WBC 16.25 (H) 03/18/2024    HGB 10.4 (L) 03/18/2024    HCT 32.3 (L) 03/18/2024     (H) 03/18/2024     03/18/2024    INR 0.9 03/06/2024    TSH 1.010 09/06/2023    CHOL 147 09/06/2023    HDL 77 (H) 09/06/2023    LDLCALC 49.6 (L) 09/06/2023    TRIG 102 09/06/2023     (H) 10/07/2022     Assessment:      1. PVD (peripheral vascular disease)    2. 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 History of non-ST elevation myocardial infarction (NSTEMI)    4. Primary hypertension    5. Tobacco abuse disorder    6. Chronic heart failure with preserved ejection fraction    7. Essential hypertension    8. COPD, group D, by GOLD 2017 classification    9. Atherosclerosis of aorta    10. Ankle swelling, unspecified laterality    11. Bilateral carotid bruits            Plan:        HFPEF  - cont Lasix PRN - taking it daily now      CAD, H/o NSTEMI (non-ST elevated myocardial infarction)   cont med management - asa, plavix,   10/7/22 - order pharm nuclear stress test to r/o ischemia  - negative 11/2022     Tobacco abuse disorder with COPD  -Smoking cessation advised.  - tried nicotine patch      PVD (peripheral vascular disease), aortic atherosc  -cont ASA, plavix, repeat u/s and SHIKHA  -Smoking cessation., order carotid u/s     Anemia, h/o melena , h/o subdural hematoma   -cont to monitor her PCP/GI    HTN (hypertension)   - titrate meds     HLD  - cont statin    Visit today included increased complexity associated with the care of the episodic problem CHF addressed and managing the longitudinal care of the patient due to the serious and/or complex managed problem(s) .      Thank you for allowing me to participate in this patient's care. Please do not hesitate to contact me with  any questions or concerns. Consult note has been forwarded to the referral physician.

## 2024-05-17 ENCOUNTER — OFFICE VISIT (OUTPATIENT)
Dept: CARDIOLOGY | Facility: CLINIC | Age: 79
End: 2024-05-17
Payer: MEDICARE

## 2024-05-17 VITALS
DIASTOLIC BLOOD PRESSURE: 74 MMHG | HEART RATE: 83 BPM | WEIGHT: 96.13 LBS | HEIGHT: 62 IN | OXYGEN SATURATION: 93 % | SYSTOLIC BLOOD PRESSURE: 140 MMHG | BODY MASS INDEX: 17.69 KG/M2

## 2024-05-17 DIAGNOSIS — R09.89 BILATERAL CAROTID BRUITS: ICD-10-CM

## 2024-05-17 DIAGNOSIS — J44.9 COPD, GROUP D, BY GOLD 2017 CLASSIFICATION: ICD-10-CM

## 2024-05-17 DIAGNOSIS — M25.473 ANKLE SWELLING, UNSPECIFIED LATERALITY: ICD-10-CM

## 2024-05-17 DIAGNOSIS — I70.0 ATHEROSCLEROSIS OF AORTA: ICD-10-CM

## 2024-05-17 DIAGNOSIS — I10 PRIMARY HYPERTENSION: ICD-10-CM

## 2024-05-17 DIAGNOSIS — I25.2 HISTORY OF NON-ST ELEVATION MYOCARDIAL INFARCTION (NSTEMI): ICD-10-CM

## 2024-05-17 DIAGNOSIS — I70.262 ATHEROSCLEROSIS OF NATIVE ARTERY OF LEFT LOWER EXTREMITY WITH GANGRENE: ICD-10-CM

## 2024-05-17 DIAGNOSIS — I50.32 CHRONIC HEART FAILURE WITH PRESERVED EJECTION FRACTION: ICD-10-CM

## 2024-05-17 DIAGNOSIS — Z72.0 TOBACCO ABUSE DISORDER: ICD-10-CM

## 2024-05-17 DIAGNOSIS — I10 ESSENTIAL HYPERTENSION: ICD-10-CM

## 2024-05-17 DIAGNOSIS — I73.9 PVD (PERIPHERAL VASCULAR DISEASE): Primary | ICD-10-CM

## 2024-05-17 PROCEDURE — 1160F RVW MEDS BY RX/DR IN RCRD: CPT | Mod: CPTII,S$GLB,, | Performed by: INTERNAL MEDICINE

## 2024-05-17 PROCEDURE — 3078F DIAST BP <80 MM HG: CPT | Mod: CPTII,S$GLB,, | Performed by: INTERNAL MEDICINE

## 2024-05-17 PROCEDURE — G2211 COMPLEX E/M VISIT ADD ON: HCPCS | Mod: S$GLB,,, | Performed by: INTERNAL MEDICINE

## 2024-05-17 PROCEDURE — 99999 PR PBB SHADOW E&M-EST. PATIENT-LVL IV: CPT | Mod: PBBFAC,,, | Performed by: INTERNAL MEDICINE

## 2024-05-17 PROCEDURE — 3288F FALL RISK ASSESSMENT DOCD: CPT | Mod: CPTII,S$GLB,, | Performed by: INTERNAL MEDICINE

## 2024-05-17 PROCEDURE — 1126F AMNT PAIN NOTED NONE PRSNT: CPT | Mod: CPTII,S$GLB,, | Performed by: INTERNAL MEDICINE

## 2024-05-17 PROCEDURE — 99214 OFFICE O/P EST MOD 30 MIN: CPT | Mod: S$GLB,,, | Performed by: INTERNAL MEDICINE

## 2024-05-17 PROCEDURE — 1159F MED LIST DOCD IN RCRD: CPT | Mod: CPTII,S$GLB,, | Performed by: INTERNAL MEDICINE

## 2024-05-17 PROCEDURE — 1101F PT FALLS ASSESS-DOCD LE1/YR: CPT | Mod: CPTII,S$GLB,, | Performed by: INTERNAL MEDICINE

## 2024-05-17 PROCEDURE — 3077F SYST BP >= 140 MM HG: CPT | Mod: CPTII,S$GLB,, | Performed by: INTERNAL MEDICINE

## 2024-05-17 RX ORDER — AMLODIPINE BESYLATE 5 MG/1
5 TABLET ORAL DAILY
Qty: 30 TABLET | Refills: 11 | Status: SHIPPED | OUTPATIENT
Start: 2024-05-17 | End: 2025-05-17

## 2024-05-17 RX ORDER — FUROSEMIDE 20 MG/1
20 TABLET ORAL DAILY
Qty: 90 TABLET | Refills: 0 | Status: SHIPPED | OUTPATIENT
Start: 2024-05-17

## 2024-05-17 RX ORDER — ATORVASTATIN CALCIUM 20 MG/1
20 TABLET, FILM COATED ORAL DAILY
Qty: 90 TABLET | Refills: 3 | Status: SHIPPED | OUTPATIENT
Start: 2024-05-17 | End: 2025-05-17

## 2024-05-17 RX ORDER — RANOLAZINE 500 MG/1
500 TABLET, EXTENDED RELEASE ORAL 2 TIMES DAILY
Qty: 180 TABLET | Refills: 1 | Status: SHIPPED | OUTPATIENT
Start: 2024-05-17 | End: 2025-05-17

## 2024-05-24 RX ORDER — CLOPIDOGREL BISULFATE 75 MG/1
75 TABLET ORAL DAILY
Qty: 90 TABLET | Refills: 1 | Status: SHIPPED | OUTPATIENT
Start: 2024-05-24

## 2024-05-28 ENCOUNTER — HOSPITAL ENCOUNTER (OUTPATIENT)
Dept: RADIOLOGY | Facility: HOSPITAL | Age: 79
Discharge: HOME | End: 2024-05-28
Attending: PHYSICIAN ASSISTANT
Payer: MEDICARE

## 2024-05-28 DIAGNOSIS — I62.00 NONTRAUMATIC SUBDURAL HEMORRHAGE, UNSPECIFIED: ICD-10-CM

## 2024-05-28 PROCEDURE — 70450 CT HEAD/BRAIN W/O DYE: CPT | Mod: TC

## 2024-05-28 PROCEDURE — 70450 CT HEAD/BRAIN W/O DYE: CPT | Mod: 26,,, | Performed by: RADIOLOGY

## 2024-05-29 ENCOUNTER — TELEPHONE (OUTPATIENT)
Dept: NEUROSURGERY | Facility: CLINIC | Age: 79
End: 2024-05-29
Payer: MEDICARE

## 2024-05-29 NOTE — TELEPHONE ENCOUNTER
Lvm on home number- reviewed CT head with Dr Valle, resolved subdural. No need to repeat imaging or further follow up. Ok to restart plavix/asa.     Spoke with Pasquale, reiterated message left on other number. He voiced understanding.

## 2024-05-31 ENCOUNTER — TELEPHONE (OUTPATIENT)
Dept: HEMATOLOGY/ONCOLOGY | Facility: CLINIC | Age: 79
End: 2024-05-31
Payer: MEDICARE

## 2024-05-31 NOTE — TELEPHONE ENCOUNTER
----- Message from Drea Mg NP sent at 5/30/2024  5:51 PM CDT -----  Please let patient know that the CT of head wo contrast was normal.    Drea Granger'

## 2024-06-17 RX ORDER — ROSUVASTATIN CALCIUM 5 MG/1
TABLET, COATED ORAL
Qty: 90 TABLET | Refills: 2 | OUTPATIENT
Start: 2024-06-17

## 2024-06-17 NOTE — TELEPHONE ENCOUNTER
No care due was identified.  [facility] Embedded Care Due Messages. Reference number: [id number].   6/17/2024 2:21:04 PM CDT

## 2024-06-17 NOTE — TELEPHONE ENCOUNTER
Refill Routing Note   Medication(s) are not appropriate for processing by [facility] for the following reason(s):        Outside of protocol    [facility] action(s):  Route  Quick Discontinue        Medication Therapy Plan: prescription was discontinued on 3/21/2024 by Kunal Mckay MD for the following reason: Stop Taking at Discharge ROSUVASTATIN 5 MG DOSE CHANGED TO 20 MG ATORVASTATIN      Appointments  past 12m or future 3m with PCP    Date Provider   Last Visit   4/16/2024 Aguilar De La Rosa MD   Next Visit   Visit date not found Aguilar De La Rosa MD   ED visits in past 90 days: 0        Note composed:3:08 PM 06/17/2024

## 2024-06-18 RX ORDER — ERGOCALCIFEROL 1.25 MG/1
CAPSULE ORAL
Qty: 12 CAPSULE | Refills: 3 | Status: SHIPPED | OUTPATIENT
Start: 2024-06-18

## 2024-06-19 RX ORDER — BUSPIRONE HYDROCHLORIDE 10 MG/1
TABLET ORAL
Qty: 60 TABLET | Refills: 8 | Status: SHIPPED | OUTPATIENT
Start: 2024-06-19

## 2024-06-20 ENCOUNTER — TELEPHONE (OUTPATIENT)
Dept: CARDIOLOGY | Facility: HOSPITAL | Age: 79
End: 2024-06-20
Payer: MEDICARE

## 2024-07-15 ENCOUNTER — LAB VISIT (OUTPATIENT)
Dept: LAB | Facility: HOSPITAL | Age: 79
End: 2024-07-15
Attending: NURSE PRACTITIONER
Payer: MEDICARE

## 2024-07-15 DIAGNOSIS — D53.9 MACROCYTIC ANEMIA: ICD-10-CM

## 2024-07-15 DIAGNOSIS — E83.19 INCREASED STORAGE IRON: ICD-10-CM

## 2024-07-15 DIAGNOSIS — R76.8 ELEVATED SERUM IMMUNOGLOBULIN FREE LIGHT CHAIN LEVEL: ICD-10-CM

## 2024-07-15 DIAGNOSIS — E53.8 B12 DEFICIENCY: ICD-10-CM

## 2024-07-15 LAB
ALBUMIN SERPL BCP-MCNC: 4 G/DL (ref 3.5–5.2)
ALP SERPL-CCNC: 79 U/L (ref 55–135)
ALT SERPL W/O P-5'-P-CCNC: 7 U/L (ref 10–44)
ANION GAP SERPL CALC-SCNC: 17 MMOL/L (ref 8–16)
AST SERPL-CCNC: 20 U/L (ref 10–40)
BASOPHILS # BLD AUTO: 0.08 K/UL (ref 0–0.2)
BASOPHILS NFR BLD: 1 % (ref 0–1.9)
BILIRUB SERPL-MCNC: 0.3 MG/DL (ref 0.1–1)
BUN SERPL-MCNC: 17 MG/DL (ref 8–23)
CALCIUM SERPL-MCNC: 9.7 MG/DL (ref 8.7–10.5)
CHLORIDE SERPL-SCNC: 96 MMOL/L (ref 95–110)
CO2 SERPL-SCNC: 24 MMOL/L (ref 23–29)
CREAT SERPL-MCNC: 1 MG/DL (ref 0.5–1.4)
DIFFERENTIAL METHOD BLD: ABNORMAL
EOSINOPHIL # BLD AUTO: 0.2 K/UL (ref 0–0.5)
EOSINOPHIL NFR BLD: 2.2 % (ref 0–8)
ERYTHROCYTE [DISTWIDTH] IN BLOOD BY AUTOMATED COUNT: 15.4 % (ref 11.5–14.5)
EST. GFR  (NO RACE VARIABLE): 57 ML/MIN/1.73 M^2
FERRITIN SERPL-MCNC: 31 NG/ML (ref 20–300)
GLUCOSE SERPL-MCNC: 91 MG/DL (ref 70–110)
HCT VFR BLD AUTO: 36.1 % (ref 37–48.5)
HGB BLD-MCNC: 11.2 G/DL (ref 12–16)
IMM GRANULOCYTES # BLD AUTO: 0.01 K/UL (ref 0–0.04)
IMM GRANULOCYTES NFR BLD AUTO: 0.1 % (ref 0–0.5)
IRON SERPL-MCNC: 62 UG/DL (ref 30–160)
LYMPHOCYTES # BLD AUTO: 2.4 K/UL (ref 1–4.8)
LYMPHOCYTES NFR BLD: 31.5 % (ref 18–48)
MCH RBC QN AUTO: 28.8 PG (ref 27–31)
MCHC RBC AUTO-ENTMCNC: 31 G/DL (ref 32–36)
MCV RBC AUTO: 93 FL (ref 82–98)
MONOCYTES # BLD AUTO: 0.6 K/UL (ref 0.3–1)
MONOCYTES NFR BLD: 8 % (ref 4–15)
NEUTROPHILS # BLD AUTO: 4.4 K/UL (ref 1.8–7.7)
NEUTROPHILS NFR BLD: 57.2 % (ref 38–73)
NRBC BLD-RTO: 0 /100 WBC
PLATELET # BLD AUTO: 334 K/UL (ref 150–450)
PMV BLD AUTO: 9.6 FL (ref 9.2–12.9)
POTASSIUM SERPL-SCNC: 2.9 MMOL/L (ref 3.5–5.1)
PROT SERPL-MCNC: 7.6 G/DL (ref 6–8.4)
RBC # BLD AUTO: 3.89 M/UL (ref 4–5.4)
SATURATED IRON: 13 % (ref 20–50)
SODIUM SERPL-SCNC: 137 MMOL/L (ref 136–145)
TOTAL IRON BINDING CAPACITY: 482 UG/DL (ref 250–450)
TRANSFERRIN SERPL-MCNC: 326 MG/DL (ref 200–375)
WBC # BLD AUTO: 7.64 K/UL (ref 3.9–12.7)

## 2024-07-15 PROCEDURE — 36415 COLL VENOUS BLD VENIPUNCTURE: CPT | Mod: HCNC,PO | Performed by: NURSE PRACTITIONER

## 2024-07-15 PROCEDURE — 85025 COMPLETE CBC W/AUTO DIFF WBC: CPT | Mod: HCNC | Performed by: NURSE PRACTITIONER

## 2024-07-15 PROCEDURE — 82728 ASSAY OF FERRITIN: CPT | Mod: HCNC | Performed by: NURSE PRACTITIONER

## 2024-07-15 PROCEDURE — 80053 COMPREHEN METABOLIC PANEL: CPT | Mod: HCNC | Performed by: NURSE PRACTITIONER

## 2024-07-15 PROCEDURE — 84466 ASSAY OF TRANSFERRIN: CPT | Mod: HCNC | Performed by: NURSE PRACTITIONER

## 2024-07-18 ENCOUNTER — TELEPHONE (OUTPATIENT)
Dept: HEMATOLOGY/ONCOLOGY | Facility: CLINIC | Age: 79
End: 2024-07-18

## 2024-07-18 ENCOUNTER — OFFICE VISIT (OUTPATIENT)
Dept: HEMATOLOGY/ONCOLOGY | Facility: CLINIC | Age: 79
End: 2024-07-18
Payer: MEDICARE

## 2024-07-18 VITALS
WEIGHT: 89.31 LBS | DIASTOLIC BLOOD PRESSURE: 85 MMHG | HEART RATE: 92 BPM | BODY MASS INDEX: 16.43 KG/M2 | OXYGEN SATURATION: 95 % | HEIGHT: 62 IN | SYSTOLIC BLOOD PRESSURE: 154 MMHG

## 2024-07-18 DIAGNOSIS — R91.8 OTHER NONSPECIFIC ABNORMAL FINDING OF LUNG FIELD: ICD-10-CM

## 2024-07-18 DIAGNOSIS — E87.6 HYPOKALEMIA: ICD-10-CM

## 2024-07-18 DIAGNOSIS — D50.9 IRON DEFICIENCY ANEMIA, UNSPECIFIED IRON DEFICIENCY ANEMIA TYPE: ICD-10-CM

## 2024-07-18 DIAGNOSIS — R91.1 SOLITARY PULMONARY NODULE: ICD-10-CM

## 2024-07-18 DIAGNOSIS — R06.02 SHORTNESS OF BREATH: ICD-10-CM

## 2024-07-18 DIAGNOSIS — E53.8 B12 DEFICIENCY: ICD-10-CM

## 2024-07-18 DIAGNOSIS — J43.9 PULMONARY EMPHYSEMA, UNSPECIFIED EMPHYSEMA TYPE: ICD-10-CM

## 2024-07-18 DIAGNOSIS — R76.8 ELEVATED SERUM IMMUNOGLOBULIN FREE LIGHT CHAIN LEVEL: Primary | ICD-10-CM

## 2024-07-18 DIAGNOSIS — F17.210 CIGARETTE SMOKER: ICD-10-CM

## 2024-07-18 PROCEDURE — 99215 OFFICE O/P EST HI 40 MIN: CPT | Mod: HCNC,S$GLB,, | Performed by: NURSE PRACTITIONER

## 2024-07-18 PROCEDURE — 1159F MED LIST DOCD IN RCRD: CPT | Mod: HCNC,CPTII,S$GLB, | Performed by: NURSE PRACTITIONER

## 2024-07-18 PROCEDURE — 1160F RVW MEDS BY RX/DR IN RCRD: CPT | Mod: HCNC,CPTII,S$GLB, | Performed by: NURSE PRACTITIONER

## 2024-07-18 PROCEDURE — 3077F SYST BP >= 140 MM HG: CPT | Mod: HCNC,CPTII,S$GLB, | Performed by: NURSE PRACTITIONER

## 2024-07-18 PROCEDURE — 1100F PTFALLS ASSESS-DOCD GE2>/YR: CPT | Mod: HCNC,CPTII,S$GLB, | Performed by: NURSE PRACTITIONER

## 2024-07-18 PROCEDURE — 3079F DIAST BP 80-89 MM HG: CPT | Mod: HCNC,CPTII,S$GLB, | Performed by: NURSE PRACTITIONER

## 2024-07-18 PROCEDURE — 99999 PR PBB SHADOW E&M-EST. PATIENT-LVL V: CPT | Mod: PBBFAC,HCNC,, | Performed by: NURSE PRACTITIONER

## 2024-07-18 PROCEDURE — 1126F AMNT PAIN NOTED NONE PRSNT: CPT | Mod: HCNC,CPTII,S$GLB, | Performed by: NURSE PRACTITIONER

## 2024-07-18 PROCEDURE — 3288F FALL RISK ASSESSMENT DOCD: CPT | Mod: HCNC,CPTII,S$GLB, | Performed by: NURSE PRACTITIONER

## 2024-07-18 RX ORDER — POTASSIUM CHLORIDE 750 MG/1
CAPSULE, EXTENDED RELEASE ORAL
Qty: 50 CAPSULE | Refills: 0 | Status: SHIPPED | OUTPATIENT
Start: 2024-07-18

## 2024-07-18 RX ORDER — ROSUVASTATIN CALCIUM 5 MG/1
5 TABLET, COATED ORAL
COMMUNITY
Start: 2024-06-17

## 2024-07-18 NOTE — TELEPHONE ENCOUNTER
Nurse spoke with pt in regards to rescheduling her ct scan. Pt has been rescheduled. Verbalized understanding of new scheduled appt. Call ended well

## 2024-07-18 NOTE — PATIENT INSTRUCTIONS
Start SlowFe 1 tablet every other day - over the counter medication.  Start potassium 10 meq - take 4 capsules daily x 2 days then 10 meq - 1 capsule every other day.  Do not take if you do not take diuretic that day.  Low dose CT scan chest to eval for lung cancer.  Continue B12 complex vitamin daily.  F/u in 3 months with labs prior and in person visit a couple of days later.  Hold iron tablet on morning of next labs.

## 2024-07-18 NOTE — TELEPHONE ENCOUNTER
----- Message from Sergio You sent at 7/18/2024 10:57 AM CDT -----  Good Morning, patient has a CT Scan scheduled for tomorrow, and we need to allow time for insurance approval, is the test medically urgent? if not medically urgent we may need to re-schedule to allow Insurance time to approve test. If you have any questions call ext [phone].  Thanks   Radiology    IF TEST IS NOT MEDICALLY URGENT, PLEASE RE-SCHEDULE PATIENT AT LEAST 3-4 DAYS OUT TO ALLOW TIME FOR INSURANCE TO PROCESS APPROVAL

## 2024-07-18 NOTE — PROGRESS NOTES
Subjective:      Patient ID: Keyona Dwyer is a 78 y.o. female.    Chief Complaint: no complaints    HPI:   79 yo female who presents today for further evaluation and recommendations of iron deficiency anemia.  She has been referred to the hematology by her PCP, Dr. Ralf Isidro.  Had diagnosis of acute pancreatitis requiring hospitalization in 6/2022 with complaints of abdominal pain.  Other chronic diagnosis include anxiety, chronic back pain, hyperlipidemia, htn, osteoporosis, RVT, and restless leg syndrome.       Patient states that she got bit by a brown recluse and got sick due to antibiotics and has some weight loss.  States that she was on cephalexin 500 mg PO TID and caused stomach distress with weight loss and episodic nausea/vomiting.      Is a current smoker since she was 12 years old - 1 pack per day for 60 years. Now smokes 3-10 per day.  She denies alcohol use.      She denies f/c/ns or lymphadenopathy.       Interval History:  10/10/2022: With hospitalization in 9/2022 d/t pneumonia, NSTEMI, and GI bleed.  EGD done as well as bleeding scan and found with no active bleeding although patient did have dark stools during hospitalizations with a drop in hgb requiring blood transfusion.  She was unable to tolerate GI prep for colonoscopy.  At last visit work up for anemia found with elevated MMA.  She was placed on B12 2000 mcg PO daily.  Also found with LOR started on ferrous sulfate 325 mg PO and is currently taking iron tablets every other day.  States that she is taking stool softeners daily.  States has not noted dark stools since hospitalization.  3+ blood found in urine during hospitalizations     2/9/2023 Had surgery to face to remove skin cancer one week ago - part of left side nose had to be removed - done by Dr. Aguilar Wallace dermatology.  Currently with bandages on her face that are CDI.  Has not noticed any abnormal bleeding.  States that she has been off of her iron tablets this past  week.  Was taking every other day.  Is taking B12 tablets daily.  States that when she takes the oral iron she has constipation requiring stool softeners.      5/25/2023 She was to continue B12 daily and continue oral iron supplements every other day since her last visit in 2/2023.  Presents today to evaluate response.   Denies dark stools.  Denies any unusual bleeding. Has gained 8 lbs.  States that she is feeling better then she has in a long time.       Interval History:  8/31/2023  Has continued ferrous sulfate 325 mg PO daily and surjit B complex daily supplement daily.  States that she has reduced her smoking to less then a pack/day.  States that she took her iron tablet after doing labs on 8/30/2023.  Noted now with elevated iron. Slight macrocytic anemia - improved.  States that she feels better then she has in a long time.      Interval History  11/27/2023 At last visit in 8/2023 we stopped oral iron supplement and continued Surjit B complex daily.  Presents today with stable chronic anemia with noted macrocytosis present for the past year - stable. Saturated iron of 13%.  C/o right shoulder pain due to right should pain and decreased rom from fall that occurred at least 6 weeks ago or longer.  Had xray 10/25/2023 done just showing arthritis. Denies any abnormal bleeding.       Interval History:  2/29/2024  States that her shoulder is feeling better.  She is going to PT three times per week.  Rotary cuff was dislocated from her shoulder.  Still smoking cigarettes daily but has reduced amount. Continues to take a B complex daily.  States that she has been eating liver 2-3 times/week.  Noted elevated liver indices.   With continued macrocytic anemia.  Hgb 11.4 mcv 105.  BP elevated.  States that she ate fast food yesterrday.  Cardiology has taken her off all of her BP medications due to her BP getting too low.  Denies HA, blurred vision, sob, chest tightness or chest pain.     Interval History:  7/18/2024   Presents today to evaluate labs with h/o macrocytic anemia, B12 deficiency and recurrent iron deficiency.  Has stopped oral iron since last visit in Feb 2024.  Had a fall on 3/4/2024 - spent 5 weeks in ICU and then went to rehab and then home health after.  Tripped and fell hitting head on floor of when she was walking into the door- she developed a brain bleed that has currently resolved. Continues taking B12 complex daily.  Hgb 11.2 normocytic, sat iron 13% and tibc 482.  Has easy bruising.  Denies any other known abnormal bleeding.  Still smoking less then a pack of cigarettes daily.  She is accompanied by her . I have reviewed all of the patient's relevant lab work available in the medical record and have utilized this in my evaluation and management recommendations today.      Social History     Socioeconomic History    Marital status:      Spouse name: elizabeth    Number of children: 3   Occupational History    Occupation: retired   Tobacco Use    Smoking status: Every Day     Current packs/day: 0.50     Average packs/day: 0.5 packs/day for 63.0 years (31.5 ttl pk-yrs)     Types: Cigarettes    Smokeless tobacco: Current    Tobacco comments:     started age of  11    Substance and Sexual Activity    Alcohol use: No    Drug use: No    Sexual activity: Yes     Partners: Male     Social Determinants of Health     Financial Resource Strain: Low Risk  (3/7/2024)    Overall Financial Resource Strain (CARDIA)     Difficulty of Paying Living Expenses: Not hard at all   Food Insecurity: No Food Insecurity (3/7/2024)    Hunger Vital Sign     Worried About Running Out of Food in the Last Year: Never true     Ran Out of Food in the Last Year: Never true   Transportation Needs: No Transportation Needs (3/7/2024)    PRAPARE - Transportation     Lack of Transportation (Medical): No     Lack of Transportation (Non-Medical): No   Physical Activity: Insufficiently Active (3/6/2023)    Exercise Vital Sign     Days of  Exercise per Week: 7 days     Minutes of Exercise per Session: 20 min   Stress: No Stress Concern Present (3/6/2023)    [employer] [place] [employer] - Occupational Stress Questionnaire     Feeling of Stress : Not at all   Housing Stability: Low Risk  (3/7/2024)    Housing Stability Vital Sign     Unable to Pay for Housing in the Last Year: No     Number of Places Lived in the Last Year: 1     Unstable Housing in the Last Year: No       Family History   Problem Relation Name Age of Onset    Cancer Mother      Breast cancer Mother      Hyperlipidemia Father      Heart failure Father      Heart disease Brother      Brain cancer Brother      Stroke Brother      Alcohol abuse Brother         Past Surgical History:   Procedure Laterality Date    ANGIOGRAPHY OF LOWER EXTREMITY N/A 04/12/2021    Procedure: ANGIOGRAM, LOWER EXTREMITY/left leg;  Surgeon: Maico Adkins MD;  Location: [facility] CATH LAB;  Service: Vascular;  Laterality: N/A;  req 1130 start time/ A243A    AORTOGRAPHY WITH SERIALOGRAPHY N/A 04/14/2021    Procedure: AORTOGRAM, WITH RUNOFF;  Surgeon: Maioc Adkins MD;  Location: [facility] CATH LAB;  Service: Vascular;  Laterality: N/A;  left iliac stent with shockwave/req 1030 start    CATARACT EXTRACTION W/  INTRAOCULAR LENS IMPLANT Bilateral 8/ 9 2017    ESOPHAGOGASTRODUODENOSCOPY N/A 09/13/2022    Procedure: EGD (ESOPHAGOGASTRODUODENOSCOPY);  Surgeon: Fern Rasmussen MD;  Location: [facility] ENDO;  Service: Endoscopy;  Laterality: N/A;    GALLBLADDER SURGERY      HYSTERECTOMY  1972    LUNG SURGERY Right age 15    lung collpase    SHOULDER SURGERY      TOE AMPUTATION Left 04/15/2021    Procedure: AMPUTATION, TOE;  Surgeon: Taylor Anderson DPM;  Location: [facility] OR;  Service: Podiatry;  Laterality: Left;  Hallux (Great Toe)    TOTAL HIP ARTHROPLASTY Bilateral     TOTAL KNEE ARTHROPLASTY Bilateral        Past Medical History:   Diagnosis Date    Acute diastolic heart failure 09/15/2022    Acute respiratory failure  with hypoxia and hypercarbia 09/14/2022    Anemia     hematol    Anxiety     CAD (coronary artery disease)     Cancer     Cataract     Chronic back pain     Dr. Guzman    Chronic pain 04/16/2024    Coronary artery disease without angina pectoris 03/06/2024    Fibrocystic breast     Glaucoma     Hyperlipemia     Hypertension     Idiopathic acute pancreatitis 06/18/2022    NSTEMI (non-ST elevated myocardial infarction) 09/11/2022    Osteoarthritis     Osteoporosis     Rheumatology/on [place]    Pancreatitis     Pneumonia     Polyneuropathy     PVD (peripheral vascular disease) 04/12/2021    PVD (peripheral vascular disease)     dr edmondson    Restless leg syndrome     Subdural hematoma 03/2024    Tobacco dependence     Trouble in sleeping        Review of Systems   Constitutional: Negative.    HENT: Negative.  Negative for nosebleeds.    Eyes: Negative.    Respiratory:  Positive for shortness of breath.    Cardiovascular: Negative.    Gastrointestinal:  Positive for constipation. Negative for anal bleeding.   Endocrine: Negative.    Genitourinary: Negative.  Negative for hematuria and vaginal bleeding.   Musculoskeletal:  Positive for gait problem.   Skin: Negative.    Allergic/Immunologic: Negative.    Hematological:  Bruises/bleeds easily.   Psychiatric/Behavioral: Negative.            Medication List with Changes/Refills   New Medications    POTASSIUM CHLORIDE (MICRO-K) 10 MEQ CPSR    Take 4 tablets 40 meq daily for 2 days; then reduce to 1 tablet 10 meq every other day with taking diuretic (lasix).   Current Medications    AMLODIPINE (NORVASC) 5 MG TABLET    Take 1 tablet (5 mg total) by mouth once daily.    ASPIRIN (ECOTRIN) 81 MG EC TABLET    Take 1 tablet (81 mg total) by mouth once daily.    ATORVASTATIN (LIPITOR) 20 MG TABLET    Take 1 tablet (20 mg total) by mouth once daily.    AZELASTINE (ASTELIN) 137 MCG (0.1 %) NASAL SPRAY    1 spray (137 mcg total) by Nasal route 2 (two) times daily.    B COMPLEX  VITAMINS CAPSULE    Take 1 capsule by mouth once daily.    BUPROPION (WELLBUTRIN) 75 MG TABLET    Take 1 tablet (75 mg total) by mouth 2 (two) times daily.    BUSPIRONE (BUSPAR) 10 MG TABLET    TAKE 1 TABLET BY MOUTH TWICE DAILY    CLOPIDOGREL (PLAVIX) 75 MG TABLET    Take 1 tablet (75 mg total) by mouth once daily.    CLOTRIMAZOLE-BETAMETHASONE 1-0.05% (LOTRISONE) CREAM    Apply to external ear with finger once a week    DOCUSATE SODIUM (COLACE) 100 MG CAPSULE    Take 1 capsule (100 mg total) by mouth 2 (two) times daily.    DULOXETINE (CYMBALTA) 30 MG CAPSULE    Take 30 mg by mouth once daily.    ERGOCALCIFEROL (ERGOCALCIFEROL) 50,000 UNIT CAP    TAKE 1 CAPSULE BY MOUTH EVERY WEEK    ERGOCALCIFEROL (VITAMIN D2) 50,000 UNIT CAP    Take 50,000 Units by mouth every 7 days.    FUROSEMIDE (LASIX) 20 MG TABLET    Take 1 tablet (20 mg total) by mouth once daily. Do not take if BP is below 110/70    GABAPENTIN (NEURONTIN) 600 MG TABLET    Take 600 mg by mouth 3 (three) times daily.    HYDROCODONE-ACETAMINOPHEN (NORCO)  MG PER TABLET    Take 1 tablet by mouth 3 (three) times daily as needed.    IPRATROPIUM (ATROVENT) 42 MCG (0.06 %) NASAL SPRAY    2 sprays by Each Nostril route 2 (two) times daily.    ON-Q PAIN PUMP    by Misc.(Non-Drug; Combo Route) route.    RANOLAZINE (RANEXA) 500 MG TB12    Take 1 tablet (500 mg total) by mouth 2 (two) times daily.    ROSUVASTATIN (CRESTOR) 5 MG TABLET    Take 5 mg by mouth.   Discontinued Medications    FERROUS SULFATE (FEOSOL) 325 MG (65 MG IRON) TAB TABLET    Take 325 mg by mouth daily with breakfast.        Objective:     Vitals:    07/18/24 0924   BP: (!) 154/85   Pulse: 92       Physical Exam  Vitals reviewed.   Constitutional:       Appearance: Normal appearance. She is underweight.   HENT:      Head: Normocephalic and atraumatic.      Right Ear: External ear normal.      Left Ear: External ear normal.   Cardiovascular:      Rate and Rhythm: Normal rate and regular rhythm.       Heart sounds: S1 normal and S2 normal. Murmur heard.   Pulmonary:      Effort: Pulmonary effort is normal.      Breath sounds: Decreased air movement present. Examination of the right-upper field reveals decreased breath sounds. Examination of the left-upper field reveals decreased breath sounds. Examination of the right-middle field reveals decreased breath sounds. Examination of the left-middle field reveals decreased breath sounds. Examination of the right-lower field reveals decreased breath sounds. Examination of the left-lower field reveals decreased breath sounds. Decreased breath sounds present.   Abdominal:      General: There is no distension.   Musculoskeletal:         General: Normal range of motion.      Cervical back: Normal range of motion.   Skin:     General: Skin is warm and dry.      Findings: Bruising present.   Neurological:      General: No focal deficit present.      Mental Status: She is alert and oriented to person, place, and time.   Psychiatric:         Attention and Perception: Attention and perception normal.         Mood and Affect: Mood and affect normal.         Speech: Speech normal.         Behavior: Behavior normal. Behavior is cooperative.         Thought Content: Thought content normal.         Cognition and Memory: Cognition and memory normal.         Judgment: Judgment normal.         Assessment:     Problem List Items Addressed This Visit          Oncology    Anemia    Relevant Orders    CBC Auto Differential    Comprehensive Metabolic Panel    Ferritin    Iron and TIBC       Endocrine    B12 deficiency    Relevant Orders    CBC Auto Differential     Other Visit Diagnoses       Elevated serum immunoglobulin free light chain level    -  Primary    Relevant Orders    CBC Auto Differential    Comprehensive Metabolic Panel    Immunoglobulin Free LT Chains Blood    Cigarette smoker        Relevant Orders    CT Chest Lung Screening Low Dose    CBC Auto Differential     Comprehensive Metabolic Panel    Ferritin    Iron and TIBC    Hypokalemia        Relevant Medications    potassium chloride (MICRO-K) 10 MEQ CpSR    Other Relevant Orders    Comprehensive Metabolic Panel    Solitary pulmonary nodule        Relevant Orders    CT Chest Lung Screening Low Dose    CBC Auto Differential    Comprehensive Metabolic Panel    Other nonspecific abnormal finding of lung field        Relevant Orders    CT Chest Lung Screening Low Dose    Pulmonary emphysema, unspecified emphysema type        Shortness of breath        Relevant Orders    CBC Auto Differential    Comprehensive Metabolic Panel    Ferritin    Iron and TIBC            Lab Results   Component Value Date    WBC 7.64 07/15/2024    RBC 3.89 (L) 07/15/2024    HGB 11.2 (L) 07/15/2024    HCT 36.1 (L) 07/15/2024    MCV 93 07/15/2024    MCH 28.8 07/15/2024    MCHC 31.0 (L) 07/15/2024    RDW 15.4 (H) 07/15/2024     07/15/2024    MPV 9.6 07/15/2024    GRAN 4.4 07/15/2024    GRAN 57.2 07/15/2024    LYMPH 2.4 07/15/2024    LYMPH 31.5 07/15/2024    MONO 0.6 07/15/2024    MONO 8.0 07/15/2024    EOS 0.2 07/15/2024    BASO 0.08 07/15/2024    EOSINOPHIL 2.2 07/15/2024    BASOPHIL 1.0 07/15/2024      Lab Results   Component Value Date     07/15/2024    K 2.9 (L) 07/15/2024    CL 96 07/15/2024    CO2 24 07/15/2024    BUN 17 07/15/2024    CREATININE 1.0 07/15/2024    CALCIUM 9.7 07/15/2024    ANIONGAP 17 (H) 07/15/2024    ESTGFRAFRICA >60.0 06/27/2022    EGFRNONAA >60.0 06/27/2022     Lab Results   Component Value Date    ALT 7 (L) 07/15/2024    AST 20 07/15/2024    ALKPHOS 79 07/15/2024    BILITOT 0.3 07/15/2024     Lab Results   Component Value Date    IRON 62 07/15/2024    TRANSFERRIN 326 07/15/2024    TIBC 482 (H) 07/15/2024    FESATURATED 13 (L) 07/15/2024    FERRITIN 31 07/15/2024        Plan:   Elevated serum immunoglobulin free light chain level  -     CBC Auto Differential; Future; Expected date: 07/18/2024  -     Comprehensive  Metabolic Panel; Future; Expected date: 07/18/2024  -     Immunoglobulin Free LT Chains Blood; Future; Expected date: 07/18/2024    B12 deficiency  -     CBC Auto Differential; Future; Expected date: 07/18/2024    Iron deficiency anemia, unspecified iron deficiency anemia type  -     CBC Auto Differential; Future; Expected date: 07/18/2024  -     Comprehensive Metabolic Panel; Future; Expected date: 07/18/2024  -     Ferritin; Future; Expected date: 07/18/2024  -     Iron and TIBC; Future; Expected date: 07/18/2024    Cigarette smoker  -     CT Chest Lung Screening Low Dose; Future; Expected date: 07/18/2024  -     CBC Auto Differential; Future; Expected date: 07/18/2024  -     Comprehensive Metabolic Panel; Future; Expected date: 07/18/2024  -     Ferritin; Future; Expected date: 07/18/2024  -     Iron and TIBC; Future; Expected date: 07/18/2024    Hypokalemia  -     potassium chloride (MICRO-K) 10 MEQ CpSR; Take 4 tablets 40 meq daily for 2 days; then reduce to 1 tablet 10 meq every other day with taking diuretic (lasix).  Dispense: 50 capsule; Refill: 0  -     Comprehensive Metabolic Panel; Future; Expected date: 07/18/2024    Solitary pulmonary nodule  -     CT Chest Lung Screening Low Dose; Future; Expected date: 07/18/2024  -     CBC Auto Differential; Future; Expected date: 07/18/2024  -     Comprehensive Metabolic Panel; Future; Expected date: 07/18/2024    Other nonspecific abnormal finding of lung field  -     CT Chest Lung Screening Low Dose; Future; Expected date: 07/18/2024    Pulmonary emphysema, unspecified emphysema type    Shortness of breath  -     CBC Auto Differential; Future; Expected date: 07/18/2024  -     Comprehensive Metabolic Panel; Future; Expected date: 07/18/2024  -     Ferritin; Future; Expected date: 07/18/2024  -     Iron and TIBC; Future; Expected date: 07/18/2024      Med Onc Chart Routing      Follow up with physician    Follow up with BYRON 3 months. in person at [facility]  labs prior   Infusion scheduling note   n/a   Injection scheduling note n/a   Labs   Scheduling:  Preferred lab: [facility]  Lab interval:  cbc, cmp,iron studies, flc   Imaging   LDCT chest   Pharmacy appointment No pharmacy appointment needed      Other referrals       No additional referrals needed  n/a         Start SlowFe 1 tablet every other day - over the counter medication.  Start potassium 10 meq - take 4 capsules daily x 2 days then 10 meq - 1 capsule every other day.  Do not take if you do not take diuretic that day.  Low dose CT scan chest to eval for lung cancer.  Continue B12 complex vitamin daily.  F/u in 3 months with labs prior and in person visit a couple of days later.  Hold iron tablet on morning of next labs.     Total time spent on encounter: 45 minutes    Collaborating Provider:  Dr. Kris Alcocer    Thank You,  LUIS DANIEL WeberP-C  Benign Hematology

## 2024-07-24 ENCOUNTER — HOSPITAL ENCOUNTER (OUTPATIENT)
Dept: RADIOLOGY | Facility: HOSPITAL | Age: 79
Discharge: HOME | End: 2024-07-24
Attending: NURSE PRACTITIONER
Payer: MEDICARE

## 2024-07-24 DIAGNOSIS — R91.1 SOLITARY PULMONARY NODULE: ICD-10-CM

## 2024-07-24 DIAGNOSIS — R91.8 OTHER NONSPECIFIC ABNORMAL FINDING OF LUNG FIELD: ICD-10-CM

## 2024-07-24 DIAGNOSIS — F17.210 CIGARETTE SMOKER: ICD-10-CM

## 2024-07-24 PROCEDURE — 71271 CT THORAX LUNG CANCER SCR C-: CPT | Mod: TC,HCNC,PN

## 2024-07-24 PROCEDURE — 71271 CT THORAX LUNG CANCER SCR C-: CPT | Mod: 26,HCNC,, | Performed by: RADIOLOGY

## 2024-09-05 NOTE — TELEPHONE ENCOUNTER
Refill Routing Note   Medication(s) are not appropriate for processing by [facility] for the following reason(s):        Required labs outdated  No active prescription written by provider  See note below for additional explanation/ staff routed back to Refill Center: noted info found     [facility] action(s):  Defer        Medication Therapy Plan: Per notes: 5/17/24 patient saw Dr. Berg (cardio) who d/c Crestor and Added Lipitor. The Crestor was never cancelled at pharmacy and she has been filling both med per adherence data.      Appointments  past 12m or future 3m with PCP    Date Provider   Last Visit   4/16/2024 Aguilar De La Rosa MD   Next Visit   Visit date not found Aguilar De La Rosa MD   ED visits in past 90 days: 0        Note composed:8:50 AM 09/05/2024

## 2024-09-13 ENCOUNTER — OFFICE VISIT (OUTPATIENT)
Dept: INTERNAL MEDICINE | Facility: CLINIC | Age: 79
End: 2024-09-13
Payer: MEDICARE

## 2024-09-13 VITALS
BODY MASS INDEX: 16.57 KG/M2 | DIASTOLIC BLOOD PRESSURE: 80 MMHG | WEIGHT: 90.63 LBS | OXYGEN SATURATION: 95 % | SYSTOLIC BLOOD PRESSURE: 136 MMHG | TEMPERATURE: 98 F | HEART RATE: 93 BPM

## 2024-09-13 DIAGNOSIS — J30.1 CHRONIC SEASONAL ALLERGIC RHINITIS DUE TO POLLEN: ICD-10-CM

## 2024-09-13 DIAGNOSIS — I10 PRIMARY HYPERTENSION: Primary | ICD-10-CM

## 2024-09-13 PROCEDURE — 99999 PR PBB SHADOW E&M-EST. PATIENT-LVL V: CPT | Mod: PBBFAC,HCNC,, | Performed by: NURSE PRACTITIONER

## 2024-09-13 NOTE — PROGRESS NOTES
Subjective:       Patient ID: Keyona Dwyer is a 78 y.o. female.    CHIEF COMPLAINT:  The patient presents for concerns about elevated blood pressure readings at home.    HPI:  Ms. Dwyer reports elevated blood pressure readings at home, with systolic values in the 160s and consistently low diastolic values. Measurements are taken around noon, after administration of antihypertensive medication. Current management includes amlodipine and furosemide for hypertension. The patient seeks clarification on concerning blood pressure thresholds.    The patient has facial edema, most prominent in the periorbital region, which diminished slightly throughout the day. 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     The patient denies any allergic rhinitis symptoms such as sneezing or ocular pruritus and lacrimation.        /80   Pulse 93   Temp 97.9 °F (36.6 °C) (Tympanic)   Wt 41.1 kg (90 lb 9.7 oz)   SpO2 95%   BMI 16.57 kg/m²     Review of Systems   Constitutional:  Negative for activity change, appetite change, chills, diaphoresis, fatigue, fever and unexpected weight change.   HENT:  Positive for facial swelling and postnasal drip.    Respiratory:  Negative for cough and shortness of breath.    Cardiovascular:  Negative for chest pain, palpitations and leg swelling.   Gastrointestinal: Negative.    Genitourinary: Negative.    Musculoskeletal: Negative.    Skin:  Negative for color change, pallor, rash and wound.   Allergic/Immunologic: Negative for immunocompromised state.   Neurological: Negative.  Negative for dizziness and facial asymmetry.   Hematological:  Negative for adenopathy. Does not bruise/bleed easily.   Psychiatric/Behavioral:  Negative for agitation, behavioral problems and confusion.        Objective:      Physical Exam  Vitals and nursing note reviewed.   Constitutional:       General: She is not in acute distress.     Appearance: Normal appearance. She is  underweight. She is not diaphoretic.   HENT:      Head: Normocephalic and atraumatic.        Comments: Very minimal swelling noted under left eye, no erythema or warmth. No s/s of infection   Cardiovascular:      Rate and Rhythm: Normal rate and regular rhythm.      Heart sounds: Normal heart sounds. No murmur heard.  Pulmonary:      Effort: Pulmonary effort is normal. No respiratory distress.      Breath sounds: Normal breath sounds.   Musculoskeletal:         General: Normal range of motion.   Skin:     General: Skin is warm and dry.      Findings: No rash.   Neurological:      Mental Status: She is alert.   Psychiatric:         Mood and Affect: Mood normal.         Behavior: Behavior normal. Behavior is cooperative.         Thought Content: Thought content normal.         Judgment: Judgment normal.         Assessment and Plan        1. Primary hypertension    2. Chronic seasonal allergic rhinitis due to pollen    3. Body mass index (BMI) less than 19        Assessment & Plan    HYPERTENSION:  - Assessed patient's blood pressure, noting home readings in 160s for systolic.  - Considered risks of aggressive blood pressure management in elderly patient, balancing fall risk against hypertension management.  - Explained ideal blood pressure range (130s-140s systolic), with 120 being optimal.  - Ms. Dwyer to continue monitoring blood pressure at home, preferably in the morning before taking medication.  - Continued amlodipine at current dose.  - Continued Lasix (furosemide) at current dose.  - Ms. Dwyer to bring home blood pressure machine to next appointment for comparison with office readings.    FACIAL SWELLING:  - Evaluated facial swelling, likely due to thin skin under eye and history of skin cancer affecting fluid drainage.  - Advised on monitoring facial swelling, watching for signs of infection like redness or heat.  - MsAdeline Dwyer to observe facial swelling throughout the day, noting any changes.  - Contact  the office if facial swelling does not improve or if signs of infection develop.    FOLLOW UP:  - Follow up with Dr. Rome's nurse practitioner, Aylin, on October 16th.

## 2024-09-13 NOTE — PROGRESS NOTES
Subjective:       Patient ID: Keyona Dwyer is a 78 y.o. female.    Chief Complaint: Annual Exam    HPI    /80   Pulse 93   Temp 97.9 °F (36.6 °C) (Tympanic)   Wt 41.1 kg (90 lb 9.7 oz)   SpO2 95%   BMI 16.57 kg/m²     Review of Systems    Objective:      Physical Exam    Assessment:       1. Primary hypertension        Plan:       Keyona was seen today for annual exam.    Diagnoses and all orders for this visit:    Primary hypertension      There are no Patient Instructions on file for this visit.

## 2024-09-18 DIAGNOSIS — Z78.0 MENOPAUSE: ICD-10-CM

## 2024-09-27 ENCOUNTER — TELEPHONE (OUTPATIENT)
Dept: INTERNAL MEDICINE | Facility: CLINIC | Age: 79
End: 2024-09-27
Payer: MEDICARE

## 2024-09-27 NOTE — TELEPHONE ENCOUNTER
Spoke with the patient stated that she is taking Crestor. The patient stated that she was in the hospital in March 2024 in ICU for 3 weeks. The patient stated having a beulah bleed and medication was changed.

## 2024-09-28 RX ORDER — ROSUVASTATIN CALCIUM 5 MG/1
5 TABLET, COATED ORAL
Qty: 90 TABLET | Refills: 4 | Status: SHIPPED | OUTPATIENT
Start: 2024-09-28

## 2024-10-11 DIAGNOSIS — M25.473 ANKLE SWELLING, UNSPECIFIED LATERALITY: ICD-10-CM

## 2024-10-11 RX ORDER — FUROSEMIDE 20 MG/1
20 TABLET ORAL DAILY
Qty: 90 TABLET | Refills: 1 | Status: SHIPPED | OUTPATIENT
Start: 2024-10-11

## 2024-10-14 RX ORDER — CLOPIDOGREL BISULFATE 75 MG/1
75 TABLET ORAL DAILY
Qty: 90 TABLET | Refills: 1 | Status: SHIPPED | OUTPATIENT
Start: 2024-10-14

## 2024-10-14 NOTE — TELEPHONE ENCOUNTER
----- Message from Mila sent at 10/14/2024 10:19 AM CDT -----  Contact: [phone]  Requesting an RX refill or new RX.    Is this a refill or new RX: refill    RX name and strength (copy/paste from chart):  clopidogreL (PLAVIX) 75 mg tablet    Is this a 30 day or 90 day RX: 90    Pharmacy name and phone # (copy/paste from chart):  [facility] [id number] [facility] [address]  [address] [address] [address]  Phone: [phone] Fax: [phone]         The doctors have asked that we provide their patients with the following 2 reminders -- prescription refills can take up to 72 hours, and a friendly reminder that in the future you can use your [facility] account to request refills: yes

## 2024-10-15 ENCOUNTER — LAB VISIT (OUTPATIENT)
Dept: LAB | Facility: HOSPITAL | Age: 79
End: 2024-10-15
Attending: NURSE PRACTITIONER
Payer: MEDICARE

## 2024-10-15 DIAGNOSIS — R91.1 SOLITARY PULMONARY NODULE: ICD-10-CM

## 2024-10-15 DIAGNOSIS — F17.210 CIGARETTE SMOKER: ICD-10-CM

## 2024-10-15 DIAGNOSIS — R06.02 SHORTNESS OF BREATH: ICD-10-CM

## 2024-10-15 DIAGNOSIS — D50.9 IRON DEFICIENCY ANEMIA, UNSPECIFIED IRON DEFICIENCY ANEMIA TYPE: ICD-10-CM

## 2024-10-15 DIAGNOSIS — E53.8 B12 DEFICIENCY: ICD-10-CM

## 2024-10-15 DIAGNOSIS — R76.8 ELEVATED SERUM IMMUNOGLOBULIN FREE LIGHT CHAIN LEVEL: ICD-10-CM

## 2024-10-15 DIAGNOSIS — E87.6 HYPOKALEMIA: ICD-10-CM

## 2024-10-15 LAB
ALBUMIN SERPL BCP-MCNC: 3.5 G/DL (ref 3.5–5.2)
ALP SERPL-CCNC: 64 U/L (ref 55–135)
ALT SERPL W/O P-5'-P-CCNC: 6 U/L (ref 10–44)
ANION GAP SERPL CALC-SCNC: 11 MMOL/L (ref 8–16)
AST SERPL-CCNC: 14 U/L (ref 10–40)
BASOPHILS # BLD AUTO: 0.07 K/UL (ref 0–0.2)
BASOPHILS NFR BLD: 1.3 % (ref 0–1.9)
BILIRUB SERPL-MCNC: 0.4 MG/DL (ref 0.1–1)
BUN SERPL-MCNC: 13 MG/DL (ref 8–23)
CALCIUM SERPL-MCNC: 9.1 MG/DL (ref 8.7–10.5)
CHLORIDE SERPL-SCNC: 103 MMOL/L (ref 95–110)
CO2 SERPL-SCNC: 26 MMOL/L (ref 23–29)
CREAT SERPL-MCNC: 1 MG/DL (ref 0.5–1.4)
DIFFERENTIAL METHOD BLD: ABNORMAL
EOSINOPHIL # BLD AUTO: 0.2 K/UL (ref 0–0.5)
EOSINOPHIL NFR BLD: 4.3 % (ref 0–8)
ERYTHROCYTE [DISTWIDTH] IN BLOOD BY AUTOMATED COUNT: 16.1 % (ref 11.5–14.5)
EST. GFR  (NO RACE VARIABLE): 57 ML/MIN/1.73 M^2
FERRITIN SERPL-MCNC: 21 NG/ML (ref 20–300)
GLUCOSE SERPL-MCNC: 95 MG/DL (ref 70–110)
HCT VFR BLD AUTO: 34 % (ref 37–48.5)
HGB BLD-MCNC: 10.7 G/DL (ref 12–16)
IMM GRANULOCYTES # BLD AUTO: 0.01 K/UL (ref 0–0.04)
IMM GRANULOCYTES NFR BLD AUTO: 0.2 % (ref 0–0.5)
IRON SERPL-MCNC: 45 UG/DL (ref 30–160)
LYMPHOCYTES # BLD AUTO: 2.2 K/UL (ref 1–4.8)
LYMPHOCYTES NFR BLD: 39.3 % (ref 18–48)
MCH RBC QN AUTO: 30.9 PG (ref 27–31)
MCHC RBC AUTO-ENTMCNC: 31.5 G/DL (ref 32–36)
MCV RBC AUTO: 98 FL (ref 82–98)
MONOCYTES # BLD AUTO: 0.4 K/UL (ref 0.3–1)
MONOCYTES NFR BLD: 6.5 % (ref 4–15)
NEUTROPHILS # BLD AUTO: 2.7 K/UL (ref 1.8–7.7)
NEUTROPHILS NFR BLD: 48.4 % (ref 38–73)
NRBC BLD-RTO: 0 /100 WBC
PLATELET # BLD AUTO: 264 K/UL (ref 150–450)
PMV BLD AUTO: 8.8 FL (ref 9.2–12.9)
POTASSIUM SERPL-SCNC: 3.3 MMOL/L (ref 3.5–5.1)
PROT SERPL-MCNC: 6.6 G/DL (ref 6–8.4)
RBC # BLD AUTO: 3.46 M/UL (ref 4–5.4)
SATURATED IRON: 10 % (ref 20–50)
SODIUM SERPL-SCNC: 140 MMOL/L (ref 136–145)
TOTAL IRON BINDING CAPACITY: 443 UG/DL (ref 250–450)
TRANSFERRIN SERPL-MCNC: 299 MG/DL (ref 200–375)
WBC # BLD AUTO: 5.57 K/UL (ref 3.9–12.7)

## 2024-10-15 PROCEDURE — 80053 COMPREHEN METABOLIC PANEL: CPT | Mod: HCNC | Performed by: NURSE PRACTITIONER

## 2024-10-15 PROCEDURE — 83521 IG LIGHT CHAINS FREE EACH: CPT | Mod: HCNC | Performed by: NURSE PRACTITIONER

## 2024-10-15 PROCEDURE — 36415 COLL VENOUS BLD VENIPUNCTURE: CPT | Mod: HCNC,PO | Performed by: NURSE PRACTITIONER

## 2024-10-15 PROCEDURE — 82728 ASSAY OF FERRITIN: CPT | Mod: HCNC | Performed by: NURSE PRACTITIONER

## 2024-10-15 PROCEDURE — 85025 COMPLETE CBC W/AUTO DIFF WBC: CPT | Mod: HCNC | Performed by: NURSE PRACTITIONER

## 2024-10-15 PROCEDURE — 83540 ASSAY OF IRON: CPT | Mod: HCNC | Performed by: NURSE PRACTITIONER

## 2024-10-16 ENCOUNTER — OFFICE VISIT (OUTPATIENT)
Dept: INTERNAL MEDICINE | Facility: CLINIC | Age: 79
End: 2024-10-16
Payer: MEDICARE

## 2024-10-16 VITALS
DIASTOLIC BLOOD PRESSURE: 70 MMHG | OXYGEN SATURATION: 96 % | WEIGHT: 90.19 LBS | TEMPERATURE: 98 F | HEIGHT: 62 IN | HEART RATE: 102 BPM | SYSTOLIC BLOOD PRESSURE: 120 MMHG | BODY MASS INDEX: 16.6 KG/M2

## 2024-10-16 DIAGNOSIS — I10 PRIMARY HYPERTENSION: Primary | Chronic | ICD-10-CM

## 2024-10-16 DIAGNOSIS — F11.20 UNCOMPLICATED OPIOID DEPENDENCE: ICD-10-CM

## 2024-10-16 DIAGNOSIS — I25.2 HISTORY OF NON-ST ELEVATION MYOCARDIAL INFARCTION (NSTEMI): ICD-10-CM

## 2024-10-16 DIAGNOSIS — I73.9 PVD (PERIPHERAL VASCULAR DISEASE): ICD-10-CM

## 2024-10-16 DIAGNOSIS — N18.31 STAGE 3A CHRONIC KIDNEY DISEASE: ICD-10-CM

## 2024-10-16 DIAGNOSIS — I70.0 ATHEROSCLEROSIS OF AORTA: ICD-10-CM

## 2024-10-16 DIAGNOSIS — E87.6 HYPOKALEMIA: ICD-10-CM

## 2024-10-16 DIAGNOSIS — I50.32 CHRONIC HEART FAILURE WITH PRESERVED EJECTION FRACTION: ICD-10-CM

## 2024-10-16 DIAGNOSIS — F17.210 NICOTINE DEPENDENCE, CIGARETTES, UNCOMPLICATED: Chronic | ICD-10-CM

## 2024-10-16 DIAGNOSIS — I25.10 CORONARY ARTERY DISEASE INVOLVING NATIVE CORONARY ARTERY OF NATIVE HEART WITHOUT ANGINA PECTORIS: ICD-10-CM

## 2024-10-16 DIAGNOSIS — E78.2 MIXED HYPERLIPIDEMIA: Chronic | ICD-10-CM

## 2024-10-16 DIAGNOSIS — Z89.412 STATUS POST AMPUTATION OF GREAT TOE, LEFT: ICD-10-CM

## 2024-10-16 DIAGNOSIS — M81.0 AGE-RELATED OSTEOPOROSIS WITHOUT CURRENT PATHOLOGICAL FRACTURE: ICD-10-CM

## 2024-10-16 DIAGNOSIS — S06.5XAA SUBDURAL HEMATOMA, ACUTE: ICD-10-CM

## 2024-10-16 DIAGNOSIS — Z23 NEED FOR VACCINATION: ICD-10-CM

## 2024-10-16 DIAGNOSIS — J44.9 COPD, GROUP D, BY GOLD 2017 CLASSIFICATION: ICD-10-CM

## 2024-10-16 DIAGNOSIS — S98.112A AMPUTATION OF LEFT GREAT TOE: ICD-10-CM

## 2024-10-16 DIAGNOSIS — F33.41 RECURRENT MAJOR DEPRESSIVE DISORDER, IN PARTIAL REMISSION: ICD-10-CM

## 2024-10-16 DIAGNOSIS — I27.20 PULMONARY HYPERTENSION: ICD-10-CM

## 2024-10-16 LAB
KAPPA LC SER QL IA: 4.22 MG/DL (ref 0.33–1.94)
KAPPA LC/LAMBDA SER IA: 1.68 (ref 0.26–1.65)
LAMBDA LC SER QL IA: 2.51 MG/DL (ref 0.57–2.63)

## 2024-10-16 PROCEDURE — 1159F MED LIST DOCD IN RCRD: CPT | Mod: HCNC,CPTII,S$GLB, | Performed by: PHYSICIAN ASSISTANT

## 2024-10-16 PROCEDURE — 99214 OFFICE O/P EST MOD 30 MIN: CPT | Mod: HCNC,S$GLB,, | Performed by: PHYSICIAN ASSISTANT

## 2024-10-16 PROCEDURE — 99999 PR PBB SHADOW E&M-EST. PATIENT-LVL V: CPT | Mod: PBBFAC,HCNC,, | Performed by: PHYSICIAN ASSISTANT

## 2024-10-16 PROCEDURE — 3078F DIAST BP <80 MM HG: CPT | Mod: HCNC,CPTII,S$GLB, | Performed by: PHYSICIAN ASSISTANT

## 2024-10-16 PROCEDURE — G0008 ADMIN INFLUENZA VIRUS VAC: HCPCS | Mod: HCNC,S$GLB,, | Performed by: PHYSICIAN ASSISTANT

## 2024-10-16 PROCEDURE — 3074F SYST BP LT 130 MM HG: CPT | Mod: HCNC,CPTII,S$GLB, | Performed by: PHYSICIAN ASSISTANT

## 2024-10-16 PROCEDURE — 1101F PT FALLS ASSESS-DOCD LE1/YR: CPT | Mod: HCNC,CPTII,S$GLB, | Performed by: PHYSICIAN ASSISTANT

## 2024-10-16 PROCEDURE — 1126F AMNT PAIN NOTED NONE PRSNT: CPT | Mod: HCNC,CPTII,S$GLB, | Performed by: PHYSICIAN ASSISTANT

## 2024-10-16 PROCEDURE — 3288F FALL RISK ASSESSMENT DOCD: CPT | Mod: HCNC,CPTII,S$GLB, | Performed by: PHYSICIAN ASSISTANT

## 2024-10-16 PROCEDURE — 1160F RVW MEDS BY RX/DR IN RCRD: CPT | Mod: HCNC,CPTII,S$GLB, | Performed by: PHYSICIAN ASSISTANT

## 2024-10-16 PROCEDURE — 90653 IIV ADJUVANT VACCINE IM: CPT | Mod: HCNC,S$GLB,, | Performed by: PHYSICIAN ASSISTANT

## 2024-10-16 NOTE — PROGRESS NOTES
Subjective:      Patient ID: Keyona Dwyer is a 78 y.o. female.    Chief Complaint: Follow-up    HPI  History of Present Illness    CHIEF COMPLAINT:  Ms. Dwyer presents today for routine six-month follow-up.    MEDICATIONS:  Current medications include Rosuvastatin (Crestor), Bupropion (Wellbutrin) twice daily, BuSpar, potassium supplements, baby aspirin, Vitamin D, and Teriparatide (Forteo) annually for osteoporosis. She reports taking potassium supplements every other day due to fluctuating levels, with recent lab showing low potassium. She is up to date with her flu vaccine and denies concerns about her current medication regimen.    CARDIOVASCULAR HEALTH:  She reports consistently good blood pressure.  Appointment with her cardiologist is scheduled for next month.    NEUROLOGICAL HISTORY:  3/6/2024: She has a history of falls, including a significant brain bleed last year resulting in a four-day ICU stay, followed by a 31-day hospital stay in [place] due to local bed unavailability. She subsequently underwent rehabilitation at a facility on [facility], which she found positive and would recommend.    MOOD AND MENTAL HEALTH:  Her mood is generally good, though she occasionally experiences aggression due to frustration with physical limitations.    WEIGHT MANAGEMENT:  Her weight is stable at 90 lbs. She drinks high-calorie supplements inconsistently and consumes ice cream daily as recommended.    FOOT ISSUES:  She reports drop foot on one foot and loss of the left great toe. This loss followed recurrent ingrown toenail infections requiring multiple antibiotic treatments. She describes a toe fracture incident where her foot stuck to a rug due to hairspray, resulting in a break of the great toe and adjacent toe, suggesting possible vascular complications. Pt subsequently had the toe amputated. Doing well. Denies any issues/concerns about the stump.    PAST MEDICAL HISTORY:  She reports a history of  pressure ulcer and subdural hematoma in March, both of which have resolved.    RECENT IMAGING AND UPCOMING APPOINTMENTS:  Recent CT chest was reported as negative. She has appointments scheduled with her hematologist tomorrow and a wellness check at home on the 22nd.        LIpid/dexa due    Wt Readings from Last 3 Encounters:   10/16/24 0955 40.9 kg (90 lb 2.7 oz)   09/13/24 0955 41.1 kg (90 lb 9.7 oz)   07/18/24 0924 40.5 kg (89 lb 4.6 oz)      Patient Active Problem List   Diagnosis    HTN (hypertension)    Hyperlipidemia    Nicotine dependence, cigarettes, uncomplicated    Osteoarthritis    Lumbar radiculopathy    Osteoporosis    Vitamin D deficiency    Glaucoma of both eyes    Uncomplicated opioid dependence    Chronic seasonal allergic rhinitis due to pollen    COPD, group D, by GOLD 2017 classification    Atherosclerosis of aorta    Anemia    PVD (peripheral vascular disease)    Tobacco abuse disorder    Status post amputation of great toe, left    Amputation of left great toe    Idiopathic acute pancreatitis    Leukocytosis    Chronic heart failure with preserved ejection fraction    Debility    Neuralgia and neuritis    Sacroiliitis    Pulmonary hypertension    History of non-ST elevation myocardial infarction (NSTEMI)    B12 deficiency    Skin cancer of face    Subdural hematoma, acute    Coronary artery disease without angina pectoris    Closed fracture of one rib of left side    ACP (advance care planning)    Pain of left hip    Chronic pain    Other chronic osteomyelitis of left foot    Stage 3a chronic kidney disease    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    Hypokalemia         Current Outpatient Medications:     amLODIPine (NORVASC) 5 MG tablet, Take 1 tablet (5 mg total) by mouth once daily., Disp: 30 tablet, Rfl: 11    aspirin (ECOTRIN) 81 MG EC tablet, Take 1 tablet (81 mg total) by mouth once daily., Disp: 90 tablet, Rfl: 3    azelastine (ASTELIN) 137 mcg (0.1 %)  nasal spray, 1 spray (137 mcg total) by Nasal route 2 (two) times daily., Disp: 30 mL, Rfl: 11    b complex vitamins capsule, Take 1 capsule by mouth once daily., Disp: , Rfl:     buPROPion (WELLBUTRIN) 75 MG tablet, Take 1 tablet (75 mg total) by mouth 2 (two) times daily., Disp: 60 tablet, Rfl: 2    busPIRone (BUSPAR) 10 MG tablet, TAKE 1 TABLET BY MOUTH TWICE DAILY, Disp: 60 tablet, Rfl: 8    clopidogreL (PLAVIX) 75 mg tablet, Take 1 tablet (75 mg total) by mouth once daily., Disp: 90 tablet, Rfl: 1    clotrimazole-betamethasone 1-0.05% (LOTRISONE) cream, Apply to external ear with finger once a week, Disp: , Rfl:     docusate sodium (COLACE) 100 MG capsule, Take 1 capsule (100 mg total) by mouth 2 (two) times daily., Disp: 180 capsule, Rfl: 2    duloxetine (CYMBALTA) 30 MG capsule, Take 30 mg by mouth once daily., Disp: , Rfl:     ergocalciferol (ERGOCALCIFEROL) 50,000 unit Cap, TAKE 1 CAPSULE BY MOUTH EVERY WEEK, Disp: 12 capsule, Rfl: 3    ergocalciferol (VITAMIN D2) 50,000 unit Cap, Take 50,000 Units by mouth every 7 days., Disp: , Rfl:     furosemide (LASIX) 20 MG tablet, Take 1 tablet (20 mg total) by mouth once daily. Do not take if BP is below 110/70, Disp: 90 tablet, Rfl: 1    gabapentin (NEURONTIN) 600 MG tablet, Take 600 mg by mouth 3 (three) times daily., Disp: , Rfl:     HYDROcodone-acetaminophen (NORCO)  mg per tablet, Take 1 tablet by mouth 3 (three) times daily as needed., Disp: 21 tablet, Rfl: 0    ipratropium (ATROVENT) 42 mcg (0.06 %) nasal spray, 2 sprays by Each Nostril route 2 (two) times daily., Disp: , Rfl:     ON-Q PAIN PUMP, by Misc.(Non-Drug; Combo Route) route., Disp: , Rfl:     potassium chloride (MICRO-K) 10 MEQ CpSR, Take 4 tablets 40 meq daily for 2 days; then reduce to 1 tablet 10 meq every other day with taking diuretic (lasix)., Disp: 50 capsule, Rfl: 0    ranolazine (RANEXA) 500 MG Tb12, Take 1 tablet (500 mg total) by mouth 2 (two) times daily., Disp: 180 tablet, Rfl:  "1    rosuvastatin (CRESTOR) 5 MG tablet, TAKE 1 TABLET BY MOUTH ONCE A DAY, Disp: 90 tablet, Rfl: 4    Review of Systems   Constitutional:  Negative for activity change, appetite change, chills, diaphoresis, fatigue, fever and unexpected weight change.   HENT: Negative.  Negative for congestion, hearing loss, postnasal drip, rhinorrhea, sore throat, trouble swallowing and voice change.    Eyes: Negative.  Negative for visual disturbance.   Respiratory: Negative.  Negative for cough, choking, chest tightness and shortness of breath.    Cardiovascular:  Negative for chest pain, palpitations and leg swelling.   Gastrointestinal:  Negative for abdominal distention, abdominal pain, blood in stool, constipation, diarrhea, nausea and vomiting.   Endocrine: Negative for cold intolerance, heat intolerance, polydipsia and polyuria.   Genitourinary: Negative.  Negative for difficulty urinating and frequency.   Musculoskeletal:  Positive for gait problem. Negative for arthralgias, back pain, joint swelling and myalgias.   Skin:  Negative for color change, pallor, rash and wound.   Neurological:  Positive for weakness. Negative for dizziness, tremors, light-headedness, numbness and headaches.   Hematological:  Negative for adenopathy.   Psychiatric/Behavioral:  Negative for behavioral problems, confusion, self-injury, sleep disturbance and suicidal ideas. The patient is not nervous/anxious.      Objective:   /70 (BP Location: Right arm, Patient Position: Sitting)   Pulse 102   Temp 97.9 °F (36.6 °C) (Tympanic)   Ht 5' 2" (1.575 m)   Wt 40.9 kg (90 lb 2.7 oz)   SpO2 96%   BMI 16.49 kg/m²     Physical Exam  Vitals and nursing note reviewed.   Constitutional:       General: She is not in acute distress.     Appearance: Normal appearance. She is well-developed. She is not ill-appearing, toxic-appearing or diaphoretic.   HENT:      Head: Normocephalic and atraumatic.   Cardiovascular:      Rate and Rhythm: Normal rate and " regular rhythm.      Heart sounds: Normal heart sounds. No murmur heard.     No friction rub. No gallop.   Pulmonary:      Effort: Pulmonary effort is normal. No respiratory distress.      Breath sounds: Normal breath sounds. No wheezing or rales.   Musculoskeletal:         General: Normal range of motion.   Skin:     General: Skin is warm.      Capillary Refill: Capillary refill takes less than 2 seconds.      Findings: No rash.   Neurological:      Mental Status: She is alert and oriented to person, place, and time.      Motor: No weakness.      Gait: Gait normal.   Psychiatric:         Mood and Affect: Mood normal.         Behavior: Behavior normal.         Thought Content: Thought content normal.         Judgment: Judgment normal.       Lab Visit on 10/15/2024   Component Date Value Ref Range Status    WBC 10/15/2024 5.57  3.90 - 12.70 K/uL Final    RBC 10/15/2024 3.46 (L)  4.00 - 5.40 M/uL Final    Hemoglobin 10/15/2024 10.7 (L)  12.0 - 16.0 g/dL Final    Hematocrit 10/15/2024 34.0 (L)  37.0 - 48.5 % Final    MCV 10/15/2024 98  82 - 98 fL Final    MCH 10/15/2024 30.9  27.0 - 31.0 pg Final    MCHC 10/15/2024 31.5 (L)  32.0 - 36.0 g/dL Final    RDW 10/15/2024 16.1 (H)  11.5 - 14.5 % Final    Platelets 10/15/2024 264  150 - 450 K/uL Final    MPV 10/15/2024 8.8 (L)  9.2 - 12.9 fL Final    Immature Granulocytes 10/15/2024 0.2  0.0 - 0.5 % Final    Gran # (ANC) 10/15/2024 2.7  1.8 - 7.7 K/uL Final    Immature Grans (Abs) 10/15/2024 0.01  0.00 - 0.04 K/uL Final    Comment: Mild elevation in immature granulocytes is non specific and   can be seen in a variety of conditions including stress response,   acute inflammation, trauma and pregnancy. Correlation with other   laboratory and clinical findings is essential.      Lymph # 10/15/2024 2.2  1.0 - 4.8 K/uL Final    Mono # 10/15/2024 0.4  0.3 - 1.0 K/uL Final    Eos # 10/15/2024 0.2  0.0 - 0.5 K/uL Final    Baso # 10/15/2024 0.07  0.00 - 0.20 K/uL Final    nRBC  10/15/2024 0  0 /100 WBC Final    Gran % 10/15/2024 48.4  38.0 - 73.0 % Final    Lymph % 10/15/2024 39.3  18.0 - 48.0 % Final    Mono % 10/15/2024 6.5  4.0 - 15.0 % Final    Eosinophil % 10/15/2024 4.3  0.0 - 8.0 % Final    Basophil % 10/15/2024 1.3  0.0 - 1.9 % Final    Differential Method 10/15/2024 Automated   Final    Sodium 10/15/2024 140  136 - 145 mmol/L Final    Potassium 10/15/2024 3.3 (L)  3.5 - 5.1 mmol/L Final    Chloride 10/15/2024 103  95 - 110 mmol/L Final    CO2 10/15/2024 26  23 - 29 mmol/L Final    Glucose 10/15/2024 95  70 - 110 mg/dL Final    BUN 10/15/2024 13  8 - 23 mg/dL Final    Creatinine 10/15/2024 1.0  0.5 - 1.4 mg/dL Final    Calcium 10/15/2024 9.1  8.7 - 10.5 mg/dL Final    Total Protein 10/15/2024 6.6  6.0 - 8.4 g/dL Final    Albumin 10/15/2024 3.5  3.5 - 5.2 g/dL Final    Total Bilirubin 10/15/2024 0.4  0.1 - 1.0 mg/dL Final    Comment: For infants and newborns, interpretation of results should be based  on gestational age, weight and in agreement with clinical  observations.    Premature Infant recommended reference ranges:  Up to 24 hours.............<8.0 mg/dL  Up to 48 hours............<12.0 mg/dL  3-5 days..................<15.0 mg/dL  6-29 days.................<15.0 mg/dL      Alkaline Phosphatase 10/15/2024 64  55 - 135 U/L Final    AST 10/15/2024 14  10 - 40 U/L Final    ALT 10/15/2024 6 (L)  10 - 44 U/L Final    eGFR 10/15/2024 57 (A)  >60 mL/min/1.73 m^2 Final    Anion Gap 10/15/2024 11  8 - 16 mmol/L Final    Ferritin 10/15/2024 21  20.0 - 300.0 ng/mL Final    Iron 10/15/2024 45  30 - 160 ug/dL Final    Transferrin 10/15/2024 299  200 - 375 mg/dL Final    TIBC 10/15/2024 443  250 - 450 ug/dL Final    Saturated Iron 10/15/2024 10 (L)  20 - 50 % Final    Kappa Free Light Chains 10/15/2024 4.22 (H)  0.33 - 1.94 mg/dL Final    Lambda Free Light Chains 10/15/2024 2.51  0.57 - 2.63 mg/dL Final    Kappa/Lambda FLC Ratio 10/15/2024 1.68 (H)  0.26 - 1.65 Final    Comment: Undetected  antigen excess is a rare event but cannot   be excluded. If these free light chain results do not   agree with other clinical or laboratory findings or   if the sample is from a patient that has previously   demonstrated antigen excess, discuss with the testing   laboratory.   Results should always be interpreted in conjunction   with other laboratory tests and clinical evidence.         Assessment:     1. Primary hypertension    2. Mixed hyperlipidemia    3. Age-related osteoporosis without current pathological fracture    4. Coronary artery disease involving native coronary artery of native heart without angina pectoris    5. History of non-ST elevation myocardial infarction (NSTEMI)    6. Atherosclerosis of aorta    7. PVD (peripheral vascular disease)    8. Chronic heart failure with preserved ejection fraction    9. Pulmonary hypertension    10. Stage 3a chronic kidney disease    11. COPD, group D, by GOLD 2017 classification    12. Status post amputation of great toe, left    13. Amputation of left great toe    14. Uncomplicated opioid dependence    15. Nicotine dependence, cigarettes, uncomplicated    16. Subdural hematoma, acute    17. Need for vaccination    18. Hypokalemia    19. Recurrent major depressive disorder, in partial remission      Plan:   1. Primary hypertension    2. Mixed hyperlipidemia  -     Lipid Panel; Future; Expected date: 10/16/2024    3. Age-related osteoporosis without current pathological fracture  -     DXA Bone Density Axial Skeleton 1 or more sites; Future; Expected date: 10/16/2024    4. Coronary artery disease involving native coronary artery of native heart without angina pectoris  -follow up with cardiology as scheduled.   -stable on current meds    5. History of non-ST elevation myocardial infarction (NSTEMI)    6. Atherosclerosis of aorta  Overview:  Noted on CT 5/8/2018      7. PVD (peripheral vascular disease)  -follow up with cardiology as scheduled.   -stable.     8.  Chronic heart failure with preserved ejection fraction  -follow up with cardiology as scheduled.     9. Pulmonary hypertension  -follow up with cardiology as scheduled.     10. Stage 3a chronic kidney disease  -stable on recent labs    11. COPD, group D, by GOLD 2017 classification  -breathing stable.     12. Status post amputation of great toe, left  -stable. In good condition.     13. Amputation of left great toe  -stump in good condition.    14. Uncomplicated opioid dependence  -stable    15. Nicotine dependence, cigarettes, uncomplicated  -advised to quit    16. Subdural hematoma, acute  3/6/2024-hospitalization + rehab + home health  -stable and back at home    17. Need for vaccination  -     Influenza - Trivalent (Adjuvanted)    18. Hypokalemia     19. Depression  -stable and well controlled on wellbutrin and buspar.      Assessment & Plan    Reviewed lab results, noting low potassium level of 3.2  Assessed history of fluctuating potassium levels  Evaluated cholesterol status, noting it has not been checked this year  Considered history of brain bleed and falls  Reviewed blood pressure, which has been stable  Assessed kidney function, which appears stable based on recent labs  Noted weight stability at 90 lbs, but concerned about further weight loss  Evaluated foot condition, including history of toe amputation and resolved pressure ulcer    HYPOKALEMIA:  - Explained normal potassium range (3.5 and above).  - Increased potassium from every other day to daily dosing of 10 MEQ.    WEIGHT MANAGEMENT:  - Discussed importance of maintaining weight and using high-calorie supplements.  - Ms. Dwyer to continue consuming ice cream daily for calorie intake.  - Ms. Dwyer to supplement with high-calorie drinks available over the counter.  - Recommend switching to different brands of nutritional supplements if current ones become unpalatable.    HYPERLIPIDEMIA:  - Ordered fasting cholesterol test.  - Continued  rosuvastatin (Crestor).    MEDICATIONS/SUPPLEMENTS:  - Continued bupropion (Wellbutrin) twice daily, buspirone (BuSpar), daily baby aspirin, and vitamin D.    OSTEOPOROSIS SCREENING:  - Ordered bone scan to check for osteoporosis.  - Schedule bone scan, preferably in [place] if available.    LABS:  - Schedule fasting cholesterol test in [place].    FOLLOW UP:  - Follow up in 6 months with Dr. De La Rosa.         Follow up in about 6 months (around 4/16/2025), or if symptoms worsen or fail to improve.

## 2024-10-17 ENCOUNTER — OFFICE VISIT (OUTPATIENT)
Dept: HEMATOLOGY/ONCOLOGY | Facility: CLINIC | Age: 79
End: 2024-10-17
Payer: MEDICARE

## 2024-10-17 VITALS
DIASTOLIC BLOOD PRESSURE: 69 MMHG | HEIGHT: 62 IN | SYSTOLIC BLOOD PRESSURE: 109 MMHG | BODY MASS INDEX: 16.68 KG/M2 | HEART RATE: 82 BPM | WEIGHT: 90.63 LBS

## 2024-10-17 DIAGNOSIS — D50.9 IRON DEFICIENCY ANEMIA, UNSPECIFIED IRON DEFICIENCY ANEMIA TYPE: ICD-10-CM

## 2024-10-17 DIAGNOSIS — K21.9 GASTROESOPHAGEAL REFLUX DISEASE, UNSPECIFIED WHETHER ESOPHAGITIS PRESENT: ICD-10-CM

## 2024-10-17 DIAGNOSIS — D64.9 NORMOCYTIC ANEMIA: ICD-10-CM

## 2024-10-17 DIAGNOSIS — R76.8 ELEVATED SERUM IMMUNOGLOBULIN FREE LIGHT CHAIN LEVEL: Primary | ICD-10-CM

## 2024-10-17 DIAGNOSIS — E53.8 B12 DEFICIENCY: ICD-10-CM

## 2024-10-17 DIAGNOSIS — E87.6 HYPOKALEMIA: ICD-10-CM

## 2024-10-17 PROCEDURE — 1126F AMNT PAIN NOTED NONE PRSNT: CPT | Mod: HCNC,CPTII,S$GLB, | Performed by: NURSE PRACTITIONER

## 2024-10-17 PROCEDURE — 3074F SYST BP LT 130 MM HG: CPT | Mod: HCNC,CPTII,S$GLB, | Performed by: NURSE PRACTITIONER

## 2024-10-17 PROCEDURE — 3288F FALL RISK ASSESSMENT DOCD: CPT | Mod: HCNC,CPTII,S$GLB, | Performed by: NURSE PRACTITIONER

## 2024-10-17 PROCEDURE — 99215 OFFICE O/P EST HI 40 MIN: CPT | Mod: HCNC,S$GLB,, | Performed by: NURSE PRACTITIONER

## 2024-10-17 PROCEDURE — 99999 PR PBB SHADOW E&M-EST. PATIENT-LVL IV: CPT | Mod: PBBFAC,HCNC,, | Performed by: NURSE PRACTITIONER

## 2024-10-17 PROCEDURE — 1101F PT FALLS ASSESS-DOCD LE1/YR: CPT | Mod: HCNC,CPTII,S$GLB, | Performed by: NURSE PRACTITIONER

## 2024-10-17 PROCEDURE — 1159F MED LIST DOCD IN RCRD: CPT | Mod: HCNC,CPTII,S$GLB, | Performed by: NURSE PRACTITIONER

## 2024-10-17 PROCEDURE — 1160F RVW MEDS BY RX/DR IN RCRD: CPT | Mod: HCNC,CPTII,S$GLB, | Performed by: NURSE PRACTITIONER

## 2024-10-17 PROCEDURE — G2211 COMPLEX E/M VISIT ADD ON: HCPCS | Mod: HCNC,S$GLB,, | Performed by: NURSE PRACTITIONER

## 2024-10-17 PROCEDURE — 3078F DIAST BP <80 MM HG: CPT | Mod: HCNC,CPTII,S$GLB, | Performed by: NURSE PRACTITIONER

## 2024-10-17 RX ORDER — PANTOPRAZOLE SODIUM 40 MG/1
40 TABLET, DELAYED RELEASE ORAL DAILY
Qty: 90 TABLET | Refills: 3 | Status: SHIPPED | OUTPATIENT
Start: 2024-10-17 | End: 2025-10-17

## 2024-10-17 NOTE — PROGRESS NOTES
Subjective:      Patient ID: Keyona Dwyer is a 78 y.o. female.    Chief Complaint:  no complaints    HPI:   79 yo female who presents today for further evaluation and recommendations of iron deficiency anemia.  She has been referred to the hematology by her PCP, Dr. Ralf Isidro.  Had diagnosis of acute pancreatitis requiring hospitalization in 6/2022 with complaints of abdominal pain.  Other chronic diagnosis include anxiety, chronic back pain, hyperlipidemia, htn, osteoporosis, RVT, and restless leg syndrome.       Patient states that she got bit by a brown recluse and got sick due to antibiotics and has some weight loss.  States that she was on cephalexin 500 mg PO TID and caused stomach distress with weight loss and episodic nausea/vomiting.      Is a current smoker since she was 12 years old - 1 pack per day for 60 years. Now smokes 3-10 per day.  She denies alcohol use.      She denies f/c/ns or lymphadenopathy.       Interval History:  10/10/2022: With hospitalization in 9/2022 d/t pneumonia, NSTEMI, and GI bleed.  EGD done as well as bleeding scan and found with no active bleeding although patient did have dark stools during hospitalizations with a drop in hgb requiring blood transfusion.  She was unable to tolerate GI prep for colonoscopy.  At last visit work up for anemia found with elevated MMA.  She was placed on B12 2000 mcg PO daily.  Also found with LOR started on ferrous sulfate 325 mg PO and is currently taking iron tablets every other day.  States that she is taking stool softeners daily.  States has not noted dark stools since hospitalization.  3+ blood found in urine during hospitalizations     2/9/2023 Had surgery to face to remove skin cancer one week ago - part of left side nose had to be removed - done by Dr. Aguilar Wallace dermatology.  Currently with bandages on her face that are CDI.  Has not noticed any abnormal bleeding.  States that she has been off of her iron tablets this  past week.  Was taking every other day.  Is taking B12 tablets daily.  States that when she takes the oral iron she has constipation requiring stool softeners.      5/25/2023 She was to continue B12 daily and continue oral iron supplements every other day since her last visit in 2/2023.  Presents today to evaluate response.   Denies dark stools.  Denies any unusual bleeding. Has gained 8 lbs.  States that she is feeling better then she has in a long time.       Interval History:  8/31/2023  Has continued ferrous sulfate 325 mg PO daily and surjit B complex daily supplement daily.  States that she has reduced her smoking to less then a pack/day.  States that she took her iron tablet after doing labs on 8/30/2023.  Noted now with elevated iron. Slight macrocytic anemia - improved.  States that she feels better then she has in a long time.      Interval History  11/27/2023 At last visit in 8/2023 we stopped oral iron supplement and continued Surjit B complex daily.  Presents today with stable chronic anemia with noted macrocytosis present for the past year - stable. Saturated iron of 13%.  C/o right shoulder pain due to right should pain and decreased rom from fall that occurred at least 6 weeks ago or longer.  Had xray 10/25/2023 done just showing arthritis. Denies any abnormal bleeding.       Interval History:  2/29/2024  States that her shoulder is feeling better.  She is going to PT three times per week.  Rotary cuff was dislocated from her shoulder.  Still smoking cigarettes daily but has reduced amount. Continues to take a B complex daily.  States that she has been eating liver 2-3 times/week.  Noted elevated liver indices.   With continued macrocytic anemia.  Hgb 11.4 mcv 105.  BP elevated.  States that she ate fast food yesterrday.  Cardiology has taken her off all of her BP medications due to her BP getting too low.  Denies HA, blurred vision, sob, chest tightness or chest pain.      Interval History:  7/18/2024   Presents today to evaluate labs with h/o macrocytic anemia, B12 deficiency and recurrent iron deficiency.  Has stopped oral iron since last visit in Feb 2024.  Had a fall on 3/4/2024 - spent 5 weeks in ICU and then went to rehab and then home health after.  Tripped and fell hitting head on floor of when she was walking into the door- she developed a brain bleed that has currently resolved. Continues taking B12 complex daily.  Hgb 11.2 normocytic, sat iron 13% and tibc 482.  Has easy bruising.  Denies any other known abnormal bleeding.  Still smoking less then a pack of cigarettes daily.  She is accompanied by her .    Interval History:  10/17/2024 States has not been taking slowFe at all.  States that has increased iron rich foods. Supposed to be taking potassium 10 meq by mouth every other day - just recently told by primary care to start taking daily.  Patient states she sometimes forgets to take it.  States that she is taking the B Complex vitamin daily. LDCT scan 7/24/2024 showing emphysema with severe CA disease and severe aortic calcification. Denies sob or chest pain.  Hgb stable at 10.7 g/dL.  Saturated iron is 10%.    I have reviewed all of the patient's relevant lab work available in the medical record and have utilized this in my evaluation and management recommendations today.      Social History     Socioeconomic History    Marital status:      Spouse name: elizabeth    Number of children: 3   Occupational History    Occupation: retired   Tobacco Use    Smoking status: Every Day     Current packs/day: 0.50     Average packs/day: 0.5 packs/day for 63.0 years (31.5 ttl pk-yrs)     Types: Cigarettes     Passive exposure: Never    Smokeless tobacco: Current    Tobacco comments:     started age of  11    Substance and Sexual Activity    Alcohol use: No    Drug use: No    Sexual activity: Yes     Partners: Male     Social Drivers of Health     Financial Resource Strain: Low Risk  (3/7/2024)     Overall Financial Resource Strain (CARDIA)     Difficulty of Paying Living Expenses: Not hard at all   Food Insecurity: No Food Insecurity (3/7/2024)    Hunger Vital Sign     Worried About Running Out of Food in the Last Year: Never true     Ran Out of Food in the Last Year: Never true   Transportation Needs: No Transportation Needs (3/7/2024)    PRAPARE - Transportation     Lack of Transportation (Medical): No     Lack of Transportation (Non-Medical): No   Physical Activity: Insufficiently Active (3/6/2023)    Exercise Vital Sign     Days of Exercise per Week: 7 days     Minutes of Exercise per Session: 20 min   Stress: No Stress Concern Present (3/6/2023)    [employer] - Occupational Stress Questionnaire     Feeling of Stress : Not at all   Housing Stability: Low Risk  (3/7/2024)    Housing Stability Vital Sign     Unable to Pay for Housing in the Last Year: No     Number of Places Lived in the Last Year: 1     Unstable Housing in the Last Year: No       Family History   Problem Relation Name Age of Onset    Cancer Mother      Breast cancer Mother      Hyperlipidemia Father      Heart failure Father      Heart disease Brother      Brain cancer Brother      Stroke Brother      Alcohol abuse Brother         Past Surgical History:   Procedure Laterality Date    ANGIOGRAPHY OF LOWER EXTREMITY N/A 04/12/2021    Procedure: ANGIOGRAM, LOWER EXTREMITY/left leg;  Surgeon: Maico Adkins MD;  Location: [facility] CATH LAB;  Service: Vascular;  Laterality: N/A;  req 1130 start time/Rm A243A    AORTOGRAPHY WITH SERIALOGRAPHY N/A 04/14/2021    Procedure: AORTOGRAM, WITH RUNOFF;  Surgeon: Maico Adkins MD;  Location: [facility] CATH LAB;  Service: Vascular;  Laterality: N/A;  left iliac stent with shockwave/req 1030 start    CATARACT EXTRACTION W/  INTRAOCULAR LENS IMPLANT Bilateral 8/ 9 2017    ESOPHAGOGASTRODUODENOSCOPY N/A 09/13/2022    Procedure: EGD (ESOPHAGOGASTRODUODENOSCOPY);  Surgeon: Fern Rasmussen,  MD;  Location: [facility] ENDO;  Service: Endoscopy;  Laterality: N/A;    GALLBLADDER SURGERY      HYSTERECTOMY  1972    LUNG SURGERY Right age 15    lung collpase    SHOULDER SURGERY      TOE AMPUTATION Left 04/15/2021    Procedure: AMPUTATION, TOE;  Surgeon: Taylor Anderson DPM;  Location: [facility] OR;  Service: Podiatry;  Laterality: Left;  Hallux (Great Toe)    TOTAL HIP ARTHROPLASTY Bilateral     TOTAL KNEE ARTHROPLASTY Bilateral        Past Medical History:   Diagnosis Date    Acute diastolic heart failure 09/15/2022    Acute respiratory failure with hypoxia and hypercarbia 09/14/2022    Anemia     hematol    Anxiety     CAD (coronary artery disease)     Cancer     Cataract     Chronic back pain     Dr. Guzman    Chronic pain 04/16/2024    Coronary artery disease without angina pectoris 03/06/2024    Fibrocystic breast     Glaucoma     Hyperlipemia     Hypertension     Idiopathic acute pancreatitis 06/18/2022    NSTEMI (non-ST elevated myocardial infarction) 09/11/2022    Osteoarthritis     Osteoporosis     Rheumatology/on Prolia    Pancreatitis     Pneumonia     Polyneuropathy     PVD (peripheral vascular disease) 04/12/2021    PVD (peripheral vascular disease)     dr edmondson    Restless leg syndrome     Subdural hematoma 03/2024    Tobacco dependence     Trouble in sleeping        Review of Systems   Constitutional: Negative.    HENT: Negative.  Negative for nosebleeds.    Eyes: Negative.    Respiratory:  Negative for chest tightness and shortness of breath.    Cardiovascular: Negative.    Gastrointestinal:  Negative for anal bleeding and constipation.        Acid reflux   Endocrine: Negative.    Genitourinary: Negative.  Negative for hematuria and vaginal bleeding.   Musculoskeletal:  Positive for gait problem.   Skin: Negative.    Allergic/Immunologic: Negative.    Hematological:  Bruises/bleeds easily.   Psychiatric/Behavioral: Negative.            Medication List with Changes/Refills   New Medications     PANTOPRAZOLE (PROTONIX) 40 MG TABLET    Take 1 tablet (40 mg total) by mouth once daily.   Current Medications    AMLODIPINE (NORVASC) 5 MG TABLET    Take 1 tablet (5 mg total) by mouth once daily.    ASPIRIN (ECOTRIN) 81 MG EC TABLET    Take 1 tablet (81 mg total) by mouth once daily.    AZELASTINE (ASTELIN) 137 MCG (0.1 %) NASAL SPRAY    1 spray (137 mcg total) by Nasal route 2 (two) times daily.    B COMPLEX VITAMINS CAPSULE    Take 1 capsule by mouth once daily.    BUPROPION (WELLBUTRIN) 75 MG TABLET    Take 1 tablet (75 mg total) by mouth 2 (two) times daily.    BUSPIRONE (BUSPAR) 10 MG TABLET    TAKE 1 TABLET BY MOUTH TWICE DAILY    CLOPIDOGREL (PLAVIX) 75 MG TABLET    Take 1 tablet (75 mg total) by mouth once daily.    CLOTRIMAZOLE-BETAMETHASONE 1-0.05% (LOTRISONE) CREAM    Apply to external ear with finger once a week    DOCUSATE SODIUM (COLACE) 100 MG CAPSULE    Take 1 capsule (100 mg total) by mouth 2 (two) times daily.    DULOXETINE (CYMBALTA) 30 MG CAPSULE    Take 30 mg by mouth once daily.    ERGOCALCIFEROL (ERGOCALCIFEROL) 50,000 UNIT CAP    TAKE 1 CAPSULE BY MOUTH EVERY WEEK    ERGOCALCIFEROL (VITAMIN D2) 50,000 UNIT CAP    Take 50,000 Units by mouth every 7 days.    FUROSEMIDE (LASIX) 20 MG TABLET    Take 1 tablet (20 mg total) by mouth once daily. Do not take if BP is below 110/70    GABAPENTIN (NEURONTIN) 600 MG TABLET    Take 600 mg by mouth 3 (three) times daily.    HYDROCODONE-ACETAMINOPHEN (NORCO)  MG PER TABLET    Take 1 tablet by mouth 3 (three) times daily as needed.    IPRATROPIUM (ATROVENT) 42 MCG (0.06 %) NASAL SPRAY    2 sprays by Each Nostril route 2 (two) times daily.    ON-Q PAIN PUMP    by Misc.(Non-Drug; Combo Route) route.    POTASSIUM CHLORIDE (MICRO-K) 10 MEQ CPSR    Take 4 tablets 40 meq daily for 2 days; then reduce to 1 tablet 10 meq every other day with taking diuretic (lasix).    RANOLAZINE (RANEXA) 500 MG TB12    Take 1 tablet (500 mg total) by mouth 2 (two) times  daily.    ROSUVASTATIN (CRESTOR) 5 MG TABLET    TAKE 1 TABLET BY MOUTH ONCE A DAY        Objective:     Vitals:    10/17/24 0928   BP: 109/69   Pulse: 82       Physical Exam  Vitals reviewed.   Constitutional:       Appearance: Normal appearance. She is underweight.   HENT:      Head: Normocephalic and atraumatic.      Right Ear: External ear normal.      Left Ear: External ear normal.   Cardiovascular:      Rate and Rhythm: Normal rate and regular rhythm.      Heart sounds: S1 normal and S2 normal. Murmur heard.   Pulmonary:      Effort: Pulmonary effort is normal.      Breath sounds: Decreased air movement present. Examination of the right-upper field reveals decreased breath sounds. Examination of the left-upper field reveals decreased breath sounds. Examination of the right-middle field reveals decreased breath sounds. Examination of the left-middle field reveals decreased breath sounds. Examination of the right-lower field reveals decreased breath sounds. Examination of the left-lower field reveals decreased breath sounds. Decreased breath sounds present.   Abdominal:      General: There is no distension.   Musculoskeletal:         General: Normal range of motion.      Cervical back: Normal range of motion.   Skin:     General: Skin is warm and dry.      Findings: Bruising present.   Neurological:      General: No focal deficit present.      Mental Status: She is alert and oriented to person, place, and time.   Psychiatric:         Attention and Perception: Attention and perception normal.         Mood and Affect: Mood and affect normal.         Speech: Speech normal.         Behavior: Behavior normal. Behavior is cooperative.         Thought Content: Thought content normal.         Cognition and Memory: Cognition and memory normal.         Judgment: Judgment normal.         Assessment:     Problem List Items Addressed This Visit       Anemia    Relevant Orders    CBC Auto Differential    Comprehensive  Metabolic Panel    Ferritin    Iron and TIBC    B12 deficiency    Relevant Orders    CBC Auto Differential    Hypokalemia    Relevant Orders    Comprehensive Metabolic Panel     Other Visit Diagnoses       Elevated serum immunoglobulin free light chain level    -  Primary    Relevant Orders    CBC Auto Differential    Comprehensive Metabolic Panel    Normocytic anemia        Relevant Orders    CBC Auto Differential    Gastroesophageal reflux disease, unspecified whether esophagitis present        Relevant Medications    pantoprazole (PROTONIX) 40 MG tablet    Other Relevant Orders    CBC Auto Differential    Comprehensive Metabolic Panel    Ferritin    Iron and TIBC            Lab Results   Component Value Date    WBC 5.57 10/15/2024    RBC 3.46 (L) 10/15/2024    HGB 10.7 (L) 10/15/2024    HCT 34.0 (L) 10/15/2024    MCV 98 10/15/2024    MCH 30.9 10/15/2024    MCHC 31.5 (L) 10/15/2024    RDW 16.1 (H) 10/15/2024     10/15/2024    MPV 8.8 (L) 10/15/2024    GRAN 2.7 10/15/2024    GRAN 48.4 10/15/2024    LYMPH 2.2 10/15/2024    LYMPH 39.3 10/15/2024    MONO 0.4 10/15/2024    MONO 6.5 10/15/2024    EOS 0.2 10/15/2024    BASO 0.07 10/15/2024    EOSINOPHIL 4.3 10/15/2024    BASOPHIL 1.3 10/15/2024      Lab Results   Component Value Date     10/15/2024    K 3.3 (L) 10/15/2024     10/15/2024    CO2 26 10/15/2024    BUN 13 10/15/2024    CREATININE 1.0 10/15/2024    CALCIUM 9.1 10/15/2024    ANIONGAP 11 10/15/2024    ESTGFRAFRICA >60.0 06/27/2022    EGFRNONAA >60.0 06/27/2022     Lab Results   Component Value Date    ALT 6 (L) 10/15/2024    AST 14 10/15/2024    ALKPHOS 64 10/15/2024    BILITOT 0.4 10/15/2024     Lab Results   Component Value Date    IRON 45 10/15/2024    TRANSFERRIN 299 10/15/2024    TIBC 443 10/15/2024    FESATURATED 10 (L) 10/15/2024    FERRITIN 21 10/15/2024        Plan:   Elevated serum immunoglobulin free light chain level  -     CBC Auto Differential; Future; Expected date:  10/17/2024  -     Comprehensive Metabolic Panel; Future; Expected date: 10/17/2024    Iron deficiency anemia, unspecified iron deficiency anemia type  -     CBC Auto Differential; Future; Expected date: 10/17/2024  -     Comprehensive Metabolic Panel; Future; Expected date: 10/17/2024  -     Ferritin; Future; Expected date: 10/17/2024  -     Iron and TIBC; Future; Expected date: 10/17/2024    Normocytic anemia  -     CBC Auto Differential; Future; Expected date: 10/17/2024    B12 deficiency  -     CBC Auto Differential; Future; Expected date: 10/17/2024    Hypokalemia  -     Comprehensive Metabolic Panel; Future; Expected date: 10/17/2024    Gastroesophageal reflux disease, unspecified whether esophagitis present  -     pantoprazole (PROTONIX) 40 MG tablet; Take 1 tablet (40 mg total) by mouth once daily.  Dispense: 90 tablet; Refill: 3  -     CBC Auto Differential; Future; Expected date: 10/17/2024  -     Comprehensive Metabolic Panel; Future; Expected date: 10/17/2024  -     Ferritin; Future; Expected date: 10/17/2024  -     Iron and TIBC; Future; Expected date: 10/17/2024    Yearly LDCT scan chest.  Not interested in cigarette cessation.  Repeat [facility] lab in 6 months.  Restart Slow Fe every other day.  Continue B12 complex vitamin daily.  Continue potassium 10 meq by mouth daily - being followed by primary care.  F/u with cardiology due to finding on LDCT scan with severe aortic and coronary artery calcifications.  Start Protonix 40 mg by mouth daily.       Med Onc Chart Routing      Follow up with physician    Follow up with [facility] 3 months. in person visit in [place] - labs prior   Infusion scheduling note   n/a   Injection scheduling note n/a   Labs   Scheduling:  Preferred lab: [facility]  Lab interval:  cbc, cmp, iron studies   Imaging   N/a   Pharmacy appointment No pharmacy appointment needed      Other referrals       No additional referrals needed  n/a          Total time spent on encounter: 40  minutes    Collaborating Provider:  Dr. Kris Alcocer    Thank You,  DOUGLAS Weber-ASHLYN  Benign Hematology

## 2024-10-21 ENCOUNTER — HOSPITAL ENCOUNTER (OUTPATIENT)
Dept: CARDIOLOGY | Facility: HOSPITAL | Age: 79
Discharge: HOME | End: 2024-10-21
Attending: INTERNAL MEDICINE
Payer: MEDICARE

## 2024-10-21 VITALS
HEIGHT: 62 IN | SYSTOLIC BLOOD PRESSURE: 143 MMHG | SYSTOLIC BLOOD PRESSURE: 143 MMHG | HEIGHT: 62 IN | DIASTOLIC BLOOD PRESSURE: 62 MMHG | WEIGHT: 90 LBS | BODY MASS INDEX: 16.56 KG/M2 | BODY MASS INDEX: 16.56 KG/M2 | HEIGHT: 62 IN | DIASTOLIC BLOOD PRESSURE: 72 MMHG | DIASTOLIC BLOOD PRESSURE: 72 MMHG | WEIGHT: 90 LBS | BODY MASS INDEX: 16.56 KG/M2 | WEIGHT: 90 LBS | SYSTOLIC BLOOD PRESSURE: 128 MMHG

## 2024-10-21 DIAGNOSIS — I73.9 PVD (PERIPHERAL VASCULAR DISEASE): Primary | ICD-10-CM

## 2024-10-21 DIAGNOSIS — I73.9 PVD (PERIPHERAL VASCULAR DISEASE): ICD-10-CM

## 2024-10-21 DIAGNOSIS — R09.89 BILATERAL CAROTID BRUITS: ICD-10-CM

## 2024-10-21 LAB
LEFT ABI: 1.02
LEFT ANT TIBIAL SYS PSV: 23 CM/S
LEFT ARM BP: 143 MMHG
LEFT ARM DIASTOLIC BLOOD PRESSURE: 72 MMHG
LEFT ARM SYSTOLIC BLOOD PRESSURE: 143 MMHG
LEFT CBA DIAS: 18 CM/S
LEFT CBA SYS: 75 CM/S
LEFT CCA DIST DIAS: 17 CM/S
LEFT CCA DIST SYS: 83 CM/S
LEFT CCA MID DIAS: 16 CM/S
LEFT CCA MID SYS: 78 CM/S
LEFT CCA PROX DIAS: 14 CM/S
LEFT CCA PROX SYS: 54 CM/S
LEFT CFA PSV: 123 CM/S
LEFT DORSALIS PEDIS: 127 MMHG
LEFT ECA DIAS: 10 CM/S
LEFT ECA SYS: 73 CM/S
LEFT ICA DIST DIAS: 32 CM/S
LEFT ICA DIST SYS: 107 CM/S
LEFT ICA MID DIAS: 27 CM/S
LEFT ICA MID SYS: 101 CM/S
LEFT ICA PROX DIAS: 22 CM/S
LEFT ICA PROX SYS: 80 CM/S
LEFT POPLITEAL PSV: 75 CM/S
LEFT POST TIBIAL SYS PSV: 60 CM/S
LEFT POSTERIOR TIBIAL: 146 MMHG
LEFT PROFUNDA SYS PSV: 54 CM/S
LEFT SUPER FEMORAL DIST SYS PSV: 73 CM/S
LEFT SUPER FEMORAL MID SYS PSV: 80 CM/S
LEFT SUPER FEMORAL OSTIAL SYS PSV: 131 CM/S
LEFT SUPER FEMORAL PROX SYS PSV: 120 CM/S
LEFT TBI: 0.68
LEFT TIB/PER TRUNK SYS PSV: 47 CM/S
LEFT TOE PRESSURE: 97 MMHG
LEFT VERTEBRAL DIAS: 16 CM/S
LEFT VERTEBRAL SYS: 65 CM/S
OHS CV CAROTID RIGHT ICA EDV HIGHEST: 28
OHS CV CAROTID ULTRASOUND LEFT ICA/CCA RATIO: 1.29
OHS CV CAROTID ULTRASOUND RIGHT ICA/CCA RATIO: 1.8
OHS CV LEFT LOWER EXTREMITY ABI (NO CALC): 1.02
OHS CV PV CAROTID LEFT HIGHEST CCA: 83
OHS CV PV CAROTID LEFT HIGHEST ICA: 107
OHS CV PV CAROTID RIGHT HIGHEST CCA: 88
OHS CV PV CAROTID RIGHT HIGHEST ICA: 117
OHS CV RIGHT ABI LOWER EXTREMITY (NO CALC): 0.69
OHS CV US CAROTID LEFT HIGHEST EDV: 32
RIGHT ABI: 0.69
RIGHT ANT TIBIAL SYS PSV: 33 CM/S
RIGHT ARM BP: 128 MMHG
RIGHT ARM DIASTOLIC BLOOD PRESSURE: 62 MMHG
RIGHT ARM SYSTOLIC BLOOD PRESSURE: 128 MMHG
RIGHT CBA DIAS: 16 CM/S
RIGHT CBA SYS: 80 CM/S
RIGHT CCA DIST DIAS: 18 CM/S
RIGHT CCA DIST SYS: 65 CM/S
RIGHT CCA MID DIAS: 17 CM/S
RIGHT CCA MID SYS: 88 CM/S
RIGHT CCA PROX DIAS: 13 CM/S
RIGHT CCA PROX SYS: 68 CM/S
RIGHT CFA PSV: 79 CM/S
RIGHT DORSALIS PEDIS: 90 MMHG
RIGHT ECA DIAS: 10 CM/S
RIGHT ECA SYS: 85 CM/S
RIGHT ICA DIST DIAS: 28 CM/S
RIGHT ICA DIST SYS: 117 CM/S
RIGHT ICA MID DIAS: 27 CM/S
RIGHT ICA MID SYS: 81 CM/S
RIGHT ICA PROX DIAS: 16 CM/S
RIGHT ICA PROX SYS: 67 CM/S
RIGHT PERONEAL SYS PSV: 12 CM/S
RIGHT POPLITEAL PSV: 30 CM/S
RIGHT POST TIBIAL SYS PSV: 31 CM/S
RIGHT POSTERIOR TIBIAL: 99 MMHG
RIGHT PROFUNDA SYS PSV: 68 CM/S
RIGHT SUPER FEMORAL DIST SYS PSV: 49 CM/S
RIGHT SUPER FEMORAL MID SYS PSV: 69 CM/S
RIGHT SUPER FEMORAL OSTIAL SYS PSV: 81 CM/S
RIGHT SUPER FEMORAL PROX SYS PSV: 72 CM/S
RIGHT TBI: 0.41
RIGHT TIB/PER TRUNK SYS PSV: 34 CM/S
RIGHT TOE PRESSURE: 58 MMHG
RIGHT VERTEBRAL DIAS: 12 CM/S
RIGHT VERTEBRAL SYS: 47 CM/S

## 2024-10-21 PROCEDURE — 93925 LOWER EXTREMITY STUDY: CPT | Mod: HCNC,PO

## 2024-10-21 PROCEDURE — 93880 EXTRACRANIAL BILAT STUDY: CPT | Mod: HCNC,PO

## 2024-10-21 PROCEDURE — 93922 UPR/L XTREMITY ART 2 LEVELS: CPT | Mod: HCNC,PO

## 2024-10-21 PROCEDURE — 93880 EXTRACRANIAL BILAT STUDY: CPT | Mod: 26,HCNC,, | Performed by: INTERNAL MEDICINE

## 2024-10-21 PROCEDURE — 93925 LOWER EXTREMITY STUDY: CPT | Mod: 26,HCNC,, | Performed by: INTERNAL MEDICINE

## 2024-10-22 ENCOUNTER — TELEPHONE (OUTPATIENT)
Dept: CARDIOLOGY | Facility: CLINIC | Age: 79
End: 2024-10-22
Payer: MEDICARE

## 2024-10-22 ENCOUNTER — OFFICE VISIT (OUTPATIENT)
Dept: HOME HEALTH SERVICES | Facility: CLINIC | Age: 79
End: 2024-10-22
Payer: MEDICARE

## 2024-10-22 VITALS
DIASTOLIC BLOOD PRESSURE: 60 MMHG | WEIGHT: 90 LBS | HEART RATE: 82 BPM | SYSTOLIC BLOOD PRESSURE: 118 MMHG | HEIGHT: 62 IN | BODY MASS INDEX: 16.56 KG/M2 | RESPIRATION RATE: 18 BRPM | OXYGEN SATURATION: 95 % | TEMPERATURE: 98 F

## 2024-10-22 DIAGNOSIS — I50.32 CHRONIC HEART FAILURE WITH PRESERVED EJECTION FRACTION: ICD-10-CM

## 2024-10-22 DIAGNOSIS — M54.16 LUMBAR RADICULOPATHY: ICD-10-CM

## 2024-10-22 DIAGNOSIS — Z00.00 ENCOUNTER FOR PREVENTIVE HEALTH EXAMINATION: ICD-10-CM

## 2024-10-22 DIAGNOSIS — M81.0 AGE-RELATED OSTEOPOROSIS WITHOUT CURRENT PATHOLOGICAL FRACTURE: ICD-10-CM

## 2024-10-22 DIAGNOSIS — E55.9 VITAMIN D DEFICIENCY: ICD-10-CM

## 2024-10-22 DIAGNOSIS — I73.9 PVD (PERIPHERAL VASCULAR DISEASE): ICD-10-CM

## 2024-10-22 DIAGNOSIS — M46.1 SACROILIITIS: ICD-10-CM

## 2024-10-22 DIAGNOSIS — E87.6 HYPOKALEMIA: ICD-10-CM

## 2024-10-22 DIAGNOSIS — N18.31 STAGE 3A CHRONIC KIDNEY DISEASE: ICD-10-CM

## 2024-10-22 DIAGNOSIS — I25.2 HISTORY OF NON-ST ELEVATION MYOCARDIAL INFARCTION (NSTEMI): ICD-10-CM

## 2024-10-22 DIAGNOSIS — I70.0 ATHEROSCLEROSIS OF AORTA: ICD-10-CM

## 2024-10-22 DIAGNOSIS — R53.81 DEBILITY: ICD-10-CM

## 2024-10-22 DIAGNOSIS — E78.2 MIXED HYPERLIPIDEMIA: Chronic | ICD-10-CM

## 2024-10-22 DIAGNOSIS — M79.2 NEURALGIA AND NEURITIS: Primary | ICD-10-CM

## 2024-10-22 DIAGNOSIS — I10 PRIMARY HYPERTENSION: Chronic | ICD-10-CM

## 2024-10-22 DIAGNOSIS — C44.300 SKIN CANCER OF FACE: ICD-10-CM

## 2024-10-22 DIAGNOSIS — I27.20 PULMONARY HYPERTENSION: ICD-10-CM

## 2024-10-22 DIAGNOSIS — R63.6 UNDERWEIGHT: ICD-10-CM

## 2024-10-22 DIAGNOSIS — J44.9 COPD, GROUP D, BY GOLD 2017 CLASSIFICATION: ICD-10-CM

## 2024-10-22 DIAGNOSIS — Z99.89 DEPENDENCE ON OTHER ENABLING MACHINES AND DEVICES: ICD-10-CM

## 2024-10-22 DIAGNOSIS — M25.552 PAIN OF LEFT HIP: ICD-10-CM

## 2024-10-22 DIAGNOSIS — F17.210 NICOTINE DEPENDENCE, CIGARETTES, UNCOMPLICATED: Chronic | ICD-10-CM

## 2024-10-22 DIAGNOSIS — E53.8 B12 DEFICIENCY: ICD-10-CM

## 2024-10-22 DIAGNOSIS — H40.9 GLAUCOMA OF BOTH EYES, UNSPECIFIED GLAUCOMA TYPE: ICD-10-CM

## 2024-10-22 DIAGNOSIS — F11.20 UNCOMPLICATED OPIOID DEPENDENCE: ICD-10-CM

## 2024-10-22 DIAGNOSIS — D50.9 IRON DEFICIENCY ANEMIA, UNSPECIFIED IRON DEFICIENCY ANEMIA TYPE: ICD-10-CM

## 2024-10-22 DIAGNOSIS — I25.10 CORONARY ARTERY DISEASE INVOLVING NATIVE CORONARY ARTERY OF NATIVE HEART WITHOUT ANGINA PECTORIS: ICD-10-CM

## 2024-10-22 DIAGNOSIS — M15.0 PRIMARY OSTEOARTHRITIS INVOLVING MULTIPLE JOINTS: Chronic | ICD-10-CM

## 2024-10-22 DIAGNOSIS — J30.1 CHRONIC SEASONAL ALLERGIC RHINITIS DUE TO POLLEN: ICD-10-CM

## 2024-10-22 DIAGNOSIS — I70.262 ATHEROSCLEROSIS OF NATIVE ARTERY OF LEFT LOWER EXTREMITY WITH GANGRENE: ICD-10-CM

## 2024-10-22 DIAGNOSIS — Z89.412 STATUS POST AMPUTATION OF GREAT TOE, LEFT: ICD-10-CM

## 2024-10-22 DIAGNOSIS — G89.4 CHRONIC PAIN SYNDROME: ICD-10-CM

## 2024-10-22 DIAGNOSIS — I73.9 PVD (PERIPHERAL VASCULAR DISEASE): Primary | ICD-10-CM

## 2024-10-22 PROBLEM — D72.829 LEUKOCYTOSIS: Status: RESOLVED | Noted: 2022-06-18 | Resolved: 2024-10-22

## 2024-10-22 PROBLEM — Z72.0 TOBACCO ABUSE DISORDER: Status: RESOLVED | Noted: 2021-04-13 | Resolved: 2024-10-22

## 2024-10-22 PROBLEM — Z71.89 ACP (ADVANCE CARE PLANNING): Status: RESOLVED | Noted: 2024-03-08 | Resolved: 2024-10-22

## 2024-10-22 PROBLEM — M86.672 OTHER CHRONIC OSTEOMYELITIS OF LEFT FOOT: Status: RESOLVED | Noted: 2024-04-16 | Resolved: 2024-10-22

## 2024-10-22 PROBLEM — S98.112A AMPUTATION OF LEFT GREAT TOE: Status: RESOLVED | Noted: 2022-03-16 | Resolved: 2024-10-22

## 2024-10-22 PROBLEM — K85.00 IDIOPATHIC ACUTE PANCREATITIS: Status: RESOLVED | Noted: 2022-06-18 | Resolved: 2024-10-22

## 2024-10-22 PROBLEM — S22.32XA CLOSED FRACTURE OF ONE RIB OF LEFT SIDE: Status: RESOLVED | Noted: 2024-03-06 | Resolved: 2024-10-22

## 2024-10-22 PROBLEM — S06.5XAA SUBDURAL HEMATOMA, ACUTE: Status: RESOLVED | Noted: 2024-03-06 | Resolved: 2024-10-22

## 2024-10-22 PROCEDURE — G0439 PPPS, SUBSEQ VISIT: HCPCS | Mod: S$GLB,,, | Performed by: NURSE PRACTITIONER

## 2024-10-22 PROCEDURE — 1125F AMNT PAIN NOTED PAIN PRSNT: CPT | Mod: CPTII,S$GLB,, | Performed by: NURSE PRACTITIONER

## 2024-10-22 PROCEDURE — 3074F SYST BP LT 130 MM HG: CPT | Mod: CPTII,S$GLB,, | Performed by: NURSE PRACTITIONER

## 2024-10-22 PROCEDURE — 1170F FXNL STATUS ASSESSED: CPT | Mod: CPTII,S$GLB,, | Performed by: NURSE PRACTITIONER

## 2024-10-22 PROCEDURE — 3288F FALL RISK ASSESSMENT DOCD: CPT | Mod: CPTII,S$GLB,, | Performed by: NURSE PRACTITIONER

## 2024-10-22 PROCEDURE — 1100F PTFALLS ASSESS-DOCD GE2>/YR: CPT | Mod: CPTII,S$GLB,, | Performed by: NURSE PRACTITIONER

## 2024-10-22 PROCEDURE — 3078F DIAST BP <80 MM HG: CPT | Mod: CPTII,S$GLB,, | Performed by: NURSE PRACTITIONER

## 2024-10-22 PROCEDURE — 1159F MED LIST DOCD IN RCRD: CPT | Mod: CPTII,S$GLB,, | Performed by: NURSE PRACTITIONER

## 2024-10-22 PROCEDURE — 1158F ADVNC CARE PLAN TLK DOCD: CPT | Mod: CPTII,S$GLB,, | Performed by: NURSE PRACTITIONER

## 2024-10-22 PROCEDURE — 1160F RVW MEDS BY RX/DR IN RCRD: CPT | Mod: CPTII,S$GLB,, | Performed by: NURSE PRACTITIONER

## 2024-10-22 NOTE — PATIENT INSTRUCTIONS
Counseling and Referral of Other Preventative  (Italic type indicates deductible and co-insurance are waived)    Patient Name: Keyona Dwyer  Today's Date: 10/22/2024    Health Maintenance       Date Due Completion Date    Shingles Vaccine (1 of 2) Never done ---    RSV Vaccine (Age 60+ and Pregnant patients) (1 - 1-dose 75+ series) Never done ---    DEXA Scan 08/15/2024 8/15/2022    Override on 4/30/2013: Done (REPEAT 2 YRS)    COVID-19 Vaccine (5 - 2024-25 season) 09/01/2024 5/13/2022    TETANUS VACCINE 02/13/2025 2/13/2015    LDCT Lung Screen 07/24/2025 7/24/2024    Lipid Panel 10/21/2025 10/21/2024    Override on 12/14/2018: Done    Override on 11/11/2016: Done        No orders of the defined types were placed in this encounter.    The following information is provided to all patients.  This information is to help you find resources for any of the problems found today that may be affecting your health:                  Living healthy guide: www.[facility].[place].gov      Understanding Diabetes: www.diabetes.org      Eating healthy: www.cdc.gov/healthyweight      CDC home safety checklist: www.cdc.gov/steadi/patient.html      Agency on Aging: www.goea.[place].[facility]      Alcoholics anonymous (AA): www.aa.org      Physical Activity: www.sheryl.nih.gov/jf1drcl      Tobacco use: www.quitwithusla.org

## 2024-10-22 NOTE — PROGRESS NOTES
"  Keyona Dwyer presented for an initial Medicare AWV today. The following components were reviewed and updated:    Medical history  Family History  Social history  Allergies and Current Medications  Health Risk Assessment  Health Maintenance  Care Team    See Completed Assessments for Annual Wellness visit with in the encounter summary    The following assessments were completed:  Depression Screening  Cognitive function Screening  Timed Get Up Test  Whisper Test      Opioid documentation:      Patient does have a current opioid prescription.      Patient  does not need  further discussion regarding opioid medication use. (Has a contract with pain management)           Vitals:    10/22/24 1049   BP: 118/60   Pulse: 82   Resp: 18   Temp: 98.3 °F (36.8 °C)   SpO2: 95%   Weight: 40.8 kg (90 lb)   Height: 5' 2" (1.575 m)     Body mass index is 16.46 kg/m².       Physical Exam  Vitals reviewed.   Constitutional:       General: She is not in acute distress.     Appearance: She is not ill-appearing.   HENT:      Head: Normocephalic and atraumatic.      Nose: Nose normal.      Mouth/Throat:      Mouth: Mucous membranes are moist.      Pharynx: Oropharynx is clear.   Cardiovascular:      Rate and Rhythm: Normal rate and regular rhythm.      Pulses: Normal pulses.      Heart sounds: Normal heart sounds.   Pulmonary:      Effort: Pulmonary effort is normal.      Breath sounds: Examination of the right-upper field reveals decreased breath sounds. Examination of the right-lower field reveals decreased breath sounds. Decreased breath sounds present.   Abdominal:      General: Bowel sounds are normal.      Palpations: Abdomen is soft.   Musculoskeletal:      Cervical back: Neck supple.      Right lower leg: No edema.      Left lower leg: No edema.   Skin:     General: Skin is warm and dry.   Neurological:      Mental Status: She is alert and oriented to person, place, and time.   Psychiatric:         Mood and Affect: Mood " normal.         Behavior: Behavior normal.         Thought Content: Thought content normal.         Judgment: Judgment normal.           Diagnoses and health risks identified today and associated recommendations/orders:  1. Neuralgia and neuritis  Chronic and stable. Continue current management with gabapentin 600mg PO TID  Follow up with PCP as instructed.     2. Lumbar radiculopathy  Chronic and stable. Continue current management with gabapentin 600mg PO TID Follow up with PCP as instructed.     3. Chronic pain syndrome  Chronic and stable. Continue current management with implanted morphine pump, hydrocodone-acetaminophen  PO TID and duloxetine 30mg PO QD. Follow up with Pain Management as instructed.     4. Uncomplicated opioid dependence  Chronic and stable. Follow up with Pain Management as instructed.     5. Glaucoma of both eyes, unspecified glaucoma type  Chronic and stable. Patient is legally blind in the right eye.  Follow up with Ophthalmology as instructed.     6. Chronic seasonal allergic rhinitis due to pollen  Chronic and stable. Continue current management with ipratropium 42mcg  2 sprays BID to both nostrils and azelastine 137mcg 1 spray BID to both nostrils.  Follow up with PCP as instructed.      7. COPD, group D, by GOLD 2017 classification  Chronic and stable. Follow up with Pulmonology as instructed.     8. PVD (peripheral vascular disease)  Chronic and stable. Continue current management with rosuvastatin 5mg PO QD and clopidogrel 75mg PO QD.  Follow up with Cardiology as instructed.     9. Pulmonary hypertension  Chronic and stable. Follow up with Cardiology/Pulmonology as instructed.     10. Mixed hyperlipidemia  Chronic and stable. Continue current management with rosuvastatin 5mg PO QD.Follow up with PCP as instructed.   Component      Latest Ref Rng 6/19/2022 9/6/2023 10/21/2024   Cholesterol Total      120 - 199 mg/dL 66 (L)  147  133    Triglycerides      30 - 150 mg/dL 35  102   148    HDL      40 - 75 mg/dL 39 (L)  77 (H)  60    LDL Cholesterol      63.0 - 159.0 mg/dL 20.0 (L)  49.6 (L)  43.4 (L)    HDL/Cholesterol Ratio      20.0 - 50.0 % 59.1 (H)  52.4 (H)  45.1    Total Cholesterol/HDL Ratio      2.0 - 5.0  1.7 (L)  1.9 (L)  2.2    Non-HDL Cholesterol      mg/dL 27  70  73       11. Primary hypertension  Chronic and stable. Continue current management with amlodipine 5mg PO QD. Follow up with PCP as instructed. BP on visit 118/60.     12. History of non-ST elevation myocardial infarction (NSTEMI)  Chronic and stable.  Follow up with Cardiology as instructed  Last EKG on 3/6/2024: When compared with ECG of 30-AUG-2023 14:37,   Nonspecific T wave abnormality has replaced inverted T waves in Anterior leads. QT has lengthened     13. Coronary artery disease involving native coronary artery of native heart without angina pectoris  Chronic and stable. Continue current management with ranolazine 500mg PO BID, clopidogrel 75mg PO QD, rosuvastatin 5mg PO QD and aspirin 81mg PO QD. Follow up with Cardiology as instructed.     14. Chronic heart failure with preserved ejection fraction  Chronic and stable. Continue current management with furosemide 20mg PO QD.  Follow up with Cardiology as instructed. No edema noted on visit.    15. 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 Continue current management with rosuvastatin 5mg PO and clopidogrel 75mg PO QD and aspirin 81mg PO QD.  Follow up with Cardiology as instructed.     16. Atherosclerosis of aorta  Chronic and stable. Continue current management with rosuvastatin 5mg PO and clopidogrel 75mg PO QD and aspirin 81mg PO QD.  Follow up with Cardiology as instructed. Noted on CT from 5/8/2018    17. Stage 3a chronic kidney disease  Chronic and stable. Follow up with PCP as instructed.   Component      Latest Ref Rng 7/15/2024 10/15/2024   Sodium      136 - 145 mmol/L 137  140    Potassium      3.5 -  5.1 mmol/L 2.9 (L)  3.3 (L)    Chloride      95 - 110 mmol/L 96  103    CO2      23 - 29 mmol/L 24  26    Glucose      70 - 110 mg/dL 91  95    BUN      8 - 23 mg/dL 17  13    Creatinine      0.5 - 1.4 mg/dL 1.0  1.0    Calcium      8.7 - 10.5 mg/dL 9.7  9.1    PROTEIN TOTAL      6.0 - 8.4 g/dL 7.6  6.6    Albumin      3.5 - 5.2 g/dL 4.0  3.5    BILIRUBIN TOTAL      0.1 - 1.0 mg/dL 0.3  0.4    ALP      55 - 135 U/L 79  64    AST      10 - 40 U/L 20  14    ALT      10 - 44 U/L 7 (L)  6 (L)    Anion Gap      8 - 16 mmol/L 17 (H)  11    eGFR      >60 mL/min/1.73 m^2 57 !  57 !       18. Hypokalemia  Chronic and stable. Continue current management with potassium chloride 10MeQ PO QD. Follow up with PCP/Cardiology as instructed.   Component      Latest Ref Rng 7/15/2024 10/15/2024   Sodium      136 - 145 mmol/L 137  140    Potassium      3.5 - 5.1 mmol/L 2.9 (L)  3.3 (L)    Chloride      95 - 110 mmol/L 96  103    CO2      23 - 29 mmol/L 24  26    Glucose      70 - 110 mg/dL 91  95    BUN      8 - 23 mg/dL 17  13    Creatinine      0.5 - 1.4 mg/dL 1.0  1.0    Calcium      8.7 - 10.5 mg/dL 9.7  9.1    PROTEIN TOTAL      6.0 - 8.4 g/dL 7.6  6.6    Albumin      3.5 - 5.2 g/dL 4.0  3.5    BILIRUBIN TOTAL      0.1 - 1.0 mg/dL 0.3  0.4    ALP      55 - 135 U/L 79  64    AST      10 - 40 U/L 20  14    ALT      10 - 44 U/L 7 (L)  6 (L)    Anion Gap      8 - 16 mmol/L 17 (H)  11    eGFR      >60 mL/min/1.73 m^2 57 !  57 !         19. Skin cancer of face  Chronic and stable. Continue current management with monitoring. Follow up with Dermatology as instructed.     20. Iron deficiency anemia, unspecified iron deficiency anemia type  Chronic and stable. Continue to monitor. Follow up with Heme/Onc as instructed   Component      Latest Ref Rng 2/27/2024 7/15/2024 10/15/2024   Iron      30 - 160 ug/dL 247 (H)  62  45    Transferrin      200 - 375 mg/dL 215  326  299    TIBC      250 - 450 ug/dL 318  482 (H)  443    Saturated Iron       20 - 50 % 78 (H)  13 (L)  10 (L)       21. Vitamin D deficiency  Chronic and stable. Continue current management with ergocalciferol 50,000units PO every 7 days. Follow up with PCP as instructed.   Component      Latest Ref Rng 8/14/2020 8/21/2023 9/6/2023   Vitamin D      30 - 96 ng/mL 44  40  39        22. Age-related osteoporosis without current pathological fracture  Chronic and stable. Continue to monitor with DXA Scans.  Follow up with PCP as instructed. DXA Scan completed on 10/16/2024 and results are pending.     23. B12 deficiency  Chronic and stable. Continue current management with B vitamin complex 1 a day.Follow up with PCP.   Component      Latest Ref Rng 7/18/2022 8/30/2023 11/22/2023   Vitamin B12      210 - 950 pg/mL 912  684  395        24. Status post amputation of great toe, left  Chronic and stable. Amputated on 4/9/2021. Follow up with Podiatry as instructed.     25. Sacroiliitis  Chronic and stable. Continue current management with medications as prescribed per pain management. Follow up with Pain Management as instructed.     26. Pain of left hip  Chronic and stable. Continue current management with medications as prescribed per pain management. Follow up with Pain Management as instructed.     27. Primary osteoarthritis involving multiple joints  Chronic and stable. Continue current management with pain medications. Follow up with Pain Management as instructed.     28. Nicotine dependence, cigarettes, uncomplicated  Chronic. Spoke with the patient about Smoking Cessation program at [facility]. Follow up with PCP as instructed.     29.Debility  Chronic and stable. Continue current management with assistance from rollator for ambulation Follow up with PCP as instructed.     30. Dependence on other enabling machines and devices  Chronic and stable. Continue current management with rollator for ambulation. Follow up with PCP as instructed.     31. Underweight  Chronic and stable. Patient reports  "that her weight has been stable over the "past few months Follow up with PCP.as instructed     32. Encounter for preventive health examination  Completed      Provided [place]yn[place] with a 5-10 year written screening schedule and personal prevention plan. Recommendations were developed using the USPSTF age appropriate recommendations. Education, counseling, and referrals were provided as needed.  After Visit Summary printed and given to patient which includes a list of additional screenings\tests needed.    No follow-ups on file.      Lindy Ibarra NP  I offered to discuss advanced care planning, including how to pick a person who would make decisions for you if you were unable to make them for yourself, called a health care power of , and what kind of decisions you might make such as use of life sustaining treatments such as ventilators and tube feeding when faced with a life limiting illness recorded on a living will that they will need to know. (How you want to be cared for as you near the end of your natural life) Patient needs to make changes then will submit to Dr Rj ADORNO  Patient has advanced directive written but has opted not to place them on file with the institution.  "

## 2024-10-22 NOTE — TELEPHONE ENCOUNTER
Contacted PT and reviewed results of Carotid US showing mild carotid artery disease PT stated verbal understanding       ----- Message from Jose Miguel Berg MD sent at 10/22/2024  2:36 PM CDT -----  Please contact the patient and let them know that their results of carotid ultrasound reveal mild carotid artery disease/plaque -will continue to monitor it.

## 2024-10-23 ENCOUNTER — TELEPHONE (OUTPATIENT)
Dept: CARDIOLOGY | Facility: CLINIC | Age: 79
End: 2024-10-23
Payer: MEDICARE

## 2024-10-23 NOTE — TELEPHONE ENCOUNTER
The patient has been notified of the results.            Please contact the patient and let them know that their results of leg ultrasound and SHIKHA reveals moderate to severe blockage/plaque in right leg - will refer to Dr. Galeas to discuss further interventions needed to improve blood flow and may need to discuss with vascular surgery as well.

## 2024-10-23 NOTE — TELEPHONE ENCOUNTER
Ppt made with dr espinal as asked by dr garcia pb    ----- Message from Jose Miguel Garcia MD sent at 10/22/2024  4:56 PM CDT -----  Please contact the patient and let them know that their results of leg ultrasound and SHIKHA reveals moderate to severe blockage/plaque in right leg - will refer to Dr. Espinal to discuss further interventions needed to improve blood flow and may need to discuss with vascular surgery as well.

## 2024-10-23 NOTE — TELEPHONE ENCOUNTER
----- Message from Jose Miguel Berg MD sent at 10/22/2024  4:56 PM CDT -----  Please contact the patient and let them know that their results of leg ultrasound and SHIKHA reveals moderate to severe blockage/plaque in right leg - will refer to Dr. Galeas to discuss further interventions needed to improve blood flow and may need to discuss with vascular surgery as well.

## 2024-11-19 ENCOUNTER — OFFICE VISIT (OUTPATIENT)
Dept: CARDIOLOGY | Facility: CLINIC | Age: 79
End: 2024-11-19
Payer: MEDICARE

## 2024-11-19 ENCOUNTER — HOSPITAL ENCOUNTER (OUTPATIENT)
Dept: CARDIOLOGY | Facility: HOSPITAL | Age: 79
Discharge: HOME | End: 2024-11-19
Attending: INTERNAL MEDICINE
Payer: MEDICARE

## 2024-11-19 VITALS
HEART RATE: 82 BPM | OXYGEN SATURATION: 95 % | DIASTOLIC BLOOD PRESSURE: 70 MMHG | WEIGHT: 88.63 LBS | BODY MASS INDEX: 16.21 KG/M2 | SYSTOLIC BLOOD PRESSURE: 136 MMHG

## 2024-11-19 DIAGNOSIS — I65.23 BILATERAL CAROTID ARTERY STENOSIS: ICD-10-CM

## 2024-11-19 DIAGNOSIS — M25.473 ANKLE SWELLING, UNSPECIFIED LATERALITY: ICD-10-CM

## 2024-11-19 DIAGNOSIS — I50.32 CHRONIC HEART FAILURE WITH PRESERVED EJECTION FRACTION: ICD-10-CM

## 2024-11-19 DIAGNOSIS — I70.262 ATHEROSCLEROSIS OF NATIVE ARTERY OF LEFT LOWER EXTREMITY WITH GANGRENE: ICD-10-CM

## 2024-11-19 DIAGNOSIS — J44.9 COPD, GROUP D, BY GOLD 2017 CLASSIFICATION: ICD-10-CM

## 2024-11-19 DIAGNOSIS — R09.89 BILATERAL CAROTID BRUITS: ICD-10-CM

## 2024-11-19 DIAGNOSIS — I25.2 HISTORY OF NON-ST ELEVATION MYOCARDIAL INFARCTION (NSTEMI): ICD-10-CM

## 2024-11-19 DIAGNOSIS — I73.9 PVD (PERIPHERAL VASCULAR DISEASE): Primary | ICD-10-CM

## 2024-11-19 DIAGNOSIS — I10 ESSENTIAL HYPERTENSION: ICD-10-CM

## 2024-11-19 DIAGNOSIS — I10 PRIMARY HYPERTENSION: ICD-10-CM

## 2024-11-19 DIAGNOSIS — Z72.0 TOBACCO ABUSE DISORDER: ICD-10-CM

## 2024-11-19 DIAGNOSIS — I73.9 PVD (PERIPHERAL VASCULAR DISEASE): ICD-10-CM

## 2024-11-19 DIAGNOSIS — I70.0 ATHEROSCLEROSIS OF AORTA: ICD-10-CM

## 2024-11-19 LAB
OHS QRS DURATION: 74 MS
OHS QTC CALCULATION: 456 MS

## 2024-11-19 PROCEDURE — 99214 OFFICE O/P EST MOD 30 MIN: CPT | Mod: HCNC,S$GLB,, | Performed by: INTERNAL MEDICINE

## 2024-11-19 PROCEDURE — 1160F RVW MEDS BY RX/DR IN RCRD: CPT | Mod: HCNC,CPTII,S$GLB, | Performed by: INTERNAL MEDICINE

## 2024-11-19 PROCEDURE — 93005 ELECTROCARDIOGRAM TRACING: CPT | Mod: HCNC

## 2024-11-19 PROCEDURE — 99999 PR PBB SHADOW E&M-EST. PATIENT-LVL IV: CPT | Mod: PBBFAC,HCNC,, | Performed by: INTERNAL MEDICINE

## 2024-11-19 PROCEDURE — 1101F PT FALLS ASSESS-DOCD LE1/YR: CPT | Mod: HCNC,CPTII,S$GLB, | Performed by: INTERNAL MEDICINE

## 2024-11-19 PROCEDURE — 3075F SYST BP GE 130 - 139MM HG: CPT | Mod: HCNC,CPTII,S$GLB, | Performed by: INTERNAL MEDICINE

## 2024-11-19 PROCEDURE — 1159F MED LIST DOCD IN RCRD: CPT | Mod: HCNC,CPTII,S$GLB, | Performed by: INTERNAL MEDICINE

## 2024-11-19 PROCEDURE — G2211 COMPLEX E/M VISIT ADD ON: HCPCS | Mod: HCNC,S$GLB,, | Performed by: INTERNAL MEDICINE

## 2024-11-19 PROCEDURE — 3288F FALL RISK ASSESSMENT DOCD: CPT | Mod: HCNC,CPTII,S$GLB, | Performed by: INTERNAL MEDICINE

## 2024-11-19 PROCEDURE — 3078F DIAST BP <80 MM HG: CPT | Mod: HCNC,CPTII,S$GLB, | Performed by: INTERNAL MEDICINE

## 2024-11-19 PROCEDURE — 93010 ELECTROCARDIOGRAM REPORT: CPT | Mod: HCNC,,, | Performed by: INTERNAL MEDICINE
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
## 2025-01-08 ENCOUNTER — OFFICE VISIT (OUTPATIENT)
Dept: CARDIOLOGY | Facility: CLINIC | Age: 80
End: 2025-01-08
Payer: MEDICARE

## 2025-01-08 VITALS
OXYGEN SATURATION: 99 % | WEIGHT: 90.63 LBS | BODY MASS INDEX: 16.57 KG/M2 | HEART RATE: 65 BPM | DIASTOLIC BLOOD PRESSURE: 80 MMHG | SYSTOLIC BLOOD PRESSURE: 130 MMHG

## 2025-01-08 DIAGNOSIS — I73.9 PVD (PERIPHERAL VASCULAR DISEASE): Primary | ICD-10-CM

## 2025-01-08 DIAGNOSIS — Z89.412 STATUS POST AMPUTATION OF GREAT TOE, LEFT: ICD-10-CM

## 2025-01-08 DIAGNOSIS — I73.9 PERIPHERAL VASCULAR DISEASE, UNSPECIFIED: ICD-10-CM

## 2025-01-08 DIAGNOSIS — I27.20 PULMONARY HYPERTENSION: ICD-10-CM

## 2025-01-08 DIAGNOSIS — I50.32 CHRONIC HEART FAILURE WITH PRESERVED EJECTION FRACTION: ICD-10-CM

## 2025-01-08 DIAGNOSIS — I70.262 ATHEROSCLEROSIS OF NATIVE ARTERY OF LEFT LOWER EXTREMITY WITH GANGRENE: ICD-10-CM

## 2025-01-08 DIAGNOSIS — M54.16 LUMBAR RADICULOPATHY: ICD-10-CM

## 2025-01-08 DIAGNOSIS — F11.20 UNCOMPLICATED OPIOID DEPENDENCE: ICD-10-CM

## 2025-01-08 DIAGNOSIS — I25.2 HISTORY OF NON-ST ELEVATION MYOCARDIAL INFARCTION (NSTEMI): ICD-10-CM

## 2025-01-08 DIAGNOSIS — F17.210 NICOTINE DEPENDENCE, CIGARETTES, UNCOMPLICATED: Chronic | ICD-10-CM

## 2025-01-08 DIAGNOSIS — J44.9 COPD, GROUP D, BY GOLD 2017 CLASSIFICATION: ICD-10-CM

## 2025-01-08 DIAGNOSIS — I70.0 ATHEROSCLEROSIS OF AORTA: ICD-10-CM

## 2025-01-08 DIAGNOSIS — N18.31 STAGE 3A CHRONIC KIDNEY DISEASE: ICD-10-CM

## 2025-01-08 DIAGNOSIS — I25.10 CORONARY ARTERY DISEASE INVOLVING NATIVE CORONARY ARTERY OF NATIVE HEART WITHOUT ANGINA PECTORIS: ICD-10-CM

## 2025-01-08 DIAGNOSIS — I10 PRIMARY HYPERTENSION: Chronic | ICD-10-CM

## 2025-01-08 PROCEDURE — 1160F RVW MEDS BY RX/DR IN RCRD: CPT | Mod: HCNC,CPTII,S$GLB, | Performed by: INTERNAL MEDICINE

## 2025-01-08 PROCEDURE — 1159F MED LIST DOCD IN RCRD: CPT | Mod: HCNC,CPTII,S$GLB, | Performed by: INTERNAL MEDICINE

## 2025-01-08 PROCEDURE — 1101F PT FALLS ASSESS-DOCD LE1/YR: CPT | Mod: HCNC,CPTII,S$GLB, | Performed by: INTERNAL MEDICINE

## 2025-01-08 PROCEDURE — G2211 COMPLEX E/M VISIT ADD ON: HCPCS | Mod: HCNC,S$GLB,, | Performed by: INTERNAL MEDICINE

## 2025-01-08 PROCEDURE — 99999 PR PBB SHADOW E&M-EST. PATIENT-LVL V: CPT | Mod: PBBFAC,HCNC,, | Performed by: INTERNAL MEDICINE

## 2025-01-08 PROCEDURE — 3075F SYST BP GE 130 - 139MM HG: CPT | Mod: HCNC,CPTII,S$GLB, | Performed by: INTERNAL MEDICINE

## 2025-01-08 PROCEDURE — 3288F FALL RISK ASSESSMENT DOCD: CPT | Mod: HCNC,CPTII,S$GLB, | Performed by: INTERNAL MEDICINE

## 2025-01-08 PROCEDURE — 99214 OFFICE O/P EST MOD 30 MIN: CPT | Mod: HCNC,S$GLB,, | Performed by: INTERNAL MEDICINE

## 2025-01-08 PROCEDURE — 3079F DIAST BP 80-89 MM HG: CPT | Mod: HCNC,CPTII,S$GLB, | Performed by: INTERNAL MEDICINE
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
## 2025-01-14 ENCOUNTER — LAB VISIT (OUTPATIENT)
Dept: LAB | Facility: HOSPITAL | Age: 80
End: 2025-01-14
Attending: NURSE PRACTITIONER
Payer: MEDICARE

## 2025-01-14 DIAGNOSIS — K21.9 GASTROESOPHAGEAL REFLUX DISEASE, UNSPECIFIED WHETHER ESOPHAGITIS PRESENT: ICD-10-CM

## 2025-01-14 DIAGNOSIS — R76.8 ELEVATED SERUM IMMUNOGLOBULIN FREE LIGHT CHAIN LEVEL: ICD-10-CM

## 2025-01-14 DIAGNOSIS — D50.9 IRON DEFICIENCY ANEMIA, UNSPECIFIED IRON DEFICIENCY ANEMIA TYPE: ICD-10-CM

## 2025-01-14 DIAGNOSIS — E87.6 HYPOKALEMIA: ICD-10-CM

## 2025-01-14 DIAGNOSIS — E53.8 B12 DEFICIENCY: ICD-10-CM

## 2025-01-14 DIAGNOSIS — D64.9 NORMOCYTIC ANEMIA: ICD-10-CM

## 2025-01-14 LAB
ALBUMIN SERPL BCP-MCNC: 3.8 G/DL (ref 3.5–5.2)
ALP SERPL-CCNC: 62 U/L (ref 40–150)
ALT SERPL W/O P-5'-P-CCNC: 8 U/L (ref 10–44)
ANION GAP SERPL CALC-SCNC: 10 MMOL/L (ref 8–16)
AST SERPL-CCNC: 14 U/L (ref 10–40)
BASOPHILS # BLD AUTO: 0.07 K/UL (ref 0–0.2)
BASOPHILS NFR BLD: 1.1 % (ref 0–1.9)
BILIRUB SERPL-MCNC: 0.3 MG/DL (ref 0.1–1)
BUN SERPL-MCNC: 16 MG/DL (ref 8–23)
CALCIUM SERPL-MCNC: 9.9 MG/DL (ref 8.7–10.5)
CHLORIDE SERPL-SCNC: 103 MMOL/L (ref 95–110)
CO2 SERPL-SCNC: 29 MMOL/L (ref 23–29)
CREAT SERPL-MCNC: 0.9 MG/DL (ref 0.5–1.4)
DIFFERENTIAL METHOD BLD: ABNORMAL
EOSINOPHIL # BLD AUTO: 0.1 K/UL (ref 0–0.5)
EOSINOPHIL NFR BLD: 2.2 % (ref 0–8)
ERYTHROCYTE [DISTWIDTH] IN BLOOD BY AUTOMATED COUNT: 14.8 % (ref 11.5–14.5)
EST. GFR  (NO RACE VARIABLE): >60 ML/MIN/1.73 M^2
FERRITIN SERPL-MCNC: 15 NG/ML (ref 20–300)
GLUCOSE SERPL-MCNC: 140 MG/DL (ref 70–110)
HCT VFR BLD AUTO: 35.7 % (ref 37–48.5)
HGB BLD-MCNC: 10.5 G/DL (ref 12–16)
IMM GRANULOCYTES # BLD AUTO: 0.02 K/UL (ref 0–0.04)
IMM GRANULOCYTES NFR BLD AUTO: 0.3 % (ref 0–0.5)
IRON SERPL-MCNC: 40 UG/DL (ref 30–160)
LYMPHOCYTES # BLD AUTO: 2.3 K/UL (ref 1–4.8)
LYMPHOCYTES NFR BLD: 36.2 % (ref 18–48)
MCH RBC QN AUTO: 27.8 PG (ref 27–31)
MCHC RBC AUTO-ENTMCNC: 29.4 G/DL (ref 32–36)
MCV RBC AUTO: 94 FL (ref 82–98)
MONOCYTES # BLD AUTO: 0.5 K/UL (ref 0.3–1)
MONOCYTES NFR BLD: 8.5 % (ref 4–15)
NEUTROPHILS # BLD AUTO: 3.3 K/UL (ref 1.8–7.7)
NEUTROPHILS NFR BLD: 51.7 % (ref 38–73)
NRBC BLD-RTO: 0 /100 WBC
PLATELET # BLD AUTO: 384 K/UL (ref 150–450)
PMV BLD AUTO: 9.4 FL (ref 9.2–12.9)
POTASSIUM SERPL-SCNC: 3.9 MMOL/L (ref 3.5–5.1)
PROT SERPL-MCNC: 6.8 G/DL (ref 6–8.4)
RBC # BLD AUTO: 3.78 M/UL (ref 4–5.4)
SATURATED IRON: 8 % (ref 20–50)
SODIUM SERPL-SCNC: 142 MMOL/L (ref 136–145)
TOTAL IRON BINDING CAPACITY: 474 UG/DL (ref 250–450)
TRANSFERRIN SERPL-MCNC: 320 MG/DL (ref 200–375)
WBC # BLD AUTO: 6.36 K/UL (ref 3.9–12.7)

## 2025-01-14 PROCEDURE — 85025 COMPLETE CBC W/AUTO DIFF WBC: CPT | Mod: HCNC | Performed by: NURSE PRACTITIONER

## 2025-01-14 PROCEDURE — 36415 COLL VENOUS BLD VENIPUNCTURE: CPT | Mod: HCNC,PO | Performed by: NURSE PRACTITIONER

## 2025-01-14 PROCEDURE — 82728 ASSAY OF FERRITIN: CPT | Mod: HCNC | Performed by: NURSE PRACTITIONER

## 2025-01-14 PROCEDURE — 84466 ASSAY OF TRANSFERRIN: CPT | Mod: HCNC | Performed by: NURSE PRACTITIONER

## 2025-01-14 PROCEDURE — 80053 COMPREHEN METABOLIC PANEL: CPT | Mod: HCNC | Performed by: NURSE PRACTITIONER

## 2025-01-17 ENCOUNTER — OFFICE VISIT (OUTPATIENT)
Dept: HEMATOLOGY/ONCOLOGY | Facility: CLINIC | Age: 80
End: 2025-01-17
Payer: MEDICARE

## 2025-01-17 VITALS
WEIGHT: 90.19 LBS | BODY MASS INDEX: 16.49 KG/M2 | HEART RATE: 83 BPM | DIASTOLIC BLOOD PRESSURE: 68 MMHG | SYSTOLIC BLOOD PRESSURE: 119 MMHG

## 2025-01-17 DIAGNOSIS — E87.6 HYPOKALEMIA: ICD-10-CM

## 2025-01-17 DIAGNOSIS — D50.9 IRON DEFICIENCY ANEMIA, UNSPECIFIED IRON DEFICIENCY ANEMIA TYPE: ICD-10-CM

## 2025-01-17 DIAGNOSIS — R76.8 ELEVATED SERUM IMMUNOGLOBULIN FREE LIGHT CHAIN LEVEL: ICD-10-CM

## 2025-01-17 DIAGNOSIS — D64.9 NORMOCYTIC ANEMIA: Primary | ICD-10-CM

## 2025-01-17 PROCEDURE — 1160F RVW MEDS BY RX/DR IN RCRD: CPT | Mod: HCNC,CPTII,S$GLB, | Performed by: NURSE PRACTITIONER

## 2025-01-17 PROCEDURE — 1101F PT FALLS ASSESS-DOCD LE1/YR: CPT | Mod: HCNC,CPTII,S$GLB, | Performed by: NURSE PRACTITIONER

## 2025-01-17 PROCEDURE — G2211 COMPLEX E/M VISIT ADD ON: HCPCS | Mod: HCNC,S$GLB,, | Performed by: NURSE PRACTITIONER

## 2025-01-17 PROCEDURE — 1126F AMNT PAIN NOTED NONE PRSNT: CPT | Mod: HCNC,CPTII,S$GLB, | Performed by: NURSE PRACTITIONER

## 2025-01-17 PROCEDURE — 3288F FALL RISK ASSESSMENT DOCD: CPT | Mod: HCNC,CPTII,S$GLB, | Performed by: NURSE PRACTITIONER

## 2025-01-17 PROCEDURE — 99999 PR PBB SHADOW E&M-EST. PATIENT-LVL III: CPT | Mod: PBBFAC,HCNC,, | Performed by: NURSE PRACTITIONER

## 2025-01-17 PROCEDURE — 3078F DIAST BP <80 MM HG: CPT | Mod: HCNC,CPTII,S$GLB, | Performed by: NURSE PRACTITIONER

## 2025-01-17 PROCEDURE — 3074F SYST BP LT 130 MM HG: CPT | Mod: HCNC,CPTII,S$GLB, | Performed by: NURSE PRACTITIONER

## 2025-01-17 PROCEDURE — 99214 OFFICE O/P EST MOD 30 MIN: CPT | Mod: HCNC,S$GLB,, | Performed by: NURSE PRACTITIONER

## 2025-01-17 PROCEDURE — 1159F MED LIST DOCD IN RCRD: CPT | Mod: HCNC,CPTII,S$GLB, | Performed by: NURSE PRACTITIONER

## 2025-01-17 RX ORDER — DIPHENHYDRAMINE HYDROCHLORIDE 50 MG/ML
50 INJECTION INTRAMUSCULAR; INTRAVENOUS ONCE AS NEEDED
OUTPATIENT
Start: 2025-01-17

## 2025-01-17 RX ORDER — METHYLPREDNISOLONE SOD SUCC 125 MG
60 VIAL (EA) INJECTION
Start: 2025-01-17

## 2025-01-17 RX ORDER — POTASSIUM CHLORIDE 750 MG/1
CAPSULE, EXTENDED RELEASE ORAL
Qty: 50 CAPSULE | Refills: 0 | Status: SHIPPED | OUTPATIENT
Start: 2025-01-17

## 2025-01-17 RX ORDER — SODIUM CHLORIDE 0.9 % (FLUSH) 0.9 %
10 SYRINGE (ML) INJECTION
OUTPATIENT
Start: 2025-01-17

## 2025-01-17 RX ORDER — EPINEPHRINE 0.3 MG/.3ML
0.3 INJECTION SUBCUTANEOUS ONCE AS NEEDED
OUTPATIENT
Start: 2025-01-17

## 2025-01-17 NOTE — PROGRESS NOTES
Subjective:      Patient ID: Keyona Dwyer is a 79 y.o. female.    Chief Complaint:  no complaints    HPI:   78 yo female who presents today for further evaluation and recommendations of iron deficiency anemia.  She has been referred to the hematology by her PCP, Dr. Ralf Isidro.  Had diagnosis of acute pancreatitis requiring hospitalization in 6/2022 with complaints of abdominal pain.  Other chronic diagnosis include anxiety, chronic back pain, hyperlipidemia, htn, osteoporosis, RVT, and restless leg syndrome.       Patient states that she got bit by a brown recluse and got sick due to antibiotics and has some weight loss.  States that she was on cephalexin 500 mg PO TID and caused stomach distress with weight loss and episodic nausea/vomiting.      Is a current smoker since she was 12 years old - 1 pack per day for 60 years. Now smokes 3-10 per day.  She denies alcohol use.      She denies f/c/ns or lymphadenopathy.       Interval History:  10/10/2022: With hospitalization in 9/2022 d/t pneumonia, NSTEMI, and GI bleed.  EGD done as well as bleeding scan and found with no active bleeding although patient did have dark stools during hospitalizations with a drop in hgb requiring blood transfusion.  She was unable to tolerate GI prep for colonoscopy.  At last visit work up for anemia found with elevated MMA.  She was placed on B12 2000 mcg PO daily.  Also found with LOR started on ferrous sulfate 325 mg PO and is currently taking iron tablets every other day.  States that she is taking stool softeners daily.  States has not noted dark stools since hospitalization.  3+ blood found in urine during hospitalizations     2/9/2023 Had surgery to face to remove skin cancer one week ago - part of left side nose had to be removed - done by Dr. [facility].  Currently with bandages on her face that are CDI.  Has not noticed any abnormal bleeding.  States that she has been off of her iron tablets this  past week.  Was taking every other day.  Is taking B12 tablets daily.  States that when she takes the oral iron she has constipation requiring stool softeners.      5/25/2023 She was to continue B12 daily and continue oral iron supplements every other day since her last visit in 2/2023.  Presents today to evaluate response.   Denies dark stools.  Denies any unusual bleeding. Has gained 8 lbs.  States that she is feeling better then she has in a long time.       Interval History:  8/31/2023  Has continued ferrous sulfate 325 mg PO daily and surjit B complex daily supplement daily.  States that she has reduced her smoking to less then a pack/day.  States that she took her iron tablet after doing labs on 8/30/2023.  Noted now with elevated iron. Slight macrocytic anemia - improved.  States that she feels better then she has in a long time.      Interval History  11/27/2023 At last visit in 8/2023 we stopped oral iron supplement and continued Surjit B complex daily.  Presents today with stable chronic anemia with noted macrocytosis present for the past year - stable. Saturated iron of 13%.  C/o right shoulder pain due to right should pain and decreased rom from fall that occurred at least 6 weeks ago or longer.  Had xray 10/25/2023 done just showing arthritis. Denies any abnormal bleeding.       Interval History:  2/29/2024  States that her shoulder is feeling better.  She is going to PT three times per week.  Rotary cuff was dislocated from her shoulder.  Still smoking cigarettes daily but has reduced amount. Continues to take a B complex daily.  States that she has been eating liver 2-3 times/week.  Noted elevated liver indices.   With continued macrocytic anemia.  Hgb 11.4 mcv 105.  BP elevated.  States that she ate fast food yesterrday.  Cardiology has taken her off all of her BP medications due to her BP getting too low.  Denies HA, blurred vision, sob, chest tightness or chest pain.      Interval History:  7/18/2024   Presents today to evaluate labs with h/o macrocytic anemia, B12 deficiency and recurrent iron deficiency.  Has stopped oral iron since last visit in Feb 2024.  Had a fall on 3/4/2024 - spent 5 weeks in ICU and then went to rehab and then home health after.  Tripped and fell hitting head on floor of when she was walking into the door- she developed a brain bleed that has currently resolved. Continues taking B12 complex daily.  Hgb 11.2 normocytic, sat iron 13% and tibc 482.  Has easy bruising.  Denies any other known abnormal bleeding.  Still smoking less then a pack of cigarettes daily.  She is accompanied by her .    Interval History:  10/17/2024 States has not been taking slowFe at all.  States that has increased iron rich foods. Supposed to be taking potassium 10 meq by mouth every other day - just recently told by primary care to start taking daily.  Patient states she sometimes forgets to take it.  States that she is taking the B Complex vitamin daily. LDCT scan 7/24/2024 showing emphysema with severe CA disease and severe aortic calcification. Denies sob or chest pain.  Hgb stable at 10.7 g/dL.  Saturated iron is 10%.      Interval History:  1/17/2025.    I have reviewed all of the patient's relevant lab work available in the medical record and have utilized this in my evaluation and management recommendations today.      Social History     Socioeconomic History    Marital status:      Spouse name: elizabeth    Number of children: 3   Occupational History    Occupation: retired   Tobacco Use    Smoking status: Every Day     Current packs/day: 0.50     Average packs/day: 0.5 packs/day for 63.0 years (31.5 ttl pk-yrs)     Types: Cigarettes     Passive exposure: Never    Smokeless tobacco: Current    Tobacco comments:     started age of  11    Substance and Sexual Activity    Alcohol use: No    Drug use: No    Sexual activity: Yes     Partners: Male     Social Drivers of Health     Financial Resource  Strain: Low Risk  (10/22/2024)    Overall Financial Resource Strain (CARDIA)     Difficulty of Paying Living Expenses: Not hard at all   Food Insecurity: No Food Insecurity (10/22/2024)    Hunger Vital Sign     Worried About Running Out of Food in the Last Year: Never true     Ran Out of Food in the Last Year: Never true   Transportation Needs: No Transportation Needs (3/7/2024)    PRAPARE - Transportation     Lack of Transportation (Medical): No     Lack of Transportation (Non-Medical): No   Physical Activity: Inactive (10/22/2024)    Exercise Vital Sign     Days of Exercise per Week: 0 days     Minutes of Exercise per Session: 0 min   Stress: No Stress Concern Present (10/22/2024)    Citizen of [place] of Occupational Health - Occupational Stress Questionnaire     Feeling of Stress : Not at all   Housing Stability: Low Risk  (3/7/2024)    Housing Stability Vital Sign     Unable to Pay for Housing in the Last Year: No     Number of Places Lived in the Last Year: 1     Unstable Housing in the Last Year: No       Family History   Problem Relation Name Age of Onset    Cancer Mother      Breast cancer Mother      Hyperlipidemia Father      Heart failure Father      Heart disease Brother      Brain cancer Brother      Stroke Brother      Alcohol abuse Brother         Past Surgical History:   Procedure Laterality Date    ANGIOGRAPHY OF LOWER EXTREMITY N/A 04/12/2021    Procedure: ANGIOGRAM, LOWER EXTREMITY/left leg;  Surgeon: Maico Adkins MD;  Location: [facility] CATH LAB;  Service: Vascular;  Laterality: N/A;  req 1130 start time/ A243A    AORTOGRAPHY WITH SERIALOGRAPHY N/A 04/14/2021    Procedure: AORTOGRAM, WITH RUNOFF;  Surgeon: Maico Adkins MD;  Location: [facility] CATH LAB;  Service: Vascular;  Laterality: N/A;  left iliac stent with shockwave/req 1030 start    CATARACT EXTRACTION W/  INTRAOCULAR LENS IMPLANT Bilateral 8/ 9 2017    ESOPHAGOGASTRODUODENOSCOPY N/A 09/13/2022    Procedure: EGD (ESOPHAGOGASTRODUODENOSCOPY);   Surgeon: Fern Rasmussen MD;  Location: [facility];  Service: Endoscopy;  Laterality: N/A;    GALLBLADDER SURGERY      HYSTERECTOMY  1972    LUNG SURGERY Right age 15    lung collpase    SHOULDER SURGERY      TOE AMPUTATION Left 04/15/2021    Procedure: AMPUTATION, TOE;  Surgeon: Taylor Anderson DPM;  Location: [facility] OR;  Service: Podiatry;  Laterality: Left;  Hallux (Great Toe)    TOTAL HIP ARTHROPLASTY Bilateral     TOTAL KNEE ARTHROPLASTY Bilateral        Past Medical History:   Diagnosis Date    ACP (advance care planning) 03/08/2024    Acute diastolic heart failure 09/15/2022    Acute respiratory failure with hypoxia and hypercarbia 09/14/2022    Amputation of left great toe 03/16/2022    Anemia     hematol    Anxiety     CAD (coronary artery disease)     Cancer     Cataract     Chronic back pain     Dr. Guzman    Chronic pain 04/16/2024    Closed fracture of one rib of left side 03/06/2024    Coronary artery disease without angina pectoris 03/06/2024    Fibrocystic breast     Glaucoma     Hyperlipemia     Hypertension     Idiopathic acute pancreatitis 06/18/2022    Idiopathic acute pancreatitis 06/18/2022    Leukocytosis 06/18/2022    NSTEMI (non-ST elevated myocardial infarction) 09/11/2022    Osteoarthritis     Osteoporosis     Rheumatology/on Prolia    Other chronic osteomyelitis of left foot 04/16/2024    Pancreatitis     Pneumonia     Polyneuropathy     PVD (peripheral vascular disease) 04/12/2021    PVD (peripheral vascular disease)     dr edmondson    Restless leg syndrome     Subdural hematoma 03/2024    Subdural hematoma, acute 03/06/2024    Tobacco dependence     Trouble in sleeping        Review of Systems   Constitutional: Negative.    HENT: Negative.  Negative for nosebleeds.    Eyes: Negative.    Respiratory:  Negative for chest tightness and shortness of breath.    Cardiovascular: Negative.    Gastrointestinal:  Negative for anal bleeding, blood in stool and constipation.        Acid reflux    Endocrine: Negative.    Genitourinary: Negative.  Negative for hematuria and vaginal bleeding.   Musculoskeletal:  Positive for gait problem.   Skin: Negative.    Allergic/Immunologic: Negative.    Hematological:  Bruises/bleeds easily.   Psychiatric/Behavioral: Negative.            Medication List with Changes/Refills   Current Medications    AMLODIPINE (NORVASC) 5 MG TABLET    Take 1 tablet (5 mg total) by mouth once daily.    ASPIRIN (ECOTRIN) 81 MG EC TABLET    Take 1 tablet (81 mg total) by mouth once daily.    AZELASTINE (ASTELIN) 137 MCG (0.1 %) NASAL SPRAY    1 spray (137 mcg total) by Nasal route 2 (two) times daily.    B COMPLEX VITAMINS CAPSULE    Take 1 capsule by mouth once daily.    BUPROPION (WELLBUTRIN) 75 MG TABLET    Take 1 tablet (75 mg total) by mouth 2 (two) times daily.    BUSPIRONE (BUSPAR) 10 MG TABLET    TAKE 1 TABLET BY MOUTH TWICE DAILY    CLOPIDOGREL (PLAVIX) 75 MG TABLET    Take 1 tablet (75 mg total) by mouth once daily.    CLOTRIMAZOLE-BETAMETHASONE 1-0.05% (LOTRISONE) CREAM    Apply to external ear with finger once a week    DULOXETINE (CYMBALTA) 30 MG CAPSULE    Take 30 mg by mouth once daily.    ERGOCALCIFEROL (ERGOCALCIFEROL) 50,000 UNIT CAP    TAKE 1 CAPSULE BY MOUTH EVERY WEEK    FUROSEMIDE (LASIX) 20 MG TABLET    Take 1 tablet (20 mg total) by mouth once daily. Do not take if BP is below 110/70    GABAPENTIN (NEURONTIN) 600 MG TABLET    Take 600 mg by mouth 3 (three) times daily.    HYDROCODONE-ACETAMINOPHEN (NORCO)  MG PER TABLET    Take 1 tablet by mouth 3 (three) times daily as needed.    IPRATROPIUM (ATROVENT) 42 MCG (0.06 %) NASAL SPRAY    2 sprays by Each Nostril route 2 (two) times daily.    ON-Q PAIN PUMP    by Misc.(Non-Drug; Combo Route) route.    PANTOPRAZOLE (PROTONIX) 40 MG TABLET    Take 1 tablet (40 mg total) by mouth once daily.    RANOLAZINE (RANEXA) 500 MG TB12    Take 1 tablet (500 mg total) by mouth 2 (two) times daily.    ROSUVASTATIN (CRESTOR) 5 MG  TABLET    TAKE 1 TABLET BY MOUTH ONCE A DAY   Changed and/or Refilled Medications    Modified Medication Previous Medication    POTASSIUM CHLORIDE (MICRO-K) 10 MEQ CPSR potassium chloride (MICRO-K) 10 MEQ CpSR       Take 4 tablets 40 meq daily for 2 days; then reduce to 1 tablet 10 meq every other day with taking diuretic (lasix).    Take 4 tablets 40 meq daily for 2 days; then reduce to 1 tablet 10 meq every other day with taking diuretic (lasix).        Objective:     Vitals:    01/17/25 1258   BP: 119/68   Pulse: 83       Physical Exam  Vitals reviewed.   Constitutional:       Appearance: Normal appearance. She is underweight.   HENT:      Head: Normocephalic and atraumatic.      Right Ear: External ear normal.      Left Ear: External ear normal.   Cardiovascular:      Rate and Rhythm: Normal rate and regular rhythm.      Heart sounds: S1 normal and S2 normal. Murmur heard.   Pulmonary:      Effort: Pulmonary effort is normal.      Breath sounds: Decreased air movement present. Examination of the right-upper field reveals decreased breath sounds. Examination of the left-upper field reveals decreased breath sounds. Examination of the right-middle field reveals decreased breath sounds. Examination of the left-middle field reveals decreased breath sounds. Examination of the right-lower field reveals decreased breath sounds. Examination of the left-lower field reveals decreased breath sounds. Decreased breath sounds present.   Abdominal:      General: There is no distension.   Musculoskeletal:         General: Normal range of motion.      Cervical back: Normal range of motion.   Skin:     General: Skin is warm and dry.      Findings: Bruising present.   Neurological:      General: No focal deficit present.      Mental Status: She is alert and oriented to person, place, and time.   Psychiatric:         Attention and Perception: Attention and perception normal.         Mood and Affect: Mood and affect normal.          Speech: Speech normal.         Behavior: Behavior normal. Behavior is cooperative.         Thought Content: Thought content normal.         Cognition and Memory: Cognition and memory normal.         Judgment: Judgment normal.         Assessment:     Problem List Items Addressed This Visit       Anemia    Relevant Orders    CBC Auto Differential    Comprehensive Metabolic Panel    Ferritin    Iron and TIBC    Immunoglobulin Free LT Chains Blood    Hypokalemia    Relevant Medications    potassium chloride (MICRO-K) 10 MEQ CpSR    Other Relevant Orders    Comprehensive Metabolic Panel     Other Visit Diagnoses       Normocytic anemia    -  Primary    Relevant Orders    CBC Auto Differential    Comprehensive Metabolic Panel    Ferritin    Iron and TIBC    Immunoglobulin Free LT Chains Blood    Elevated serum immunoglobulin free light chain level        Relevant Orders    CBC Auto Differential    Comprehensive Metabolic Panel    Immunoglobulin Free LT Chains Blood              Lab Results   Component Value Date    WBC 6.36 01/14/2025    RBC 3.78 (L) 01/14/2025    HGB 10.5 (L) 01/14/2025    HCT 35.7 (L) 01/14/2025    MCV 94 01/14/2025    MCH 27.8 01/14/2025    MCHC 29.4 (L) 01/14/2025    RDW 14.8 (H) 01/14/2025     01/14/2025    MPV 9.4 01/14/2025    GRAN 3.3 01/14/2025    GRAN 51.7 01/14/2025    LYMPH 2.3 01/14/2025    LYMPH 36.2 01/14/2025    MONO 0.5 01/14/2025    MONO 8.5 01/14/2025    EOS 0.1 01/14/2025    BASO 0.07 01/14/2025    EOSINOPHIL 2.2 01/14/2025    BASOPHIL 1.1 01/14/2025      Lab Results   Component Value Date     01/14/2025    K 3.9 01/14/2025     01/14/2025    CO2 29 01/14/2025    BUN 16 01/14/2025    CREATININE 0.9 01/14/2025    CALCIUM 9.9 01/14/2025    ANIONGAP 10 01/14/2025    ESTGFRAFRICA >60.0 06/27/2022    EGFRNONAA >60.0 06/27/2022     Lab Results   Component Value Date    ALT 8 (L) 01/14/2025    AST 14 01/14/2025    ALKPHOS 62 01/14/2025    BILITOT 0.3 01/14/2025     Lab  Results   Component Value Date    IRON 40 01/14/2025    TRANSFERRIN 320 01/14/2025    TIBC 474 (H) 01/14/2025    FESATURATED 8 (L) 01/14/2025    FERRITIN 15 (L) 01/14/2025      Lab Results   Component Value Date    IVHYZEGI04 395 11/22/2023     Lab Results   Component Value Date    FOLATE 13.3 07/18/2022     Lab Results   Component Value Date    TSH 1.010 09/06/2023         Plan:   Normocytic anemia  -     CBC Auto Differential; Future; Expected date: 01/17/2025  -     Comprehensive Metabolic Panel; Future; Expected date: 01/17/2025  -     Ferritin; Future; Expected date: 01/17/2025  -     Iron and TIBC; Future; Expected date: 01/17/2025  -     Immunoglobulin Free LT Chains Blood; Future; Expected date: 01/17/2025    Hypokalemia  -     potassium chloride (MICRO-K) 10 MEQ CpSR; Take 4 tablets 40 meq daily for 2 days; then reduce to 1 tablet 10 meq every other day with taking diuretic (lasix).  Dispense: 50 capsule; Refill: 0  -     Comprehensive Metabolic Panel; Future; Expected date: 01/17/2025    Iron deficiency anemia, unspecified iron deficiency anemia type  -     CBC Auto Differential; Future; Expected date: 01/17/2025  -     Comprehensive Metabolic Panel; Future; Expected date: 01/17/2025  -     Ferritin; Future; Expected date: 01/17/2025  -     Iron and TIBC; Future; Expected date: 01/17/2025  -     Immunoglobulin Free LT Chains Blood; Future; Expected date: 01/17/2025    Elevated serum immunoglobulin free light chain level  -     CBC Auto Differential; Future; Expected date: 01/17/2025  -     Comprehensive Metabolic Panel; Future; Expected date: 01/17/2025  -     Immunoglobulin Free LT Chains Blood; Future; Expected date: 01/17/2025    Other orders  -     ferric gluconate (FERRLECIT) 125 mg in 0.9% NaCl 100 mL IVPB  -     methylPREDNISolone sodium succinate injection 60 mg  -     EPINEPHrine (EPIPEN) 0.3 mg/0.3 mL pen injection 0.3 mg  -     diphenhydrAMINE injection 50 mg  -     hydrocortisone sodium  succinate injection 100 mg  -     sodium chloride 0.9% flush 10 mL  -     0.9% NaCl 100 mL flush bag      Yearly LDCT scan chest 7/2026.  Not interested in cigarette cessation.  Repeat [facility] lab in 6 months.  Restart Slow Fe daily until you receive IV iron then stop until f/u with me  Continue B12 complex vitamin daily.  Continue potassium 10 meq by mouth every other day. - being followed by primary care.   Start Protonix 40 mg by mouth daily.       Med Onc Chart Routing      Follow up with physician    Follow up with BYRON . F/u 5 weeks after last dose of iron with labs prior   Infusion scheduling note   Ferrlecit 125 mg IV weekly x 8 weeks at [facility]   Injection scheduling note n/a   Labs   Scheduling:  Preferred lab: [facility]  Lab interval:  cbc, cmp, iron studies, flc   Imaging   N/a   Pharmacy appointment No pharmacy appointment needed      Other referrals       No additional referrals needed  n/a              Total time spent on encounter: 30 minutes    Collaborating Provider:  Dr. Kris Alcocer    Thank You,  LUIS DANIEL WeberP-C  [facility] Hematology

## 2025-01-25 ENCOUNTER — TELEPHONE (OUTPATIENT)
Dept: INFUSION THERAPY | Facility: HOSPITAL | Age: 80
End: 2025-01-25
Payer: MEDICARE

## 2025-01-29 RX ORDER — RANOLAZINE 500 MG/1
500 TABLET, EXTENDED RELEASE ORAL 2 TIMES DAILY
Qty: 180 TABLET | Refills: 1 | Status: SHIPPED | OUTPATIENT
Start: 2025-01-29 | End: 2026-01-29

## 2025-02-18 ENCOUNTER — INFUSION (OUTPATIENT)
Dept: INFUSION THERAPY | Facility: HOSPITAL | Age: 80
End: 2025-02-18
Attending: NURSE PRACTITIONER
Payer: MEDICARE

## 2025-02-18 VITALS
TEMPERATURE: 97 F | OXYGEN SATURATION: 96 % | RESPIRATION RATE: 18 BRPM | DIASTOLIC BLOOD PRESSURE: 70 MMHG | SYSTOLIC BLOOD PRESSURE: 124 MMHG | HEART RATE: 72 BPM

## 2025-02-18 DIAGNOSIS — D50.9 IRON DEFICIENCY ANEMIA, UNSPECIFIED IRON DEFICIENCY ANEMIA TYPE: Primary | ICD-10-CM

## 2025-02-18 PROCEDURE — 25000003 PHARM REV CODE 250: Mod: HCNC | Performed by: NURSE PRACTITIONER

## 2025-02-18 PROCEDURE — 96375 TX/PRO/DX INJ NEW DRUG ADDON: CPT | Mod: HCNC

## 2025-02-18 PROCEDURE — 96365 THER/PROPH/DIAG IV INF INIT: CPT | Mod: HCNC

## 2025-02-18 PROCEDURE — 63600175 PHARM REV CODE 636 W HCPCS: Mod: JZ,TB,HCNC | Performed by: NURSE PRACTITIONER

## 2025-02-18 RX ORDER — SODIUM CHLORIDE 0.9 % (FLUSH) 0.9 %
10 SYRINGE (ML) INJECTION
OUTPATIENT
Start: 2025-02-25

## 2025-02-18 RX ORDER — DIPHENHYDRAMINE HYDROCHLORIDE 50 MG/ML
50 INJECTION INTRAMUSCULAR; INTRAVENOUS ONCE AS NEEDED
OUTPATIENT
Start: 2025-02-25

## 2025-02-18 RX ORDER — EPINEPHRINE 0.3 MG/.3ML
0.3 INJECTION SUBCUTANEOUS ONCE AS NEEDED
OUTPATIENT
Start: 2025-02-25

## 2025-02-18 RX ORDER — SODIUM CHLORIDE 0.9 % (FLUSH) 0.9 %
10 SYRINGE (ML) INJECTION
Status: DISCONTINUED | OUTPATIENT
Start: 2025-02-18 | End: 2025-02-18 | Stop reason: HOSPADM

## 2025-02-18 RX ORDER — SODIUM FERRIC GLUCONATE COMPLEX IN SUCROSE 12.5 MG/ML
125 INJECTION INTRAVENOUS ONCE
Status: COMPLETED | OUTPATIENT
Start: 2025-02-18 | End: 2025-02-18

## 2025-02-18 RX ORDER — METHYLPREDNISOLONE SOD SUCC 125 MG
60 VIAL (EA) INJECTION
Status: COMPLETED | OUTPATIENT
Start: 2025-02-18 | End: 2025-02-18

## 2025-02-18 RX ORDER — METHYLPREDNISOLONE SOD SUCC 125 MG
60 VIAL (EA) INJECTION
Start: 2025-02-25

## 2025-02-18 RX ADMIN — METHYLPREDNISOLONE SODIUM SUCCINATE 60 MG: 125 INJECTION, POWDER, FOR SOLUTION INTRAMUSCULAR; INTRAVENOUS at 08:02

## 2025-02-18 RX ADMIN — SODIUM CHLORIDE 125 MG: 9 INJECTION, SOLUTION INTRAVENOUS at 08:02

## 2025-02-18 NOTE — PLAN OF CARE
Plan of care reviewed with patient. Discussed if there are any new or ongoing concerns. Denies.   Problem: Adult Inpatient Plan of Care  Goal: Plan of Care Review  Outcome: Progressing  Flowsheets (Taken 2/18/2025 0827)  Plan of Care Reviewed With: patient  Goal: Absence of Hospital-Acquired Illness or Injury  Outcome: Progressing  Intervention: Identify and Manage Fall Risk  Flowsheets (Taken 2/18/2025 0827)  Safety Promotion/Fall Prevention: in recliner, wheels locked  Goal: Optimal Comfort and Wellbeing  Outcome: Progressing  Intervention: Provide Person-Centered Care  Flowsheets (Taken 2/18/2025 0827)  Trust Relationship/Rapport:   care explained   questions encouraged   choices provided   reassurance provided   emotional support provided   empathic listening provided   questions answered   thoughts/feelings acknowledged

## 2025-02-18 NOTE — NURSING
Infusion # 1/8 - Ferrlecit 125 mg     Premeds-60 mg Solumedrol IVP over 3 minutes     Ferrlecit 125 mg administered IV at a 60 minute rate per orders; see MAR and vitals for more details. Monitored for 60 minutes post infusion for any s/sx of reaction. Tolerated well without adverse events, discharged and ambulatory out of clinic.

## 2025-02-22 DIAGNOSIS — Z00.00 ENCOUNTER FOR MEDICARE ANNUAL WELLNESS EXAM: ICD-10-CM

## 2025-02-25 ENCOUNTER — INFUSION (OUTPATIENT)
Dept: INFUSION THERAPY | Facility: HOSPITAL | Age: 80
End: 2025-02-25
Attending: NURSE PRACTITIONER
Payer: MEDICARE

## 2025-02-25 VITALS
RESPIRATION RATE: 18 BRPM | SYSTOLIC BLOOD PRESSURE: 104 MMHG | BODY MASS INDEX: 16.4 KG/M2 | TEMPERATURE: 98 F | OXYGEN SATURATION: 98 % | DIASTOLIC BLOOD PRESSURE: 63 MMHG | HEART RATE: 74 BPM | WEIGHT: 86.88 LBS | HEIGHT: 61 IN

## 2025-02-25 DIAGNOSIS — D50.9 IRON DEFICIENCY ANEMIA, UNSPECIFIED IRON DEFICIENCY ANEMIA TYPE: Primary | ICD-10-CM

## 2025-02-25 PROCEDURE — 96365 THER/PROPH/DIAG IV INF INIT: CPT | Mod: HCNC

## 2025-02-25 PROCEDURE — 25000003 PHARM REV CODE 250: Mod: HCNC | Performed by: NURSE PRACTITIONER

## 2025-02-25 PROCEDURE — 63600175 PHARM REV CODE 636 W HCPCS: Mod: HCNC | Performed by: NURSE PRACTITIONER

## 2025-02-25 PROCEDURE — 96375 TX/PRO/DX INJ NEW DRUG ADDON: CPT | Mod: HCNC

## 2025-02-25 RX ORDER — SODIUM FERRIC GLUCONATE COMPLEX IN SUCROSE 12.5 MG/ML
125 INJECTION INTRAVENOUS ONCE
Status: COMPLETED | OUTPATIENT
Start: 2025-02-25 | End: 2025-02-25

## 2025-02-25 RX ORDER — EPINEPHRINE 0.3 MG/.3ML
0.3 INJECTION SUBCUTANEOUS ONCE AS NEEDED
OUTPATIENT
Start: 2025-03-04

## 2025-02-25 RX ORDER — METHYLPREDNISOLONE SOD SUCC 125 MG
60 VIAL (EA) INJECTION
Status: COMPLETED | OUTPATIENT
Start: 2025-02-25 | End: 2025-02-25

## 2025-02-25 RX ORDER — SODIUM CHLORIDE 0.9 % (FLUSH) 0.9 %
10 SYRINGE (ML) INJECTION
Status: DISCONTINUED | OUTPATIENT
Start: 2025-02-25 | End: 2025-02-25 | Stop reason: HOSPADM

## 2025-02-25 RX ORDER — SODIUM CHLORIDE 0.9 % (FLUSH) 0.9 %
10 SYRINGE (ML) INJECTION
OUTPATIENT
Start: 2025-03-04

## 2025-02-25 RX ORDER — METHYLPREDNISOLONE SOD SUCC 125 MG
60 VIAL (EA) INJECTION
Start: 2025-03-04

## 2025-02-25 RX ORDER — DIPHENHYDRAMINE HYDROCHLORIDE 50 MG/ML
50 INJECTION INTRAMUSCULAR; INTRAVENOUS ONCE AS NEEDED
OUTPATIENT
Start: 2025-03-04

## 2025-02-25 RX ADMIN — METHYLPREDNISOLONE SODIUM SUCCINATE 60 MG: 125 INJECTION, POWDER, FOR SOLUTION INTRAMUSCULAR; INTRAVENOUS at 08:02

## 2025-02-25 RX ADMIN — SODIUM CHLORIDE 125 MG: 9 INJECTION, SOLUTION INTRAVENOUS at 08:02

## 2025-02-25 NOTE — NURSING
Infusion# 2    Recent labs? Reviewed    Premeds? Solumedrol 60 mg IVP    S/S of current or recent infections in the past 14 days? None/Denies    Recent Surgery/invasive procedures? None/Denies     Any recent vaccines ? None/Denies    Ferrlecit 125 mg administered IV at a 60 minute rate per orders; see MAR and vitals for more  Details.

## 2025-03-04 ENCOUNTER — INFUSION (OUTPATIENT)
Dept: INFUSION THERAPY | Facility: HOSPITAL | Age: 80
End: 2025-03-04
Attending: NURSE PRACTITIONER
Payer: MEDICARE

## 2025-03-04 VITALS
TEMPERATURE: 98 F | OXYGEN SATURATION: 98 % | DIASTOLIC BLOOD PRESSURE: 60 MMHG | SYSTOLIC BLOOD PRESSURE: 98 MMHG | HEART RATE: 85 BPM | RESPIRATION RATE: 18 BRPM

## 2025-03-04 DIAGNOSIS — D50.9 IRON DEFICIENCY ANEMIA, UNSPECIFIED IRON DEFICIENCY ANEMIA TYPE: Primary | ICD-10-CM

## 2025-03-04 PROCEDURE — 96365 THER/PROPH/DIAG IV INF INIT: CPT | Mod: HCNC

## 2025-03-04 PROCEDURE — 63600175 PHARM REV CODE 636 W HCPCS: Mod: JZ,TB,HCNC | Performed by: NURSE PRACTITIONER

## 2025-03-04 PROCEDURE — 25000003 PHARM REV CODE 250: Mod: HCNC | Performed by: NURSE PRACTITIONER

## 2025-03-04 PROCEDURE — 96375 TX/PRO/DX INJ NEW DRUG ADDON: CPT | Mod: HCNC

## 2025-03-04 RX ORDER — METHYLPREDNISOLONE SOD SUCC 125 MG
60 VIAL (EA) INJECTION
Start: 2025-03-11

## 2025-03-04 RX ORDER — EPINEPHRINE 0.3 MG/.3ML
0.3 INJECTION SUBCUTANEOUS ONCE AS NEEDED
OUTPATIENT
Start: 2025-03-11

## 2025-03-04 RX ORDER — EPINEPHRINE 0.3 MG/.3ML
0.3 INJECTION SUBCUTANEOUS ONCE AS NEEDED
Status: DISCONTINUED | OUTPATIENT
Start: 2025-03-04 | End: 2025-03-04 | Stop reason: HOSPADM

## 2025-03-04 RX ORDER — SODIUM CHLORIDE 0.9 % (FLUSH) 0.9 %
10 SYRINGE (ML) INJECTION
OUTPATIENT
Start: 2025-03-11

## 2025-03-04 RX ORDER — DIPHENHYDRAMINE HYDROCHLORIDE 50 MG/ML
50 INJECTION INTRAMUSCULAR; INTRAVENOUS ONCE AS NEEDED
OUTPATIENT
Start: 2025-03-11

## 2025-03-04 RX ORDER — SODIUM CHLORIDE 0.9 % (FLUSH) 0.9 %
10 SYRINGE (ML) INJECTION
Status: DISCONTINUED | OUTPATIENT
Start: 2025-03-04 | End: 2025-03-04 | Stop reason: HOSPADM

## 2025-03-04 RX ORDER — METHYLPREDNISOLONE SOD SUCC 125 MG
60 VIAL (EA) INJECTION
Status: COMPLETED | OUTPATIENT
Start: 2025-03-04 | End: 2025-03-04

## 2025-03-04 RX ORDER — DIPHENHYDRAMINE HYDROCHLORIDE 50 MG/ML
50 INJECTION INTRAMUSCULAR; INTRAVENOUS ONCE AS NEEDED
Status: DISCONTINUED | OUTPATIENT
Start: 2025-03-04 | End: 2025-03-04 | Stop reason: HOSPADM

## 2025-03-04 RX ADMIN — METHYLPREDNISOLONE SODIUM SUCCINATE 60 MG: 125 INJECTION, POWDER, FOR SOLUTION INTRAMUSCULAR; INTRAVENOUS at 08:03

## 2025-03-04 RX ADMIN — SODIUM CHLORIDE: 9 INJECTION, SOLUTION INTRAVENOUS at 08:03

## 2025-03-04 RX ADMIN — SODIUM CHLORIDE 125 MG: 9 INJECTION, SOLUTION INTRAVENOUS at 08:03

## 2025-03-11 ENCOUNTER — INFUSION (OUTPATIENT)
Dept: INFUSION THERAPY | Facility: HOSPITAL | Age: 80
End: 2025-03-11
Attending: NURSE PRACTITIONER
Payer: MEDICARE

## 2025-03-11 VITALS
HEART RATE: 76 BPM | BODY MASS INDEX: 16.39 KG/M2 | HEIGHT: 62 IN | SYSTOLIC BLOOD PRESSURE: 110 MMHG | OXYGEN SATURATION: 95 % | TEMPERATURE: 98 F | RESPIRATION RATE: 18 BRPM | DIASTOLIC BLOOD PRESSURE: 58 MMHG | WEIGHT: 89.06 LBS

## 2025-03-11 DIAGNOSIS — D50.9 IRON DEFICIENCY ANEMIA, UNSPECIFIED IRON DEFICIENCY ANEMIA TYPE: Primary | ICD-10-CM

## 2025-03-11 PROCEDURE — 63600175 PHARM REV CODE 636 W HCPCS: Mod: JZ,TB,HCNC | Performed by: NURSE PRACTITIONER

## 2025-03-11 PROCEDURE — 25000003 PHARM REV CODE 250: Mod: HCNC | Performed by: NURSE PRACTITIONER

## 2025-03-11 PROCEDURE — 96375 TX/PRO/DX INJ NEW DRUG ADDON: CPT | Mod: HCNC

## 2025-03-11 PROCEDURE — 96365 THER/PROPH/DIAG IV INF INIT: CPT | Mod: HCNC

## 2025-03-11 RX ORDER — SODIUM CHLORIDE 0.9 % (FLUSH) 0.9 %
10 SYRINGE (ML) INJECTION
Status: DISCONTINUED | OUTPATIENT
Start: 2025-03-11 | End: 2025-03-11 | Stop reason: HOSPADM

## 2025-03-11 RX ORDER — DIPHENHYDRAMINE HYDROCHLORIDE 50 MG/ML
50 INJECTION, SOLUTION INTRAMUSCULAR; INTRAVENOUS ONCE AS NEEDED
Status: DISCONTINUED | OUTPATIENT
Start: 2025-03-11 | End: 2025-03-11 | Stop reason: HOSPADM

## 2025-03-11 RX ORDER — EPINEPHRINE 0.3 MG/.3ML
0.3 INJECTION SUBCUTANEOUS ONCE AS NEEDED
OUTPATIENT
Start: 2025-03-18

## 2025-03-11 RX ORDER — EPINEPHRINE 0.3 MG/.3ML
0.3 INJECTION SUBCUTANEOUS ONCE AS NEEDED
Status: DISCONTINUED | OUTPATIENT
Start: 2025-03-11 | End: 2025-03-11 | Stop reason: HOSPADM

## 2025-03-11 RX ORDER — DIPHENHYDRAMINE HYDROCHLORIDE 50 MG/ML
50 INJECTION, SOLUTION INTRAMUSCULAR; INTRAVENOUS ONCE AS NEEDED
OUTPATIENT
Start: 2025-03-18

## 2025-03-11 RX ORDER — METHYLPREDNISOLONE SOD SUCC 125 MG
60 VIAL (EA) INJECTION
Status: COMPLETED | OUTPATIENT
Start: 2025-03-11 | End: 2025-03-11

## 2025-03-11 RX ORDER — SODIUM CHLORIDE 0.9 % (FLUSH) 0.9 %
10 SYRINGE (ML) INJECTION
OUTPATIENT
Start: 2025-03-18

## 2025-03-11 RX ORDER — METHYLPREDNISOLONE SOD SUCC 125 MG
60 VIAL (EA) INJECTION
Start: 2025-03-18

## 2025-03-11 RX ADMIN — METHYLPREDNISOLONE SODIUM SUCCINATE 60 MG: 125 INJECTION, POWDER, FOR SOLUTION INTRAMUSCULAR; INTRAVENOUS at 08:03

## 2025-03-11 RX ADMIN — SODIUM CHLORIDE 125 MG: 9 INJECTION, SOLUTION INTRAVENOUS at 08:03

## 2025-03-11 NOTE — NURSING
Infusion# 4    Recent labs? Reviewed    Premeds? Solumedrol 60 mg IVP    S/S of current or recent infections in the past 14 days? None/Denies    Recent Surgery/invasive procedures? None/Denies     Any recent vaccines ? None/Denies    Ferrlecit 125 mg administered IV at a 60 minute rate per orders; see MAR and vitals for more  Details.

## 2025-03-17 ENCOUNTER — OFFICE VISIT (OUTPATIENT)
Dept: CARDIOLOGY | Facility: CLINIC | Age: 80
End: 2025-03-17
Payer: MEDICARE

## 2025-03-17 VITALS
DIASTOLIC BLOOD PRESSURE: 72 MMHG | SYSTOLIC BLOOD PRESSURE: 132 MMHG | HEART RATE: 80 BPM | HEIGHT: 62 IN | WEIGHT: 87.31 LBS | BODY MASS INDEX: 16.07 KG/M2

## 2025-03-17 DIAGNOSIS — I70.0 ATHEROSCLEROSIS OF AORTA: ICD-10-CM

## 2025-03-17 DIAGNOSIS — F17.210 NICOTINE DEPENDENCE, CIGARETTES, UNCOMPLICATED: ICD-10-CM

## 2025-03-17 DIAGNOSIS — I25.2 HISTORY OF NON-ST ELEVATION MYOCARDIAL INFARCTION (NSTEMI): ICD-10-CM

## 2025-03-17 DIAGNOSIS — I73.9 PVD (PERIPHERAL VASCULAR DISEASE): ICD-10-CM

## 2025-03-17 DIAGNOSIS — I70.262 ATHEROSCLEROSIS OF NATIVE ARTERY OF LEFT LOWER EXTREMITY WITH GANGRENE: ICD-10-CM

## 2025-03-17 DIAGNOSIS — I73.9 PERIPHERAL VASCULAR DISEASE, UNSPECIFIED: ICD-10-CM

## 2025-03-17 DIAGNOSIS — I10 PRIMARY HYPERTENSION: ICD-10-CM

## 2025-03-17 DIAGNOSIS — N18.31 STAGE 3A CHRONIC KIDNEY DISEASE: ICD-10-CM

## 2025-03-17 DIAGNOSIS — M25.473 ANKLE SWELLING, UNSPECIFIED LATERALITY: ICD-10-CM

## 2025-03-17 DIAGNOSIS — I50.32 CHRONIC HEART FAILURE WITH PRESERVED EJECTION FRACTION: ICD-10-CM

## 2025-03-17 DIAGNOSIS — R09.89 BILATERAL CAROTID BRUITS: ICD-10-CM

## 2025-03-17 DIAGNOSIS — Z72.0 TOBACCO ABUSE DISORDER: ICD-10-CM

## 2025-03-17 DIAGNOSIS — Z01.810 PREOP CARDIOVASCULAR EXAM: Primary | ICD-10-CM

## 2025-03-17 DIAGNOSIS — I27.20 PULMONARY HYPERTENSION: ICD-10-CM

## 2025-03-17 DIAGNOSIS — I25.118 CORONARY ARTERY DISEASE OF NATIVE ARTERY OF NATIVE HEART WITH STABLE ANGINA PECTORIS: ICD-10-CM

## 2025-03-17 DIAGNOSIS — J44.9 COPD, GROUP D, BY GOLD 2017 CLASSIFICATION: ICD-10-CM

## 2025-03-17 DIAGNOSIS — Z89.412 STATUS POST AMPUTATION OF GREAT TOE, LEFT: ICD-10-CM

## 2025-03-17 DIAGNOSIS — M54.16 LUMBAR RADICULOPATHY: ICD-10-CM

## 2025-03-17 PROCEDURE — 1101F PT FALLS ASSESS-DOCD LE1/YR: CPT | Mod: HCNC,CPTII,S$GLB, | Performed by: INTERNAL MEDICINE

## 2025-03-17 PROCEDURE — G2211 COMPLEX E/M VISIT ADD ON: HCPCS | Mod: HCNC,S$GLB,, | Performed by: INTERNAL MEDICINE

## 2025-03-17 PROCEDURE — 1159F MED LIST DOCD IN RCRD: CPT | Mod: HCNC,CPTII,S$GLB, | Performed by: INTERNAL MEDICINE

## 2025-03-17 PROCEDURE — 99214 OFFICE O/P EST MOD 30 MIN: CPT | Mod: HCNC,S$GLB,, | Performed by: INTERNAL MEDICINE

## 2025-03-17 PROCEDURE — 3288F FALL RISK ASSESSMENT DOCD: CPT | Mod: HCNC,CPTII,S$GLB, | Performed by: INTERNAL MEDICINE

## 2025-03-17 PROCEDURE — 99999 PR PBB SHADOW E&M-EST. PATIENT-LVL III: CPT | Mod: PBBFAC,HCNC,, | Performed by: INTERNAL MEDICINE

## 2025-03-17 PROCEDURE — 1160F RVW MEDS BY RX/DR IN RCRD: CPT | Mod: HCNC,CPTII,S$GLB, | Performed by: INTERNAL MEDICINE

## 2025-03-17 PROCEDURE — 3078F DIAST BP <80 MM HG: CPT | Mod: HCNC,CPTII,S$GLB, | Performed by: INTERNAL MEDICINE

## 2025-03-17 PROCEDURE — 3075F SYST BP GE 130 - 139MM HG: CPT | Mod: HCNC,CPTII,S$GLB, | Performed by: INTERNAL MEDICINE

## 2025-03-17 PROCEDURE — 1126F AMNT PAIN NOTED NONE PRSNT: CPT | Mod: HCNC,CPTII,S$GLB, | Performed by: INTERNAL MEDICINE

## 2025-03-17 RX ORDER — FUROSEMIDE 20 MG/1
20 TABLET ORAL DAILY
Qty: 90 TABLET | Refills: 1 | Status: SHIPPED | OUTPATIENT
Start: 2025-03-17

## 2025-03-17 RX ORDER — ASPIRIN 81 MG/1
81 TABLET ORAL DAILY
Qty: 90 TABLET | Refills: 3 | Status: SHIPPED | OUTPATIENT
Start: 2025-03-17 | End: 2026-03-17

## 2025-03-17 RX ORDER — AMLODIPINE BESYLATE 5 MG/1
5 TABLET ORAL DAILY
Qty: 90 TABLET | Refills: 1 | Status: SHIPPED | OUTPATIENT
Start: 2025-03-17 | End: 2026-03-17

## 2025-03-17 RX ORDER — CLOPIDOGREL BISULFATE 75 MG/1
75 TABLET ORAL DAILY
Qty: 90 TABLET | Refills: 1 | Status: SHIPPED | OUTPATIENT
Start: 2025-03-17

## 2025-03-17 NOTE — TELEPHONE ENCOUNTER
No care due was identified.  [facility] Embedded Care Due Messages. Reference number: [id number].   3/17/2025 11:02:19 AM CDT
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
## 2025-03-18 ENCOUNTER — INFUSION (OUTPATIENT)
Dept: INFUSION THERAPY | Facility: HOSPITAL | Age: 80
End: 2025-03-18
Attending: NURSE PRACTITIONER
Payer: MEDICARE

## 2025-03-18 VITALS
OXYGEN SATURATION: 95 % | DIASTOLIC BLOOD PRESSURE: 72 MMHG | RESPIRATION RATE: 18 BRPM | TEMPERATURE: 97 F | HEART RATE: 90 BPM | SYSTOLIC BLOOD PRESSURE: 107 MMHG

## 2025-03-18 DIAGNOSIS — D50.9 IRON DEFICIENCY ANEMIA, UNSPECIFIED IRON DEFICIENCY ANEMIA TYPE: Primary | ICD-10-CM

## 2025-03-18 PROCEDURE — 96365 THER/PROPH/DIAG IV INF INIT: CPT | Mod: HCNC

## 2025-03-18 PROCEDURE — 25000003 PHARM REV CODE 250: Mod: HCNC | Performed by: NURSE PRACTITIONER

## 2025-03-18 PROCEDURE — 96375 TX/PRO/DX INJ NEW DRUG ADDON: CPT | Mod: HCNC

## 2025-03-18 PROCEDURE — 63600175 PHARM REV CODE 636 W HCPCS: Mod: JZ,TB,HCNC | Performed by: NURSE PRACTITIONER

## 2025-03-18 RX ORDER — METHYLPREDNISOLONE SOD SUCC 125 MG
60 VIAL (EA) INJECTION
Status: COMPLETED | OUTPATIENT
Start: 2025-03-18 | End: 2025-03-18

## 2025-03-18 RX ORDER — SODIUM CHLORIDE 0.9 % (FLUSH) 0.9 %
10 SYRINGE (ML) INJECTION
OUTPATIENT
Start: 2025-03-25

## 2025-03-18 RX ORDER — SODIUM CHLORIDE 0.9 % (FLUSH) 0.9 %
10 SYRINGE (ML) INJECTION
Status: DISCONTINUED | OUTPATIENT
Start: 2025-03-18 | End: 2025-03-18 | Stop reason: HOSPADM

## 2025-03-18 RX ORDER — EPINEPHRINE 0.3 MG/.3ML
0.3 INJECTION SUBCUTANEOUS ONCE AS NEEDED
OUTPATIENT
Start: 2025-03-25

## 2025-03-18 RX ORDER — BUSPIRONE HYDROCHLORIDE 10 MG/1
10 TABLET ORAL 2 TIMES DAILY
Qty: 60 TABLET | Refills: 11 | Status: SHIPPED | OUTPATIENT
Start: 2025-03-18

## 2025-03-18 RX ORDER — DIPHENHYDRAMINE HYDROCHLORIDE 50 MG/ML
50 INJECTION, SOLUTION INTRAMUSCULAR; INTRAVENOUS ONCE AS NEEDED
Status: DISCONTINUED | OUTPATIENT
Start: 2025-03-18 | End: 2025-03-18 | Stop reason: HOSPADM

## 2025-03-18 RX ORDER — DIPHENHYDRAMINE HYDROCHLORIDE 50 MG/ML
50 INJECTION, SOLUTION INTRAMUSCULAR; INTRAVENOUS ONCE AS NEEDED
OUTPATIENT
Start: 2025-03-25

## 2025-03-18 RX ORDER — METHYLPREDNISOLONE SOD SUCC 125 MG
60 VIAL (EA) INJECTION
Start: 2025-03-25

## 2025-03-18 RX ORDER — EPINEPHRINE 0.3 MG/.3ML
0.3 INJECTION SUBCUTANEOUS ONCE AS NEEDED
Status: DISCONTINUED | OUTPATIENT
Start: 2025-03-18 | End: 2025-03-18 | Stop reason: HOSPADM

## 2025-03-18 RX ADMIN — SODIUM CHLORIDE: 9 INJECTION, SOLUTION INTRAVENOUS at 08:03

## 2025-03-18 RX ADMIN — SODIUM CHLORIDE 125 MG: 9 INJECTION, SOLUTION INTRAVENOUS at 08:03

## 2025-03-18 RX ADMIN — METHYLPREDNISOLONE SODIUM SUCCINATE 60 MG: 125 INJECTION, POWDER, FOR SOLUTION INTRAMUSCULAR; INTRAVENOUS at 08:03

## 2025-03-19 ENCOUNTER — HOSPITAL ENCOUNTER (OUTPATIENT)
Dept: RADIOLOGY | Facility: HOSPITAL | Age: 80
Discharge: HOME | End: 2025-03-19
Attending: INTERNAL MEDICINE
Payer: MEDICARE

## 2025-03-19 ENCOUNTER — TELEPHONE (OUTPATIENT)
Dept: CARDIOLOGY | Facility: CLINIC | Age: 80
End: 2025-03-19
Payer: MEDICARE

## 2025-03-19 ENCOUNTER — RESULTS FOLLOW-UP (OUTPATIENT)
Dept: CARDIOLOGY | Facility: CLINIC | Age: 80
End: 2025-03-19

## 2025-03-19 DIAGNOSIS — I73.9 PERIPHERAL VASCULAR DISEASE, UNSPECIFIED: ICD-10-CM

## 2025-03-19 LAB
CREAT SERPL-MCNC: 0.9 MG/DL (ref 0.5–1.4)
SAMPLE: NORMAL

## 2025-03-19 PROCEDURE — 75635 CT ANGIO ABDOMINAL ARTERIES: CPT | Mod: 26,HCNC,, | Performed by: RADIOLOGY

## 2025-03-19 PROCEDURE — 75635 CT ANGIO ABDOMINAL ARTERIES: CPT | Mod: TC,HCNC

## 2025-03-19 PROCEDURE — 25500020 PHARM REV CODE 255: Mod: HCNC | Performed by: INTERNAL MEDICINE

## 2025-03-19 RX ADMIN — IOHEXOL 100 ML: 350 INJECTION, SOLUTION INTRAVENOUS at 08:03

## 2025-03-19 NOTE — TELEPHONE ENCOUNTER
Called and advised pt and she voiced understanding.       ----- Message from Salome Galeas MD sent at 3/19/2025  2:00 PM CDT -----  Has significant blockages above the hip level. Needs to stop smoking.continue same emds. Will f/u in 3 months  ----- Message -----  From: Interface, Rad Results In  Sent: 3/19/2025   9:15 AM CDT  To: Salome Galeas MD

## 2025-03-25 ENCOUNTER — INFUSION (OUTPATIENT)
Dept: INFUSION THERAPY | Facility: HOSPITAL | Age: 80
End: 2025-03-25
Attending: NURSE PRACTITIONER
Payer: MEDICARE

## 2025-03-25 VITALS
DIASTOLIC BLOOD PRESSURE: 63 MMHG | OXYGEN SATURATION: 98 % | SYSTOLIC BLOOD PRESSURE: 113 MMHG | TEMPERATURE: 98 F | RESPIRATION RATE: 14 BRPM | HEART RATE: 73 BPM

## 2025-03-25 DIAGNOSIS — D50.9 IRON DEFICIENCY ANEMIA, UNSPECIFIED IRON DEFICIENCY ANEMIA TYPE: Primary | ICD-10-CM

## 2025-03-25 PROCEDURE — 25000003 PHARM REV CODE 250: Mod: HCNC | Performed by: NURSE PRACTITIONER

## 2025-03-25 PROCEDURE — 63600175 PHARM REV CODE 636 W HCPCS: Mod: JZ,TB,HCNC | Performed by: NURSE PRACTITIONER

## 2025-03-25 PROCEDURE — 96365 THER/PROPH/DIAG IV INF INIT: CPT | Mod: HCNC

## 2025-03-25 PROCEDURE — 96375 TX/PRO/DX INJ NEW DRUG ADDON: CPT | Mod: HCNC

## 2025-03-25 PROCEDURE — A4216 STERILE WATER/SALINE, 10 ML: HCPCS | Mod: HCNC | Performed by: NURSE PRACTITIONER

## 2025-03-25 RX ORDER — METHYLPREDNISOLONE SOD SUCC 125 MG
60 VIAL (EA) INJECTION
Start: 2025-04-01

## 2025-03-25 RX ORDER — DIPHENHYDRAMINE HYDROCHLORIDE 50 MG/ML
50 INJECTION, SOLUTION INTRAMUSCULAR; INTRAVENOUS ONCE AS NEEDED
OUTPATIENT
Start: 2025-04-01

## 2025-03-25 RX ORDER — SODIUM CHLORIDE 0.9 % (FLUSH) 0.9 %
10 SYRINGE (ML) INJECTION
OUTPATIENT
Start: 2025-04-01

## 2025-03-25 RX ORDER — METHYLPREDNISOLONE SOD SUCC 125 MG
60 VIAL (EA) INJECTION
Status: COMPLETED | OUTPATIENT
Start: 2025-03-25 | End: 2025-03-25

## 2025-03-25 RX ORDER — SODIUM CHLORIDE 0.9 % (FLUSH) 0.9 %
10 SYRINGE (ML) INJECTION
Status: DISCONTINUED | OUTPATIENT
Start: 2025-03-25 | End: 2025-03-25 | Stop reason: HOSPADM

## 2025-03-25 RX ORDER — EPINEPHRINE 0.3 MG/.3ML
0.3 INJECTION SUBCUTANEOUS ONCE AS NEEDED
OUTPATIENT
Start: 2025-04-01

## 2025-03-25 RX ADMIN — METHYLPREDNISOLONE SODIUM SUCCINATE 60 MG: 125 INJECTION, POWDER, FOR SOLUTION INTRAMUSCULAR; INTRAVENOUS at 07:03

## 2025-03-25 RX ADMIN — SODIUM CHLORIDE 125 MG: 9 INJECTION, SOLUTION INTRAVENOUS at 08:03

## 2025-03-25 RX ADMIN — Medication 10 ML: at 09:03

## 2025-03-25 NOTE — PLAN OF CARE
Patient tolerated Ferrlecit well today; no adverse reaction noted.  C/O bruising and fatigue.  IV discontinued with catheter intact and pressure dressing applied to the site.  Has f/u appt(s) scheduled per MD request.  No questions or concerns voiced.  NAD noted upon discharge.

## 2025-03-25 NOTE — DISCHARGE INSTRUCTIONS
Thank you for letting me take care of YOU!!    [facility]  [address] [facility]  [id number] [place] [address] Drive  [phone] phone     [phone] fax  Hours of Operation: Monday- Friday 8:00am- 5:00pm  After hours phone  [phone]  Hematology / Oncology Physicians on call:    Dr. Leodan Ramirez        Nurse Practitioners:    Elenita Andersen, JACQUI Chu, DEION Gann, JACQUI Garcia, JACQUI Mg, JACQUI      Please don't hesitate to call if you have any concerns.

## 2025-04-01 ENCOUNTER — INFUSION (OUTPATIENT)
Dept: INFUSION THERAPY | Facility: HOSPITAL | Age: 80
End: 2025-04-01
Attending: NURSE PRACTITIONER
Payer: MEDICARE

## 2025-04-01 VITALS
TEMPERATURE: 97 F | DIASTOLIC BLOOD PRESSURE: 74 MMHG | OXYGEN SATURATION: 98 % | RESPIRATION RATE: 18 BRPM | HEART RATE: 76 BPM | SYSTOLIC BLOOD PRESSURE: 136 MMHG

## 2025-04-01 DIAGNOSIS — D50.9 IRON DEFICIENCY ANEMIA, UNSPECIFIED IRON DEFICIENCY ANEMIA TYPE: Primary | ICD-10-CM

## 2025-04-01 PROCEDURE — 96374 THER/PROPH/DIAG INJ IV PUSH: CPT | Mod: HCNC

## 2025-04-01 PROCEDURE — 96375 TX/PRO/DX INJ NEW DRUG ADDON: CPT | Mod: HCNC

## 2025-04-01 PROCEDURE — 63600175 PHARM REV CODE 636 W HCPCS: Mod: HCNC | Performed by: NURSE PRACTITIONER

## 2025-04-01 PROCEDURE — 25000003 PHARM REV CODE 250: Mod: HCNC | Performed by: NURSE PRACTITIONER

## 2025-04-01 RX ORDER — METHYLPREDNISOLONE SOD SUCC 125 MG
60 VIAL (EA) INJECTION
Status: COMPLETED | OUTPATIENT
Start: 2025-04-01 | End: 2025-04-01

## 2025-04-01 RX ORDER — METHYLPREDNISOLONE SOD SUCC 125 MG
60 VIAL (EA) INJECTION
Start: 2025-04-08

## 2025-04-01 RX ORDER — DIPHENHYDRAMINE HYDROCHLORIDE 50 MG/ML
50 INJECTION, SOLUTION INTRAMUSCULAR; INTRAVENOUS ONCE AS NEEDED
OUTPATIENT
Start: 2025-04-08

## 2025-04-01 RX ORDER — DIPHENHYDRAMINE HYDROCHLORIDE 50 MG/ML
50 INJECTION, SOLUTION INTRAMUSCULAR; INTRAVENOUS ONCE AS NEEDED
Status: DISCONTINUED | OUTPATIENT
Start: 2025-04-01 | End: 2025-04-01 | Stop reason: HOSPADM

## 2025-04-01 RX ORDER — EPINEPHRINE 0.3 MG/.3ML
0.3 INJECTION SUBCUTANEOUS ONCE AS NEEDED
Status: DISCONTINUED | OUTPATIENT
Start: 2025-04-01 | End: 2025-04-01 | Stop reason: HOSPADM

## 2025-04-01 RX ORDER — EPINEPHRINE 0.3 MG/.3ML
0.3 INJECTION SUBCUTANEOUS ONCE AS NEEDED
OUTPATIENT
Start: 2025-04-08

## 2025-04-01 RX ORDER — SODIUM CHLORIDE 0.9 % (FLUSH) 0.9 %
10 SYRINGE (ML) INJECTION
OUTPATIENT
Start: 2025-04-08

## 2025-04-01 RX ADMIN — SODIUM CHLORIDE 125 MG: 9 INJECTION, SOLUTION INTRAVENOUS at 08:04

## 2025-04-01 RX ADMIN — METHYLPREDNISOLONE SODIUM SUCCINATE 60 MG: 125 INJECTION, POWDER, FOR SOLUTION INTRAMUSCULAR; INTRAVENOUS at 08:04

## 2025-04-01 NOTE — PLAN OF CARE
Discussed plan of care with pt. Addressed any and ongoing concerns. Pt denies    Problem: Adult Inpatient Plan of Care  Goal: Plan of Care Review  Outcome: Progressing  Flowsheets (Taken 4/1/2025 0832)  Plan of Care Reviewed With: patient  Goal: Absence of Hospital-Acquired Illness or Injury  Outcome: Progressing  Intervention: Identify and Manage Fall Risk  Flowsheets (Taken 4/1/2025 0832)  Safety Promotion/Fall Prevention:   in recliner, wheels locked   nonskid shoes/socks when out of bed   room near unit station  Intervention: Prevent Infection  Flowsheets (Taken 4/1/2025 0832)  Infection Prevention:   hand hygiene promoted   equipment surfaces disinfected  Goal: Optimal Comfort and Wellbeing  Outcome: Progressing  Intervention: Monitor Pain and Promote Comfort  Flowsheets (Taken 4/1/2025 0832)  Pain Management Interventions:   quiet environment facilitated   warm blanket provided   relaxation techniques promoted  Intervention: Provide Person-Centered Care  Flowsheets (Taken 4/1/2025 0832)  Trust Relationship/Rapport:   care explained   reassurance provided   thoughts/feelings acknowledged   choices provided   emotional support provided   empathic listening provided   questions answered   questions encouraged

## 2025-04-03 ENCOUNTER — TELEPHONE (OUTPATIENT)
Dept: INFUSION THERAPY | Facility: HOSPITAL | Age: 80
End: 2025-04-03
Payer: MEDICARE

## 2025-04-08 ENCOUNTER — INFUSION (OUTPATIENT)
Dept: INFUSION THERAPY | Facility: HOSPITAL | Age: 80
End: 2025-04-08
Attending: NURSE PRACTITIONER
Payer: MEDICARE

## 2025-04-08 VITALS
TEMPERATURE: 98 F | SYSTOLIC BLOOD PRESSURE: 122 MMHG | DIASTOLIC BLOOD PRESSURE: 63 MMHG | RESPIRATION RATE: 18 BRPM | OXYGEN SATURATION: 100 %

## 2025-04-08 DIAGNOSIS — D50.9 IRON DEFICIENCY ANEMIA, UNSPECIFIED IRON DEFICIENCY ANEMIA TYPE: Primary | ICD-10-CM

## 2025-04-08 PROCEDURE — 63600175 PHARM REV CODE 636 W HCPCS: Mod: HCNC | Performed by: NURSE PRACTITIONER

## 2025-04-08 PROCEDURE — 25000003 PHARM REV CODE 250: Mod: HCNC | Performed by: NURSE PRACTITIONER

## 2025-04-08 PROCEDURE — 96375 TX/PRO/DX INJ NEW DRUG ADDON: CPT | Mod: HCNC

## 2025-04-08 PROCEDURE — 96365 THER/PROPH/DIAG IV INF INIT: CPT | Mod: HCNC

## 2025-04-08 RX ORDER — DIPHENHYDRAMINE HYDROCHLORIDE 50 MG/ML
50 INJECTION, SOLUTION INTRAMUSCULAR; INTRAVENOUS ONCE AS NEEDED
OUTPATIENT
Start: 2025-04-15

## 2025-04-08 RX ORDER — METHYLPREDNISOLONE SOD SUCC 125 MG
60 VIAL (EA) INJECTION
Status: COMPLETED | OUTPATIENT
Start: 2025-04-08 | End: 2025-04-08

## 2025-04-08 RX ORDER — METHYLPREDNISOLONE SOD SUCC 125 MG
60 VIAL (EA) INJECTION
Status: CANCELLED
Start: 2025-04-15

## 2025-04-08 RX ORDER — SODIUM CHLORIDE 0.9 % (FLUSH) 0.9 %
10 SYRINGE (ML) INJECTION
OUTPATIENT
Start: 2025-04-15

## 2025-04-08 RX ORDER — EPINEPHRINE 0.3 MG/.3ML
0.3 INJECTION SUBCUTANEOUS ONCE AS NEEDED
OUTPATIENT
Start: 2025-04-15

## 2025-04-08 RX ADMIN — SODIUM CHLORIDE 125 MG: 9 INJECTION, SOLUTION INTRAVENOUS at 08:04

## 2025-04-08 RX ADMIN — METHYLPREDNISOLONE SODIUM SUCCINATE 60 MG: 125 INJECTION, POWDER, FOR SOLUTION INTRAMUSCULAR; INTRAVENOUS at 08:04

## 2025-04-08 NOTE — PLAN OF CARE
Discussed plan of care with pt. Addressed any and ongoing concerns. Pt denies    Problem: Adult Inpatient Plan of Care  Goal: Plan of Care Review  Outcome: Progressing  Flowsheets (Taken 4/8/2025 0728)  Plan of Care Reviewed With: patient  Goal: Absence of Hospital-Acquired Illness or Injury  Outcome: Progressing  Intervention: Identify and Manage Fall Risk  Flowsheets (Taken 4/8/2025 0728)  Safety Promotion/Fall Prevention:   room near unit station   in recliner, wheels locked   nonskid shoes/socks when out of bed  Intervention: Prevent Infection  Flowsheets (Taken 4/8/2025 0728)  Infection Prevention:   hand hygiene promoted   equipment surfaces disinfected  Goal: Optimal Comfort and Wellbeing  Outcome: Progressing  Intervention: Monitor Pain and Promote Comfort  Flowsheets (Taken 4/8/2025 0728)  Pain Management Interventions:   quiet environment facilitated   warm blanket provided   relaxation techniques promoted  Intervention: Provide Person-Centered Care  Flowsheets (Taken 4/8/2025 0728)  Trust Relationship/Rapport:   reassurance provided   care explained   emotional support provided   thoughts/feelings acknowledged   choices provided   empathic listening provided   questions answered   questions encouraged

## 2025-04-22 RX ORDER — AMLODIPINE BESYLATE 5 MG/1
5 TABLET ORAL DAILY
Qty: 90 TABLET | Refills: 1 | Status: SHIPPED | OUTPATIENT
Start: 2025-04-22

## 2025-04-28 ENCOUNTER — OFFICE VISIT (OUTPATIENT)
Dept: INTERNAL MEDICINE | Facility: CLINIC | Age: 80
End: 2025-04-28
Payer: MEDICARE

## 2025-04-28 VITALS
TEMPERATURE: 97 F | WEIGHT: 88.38 LBS | OXYGEN SATURATION: 95 % | HEIGHT: 62 IN | HEART RATE: 56 BPM | SYSTOLIC BLOOD PRESSURE: 124 MMHG | DIASTOLIC BLOOD PRESSURE: 70 MMHG | BODY MASS INDEX: 16.26 KG/M2

## 2025-04-28 DIAGNOSIS — I50.32 CHRONIC HEART FAILURE WITH PRESERVED EJECTION FRACTION: ICD-10-CM

## 2025-04-28 DIAGNOSIS — E53.8 B12 DEFICIENCY: ICD-10-CM

## 2025-04-28 DIAGNOSIS — M81.0 AGE-RELATED OSTEOPOROSIS WITHOUT CURRENT PATHOLOGICAL FRACTURE: Primary | ICD-10-CM

## 2025-04-28 DIAGNOSIS — G89.4 CHRONIC PAIN SYNDROME: ICD-10-CM

## 2025-04-28 DIAGNOSIS — D50.9 IRON DEFICIENCY ANEMIA, UNSPECIFIED IRON DEFICIENCY ANEMIA TYPE: ICD-10-CM

## 2025-04-28 DIAGNOSIS — J44.9 COPD, GROUP D, BY GOLD 2017 CLASSIFICATION: ICD-10-CM

## 2025-04-28 DIAGNOSIS — I70.0 ATHEROSCLEROSIS OF AORTA: ICD-10-CM

## 2025-04-28 DIAGNOSIS — R73.9 HYPERGLYCEMIA: ICD-10-CM

## 2025-04-28 DIAGNOSIS — I73.9 PVD (PERIPHERAL VASCULAR DISEASE): ICD-10-CM

## 2025-04-28 DIAGNOSIS — I10 PRIMARY HYPERTENSION: Chronic | ICD-10-CM

## 2025-04-28 DIAGNOSIS — I25.10 CORONARY ARTERY DISEASE INVOLVING NATIVE CORONARY ARTERY OF NATIVE HEART WITHOUT ANGINA PECTORIS: ICD-10-CM

## 2025-04-28 PROBLEM — N18.31 STAGE 3A CHRONIC KIDNEY DISEASE: Status: RESOLVED | Noted: 2024-04-16 | Resolved: 2025-04-28

## 2025-04-28 PROCEDURE — 1126F AMNT PAIN NOTED NONE PRSNT: CPT | Mod: CPTII,HCNC,S$GLB, | Performed by: FAMILY MEDICINE

## 2025-04-28 PROCEDURE — G2211 COMPLEX E/M VISIT ADD ON: HCPCS | Mod: HCNC,S$GLB,, | Performed by: FAMILY MEDICINE

## 2025-04-28 PROCEDURE — 3288F FALL RISK ASSESSMENT DOCD: CPT | Mod: CPTII,HCNC,S$GLB, | Performed by: FAMILY MEDICINE

## 2025-04-28 PROCEDURE — 1101F PT FALLS ASSESS-DOCD LE1/YR: CPT | Mod: CPTII,HCNC,S$GLB, | Performed by: FAMILY MEDICINE

## 2025-04-28 PROCEDURE — 99214 OFFICE O/P EST MOD 30 MIN: CPT | Mod: HCNC,S$GLB,, | Performed by: FAMILY MEDICINE

## 2025-04-28 PROCEDURE — 1159F MED LIST DOCD IN RCRD: CPT | Mod: CPTII,HCNC,S$GLB, | Performed by: FAMILY MEDICINE

## 2025-04-28 PROCEDURE — 3074F SYST BP LT 130 MM HG: CPT | Mod: CPTII,HCNC,S$GLB, | Performed by: FAMILY MEDICINE

## 2025-04-28 PROCEDURE — 99999 PR PBB SHADOW E&M-EST. PATIENT-LVL V: CPT | Mod: PBBFAC,HCNC,, | Performed by: FAMILY MEDICINE

## 2025-04-28 PROCEDURE — 3078F DIAST BP <80 MM HG: CPT | Mod: CPTII,HCNC,S$GLB, | Performed by: FAMILY MEDICINE

## 2025-04-28 NOTE — PATIENT INSTRUCTIONS
Please obtain the RSV vaccine via your pharmacy  Tetanus/whooping cough vaccine via pharmacy  Shingrix new shingles vaccine  via a pharmacy  Please make appointment with external rheumatology for [phone]

## 2025-04-28 NOTE — PROGRESS NOTES
Subjective:      Patient ID: Keyona Dwyer is a. female.    Chief Complaint:     HPI b12def (due) ,anemia utd hemat  Osteopor :rheum prolia;had one injxn andno f/u since. D/wd dept closed to new patients for now.  Cad /chfutd dr garcia  Pvd hx dr edmondson/kylie    Chronic low back pain/opiate use utd pain mgmt dr del toro    Elev sugar lab    Review of Systems      Cardiovascular: no chest pain  Chest: no shortness of breath  Abd: no abd pain  Remainder review of systems negative    Objective:     Physical Examgen nad  Cvrrr  Chest ctabilat  Assessment:         ICD-10-CM ICD-9-CM   1. Primary hypertension  I10 401.9   2. Osteoporosis, unspecified osteoporosis type, unspecified pathological fracture presence  M81.0 733.00   3. COPD, group D, by GOLD 2017 classification  J44.9 496   4. Atherosclerosis of aorta  I70.0 440.0   5. Iron deficiency anemia, unspecified iron deficiency anemia type  D50.9 280.9   6. Amputation of left great toe  S98.112A 895.0   7. PVD (peripheral vascular disease)  I73.9 443.9   8. Chronic heart failure with preserved ejection fraction  I50.32 428.9   9. B12 deficiency  E53.8 266.2   10. Coronary artery disease without angina pectoris, unspecified vessel or lesion type, unspecified whether native or transplanted heart  I25.10 414.00   11. Chronic pain syndrome  G89.4 338.4   12. Other chronic osteomyelitis of left foot  M86.672 730.17   13. Stage 3a chronic kidney disease  N18.31 585.3   14. 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 Uncomplicated opioid dependence  F11.20 304.00      Plan:    F/u rheum, card, vasc,  hemat when due    Add lab to May hemat lab   Declines lung ca screening    F/u sugey 3 months on gluc and ext rheum ;ensure has appt or was seen    Age-related osteoporosis without current pathological fracture  -     Ambulatory referral/consult to Rheumatology; Future; Expected date: 05/05/2025    Iron deficiency anemia,  unspecified iron deficiency anemia type    B12 deficiency  -     Vitamin B12; Future; Expected date: 05/09/2025    Chronic heart failure with preserved ejection fraction    Coronary artery disease involving native coronary artery of native heart without angina pectoris    Chronic pain syndrome    PVD (peripheral vascular disease)    Atherosclerosis of aorta    COPD, group D, by GOLD 2017 classification    Primary hypertension    Hyperglycemia  -     Hemoglobin A1C; Future; Expected date: 05/09/2025

## 2025-05-09 ENCOUNTER — LAB VISIT (OUTPATIENT)
Dept: LAB | Facility: HOSPITAL | Age: 80
End: 2025-05-09
Attending: NURSE PRACTITIONER
Payer: MEDICARE

## 2025-05-09 DIAGNOSIS — R73.9 HYPERGLYCEMIA: ICD-10-CM

## 2025-05-09 DIAGNOSIS — R76.8 ELEVATED SERUM IMMUNOGLOBULIN FREE LIGHT CHAIN LEVEL: ICD-10-CM

## 2025-05-09 DIAGNOSIS — D50.9 IRON DEFICIENCY ANEMIA, UNSPECIFIED IRON DEFICIENCY ANEMIA TYPE: ICD-10-CM

## 2025-05-09 DIAGNOSIS — D64.9 NORMOCYTIC ANEMIA: ICD-10-CM

## 2025-05-09 DIAGNOSIS — E87.6 HYPOKALEMIA: ICD-10-CM

## 2025-05-09 DIAGNOSIS — E53.8 B12 DEFICIENCY: ICD-10-CM

## 2025-05-09 LAB
ABSOLUTE EOSINOPHIL (OHS): 0.2 K/UL
ABSOLUTE MONOCYTE (OHS): 0.47 K/UL (ref 0.3–1)
ABSOLUTE NEUTROPHIL COUNT (OHS): 2.99 K/UL (ref 1.8–7.7)
ALBUMIN SERPL BCP-MCNC: 3.6 G/DL (ref 3.5–5.2)
ALP SERPL-CCNC: 68 UNIT/L (ref 40–150)
ALT SERPL W/O P-5'-P-CCNC: 8 UNIT/L (ref 10–44)
ANION GAP (OHS): 13 MMOL/L (ref 8–16)
AST SERPL-CCNC: 15 UNIT/L (ref 11–45)
BASOPHILS # BLD AUTO: 0.07 K/UL
BASOPHILS NFR BLD AUTO: 1.2 %
BILIRUB SERPL-MCNC: 0.3 MG/DL (ref 0.1–1)
BUN SERPL-MCNC: 19 MG/DL (ref 8–23)
CALCIUM SERPL-MCNC: 9.7 MG/DL (ref 8.7–10.5)
CHLORIDE SERPL-SCNC: 102 MMOL/L (ref 95–110)
CO2 SERPL-SCNC: 23 MMOL/L (ref 23–29)
CREAT SERPL-MCNC: 0.8 MG/DL (ref 0.5–1.4)
EAG (OHS): 103 MG/DL (ref 68–131)
ERYTHROCYTE [DISTWIDTH] IN BLOOD BY AUTOMATED COUNT: 16.8 % (ref 11.5–14.5)
FERRITIN SERPL-MCNC: 270 NG/ML (ref 20–300)
GFR SERPLBLD CREATININE-BSD FMLA CKD-EPI: >60 ML/MIN/1.73/M2
GLUCOSE SERPL-MCNC: 101 MG/DL (ref 70–110)
HBA1C MFR BLD: 5.2 % (ref 4–5.6)
HCT VFR BLD AUTO: 39.7 % (ref 37–48.5)
HGB BLD-MCNC: 12.6 GM/DL (ref 12–16)
IMM GRANULOCYTES # BLD AUTO: 0.02 K/UL (ref 0–0.04)
IMM GRANULOCYTES NFR BLD AUTO: 0.3 % (ref 0–0.5)
IRON SATN MFR SERPL: 24 % (ref 20–50)
IRON SERPL-MCNC: 72 UG/DL (ref 30–160)
LYMPHOCYTES # BLD AUTO: 2.32 K/UL (ref 1–4.8)
MCH RBC QN AUTO: 31.7 PG (ref 27–31)
MCHC RBC AUTO-ENTMCNC: 31.7 G/DL (ref 32–36)
MCV RBC AUTO: 100 FL (ref 82–98)
NUCLEATED RBC (/100WBC) (OHS): 0 /100 WBC
PLATELET # BLD AUTO: 320 K/UL (ref 150–450)
PMV BLD AUTO: 8.6 FL (ref 9.2–12.9)
POTASSIUM SERPL-SCNC: 5.2 MMOL/L (ref 3.5–5.1)
PROT SERPL-MCNC: 7.4 GM/DL (ref 6–8.4)
RBC # BLD AUTO: 3.98 M/UL (ref 4–5.4)
RELATIVE EOSINOPHIL (OHS): 3.3 %
RELATIVE LYMPHOCYTE (OHS): 38.2 % (ref 18–48)
RELATIVE MONOCYTE (OHS): 7.7 % (ref 4–15)
RELATIVE NEUTROPHIL (OHS): 49.3 % (ref 38–73)
SODIUM SERPL-SCNC: 138 MMOL/L (ref 136–145)
TIBC SERPL-MCNC: 302 UG/DL (ref 250–450)
TRANSFERRIN SERPL-MCNC: 204 MG/DL (ref 200–375)
VIT B12 SERPL-MCNC: 592 PG/ML (ref 210–950)
WBC # BLD AUTO: 6.07 K/UL (ref 3.9–12.7)

## 2025-05-09 PROCEDURE — 85025 COMPLETE CBC W/AUTO DIFF WBC: CPT | Mod: HCNC

## 2025-05-09 PROCEDURE — 82728 ASSAY OF FERRITIN: CPT | Mod: HCNC

## 2025-05-09 PROCEDURE — 83540 ASSAY OF IRON: CPT | Mod: HCNC

## 2025-05-09 PROCEDURE — 82607 VITAMIN B-12: CPT | Mod: HCNC

## 2025-05-09 PROCEDURE — 83036 HEMOGLOBIN GLYCOSYLATED A1C: CPT | Mod: HCNC

## 2025-05-09 PROCEDURE — 36415 COLL VENOUS BLD VENIPUNCTURE: CPT | Mod: HCNC,PN

## 2025-05-09 PROCEDURE — 83521 IG LIGHT CHAINS FREE EACH: CPT | Mod: HCNC

## 2025-05-09 PROCEDURE — 80053 COMPREHEN METABOLIC PANEL: CPT | Mod: HCNC

## 2025-05-12 LAB
KAPPA LC FREE SER-MCNC: 1.36 MG/L (ref 0.26–1.65)
KAPPA LC FREE/LAMBDA FREE SER: 3.83 MG/DL (ref 0.33–1.94)
LAMBDA LC FREE SERPL-MCNC: 2.81 MG/DL (ref 0.57–2.63)

## 2025-05-16 ENCOUNTER — OFFICE VISIT (OUTPATIENT)
Dept: HEMATOLOGY/ONCOLOGY | Facility: CLINIC | Age: 80
End: 2025-05-16
Payer: MEDICARE

## 2025-05-16 VITALS
WEIGHT: 81.38 LBS | TEMPERATURE: 98 F | DIASTOLIC BLOOD PRESSURE: 70 MMHG | BODY MASS INDEX: 14.97 KG/M2 | SYSTOLIC BLOOD PRESSURE: 119 MMHG | HEIGHT: 62 IN | HEART RATE: 83 BPM

## 2025-05-16 DIAGNOSIS — E87.6 HYPOKALEMIA: Primary | ICD-10-CM

## 2025-05-16 DIAGNOSIS — E53.8 B12 DEFICIENCY: ICD-10-CM

## 2025-05-16 DIAGNOSIS — Z86.2 HISTORY OF IRON DEFICIENCY ANEMIA: ICD-10-CM

## 2025-05-16 DIAGNOSIS — D53.9 MACROCYTIC ANEMIA: ICD-10-CM

## 2025-05-16 PROCEDURE — 99999 PR PBB SHADOW E&M-EST. PATIENT-LVL IV: CPT | Mod: PBBFAC,HCNC,, | Performed by: NURSE PRACTITIONER

## 2025-05-16 RX ORDER — POTASSIUM CHLORIDE 750 MG/1
CAPSULE, EXTENDED RELEASE ORAL
Qty: 24 CAPSULE | Refills: 1 | Status: SHIPPED | OUTPATIENT
Start: 2025-05-16

## 2025-05-16 NOTE — PROGRESS NOTES
Subjective:      Patient ID: Keyona Dwyer is a 79 y.o. female.    Chief Complaint:  no complaints    HPI:   80 yo female who presents today for further evaluation and recommendations of iron deficiency anemia.  She has been referred to the hematology by her PCP, Dr. Ralf Isidro.  Had diagnosis of acute pancreatitis requiring hospitalization in 6/2022 with complaints of abdominal pain.  Other chronic diagnosis include anxiety, chronic back pain, hyperlipidemia, htn, osteoporosis, RVT, and restless leg syndrome.       Patient states that she got bit by a brown recluse and got sick due to antibiotics and has some weight loss.  States that she was on cephalexin 500 mg PO TID and caused stomach distress with weight loss and episodic nausea/vomiting.      Is a current smoker since she was 12 years old - 1 pack per day for 60 years. Now smokes 3-10 per day.  She denies alcohol use.      She denies f/c/ns or lymphadenopathy.       Interval History:  10/10/2022: With hospitalization in 9/2022 d/t pneumonia, NSTEMI, and GI bleed.  EGD done as well as bleeding scan and found with no active bleeding although patient did have dark stools during hospitalizations with a drop in hgb requiring blood transfusion.  She was unable to tolerate GI prep for colonoscopy.  At last visit work up for anemia found with elevated MMA.  She was placed on B12 2000 mcg PO daily.  Also found with LOR started on ferrous sulfate 325 mg PO and is currently taking iron tablets every other day.  States that she is taking stool softeners daily.  States has not noted dark stools since hospitalization.  3+ blood found in urine during hospitalizations     2/9/2023 Had surgery to face to remove skin cancer one week ago - part of left side nose had to be removed - done by Dr. Aguilar Wallace dermatology.  Currently with bandages on her face that are CDI.  Has not noticed any abnormal bleeding.  States that she has been off of her iron tablets this  past week.  Was taking every other day.  Is taking B12 tablets daily.  States that when she takes the oral iron she has constipation requiring stool softeners.      5/25/2023 She was to continue B12 daily and continue oral iron supplements every other day since her last visit in 2/2023.  Presents today to evaluate response.   Denies dark stools.  Denies any unusual bleeding. Has gained 8 lbs.  States that she is feeling better then she has in a long time.       Interval History:  8/31/2023  Has continued ferrous sulfate 325 mg PO daily and surjit B complex daily supplement daily.  States that she has reduced her smoking to less then a pack/day.  States that she took her iron tablet after doing labs on 8/30/2023.  Noted now with elevated iron. Slight macrocytic anemia - improved.  States that she feels better then she has in a long time.      Interval History  11/27/2023 At last visit in 8/2023 we stopped oral iron supplement and continued Surjit B complex daily.  Presents today with stable chronic anemia with noted macrocytosis present for the past year - stable. Saturated iron of 13%.  C/o right shoulder pain due to right should pain and decreased rom from fall that occurred at least 6 weeks ago or longer.  Had xray 10/25/2023 done just showing arthritis. Denies any abnormal bleeding.       Interval History:  2/29/2024  States that her shoulder is feeling better.  She is going to PT three times per week.  Rotary cuff was dislocated from her shoulder.  Still smoking cigarettes daily but has reduced amount. Continues to take a B complex daily.  States that she has been eating liver 2-3 times/week.  Noted elevated liver indices.   With continued macrocytic anemia.  Hgb 11.4 mcv 105.  BP elevated.  States that she ate fast food yesterrday.  Cardiology has taken her off all of her BP medications due to her BP getting too low.  Denies HA, blurred vision, sob, chest tightness or chest pain.      Interval History:  7/18/2024   Presents today to evaluate labs with h/o macrocytic anemia, B12 deficiency and recurrent iron deficiency.  Has stopped oral iron since last visit in Feb 2024.  Had a fall on 3/4/2024 - spent 5 weeks in ICU and then went to rehab and then home health after.  Tripped and fell hitting head on floor of when she was walking into the door- she developed a brain bleed that has currently resolved. Continues taking B12 complex daily.  Hgb 11.2 normocytic, sat iron 13% and tibc 482.  Has easy bruising.  Denies any other known abnormal bleeding.  Still smoking less then a pack of cigarettes daily.  She is accompanied by her .    Interval History:  10/17/2024 States has not been taking slowFe at all.  States that has increased iron rich foods. Supposed to be taking potassium 10 meq by mouth every other day - just recently told by primary care to start taking daily.  Patient states she sometimes forgets to take it.  States that she is taking the B Complex vitamin daily. LDCT scan 7/24/2024 showing emphysema with severe CA disease and severe aortic calcification. Denies sob or chest pain.  Hgb stable at 10.7 g/dL.  Saturated iron is 10%.      Interval History:  5/16/2025 received ferrlecit 125 mg IV infusions x 8 doses with last dose received on 4/8/2025.  Presents today to evaluate response.  Continues B complex vitamin daily.  Currently without hypokalemia.  States has ran out of potassium.  Was taking potassium 10 meq every other day.  With h/o chronic hypokalemia.   I have reviewed all of the patient's relevant lab work available in the medical record and have utilized this in my evaluation and management recommendations today.      Social History     Socioeconomic History    Marital status:      Spouse name: elizabeth    Number of children: 3   Occupational History    Occupation: retired   Tobacco Use    Smoking status: Every Day     Current packs/day: 0.50     Average packs/day: 0.5 packs/day for 63.0 years (31.5  ttl pk-yrs)     Types: Cigarettes     Passive exposure: Never    Smokeless tobacco: Current    Tobacco comments:     started age of  11    Substance and Sexual Activity    Alcohol use: No    Drug use: No    Sexual activity: Yes     Partners: Male     Social Drivers of Health     Financial Resource Strain: Low Risk  (10/22/2024)    Overall Financial Resource Strain (CARDIA)     Difficulty of Paying Living Expenses: Not hard at all   Food Insecurity: No Food Insecurity (10/22/2024)    Hunger Vital Sign     Worried About Running Out of Food in the Last Year: Never true     Ran Out of Food in the Last Year: Never true   Transportation Needs: No Transportation Needs (3/7/2024)    PRAPARE - Transportation     Lack of Transportation (Medical): No     Lack of Transportation (Non-Medical): No   Physical Activity: Inactive (10/22/2024)    Exercise Vital Sign     Days of Exercise per Week: 0 days     Minutes of Exercise per Session: 0 min   Stress: No Stress Concern Present (10/22/2024)    [employer] - Occupational Stress Questionnaire     Feeling of Stress : Not at all   Housing Stability: Low Risk  (3/7/2024)    Housing Stability Vital Sign     Unable to Pay for Housing in the Last Year: No     Number of Places Lived in the Last Year: 1     Unstable Housing in the Last Year: No       Family History   Problem Relation Name Age of Onset    Cancer Mother      Breast cancer Mother      Hyperlipidemia Father      Heart failure Father      Heart disease Brother      Brain cancer Brother      Stroke Brother      Alcohol abuse Brother         Past Surgical History:   Procedure Laterality Date    ANGIOGRAPHY OF LOWER EXTREMITY N/A 04/12/2021    Procedure: ANGIOGRAM, LOWER EXTREMITY/left leg;  Surgeon: Maico Adkins MD;  Location: [facility] CATH LAB;  Service: Vascular;  Laterality: N/A;  req 1130 start time/Rm A243A    AORTOGRAPHY WITH SERIALOGRAPHY N/A 04/14/2021    Procedure: AORTOGRAM, WITH RUNOFF;   Surgeon: Maico Adkins MD;  Location: [facility] CATH LAB;  Service: Vascular;  Laterality: N/A;  left iliac stent with shockwave/req 1030 start    CATARACT EXTRACTION W/  INTRAOCULAR LENS IMPLANT Bilateral 8/ 9 2017    ESOPHAGOGASTRODUODENOSCOPY N/A 09/13/2022    Procedure: EGD (ESOPHAGOGASTRODUODENOSCOPY);  Surgeon: Fern Rasmussen MD;  Location: [facility] ENDO;  Service: Endoscopy;  Laterality: N/A;    GALLBLADDER SURGERY      HYSTERECTOMY  1972    LUNG SURGERY Right age 15    lung collpase    SHOULDER SURGERY      TOE AMPUTATION Left 04/15/2021    Procedure: AMPUTATION, TOE;  Surgeon: Taylor Anderson DPM;  Location: [facility] OR;  Service: Podiatry;  Laterality: Left;  Hallux (Great Toe)    TOTAL HIP ARTHROPLASTY Bilateral     TOTAL KNEE ARTHROPLASTY Bilateral        Past Medical History:   Diagnosis Date    ACP (advance care planning) 03/08/2024    Acute diastolic heart failure 09/15/2022    Acute respiratory failure with hypoxia and hypercarbia 09/14/2022    Amputation of left great toe 03/16/2022    Anemia     hematol    Anxiety     CAD (coronary artery disease)     Cancer     Cataract     Chronic back pain     Dr. Guzman    Chronic pain 04/16/2024    Closed fracture of one rib of left side 03/06/2024    Coronary artery disease without angina pectoris 03/06/2024    Fibrocystic breast     Glaucoma     Hyperlipemia     Hypertension     Idiopathic acute pancreatitis 06/18/2022    Idiopathic acute pancreatitis 06/18/2022    Leukocytosis 06/18/2022    NSTEMI (non-ST elevated myocardial infarction) 09/11/2022    Osteoarthritis     Osteoporosis     Rheumatology/on Prolia    Other chronic osteomyelitis of left foot 04/16/2024    Pancreatitis     Pneumonia     Polyneuropathy     PVD (peripheral vascular disease) 04/12/2021    PVD (peripheral vascular disease)     dr adkins    Restless leg syndrome     Subdural hematoma 03/2024    Subdural hematoma, acute 03/06/2024    Tobacco dependence     Trouble in sleeping        Review of  Systems   Constitutional: Negative.    HENT: Negative.  Negative for nosebleeds.    Eyes: Negative.    Respiratory:  Negative for chest tightness and shortness of breath.    Cardiovascular: Negative.    Gastrointestinal:  Negative for anal bleeding, blood in stool and constipation.        Acid reflux   Endocrine: Negative.    Genitourinary: Negative.  Negative for hematuria and vaginal bleeding.   Musculoskeletal:  Positive for gait problem.   Skin: Negative.    Allergic/Immunologic: Negative.    Hematological:  Bruises/bleeds easily.   Psychiatric/Behavioral: Negative.            Medication List with Changes/Refills   New Medications    POTASSIUM CHLORIDE (MICRO-K) 10 MEQ CPSR    Take potassium chloride 10 meq (1 capsule) by mouth on Monday and on Friday.   Current Medications    AMLODIPINE (NORVASC) 5 MG TABLET    Take 1 tablet (5 mg total) by mouth once daily.    ASPIRIN (ECOTRIN) 81 MG EC TABLET    Take 1 tablet (81 mg total) by mouth once daily.    AZELASTINE (ASTELIN) 137 MCG (0.1 %) NASAL SPRAY    1 spray (137 mcg total) by Nasal route 2 (two) times daily.    B COMPLEX VITAMINS CAPSULE    Take 1 capsule by mouth once daily.    BUPROPION (WELLBUTRIN) 75 MG TABLET    Take 1 tablet (75 mg total) by mouth 2 (two) times daily.    BUSPIRONE (BUSPAR) 10 MG TABLET    TAKE 1 TABLET BY MOUTH TWICE DAILY    CLOPIDOGREL (PLAVIX) 75 MG TABLET    Take 1 tablet (75 mg total) by mouth once daily.    CLOTRIMAZOLE-BETAMETHASONE 1-0.05% (LOTRISONE) CREAM    Apply to external ear with finger once a week    DULOXETINE (CYMBALTA) 30 MG CAPSULE    Take 30 mg by mouth once daily.    ERGOCALCIFEROL (ERGOCALCIFEROL) 50,000 UNIT CAP    TAKE 1 CAPSULE BY MOUTH EVERY WEEK    FUROSEMIDE (LASIX) 20 MG TABLET    Take 1 tablet (20 mg total) by mouth once daily. Do not take if BP is below 110/70    GABAPENTIN (NEURONTIN) 600 MG TABLET    Take 600 mg by mouth 3 (three) times daily.    HYDROCODONE-ACETAMINOPHEN (NORCO)  MG PER TABLET     Take 1 tablet by mouth 3 (three) times daily as needed.    IPRATROPIUM (ATROVENT) 42 MCG (0.06 %) NASAL SPRAY    2 sprays by Each Nostril route 2 (two) times daily.    ON-Q PAIN PUMP    by Misc.(Non-Drug; Combo Route) route.    PANTOPRAZOLE (PROTONIX) 40 MG TABLET    Take 1 tablet (40 mg total) by mouth once daily.    RANOLAZINE (RANEXA) 500 MG TB12    Take 1 tablet (500 mg total) by mouth 2 (two) times daily.    ROSUVASTATIN (CRESTOR) 5 MG TABLET    TAKE 1 TABLET BY MOUTH ONCE A DAY   Discontinued Medications    POTASSIUM CHLORIDE (MICRO-K) 10 MEQ CPSR    Take 4 tablets 40 meq daily for 2 days; then reduce to 1 tablet 10 meq every other day with taking diuretic (lasix).        Objective:     Vitals:    05/16/25 1436   BP: 119/70   Pulse: 83   Temp: 97.7 °F (36.5 °C)       Physical Exam  Vitals reviewed.   Constitutional:       Appearance: Normal appearance. She is underweight.   HENT:      Head: Normocephalic and atraumatic.      Right Ear: External ear normal.      Left Ear: External ear normal.   Cardiovascular:      Rate and Rhythm: Normal rate and regular rhythm.      Heart sounds: S1 normal and S2 normal. Murmur heard.   Pulmonary:      Effort: Pulmonary effort is normal.      Breath sounds: Decreased air movement present. Examination of the right-upper field reveals decreased breath sounds. Examination of the left-upper field reveals decreased breath sounds. Examination of the right-middle field reveals decreased breath sounds. Examination of the left-middle field reveals decreased breath sounds. Examination of the right-lower field reveals decreased breath sounds. Examination of the left-lower field reveals decreased breath sounds. Decreased breath sounds present.   Abdominal:      General: There is no distension.   Musculoskeletal:         General: Normal range of motion.      Cervical back: Normal range of motion.   Skin:     General: Skin is warm and dry.      Findings: Bruising present.   Neurological:       General: No focal deficit present.      Mental Status: She is alert and oriented to person, place, and time.   Psychiatric:         Attention and Perception: Attention and perception normal.         Mood and Affect: Mood and affect normal.         Speech: Speech normal.         Behavior: Behavior normal. Behavior is cooperative.         Thought Content: Thought content normal.         Cognition and Memory: Cognition and memory normal.         Judgment: Judgment normal.         Assessment:     Problem List Items Addressed This Visit       B12 deficiency    Relevant Orders    CBC Auto Differential    Comprehensive Metabolic Panel    Vitamin B12    Hypokalemia - Primary    Relevant Medications    potassium chloride (MICRO-K) 10 MEQ CpSR    Other Relevant Orders    Comprehensive Metabolic Panel     Other Visit Diagnoses         History of iron deficiency anemia        Relevant Orders    CBC Auto Differential    Comprehensive Metabolic Panel    Ferritin    Iron and TIBC      Macrocytic anemia        Relevant Orders    CBC Auto Differential    Comprehensive Metabolic Panel    Vitamin B12    Folate                Lab Results   Component Value Date    WBC 6.07 05/09/2025    RBC 3.98 (L) 05/09/2025    HGB 12.6 05/09/2025    HCT 39.7 05/09/2025     (H) 05/09/2025    MCH 31.7 (H) 05/09/2025    MCHC 31.7 (L) 05/09/2025    RDW 16.8 (H) 05/09/2025     05/09/2025    MPV 8.6 (L) 05/09/2025    GRAN 3.3 01/14/2025    GRAN 51.7 01/14/2025    LYMPH 38.2 05/09/2025    LYMPH 2.32 05/09/2025    MONO 7.7 05/09/2025    MONO 0.47 05/09/2025    EOS 3.3 05/09/2025    EOS 0.20 05/09/2025    BASO 0.07 01/14/2025    EOSINOPHIL 2.2 01/14/2025    BASOPHIL 1.2 05/09/2025    BASOPHIL 0.07 05/09/2025      Lab Results   Component Value Date     05/09/2025    K 5.2 (H) 05/09/2025     05/09/2025    CO2 23 05/09/2025    BUN 19 05/09/2025    CREATININE 0.8 05/09/2025    CALCIUM 9.7 05/09/2025    ANIONGAP 13 05/09/2025     ESTGFRAFRICA >60.0 06/27/2022    EGFRNONAA >60.0 06/27/2022     Lab Results   Component Value Date    ALT 8 (L) 05/09/2025    AST 15 05/09/2025    ALKPHOS 68 05/09/2025    BILITOT 0.3 05/09/2025     Lab Results   Component Value Date    IRON 72 05/09/2025    TRANSFERRIN 204 05/09/2025    TIBC 302 05/09/2025    FESATURATED 8 (L) 01/14/2025    FERRITIN 270.0 05/09/2025    Sat iron 24%  Lab Results   Component Value Date    RZGJAMPX04 592 05/09/2025     Lab Results   Component Value Date    FOLATE 13.3 07/18/2022     Lab Results   Component Value Date    TSH 1.010 09/06/2023     Lab Results   Component Value Date    IOTSKOQF90 592 05/09/2025     Free light chain is polyclonal indicative of chronic disease.      Plan:   Hypokalemia  -     potassium chloride (MICRO-K) 10 MEQ CpSR; Take potassium chloride 10 meq (1 capsule) by mouth on Monday and on Friday.  Dispense: 24 capsule; Refill: 1  -     Comprehensive Metabolic Panel; Future; Expected date: 05/16/2025    History of iron deficiency anemia  -     CBC Auto Differential; Future; Expected date: 05/16/2025  -     Comprehensive Metabolic Panel; Future; Expected date: 05/16/2025  -     Ferritin; Future; Expected date: 05/16/2025  -     Iron and TIBC; Future; Expected date: 05/16/2025    Macrocytic anemia  -     CBC Auto Differential; Future; Expected date: 05/16/2025  -     Comprehensive Metabolic Panel; Future; Expected date: 05/16/2025  -     Vitamin B12; Future; Expected date: 05/16/2025  -     Folate; Future; Expected date: 05/16/2025    B12 deficiency  -     CBC Auto Differential; Future; Expected date: 05/16/2025  -     Comprehensive Metabolic Panel; Future; Expected date: 05/16/2025  -     Vitamin B12; Future; Expected date: 05/16/2025        Yearly LDCT lung - no longer covered since 80 yo now.  Not interested in cigarette cessation.  Restart Slow Fe daily.  Continue B12 complex vitamin daily.  Restart potassium 10 meq twice weekly on Monday and Friday.  continue  Protonix 40 mg by mouth daily.       Med Onc Chart Routing      Follow up with physician    Follow up with [facility] 3 months. in person visit in [place] with labs prior   Infusion scheduling note   n/a   Injection scheduling note n/a   Labs   Scheduling:  [facility]  Lab interval:  cbc, cmp, iron studies, b12, folate   Imaging   N/a   Pharmacy appointment No pharmacy appointment needed      Other referrals No nutrition appointment needed -        No additional referrals needed  n/a          Total time spent on encounter: 30 minutes    Collaborating Provider:  Dr. Kris Alcocer    Thank You,  Tiarra Mg, LUIS DANIELP-C  Benign Hematology

## 2025-06-20 DIAGNOSIS — I50.32 CHRONIC HEART FAILURE WITH PRESERVED EJECTION FRACTION: ICD-10-CM

## 2025-06-20 DIAGNOSIS — I10 PRIMARY HYPERTENSION: Primary | Chronic | ICD-10-CM

## 2025-06-27 ENCOUNTER — OFFICE VISIT (OUTPATIENT)
Dept: CARDIOLOGY | Facility: CLINIC | Age: 80
End: 2025-06-27
Payer: MEDICARE

## 2025-06-27 ENCOUNTER — HOSPITAL ENCOUNTER (OUTPATIENT)
Dept: CARDIOLOGY | Facility: HOSPITAL | Age: 80
Discharge: HOME | End: 2025-06-27
Attending: INTERNAL MEDICINE
Payer: MEDICARE

## 2025-06-27 VITALS
WEIGHT: 85.75 LBS | HEIGHT: 62 IN | OXYGEN SATURATION: 96 % | BODY MASS INDEX: 15.78 KG/M2 | SYSTOLIC BLOOD PRESSURE: 102 MMHG | DIASTOLIC BLOOD PRESSURE: 62 MMHG | HEART RATE: 82 BPM

## 2025-06-27 DIAGNOSIS — I50.32 CHRONIC HEART FAILURE WITH PRESERVED EJECTION FRACTION: ICD-10-CM

## 2025-06-27 DIAGNOSIS — I10 PRIMARY HYPERTENSION: Chronic | ICD-10-CM

## 2025-06-27 DIAGNOSIS — J44.9 COPD, GROUP D, BY GOLD 2017 CLASSIFICATION: ICD-10-CM

## 2025-06-27 DIAGNOSIS — M54.16 LUMBAR RADICULOPATHY: ICD-10-CM

## 2025-06-27 DIAGNOSIS — Z89.412 STATUS POST AMPUTATION OF GREAT TOE, LEFT: ICD-10-CM

## 2025-06-27 DIAGNOSIS — I70.0 ATHEROSCLEROSIS OF AORTA: ICD-10-CM

## 2025-06-27 DIAGNOSIS — I25.2 HISTORY OF NON-ST ELEVATION MYOCARDIAL INFARCTION (NSTEMI): ICD-10-CM

## 2025-06-27 DIAGNOSIS — F17.210 NICOTINE DEPENDENCE, CIGARETTES, UNCOMPLICATED: ICD-10-CM

## 2025-06-27 DIAGNOSIS — I27.20 PULMONARY HYPERTENSION: Primary | ICD-10-CM

## 2025-06-27 DIAGNOSIS — M25.473 ANKLE SWELLING, UNSPECIFIED LATERALITY: ICD-10-CM

## 2025-06-27 DIAGNOSIS — I73.9 PVD (PERIPHERAL VASCULAR DISEASE): ICD-10-CM

## 2025-06-27 DIAGNOSIS — E87.6 HYPOKALEMIA: ICD-10-CM

## 2025-06-27 LAB
OHS QRS DURATION: 66 MS
OHS QTC CALCULATION: 430 MS

## 2025-06-27 PROCEDURE — 99999 PR PBB SHADOW E&M-EST. PATIENT-LVL III: CPT | Mod: PBBFAC,,, | Performed by: INTERNAL MEDICINE

## 2025-06-27 PROCEDURE — 93010 ELECTROCARDIOGRAM REPORT: CPT | Mod: ,,, | Performed by: INTERNAL MEDICINE

## 2025-06-27 PROCEDURE — 93005 ELECTROCARDIOGRAM TRACING: CPT

## 2025-06-27 RX ORDER — AMLODIPINE BESYLATE 2.5 MG/1
2.5 TABLET ORAL DAILY
Qty: 90 TABLET | Refills: 1 | Status: SHIPPED | OUTPATIENT
Start: 2025-06-27

## 2025-06-27 RX ORDER — ASPIRIN 81 MG/1
81 TABLET ORAL DAILY
Qty: 90 TABLET | Refills: 3 | Status: SHIPPED | OUTPATIENT
Start: 2025-06-27 | End: 2026-06-27

## 2025-06-27 RX ORDER — AMLODIPINE BESYLATE 5 MG/1
5 TABLET ORAL DAILY
Qty: 90 TABLET | Refills: 1 | Status: SHIPPED | OUTPATIENT
Start: 2025-06-27 | End: 2025-06-27 | Stop reason: SDUPTHER

## 2025-06-27 RX ORDER — ROSUVASTATIN CALCIUM 5 MG/1
5 TABLET, COATED ORAL NIGHTLY
Qty: 90 TABLET | Refills: 3 | Status: SHIPPED | OUTPATIENT
Start: 2025-06-27

## 2025-06-27 RX ORDER — RANOLAZINE 500 MG/1
500 TABLET, EXTENDED RELEASE ORAL 2 TIMES DAILY
Qty: 180 TABLET | Refills: 1 | Status: SHIPPED | OUTPATIENT
Start: 2025-06-27 | End: 2026-06-27

## 2025-06-27 RX ORDER — CLOPIDOGREL BISULFATE 75 MG/1
75 TABLET ORAL DAILY
Qty: 90 TABLET | Refills: 1 | Status: SHIPPED | OUTPATIENT
Start: 2025-06-27

## 2025-06-27 RX ORDER — FUROSEMIDE 20 MG/1
20 TABLET ORAL DAILY PRN
Qty: 90 TABLET | Refills: 1 | Status: SHIPPED | OUTPATIENT
Start: 2025-06-27
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
## 2025-06-30 RX ORDER — ERGOCALCIFEROL 1.25 MG/1
50000 CAPSULE ORAL
Qty: 12 CAPSULE | Refills: 3 | OUTPATIENT
Start: 2025-06-30

## 2025-06-30 RX ORDER — RANOLAZINE 500 MG/1
500 TABLET, EXTENDED RELEASE ORAL 2 TIMES DAILY
Qty: 180 TABLET | Refills: 1 | Status: SHIPPED | OUTPATIENT
Start: 2025-06-30 | End: 2026-06-30

## 2025-07-19 ENCOUNTER — HOSPITAL ENCOUNTER (EMERGENCY)
Facility: HOSPITAL | Age: 80
Discharge: HOME | End: 2025-07-19
Attending: EMERGENCY MEDICINE
Payer: MEDICARE

## 2025-07-19 VITALS
WEIGHT: 83.19 LBS | HEART RATE: 102 BPM | DIASTOLIC BLOOD PRESSURE: 63 MMHG | RESPIRATION RATE: 20 BRPM | OXYGEN SATURATION: 95 % | SYSTOLIC BLOOD PRESSURE: 137 MMHG | BODY MASS INDEX: 15.22 KG/M2 | TEMPERATURE: 98 F

## 2025-07-19 DIAGNOSIS — D64.9 ANEMIA, UNSPECIFIED TYPE: ICD-10-CM

## 2025-07-19 DIAGNOSIS — R07.9 CHEST PAIN: ICD-10-CM

## 2025-07-19 DIAGNOSIS — U07.1 ACUTE BRONCHITIS DUE TO COVID-19 VIRUS: Primary | ICD-10-CM

## 2025-07-19 DIAGNOSIS — Z72.0 TOBACCO ABUSE DISORDER: ICD-10-CM

## 2025-07-19 DIAGNOSIS — J20.8 ACUTE BRONCHITIS DUE TO COVID-19 VIRUS: Primary | ICD-10-CM

## 2025-07-19 DIAGNOSIS — U07.1 COVID-19 VIRUS DETECTED: ICD-10-CM

## 2025-07-19 LAB
ABSOLUTE EOSINOPHIL (OHS): 0.01 K/UL
ABSOLUTE MONOCYTE (OHS): 0.55 K/UL (ref 0.3–1)
ABSOLUTE NEUTROPHIL COUNT (OHS): 4.76 K/UL (ref 1.8–7.7)
ALBUMIN SERPL BCP-MCNC: 2.8 G/DL (ref 3.5–5.2)
ALP SERPL-CCNC: 48 UNIT/L (ref 40–150)
ALT SERPL W/O P-5'-P-CCNC: 11 UNIT/L (ref 10–44)
ANION GAP (OHS): 11 MMOL/L (ref 8–16)
AST SERPL-CCNC: 15 UNIT/L (ref 11–45)
BASOPHILS # BLD AUTO: 0.01 K/UL
BASOPHILS NFR BLD AUTO: 0.2 %
BILIRUB SERPL-MCNC: 0.4 MG/DL (ref 0.1–1)
BNP SERPL-MCNC: 164 PG/ML (ref 0–99)
BUN SERPL-MCNC: 6 MG/DL (ref 8–23)
CALCIUM SERPL-MCNC: 9.4 MG/DL (ref 8.7–10.5)
CHLORIDE SERPL-SCNC: 107 MMOL/L (ref 95–110)
CO2 SERPL-SCNC: 17 MMOL/L (ref 23–29)
CREAT SERPL-MCNC: 0.6 MG/DL (ref 0.5–1.4)
ERYTHROCYTE [DISTWIDTH] IN BLOOD BY AUTOMATED COUNT: 15.9 % (ref 11.5–14.5)
ERYTHROCYTE [SEDIMENTATION RATE] IN BLOOD: 117 MM/HR
FERRITIN SERPL-MCNC: 108.7 NG/ML (ref 20–300)
GFR SERPLBLD CREATININE-BSD FMLA CKD-EPI: >60 ML/MIN/1.73/M2
GLUCOSE SERPL-MCNC: 97 MG/DL (ref 70–110)
HCT VFR BLD AUTO: 28.1 % (ref 37–48.5)
HGB BLD-MCNC: 8.4 GM/DL (ref 12–16)
IMM GRANULOCYTES # BLD AUTO: 0.01 K/UL (ref 0–0.04)
IMM GRANULOCYTES NFR BLD AUTO: 0.2 % (ref 0–0.5)
LYMPHOCYTES # BLD AUTO: 1.07 K/UL (ref 1–4.8)
MCH RBC QN AUTO: 31.7 PG (ref 27–31)
MCHC RBC AUTO-ENTMCNC: 29.9 G/DL (ref 32–36)
MCV RBC AUTO: 106 FL (ref 82–98)
NUCLEATED RBC (/100WBC) (OHS): 0 /100 WBC
PLATELET # BLD AUTO: 283 K/UL (ref 150–450)
PMV BLD AUTO: 9.3 FL (ref 9.2–12.9)
POTASSIUM SERPL-SCNC: 4.8 MMOL/L (ref 3.5–5.1)
PROT SERPL-MCNC: 6.3 GM/DL (ref 6–8.4)
RBC # BLD AUTO: 2.65 M/UL (ref 4–5.4)
RELATIVE EOSINOPHIL (OHS): 0.2 %
RELATIVE LYMPHOCYTE (OHS): 16.7 % (ref 18–48)
RELATIVE MONOCYTE (OHS): 8.6 % (ref 4–15)
RELATIVE NEUTROPHIL (OHS): 74.1 % (ref 38–73)
SARS-COV-2 RDRP RESP QL NAA+PROBE: POSITIVE
SODIUM SERPL-SCNC: 135 MMOL/L (ref 136–145)
TROPONIN I SERPL DL<=0.01 NG/ML-MCNC: <0.006 NG/ML
WBC # BLD AUTO: 6.41 K/UL (ref 3.9–12.7)

## 2025-07-19 PROCEDURE — 99285 EMERGENCY DEPT VISIT HI MDM: CPT | Mod: 25,HCNC

## 2025-07-19 PROCEDURE — 93005 ELECTROCARDIOGRAM TRACING: CPT | Mod: HCNC

## 2025-07-19 PROCEDURE — 93010 ELECTROCARDIOGRAM REPORT: CPT | Mod: HCNC,,, | Performed by: INTERNAL MEDICINE

## 2025-07-19 PROCEDURE — U0002 COVID-19 LAB TEST NON-CDC: HCPCS | Mod: HCNC | Performed by: EMERGENCY MEDICINE

## 2025-07-19 PROCEDURE — 96374 THER/PROPH/DIAG INJ IV PUSH: CPT | Mod: HCNC

## 2025-07-19 PROCEDURE — 85652 RBC SED RATE AUTOMATED: CPT | Mod: HCNC | Performed by: EMERGENCY MEDICINE

## 2025-07-19 PROCEDURE — 85025 COMPLETE CBC W/AUTO DIFF WBC: CPT | Mod: HCNC | Performed by: EMERGENCY MEDICINE

## 2025-07-19 PROCEDURE — 82728 ASSAY OF FERRITIN: CPT | Mod: HCNC | Performed by: EMERGENCY MEDICINE

## 2025-07-19 PROCEDURE — 84484 ASSAY OF TROPONIN QUANT: CPT | Mod: HCNC | Performed by: EMERGENCY MEDICINE

## 2025-07-19 PROCEDURE — 63600175 PHARM REV CODE 636 W HCPCS: Mod: JZ,TB,HCNC | Performed by: EMERGENCY MEDICINE

## 2025-07-19 PROCEDURE — 94761 N-INVAS EAR/PLS OXIMETRY MLT: CPT | Mod: HCNC

## 2025-07-19 PROCEDURE — 94640 AIRWAY INHALATION TREATMENT: CPT | Mod: HCNC

## 2025-07-19 PROCEDURE — 82040 ASSAY OF SERUM ALBUMIN: CPT | Mod: HCNC | Performed by: EMERGENCY MEDICINE

## 2025-07-19 PROCEDURE — 83880 ASSAY OF NATRIURETIC PEPTIDE: CPT | Mod: HCNC | Performed by: EMERGENCY MEDICINE

## 2025-07-19 PROCEDURE — 25000242 PHARM REV CODE 250 ALT 637 W/ HCPCS: Mod: HCNC | Performed by: EMERGENCY MEDICINE

## 2025-07-19 RX ORDER — IPRATROPIUM BROMIDE AND ALBUTEROL SULFATE 2.5; .5 MG/3ML; MG/3ML
3 SOLUTION RESPIRATORY (INHALATION)
Status: COMPLETED | OUTPATIENT
Start: 2025-07-19 | End: 2025-07-19

## 2025-07-19 RX ORDER — METHYLPREDNISOLONE 4 MG/1
TABLET ORAL
Qty: 1 EACH | Refills: 0 | Status: SHIPPED | OUTPATIENT
Start: 2025-07-19 | End: 2025-08-09

## 2025-07-19 RX ORDER — IBUPROFEN 200 MG
1 TABLET ORAL DAILY
Qty: 28 PATCH | Refills: 3 | Status: SHIPPED | OUTPATIENT
Start: 2025-07-19

## 2025-07-19 RX ORDER — GUAIFENESIN AND DEXTROMETHORPHAN HYDROBROMIDE 10; 100 MG/5ML; MG/5ML
10 SYRUP ORAL ONCE
Status: DISCONTINUED | OUTPATIENT
Start: 2025-07-19 | End: 2025-07-19 | Stop reason: HOSPADM

## 2025-07-19 RX ORDER — METHYLPREDNISOLONE SOD SUCC 125 MG
125 VIAL (EA) INJECTION
Status: COMPLETED | OUTPATIENT
Start: 2025-07-19 | End: 2025-07-19

## 2025-07-19 RX ORDER — GUAIFENESIN AND DEXTROMETHORPHAN HYDROBROMIDE 10; 100 MG/5ML; MG/5ML
5 SYRUP ORAL 4 TIMES DAILY PRN
Qty: 120 ML | Refills: 0 | Status: SHIPPED | OUTPATIENT
Start: 2025-07-19 | End: 2025-07-29

## 2025-07-19 RX ADMIN — METHYLPREDNISOLONE SODIUM SUCCINATE 125 MG: 125 INJECTION, POWDER, FOR SOLUTION INTRAMUSCULAR; INTRAVENOUS at 12:07

## 2025-07-19 RX ADMIN — IPRATROPIUM BROMIDE AND ALBUTEROL SULFATE 3 ML: 2.5; .5 SOLUTION RESPIRATORY (INHALATION) at 12:07

## 2025-07-19 NOTE — ED PROVIDER NOTES
SCRIBE #1 NOTE: I, Joy Wolfe, am scribing for, and in the presence of, Aparna Riley MD. I have scribed the entire note.       History     Chief Complaint   Patient presents with    Chest Pain     Chest pain and shortness of breath started yesterday. Tested positive for Covid 3 days ago.      Review of patient's allergies indicates:   Allergen Reactions    Indomethacin      Other reaction(s): Headache    K-flex Other (See Comments)     Pancreatitis     Latex, natural rubber Swelling    Penicillins      Other reaction(s): Vomiting    Patient tolerated Rocephin during the 4/2021 encounter and she states that she has tolerated Keflex in the past without issue.    Sulfa (sulfonamide antibiotics) Other (See Comments)     hallucinations    Tolmetin      Other reaction(s): Hives    Bactroban [mupirocin calcium] Rash     Burned skin          History of Present Illness     HPI    7/19/2025, 11:46 AM  History obtained from the patient and medical records      History of Present Illness: Keyona Dwyer is a 79 y.o. female patient with a PMHx of HTN, HLD, anemia, pneumonia, CAD, PVD, leukocytosis, and pancreatitis who presents to the Emergency Department for evaluation of chest pain and a cough which began yesterday. Pt tested positive for COVID-19 on Wednesday. Pt used an at home test. Pt states she feels pain in her chest every time she coughs. She sometimes cough up phlegm and other times nothing. Symptoms are constant and moderate in severity. No mitigating or exacerbating factors reported. Associated sxs include SOB. Patient denies any fever, chills, N/V/D, dizziness. No prior Tx specified. Pt smokes cigarettes. Pt denies using any O2 at home. No further complaints or concerns at this time.       Arrival mode: Personal Transportation    PCP: Aguilar De La Rosa MD        Past Medical History:  Past Medical History:   Diagnosis Date    ACP (advance care planning) 03/08/2024    Acute diastolic heart failure  09/15/2022    Acute respiratory failure with hypoxia and hypercarbia 09/14/2022    Amputation of left great toe 03/16/2022    Anemia     hematol    Anxiety     CAD (coronary artery disease)     Cancer     Cataract     Chronic back pain     Dr. Guzman    Chronic pain 04/16/2024    Closed fracture of one rib of left side 03/06/2024    Coronary artery disease without angina pectoris 03/06/2024    Fibrocystic breast     Glaucoma     Hyperlipemia     Hypertension     Idiopathic acute pancreatitis 06/18/2022    Idiopathic acute pancreatitis 06/18/2022    Leukocytosis 06/18/2022    NSTEMI (non-ST elevated myocardial infarction) 09/11/2022    Osteoarthritis     Osteoporosis     Rheumatology/on [place]ia    Other chronic osteomyelitis of left foot 04/16/2024    Pancreatitis     Pneumonia     Polyneuropathy     PVD (peripheral vascular disease) 04/12/2021    PVD (peripheral vascular disease)     dr edmondson    Restless leg syndrome     Subdural hematoma 03/2024    Subdural hematoma, acute 03/06/2024    Tobacco dependence     Trouble in sleeping        Past Surgical History:  Past Surgical History:   Procedure Laterality Date    ANGIOGRAPHY OF LOWER EXTREMITY N/A 04/12/2021    Procedure: ANGIOGRAM, LOWER EXTREMITY/left leg;  Surgeon: Maico Edmondson MD;  Location: [facility] CATH LAB;  Service: Vascular;  Laterality: N/A;  req 1130 start time/Rm A243A    AORTOGRAPHY WITH SERIALOGRAPHY N/A 04/14/2021    Procedure: AORTOGRAM, WITH RUNOFF;  Surgeon: Maico Edmondson MD;  Location: [facility] CATH LAB;  Service: Vascular;  Laterality: N/A;  left iliac stent with shockwave/req 1030 start    CATARACT EXTRACTION W/  INTRAOCULAR LENS IMPLANT Bilateral 8/ 9 2017    ESOPHAGOGASTRODUODENOSCOPY N/A 09/13/2022    Procedure: EGD (ESOPHAGOGASTRODUODENOSCOPY);  Surgeon: Fern Rasmussen MD;  Location: [facility] ENDO;  Service: Endoscopy;  Laterality: N/A;    GALLBLADDER SURGERY      HYSTERECTOMY  1972    LUNG SURGERY Right age 15    lung collpase    SHOULDER SURGERY       TOE AMPUTATION Left 04/15/2021    Procedure: AMPUTATION, TOE;  Surgeon: Taylor Anderson DPM;  Location: [facility];  Service: Podiatry;  Laterality: Left;  Hallux (Great Toe)    TOTAL HIP ARTHROPLASTY Bilateral     TOTAL KNEE ARTHROPLASTY Bilateral          Family History:  Family History   Problem Relation Name Age of Onset    Cancer Mother      Breast cancer Mother      Hyperlipidemia Father      Heart failure Father      Heart disease Brother      Brain cancer Brother      Stroke Brother      Alcohol abuse Brother         Social History:  Social History     Tobacco Use    Smoking status: Every Day     Current packs/day: 0.50     Average packs/day: 0.5 packs/day for 63.0 years (31.5 ttl pk-yrs)     Types: Cigarettes     Passive exposure: Never    Smokeless tobacco: Current    Tobacco comments:     started age of  11    Substance and Sexual Activity    Alcohol use: No    Drug use: No    Sexual activity: Yes     Partners: Male        Review of Systems     Review of Systems   Constitutional:  Negative for fever.   HENT:  Negative for sore throat.    Respiratory:  Positive for cough and shortness of breath.    Cardiovascular:  Positive for chest pain.   Gastrointestinal:  Negative for nausea.   Genitourinary:  Negative for dysuria.   Musculoskeletal:  Negative for back pain.   Skin:  Negative for rash.   Neurological:  Negative for weakness.   Hematological:  Does not bruise/bleed easily.   All other systems reviewed and are negative.     Physical Exam     Initial Vitals [07/19/25 1025]   BP Pulse Resp Temp SpO2   (!) 145/66 (!) 118 (!) 28 98.3 °F (36.8 °C) 100 %      MAP       --          Physical Exam  Nursing Notes and Vital Signs Reviewed.  Constitutional: Patient is in no apparent distress. Chronically ill appearing and frail.  Head: Atraumatic. Normocephalic.  Eyes: PERRL. EOM intact. Conjunctivae are not pale. No scleral icterus.  ENT: Mucous membranes are moist. Oropharynx is clear and symmetric.    Neck:  Supple. Full ROM. No lymphadenopathy.  Cardiovascular: Regular rate. Regular rhythm. No murmurs, rubs, or gallops. Distal pulses are 2+ and symmetric.  Pulmonary/Chest: Productive cough. Crackles bilaterally (left > right).  Abdominal: Soft and non-distended. There is no tenderness.  No rebound, guarding, or rigidity. Good bowel sounds.  Genitourinary: No CVA tenderness.  Musculoskeletal: Moves all extremities. No obvious deformities. No edema. No calf tenderness.  Skin: Warm and dry.  Neurological:  Alert, awake, and appropriate.  Normal speech.  No acute focal neurological deficits are appreciated.  Psychiatric: Normal affect. Good eye contact. Appropriate in content.     ED Course   Procedures  ED Vital Signs:  Vitals:    07/19/25 1025 07/19/25 1115 07/19/25 1200 07/19/25 1229   BP: (!) 145/66 (!) 149/67 (!) 150/69    Pulse: (!) 118 91 88 92   Resp: (!) 28 19 (!) 22 18   Temp: 98.3 °F (36.8 °C)      TempSrc: Oral      SpO2: 100% 97% 100% 100%   Weight: 37.7 kg (83 lb 3.2 oz)       07/19/25 1245 07/19/25 1315 07/19/25 1415   BP: (!) 140/64 137/64 137/63   Pulse: 89 96 102   Resp: 20 20 20   Temp:      TempSrc:      SpO2: 99% 97% 95%   Weight:          Abnormal Lab Results:  Labs Reviewed   COMPREHENSIVE METABOLIC PANEL - Abnormal       Result Value    Sodium 135 (*)     Potassium 4.8      Chloride 107      CO2 17 (*)     Glucose 97      BUN 6 (*)     Creatinine 0.6      Calcium 9.4      Protein Total 6.3      Albumin 2.8 (*)     Bilirubin Total 0.4      ALP 48      AST 15      ALT 11      Anion Gap 11      eGFR >60     B-TYPE NATRIURETIC PEPTIDE - Abnormal     (*)    CBC WITH DIFFERENTIAL - Abnormal    WBC 6.41      RBC 2.65 (*)     HGB 8.4 (*)     HCT 28.1 (*)      (*)     MCH 31.7 (*)     MCHC 29.9 (*)     RDW 15.9 (*)     Platelet Count 283      MPV 9.3      Nucleated RBC 0      Neut % 74.1 (*)     Lymph % 16.7 (*)     Mono % 8.6      Eos % 0.2      Basophil % 0.2      Imm Grans % 0.2      Neut #  4.76      Lymph # 1.07      Mono # 0.55      Eos # 0.01      Baso # 0.01      Imm Grans # 0.01     SARS-COV-2 RNA AMPLIFICATION, QUAL - Abnormal    SARS COV-2 Molecular Positive (*)    SEDIMENTATION RATE - Abnormal    Sed Rate 117 (*)    TROPONIN I - Normal    Troponin-I <0.006     FERRITIN - Normal    Ferritin 108.7     CBC W/ AUTO DIFFERENTIAL    Narrative:     The following orders were created for panel order CBC auto differential.  Procedure                               Abnormality         Status                     ---------                               -----------         ------                     CBC with Differential[[id number]]       Abnormal            Final result                 Please view results for these tests on the individual orders.        All Lab Results:  Results for orders placed or performed during the hospital encounter of 07/19/25   EKG 12-lead    Collection Time: 07/19/25 10:28 AM   Result Value Ref Range    QRS Duration 64 ms    [facility] QTC Calculation 433 ms   COVID-19 Rapid Screening    Collection Time: 07/19/25 10:59 AM   Result Value Ref Range    SARS COV-2 Molecular Positive (A) Negative   Comprehensive metabolic panel    Collection Time: 07/19/25 11:13 AM   Result Value Ref Range    Sodium 135 (L) 136 - 145 mmol/L    Potassium 4.8 3.5 - 5.1 mmol/L    Chloride 107 95 - 110 mmol/L    CO2 17 (L) 23 - 29 mmol/L    Glucose 97 70 - 110 mg/dL    BUN 6 (L) 8 - 23 mg/dL    Creatinine 0.6 0.5 - 1.4 mg/dL    Calcium 9.4 8.7 - 10.5 mg/dL    Protein Total 6.3 6.0 - 8.4 gm/dL    Albumin 2.8 (L) 3.5 - 5.2 g/dL    Bilirubin Total 0.4 0.1 - 1.0 mg/dL    ALP 48 40 - 150 unit/L    AST 15 11 - 45 unit/L    ALT 11 10 - 44 unit/L    Anion Gap 11 8 - 16 mmol/L    eGFR >60 >60 mL/min/1.73/m2   Troponin I #1    Collection Time: 07/19/25 11:13 AM   Result Value Ref Range    Troponin-I <0.006 <=0.026 ng/mL   BNP    Collection Time: 07/19/25 11:13 AM   Result Value Ref Range     (H) 0 - 99 pg/mL   CBC  with Differential    Collection Time: 07/19/25 11:13 AM   Result Value Ref Range    WBC 6.41 3.90 - 12.70 K/uL    RBC 2.65 (L) 4.00 - 5.40 M/uL    HGB 8.4 (L) 12.0 - 16.0 gm/dL    HCT 28.1 (L) 37.0 - 48.5 %     (H) 82 - 98 fL    MCH 31.7 (H) 27.0 - 31.0 pg    MCHC 29.9 (L) 32.0 - 36.0 g/dL    RDW 15.9 (H) 11.5 - 14.5 %    Platelet Count 283 150 - 450 K/uL    MPV 9.3 9.2 - 12.9 fL    Nucleated RBC 0 <=0 /100 WBC    Neut % 74.1 (H) 38 - 73 %    Lymph % 16.7 (L) 18 - 48 %    Mono % 8.6 4 - 15 %    Eos % 0.2 <=8 %    Basophil % 0.2 <=1.9 %    Imm Grans % 0.2 0.0 - 0.5 %    Neut # 4.76 1.8 - 7.7 K/uL    Lymph # 1.07 1 - 4.8 K/uL    Mono # 0.55 0.3 - 1 K/uL    Eos # 0.01 <=0.5 K/uL    Baso # 0.01 <=0.2 K/uL    Imm Grans # 0.01 0.00 - 0.04 K/uL   Sedimentation rate    Collection Time: 07/19/25 11:13 AM   Result Value Ref Range    Sed Rate 117 (H) <=36 mm/hr   Ferritin    Collection Time: 07/19/25 11:13 AM   Result Value Ref Range    Ferritin 108.7 20.0 - 300.0 ng/mL       Imaging Results:  Imaging Results              X-Ray Chest AP Portable (Final result)  Result time 07/19/25 12:29:20      Final result by Guillermo Harden MD (07/19/25 12:29:20)                   Impression:     As above.    Finalized on: 7/19/2025 12:29 PM By:  Guillermo Harden MD  [facility]# [id number]      2025-07-19 12:31:21.371     [facility]               Narrative:    EXAM: XR CHEST AP PORTABLE    CLINICAL HISTORY: Chest pain.    FINDINGS:  No pleural fluid or pneumothorax.  Borderline cardiomegaly.  Aortic atherosclerosis.  Degenerative change of the shoulders.  No acute pulmonary opacities.                                         The EKG was ordered, reviewed, and independently interpreted by the ED provider.  Interpretation time: 10:28  Rate: 106 BPM  Rhythm: sinus tachycardia  Interpretation: Cannot rule out anterior infarct, age undetermined. Abnormal ECG. No STEMI.           The Emergency Provider reviewed the vital signs and test results, which  are outlined above.     ED Discussion     1:36 PM: Reassessed pt at this time. Discussed with patient and/or family/caretaker all pertinent ED information and results. Discussed pt dx and plan of tx. Gave the patient all f/u and return to the ED instructions. All questions and concerns were addressed at this time. Patient and/or family/caretaker expresses understanding of information and instructions, and is comfortable with plan to discharge. Pt is stable for discharge.     I discussed with patient and/or family/caretaker that evaluation in the ED does not suggest any emergent or life threatening medical conditions requiring immediate intervention beyond what was provided in the ED, and I believe patient is safe for discharge. Regardless, an unremarkable evaluation in the ED does not preclude the development or presence of a serious or life threatening condition. As such, I instructed that the patient is to return immediately for any worsening or change in current symptoms.         Medical Decision Making  DDX: 1. Covid Pneumonia 2. Bronchitis 3. Pleurisy 4. PTX    ECG sinus tachycardia, no acute ischemic changes, CXR negative, lab work reviewed and wbc normal, h/h lower than baseline, no evidence or complaints of acute blood loss and vital signs are stable,  bicarb 17 and kidney fxn normal, troponin normal, bnp normal, ferritin normal, sed rate mildly elevated.  Admission initially considered as patient covid positive however vital signs are stable, doesn't appear acutely ill and overall appears stable for discharge.     Amount and/or Complexity of Data Reviewed  Labs: ordered. Decision-making details documented in ED Course.  Radiology: ordered. Decision-making details documented in ED Course.  ECG/medicine tests: ordered and independent interpretation performed. Decision-making details documented in ED Course.    Risk  OTC drugs.  Prescription drug management.       Additional MDM:   Smoking Cessation: The  patient is a smoker. The patient was counseled on smoking cessation for: 4 minutes. The patient was counseled on tobacco related  health complications.           ED Medication(s):  Medications   albuterol-ipratropium 2.5 mg-0.5 mg/3 mL nebulizer solution 3 mL (3 mLs Nebulization Given 7/19/25 1229)   methylPREDNISolone sodium succinate injection 125 mg (125 mg Intravenous Given 7/19/25 1234)       Discharge Medication List as of 7/19/2025  1:37 PM        START taking these medications    Details   dextromethorphan-guaiFENesin  mg/5 ml (ROBITUSSIN-DM)  mg/5 mL liquid Take 5 mLs by mouth 4 (four) times daily as needed (cough)., Starting Sat 7/19/2025, Until Tue 7/29/2025 at 2359, Normal      methylPREDNISolone (MEDROL DOSEPACK) 4 mg tablet Take as directed, Normal      nicotine (NICODERM CQ) 21 mg/24 hr Place 1 patch onto the skin once daily., Starting Sat 7/19/2025, Normal              Follow-up Information       Aguilar De La Rosa MD. Schedule an appointment as soon as possible for a visit in 2 days.    Specialty: Family Medicine  Contact information:  [id number]  [place] [address] [phone]                                 Scribe Attestation:   Scribe #1: I performed the above scribed service and the documentation accurately describes the services I performed. I attest to the accuracy of the note.     Attending:   Physician Attestation Statement for Scribe #1: I, Aparna Riley MD, personally performed the services described in this documentation, as scribed by Joy Wolfe, in my presence, and it is both accurate and complete.           Clinical Impression       ICD-10-CM ICD-9-CM   1. Acute bronchitis due to COVID-19 virus  U07.1 466.0    J20.8 079.89   2. Chest pain  R07.9 786.50   3. Tobacco abuse disorder  Z72.0 305.1   4. Anemia, unspecified type  D64.9 285.9       Disposition:   Disposition: Discharged  Condition: Stable         Aparna Riley MD  07/22/25 [id number]

## 2025-07-20 LAB
OHS QRS DURATION: 64 MS
OHS QTC CALCULATION: 433 MS

## 2025-08-04 ENCOUNTER — TELEPHONE (OUTPATIENT)
Dept: INTERNAL MEDICINE | Facility: CLINIC | Age: 80
End: 2025-08-04
Payer: MEDICARE

## 2025-08-04 NOTE — TELEPHONE ENCOUNTER
Spoke w/[facility] re: pt HR-109. Pt is not in any distress, denies any c/o chest pain, SOB, dizziness. She only needed to report HR d/t outside of HH parameters of 60-100bpm. (Note in reference to CRM)///TF

## 2025-08-06 ENCOUNTER — TELEPHONE (OUTPATIENT)
Dept: INTERNAL MEDICINE | Facility: CLINIC | Age: 80
End: 2025-08-06
Payer: MEDICARE

## 2025-08-06 ENCOUNTER — LAB VISIT (OUTPATIENT)
Dept: LAB | Facility: HOSPITAL | Age: 80
End: 2025-08-06
Attending: NURSE PRACTITIONER
Payer: MEDICARE

## 2025-08-06 ENCOUNTER — TELEPHONE (OUTPATIENT)
Dept: INTERNAL MEDICINE | Facility: CLINIC | Age: 80
End: 2025-08-06

## 2025-08-06 ENCOUNTER — OFFICE VISIT (OUTPATIENT)
Dept: INTERNAL MEDICINE | Facility: CLINIC | Age: 80
End: 2025-08-06
Payer: MEDICARE

## 2025-08-06 VITALS
SYSTOLIC BLOOD PRESSURE: 102 MMHG | HEIGHT: 62 IN | TEMPERATURE: 99 F | HEART RATE: 114 BPM | BODY MASS INDEX: 17.21 KG/M2 | WEIGHT: 93.5 LBS | DIASTOLIC BLOOD PRESSURE: 54 MMHG | OXYGEN SATURATION: 96 %

## 2025-08-06 DIAGNOSIS — J44.9 COPD, GROUP D, BY GOLD 2017 CLASSIFICATION: ICD-10-CM

## 2025-08-06 DIAGNOSIS — D53.9 MACROCYTIC ANEMIA: ICD-10-CM

## 2025-08-06 DIAGNOSIS — F17.210 NICOTINE DEPENDENCE, CIGARETTES, UNCOMPLICATED: Primary | Chronic | ICD-10-CM

## 2025-08-06 DIAGNOSIS — Z86.2 HISTORY OF IRON DEFICIENCY ANEMIA: ICD-10-CM

## 2025-08-06 DIAGNOSIS — D50.9 IRON DEFICIENCY ANEMIA, UNSPECIFIED IRON DEFICIENCY ANEMIA TYPE: ICD-10-CM

## 2025-08-06 DIAGNOSIS — E53.8 B12 DEFICIENCY: ICD-10-CM

## 2025-08-06 DIAGNOSIS — E87.6 HYPOKALEMIA: ICD-10-CM

## 2025-08-06 DIAGNOSIS — L89.42: ICD-10-CM

## 2025-08-06 LAB
ABSOLUTE EOSINOPHIL (OHS): 0.14 K/UL
ABSOLUTE MONOCYTE (OHS): 0.63 K/UL (ref 0.3–1)
ABSOLUTE NEUTROPHIL COUNT (OHS): 7.08 K/UL (ref 1.8–7.7)
ALBUMIN SERPL BCP-MCNC: 2.7 G/DL (ref 3.5–5.2)
ALP SERPL-CCNC: 45 UNIT/L (ref 40–150)
ALT SERPL W/O P-5'-P-CCNC: 177 UNIT/L (ref 10–44)
ANION GAP (OHS): 10 MMOL/L (ref 8–16)
AST SERPL-CCNC: 184 UNIT/L (ref 11–45)
BASOPHILS # BLD AUTO: 0.02 K/UL
BASOPHILS NFR BLD AUTO: 0.2 %
BILIRUB SERPL-MCNC: 0.4 MG/DL (ref 0.1–1)
BUN SERPL-MCNC: 26 MG/DL (ref 8–23)
CALCIUM SERPL-MCNC: 8.4 MG/DL (ref 8.7–10.5)
CHLORIDE SERPL-SCNC: 104 MMOL/L (ref 95–110)
CO2 SERPL-SCNC: 26 MMOL/L (ref 23–29)
CREAT SERPL-MCNC: 0.7 MG/DL (ref 0.5–1.4)
ERYTHROCYTE [DISTWIDTH] IN BLOOD BY AUTOMATED COUNT: 19.3 % (ref 11.5–14.5)
FERRITIN SERPL-MCNC: 2156 NG/ML (ref 20–300)
FOLATE SERPL-MCNC: 10.7 NG/ML (ref 4–24)
GFR SERPLBLD CREATININE-BSD FMLA CKD-EPI: >60 ML/MIN/1.73/M2
GLUCOSE SERPL-MCNC: 83 MG/DL (ref 70–110)
HCT VFR BLD AUTO: 32.1 % (ref 37–48.5)
HGB BLD-MCNC: 9.8 GM/DL (ref 12–16)
IMM GRANULOCYTES # BLD AUTO: 0.17 K/UL (ref 0–0.04)
IMM GRANULOCYTES NFR BLD AUTO: 1.6 % (ref 0–0.5)
IRON SATN MFR SERPL: 27 % (ref 20–50)
IRON SERPL-MCNC: 82 UG/DL (ref 30–160)
LYMPHOCYTES # BLD AUTO: 2.37 K/UL (ref 1–4.8)
MCH RBC QN AUTO: 30.5 PG (ref 27–31)
MCHC RBC AUTO-ENTMCNC: 30.5 G/DL (ref 32–36)
MCV RBC AUTO: 100 FL (ref 82–98)
NUCLEATED RBC (/100WBC) (OHS): 0 /100 WBC
PLATELET # BLD AUTO: 393 K/UL (ref 150–450)
PMV BLD AUTO: 9.1 FL (ref 9.2–12.9)
POTASSIUM SERPL-SCNC: 5.3 MMOL/L (ref 3.5–5.1)
PROT SERPL-MCNC: 5 GM/DL (ref 6–8.4)
RBC # BLD AUTO: 3.21 M/UL (ref 4–5.4)
RELATIVE EOSINOPHIL (OHS): 1.3 %
RELATIVE LYMPHOCYTE (OHS): 22.8 % (ref 18–48)
RELATIVE MONOCYTE (OHS): 6.1 % (ref 4–15)
RELATIVE NEUTROPHIL (OHS): 68 % (ref 38–73)
SODIUM SERPL-SCNC: 140 MMOL/L (ref 136–145)
TIBC SERPL-MCNC: 308 UG/DL (ref 250–450)
TRANSFERRIN SERPL-MCNC: 208 MG/DL (ref 200–375)
VIT B12 SERPL-MCNC: 677 PG/ML (ref 210–950)
WBC # BLD AUTO: 10.41 K/UL (ref 3.9–12.7)

## 2025-08-06 PROCEDURE — 99999 PR PBB SHADOW E&M-EST. PATIENT-LVL V: CPT | Mod: PBBFAC,HCNC,, | Performed by: PEDIATRICS

## 2025-08-06 PROCEDURE — 80053 COMPREHEN METABOLIC PANEL: CPT | Mod: HCNC

## 2025-08-06 PROCEDURE — 85025 COMPLETE CBC W/AUTO DIFF WBC: CPT | Mod: HCNC

## 2025-08-06 PROCEDURE — 82607 VITAMIN B-12: CPT | Mod: HCNC

## 2025-08-06 PROCEDURE — 82728 ASSAY OF FERRITIN: CPT | Mod: HCNC

## 2025-08-06 PROCEDURE — 36415 COLL VENOUS BLD VENIPUNCTURE: CPT | Mod: HCNC

## 2025-08-06 PROCEDURE — 82746 ASSAY OF FOLIC ACID SERUM: CPT | Mod: HCNC

## 2025-08-06 PROCEDURE — 84466 ASSAY OF TRANSFERRIN: CPT | Mod: HCNC

## 2025-08-06 RX ORDER — ALBUTEROL SULFATE 90 UG/1
2 INHALANT RESPIRATORY (INHALATION) EVERY 6 HOURS PRN
Qty: 18 G | Refills: 11 | Status: SHIPPED | OUTPATIENT
Start: 2025-08-06

## 2025-08-06 RX ORDER — FLUTICASONE FUROATE, UMECLIDINIUM BROMIDE AND VILANTEROL TRIFENATATE 100; 62.5; 25 UG/1; UG/1; UG/1
1 POWDER RESPIRATORY (INHALATION) DAILY
Qty: 1 EACH | Refills: 11 | Status: SHIPPED | OUTPATIENT
Start: 2025-08-06

## 2025-08-06 RX ORDER — CYPROHEPTADINE HYDROCHLORIDE 4 MG/1
4 TABLET ORAL 3 TIMES DAILY
Qty: 90 TABLET | Refills: 0 | Status: SHIPPED | OUTPATIENT
Start: 2025-08-06 | End: 2025-09-05

## 2025-08-06 RX ORDER — CYPROHEPTADINE HYDROCHLORIDE 4 MG/1
4 TABLET ORAL 3 TIMES DAILY
COMMUNITY
Start: 2025-07-28 | End: 2025-08-06 | Stop reason: SDUPTHER

## 2025-08-06 NOTE — TELEPHONE ENCOUNTER
Good morning. Dr. Dominguez wanted me to reach out to Dr. Beyer's staff to get this patient an appointment soon. Please contact pt to schedule. Thank you

## 2025-08-06 NOTE — PROGRESS NOTES
Patient ID: Keyona Dwyer is a 79 y.o. female.    Chief Complaint: Follow-up    History of Present Illness    CHIEF COMPLAINT:  Patient presents today for hospital follow-up after COPD exacerbation.    HISTORY OF PRESENT ILLNESS:  She was recently hospitalized at [facility] from the 22nd to 28th due to COPD exacerbation triggered by COVID-19. During hospitalization, she required oxygen support and was treated with multiple interventions including inhaled medications and steroids. She received Paxlovid for COVID-19 and two antibiotics (Rocephin and Zithromax). She experienced bladder issues requiring temporary Flores catheter placement, which subsequently resolved. She also received a blood transfusion during the hospital stay. She was initially considered for skilled nursing facility placement but improved sufficiently to be discharged home. She reports feeling weak following this serious medical event.    CURRENT MEDICATIONS:  She is currently on a tapering dose of steroids (yellow-greenish capsule), Flomax 30-day course for bladder emptying, and Periactin for appetite stimulation. She completed two days of antibiotics with one more day remaining. She reports urinating normally after previous bladder catheterization.    SOCIAL HISTORY:  She reports minimal smoking, currently taking a few puffs when going outside. She acknowledges the need to completely quit smoking and states she is actively trying to do so.    INTEGUMENTARY:  She reports a bedsore located on the right buttock that is almost healed with residual bruising at the site. She is currently using a donut cushion to alleviate pressure and aid healing. The wound is growing over and improving.    HEMATOLOGIC:  She has long-standing anemia and is currently being followed by a hematologist. She is scheduled to see the hematologist next week for ongoing management.    PMH, PSH, SH, FH reviewed with patient.      ROS:  General: -fever,  -chills, -fatigue, -weight gain, -weight loss, +weakness  Eyes: -vision changes, -redness, -discharge  ENT: -ear pain, -nasal congestion, -sore throat  Cardiovascular: -chest pain, -palpitations, -lower extremity edema  Respiratory: -cough, -shortness of breath  Gastrointestinal: -abdominal pain, -nausea, -vomiting, -diarrhea, -constipation, -blood in stool  Genitourinary: -dysuria, -hematuria, -frequency  Musculoskeletal: -joint pain, -muscle pain  Skin: -rash, -lesion  Neurological: -headache, -dizziness, -numbness, -tingling  Psychiatric: -anxiety, -depression, -sleep difficulty         Exam:  Physical Exam    General: No acute distress. Well-developed. Well-nourished.  Eyes: EOMI. Sclerae anicteric.  HENT: Normocephalic. Atraumatic. Nares patent. Moist oral mucosa.  Cardiovascular: Regular rate. Regular rhythm. No murmurs. No rubs. No gallops. Normal S1, S2. Heart beat sounds good.  Respiratory: Normal respiratory effort. Clear to auscultation bilaterally. No rales. No rhonchi. No wheezing.  Abdomen: Soft. Non-tender. Non-distended. Normoactive bowel sounds.  Musculoskeletal: No  obvious deformity.  Extremities: No lower extremity edema.  Neurological: Alert & oriented x3. No slurred speech. Normal gait.  Psychiatric: Normal mood. Normal affect. Good insight. Good judgment.  Skin: Warm. Dry. No rash.         Assessment/Plan:  Nicotine dependence, cigarettes, uncomplicated    COPD, group D, by GOLD 2017 classification  -     Ambulatory referral/consult to Pulmonology; Future; Expected date: 08/13/2025    Iron deficiency anemia, unspecified iron deficiency anemia type    Pressure ulcer of contiguous region involving back and right buttock, stage 2    Other orders  -     cyproheptadine (PERIACTIN) 4 mg tablet; Take 1 tablet (4 mg total) by mouth 3 (three) times daily.  Dispense: 90 tablet; Refill: 0  -     albuterol (PROVENTIL HFA) 90 mcg/actuation inhaler; Inhale 2 puffs into the lungs every 6 (six) hours as  needed for Wheezing. Rescue  Dispense: 18 g; Refill: 11  -     fluticasone-umeclidin-vilanter (TRELEGY ELLIPTA) 100-62.5-25 mcg DsDv; Inhale 1 puff into the lungs once daily.  Dispense: 1 each; Refill: 11         Assessment & Plan    IMPRESSION:  - Assessed recent hospitalization for COPD exacerbation triggered by COVID-19.  - Evaluated current respiratory status, noting improvement with clear lung sounds.  - Reviewed healing progress of right buttock bedsore.  - Determined need for COPD maintenance and rescue inhalers.    ## CHRONIC OBSTRUCTIVE PULMONARY DISEASE (COPD):  - Monitored patient's recent COPD exacerbation which required hospitalization, oxygen, inhalers, steroids, and antibiotics (Rocephin and Zithromax).  - Lungs sound good today with no crackles or wheezing.  - Prescribed Albuterol inhaler as rescue medication and Trelegy inhaler, 1 puff daily, as controller medication.  - Discussed the difference between asthma and COPD, noting similarities in treatment approaches.  - Referred the patient to a pulmonologist for ongoing COPD management.  - Recommend annual influenza vaccination and COVID-19 vaccine for prevention of future exacerbations.    ## COVID-19:  - Identified COVID-19 as the trigger for the patient's COPD exacerbation.  - Treated with Paxlovid.  - Explained the annual schedule for COVID-19 vaccination, clarifying its similarity to influenza vaccine updates.    ## PRESSURE ULCER:  - Monitored pressure ulcer on right buttock which has almost cleared up, with only a blue spot remaining.  - The bedsore appears healed over with a small residual bruise.  - Advised patient to maintain good nutrition and keep pressure off the healing area to promote complete healing.    ## ANEMIA:  - Monitored patient's long-standing anemia.  - Patient received a blood transfusion during hospitalization.  - Instructed to follow up with the hematologist next week.    ## SMOKING CESSATION:  - Patient currently smoking  very little, only a few puffs outside.  - Emphasized the importance of smoking cessation for COPD management.  - Counseled on cessation strategies and advised to stop smoking completely.    ## STEROID USE AND GENERAL HEALTH:  - Patient currently on steroids, noting possible confusion with antibiotics.  - Patient reports feeling significantly weak.  - Encouraged maintaining good nutrition to support recovery.    ## FOLLOW-UP:  - Ordered labs to be completed during the current visit to avoid an extra trip for the patient.     Transitional Care Note    Family and/or Caretaker present at visit?  Yes.  Diagnostic tests reviewed/disposition: I have reviewed all completed as well as pending diagnostic tests at the time of discharge.  Disease/illness education: given  Home health/community services discussion/referrals: Patient does not have home health established from hospital visit.  They do not need home health.  If needed, we will set up home health for the patient.   Establishment or re-establishment of referral orders for community resources: No other necessary community resources.   Discussion with other health care providers: done.               Visit today included increased complexity associated with the care of the episodic problem  addressed and managing the longitudinal care of the patient due to the serious and/or complex managed problem(s) .      No follow-ups on file.    This note was generated with the assistance of ambient listening technology. Verbal consent was obtained by the patient and accompanying visitor(s) for the recording of patient appointment to facilitate this note. I attest to having reviewed and edited the generated note for accuracy, though some syntax or spelling errors may persist. Please contact the author of this note for any clarification.

## 2025-08-15 ENCOUNTER — OFFICE VISIT (OUTPATIENT)
Dept: HEMATOLOGY/ONCOLOGY | Facility: CLINIC | Age: 80
End: 2025-08-15
Payer: MEDICARE

## 2025-08-15 VITALS
OXYGEN SATURATION: 98 % | HEART RATE: 97 BPM | TEMPERATURE: 98 F | DIASTOLIC BLOOD PRESSURE: 71 MMHG | RESPIRATION RATE: 18 BRPM | SYSTOLIC BLOOD PRESSURE: 121 MMHG | WEIGHT: 86 LBS | BODY MASS INDEX: 15.83 KG/M2 | HEIGHT: 62 IN

## 2025-08-15 DIAGNOSIS — R74.01 TRANSAMINITIS: ICD-10-CM

## 2025-08-15 DIAGNOSIS — R79.89 LOW SERUM TOTAL PROTEIN LEVEL: Primary | ICD-10-CM

## 2025-08-15 DIAGNOSIS — D53.9 MACROCYTIC ANEMIA: ICD-10-CM

## 2025-08-15 DIAGNOSIS — R76.8 ELEVATED SERUM IMMUNOGLOBULIN FREE LIGHT CHAIN LEVEL: ICD-10-CM

## 2025-08-15 PROCEDURE — 99999 PR PBB SHADOW E&M-EST. PATIENT-LVL V: CPT | Mod: PBBFAC,HCNC,, | Performed by: NURSE PRACTITIONER

## 2025-08-20 ENCOUNTER — TELEPHONE (OUTPATIENT)
Dept: INTERNAL MEDICINE | Facility: CLINIC | Age: 80
End: 2025-08-20
Payer: MEDICARE

## 2025-08-25 ENCOUNTER — OFFICE VISIT (OUTPATIENT)
Dept: INTERNAL MEDICINE | Facility: CLINIC | Age: 80
End: 2025-08-25
Payer: MEDICARE

## 2025-08-25 VITALS
DIASTOLIC BLOOD PRESSURE: 68 MMHG | OXYGEN SATURATION: 95 % | BODY MASS INDEX: 16.79 KG/M2 | SYSTOLIC BLOOD PRESSURE: 128 MMHG | WEIGHT: 91.25 LBS | TEMPERATURE: 98 F | HEART RATE: 98 BPM | HEIGHT: 62 IN

## 2025-08-25 DIAGNOSIS — I73.9 PVD (PERIPHERAL VASCULAR DISEASE): ICD-10-CM

## 2025-08-25 DIAGNOSIS — M46.1 SACROILIITIS: ICD-10-CM

## 2025-08-25 DIAGNOSIS — F17.210 NICOTINE DEPENDENCE, CIGARETTES, UNCOMPLICATED: Chronic | ICD-10-CM

## 2025-08-25 DIAGNOSIS — R26.9 ABNORMALITY OF GAIT AND MOBILITY: ICD-10-CM

## 2025-08-25 DIAGNOSIS — Z89.412 STATUS POST AMPUTATION OF GREAT TOE, LEFT: ICD-10-CM

## 2025-08-25 DIAGNOSIS — D69.2 SENILE PURPURA: ICD-10-CM

## 2025-08-25 DIAGNOSIS — I70.262 ATHEROSCLEROSIS OF NATIVE ARTERY OF LEFT LOWER EXTREMITY WITH GANGRENE: ICD-10-CM

## 2025-08-25 DIAGNOSIS — U09.9 POST-COVID-19 SYNDROME MANIFESTING AS CHRONIC FATIGUE: Primary | ICD-10-CM

## 2025-08-25 DIAGNOSIS — G89.4 CHRONIC PAIN SYNDROME: ICD-10-CM

## 2025-08-25 DIAGNOSIS — R53.1 GENERALIZED WEAKNESS: ICD-10-CM

## 2025-08-25 DIAGNOSIS — G93.32 POST-COVID-19 SYNDROME MANIFESTING AS CHRONIC FATIGUE: Primary | ICD-10-CM

## 2025-08-25 DIAGNOSIS — I27.20 PULMONARY HYPERTENSION: ICD-10-CM

## 2025-08-25 DIAGNOSIS — C44.300 SKIN CANCER OF FACE: ICD-10-CM

## 2025-08-25 DIAGNOSIS — D50.9 IRON DEFICIENCY ANEMIA, UNSPECIFIED IRON DEFICIENCY ANEMIA TYPE: ICD-10-CM

## 2025-08-25 DIAGNOSIS — E78.2 MIXED HYPERLIPIDEMIA: Chronic | ICD-10-CM

## 2025-08-25 DIAGNOSIS — R47.89 OTHER SPEECH DISTURBANCE: ICD-10-CM

## 2025-08-25 DIAGNOSIS — J44.9 COPD, GROUP D, BY GOLD 2017 CLASSIFICATION: ICD-10-CM

## 2025-08-25 DIAGNOSIS — I25.10 CORONARY ARTERY DISEASE INVOLVING NATIVE CORONARY ARTERY OF NATIVE HEART WITHOUT ANGINA PECTORIS: ICD-10-CM

## 2025-08-25 DIAGNOSIS — I70.0 ATHEROSCLEROSIS OF AORTA: ICD-10-CM

## 2025-08-25 DIAGNOSIS — E55.9 VITAMIN D DEFICIENCY: ICD-10-CM

## 2025-08-25 DIAGNOSIS — F32.0 CURRENT MILD EPISODE OF MAJOR DEPRESSIVE DISORDER, UNSPECIFIED WHETHER RECURRENT: ICD-10-CM

## 2025-08-25 DIAGNOSIS — M81.0 AGE-RELATED OSTEOPOROSIS WITHOUT CURRENT PATHOLOGICAL FRACTURE: ICD-10-CM

## 2025-08-25 DIAGNOSIS — I50.32 CHRONIC HEART FAILURE WITH PRESERVED EJECTION FRACTION: ICD-10-CM

## 2025-08-25 DIAGNOSIS — I10 PRIMARY HYPERTENSION: Chronic | ICD-10-CM

## 2025-08-25 PROCEDURE — 3078F DIAST BP <80 MM HG: CPT | Mod: CPTII,HCNC,S$GLB, | Performed by: PHYSICIAN ASSISTANT

## 2025-08-25 PROCEDURE — 3074F SYST BP LT 130 MM HG: CPT | Mod: CPTII,HCNC,S$GLB, | Performed by: PHYSICIAN ASSISTANT

## 2025-08-25 PROCEDURE — G2211 COMPLEX E/M VISIT ADD ON: HCPCS | Mod: HCNC,S$GLB,, | Performed by: PHYSICIAN ASSISTANT

## 2025-08-25 PROCEDURE — 1159F MED LIST DOCD IN RCRD: CPT | Mod: CPTII,HCNC,S$GLB, | Performed by: PHYSICIAN ASSISTANT

## 2025-08-25 PROCEDURE — 99999 PR PBB SHADOW E&M-EST. PATIENT-LVL V: CPT | Mod: PBBFAC,HCNC,, | Performed by: PHYSICIAN ASSISTANT

## 2025-08-25 PROCEDURE — 1160F RVW MEDS BY RX/DR IN RCRD: CPT | Mod: CPTII,HCNC,S$GLB, | Performed by: PHYSICIAN ASSISTANT

## 2025-08-25 PROCEDURE — 1101F PT FALLS ASSESS-DOCD LE1/YR: CPT | Mod: CPTII,HCNC,S$GLB, | Performed by: PHYSICIAN ASSISTANT

## 2025-08-25 PROCEDURE — 99214 OFFICE O/P EST MOD 30 MIN: CPT | Mod: HCNC,S$GLB,, | Performed by: PHYSICIAN ASSISTANT

## 2025-08-25 PROCEDURE — 1126F AMNT PAIN NOTED NONE PRSNT: CPT | Mod: CPTII,HCNC,S$GLB, | Performed by: PHYSICIAN ASSISTANT

## 2025-08-25 PROCEDURE — 3288F FALL RISK ASSESSMENT DOCD: CPT | Mod: CPTII,HCNC,S$GLB, | Performed by: PHYSICIAN ASSISTANT

## (undated) DEVICE — APPLICATOR STRL COT 2INNR 6IN

## (undated) DEVICE — Device

## (undated) DEVICE — CATH ANGIO DIAG RIM 5X65

## (undated) DEVICE — GUIDEWIRE STF .035X180CM ANG

## (undated) DEVICE — OMNIPAQUE 300MG 150ML VIAL

## (undated) DEVICE — SEE MEDLINE ITEM 146308

## (undated) DEVICE — SEE MEDLINE ITEM 157027

## (undated) DEVICE — DRESSING N ADH OIL EMUL 3X3

## (undated) DEVICE — SHEET DRAPE FAN-FOLDED 3/4

## (undated) DEVICE — KIT SYR REUSABLE

## (undated) DEVICE — SUPPORT ULNA NERVE PROTECTOR

## (undated) DEVICE — DRAPE SURG W/TWL 17 5/8X23

## (undated) DEVICE — KIT MANIFOLD LOW PRESS TUBING

## (undated) DEVICE — SHOE POST-OP VEL CLOS W/MD

## (undated) DEVICE — HEMOSTAT SURGICEL 2X3IN

## (undated) DEVICE — GUIDEWIRE ALLSTAR .014X190

## (undated) DEVICE — PACK CATH LAB CUSTOM BR

## (undated) DEVICE — COVER OVERHEAD SURG LT BLUE

## (undated) DEVICE — SUT ETHILON 3-0 PS2 18 BLK

## (undated) DEVICE — GUIDEWIRE STD .035X180CM ANG

## (undated) DEVICE — DRESSING TRANS 4X4 TEGADERM

## (undated) DEVICE — DRAPE PLASTIC U 60X72

## (undated) DEVICE — KIT SITE-RITE NDL GUIDE 21G

## (undated) DEVICE — SYR 3CC LUER LOC

## (undated) DEVICE — GAUZE SPONGE 4X4 12PLY

## (undated) DEVICE — SEE MEDLINE ITEM 152522

## (undated) DEVICE — ELECTRODE REM PLYHSV RETURN 9

## (undated) DEVICE — STOCKINET TUBULAR 1 PLY 6X60IN

## (undated) DEVICE — ANGIOTOUCH KIT

## (undated) DEVICE — KIT INTRODUCER STIFFEN MICRO

## (undated) DEVICE — APPLICATOR CHLORAPREP ORN 26ML

## (undated) DEVICE — GLOVE SURGICAL LATEX SZ 7

## (undated) DEVICE — CATHETER 5FR 90CM TRAILBLAZER

## (undated) DEVICE — CATH SHCKWAVE M5+ IVL 6.0X60MM

## (undated) DEVICE — SEE MEDLINE ITEM 152622

## (undated) DEVICE — CATH ANGIO SOFT VU 5FR 65CM

## (undated) DEVICE — NDL SAFETY 25G X 1.5 ECLIPSE

## (undated) DEVICE — SEE MEDLINE ITEM 146298

## (undated) DEVICE — POSITIONER HEAD DONUT 9IN FOAM

## (undated) DEVICE — SYR 10CC LUER LOCK

## (undated) DEVICE — CONTAINER SPECIMEN STRL 4OZ

## (undated) DEVICE — SPONGE DERMACEA GAUZE 4X4

## (undated) DEVICE — PAD CAST SPECIALIST STRL 4

## (undated) DEVICE — SEE MEDLINE ITEM 157117

## (undated) DEVICE — SWAB CULTURETTE II DUAL

## (undated) DEVICE — CONTRAST OMNIPAQUE 240 150ML

## (undated) DEVICE — MANIFOLD 4 PORT

## (undated) DEVICE — SOL 9P NACL IRR PIC IL

## (undated) DEVICE — UNDERGLOVES BIOGEL PI SZ 7 LF

## (undated) DEVICE — DECANTER VIAL ASEPTIC TRANSFER

## (undated) DEVICE — SHEATH INTRODUCER 5FR 10CM

## (undated) DEVICE — WIRE GUIDE TEFLON 3CM .035 145

## (undated) DEVICE — SYR BULB 60CC IRRIGATION

## (undated) DEVICE — GLIDE CATH ANGLED 4FR 65CM

## (undated) DEVICE — BLADE SCALP OPHTL RND TIP

## (undated) DEVICE — NDL HYPODERMIC BLUNT 18G 1.5IN